# Patient Record
Sex: MALE | Race: WHITE | NOT HISPANIC OR LATINO | URBAN - METROPOLITAN AREA
[De-identification: names, ages, dates, MRNs, and addresses within clinical notes are randomized per-mention and may not be internally consistent; named-entity substitution may affect disease eponyms.]

---

## 2017-01-23 ENCOUNTER — OUTPATIENT (OUTPATIENT)
Dept: OUTPATIENT SERVICES | Facility: HOSPITAL | Age: 57
LOS: 1 days | End: 2017-01-23

## 2017-01-23 ENCOUNTER — EMERGENCY (EMERGENCY)
Facility: HOSPITAL | Age: 57
LOS: 1 days | End: 2017-01-23
Payer: MEDICARE

## 2017-01-23 PROCEDURE — 99285 EMERGENCY DEPT VISIT HI MDM: CPT

## 2017-01-23 PROCEDURE — 74176 CT ABD & PELVIS W/O CONTRAST: CPT | Mod: 26

## 2017-01-23 PROCEDURE — 71010: CPT | Mod: 26

## 2017-01-23 PROCEDURE — 76700 US EXAM ABDOM COMPLETE: CPT | Mod: 26

## 2017-02-07 ENCOUNTER — EMERGENCY (EMERGENCY)
Facility: HOSPITAL | Age: 57
LOS: 1 days | End: 2017-02-07
Payer: MEDICARE

## 2017-02-07 PROCEDURE — 99284 EMERGENCY DEPT VISIT MOD MDM: CPT

## 2017-02-07 PROCEDURE — 74177 CT ABD & PELVIS W/CONTRAST: CPT | Mod: 26

## 2017-07-24 ENCOUNTER — OUTPATIENT (OUTPATIENT)
Dept: EMERGENCY DEPT | Facility: HOSPITAL | Age: 57
LOS: 1 days | End: 2017-07-24
Payer: MEDICARE

## 2017-07-24 VITALS
RESPIRATION RATE: 26 BRPM | DIASTOLIC BLOOD PRESSURE: 88 MMHG | SYSTOLIC BLOOD PRESSURE: 138 MMHG | OXYGEN SATURATION: 95 % | HEART RATE: 102 BPM

## 2017-07-24 DIAGNOSIS — F17.200 NICOTINE DEPENDENCE, UNSPECIFIED, UNCOMPLICATED: ICD-10-CM

## 2017-07-24 DIAGNOSIS — J98.6 DISORDERS OF DIAPHRAGM: ICD-10-CM

## 2017-07-24 DIAGNOSIS — R65.10 SYSTEMIC INFLAMMATORY RESPONSE SYNDROME (SIRS) OF NON-INFECTIOUS ORIGIN WITHOUT ACUTE ORGAN DYSFUNCTION: ICD-10-CM

## 2017-07-24 DIAGNOSIS — I10 ESSENTIAL (PRIMARY) HYPERTENSION: ICD-10-CM

## 2017-07-24 DIAGNOSIS — I50.9 HEART FAILURE, UNSPECIFIED: ICD-10-CM

## 2017-07-24 DIAGNOSIS — R07.9 CHEST PAIN, UNSPECIFIED: ICD-10-CM

## 2017-07-24 DIAGNOSIS — R10.9 UNSPECIFIED ABDOMINAL PAIN: ICD-10-CM

## 2017-07-24 DIAGNOSIS — J44.9 CHRONIC OBSTRUCTIVE PULMONARY DISEASE, UNSPECIFIED: ICD-10-CM

## 2017-07-24 DIAGNOSIS — Z29.9 ENCOUNTER FOR PROPHYLACTIC MEASURES, UNSPECIFIED: ICD-10-CM

## 2017-07-24 LAB
ALBUMIN SERPL ELPH-MCNC: 4.1 G/DL — SIGNIFICANT CHANGE UP (ref 3.3–5)
ALBUMIN SERPL ELPH-MCNC: 4.3 G/DL — SIGNIFICANT CHANGE UP (ref 3.3–5)
ALP SERPL-CCNC: 59 U/L — SIGNIFICANT CHANGE UP (ref 40–120)
ALP SERPL-CCNC: 65 U/L — SIGNIFICANT CHANGE UP (ref 40–120)
ALT FLD-CCNC: 28 U/L RC — SIGNIFICANT CHANGE UP (ref 10–45)
ALT FLD-CCNC: 31 U/L RC — SIGNIFICANT CHANGE UP (ref 10–45)
AMPHET UR-MCNC: POSITIVE
AMYLASE P1 CFR SERPL: 29 U/L — SIGNIFICANT CHANGE UP (ref 25–125)
AMYLASE P1 CFR SERPL: 32 U/L — SIGNIFICANT CHANGE UP (ref 25–125)
ANION GAP SERPL CALC-SCNC: 15 MMOL/L — SIGNIFICANT CHANGE UP (ref 5–17)
ANION GAP SERPL CALC-SCNC: 15 MMOL/L — SIGNIFICANT CHANGE UP (ref 5–17)
APPEARANCE UR: CLEAR — SIGNIFICANT CHANGE UP
APPEARANCE UR: CLEAR — SIGNIFICANT CHANGE UP
APTT BLD: 39.8 SEC — HIGH (ref 27.5–37.4)
AST SERPL-CCNC: 21 U/L — SIGNIFICANT CHANGE UP (ref 10–40)
AST SERPL-CCNC: 25 U/L — SIGNIFICANT CHANGE UP (ref 10–40)
BARBITURATES UR SCN-MCNC: NEGATIVE — SIGNIFICANT CHANGE UP
BASOPHILS # BLD AUTO: 0 K/UL — SIGNIFICANT CHANGE UP (ref 0–0.2)
BASOPHILS # BLD AUTO: 0.1 K/UL — SIGNIFICANT CHANGE UP (ref 0–0.2)
BASOPHILS NFR BLD AUTO: 0 % — SIGNIFICANT CHANGE UP (ref 0–2)
BASOPHILS NFR BLD AUTO: 0.3 % — SIGNIFICANT CHANGE UP (ref 0–2)
BENZODIAZ UR-MCNC: NEGATIVE — SIGNIFICANT CHANGE UP
BILIRUB SERPL-MCNC: 0.5 MG/DL — SIGNIFICANT CHANGE UP (ref 0.2–1.2)
BILIRUB SERPL-MCNC: 0.8 MG/DL — SIGNIFICANT CHANGE UP (ref 0.2–1.2)
BILIRUB UR-MCNC: NEGATIVE — SIGNIFICANT CHANGE UP
BILIRUB UR-MCNC: NEGATIVE — SIGNIFICANT CHANGE UP
BLD GP AB SCN SERPL QL: NEGATIVE — SIGNIFICANT CHANGE UP
BUN SERPL-MCNC: 14 MG/DL — SIGNIFICANT CHANGE UP (ref 7–23)
BUN SERPL-MCNC: 15 MG/DL — SIGNIFICANT CHANGE UP (ref 7–23)
CALCIUM SERPL-MCNC: 9 MG/DL — SIGNIFICANT CHANGE UP (ref 8.4–10.5)
CALCIUM SERPL-MCNC: 9.2 MG/DL — SIGNIFICANT CHANGE UP (ref 8.4–10.5)
CHLORIDE SERPL-SCNC: 106 MMOL/L — SIGNIFICANT CHANGE UP (ref 96–108)
CHLORIDE SERPL-SCNC: 106 MMOL/L — SIGNIFICANT CHANGE UP (ref 96–108)
CK MB BLD-MCNC: 1.7 % — SIGNIFICANT CHANGE UP (ref 0–3.5)
CK MB CFR SERPL CALC: 2.7 NG/ML — SIGNIFICANT CHANGE UP (ref 0–6.7)
CK SERPL-CCNC: 159 U/L — SIGNIFICANT CHANGE UP (ref 30–200)
CO2 SERPL-SCNC: 21 MMOL/L — LOW (ref 22–31)
CO2 SERPL-SCNC: 22 MMOL/L — SIGNIFICANT CHANGE UP (ref 22–31)
COCAINE METAB.OTHER UR-MCNC: NEGATIVE — SIGNIFICANT CHANGE UP
COLOR SPEC: SIGNIFICANT CHANGE UP
COLOR SPEC: YELLOW — SIGNIFICANT CHANGE UP
CREAT SERPL-MCNC: 1.1 MG/DL — SIGNIFICANT CHANGE UP (ref 0.5–1.3)
CREAT SERPL-MCNC: 1.13 MG/DL — SIGNIFICANT CHANGE UP (ref 0.5–1.3)
DIFF PNL FLD: ABNORMAL
DIFF PNL FLD: ABNORMAL
EOSINOPHIL # BLD AUTO: 0.1 K/UL — SIGNIFICANT CHANGE UP (ref 0–0.5)
EOSINOPHIL # BLD AUTO: 0.2 K/UL — SIGNIFICANT CHANGE UP (ref 0–0.5)
EOSINOPHIL NFR BLD AUTO: 0.7 % — SIGNIFICANT CHANGE UP (ref 0–6)
EOSINOPHIL NFR BLD AUTO: 1.4 % — SIGNIFICANT CHANGE UP (ref 0–6)
GAS PNL BLDV: SIGNIFICANT CHANGE UP
GLUCOSE SERPL-MCNC: 107 MG/DL — HIGH (ref 70–99)
GLUCOSE SERPL-MCNC: 113 MG/DL — HIGH (ref 70–99)
GLUCOSE UR QL: NEGATIVE — SIGNIFICANT CHANGE UP
GLUCOSE UR QL: NEGATIVE — SIGNIFICANT CHANGE UP
HCT VFR BLD CALC: 43 % — SIGNIFICANT CHANGE UP (ref 39–50)
HCT VFR BLD CALC: 49.1 % — SIGNIFICANT CHANGE UP (ref 39–50)
HGB BLD-MCNC: 14.8 G/DL — SIGNIFICANT CHANGE UP (ref 13–17)
HGB BLD-MCNC: 15.6 G/DL — SIGNIFICANT CHANGE UP (ref 13–17)
INR BLD: 1 RATIO — SIGNIFICANT CHANGE UP (ref 0.88–1.16)
KETONES UR-MCNC: NEGATIVE — SIGNIFICANT CHANGE UP
KETONES UR-MCNC: NEGATIVE — SIGNIFICANT CHANGE UP
LACTATE SERPL-SCNC: 1 MMOL/L — SIGNIFICANT CHANGE UP (ref 0.7–2)
LEUKOCYTE ESTERASE UR-ACNC: NEGATIVE — SIGNIFICANT CHANGE UP
LEUKOCYTE ESTERASE UR-ACNC: NEGATIVE — SIGNIFICANT CHANGE UP
LIDOCAIN IGE QN: 23 U/L — SIGNIFICANT CHANGE UP (ref 7–60)
LIDOCAIN IGE QN: 23 U/L — SIGNIFICANT CHANGE UP (ref 7–60)
LYMPHOCYTES # BLD AUTO: 1.3 K/UL — SIGNIFICANT CHANGE UP (ref 1–3.3)
LYMPHOCYTES # BLD AUTO: 1.8 K/UL — SIGNIFICANT CHANGE UP (ref 1–3.3)
LYMPHOCYTES # BLD AUTO: 10.6 % — LOW (ref 13–44)
LYMPHOCYTES # BLD AUTO: 11.6 % — LOW (ref 13–44)
MCHC RBC-ENTMCNC: 29.7 PG — SIGNIFICANT CHANGE UP (ref 27–34)
MCHC RBC-ENTMCNC: 31.7 GM/DL — LOW (ref 32–36)
MCHC RBC-ENTMCNC: 32.1 PG — SIGNIFICANT CHANGE UP (ref 27–34)
MCHC RBC-ENTMCNC: 34.5 GM/DL — SIGNIFICANT CHANGE UP (ref 32–36)
MCV RBC AUTO: 93.1 FL — SIGNIFICANT CHANGE UP (ref 80–100)
MCV RBC AUTO: 93.7 FL — SIGNIFICANT CHANGE UP (ref 80–100)
METHADONE UR-MCNC: NEGATIVE — SIGNIFICANT CHANGE UP
MONOCYTES # BLD AUTO: 0.9 K/UL — SIGNIFICANT CHANGE UP (ref 0–0.9)
MONOCYTES # BLD AUTO: 1.2 K/UL — HIGH (ref 0–0.9)
MONOCYTES NFR BLD AUTO: 7.4 % — SIGNIFICANT CHANGE UP (ref 2–14)
MONOCYTES NFR BLD AUTO: 8.3 % — SIGNIFICANT CHANGE UP (ref 2–14)
NEUTROPHILS # BLD AUTO: 13.8 K/UL — HIGH (ref 1.8–7.4)
NEUTROPHILS # BLD AUTO: 8.8 K/UL — HIGH (ref 1.8–7.4)
NEUTROPHILS NFR BLD AUTO: 78.7 % — HIGH (ref 43–77)
NEUTROPHILS NFR BLD AUTO: 81 % — HIGH (ref 43–77)
NITRITE UR-MCNC: NEGATIVE — SIGNIFICANT CHANGE UP
NITRITE UR-MCNC: NEGATIVE — SIGNIFICANT CHANGE UP
NT-PROBNP SERPL-SCNC: 559 PG/ML — HIGH (ref 0–300)
OPIATES UR-MCNC: NEGATIVE — SIGNIFICANT CHANGE UP
OXYCODONE UR-MCNC: NEGATIVE — SIGNIFICANT CHANGE UP
PCP SPEC-MCNC: SIGNIFICANT CHANGE UP
PCP UR-MCNC: NEGATIVE — SIGNIFICANT CHANGE UP
PH UR: 6 — SIGNIFICANT CHANGE UP (ref 5–8)
PH UR: 6.5 — SIGNIFICANT CHANGE UP (ref 5–8)
PLATELET # BLD AUTO: 235 K/UL — SIGNIFICANT CHANGE UP (ref 150–400)
PLATELET # BLD AUTO: 285 K/UL — SIGNIFICANT CHANGE UP (ref 150–400)
POTASSIUM SERPL-MCNC: 3.5 MMOL/L — SIGNIFICANT CHANGE UP (ref 3.5–5.3)
POTASSIUM SERPL-MCNC: 3.6 MMOL/L — SIGNIFICANT CHANGE UP (ref 3.5–5.3)
POTASSIUM SERPL-SCNC: 3.5 MMOL/L — SIGNIFICANT CHANGE UP (ref 3.5–5.3)
POTASSIUM SERPL-SCNC: 3.6 MMOL/L — SIGNIFICANT CHANGE UP (ref 3.5–5.3)
PROT SERPL-MCNC: 7 G/DL — SIGNIFICANT CHANGE UP (ref 6–8.3)
PROT SERPL-MCNC: 7.6 G/DL — SIGNIFICANT CHANGE UP (ref 6–8.3)
PROT UR-MCNC: SIGNIFICANT CHANGE UP
PROT UR-MCNC: SIGNIFICANT CHANGE UP
PROTHROM AB SERPL-ACNC: 10.9 SEC — SIGNIFICANT CHANGE UP (ref 9.8–12.7)
RBC # BLD: 4.62 M/UL — SIGNIFICANT CHANGE UP (ref 4.2–5.8)
RBC # BLD: 5.24 M/UL — SIGNIFICANT CHANGE UP (ref 4.2–5.8)
RBC # FLD: 13.1 % — SIGNIFICANT CHANGE UP (ref 10.3–14.5)
RBC # FLD: 13.2 % — SIGNIFICANT CHANGE UP (ref 10.3–14.5)
RBC CASTS # UR COMP ASSIST: ABNORMAL /HPF (ref 0–2)
RH IG SCN BLD-IMP: NEGATIVE — SIGNIFICANT CHANGE UP
SODIUM SERPL-SCNC: 142 MMOL/L — SIGNIFICANT CHANGE UP (ref 135–145)
SODIUM SERPL-SCNC: 143 MMOL/L — SIGNIFICANT CHANGE UP (ref 135–145)
SP GR SPEC: >1.03 — HIGH (ref 1.01–1.02)
SP GR SPEC: >1.03 — HIGH (ref 1.01–1.02)
THC UR QL: NEGATIVE — SIGNIFICANT CHANGE UP
TROPONIN T SERPL-MCNC: <0.01 NG/ML — SIGNIFICANT CHANGE UP (ref 0–0.06)
UROBILINOGEN FLD QL: NEGATIVE — SIGNIFICANT CHANGE UP
UROBILINOGEN FLD QL: NEGATIVE — SIGNIFICANT CHANGE UP
WBC # BLD: 11.1 K/UL — HIGH (ref 3.8–10.5)
WBC # BLD: 17 K/UL — HIGH (ref 3.8–10.5)
WBC # FLD AUTO: 11.1 K/UL — HIGH (ref 3.8–10.5)
WBC # FLD AUTO: 17 K/UL — HIGH (ref 3.8–10.5)
WBC UR QL: SIGNIFICANT CHANGE UP /HPF (ref 0–5)

## 2017-07-24 PROCEDURE — 93010 ELECTROCARDIOGRAM REPORT: CPT

## 2017-07-24 RX ORDER — FLUTICASONE PROPIONATE 50 MCG
1 SPRAY, SUSPENSION NASAL DAILY
Qty: 0 | Refills: 0 | Status: DISCONTINUED | OUTPATIENT
Start: 2017-07-24 | End: 2017-07-25

## 2017-07-24 RX ORDER — DEXTROAMPHETAMINE SACCHARATE, AMPHETAMINE ASPARTATE, DEXTROAMPHETAMINE SULFATE AND AMPHETAMINE SULFATE 1.875; 1.875; 1.875; 1.875 MG/1; MG/1; MG/1; MG/1
30 TABLET ORAL DAILY
Qty: 0 | Refills: 0 | Status: DISCONTINUED | OUTPATIENT
Start: 2017-07-24 | End: 2017-07-25

## 2017-07-24 RX ORDER — NICOTINE POLACRILEX 2 MG
1 GUM BUCCAL DAILY
Qty: 0 | Refills: 0 | Status: DISCONTINUED | OUTPATIENT
Start: 2017-07-24 | End: 2017-07-25

## 2017-07-24 RX ORDER — SODIUM CHLORIDE 9 MG/ML
1000 INJECTION INTRAMUSCULAR; INTRAVENOUS; SUBCUTANEOUS ONCE
Qty: 0 | Refills: 0 | Status: COMPLETED | OUTPATIENT
Start: 2017-07-24 | End: 2017-07-24

## 2017-07-24 RX ORDER — NITROGLYCERIN 6.5 MG
0.4 CAPSULE, EXTENDED RELEASE ORAL
Qty: 0 | Refills: 0 | Status: DISCONTINUED | OUTPATIENT
Start: 2017-07-24 | End: 2017-07-24

## 2017-07-24 RX ORDER — ALPRAZOLAM 0.25 MG
1.5 TABLET ORAL AT BEDTIME
Qty: 0 | Refills: 0 | Status: DISCONTINUED | OUTPATIENT
Start: 2017-07-24 | End: 2017-07-25

## 2017-07-24 RX ORDER — PANTOPRAZOLE SODIUM 20 MG/1
40 TABLET, DELAYED RELEASE ORAL
Qty: 0 | Refills: 0 | Status: DISCONTINUED | OUTPATIENT
Start: 2017-07-24 | End: 2017-07-25

## 2017-07-24 RX ORDER — ASPIRIN/CALCIUM CARB/MAGNESIUM 324 MG
81 TABLET ORAL DAILY
Qty: 0 | Refills: 0 | Status: DISCONTINUED | OUTPATIENT
Start: 2017-07-24 | End: 2017-07-25

## 2017-07-24 RX ORDER — ENOXAPARIN SODIUM 100 MG/ML
40 INJECTION SUBCUTANEOUS DAILY
Qty: 0 | Refills: 0 | Status: DISCONTINUED | OUTPATIENT
Start: 2017-07-24 | End: 2017-07-25

## 2017-07-24 RX ORDER — KETOROLAC TROMETHAMINE 30 MG/ML
30 SYRINGE (ML) INJECTION ONCE
Qty: 0 | Refills: 0 | Status: DISCONTINUED | OUTPATIENT
Start: 2017-07-24 | End: 2017-07-24

## 2017-07-24 RX ORDER — SODIUM CHLORIDE 9 MG/ML
1000 INJECTION INTRAMUSCULAR; INTRAVENOUS; SUBCUTANEOUS
Qty: 0 | Refills: 0 | Status: DISCONTINUED | OUTPATIENT
Start: 2017-07-24 | End: 2017-07-25

## 2017-07-24 RX ORDER — ARIPIPRAZOLE 15 MG/1
2.5 TABLET ORAL AT BEDTIME
Qty: 0 | Refills: 0 | Status: DISCONTINUED | OUTPATIENT
Start: 2017-07-24 | End: 2017-07-24

## 2017-07-24 RX ORDER — HYDROMORPHONE HYDROCHLORIDE 2 MG/ML
0.5 INJECTION INTRAMUSCULAR; INTRAVENOUS; SUBCUTANEOUS EVERY 4 HOURS
Qty: 0 | Refills: 0 | Status: DISCONTINUED | OUTPATIENT
Start: 2017-07-24 | End: 2017-07-25

## 2017-07-24 RX ORDER — MORPHINE SULFATE 50 MG/1
2 CAPSULE, EXTENDED RELEASE ORAL ONCE
Qty: 0 | Refills: 0 | Status: DISCONTINUED | OUTPATIENT
Start: 2017-07-24 | End: 2017-07-24

## 2017-07-24 RX ORDER — CARVEDILOL PHOSPHATE 80 MG/1
12.5 CAPSULE, EXTENDED RELEASE ORAL EVERY 12 HOURS
Qty: 0 | Refills: 0 | Status: DISCONTINUED | OUTPATIENT
Start: 2017-07-24 | End: 2017-07-24

## 2017-07-24 RX ORDER — IBUPROFEN 200 MG
600 TABLET ORAL ONCE
Qty: 0 | Refills: 0 | Status: DISCONTINUED | OUTPATIENT
Start: 2017-07-24 | End: 2017-07-24

## 2017-07-24 RX ORDER — ASPIRIN/CALCIUM CARB/MAGNESIUM 324 MG
325 TABLET ORAL ONCE
Qty: 0 | Refills: 0 | Status: COMPLETED | OUTPATIENT
Start: 2017-07-24 | End: 2017-07-24

## 2017-07-24 RX ORDER — SODIUM CHLORIDE 9 MG/ML
3 INJECTION INTRAMUSCULAR; INTRAVENOUS; SUBCUTANEOUS ONCE
Qty: 0 | Refills: 0 | Status: COMPLETED | OUTPATIENT
Start: 2017-07-24 | End: 2017-07-24

## 2017-07-24 RX ORDER — LORATADINE 10 MG/1
10 TABLET ORAL DAILY
Qty: 0 | Refills: 0 | Status: DISCONTINUED | OUTPATIENT
Start: 2017-07-24 | End: 2017-07-25

## 2017-07-24 RX ORDER — IBUPROFEN 200 MG
600 TABLET ORAL THREE TIMES A DAY
Qty: 0 | Refills: 0 | Status: DISCONTINUED | OUTPATIENT
Start: 2017-07-24 | End: 2017-07-24

## 2017-07-24 RX ORDER — CARVEDILOL PHOSPHATE 80 MG/1
6.25 CAPSULE, EXTENDED RELEASE ORAL EVERY 12 HOURS
Qty: 0 | Refills: 0 | Status: DISCONTINUED | OUTPATIENT
Start: 2017-07-24 | End: 2017-07-25

## 2017-07-24 RX ORDER — NITROGLYCERIN 6.5 MG
1 CAPSULE, EXTENDED RELEASE ORAL ONCE
Qty: 0 | Refills: 0 | Status: DISCONTINUED | OUTPATIENT
Start: 2017-07-24 | End: 2017-07-24

## 2017-07-24 RX ORDER — DEXTROAMPHETAMINE SACCHARATE, AMPHETAMINE ASPARTATE, DEXTROAMPHETAMINE SULFATE AND AMPHETAMINE SULFATE 1.875; 1.875; 1.875; 1.875 MG/1; MG/1; MG/1; MG/1
20 TABLET ORAL THREE TIMES A DAY
Qty: 0 | Refills: 0 | Status: DISCONTINUED | OUTPATIENT
Start: 2017-07-24 | End: 2017-07-24

## 2017-07-24 RX ORDER — DEXTROAMPHETAMINE SACCHARATE, AMPHETAMINE ASPARTATE, DEXTROAMPHETAMINE SULFATE AND AMPHETAMINE SULFATE 1.875; 1.875; 1.875; 1.875 MG/1; MG/1; MG/1; MG/1
15 TABLET ORAL DAILY
Qty: 0 | Refills: 0 | Status: DISCONTINUED | OUTPATIENT
Start: 2017-07-24 | End: 2017-07-25

## 2017-07-24 RX ORDER — ONDANSETRON 8 MG/1
4 TABLET, FILM COATED ORAL EVERY 6 HOURS
Qty: 0 | Refills: 0 | Status: DISCONTINUED | OUTPATIENT
Start: 2017-07-24 | End: 2017-07-25

## 2017-07-24 RX ORDER — IPRATROPIUM/ALBUTEROL SULFATE 18-103MCG
3 AEROSOL WITH ADAPTER (GRAM) INHALATION EVERY 6 HOURS
Qty: 0 | Refills: 0 | Status: DISCONTINUED | OUTPATIENT
Start: 2017-07-24 | End: 2017-07-25

## 2017-07-24 RX ORDER — ACETAMINOPHEN 500 MG
650 TABLET ORAL ONCE
Qty: 0 | Refills: 0 | Status: COMPLETED | OUTPATIENT
Start: 2017-07-24 | End: 2017-07-24

## 2017-07-24 RX ADMIN — Medication 325 MILLIGRAM(S): at 10:25

## 2017-07-24 RX ADMIN — DEXTROAMPHETAMINE SACCHARATE, AMPHETAMINE ASPARTATE, DEXTROAMPHETAMINE SULFATE AND AMPHETAMINE SULFATE 15 MILLIGRAM(S): 1.875; 1.875; 1.875; 1.875 TABLET ORAL at 17:03

## 2017-07-24 RX ADMIN — Medication 0.4 MILLIGRAM(S): at 10:30

## 2017-07-24 RX ADMIN — MORPHINE SULFATE 2 MILLIGRAM(S): 50 CAPSULE, EXTENDED RELEASE ORAL at 15:49

## 2017-07-24 RX ADMIN — Medication 650 MILLIGRAM(S): at 13:18

## 2017-07-24 RX ADMIN — MORPHINE SULFATE 2 MILLIGRAM(S): 50 CAPSULE, EXTENDED RELEASE ORAL at 15:17

## 2017-07-24 RX ADMIN — Medication 1 SPRAY(S): at 14:18

## 2017-07-24 RX ADMIN — Medication 30 MILLIGRAM(S): at 18:06

## 2017-07-24 RX ADMIN — Medication 650 MILLIGRAM(S): at 13:46

## 2017-07-24 RX ADMIN — Medication 0.4 MILLIGRAM(S): at 10:22

## 2017-07-24 RX ADMIN — Medication 600 MILLIGRAM(S): at 19:50

## 2017-07-24 RX ADMIN — PANTOPRAZOLE SODIUM 40 MILLIGRAM(S): 20 TABLET, DELAYED RELEASE ORAL at 21:13

## 2017-07-24 RX ADMIN — Medication 30 MILLIGRAM(S): at 17:33

## 2017-07-24 RX ADMIN — LORATADINE 10 MILLIGRAM(S): 10 TABLET ORAL at 14:19

## 2017-07-24 RX ADMIN — SODIUM CHLORIDE 50 MILLILITER(S): 9 INJECTION INTRAMUSCULAR; INTRAVENOUS; SUBCUTANEOUS at 21:15

## 2017-07-24 RX ADMIN — Medication 10 MILLIGRAM(S): at 17:33

## 2017-07-24 RX ADMIN — Medication 600 MILLIGRAM(S): at 19:18

## 2017-07-24 RX ADMIN — SODIUM CHLORIDE 1000 MILLILITER(S): 9 INJECTION INTRAMUSCULAR; INTRAVENOUS; SUBCUTANEOUS at 10:31

## 2017-07-24 RX ADMIN — DEXTROAMPHETAMINE SACCHARATE, AMPHETAMINE ASPARTATE, DEXTROAMPHETAMINE SULFATE AND AMPHETAMINE SULFATE 30 MILLIGRAM(S): 1.875; 1.875; 1.875; 1.875 TABLET ORAL at 17:03

## 2017-07-24 RX ADMIN — Medication 81 MILLIGRAM(S): at 14:18

## 2017-07-24 RX ADMIN — CARVEDILOL PHOSPHATE 6.25 MILLIGRAM(S): 80 CAPSULE, EXTENDED RELEASE ORAL at 17:33

## 2017-07-24 RX ADMIN — SODIUM CHLORIDE 3 MILLILITER(S): 9 INJECTION INTRAMUSCULAR; INTRAVENOUS; SUBCUTANEOUS at 10:31

## 2017-07-24 RX ADMIN — Medication 0.4 MILLIGRAM(S): at 13:18

## 2017-07-24 RX ADMIN — Medication 1.5 MILLIGRAM(S): at 21:13

## 2017-07-24 NOTE — PROGRESS NOTE ADULT - PROBLEM SELECTOR PLAN 2
8/10 abdominal pain  Unclear etiology. Ruled out for ACS with negative Cardiac Cath this am   In moderate distress due to abdominal pain  Differential includes Gastritis vs intestinal perforation vs gastric/duodenal ulcers vs ischemic bowel vs cholecystitis vs cholangitis.   WBC count 17 with Neutrophil predominance. Lactate 2.1  Will check CT abdomen to rule out 8/10 abdominal pain  Unclear etiology. Ruled out for ACS with negative Cardiac Cath this am   In moderate distress due to abdominal pain  Differential includes Gastritis vs intestinal perforation vs gastric/duodenal ulcers vs ischemic bowel vs cholecystitis vs cholangitis.   WBC count 17 with Neutrophil predominance. Lactate 2.1  Will check CT abdomen to rule out cholecystitis vs pancreatitis 8/10 abdominal pain  Unclear etiology. Ruled out for ACS with negative Cardiac Cath this am   In moderate distress due to abdominal pain  Differential includes Gastritis vs intestinal perforation vs gastric/duodenal ulcers vs ischemic bowel vs cholecystitis vs cholangitis.   WBC count 17 with Neutrophil predominance. Lactate 2.1  Repeat Labs. NPO for now. IV hydration   Pain control with Dilaudid   Will check CT abdomen to rule out cholecystitis vs pancreatitis

## 2017-07-24 NOTE — PROGRESS NOTE ADULT - PROBLEM SELECTOR PLAN 7
Significant smoker   Nocturnal home BiPAP  c/w CPAP Significant smoker   Nocturnal home BiPAP  c/w CPAP  Duonebs PRN

## 2017-07-24 NOTE — ED PROVIDER NOTE - PHYSICAL EXAMINATION
AAOX3, Moderate to severe pain, appears uncomfortable. NCAT, PERRL, no pallor or icterus, MMM Neck Supple, no JVD, HR regular, tachycardic no murmur apprecated. CTABL, no wheeze or rales, ABD S/NT/ND EXT warm, well perfused, No Edema, Distal Pulses Intact, No Rash,  MAEx4, Sensation to soft touch grossly intact, normal strength/tone. AAOX3, Moderate to severe pain, appears uncomfortable. NCAT, PERRL, no pallor or icterus, MMM Neck Supple, no JVD, HR regular, tachycardic no murmur apprecated. CTABL, no wheeze or rales, ABD S/NT/ND EXT warm, well perfused, No Edema, Distal Pulses Intact, No Rash,  MAEx4, Sensation to soft touch grossly intact, normal strength/tone.      louie - no murmur ctabl s1s2 = pulses x4 no bp diffwernetial -

## 2017-07-24 NOTE — ED PROVIDER NOTE - MEDICAL DECISION MAKING DETAILS
Chest pain consistent with ACS and CAD risk factors 1) serial EKGs/CXR/ASA/O2/cardiac monitor/LABS/nitro prn CP. A cath consult was called for persistant pain with typical CP aw bedside echo demonstrating hypokenesis of anterior wall. Pt pain improved from 10/10 to 2/10 sp NTG SL x 2. On evaluation pt began to co of left lower extremeity numbness and parasthesia. Pt was reevaluated and found to have subjective sensory deficit in LLE to soft touch. Heparin held for concern for dissection. XRAY demonstrated nL mediastinum, no pericardial effusion on echo. Cardiology evaluated at [facility]. Pt accepted for diagnostic angiogram and cath. Chest pain consistent with ACS and CAD risk factors 1) serial EKGs/CXR/ASA/O2/cardiac monitor/LABS/nitro prn CP. A cath consult was called for persistant pain with typical CP aw bedside echo demonstrating hypokenesis of anterior wall. Pt pain improved from 10/10 to 2/10 sp NTG SL x 2. On evaluation pt began to co of left lower extremeity numbness and parasthesia. Pt was reevaluated and found to have subjective sensory deficit in LLE to soft touch. Heparin held for concern for dissection. XRAY demonstrated nL mediastinum, no pericardial effusion on echo. Cardiology evaluated at [facility]. Pt accepted for diagnostic angiogram and cath.  louie sscp with rad to l arm - ekg no stemi non dynamic no post wall changes - echo with ant wall hypokinesis - concern for AMI - pt describes transient lle numbnes sin ed - heparin held - will shoot aortogram in cath lab

## 2017-07-24 NOTE — ED PROVIDER NOTE - PROGRESS NOTE DETAILS
Talking with wife - Allergic to statin's and lisinopril, avap for partial paralyzed diaphragm\, copd, tested + for cocksackie,   Takes - prednisone, Adderall,  Carvedilol, Discussed with wife - Pt is allergic to statin's and lisinopril, unkown reaction. Pt uses avap for partial paralyzed diaphragm, has hx of copd, last year was tested + for cocksackie, Pt takes - prednisone, Adderall,  Carvedilol, ASA. - Forrest Coleman, Resident concerning story despite no stelev - cath called - asa and heparin ordered - heparin held bc of transient lle numbness - atypical for disection - howver will hold heparin - no peric effusion on pocus - no widened med on cxr - cards ok with shooting aortogram in lab in lieu of cta pt pain free after 2 sl ntg

## 2017-07-24 NOTE — H&P CARDIOLOGY - HISTORY OF PRESENT ILLNESS
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 Patient reports he developed left sided chest pain radiating to left arm after waking this am. Pain was present until he arrived to the ER at 10 am. Upon arrival to ER, patient had negative troponins and ECHO was done which showed   Taken to cath lab for urgent cath- revealing normal coronaries. 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 Patient reports he developed left sided chest pain radiating to left arm after waking this am. Pain was present until he arrived to the ER at 10 am. Upon arrival to ER, patient had negative troponin x 1  and ECHO was done which showed anterior wall hypokinesis.  Taken to cath lab for urgent cath- revealing normal coronaries. 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 Patient reports he developed left sided chest pain radiating to left arm after waking this am. Pain was present until he arrived to the ER at 10 am. Upon arrival to ER, patient had negative troponin x 1  and ECHO was done which showed anterior wall hypokinesis.  Taken to cath lab for urgent cath- revealing normal coronaries. 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 Patient reports he developed left sided chest pain radiating to left arm after waking this am. Pain was associated with dyspnea, and lasted until he arrived to the ER at 10 am. Upon arrival to ER, patient had negative troponin x 1  and ECHO was done which showed anterior wall hypokinesis.  Taken to cath lab for cardiac cath- revealing normal coronaries. Right radial access with radial band in place- no hematoma or bleeding + cap refill <2 secs. Admitted to hospitalist for further evaluation of chest pain and ESCALERA. 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 Patient reports he developed left sided chest pain radiating to left arm after waking this am. Pain was associated with dyspnea, and lasted until he arrived to the ER at 10 am. Upon arrival to ER, patient had negative troponin x 1  and ECHO was done which showed anterior wall hypokinesis.  Taken to cath lab for cardiac cath- revealing normal coronaries. Right radial access with radial band in place- no hematoma or bleeding + cap refill <2 secs. Admitted to hospitalist for further evaluation of chest pain and ESCALERA. 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 Patient reports he developed left sided chest pain radiating to left arm after waking this am. Pain was associated with dyspnea, and lasted until he arrived to the ER at 10 am. Upon arrival to ER, patient had negative troponin x 1  and ECHO was done which showed anterior wall hypokinesis.  Taken to cath lab for cardiac cath- revealing normal coronaries. Right radial access with radial band in place- no hematoma or bleeding + cap refill <2 secs. Admitted to hospitalist for further evaluation of chest pain and ESCALERA. 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 Patient reports he developed left sided chest pain radiating to left arm after waking this am. Pain was associated with dyspnea, and lasted until he arrived to the ER at 10 am. Upon arrival to ER, patient had negative troponin x 1  and ECHO was done which showed anterior wall hypokinesis.  Taken to cath lab for cardiac cath- revealing normal coronaries. Right radial access with radial band in place- no hematoma or bleeding + cap refill <2 secs. Admitted to hospitalist for further evaluation of chest pain and ESCALERA. 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 Patient reports he developed left sided chest pain radiating to left arm after waking this am. Pain was associated with dyspnea, and lasted until he arrived to the ER at 10 am. Upon arrival to ER, patient had negative troponin x 1  and ECHO was done which showed anterior wall hypokinesis.  Taken to cath lab for cardiac cath- revealing normal coronaries. Right radial access with radial band in place- no hematoma or bleeding + cap refill <2 secs. Admitted to hospitalist for further evaluation of chest pain and ESCALERA.

## 2017-07-24 NOTE — PROGRESS NOTE ADULT - ATTENDING COMMENTS
Pt seen and examined by me with [facility] resident.  Agree with above. 57 yr old male with PMH of bipolar disorder (non compliant with medication), ADHD,  current smoker (>70 pack year smoking hx), COPD- followed by Dr Solomon Heard PCP, partial paralysed diaphragm of unclear etiology (on prednisone-unclear why),  LORNA on home CPAP, HTN, HLD, HF- EF 10% by report s/p negative cath after being admitted this am for cp radiating to left arm, concern for ACS.  On our history, pt states pain is epigastric radiating up to neck and straight through to back.  Wife states pt has been having this pain for 4 yrs and has had extensive work up including endoscopies, CT scans (last 2 yrs ago) with unclear etiology.  Does not remember last TTE.  no weight loss, nl appetite, states he always feels better once he is on CPAP - per resident initial h/p pt was in distress from pain.  On my visit pt was found sleeping on bipap, + tenderness with guarding epigastrium/RUQ and has mild L.CVAT.  Pt states occasionally pain radiates to scrotum but no swelling/skin changes.  labs/studies/consult notes reviewed.  1.  abdominal/chest/back pain - unclear etiology but concern for initial leukocytosis/elevated lactate in immunosuppressed pt.  repeating STAT labs with blood cultures and ck CT a/p  plus chest ? underlying malignancy or mass causing symptoms given smoking/etoh/drug history.    if wbc/lactate are increasing will cover with zosyn.  if negative w/u ? repeat endoscopy  2.  microscopic hematuria - repeat ua, CT to be done with urography to r/o stone/ureter pathology, ? underlying bladder or renal CA  3.  chf - ck tte, cont coreg and asa  4.  adhd  - confirmed adderall and xanax

## 2017-07-24 NOTE — ED PROVIDER NOTE - OBJECTIVE STATEMENT
58yo M pmhx of partial diaphragm paralysis, CHF unknown ef, HTN, pw 10/10 crushing sscp rad to left arm. Pt states pain began on waking this AM at 5:30 and has steadily increased. A/w sob, no n/v, diaphoresis, focal numbness. No hx of recent CP. Pt is a  on local roots. Pt has 1 pack a day tobacco hx. Pt has family hx of CAD but does not know age of onset.     No fever/chills, no change in vision, no throat pain, no jvd, +sob, +cp, no leg swelling or calf pain, no orthopnea, no pnd, no decrease exersized tolerance, no recent travel, no cancer hx, no prior hx of dvt/blood clot,  no abdominal pain, no nausea/vomiting,  no dysuria, no joint pain, no rashes, no focal numbness or weakness, no known mental health issues, no latex allergy, no diabetes,

## 2017-07-24 NOTE — PATIENT PROFILE ADULT. - VISION (WITH CORRECTIVE LENSES IF THE PATIENT USUALLY WEARS THEM):
reading and distance/Partially impaired: cannot see medication labels or newsprint, but can see obstacles in path, and the surrounding layout; can count fingers at arm's length

## 2017-07-24 NOTE — PROGRESS NOTE ADULT - PROBLEM SELECTOR PLAN 1
RR>22, HR>90 With WBC>12  Unclear etiology. Ruled out for ACS with negative Cardiac Cath this am   In moderate distress due to abdominal pain  Differential includes Gastritis vs intestinal perforation vs gastric/duodenal ulcers vs ischemic bowel vs cholecystitis vs cholangitis.   WBC count 17 with Neutrophil predominance. Lactate 2.1  Will check CT abdomen to rule out pancreatitis vs cholecystitis RR>22, HR>90 With WBC>12  Unclear etiology. Ruled out for ACS with negative Cardiac Cath this am   In moderate distress due to abdominal pain  Differential includes Gastritis vs intestinal perforation vs gastric/duodenal ulcers vs ischemic bowel vs cholecystitis vs cholangitis.   WBC count 17 with Neutrophil predominance. Lactate 2.1  Will check CT abdomen to rule out cholecystitis vs pancreatitis RR>22, HR>90 With WBC>12  Unclear etiology. Ruled out for ACS with negative Cardiac Cath this am   In moderate distress due to abdominal pain  Differential includes Gastritis vs intestinal perforation vs gastric/duodenal ulcers vs ischemic bowel vs cholecystitis vs cholangitis.   WBC count 17 with Neutrophil predominance. Lactate 2.1  Will check CT abdomen to rule out cholecystitis vs pancreatitis  NPO. IVF. Repeat Labs and UA

## 2017-07-24 NOTE — PROGRESS NOTE ADULT - SUBJECTIVE AND OBJECTIVE BOX
Medicine Accept Note     Patient is a 57y old  Male who presents with a chief complaint of Chest pain while driving this morning (24 Jul 2017 12:22)      HPI / OVERNIGHT EVENTS:    Patient is a 57 yr old male with PMH of bipolar disorder (non compliant with medication), ADHD,  current smoker (>70 pack year smoking hx), COPD- followed by Dr Solomon Heard PCP, partial paralysed diaphragm of unclear etiology (on prednisone-unclear why),  LORNA on home CPAP, HTN, HLD, HF- EF 10% as per wife 4 years ago, ETOH/cocaine abuse -in recovery x 22 years presented to ER this am with chest pain. Patient says that he began to experience epigastric pain along with chest pain acutely upon awakening this morning. The pain is crushing, 8/10 and feels as if someone is sitting on his chest. Patient says that he had similar symptoms 4 years ago and was hospitalized for CHF exacerbation for 9 days in [place] [facility]. He has been in his typical state of health and denies any orthopnea, PND, peripheral edema, recent sickness, or sick contacts. No BRBPR ro hematemesis. No weight changes or anorexia. Appetite intact. No dysuria or urinary hesitation. No flank pain. He denies taking any NSAIDs at home.     Upon arrival to ER, patient had negative troponin x 1  and ECHO was done which showed anterior wall hypokinesis. Taken to cath lab for cardiac cath- revealing normal coronaries. Admitted to medicine for further workup for abdominal pain.     Social hx: 70 pack year smoking hx  EtOH: quit 22 years ago   Used cocaine and marijuana 22 years ago as well     Review of Systems: GEN: + discomfort from pain. no fevers, chills, no night sweats no weight loss , no swollen lymph nodes    EYES: No blurry vision , no changes in vision  ENT: no sores, no runny nose, no sore throat   PULM: no cough, no shortness of breath   CARD: no chest pain, no palpitations    GI : + as per HPI  : no burning urination, no change in color of urine, no flank pain, no blood in urine   MSK: no swelling   SKIN: no bruising or bleeding, no sores in mouth  , no yellowing of the skin  Neuro: no lightheadedness, no headaches, no weakness, no fainting, no confusion , no seizures      MEDICATIONS  (STANDING):  nicotine - 21 mG/24Hr(s) Patch 1 Patch Transdermal daily  predniSONE   Tablet 10 milliGRAM(s) Oral two times a day  ALBUTerol/ipratropium for Nebulization 3 milliLiter(s) Nebulizer every 6 hours  aspirin enteric coated 81 milliGRAM(s) Oral daily  loratadine 10 milliGRAM(s) Oral daily  ALPRAZolam 1.5 milliGRAM(s) Oral at bedtime  carvedilol 6.25 milliGRAM(s) Oral every 12 hours  amphetamine/dextroamphetamine 30 milliGRAM(s) Oral daily  fluticasone propionate 50 MICROgram(s)/spray Nasal Spray 1 Spray(s) Both Nostrils daily  amphetamine/dextroamphetamine 15 milliGRAM(s) Oral daily    MEDICATIONS  (PRN):  ondansetron Injectable 4 milliGRAM(s) IV Push every 6 hours PRN Nausea and/or Vomiting  ibuprofen  Tablet 600 milliGRAM(s) Oral three times a day PRN chest pain        CAPILLARY BLOOD GLUCOSE        I&O's Summary    24 Jul 2017 07:01  -  24 Jul 2017 19:46  --------------------------------------------------------  IN: 240 mL / OUT: 0 mL / NET: 240 mL        PHYSICAL EXAM:  GENERAL: NAD, well-developed, In mild to moderate distress from abdominal pain   HEAD:  Atraumatic, Normocephalic  EYES: EOMI, PERRLA, conjunctiva and sclera clear  NECK: Supple, No JVD, No Hepatojugular reflux   CHEST/LUNG: Crackles vs atelectasis b/l LLF; Upper lung fields clear. No wheezing. Hesitation in full respiration due to pain   HEART: Tachycardic to 120s. Regular rhythm; No murmurs, rubs, or gallops  ABDOMEN: Soft, Nondistended; Excessively TTP RUQ and epigastric regions. + Carpenter's sign. Hyperactive bowel sounds present  EXTREMITIES:  2+ Peripheral Pulses, No clubbing, cyanosis, or edema  PSYCH: AAOx3  NEUROLOGY: non-focal  SKIN: No rashes or lesions    LABS:                        15.6   17.0  )-----------( 285      ( 24 Jul 2017 10:32 )             49.1     07-24    143  |  106  |  15  ----------------------------<  107<H>  3.6   |  22  |  1.13    Ca    9.2      24 Jul 2017 10:32    TPro  7.6  /  Alb  4.3  /  TBili  0.5  /  DBili  x   /  AST  25  /  ALT  31  /  AlkPhos  65  07-24    PT/INR - ( 24 Jul 2017 10:32 )   PT: 10.9 sec;   INR: 1.00 ratio         PTT - ( 24 Jul 2017 10:32 )  PTT:39.8 sec  CARDIAC MARKERS ( 24 Jul 2017 10:32 )  x     / <0.01 ng/mL / 159 U/L / x     / 2.7 ng/mL      Urinalysis Basic - ( 24 Jul 2017 14:06 )    Color: x / Appearance: Clear / SG: >1.030 / pH: x  Gluc: x / Ketone: Negative  / Bili: Negative / Urobili: Negative   Blood: x / Protein: Trace / Nitrite: Negative   Leuk Esterase: Negative / RBC: 10-25 /HPF / WBC 2-5 /HPF   Sq Epi: x / Non Sq Epi: x / Bacteria: x        RADIOLOGY & ADDITIONAL TESTS:    Imaging Personally Reviewed:    Consultant(s) Notes Reviewed:      Care Discussed with Consultants/Other Providers: Medicine Accept Note     Patient is a 57y old  Male who presents with a chief complaint of Chest pain while driving this morning (24 Jul 2017 12:22)      HPI / OVERNIGHT EVENTS:    Patient is a 57 yr old male with PMH of bipolar disorder (non compliant with medication), ADHD,  current smoker (>70 pack year smoking hx), COPD- followed by Dr Solomon Heard PCP, partial paralysed diaphragm of unclear etiology (on prednisone-unclear why),  LORNA on home CPAP, HTN, HLD, HF- EF 10% as per wife 4 years ago, ETOH/cocaine abuse -in recovery x 22 years presented to ER this am with chest pain. Patient says that he began to experience epigastric pain along with chest pain acutely upon awakening this morning. The pain is crushing, 8/10 and feels as if someone is sitting on his chest. Patient says that he had similar symptoms 4 years ago and was hospitalized for CHF exacerbation for 9 days in [place] [facility]. He has been in his typical state of health and denies any orthopnea, PND, peripheral edema, recent sickness, or sick contacts. No BRBPR ro hematemesis. No weight changes or anorexia. Appetite intact. No dysuria or urinary hesitation. No flank pain. He denies taking any NSAIDs at home.     Upon arrival to ER, patient had negative troponin x 1  and ECHO was done which showed anterior wall hypokinesis. Taken to cath lab for cardiac cath- revealing normal coronaries. Admitted to medicine for further workup for abdominal pain.     iSTOP ref # [id number]  Confirmed xanax 1.5 qd ad amphetamine 20 mg (270 tabs per month supply)     PMHx: COPD, HTN, CHF?, ADHD, Diaphragmatic paralysis    Past surgical hx: None reported    Family hx: MI in father in his 50s. Also prostate CA in father. No other pertinent hx     Social hx: 70 pack year smoking hx  EtOH: quit 22 years ago   Used cocaine and marijuana 22 years ago as well     Review of Systems: GEN: + discomfort from pain. no fevers, chills, no night sweats no weight loss , no swollen lymph nodes    EYES: No blurry vision , no changes in vision  ENT: no sores, no runny nose, no sore throat   PULM: no cough, no shortness of breath   CARD: no chest pain, no palpitations    GI : + as per HPI  : no burning urination, no change in color of urine, no flank pain, no blood in urine   MSK: no swelling   SKIN: no bruising or bleeding, no sores in mouth  , no yellowing of the skin  Neuro: no lightheadedness, no headaches, no weakness, no fainting, no confusion , no seizures      MEDICATIONS  (STANDING):  nicotine - 21 mG/24Hr(s) Patch 1 Patch Transdermal daily  predniSONE   Tablet 10 milliGRAM(s) Oral two times a day  ALBUTerol/ipratropium for Nebulization 3 milliLiter(s) Nebulizer every 6 hours  aspirin enteric coated 81 milliGRAM(s) Oral daily  loratadine 10 milliGRAM(s) Oral daily  ALPRAZolam 1.5 milliGRAM(s) Oral at bedtime  carvedilol 6.25 milliGRAM(s) Oral every 12 hours  amphetamine/dextroamphetamine 30 milliGRAM(s) Oral daily  fluticasone propionate 50 MICROgram(s)/spray Nasal Spray 1 Spray(s) Both Nostrils daily  amphetamine/dextroamphetamine 15 milliGRAM(s) Oral daily    MEDICATIONS  (PRN):  ondansetron Injectable 4 milliGRAM(s) IV Push every 6 hours PRN Nausea and/or Vomiting  ibuprofen  Tablet 600 milliGRAM(s) Oral three times a day PRN chest pain        CAPILLARY BLOOD GLUCOSE        I&O's Summary    24 Jul 2017 07:01  -  24 Jul 2017 19:46  --------------------------------------------------------  IN: 240 mL / OUT: 0 mL / NET: 240 mL        PHYSICAL EXAM:  GENERAL: NAD, well-developed, In mild to moderate distress from abdominal pain   HEAD:  Atraumatic, Normocephalic  EYES: EOMI, PERRLA, conjunctiva and sclera clear  NECK: Supple, No JVD, No Hepatojugular reflux   CHEST/LUNG: Crackles vs atelectasis b/l LLF; Upper lung fields clear. No wheezing. Hesitation in full respiration due to pain   HEART: Tachycardic to 120s. Regular rhythm; No murmurs, rubs, or gallops  ABDOMEN: Soft, Nondistended; Excessively TTP RUQ and epigastric regions. + Carpenter's sign. Hyperactive bowel sounds present  EXTREMITIES:  2+ Peripheral Pulses, No clubbing, cyanosis, or edema  PSYCH: AAOx3  NEUROLOGY: non-focal  SKIN: No rashes or lesions    LABS:                        15.6   17.0  )-----------( 285      ( 24 Jul 2017 10:32 )             49.1     07-24    143  |  106  |  15  ----------------------------<  107<H>  3.6   |  22  |  1.13    Ca    9.2      24 Jul 2017 10:32    TPro  7.6  /  Alb  4.3  /  TBili  0.5  /  DBili  x   /  AST  25  /  ALT  31  /  AlkPhos  65  07-24    PT/INR - ( 24 Jul 2017 10:32 )   PT: 10.9 sec;   INR: 1.00 ratio         PTT - ( 24 Jul 2017 10:32 )  PTT:39.8 sec  CARDIAC MARKERS ( 24 Jul 2017 10:32 )  x     / <0.01 ng/mL / 159 U/L / x     / 2.7 ng/mL      Urinalysis Basic - ( 24 Jul 2017 14:06 )    Color: x / Appearance: Clear / SG: >1.030 / pH: x  Gluc: x / Ketone: Negative  / Bili: Negative / Urobili: Negative   Blood: x / Protein: Trace / Nitrite: Negative   Leuk Esterase: Negative / RBC: 10-25 /HPF / WBC 2-5 /HPF   Sq Epi: x / Non Sq Epi: x / Bacteria: x        RADIOLOGY & ADDITIONAL TESTS:    Imaging Personally Reviewed:    Consultant(s) Notes Reviewed:      Care Discussed with Consultants/Other Providers:

## 2017-07-24 NOTE — ED PROCEDURE NOTE - PROCEDURE ADDITIONAL DETAILS
Emergency Department Focused Ultrasound performed at patient's bedside.  The complete report can be found in PACS.  \

## 2017-07-24 NOTE — ED ADULT NURSE NOTE - OBJECTIVE STATEMENT
58 yo M presents to ED A+Ox3 c/o sudden onset of "crushing" chest pain. States the pain began at approx. 530am today, was driving when "the pain got too bad." 58 yo M presents to ED A+Ox3 c/o sudden onset of "crushing" chest pain. States the pain began at approx. 530am today, was driving when "the pain got too bad." Pt. states he is a PPD smoker, is a  stating "for local drives." Pt. arrives to exam room via wheelchair, clutching chest. Pt placed on CM, EKGs completed and given to MD as ordered at bedside. Breathing labored and tachypneic, placed on 2L NC. Skin warm pink and dry. Upon arrival reports arm pain and "my leg is getting numb." Two large IVs placed. Comfort and safety measured.

## 2017-07-24 NOTE — ED ADULT NURSE REASSESSMENT NOTE - NS ED NURSE REASSESS COMMENT FT1
Pt. reports improvement in pain following nitro administration. Pt. on CM. MD at bedside continuing to monitor patient.

## 2017-07-24 NOTE — H&P CARDIOLOGY - PMH
Chronic obstructive pulmonary disease, unspecified COPD type    HF (heart failure)    LORNA on CPAP    Smoker Chronic obstructive pulmonary disease, unspecified COPD type    Diaphragm dysfunction  partial paralysis  ETOH abuse    HF (heart failure)    LORNA on CPAP    Smoker Chronic obstructive pulmonary disease, unspecified COPD type    Diaphragm dysfunction  partial paralysis  ETOH abuse    HF (heart failure)    HLD (hyperlipidemia)    HTN (hypertension)    LORNA on CPAP    Smoker

## 2017-07-24 NOTE — PROGRESS NOTE ADULT - PROBLEM SELECTOR PLAN 5
Systolic HF as per patient   Will need to obtain records from outside hospital  continue beta blocker.   Euvolemic on exam Systolic HF as per patient   Will need to obtain records from outside hospital  continue beta blocker.   Euvolemic on exam  Check TTE

## 2017-07-24 NOTE — PATIENT PROFILE ADULT. - ABILITY TO HEAR (WITH HEARING AID OR HEARING APPLIANCE IF NORMALLY USED):
"I need to buy hearing aids"/Mildly to Moderately Impaired: difficulty hearing in some environments or speaker may need to increase volume or speak distinctly

## 2017-07-24 NOTE — PROGRESS NOTE ADULT - ASSESSMENT
Patient is a 57 years old male with PMH of systolic CHF (EF 10%?), LORNA on home CPAP, ADHD, Bipolar disorder, HTN and HLD who presents with acute onset of chest/ abdominal pain. Cardiac cath negative. Patient is a 57 years old male with PMH of systolic CHF (EF 10%?), LORNA on home CPAP, ADHD, Bipolar disorder, HTN and HLD who presents with acute onset of chest/ abdominal pain. Cardiac cath negative. Admitted for pain control

## 2017-07-25 ENCOUNTER — TRANSCRIPTION ENCOUNTER (OUTPATIENT)
Age: 57
End: 2017-07-25

## 2017-07-25 VITALS — WEIGHT: 214.95 LBS

## 2017-07-25 DIAGNOSIS — R10.13 EPIGASTRIC PAIN: ICD-10-CM

## 2017-07-25 DIAGNOSIS — R91.1 SOLITARY PULMONARY NODULE: ICD-10-CM

## 2017-07-25 DIAGNOSIS — G47.33 OBSTRUCTIVE SLEEP APNEA (ADULT) (PEDIATRIC): ICD-10-CM

## 2017-07-25 DIAGNOSIS — D72.829 ELEVATED WHITE BLOOD CELL COUNT, UNSPECIFIED: ICD-10-CM

## 2017-07-25 DIAGNOSIS — M54.42 LUMBAGO WITH SCIATICA, LEFT SIDE: ICD-10-CM

## 2017-07-25 DIAGNOSIS — R07.9 CHEST PAIN, UNSPECIFIED: ICD-10-CM

## 2017-07-25 DIAGNOSIS — F17.200 NICOTINE DEPENDENCE, UNSPECIFIED, UNCOMPLICATED: ICD-10-CM

## 2017-07-25 DIAGNOSIS — Z29.9 ENCOUNTER FOR PROPHYLACTIC MEASURES, UNSPECIFIED: ICD-10-CM

## 2017-07-25 DIAGNOSIS — N20.1 CALCULUS OF URETER: ICD-10-CM

## 2017-07-25 LAB
ANION GAP SERPL CALC-SCNC: 13 MMOL/L — SIGNIFICANT CHANGE UP (ref 5–17)
BUN SERPL-MCNC: 14 MG/DL — SIGNIFICANT CHANGE UP (ref 7–23)
CALCIUM SERPL-MCNC: 8.8 MG/DL — SIGNIFICANT CHANGE UP (ref 8.4–10.5)
CHLORIDE SERPL-SCNC: 108 MMOL/L — SIGNIFICANT CHANGE UP (ref 96–108)
CO2 SERPL-SCNC: 23 MMOL/L — SIGNIFICANT CHANGE UP (ref 22–31)
CREAT SERPL-MCNC: 1.03 MG/DL — SIGNIFICANT CHANGE UP (ref 0.5–1.3)
GLUCOSE SERPL-MCNC: 116 MG/DL — HIGH (ref 70–99)
HCT VFR BLD CALC: 43.7 % — SIGNIFICANT CHANGE UP (ref 39–50)
HGB BLD-MCNC: 14.7 G/DL — SIGNIFICANT CHANGE UP (ref 13–17)
MCHC RBC-ENTMCNC: 32 PG — SIGNIFICANT CHANGE UP (ref 27–34)
MCHC RBC-ENTMCNC: 33.5 GM/DL — SIGNIFICANT CHANGE UP (ref 32–36)
MCV RBC AUTO: 95.5 FL — SIGNIFICANT CHANGE UP (ref 80–100)
PLATELET # BLD AUTO: 212 K/UL — SIGNIFICANT CHANGE UP (ref 150–400)
POTASSIUM SERPL-MCNC: 4.2 MMOL/L — SIGNIFICANT CHANGE UP (ref 3.5–5.3)
POTASSIUM SERPL-SCNC: 4.2 MMOL/L — SIGNIFICANT CHANGE UP (ref 3.5–5.3)
RBC # BLD: 4.57 M/UL — SIGNIFICANT CHANGE UP (ref 4.2–5.8)
RBC # FLD: 13 % — SIGNIFICANT CHANGE UP (ref 10.3–14.5)
SODIUM SERPL-SCNC: 144 MMOL/L — SIGNIFICANT CHANGE UP (ref 135–145)
WBC # BLD: 10.1 K/UL — SIGNIFICANT CHANGE UP (ref 3.8–10.5)
WBC # FLD AUTO: 10.1 K/UL — SIGNIFICANT CHANGE UP (ref 3.8–10.5)

## 2017-07-25 PROCEDURE — 81001 URINALYSIS AUTO W/SCOPE: CPT

## 2017-07-25 PROCEDURE — 80307 DRUG TEST PRSMV CHEM ANLYZR: CPT

## 2017-07-25 PROCEDURE — 83690 ASSAY OF LIPASE: CPT

## 2017-07-25 PROCEDURE — 74177 CT ABD & PELVIS W/CONTRAST: CPT | Mod: 26

## 2017-07-25 PROCEDURE — 94660 CPAP INITIATION&MGMT: CPT

## 2017-07-25 PROCEDURE — 82550 ASSAY OF CK (CPK): CPT

## 2017-07-25 PROCEDURE — 93005 ELECTROCARDIOGRAM TRACING: CPT

## 2017-07-25 PROCEDURE — 82803 BLOOD GASES ANY COMBINATION: CPT

## 2017-07-25 PROCEDURE — 71250 CT THORAX DX C-: CPT | Mod: 26

## 2017-07-25 PROCEDURE — 93306 TTE W/DOPPLER COMPLETE: CPT

## 2017-07-25 PROCEDURE — 80048 BASIC METABOLIC PNL TOTAL CA: CPT

## 2017-07-25 PROCEDURE — 85610 PROTHROMBIN TIME: CPT

## 2017-07-25 PROCEDURE — 83880 ASSAY OF NATRIURETIC PEPTIDE: CPT

## 2017-07-25 PROCEDURE — 84295 ASSAY OF SERUM SODIUM: CPT

## 2017-07-25 PROCEDURE — 80053 COMPREHEN METABOLIC PANEL: CPT

## 2017-07-25 PROCEDURE — 71250 CT THORAX DX C-: CPT

## 2017-07-25 PROCEDURE — 82150 ASSAY OF AMYLASE: CPT

## 2017-07-25 PROCEDURE — 86850 RBC ANTIBODY SCREEN: CPT

## 2017-07-25 PROCEDURE — 84484 ASSAY OF TROPONIN QUANT: CPT

## 2017-07-25 PROCEDURE — 82435 ASSAY OF BLOOD CHLORIDE: CPT

## 2017-07-25 PROCEDURE — 82553 CREATINE MB FRACTION: CPT

## 2017-07-25 PROCEDURE — 71045 X-RAY EXAM CHEST 1 VIEW: CPT

## 2017-07-25 PROCEDURE — 74177 CT ABD & PELVIS W/CONTRAST: CPT

## 2017-07-25 PROCEDURE — 93567 NJX CAR CTH SPRVLV AORTGRPHY: CPT

## 2017-07-25 PROCEDURE — 93454 CORONARY ARTERY ANGIO S&I: CPT

## 2017-07-25 PROCEDURE — 87040 BLOOD CULTURE FOR BACTERIA: CPT

## 2017-07-25 PROCEDURE — 82330 ASSAY OF CALCIUM: CPT

## 2017-07-25 PROCEDURE — 85027 COMPLETE CBC AUTOMATED: CPT

## 2017-07-25 PROCEDURE — 86901 BLOOD TYPING SEROLOGIC RH(D): CPT

## 2017-07-25 PROCEDURE — C1894: CPT

## 2017-07-25 PROCEDURE — 93308 TTE F-UP OR LMTD: CPT

## 2017-07-25 PROCEDURE — 93306 TTE W/DOPPLER COMPLETE: CPT | Mod: 26

## 2017-07-25 PROCEDURE — C1887: CPT

## 2017-07-25 PROCEDURE — 82947 ASSAY GLUCOSE BLOOD QUANT: CPT

## 2017-07-25 PROCEDURE — C1769: CPT

## 2017-07-25 PROCEDURE — 83605 ASSAY OF LACTIC ACID: CPT

## 2017-07-25 PROCEDURE — 84132 ASSAY OF SERUM POTASSIUM: CPT

## 2017-07-25 PROCEDURE — 85014 HEMATOCRIT: CPT

## 2017-07-25 PROCEDURE — 85730 THROMBOPLASTIN TIME PARTIAL: CPT

## 2017-07-25 PROCEDURE — 94640 AIRWAY INHALATION TREATMENT: CPT

## 2017-07-25 PROCEDURE — 86900 BLOOD TYPING SEROLOGIC ABO: CPT

## 2017-07-25 RX ORDER — ALBUTEROL 90 UG/1
2 AEROSOL, METERED ORAL
Qty: 1 | Refills: 0 | OUTPATIENT
Start: 2017-07-25 | End: 2017-08-24

## 2017-07-25 RX ORDER — TAMSULOSIN HYDROCHLORIDE 0.4 MG/1
0.4 CAPSULE ORAL AT BEDTIME
Qty: 0 | Refills: 0 | Status: DISCONTINUED | OUTPATIENT
Start: 2017-07-25 | End: 2017-07-25

## 2017-07-25 RX ORDER — TAMSULOSIN HYDROCHLORIDE 0.4 MG/1
1 CAPSULE ORAL
Qty: 30 | Refills: 1 | OUTPATIENT
Start: 2017-07-25 | End: 2017-09-22

## 2017-07-25 RX ORDER — PANTOPRAZOLE SODIUM 20 MG/1
1 TABLET, DELAYED RELEASE ORAL
Qty: 30 | Refills: 0 | OUTPATIENT
Start: 2017-07-25 | End: 2017-08-24

## 2017-07-25 RX ADMIN — LORATADINE 10 MILLIGRAM(S): 10 TABLET ORAL at 05:44

## 2017-07-25 RX ADMIN — Medication 81 MILLIGRAM(S): at 05:44

## 2017-07-25 RX ADMIN — ENOXAPARIN SODIUM 40 MILLIGRAM(S): 100 INJECTION SUBCUTANEOUS at 11:49

## 2017-07-25 RX ADMIN — DEXTROAMPHETAMINE SACCHARATE, AMPHETAMINE ASPARTATE, DEXTROAMPHETAMINE SULFATE AND AMPHETAMINE SULFATE 30 MILLIGRAM(S): 1.875; 1.875; 1.875; 1.875 TABLET ORAL at 05:44

## 2017-07-25 RX ADMIN — Medication 10 MILLIGRAM(S): at 05:44

## 2017-07-25 RX ADMIN — Medication 1 PATCH: at 11:49

## 2017-07-25 RX ADMIN — CARVEDILOL PHOSPHATE 6.25 MILLIGRAM(S): 80 CAPSULE, EXTENDED RELEASE ORAL at 05:44

## 2017-07-25 RX ADMIN — DEXTROAMPHETAMINE SACCHARATE, AMPHETAMINE ASPARTATE, DEXTROAMPHETAMINE SULFATE AND AMPHETAMINE SULFATE 15 MILLIGRAM(S): 1.875; 1.875; 1.875; 1.875 TABLET ORAL at 11:49

## 2017-07-25 RX ADMIN — Medication 1 SPRAY(S): at 05:49

## 2017-07-25 RX ADMIN — PANTOPRAZOLE SODIUM 40 MILLIGRAM(S): 20 TABLET, DELAYED RELEASE ORAL at 05:44

## 2017-07-25 NOTE — DISCHARGE NOTE ADULT - ADDITIONAL INSTRUCTIONS
No heavy lifting,  pushing, pulling with affected arm for 2 weeks.  No strenuous  activity  for 2 weeks. No sex for 1 week.  No driving for 2 days. You may shower 24 hours following procedure but avoid baths and swimming for 1 week. Check wrist site for bleeding and/or swelling daily following procedure. Call your doctor/cardiologist immediately should it occur or if you have increased/persistent pain at the site. Follow up with your cardiologist in 1- 2 weeks. You may call [facility] at [phone] or [phone] after office hours and weekends with any questions or concerns following your procedure.

## 2017-07-25 NOTE — CONSULT NOTE ADULT - NEGATIVE NEUROLOGICAL SYMPTOMS
no weakness/no paresthesias/no generalized seizures/no transient paralysis/no focal seizures/no syncope/no tremors

## 2017-07-25 NOTE — CONSULT NOTE ADULT - PROBLEM SELECTOR RECOMMENDATION 10
-chronic back pain for which he has been on Prednisone 10mg BID for unclear reason  -given lung nodule and midline lower back pain with radiation to both legs would consider MRI of LS spine to evaluate for lesion/met  -at this time I doubt cord compression because there are no signs of incontinence and he has equal strength in both LE

## 2017-07-25 NOTE — DISCHARGE NOTE ADULT - HOSPITAL COURSE
HPI:  57 yr old male with PMH of bipolar disorder (non compliant with medication), ADD,  current smoker, COPD- followed by Dr Solomon Heard PCP, partial paralysed diaphragm (on prednisone),  LORNA on CPAP, HTN, HLD, HF-unknown EF, ETOH/cocaine abuse -in recovery x 22 years presented to ER this am with chest pain. Patient reports he developed left sided chest pain radiating to left arm after waking this am. Pain was associated with dyspnea, and lasted until he arrived to the ER at 10 am. Upon arrival to ER, patient had negative troponin x 1  and ECHO was done which showed anterior wall hypokinesis.  Taken to cath lab for cardiac cath- revealing normal coronaries. Right radial access with radial band in place- no hematoma or bleeding + cap refill <2 secs. Admitted to hospitalist for further evaluation of chest pain and ESCALERA. (24 Jul 2017 11:58) The patient is a 57-year-old man with PMH of bipolar disorder, ADHD, current smoker (>70 pack year smoking hx), COPD, partial paralysed diaphragm, LORNA on home CPAP, HTN, HLD, chronic systolic HF (EF 43%) , ETOH/cocaine abuse (in recovery x 22 years), presented to ER this with chest pain.  Taken to cath lab for cardiac cath that revealed normal coronaries. Admitted to medicine for further workup.    Patient had reports of abdominal pain and a CT chest w/o contrast and CT abd/pelvis with contrast was done.  The CT showed a spiculated 1cm lung nodule, a right UPJ stone with evidence of some obstruction, and a dilated CBD.  The patient was seen by pulmonary, plan for outpatient follow-up.  Also seen by urology, recommended added Flomax and outpatient follow-up.  Also offered GI consult, but symptoms of CP and abd pain improved with addition of PPI and patient insisted on being discharged.    Plan discussed with patient, wife, and family.  Plan for d/c with outpatient, PCP, Pulmonary, Urology, and GI follow-up.

## 2017-07-25 NOTE — PROGRESS NOTE ADULT - PROBLEM SELECTOR PLAN 4
Spiculated 1cm LAMAR nodule.  Pulmonary consult called.  Case briefly discussed with pulmonary attending.
Unclear etiology   On prednisone 10 bid-supra-physiologic dose   Will continue steroids and clarify reason in am

## 2017-07-25 NOTE — CONSULT NOTE ADULT - PROBLEM SELECTOR RECOMMENDATION 3
-smoking cessation discussed with patient and wife > 10 minutes  -nicotine patch  -patient interested in trying to quit

## 2017-07-25 NOTE — DISCHARGE NOTE ADULT - CONDITIONS AT DISCHARGE
Pt verbalized understanding of discharge instructions. Pt alert and oriented x4, ambulatory, voiding, tolerating diet, vss. Pt verbalized understanding of medications prescribed and to follow up with MD in time ordered.  IVL removed. Safety maintained.  right radial site benign no bleeding or hematoma noted.  Positive pulses in bilateral dorsalis pedis. Extremities warm and mobile.

## 2017-07-25 NOTE — CONSULT NOTE ADULT - PROBLEM SELECTOR RECOMMENDATION 2
-patient not on nebulizers at home regularly  -patient has been chronically on Prednisone 10mg BID for his back pain which is providing some bronchodilation  -Would restart Dulera - if not on formulary patient can bring medicine from home - they are hesitant to start new medications given his multiple allergies  -should be on Albuterol Q8PRN for rescue - although not currently wheezing  -patient would benefit form outpatient pulmonary rehab for his COPD. There is no pulmonary contraindication to pulmonary rehab. He will require cardiac clearance to participate.  -maintain O2 sat > 90  -OOB, ambulation, incentive spirometer, acapella

## 2017-07-25 NOTE — PROGRESS NOTE ADULT - PROBLEM SELECTOR PLAN 1
Reported abdominal pain with meals.  Also with epigastric TTP on exam.  Also noted to have biliary ductal dilatation on CT, but LFTs noted to be wnl.  Possible gastritis/GERD (which may also explain the CP), possible pancreatitis.  PPI started yesterday.  GI consulted called to help address abdominal pain and CT findings.

## 2017-07-25 NOTE — PROGRESS NOTE ADULT - SUBJECTIVE AND OBJECTIVE BOX
CC: Chest pain    SUBJECTIVE: Resting in bed.  Still reports some pressure like discomfort in the mid chest.  No SOB.  Denies any abdominal pain.  No nausea, would like to have lunch.  On questioning though, patient does report episodes of epigastric pain with meals.  NAD.    MEDICATIONS  (STANDING):  nicotine - 21 mG/24Hr(s) Patch 1 Patch Transdermal daily  predniSONE   Tablet 10 milliGRAM(s) Oral two times a day  ALBUTerol/ipratropium for Nebulization 3 milliLiter(s) Nebulizer every 6 hours  aspirin enteric coated 81 milliGRAM(s) Oral daily  loratadine 10 milliGRAM(s) Oral daily  ALPRAZolam 1.5 milliGRAM(s) Oral at bedtime  carvedilol 6.25 milliGRAM(s) Oral every 12 hours  amphetamine/dextroamphetamine 30 milliGRAM(s) Oral daily  fluticasone propionate 50 MICROgram(s)/spray Nasal Spray 1 Spray(s) Both Nostrils daily  amphetamine/dextroamphetamine 15 milliGRAM(s) Oral daily  pantoprazole    Tablet 40 milliGRAM(s) Oral before breakfast  sodium chloride 0.9%. 1000 milliLiter(s) (50 mL/Hr) IV Continuous <Continuous>  enoxaparin Injectable 40 milliGRAM(s) SubCutaneous daily    MEDICATIONS  (PRN):  ondansetron Injectable 4 milliGRAM(s) IV Push every 6 hours PRN Nausea and/or Vomiting  HYDROmorphone  Injectable 0.5 milliGRAM(s) IV Push every 4 hours PRN Severe Pain (7 - 10)      Vital Signs Last 24 Hrs  T(C): 36.6 (25 Jul 2017 11:30), Max: 36.6 (24 Jul 2017 20:00)  T(F): 97.9 (25 Jul 2017 11:30), Max: 97.9 (24 Jul 2017 20:00)  HR: 87 (25 Jul 2017 11:30) (81 - 105)  BP: 126/76 (25 Jul 2017 11:30) (121/75 - 154/92)  BP(mean): --  RR: 17 (25 Jul 2017 11:30) (17 - 18)  SpO2: 93% (25 Jul 2017 11:30) (93% - 97%)        I&O's Summary    24 Jul 2017 07:01  -  25 Jul 2017 07:00  --------------------------------------------------------  IN: 240 mL / OUT: 0 mL / NET: 240 mL    25 Jul 2017 07:01  -  25 Jul 2017 12:13  --------------------------------------------------------  IN: 0 mL / OUT: 0 mL / NET: 0 mL        PHYSICAL EXAM:  GENERAL: Looks stated age, NAD  CARDIOVASCULAR: Normal S1, S2  PULMONARY: Lungs clear to auscultation bilaterally. No wheezes/rales/rhonchi  GI: Abdomen soft, right flank TTP and epigastric TTP. Bowel sounds present  MSK/Ext:  No leg edema.  No calf tenderness bilaterally  PSYCH: Normal Affect.      LABS:                        14.7   10.1  )-----------( 212      ( 25 Jul 2017 05:56 )             43.7     07-25    144  |  108  |  14  ----------------------------<  116<H>  4.2   |  23  |  1.03    Ca    8.8      25 Jul 2017 05:56    TPro  7.0  /  Alb  4.1  /  TBili  0.8  /  DBili  x   /  AST  21  /  ALT  28  /  AlkPhos  59  07-24    PT/INR - ( 24 Jul 2017 10:32 )   PT: 10.9 sec;   INR: 1.00 ratio         PTT - ( 24 Jul 2017 10:32 )  PTT:39.8 sec  CARDIAC MARKERS ( 24 Jul 2017 10:32 )  x     / <0.01 ng/mL / 159 U/L / x     / 2.7 ng/mL      Urinalysis Basic - ( 24 Jul 2017 21:44 )    Color: Yellow / Appearance: Clear / SG: >1.030 / pH: x  Gluc: x / Ketone: Negative  / Bili: Negative / Urobili: Negative   Blood: x / Protein: Trace / Nitrite: Negative   Leuk Esterase: Negative / RBC: 2-5 /HPF / WBC 0-2 /HPF   Sq Epi: x / Non Sq Epi: x / Bacteria: x        RADIOLOGY & ADDITIONAL TESTS:    CT Chest w/o contrast; Abdomen and Pelvis w/ IV Cont:  Mildly increased from prior extrahepatic biliary ductal   dilatation, common bile duct 10 mm, correlate with LFTs and if warranted   with MRCP/MRI.    1 cm spiculated left upper lobe nodule, new from 2015, pulmonary   consultation is recommended. Subcentimeter left apical nodular density to   be followed.    Emphysema.    4 mm right UVJ calculus with mild upstream hydroureter. Otherwise   unremarkable CT urogram.

## 2017-07-25 NOTE — DISCHARGE NOTE ADULT - ABILITY TO HEAR (WITH HEARING AID OR HEARING APPLIANCE IF NORMALLY USED):
Mildly to Moderately Impaired: difficulty hearing in some environments or speaker may need to increase volume or speak distinctly/"I need to buy hearing aids"

## 2017-07-25 NOTE — CONSULT NOTE ADULT - SUBJECTIVE AND OBJECTIVE BOX
HPI:  Patient is a 57y Male who presented with chest pain and is being worked up, referred for CT finding of R ureteral stone. Patient denies any history of kidney stones. He c/o occassional R flank pain that radiated to groin, however no nausea or vomiting or fever or chills.  He is currently signing out AMA.       PAST MEDICAL & SURGICAL HISTORY:  HLD (hyperlipidemia)  HTN (hypertension)  Diaphragm dysfunction: partial paralysis  ETOH abuse  HF (heart failure)  LORNA on CPAP  Smoker  Chronic obstructive pulmonary disease, unspecified COPD type  No significant past surgical history    MEDICATIONS  (STANDING):  nicotine - 21 mG/24Hr(s) Patch 1 Patch Transdermal daily  predniSONE   Tablet 10 milliGRAM(s) Oral two times a day  ALBUTerol/ipratropium for Nebulization 3 milliLiter(s) Nebulizer every 6 hours  aspirin enteric coated 81 milliGRAM(s) Oral daily  loratadine 10 milliGRAM(s) Oral daily  ALPRAZolam 1.5 milliGRAM(s) Oral at bedtime  carvedilol 6.25 milliGRAM(s) Oral every 12 hours  amphetamine/dextroamphetamine 30 milliGRAM(s) Oral daily  fluticasone propionate 50 MICROgram(s)/spray Nasal Spray 1 Spray(s) Both Nostrils daily  amphetamine/dextroamphetamine 15 milliGRAM(s) Oral daily  pantoprazole    Tablet 40 milliGRAM(s) Oral before breakfast  sodium chloride 0.9%. 1000 milliLiter(s) (50 mL/Hr) IV Continuous <Continuous>  enoxaparin Injectable 40 milliGRAM(s) SubCutaneous daily  tamsulosin 0.4 milliGRAM(s) Oral at bedtime    MEDICATIONS  (PRN):  ondansetron Injectable 4 milliGRAM(s) IV Push every 6 hours PRN Nausea and/or Vomiting  HYDROmorphone  Injectable 0.5 milliGRAM(s) IV Push every 4 hours PRN Severe Pain (7 - 10)    FAMILY HISTORY:    Allergies    lisinopril (Unknown)  statins (Unknown)        SOCIAL HISTORY:   Tobacco hx: + smoker    REVIEW OF SYSTEMS: Pertinent positives and negatives as stated in HPI, otherwise negative    Vital signs  T(C): 36.6 (07-25-17 @ 11:30), Max: 36.6 (07-24-17 @ 20:00)  HR: 87 (07-25-17 @ 11:30)  BP: 126/76 (07-25-17 @ 11:30)  SpO2: 93% (07-25-17 @ 11:30)  Wt(kg): --    Output  I&O's Summary    24 Jul 2017 07:01 - 25 Jul 2017 07:00  --------------------------------------------------------  IN: 240 mL / OUT: 0 mL / NET: 240 mL    25 Jul 2017 07:01  -  25 Jul 2017 16:00  --------------------------------------------------------  IN: 320 mL / OUT: 0 mL / NET: 320 mL    I&O's Detail    24 Jul 2017 07:01 - 25 Jul 2017 07:00  --------------------------------------------------------  IN:    Oral Fluid: 240 mL  Total IN: 240 mL    OUT:  Total OUT: 0 mL    Total NET: 240 mL      25 Jul 2017 07:01 - 25 Jul 2017 16:01  --------------------------------------------------------  IN:    Oral Fluid: 320 mL  Total IN: 320 mL    OUT:  Total OUT: 0 mL    Total NET: 320 mL          Physical Exam  Gen: NAD  Pulm: No respiratory distress, no subcostal retractions  CV: RRR, no JVD  Abd: Soft, NT, ND  Back: No CVAT  MSK: No edema present    LABS:          07-25 @ 05:56    WBC 10.1  / Hct 43.7  / SCr 1.03     07-24 @ 21:44    WBC 11.1  / Hct 43.0  / SCr 1.10     07-25    144  |  108  |  14  ----------------------------<  116<H>  4.2   |  23  |  1.03    Ca    8.8      25 Jul 2017 05:56    TPro  7.0  /  Alb  4.1  /  TBili  0.8  /  DBili  x   /  AST  21  /  ALT  28  /  AlkPhos  59  07-24    PT/INR - ( 24 Jul 2017 10:32 )   PT: 10.9 sec;   INR: 1.00 ratio         PTT - ( 24 Jul 2017 10:32 )  PTT:39.8 sec  Urinalysis Basic - ( 24 Jul 2017 21:44 )    Color: Yellow / Appearance: Clear / SG: >1.030 / pH: x  Gluc: x / Ketone: Negative  / Bili: Negative / Urobili: Negative   Blood: x / Protein: Trace / Nitrite: Negative   Leuk Esterase: Negative / RBC: 2-5 /HPF / WBC 0-2 /HPF   Sq Epi: x / Non Sq Epi: x / Bacteria: x        RADIOLOGY:< from: CT Abdomen and Pelvis w/ IV Cont (07.25.17 @ 08:45) >  4 mm right UVJ calculus with mild upstream hydroureter. Otherwise   unremarkable CT urogram.    < end of copied text >

## 2017-07-25 NOTE — DISCHARGE NOTE ADULT - MEDICATION SUMMARY - MEDICATIONS TO TAKE
I will START or STAY ON the medications listed below when I get home from the hospital:    predniSONE 10 mg oral tablet  -- 1 tab(s) by mouth 2 times a day     -- Indication: For COPD    aspirin 81 mg oral tablet  -- 1 tab(s) by mouth once a day  -- Indication: For CAD    tamsulosin 0.4 mg oral capsule  -- 1 cap(s) by mouth once a day (at bedtime) MDD:one  -- Indication: For Renal stone    Allegra 180 mg oral tablet  -- 1 tab(s) by mouth once a day  -- Indication: For Anti Allergic    Xanax 0.5 mg oral tablet  -- 3 tab(s) by mouth once a day (at bedtime)  -- Indication: For Anxiety    carvedilol 6.25 mg oral tablet  -- 1 tab(s) by mouth 2 times a day  -- Indication: For ChF    Adderall 30 mg oral tablet  -- 1 tab(s) by mouth once a day AM  -- Indication: For ADHD    Adderall 30 mg oral tablet  -- 0.5 tab(s) by mouth once a day Noon   -- Indication: For ADHD    fluticasone 50 mcg/inh nasal spray  -- 1 spray(s) into nose once a day  -- Indication: For COPD    Vitamin D3  -- 01001 unit(s) by mouth once a week  -- Indication: For Supplements I will START or STAY ON the medications listed below when I get home from the hospital:    predniSONE 10 mg oral tablet  -- 1 tab(s) by mouth 2 times a day     -- Indication: For COPD    aspirin 81 mg oral tablet  -- 1 tab(s) by mouth once a day  -- Indication: For CAD    tamsulosin 0.4 mg oral capsule  -- 1 cap(s) by mouth once a day (at bedtime) MDD:one  -- Indication: For Renal stone    Allegra 180 mg oral tablet  -- 1 tab(s) by mouth once a day  -- Indication: For Anti Allergic    Xanax 0.5 mg oral tablet  -- 3 tab(s) by mouth once a day (at bedtime)  -- Indication: For Anxiety    carvedilol 6.25 mg oral tablet  -- 1 tab(s) by mouth 2 times a day  -- Indication: For CHF    ProAir HFA 90 mcg/inh inhalation aerosol  -- 2 puff(s) inhaled every 6 hours, As Needed -for shortness of breath and/or wheezing  -- For inhalation only.  It is very important that you take or use this exactly as directed.  Do not skip doses or discontinue unless directed by your doctor.  Obtain medical advice before taking any non-prescription drugs as some may affect the action of this medication.  Shake well before use.    -- Indication: For Chronic obstructive pulmonary disease    Adderall 30 mg oral tablet  -- 1 tab(s) by mouth once a day AM  -- Indication: For ADHD    Adderall 30 mg oral tablet  -- 0.5 tab(s) by mouth once a day Noon   -- Indication: For ADHD    fluticasone 50 mcg/inh nasal spray  -- 1 spray(s) into nose once a day  -- Indication: For COPD    pantoprazole 40 mg oral delayed release tablet  -- 1 tab(s) by mouth once a day (before a meal)  -- Indication: For Acid reflux    Vitamin D3  -- 33780 unit(s) by mouth once a week  -- Indication: For Supplements

## 2017-07-25 NOTE — DISCHARGE NOTE ADULT - CARE PROVIDER_API CALL
Vini Freeman), Cardiovascular Disease; Interventional Cardiology  [address] [place] [address]  Phone: [phone]  Fax: [phone] Vini Freeman), Cardiovascular Disease; Interventional Cardiology  [address] [place] [address]  Phone: [phone]  Fax: [phone]    Joseph Busch), Urology  [address] [place] [address]  Phone: [phone]  Fax: [phone] Vini Freeman), Cardiovascular Disease; Interventional Cardiology  [address] [place] [address]  Phone: [phone]  Fax: [phone]    Joseph Busch), Urology  [address] [place] [address] [address] [place] [address]  Phone: [phone]  Fax: [phone]    Yogi Salcido), Critical Care Medicine; Internal Medicine; Pulmonary Disease  [address] [place] [address]  Phone: [phone]  Fax: [phone]    Jude Haro), Gastroenterology; Internal Medicine  [address] [place] [address]  Phone: [phone]  Fax: [phone]

## 2017-07-25 NOTE — CONSULT NOTE ADULT - ASSESSMENT
R ureteral stone  - PO fluids  - Flomax  - strain urine  - Follow up as outpatient [phone]
57 years old male with PMH of systolic CHF (reported EF of 10%), HTN, HLD, active smoker (1 ppd),  LORNA on home CPAP, ADHD, Bipolar disorder, HTN and HLD who presented with acute onset of chest pain s/p cath that did not reveal any coronary disease

## 2017-07-25 NOTE — DISCHARGE NOTE ADULT - SECONDARY DIAGNOSIS.
Abdominal pain HF (heart failure) HLD (hyperlipidemia) Chronic obstructive pulmonary disease, unspecified COPD type Lung nodule Ureteral stone

## 2017-07-25 NOTE — DISCHARGE NOTE ADULT - CARE PLAN
Principal Discharge DX:	Chest pain  Goal:	Patient will be chest pain free  Instructions for follow-up, activity and diet:	Low salt, low fat diet.   Weight management.   Take medications as prescribed.    No smoking.  Follow up appointments with your doctor(s)  as instruced.  Secondary Diagnosis:	Abdominal pain  Goal:	Patient will be abdominal pain free  Instructions for follow-up, activity and diet:	Please follow up with renal and GI doctors.  Secondary Diagnosis:	HF (heart failure)  Goal:	Patient will be free of SOB  Instructions for follow-up, activity and diet:	Take your medications as prescribed. Follow a  low-salt,  low cholesterol heart healthy diet. Weigh yourself every day; call your doctor if you gain 2 pounds over one to two days or 3 pounds over three days. Get to or maintain a healthy weight; ask your heart failure team for referrals to a registered dietitian if needed. Be active (check with your physician or cardiologist first). Find healthy ways to deal with stress, such as deep breathing, meditation, exercise, and doing hobbies that you enjoy. If you smoke, quit. (A resource to help you stop smoking is the [facility] [employer] – phone number [phone].).  Secondary Diagnosis:	HLD (hyperlipidemia)  Goal:	LDL<70  Instructions for follow-up, activity and diet:	Goal is to keep LDL<70. Continue with your cholesterol medications as prescribed. Eat a heart healthy diet that is low in saturated fats and salt, and includes whole grains, fruits, vegetables and lean protein; exercise regularly (consult with your physician or cardiologist first); maintain a heart healthy weight; if you smoke - quit (A resource to help you stop smoking is the [facility] – phone number [phone].). Continue to follow with your primary physician or cardiologist. Principal Discharge DX:	Chest pain  Goal:	Patient will be chest pain free  Instructions for follow-up, activity and diet:	Low salt, low fat diet.   Weight management.   Take medications as prescribed.    No smoking.  Follow up appointments with your doctor(s)  as instruced.  Secondary Diagnosis:	Abdominal pain  Goal:	Patient will be abdominal pain free  Instructions for follow-up, activity and diet:	Please follow up with renal and GI doctors.  Secondary Diagnosis:	HF (heart failure)  Goal:	Patient will be free of SOB  Instructions for follow-up, activity and diet:	Take your medications as prescribed. Follow a  low-salt,  low cholesterol heart healthy diet. Weigh yourself every day; call your doctor if you gain 2 pounds over one to two days or 3 pounds over three days. Get to or maintain a healthy weight; ask your heart failure team for referrals to a registered dietitian if needed. Be active (check with your physician or cardiologist first). Find healthy ways to deal with stress, such as deep breathing, meditation, exercise, and doing hobbies that you enjoy. If you smoke, quit. (A resource to help you stop smoking is the [facility] [employer] – phone number [phone].).  Secondary Diagnosis:	HLD (hyperlipidemia)  Goal:	LDL<70  Instructions for follow-up, activity and diet:	Goal is to keep LDL<70. Continue with your cholesterol medications as prescribed. Eat a heart healthy diet that is low in saturated fats and salt, and includes whole grains, fruits, vegetables and lean protein; exercise regularly (consult with your physician or cardiologist first); maintain a heart healthy weight; if you smoke - quit (A resource to help you stop smoking is the [facility] – phone number [phone].). Continue to follow with your primary physician or cardiologist. Principal Discharge DX:	Chest pain  Goal:	To prevent recurrent chest pain  Instructions for follow-up, activity and diet:	The etiology of your chest pain is not entirely clear.  Your cardiac work-up was negative and your CT scan showed no PNA.  Your symptoms may be secondary to acid reflux.  Continue Protonix as prescribed.  Please follow-up with gastroenterology.  After discharge.  Included below is the contact information for Dr. Haro, the GI doctor who would have seen you here.  Please also follow-up with Dr. Heard in 2-4 weeks.  Secondary Diagnosis:	Abdominal pain  Instructions for follow-up, activity and diet:	Please follow up with Dr. Haro or one of his associates after discharge.  Secondary Diagnosis:	HF (heart failure)  Instructions for follow-up, activity and diet:	Take your medications as prescribed. Follow a  low-salt,  low cholesterol heart healthy diet. Weigh yourself every day; call your doctor if you gain 2 pounds over one to two days or 3 pounds over three days. Get to or maintain a healthy weight; ask your heart failure team for referrals to a registered dietitian if needed. Be active (check with your physician or cardiologist first). Find healthy ways to deal with stress, such as deep breathing, meditation, exercise, and doing hobbies that you enjoy. If you smoke, quit. (A resource to help you stop smoking is the [facility] – phone number [phone].).  Secondary Diagnosis:	Ureteral stone  Instructions for follow-up, activity and diet:	Please take Flomax as prescribed and follow-up with doctor Busch (urology) in one month.  Secondary Diagnosis:	Chronic obstructive pulmonary disease, unspecified COPD type  Instructions for follow-up, activity and diet:	Please follow-up with Dr. Salcido in one month.  We have added a prescription for Albuterol inhaler as needed for shortness of breath.  Secondary Diagnosis:	Lung nodule  Instructions for follow-up, activity and diet:	Please follow-up with Dr. Salcido in one month. Principal Discharge DX:	Chest pain  Goal:	To prevent recurrent chest pain  Instructions for follow-up, activity and diet:	The etiology of your chest pain is not entirely clear.  Your cardiac work-up was negative and your CT scan showed no PNA.  Your symptoms may be secondary to acid reflux.  Continue Protonix as prescribed.  Please follow-up with gastroenterology.  After discharge.  Included below is the contact information for Dr. Haro, the GI doctor who would have seen you here.  Please also follow-up with Dr. Heard in 2-4 weeks.  Secondary Diagnosis:	Abdominal pain  Instructions for follow-up, activity and diet:	Please follow up with Dr. Haro or one of his associates after discharge.  Secondary Diagnosis:	HF (heart failure)  Instructions for follow-up, activity and diet:	Take your medications as prescribed. Follow a  low-salt,  low cholesterol heart healthy diet. Weigh yourself every day; call your doctor if you gain 2 pounds over one to two days or 3 pounds over three days. Get to or maintain a healthy weight; ask your heart failure team for referrals to a registered dietitian if needed. Be active (check with your physician or cardiologist first). Find healthy ways to deal with stress, such as deep breathing, meditation, exercise, and doing hobbies that you enjoy. If you smoke, quit. (A resource to help you stop smoking is the [facility] – phone number [phone].).  Secondary Diagnosis:	Ureteral stone  Instructions for follow-up, activity and diet:	Please take Flomax as prescribed and follow-up with doctor Busch (urology) in one month.  Secondary Diagnosis:	Chronic obstructive pulmonary disease, unspecified COPD type  Instructions for follow-up, activity and diet:	Please follow-up with Dr. Salcido in one month.  We have added a prescription for Albuterol inhaler as needed for shortness of breath.  Secondary Diagnosis:	Lung nodule  Instructions for follow-up, activity and diet:	Please follow-up with Dr. Salcido in one month.

## 2017-07-25 NOTE — CONSULT NOTE ADULT - PROBLEM SELECTOR RECOMMENDATION 7
-still present  -cardiac cath without significant coronary disease  -continue aspirin  -cardiology followup  -would check repeat echo - to evaluate EF

## 2017-07-25 NOTE — CHART NOTE - NSCHARTNOTEFT_GEN_A_CORE
50 y/o male s/p diagnostic cath from 7/24/17. patient had CT chest& abdomen which reveals 4 mm stone in the uretero vesicular junction. There is left upper lobe has 1 cm nodule. Pulmonary consult and advised to repeat CT in one month. As per Dr. Van patient needs to stay , but patient refused to stay at hospital and wants to go home. Seen & evaluated by Dr. Van & recommends to go home. 50 y/o male s/p diagnostic cath from 7/24/17. patient had CT chest& abdomen which reveals 4 mm stone in the uretero vesicular junction. There is left upper lobe has 1 cm nodule. Pulmonary consult and advised to repeat CT in one month. Urology consult done and was advised to start on Flomax and urology out patient consult in one month by Dr. Busch. As per Dr. Van patient needs to stay , but patient refused to stay at hospital and wants to go home. Seen & evaluated by Dr. Van & recommends to go home.

## 2017-07-25 NOTE — CONSULT NOTE ADULT - SUBJECTIVE AND OBJECTIVE BOX
NITIN DAVALOS  MRN-[id number]  CHIEF COMPLAINT: chest pain/epigastric pain    HPI: 57M bipolar disorder, ADHD, HTN, HLD active smoker (>70 pack years), COPD but does not use routine bronchodilators, partial paralyzed diaphragm, chronic LBP and LORNA on AVAPs, HFrEF p/w chest pain while driving. He began having epigastric pain that was worse with eating any foods. THe pain was described as crushing and central. He denies nausea or vomiting. He denies urinary pain but has intermittent burning. He notes that now the pain is more right sided and along his flank/RUQ and epigastric region. He denies changes to his stool. He denies bowel or bladder incontinence. He does have chronic LBP and LE numbness for which he has been on chronic Prednisone 10mg BID. He does not think he has had any spinal imaging    He is a recovering EtOH and drug addict. He has a history of hospitalization for HF exacerbation approximately 4 years ago at [facility]. He denies orthopnea or LE edema. He denies recent travels or sick contacts.     He is an active smoker and at his most smoked 4 ppd. He has seen a pulmonologist at [facility] - Dr. Siegler in the past but does not have routine followup. He has not had any pulmonary nodules that he is aware of. His last CT scan in our system was 2015 which did not have any similar nodules. He does describe approximately 15 lb weight loss over the last few months. He does feel more tired and fatgued    PAST MEDICAL & SURGICAL HISTORY:  HLD (hyperlipidemia)  HTN (hypertension)  Diaphragm dysfunction: partial paralysis  ETOH abuse  HF (heart failure)  LORNA on CPAP  Smoker  Chronic obstructive pulmonary disease, unspecified COPD type  No significant past surgical history      FAMILY HISTORY: Father - prostate cancer    SOCIAL HISTORY:  Smoking: Active smoker - 1 ppd  Substance Use: [ ] Never Used [x ] Used - cocaine  EtOH Use: prior  Marital Status: [ ] Single [ x]  [ ]  [ ]   Occupation:   Recent Travel: none  Country of Birth[place]  Advance Directives: Full Code    Allergies:  lisinopril (Unknown), statins (Unknown)    Intolerances        HOME MEDICATIONS: reviewed    REVIEW OF SYSTEMS:  Constitutional: [ ] fevers [ ] chills [ x] weight loss [ ] weight gain  HEENT: [x] negative [ ] dry eyes [ ] eye irritation [ ] postnasal drip [ ] nasal congestion  CV: [ x] chest pain [ ] orthopnea [x ] palpitations [ ] murmur  Resp: [x ] cough [ ] shortness of breath [x ] dyspnea [ ] wheezing [ ] sputum [ ] hemoptysis  GI: [ ] nausea [ ] vomiting [ ] diarrhea [ ] constipation [x ] abd pain [ ] dysphagia   : [ ] dysuria [ ] nocturia [ ] hematuria [x ] increased urinary frequency  Musculoskeletal: [x ] back pain [ ] myalgias [ ] arthralgias [ ] fracture  [x ] All other systems negative  [ ] Unable to assess ROS because ________    OBJECTIVE:  ICU Vital Signs Last 24 Hrs  T(C): 36.6 (25 Jul 2017 11:30), Max: 36.6 (24 Jul 2017 20:00)  T(F): 97.9 (25 Jul 2017 11:30), Max: 97.9 (24 Jul 2017 20:00)  HR: 87 (25 Jul 2017 11:30) (81 - 105)  BP: 126/76 (25 Jul 2017 11:30) (121/75 - 154/92)  BP(mean): --  ABP: --  ABP(mean): --  RR: 17 (25 Jul 2017 11:30) (17 - 18)  SpO2: 93% (25 Jul 2017 11:30) (93% - 97%)        07-24 @ 07:01 - 07-25 @ 07:00  --------------------------------------------------------  IN: 240 mL / OUT: 0 mL / NET: 240 mL    07-25 @ 07:01 - 07-25 @ 12:34  --------------------------------------------------------  IN: 0 mL / OUT: 0 mL / NET: 0 mL      CAPILLARY BLOOD GLUCOSE        HOSPITAL MEDICATIONS:  MEDICATIONS  (STANDING):  nicotine - 21 mG/24Hr(s) Patch 1 Patch Transdermal daily  predniSONE   Tablet 10 milliGRAM(s) Oral two times a day  ALBUTerol/ipratropium for Nebulization 3 milliLiter(s) Nebulizer every 6 hours  aspirin enteric coated 81 milliGRAM(s) Oral daily  loratadine 10 milliGRAM(s) Oral daily  ALPRAZolam 1.5 milliGRAM(s) Oral at bedtime  carvedilol 6.25 milliGRAM(s) Oral every 12 hours  amphetamine/dextroamphetamine 30 milliGRAM(s) Oral daily  fluticasone propionate 50 MICROgram(s)/spray Nasal Spray 1 Spray(s) Both Nostrils daily  amphetamine/dextroamphetamine 15 milliGRAM(s) Oral daily  pantoprazole    Tablet 40 milliGRAM(s) Oral before breakfast  sodium chloride 0.9%. 1000 milliLiter(s) (50 mL/Hr) IV Continuous <Continuous>  enoxaparin Injectable 40 milliGRAM(s) SubCutaneous daily    MEDICATIONS  (PRN):  ondansetron Injectable 4 milliGRAM(s) IV Push every 6 hours PRN Nausea and/or Vomiting  HYDROmorphone  Injectable 0.5 milliGRAM(s) IV Push every 4 hours PRN Severe Pain (7 - 10)      LABS:                        14.7   10.1  )-----------( 212      ( 25 Jul 2017 05:56 )             43.7     Hgb Trend: 14.7<--, 14.8<--, 15.6<--  07-25    144  |  108  |  14  ----------------------------<  116<H>  4.2   |  23  |  1.03    Ca    8.8      25 Jul 2017 05:56    TPro  7.0  /  Alb  4.1  /  TBili  0.8  /  DBili  x   /  AST  21  /  ALT  28  /  AlkPhos  59  07-24    Creatinine Trend: 1.03<--, 1.10<--, 1.13<--  PT/INR - ( 24 Jul 2017 10:32 )   PT: 10.9 sec;   INR: 1.00 ratio         PTT - ( 24 Jul 2017 10:32 )  PTT:39.8 sec  Urinalysis Basic - ( 24 Jul 2017 21:44 )    Color: Yellow / Appearance: Clear / SG: >1.030 / pH: x  Gluc: x / Ketone: Negative  / Bili: Negative / Urobili: Negative   Blood: x / Protein: Trace / Nitrite: Negative   Leuk Esterase: Negative / RBC: 2-5 /HPF / WBC 0-2 /HPF   Sq Epi: x / Non Sq Epi: x / Bacteria: x        Venous Blood Gas:  07-24 @ 10:32  7.30/50/24/24/36  VBG Lactate: 2.1    CT chest/abd/pelvis - 7/25/17 - < from: CT Chest No Cont (07.25.17 @ 08:45) >  IMPRESSION:     1 cm spiculated left upper lobe nodule, new from 2015, pulmonary   consultation is recommended. Subcentimeter left apical nodular density to   be followed.    Emphysema.    4 mm right UVJ calculus with mild upstream hydroureter. Otherwise   unremarkable CT urogram.    Mildly increased. Ductal dilatation with recommendations as above.    < end of copied text >    Cath 7/24/17 - < from: Cardiac Cath Lab - Adult (07.24.17 @ 10:47) >  CORONARY VESSELS: The coronary circulation is right dominant.  LM:   --  LM: Normal.  LAD:   --  LAD: Angiography showed minor luminal irregularities with no  flow limiting lesions.  CX:   --  Circumflex: Angiography showed minor luminal irregularities with  no flow limiting lesions.  RCA:   --  RCA: Angiography showed minor luminal irregularities with no  flow limiting lesions.  AORTA: The root exhibited mild fibrocalcific change. Ascending aorta: The  segment was normal in size.  COMPLICATIONS: There were no complications.  DIAGNOSTIC RECOMMENDATIONS: The patient should continue with the present  medications.    < end of copied text >

## 2017-07-25 NOTE — DISCHARGE NOTE ADULT - CARE PROVIDERS DIRECT ADDRESSES
,[email or web address]

## 2017-07-25 NOTE — CHART NOTE - NSCHARTNOTEFT_GEN_A_CORE
Called to see patient, informed that he wanted to leave hospital.    Patient seen again.  Very pleasant, but stated that he wants to go home.  Says that CP is resolved, would prefer to go home and f/u with GI as an outpatient.  I again offered and strongly encouraged that patient stay for GI eval, but patient declined.  Patient agrees to see GI as outpatient.    Suspect that CP possibly secondary to GERD and improved with PPI.  Patient also seen by urology.  Case d/w urology PA (note pending).  Recommended adding Flomax and outpatient follow-up.  Case also d/w pulmonary attending.  Outpatient f/u in 1 month.  Adding Albuterol MDI PRN on discharge.    Called patient's PCP Dr. Solomon Heard to discuss, was on vacation, but hospital course and discharge plan discussed with his PA.    Total time spent 50 minutes. Called to see patient, informed that he wanted to leave hospital.    Patient seen again.  Very pleasant, but stated that he wants to go home.  Says that CP is resolved, would prefer to go home and f/u with GI as an outpatient.  I again offered and strongly encouraged that patient stay for GI eval, but patient declined.  Patient agrees to see GI as outpatient.    Suspect that CP possibly secondary to GERD and improved with PPI.  Patient also seen by urology.  Case d/w urology PA (note pending).  Recommended adding Flomax and outpatient follow-up.  Case also d/w pulmonary attending.  Outpatient f/u in 1 month.  Adding Albuterol MDI PRN on discharge.  Echo resulted noted, EF 43%.    Called patient's PCP Dr. Solomon Heard to discuss, was on vacation, but hospital course and discharge plan discussed with his PA.    Total time spent 50 minutes.

## 2017-07-25 NOTE — PROGRESS NOTE ADULT - PROBLEM SELECTOR PLAN 2
Etiology unclear.  Cardiac cath negative.  CT negative for PNA.  Perhaps secondary to GERD, await GI input.  Perhaps secondary to small vessel disease. Will also follow-up with cardiology.

## 2017-07-25 NOTE — DISCHARGE NOTE ADULT - PROVIDER TOKENS
JOSE:'[id number]' JOSE:'[id number]',JOSE:'[id number]' TOKEN:'[id number]',TOKEN:'[id number]',TOKEN:'[id number]',TOKEN:'[id number]'

## 2017-07-25 NOTE — DISCHARGE NOTE ADULT - PLAN OF CARE
Patient will be chest pain free Low salt, low fat diet.   Weight management.   Take medications as prescribed.    No smoking.  Follow up appointments with your doctor(s)  as instruced. Patient will be abdominal pain free Please follow up with renal and GI doctors. Patient will be free of SOB Take your medications as prescribed. Follow a  low-salt,  low cholesterol heart healthy diet. Weigh yourself every day; call your doctor if you gain 2 pounds over one to two days or 3 pounds over three days. Get to or maintain a healthy weight; ask your heart failure team for referrals to a registered dietitian if needed. Be active (check with your physician or cardiologist first). Find healthy ways to deal with stress, such as deep breathing, meditation, exercise, and doing hobbies that you enjoy. If you smoke, quit. (A resource to help you stop smoking is the [facility] – phone number [phone].). LDL<70 Goal is to keep LDL<70. Continue with your cholesterol medications as prescribed. Eat a heart healthy diet that is low in saturated fats and salt, and includes whole grains, fruits, vegetables and lean protein; exercise regularly (consult with your physician or cardiologist first); maintain a heart healthy weight; if you smoke - quit (A resource to help you stop smoking is the [facility] – phone number [phone].). Continue to follow with your primary physician or cardiologist. Please follow-up with Dr. Salcido in one month.  We have added a prescription for Albuterol inhaler as needed for shortness of breath. Please follow-up with Dr. Salcido in one month. To prevent recurrent chest pain The etiology of your chest pain is not entirely clear.  Your cardiac work-up was negative and your CT scan showed no PNA.  Your symptoms may be secondary to acid reflux.  Continue Protonix as prescribed.  Please follow-up with gastroenterology.  After discharge.  Included below is the contact information for Dr. Haro, the GI doctor who would have seen you here.  Please also follow-up with Dr. Heard in 2-4 weeks. Please follow up with Dr. Haro or one of his associates after discharge. Please take Flomax as prescribed and follow-up with doctor Jo-Ann (urology) in one month.

## 2017-07-25 NOTE — PROGRESS NOTE ADULT - ASSESSMENT
Patient is a 57 years old male with PMH of systolic CHF (reported EF of 10%), LORNA on home CPAP, ADHD, Bipolar disorder, HTN and HLD who presented with acute onset of chest. Also reports abd pain with meals.  Epigastric and right flank TTP on exam.  Cardiac cath negative.  CT shows spiculated lung mass, obstructing right UPJ stone, dilated CBD.

## 2017-07-25 NOTE — CONSULT NOTE ADULT - PROBLEM SELECTOR RECOMMENDATION 6
-biliary ductal dilatation seen on CT  -it is possible that this is the cause of his symptoms though his LFTs are not very abnormal  -check amylase, lipase  -consider GI eval

## 2017-07-25 NOTE — DISCHARGE NOTE ADULT - PATIENT PORTAL LINK FT
“You can access the FollowHealth Patient Portal, offered by [facility], by registering with the following website: [email or web address]”

## 2017-07-25 NOTE — PROGRESS NOTE ADULT - PROBLEM SELECTOR PLAN 3
Obstructing right UPJ stone on CT with right flank TTP.  Urology consult called.
-140s/80s  Elevated in setting of poorly controlled pain   Needs adequate pain control

## 2017-07-30 LAB
CULTURE RESULTS: SIGNIFICANT CHANGE UP
CULTURE RESULTS: SIGNIFICANT CHANGE UP
SPECIMEN SOURCE: SIGNIFICANT CHANGE UP
SPECIMEN SOURCE: SIGNIFICANT CHANGE UP

## 2017-08-07 PROBLEM — Z00.00 ENCOUNTER FOR PREVENTIVE HEALTH EXAMINATION: Noted: 2017-08-07

## 2017-08-09 ENCOUNTER — APPOINTMENT (OUTPATIENT)
Dept: THORACIC SURGERY | Facility: CLINIC | Age: 57
End: 2017-08-09
Payer: MEDICARE

## 2017-08-09 ENCOUNTER — RECORD ABSTRACTING (OUTPATIENT)
Age: 57
End: 2017-08-09

## 2017-08-09 VITALS
DIASTOLIC BLOOD PRESSURE: 80 MMHG | HEIGHT: 71 IN | HEART RATE: 98 BPM | OXYGEN SATURATION: 98 % | RESPIRATION RATE: 16 BRPM | WEIGHT: 268 LBS | SYSTOLIC BLOOD PRESSURE: 133 MMHG | BODY MASS INDEX: 37.52 KG/M2

## 2017-08-09 DIAGNOSIS — Z82.49 FAMILY HISTORY OF ISCHEMIC HEART DISEASE AND OTHER DISEASES OF THE CIRCULATORY SYSTEM: ICD-10-CM

## 2017-08-09 DIAGNOSIS — Z87.09 PERSONAL HISTORY OF OTHER DISEASES OF THE RESPIRATORY SYSTEM: ICD-10-CM

## 2017-08-09 DIAGNOSIS — R13.10 DYSPHAGIA, UNSPECIFIED: ICD-10-CM

## 2017-08-09 DIAGNOSIS — F19.90 OTHER PSYCHOACTIVE SUBSTANCE USE, UNSPECIFIED, UNCOMPLICATED: ICD-10-CM

## 2017-08-09 DIAGNOSIS — Z87.39 PERSONAL HISTORY OF OTHER DISEASES OF THE MUSCULOSKELETAL SYSTEM AND CONNECTIVE TISSUE: ICD-10-CM

## 2017-08-09 DIAGNOSIS — R07.9 CHEST PAIN, UNSPECIFIED: ICD-10-CM

## 2017-08-09 DIAGNOSIS — F90.1 ATTENTION-DEFICIT HYPERACTIVITY DISORDER, PREDOMINANTLY HYPERACTIVE TYPE: ICD-10-CM

## 2017-08-09 DIAGNOSIS — R20.0 ANESTHESIA OF SKIN: ICD-10-CM

## 2017-08-09 DIAGNOSIS — Z86.79 PERSONAL HISTORY OF OTHER DISEASES OF THE CIRCULATORY SYSTEM: ICD-10-CM

## 2017-08-09 DIAGNOSIS — F17.200 NICOTINE DEPENDENCE, UNSPECIFIED, UNCOMPLICATED: ICD-10-CM

## 2017-08-09 DIAGNOSIS — Z86.59 PERSONAL HISTORY OF OTHER MENTAL AND BEHAVIORAL DISORDERS: ICD-10-CM

## 2017-08-09 DIAGNOSIS — R20.2 ANESTHESIA OF SKIN: ICD-10-CM

## 2017-08-09 DIAGNOSIS — K21.9 GASTRO-ESOPHAGEAL REFLUX DISEASE W/OUT ESOPHAGITIS: ICD-10-CM

## 2017-08-09 PROCEDURE — 99205 OFFICE O/P NEW HI 60 MIN: CPT

## 2017-08-09 RX ORDER — PANTOPRAZOLE SODIUM 40 MG/1
40 TABLET, DELAYED RELEASE ORAL
Refills: 0 | Status: ACTIVE | COMMUNITY

## 2017-08-09 RX ORDER — FLUTICASONE PROPIONATE 50 UG/1
50 SPRAY, METERED NASAL
Refills: 0 | Status: ACTIVE | COMMUNITY

## 2017-08-09 RX ORDER — ALBUTEROL SULFATE 90 UG/1
AEROSOL, METERED RESPIRATORY (INHALATION)
Refills: 0 | Status: ACTIVE | COMMUNITY

## 2017-08-09 RX ORDER — CHROMIUM 200 MCG
TABLET ORAL
Refills: 0 | Status: ACTIVE | COMMUNITY

## 2017-08-09 RX ORDER — TAMSULOSIN HYDROCHLORIDE 0.4 MG/1
0.4 CAPSULE ORAL
Refills: 0 | Status: ACTIVE | COMMUNITY

## 2017-08-09 RX ORDER — FEXOFENADINE HCL 180 MG
180 TABLET ORAL
Refills: 0 | Status: ACTIVE | COMMUNITY

## 2017-08-09 RX ORDER — PREDNISONE 50 MG/1
TABLET ORAL TWICE DAILY
Refills: 0 | Status: ACTIVE | COMMUNITY

## 2017-08-09 RX ORDER — CARVEDILOL 6.25 MG/1
6.25 TABLET, FILM COATED ORAL TWICE DAILY
Refills: 0 | Status: ACTIVE | COMMUNITY

## 2017-08-09 RX ORDER — DEXTROAMPHETAMINE SACCHARATE, AMPHETAMINE ASPARTATE, DEXTROAMPHETAMINE SULFATE, AND AMPHETAMINE SULFATE 3.75; 3.75; 3.75; 3.75 MG/1; MG/1; MG/1; MG/1
TABLET ORAL DAILY
Refills: 0 | Status: ACTIVE | COMMUNITY

## 2017-08-10 ENCOUNTER — TRANSCRIPTION ENCOUNTER (OUTPATIENT)
Age: 57
End: 2017-08-10

## 2017-09-01 ENCOUNTER — OUTPATIENT (OUTPATIENT)
Dept: OUTPATIENT SERVICES | Facility: HOSPITAL | Age: 57
LOS: 1 days | End: 2017-09-01
Payer: MEDICARE

## 2017-09-01 VITALS
HEART RATE: 88 BPM | RESPIRATION RATE: 18 BRPM | HEIGHT: 71 IN | SYSTOLIC BLOOD PRESSURE: 119 MMHG | WEIGHT: 216.05 LBS | TEMPERATURE: 98 F | DIASTOLIC BLOOD PRESSURE: 77 MMHG

## 2017-09-01 DIAGNOSIS — R91.1 SOLITARY PULMONARY NODULE: ICD-10-CM

## 2017-09-01 DIAGNOSIS — Z98.890 OTHER SPECIFIED POSTPROCEDURAL STATES: Chronic | ICD-10-CM

## 2017-09-01 DIAGNOSIS — Z01.818 ENCOUNTER FOR OTHER PREPROCEDURAL EXAMINATION: ICD-10-CM

## 2017-09-01 DIAGNOSIS — Z98.61 CORONARY ANGIOPLASTY STATUS: Chronic | ICD-10-CM

## 2017-09-01 DIAGNOSIS — G47.33 OBSTRUCTIVE SLEEP APNEA (ADULT) (PEDIATRIC): ICD-10-CM

## 2017-09-01 LAB
ALBUMIN SERPL ELPH-MCNC: 4.4 G/DL — SIGNIFICANT CHANGE UP (ref 3.3–5.2)
ALP SERPL-CCNC: 81 U/L — SIGNIFICANT CHANGE UP (ref 40–120)
ALT FLD-CCNC: 30 U/L — SIGNIFICANT CHANGE UP
ANION GAP SERPL CALC-SCNC: 16 MMOL/L — SIGNIFICANT CHANGE UP (ref 5–17)
APTT BLD: 36.2 SEC — SIGNIFICANT CHANGE UP (ref 27.5–37.4)
AST SERPL-CCNC: 26 U/L — SIGNIFICANT CHANGE UP
BILIRUB SERPL-MCNC: 0.5 MG/DL — SIGNIFICANT CHANGE UP (ref 0.4–2)
BLD GP AB SCN SERPL QL: SIGNIFICANT CHANGE UP
BUN SERPL-MCNC: 18 MG/DL — SIGNIFICANT CHANGE UP (ref 8–20)
CALCIUM SERPL-MCNC: 9.3 MG/DL — SIGNIFICANT CHANGE UP (ref 8.6–10.2)
CHLORIDE SERPL-SCNC: 105 MMOL/L — SIGNIFICANT CHANGE UP (ref 98–107)
CO2 SERPL-SCNC: 20 MMOL/L — LOW (ref 22–29)
CREAT SERPL-MCNC: 0.93 MG/DL — SIGNIFICANT CHANGE UP (ref 0.5–1.3)
GLUCOSE SERPL-MCNC: 135 MG/DL — HIGH (ref 70–115)
HCT VFR BLD CALC: 44.8 % — SIGNIFICANT CHANGE UP (ref 42–52)
HGB BLD-MCNC: 15.3 G/DL — SIGNIFICANT CHANGE UP (ref 14–18)
INR BLD: 0.96 RATIO — SIGNIFICANT CHANGE UP (ref 0.88–1.16)
MCHC RBC-ENTMCNC: 31.1 PG — HIGH (ref 27–31)
MCHC RBC-ENTMCNC: 34.2 G/DL — SIGNIFICANT CHANGE UP (ref 32–36)
MCV RBC AUTO: 91.1 FL — SIGNIFICANT CHANGE UP (ref 80–94)
PLATELET # BLD AUTO: 248 K/UL — SIGNIFICANT CHANGE UP (ref 150–400)
POTASSIUM SERPL-MCNC: 4.1 MMOL/L — SIGNIFICANT CHANGE UP (ref 3.5–5.3)
POTASSIUM SERPL-SCNC: 4.1 MMOL/L — SIGNIFICANT CHANGE UP (ref 3.5–5.3)
PROT SERPL-MCNC: 7.5 G/DL — SIGNIFICANT CHANGE UP (ref 6.6–8.7)
PROTHROM AB SERPL-ACNC: 10.6 SEC — SIGNIFICANT CHANGE UP (ref 9.8–12.7)
RBC # BLD: 4.92 M/UL — SIGNIFICANT CHANGE UP (ref 4.6–6.2)
RBC # FLD: 13.8 % — SIGNIFICANT CHANGE UP (ref 11–15.6)
SODIUM SERPL-SCNC: 141 MMOL/L — SIGNIFICANT CHANGE UP (ref 135–145)
TYPE + AB SCN PNL BLD: SIGNIFICANT CHANGE UP
WBC # BLD: 7.6 K/UL — SIGNIFICANT CHANGE UP (ref 4.8–10.8)
WBC # FLD AUTO: 7.6 K/UL — SIGNIFICANT CHANGE UP (ref 4.8–10.8)

## 2017-09-01 PROCEDURE — G0463: CPT

## 2017-09-01 PROCEDURE — 86850 RBC ANTIBODY SCREEN: CPT

## 2017-09-01 PROCEDURE — 85610 PROTHROMBIN TIME: CPT

## 2017-09-01 PROCEDURE — 85730 THROMBOPLASTIN TIME PARTIAL: CPT

## 2017-09-01 PROCEDURE — 36415 COLL VENOUS BLD VENIPUNCTURE: CPT

## 2017-09-01 PROCEDURE — 86900 BLOOD TYPING SEROLOGIC ABO: CPT

## 2017-09-01 PROCEDURE — 86901 BLOOD TYPING SEROLOGIC RH(D): CPT

## 2017-09-01 PROCEDURE — 85027 COMPLETE CBC AUTOMATED: CPT

## 2017-09-01 PROCEDURE — 80053 COMPREHEN METABOLIC PANEL: CPT

## 2017-09-01 RX ORDER — SODIUM CHLORIDE 9 MG/ML
3 INJECTION INTRAMUSCULAR; INTRAVENOUS; SUBCUTANEOUS EVERY 8 HOURS
Qty: 0 | Refills: 0 | Status: DISCONTINUED | OUTPATIENT
Start: 2017-09-15 | End: 2017-09-18

## 2017-09-01 RX ORDER — CEFAZOLIN SODIUM 1 G
2000 VIAL (EA) INJECTION ONCE
Qty: 0 | Refills: 0 | Status: DISCONTINUED | OUTPATIENT
Start: 2017-09-15 | End: 2017-09-18

## 2017-09-01 NOTE — H&P PST ADULT - PMH
Bipolar disorder, unspecified    CAD (coronary artery disease)    CHF (congestive heart failure)    Chronic obstructive pulmonary disease, unspecified COPD type    Diaphragm dysfunction  partial paralysis  ETOH abuse    HF (heart failure)    HLD (hyperlipidemia)    HTN (hypertension)    LORNA on CPAP    Renal stones    Smoker Bipolar disorder, unspecified    CAD (coronary artery disease)    CHF (congestive heart failure)    Chronic obstructive pulmonary disease, unspecified COPD type    Diaphragm dysfunction  partial paralysis  ETOH abuse    HF (heart failure)    HLD (hyperlipidemia)    HTN (hypertension)    LORNA on CPAP    Renal stones    SIRS (systemic inflammatory response syndrome)    Smoker

## 2017-09-01 NOTE — H&P PST ADULT - NEGATIVE MUSCULOSKELETAL SYMPTOMS
no arthralgia/no leg pain L/no joint swelling/no arthritis/no stiffness/no neck pain/no arm pain L/no arm pain R/no leg pain R/no muscle cramps/no muscle weakness/no myalgia/no back pain

## 2017-09-01 NOTE — H&P PST ADULT - NEGATIVE CARDIOVASCULAR SYMPTOMS
no peripheral edema/no palpitations/no orthopnea/no dyspnea on exertion/no chest pain/no paroxysmal nocturnal dyspnea/no claudication

## 2017-09-01 NOTE — H&P PST ADULT - NEGATIVE SKIN SYMPTOMS
no change in size/color of mole/no rash/no pitted nails/no hair loss/no itching/no dryness/no brittle nails

## 2017-09-01 NOTE — PATIENT PROFILE ADULT. - PMH
Bipolar disorder, unspecified    CAD (coronary artery disease)    CHF (congestive heart failure)    Chronic obstructive pulmonary disease, unspecified COPD type    Diaphragm dysfunction  partial paralysis  ETOH abuse    HF (heart failure)    HLD (hyperlipidemia)    HTN (hypertension)    LORNA on CPAP    Renal stones    Smoker

## 2017-09-01 NOTE — H&P PST ADULT - NEUROLOGICAL DETAILS
responds to verbal commands/deep reflexes intact/cranial nerves intact/normal strength/sensation intact/no spontaneous movement/superficial reflexes intact/alert and oriented x 3/responds to pain

## 2017-09-01 NOTE — H&P PST ADULT - GASTROINTESTINAL DETAILS
nontender/no rebound tenderness/no rigidity/no organomegaly/no bruit/no guarding/normal/no masses palpable/bowel sounds normal/soft/no distention

## 2017-09-01 NOTE — H&P PST ADULT - NEGATIVE OPHTHALMOLOGIC SYMPTOMS
no scleral injection R/no blurred vision L/no blurred vision R/no discharge R/no irritation L/no loss of vision R/no photophobia/no diplopia/no discharge L/no irritation R/no loss of vision L/no scleral injection L/no pain R/no lacrimation L/no lacrimation R/no pain L

## 2017-09-01 NOTE — H&P PST ADULT - NEGATIVE NEUROLOGICAL SYMPTOMS
no tremors/no vertigo/no syncope/no loss of sensation/no loss of consciousness/no hemiparesis/no difficulty walking/no confusion/no transient paralysis/no headache/no facial palsy/no paresthesias/no weakness/no generalized seizures/no focal seizures

## 2017-09-01 NOTE — H&P PST ADULT - HISTORY OF PRESENT ILLNESS
58 y/o male  with h/o COPD had cxr and ct scan which showed left lung mass patient now presents for  flexible bronchoscopy left video assisted thoracoscopic lung resection

## 2017-09-01 NOTE — H&P PST ADULT - MUSCULOSKELETAL
details… detailed exam no calf tenderness/ROM intact/normal strength/no joint swelling/no joint erythema/normal/no joint warmth

## 2017-09-01 NOTE — H&P PST ADULT - NEGATIVE MALE-SPECIFIC SYMPTOMS
no impotence/no ejaculatory dysfunction/no undescended testicle L/no undescended testicle R/no genital sores/no scrotal mass R/no urethral discharge/no erectile dysfunction/no scrotal mass L

## 2017-09-01 NOTE — H&P PST ADULT - NSANTHOSAYNRD_GEN_A_CORE
No. LORNA screening performed.  STOP BANG Legend: 0-2 = LOW Risk; 3-4 = INTERMEDIATE Risk; 5-8 = HIGH Risk Yes

## 2017-09-01 NOTE — H&P PST ADULT - NEGATIVE GASTROINTESTINAL SYMPTOMS
no flatulence/no abdominal pain/no nausea/no vomiting/no diarrhea/no constipation/no hematochezia/no hiccoughs/no change in bowel habits/no steatorrhea/no jaundice/no melena

## 2017-09-01 NOTE — H&P PST ADULT - NEGATIVE ENMT SYMPTOMS
no hearing difficulty/no abnormal taste sensation/no dry mouth/no tinnitus/no vertigo/no sinus symptoms/no nasal obstruction/no nose bleeds/no nasal discharge/no recurrent cold sores/no ear pain/no gum bleeding/no throat pain/no dysphagia

## 2017-09-01 NOTE — H&P PST ADULT - NEGATIVE GENERAL GENITOURINARY SYMPTOMS
normal urinary frequency/no flank pain L/no flank pain R/no incontinence/no renal colic/no hematuria/no urine discoloration/no gas in urine/no dysuria/no bladder infections/no urinary hesitancy/no nocturia/normal libido

## 2017-09-01 NOTE — PATIENT PROFILE ADULT. - VISION (WITH CORRECTIVE LENSES IF THE PATIENT USUALLY WEARS THEM):
Partially impaired: cannot see medication labels or newsprint, but can see obstacles in path, and the surrounding layout; can count fingers at arm's length/reading and distance

## 2017-09-01 NOTE — H&P PST ADULT - NEGATIVE BREAST SYMPTOMS
no nipple discharge L/no breast tenderness L/no breast tenderness R/no nipple discharge R/no breast lump R/no breast lump L

## 2017-09-01 NOTE — H&P PST ADULT - NEGATIVE GENERAL SYMPTOMS
no fever/no sweating/no anorexia/no polydipsia/no malaise/no fatigue/no chills/no weight gain/no polyuria/no weight loss/no polyphagia

## 2017-09-01 NOTE — H&P PST ADULT - RS GEN PE MLT RESP DETAILS PC
good air movement/clear to auscultation bilaterally/no chest wall tenderness/no intercostal retractions/no subcutaneous emphysema/no rales/normal/respirations non-labored/no wheezes/diminished breath sounds, L/airway patent/no rhonchi

## 2017-09-01 NOTE — H&P PST ADULT - FAMILY HISTORY
Mother  Still living? Yes, Estimated age: 81-90  Family history of hypertension in mother, Age at diagnosis: Age Unknown     Father  Still living? No  No family history of COPD, Age at diagnosis: Age Unknown

## 2017-09-01 NOTE — H&P PST ADULT - NEGATIVE PSYCHIATRIC SYMPTOMS
no visual hallucinations/no memory loss/no suicidal ideation/no agitation/no auditory hallucinations/no insomnia/no paranoia

## 2017-09-15 ENCOUNTER — APPOINTMENT (OUTPATIENT)
Dept: THORACIC SURGERY | Facility: HOSPITAL | Age: 57
End: 2017-09-15
Payer: MEDICARE

## 2017-09-15 ENCOUNTER — RESULT REVIEW (OUTPATIENT)
Age: 57
End: 2017-09-15

## 2017-09-15 ENCOUNTER — TRANSCRIPTION ENCOUNTER (OUTPATIENT)
Age: 57
End: 2017-09-15

## 2017-09-15 ENCOUNTER — INPATIENT (INPATIENT)
Facility: HOSPITAL | Age: 57
LOS: 2 days | Discharge: HOME | DRG: 164 | End: 2017-09-18
Attending: THORACIC SURGERY (CARDIOTHORACIC VASCULAR SURGERY) | Admitting: THORACIC SURGERY (CARDIOTHORACIC VASCULAR SURGERY)
Payer: MEDICARE

## 2017-09-15 VITALS
SYSTOLIC BLOOD PRESSURE: 127 MMHG | TEMPERATURE: 98 F | OXYGEN SATURATION: 96 % | WEIGHT: 214.95 LBS | HEIGHT: 71 IN | HEART RATE: 66 BPM | RESPIRATION RATE: 16 BRPM | DIASTOLIC BLOOD PRESSURE: 66 MMHG

## 2017-09-15 DIAGNOSIS — Z98.890 OTHER SPECIFIED POSTPROCEDURAL STATES: Chronic | ICD-10-CM

## 2017-09-15 DIAGNOSIS — Z98.61 CORONARY ANGIOPLASTY STATUS: Chronic | ICD-10-CM

## 2017-09-15 DIAGNOSIS — R91.1 SOLITARY PULMONARY NODULE: ICD-10-CM

## 2017-09-15 LAB
BLD GP AB SCN SERPL QL: SIGNIFICANT CHANGE UP
TYPE + AB SCN PNL BLD: SIGNIFICANT CHANGE UP

## 2017-09-15 PROCEDURE — 71010: CPT | Mod: 26

## 2017-09-15 PROCEDURE — 32669 THORACOSCOPY REMOVE SEGMENT: CPT | Mod: AS

## 2017-09-15 PROCEDURE — 32674 THORACOSCOPY LYMPH NODE EXC: CPT

## 2017-09-15 PROCEDURE — 88305 TISSUE EXAM BY PATHOLOGIST: CPT | Mod: 26

## 2017-09-15 PROCEDURE — 32669 THORACOSCOPY REMOVE SEGMENT: CPT

## 2017-09-15 PROCEDURE — 88331 PATH CONSLTJ SURG 1 BLK 1SPC: CPT | Mod: 26

## 2017-09-15 PROCEDURE — 88309 TISSUE EXAM BY PATHOLOGIST: CPT | Mod: 26

## 2017-09-15 PROCEDURE — 32651 THORACOSCOPY REMOVE CORTEX: CPT | Mod: AS

## 2017-09-15 PROCEDURE — 32651 THORACOSCOPY REMOVE CORTEX: CPT

## 2017-09-15 PROCEDURE — 32674 THORACOSCOPY LYMPH NODE EXC: CPT | Mod: AS

## 2017-09-15 PROCEDURE — 88313 SPECIAL STAINS GROUP 2: CPT | Mod: 26

## 2017-09-15 RX ORDER — ACETAMINOPHEN 500 MG
1000 TABLET ORAL ONCE
Qty: 0 | Refills: 0 | Status: COMPLETED | OUTPATIENT
Start: 2017-09-15 | End: 2017-09-15

## 2017-09-15 RX ORDER — DEXTROAMPHETAMINE SACCHARATE, AMPHETAMINE ASPARTATE, DEXTROAMPHETAMINE SULFATE AND AMPHETAMINE SULFATE 1.875; 1.875; 1.875; 1.875 MG/1; MG/1; MG/1; MG/1
30 TABLET ORAL DAILY
Qty: 0 | Refills: 0 | Status: DISCONTINUED | OUTPATIENT
Start: 2017-09-15 | End: 2017-09-18

## 2017-09-15 RX ORDER — FENTANYL CITRATE 50 UG/ML
30 INJECTION INTRAVENOUS
Qty: 0 | Refills: 0 | Status: DISCONTINUED | OUTPATIENT
Start: 2017-09-15 | End: 2017-09-16

## 2017-09-15 RX ORDER — TAMSULOSIN HYDROCHLORIDE 0.4 MG/1
0.4 CAPSULE ORAL AT BEDTIME
Qty: 0 | Refills: 0 | Status: DISCONTINUED | OUTPATIENT
Start: 2017-09-15 | End: 2017-09-18

## 2017-09-15 RX ORDER — ONDANSETRON 8 MG/1
4 TABLET, FILM COATED ORAL EVERY 6 HOURS
Qty: 0 | Refills: 0 | Status: DISCONTINUED | OUTPATIENT
Start: 2017-09-15 | End: 2017-09-18

## 2017-09-15 RX ORDER — FENTANYL CITRATE 50 UG/ML
50 INJECTION INTRAVENOUS
Qty: 0 | Refills: 0 | Status: DISCONTINUED | OUTPATIENT
Start: 2017-09-15 | End: 2017-09-15

## 2017-09-15 RX ORDER — CARVEDILOL PHOSPHATE 80 MG/1
6.25 CAPSULE, EXTENDED RELEASE ORAL EVERY 12 HOURS
Qty: 0 | Refills: 0 | Status: DISCONTINUED | OUTPATIENT
Start: 2017-09-15 | End: 2017-09-18

## 2017-09-15 RX ORDER — ENOXAPARIN SODIUM 100 MG/ML
40 INJECTION SUBCUTANEOUS EVERY 24 HOURS
Qty: 0 | Refills: 0 | Status: DISCONTINUED | OUTPATIENT
Start: 2017-09-16 | End: 2017-09-18

## 2017-09-15 RX ORDER — ASPIRIN/CALCIUM CARB/MAGNESIUM 324 MG
81 TABLET ORAL DAILY
Qty: 0 | Refills: 0 | Status: DISCONTINUED | OUTPATIENT
Start: 2017-09-15 | End: 2017-09-18

## 2017-09-15 RX ORDER — IPRATROPIUM BROMIDE 0.2 MG/ML
500 SOLUTION, NON-ORAL INHALATION EVERY 6 HOURS
Qty: 0 | Refills: 0 | Status: DISCONTINUED | OUTPATIENT
Start: 2017-09-15 | End: 2017-09-18

## 2017-09-15 RX ORDER — BUDESONIDE AND FORMOTEROL FUMARATE DIHYDRATE 160; 4.5 UG/1; UG/1
2 AEROSOL RESPIRATORY (INHALATION)
Qty: 0 | Refills: 0 | Status: DISCONTINUED | OUTPATIENT
Start: 2017-09-15 | End: 2017-09-18

## 2017-09-15 RX ORDER — LORATADINE 10 MG/1
10 TABLET ORAL DAILY
Qty: 0 | Refills: 0 | Status: DISCONTINUED | OUTPATIENT
Start: 2017-09-15 | End: 2017-09-18

## 2017-09-15 RX ORDER — DOCUSATE SODIUM 100 MG
100 CAPSULE ORAL THREE TIMES A DAY
Qty: 0 | Refills: 0 | Status: DISCONTINUED | OUTPATIENT
Start: 2017-09-15 | End: 2017-09-18

## 2017-09-15 RX ORDER — NALOXONE HYDROCHLORIDE 4 MG/.1ML
0.1 SPRAY NASAL
Qty: 0 | Refills: 0 | Status: DISCONTINUED | OUTPATIENT
Start: 2017-09-15 | End: 2017-09-16

## 2017-09-15 RX ORDER — ONDANSETRON 8 MG/1
4 TABLET, FILM COATED ORAL ONCE
Qty: 0 | Refills: 0 | Status: DISCONTINUED | OUTPATIENT
Start: 2017-09-15 | End: 2017-09-15

## 2017-09-15 RX ORDER — SODIUM CHLORIDE 9 MG/ML
1000 INJECTION, SOLUTION INTRAVENOUS
Qty: 0 | Refills: 0 | Status: DISCONTINUED | OUTPATIENT
Start: 2017-09-15 | End: 2017-09-15

## 2017-09-15 RX ORDER — FLUTICASONE PROPIONATE 50 MCG
1 SPRAY, SUSPENSION NASAL DAILY
Qty: 0 | Refills: 0 | Status: DISCONTINUED | OUTPATIENT
Start: 2017-09-15 | End: 2017-09-18

## 2017-09-15 RX ORDER — SODIUM CHLORIDE 9 MG/ML
1000 INJECTION, SOLUTION INTRAVENOUS
Qty: 0 | Refills: 0 | Status: DISCONTINUED | OUTPATIENT
Start: 2017-09-15 | End: 2017-09-18

## 2017-09-15 RX ORDER — ALBUTEROL 90 UG/1
2.5 AEROSOL, METERED ORAL EVERY 6 HOURS
Qty: 0 | Refills: 0 | Status: DISCONTINUED | OUTPATIENT
Start: 2017-09-15 | End: 2017-09-18

## 2017-09-15 RX ADMIN — Medication 500 MICROGRAM(S): at 16:00

## 2017-09-15 RX ADMIN — FENTANYL CITRATE 30 MILLILITER(S): 50 INJECTION INTRAVENOUS at 15:17

## 2017-09-15 RX ADMIN — Medication 10 MILLIGRAM(S): at 23:31

## 2017-09-15 RX ADMIN — Medication 81 MILLIGRAM(S): at 23:25

## 2017-09-15 RX ADMIN — SODIUM CHLORIDE 3 MILLILITER(S): 9 INJECTION INTRAMUSCULAR; INTRAVENOUS; SUBCUTANEOUS at 22:25

## 2017-09-15 RX ADMIN — CARVEDILOL PHOSPHATE 6.25 MILLIGRAM(S): 80 CAPSULE, EXTENDED RELEASE ORAL at 23:32

## 2017-09-15 RX ADMIN — FENTANYL CITRATE 30 MILLILITER(S): 50 INJECTION INTRAVENOUS at 19:42

## 2017-09-15 RX ADMIN — Medication 100 MILLIGRAM(S): at 23:25

## 2017-09-15 RX ADMIN — FENTANYL CITRATE 30 MILLILITER(S): 50 INJECTION INTRAVENOUS at 18:32

## 2017-09-15 RX ADMIN — Medication 400 MILLIGRAM(S): at 23:25

## 2017-09-15 NOTE — BRIEF OPERATIVE NOTE - ANTIBIOTIC PROTOCOL
Followed protocol
Father  Still living? Unknown  Family history of duodenal cancer, Age at diagnosis: Age Unknown

## 2017-09-15 NOTE — BRIEF OPERATIVE NOTE - PROCEDURE
<<-----Click on this checkbox to enter Procedure Wedge resection, lung, using VATS  09/15/2017  Flex Bronch, Left VATS, Left Apical Trisegmentectomy, Partial Pulmonary Decortication, Mediastinal Lymph Node Dissection  Active  [id number]

## 2017-09-16 DIAGNOSIS — I15.8 OTHER SECONDARY HYPERTENSION: ICD-10-CM

## 2017-09-16 DIAGNOSIS — J93.83 OTHER PNEUMOTHORAX: ICD-10-CM

## 2017-09-16 DIAGNOSIS — F31.9 BIPOLAR DISORDER, UNSPECIFIED: ICD-10-CM

## 2017-09-16 LAB
ANION GAP SERPL CALC-SCNC: 14 MMOL/L — SIGNIFICANT CHANGE UP (ref 5–17)
BUN SERPL-MCNC: 17 MG/DL — SIGNIFICANT CHANGE UP (ref 8–20)
CALCIUM SERPL-MCNC: 8.9 MG/DL — SIGNIFICANT CHANGE UP (ref 8.6–10.2)
CHLORIDE SERPL-SCNC: 101 MMOL/L — SIGNIFICANT CHANGE UP (ref 98–107)
CO2 SERPL-SCNC: 23 MMOL/L — SIGNIFICANT CHANGE UP (ref 22–29)
CREAT SERPL-MCNC: 0.91 MG/DL — SIGNIFICANT CHANGE UP (ref 0.5–1.3)
GLUCOSE SERPL-MCNC: 160 MG/DL — HIGH (ref 70–115)
GRAM STN FLD: SIGNIFICANT CHANGE UP
HCT VFR BLD CALC: 45.5 % — SIGNIFICANT CHANGE UP (ref 42–52)
HGB BLD-MCNC: 15.6 G/DL — SIGNIFICANT CHANGE UP (ref 14–18)
LYMPHOCYTES # BLD AUTO: 0.9 K/UL — LOW (ref 1–4.8)
LYMPHOCYTES # BLD AUTO: 6.3 % — LOW (ref 20–55)
MCHC RBC-ENTMCNC: 31.3 PG — HIGH (ref 27–31)
MCHC RBC-ENTMCNC: 34.3 G/DL — SIGNIFICANT CHANGE UP (ref 32–36)
MCV RBC AUTO: 91.4 FL — SIGNIFICANT CHANGE UP (ref 80–94)
MONOCYTES # BLD AUTO: 0.7 K/UL — SIGNIFICANT CHANGE UP (ref 0–0.8)
MONOCYTES NFR BLD AUTO: 4.9 % — SIGNIFICANT CHANGE UP (ref 3–10)
NEUTROPHILS # BLD AUTO: 12.5 K/UL — HIGH (ref 1.8–8)
NEUTROPHILS NFR BLD AUTO: 88.4 % — HIGH (ref 37–73)
PLATELET # BLD AUTO: 236 K/UL — SIGNIFICANT CHANGE UP (ref 150–400)
POTASSIUM SERPL-MCNC: 4.1 MMOL/L — SIGNIFICANT CHANGE UP (ref 3.5–5.3)
POTASSIUM SERPL-SCNC: 4.1 MMOL/L — SIGNIFICANT CHANGE UP (ref 3.5–5.3)
RBC # BLD: 4.98 M/UL — SIGNIFICANT CHANGE UP (ref 4.6–6.2)
RBC # FLD: 13.6 % — SIGNIFICANT CHANGE UP (ref 11–15.6)
SODIUM SERPL-SCNC: 138 MMOL/L — SIGNIFICANT CHANGE UP (ref 135–145)
SPECIMEN SOURCE: SIGNIFICANT CHANGE UP
WBC # BLD: 14.1 K/UL — HIGH (ref 4.8–10.8)
WBC # FLD AUTO: 14.1 K/UL — HIGH (ref 4.8–10.8)

## 2017-09-16 PROCEDURE — 71010: CPT | Mod: 26,77

## 2017-09-16 PROCEDURE — 71010: CPT | Mod: 26

## 2017-09-16 PROCEDURE — 32556 INSERT CATH PLEURA W/O IMAGE: CPT

## 2017-09-16 RX ORDER — HYDROMORPHONE HYDROCHLORIDE 2 MG/ML
0.5 INJECTION INTRAMUSCULAR; INTRAVENOUS; SUBCUTANEOUS ONCE
Qty: 0 | Refills: 0 | Status: DISCONTINUED | OUTPATIENT
Start: 2017-09-16 | End: 2017-09-16

## 2017-09-16 RX ORDER — OXYCODONE AND ACETAMINOPHEN 5; 325 MG/1; MG/1
1 TABLET ORAL ONCE
Qty: 0 | Refills: 0 | Status: DISCONTINUED | OUTPATIENT
Start: 2017-09-16 | End: 2017-09-16

## 2017-09-16 RX ORDER — HYDROMORPHONE HYDROCHLORIDE 2 MG/ML
4 INJECTION INTRAMUSCULAR; INTRAVENOUS; SUBCUTANEOUS
Qty: 0 | Refills: 0 | Status: DISCONTINUED | OUTPATIENT
Start: 2017-09-16 | End: 2017-09-18

## 2017-09-16 RX ORDER — HYDROMORPHONE HYDROCHLORIDE 2 MG/ML
1 INJECTION INTRAMUSCULAR; INTRAVENOUS; SUBCUTANEOUS ONCE
Qty: 0 | Refills: 0 | Status: DISCONTINUED | OUTPATIENT
Start: 2017-09-16 | End: 2017-09-16

## 2017-09-16 RX ORDER — OXYCODONE AND ACETAMINOPHEN 5; 325 MG/1; MG/1
1 TABLET ORAL EVERY 4 HOURS
Qty: 0 | Refills: 0 | Status: DISCONTINUED | OUTPATIENT
Start: 2017-09-16 | End: 2017-09-16

## 2017-09-16 RX ORDER — OXYCODONE AND ACETAMINOPHEN 5; 325 MG/1; MG/1
2 TABLET ORAL EVERY 4 HOURS
Qty: 0 | Refills: 0 | Status: DISCONTINUED | OUTPATIENT
Start: 2017-09-16 | End: 2017-09-18

## 2017-09-16 RX ADMIN — Medication 100 MILLIGRAM(S): at 05:31

## 2017-09-16 RX ADMIN — HYDROMORPHONE HYDROCHLORIDE 0.5 MILLIGRAM(S): 2 INJECTION INTRAMUSCULAR; INTRAVENOUS; SUBCUTANEOUS at 20:35

## 2017-09-16 RX ADMIN — HYDROMORPHONE HYDROCHLORIDE 1 MILLIGRAM(S): 2 INJECTION INTRAMUSCULAR; INTRAVENOUS; SUBCUTANEOUS at 11:29

## 2017-09-16 RX ADMIN — OXYCODONE AND ACETAMINOPHEN 1 TABLET(S): 5; 325 TABLET ORAL at 10:05

## 2017-09-16 RX ADMIN — OXYCODONE AND ACETAMINOPHEN 2 TABLET(S): 5; 325 TABLET ORAL at 16:00

## 2017-09-16 RX ADMIN — ENOXAPARIN SODIUM 40 MILLIGRAM(S): 100 INJECTION SUBCUTANEOUS at 21:57

## 2017-09-16 RX ADMIN — CARVEDILOL PHOSPHATE 6.25 MILLIGRAM(S): 80 CAPSULE, EXTENDED RELEASE ORAL at 17:36

## 2017-09-16 RX ADMIN — SODIUM CHLORIDE 3 MILLILITER(S): 9 INJECTION INTRAMUSCULAR; INTRAVENOUS; SUBCUTANEOUS at 05:24

## 2017-09-16 RX ADMIN — HYDROMORPHONE HYDROCHLORIDE 0.5 MILLIGRAM(S): 2 INJECTION INTRAMUSCULAR; INTRAVENOUS; SUBCUTANEOUS at 21:00

## 2017-09-16 RX ADMIN — Medication 1000 MILLIGRAM(S): at 00:00

## 2017-09-16 RX ADMIN — HYDROMORPHONE HYDROCHLORIDE 1 MILLIGRAM(S): 2 INJECTION INTRAMUSCULAR; INTRAVENOUS; SUBCUTANEOUS at 16:30

## 2017-09-16 RX ADMIN — OXYCODONE AND ACETAMINOPHEN 2 TABLET(S): 5; 325 TABLET ORAL at 14:58

## 2017-09-16 RX ADMIN — CARVEDILOL PHOSPHATE 6.25 MILLIGRAM(S): 80 CAPSULE, EXTENDED RELEASE ORAL at 05:30

## 2017-09-16 RX ADMIN — FENTANYL CITRATE 30 MILLILITER(S): 50 INJECTION INTRAVENOUS at 08:00

## 2017-09-16 RX ADMIN — Medication 10 MILLIGRAM(S): at 17:36

## 2017-09-16 RX ADMIN — OXYCODONE AND ACETAMINOPHEN 1 TABLET(S): 5; 325 TABLET ORAL at 10:50

## 2017-09-16 RX ADMIN — HYDROMORPHONE HYDROCHLORIDE 1 MILLIGRAM(S): 2 INJECTION INTRAMUSCULAR; INTRAVENOUS; SUBCUTANEOUS at 16:13

## 2017-09-16 RX ADMIN — OXYCODONE AND ACETAMINOPHEN 1 TABLET(S): 5; 325 TABLET ORAL at 11:00

## 2017-09-16 RX ADMIN — Medication 81 MILLIGRAM(S): at 11:29

## 2017-09-16 RX ADMIN — Medication 10 MILLIGRAM(S): at 05:30

## 2017-09-16 RX ADMIN — OXYCODONE AND ACETAMINOPHEN 1 TABLET(S): 5; 325 TABLET ORAL at 10:00

## 2017-09-16 RX ADMIN — Medication 100 MILLIGRAM(S): at 21:56

## 2017-09-16 RX ADMIN — SODIUM CHLORIDE 3 MILLILITER(S): 9 INJECTION INTRAMUSCULAR; INTRAVENOUS; SUBCUTANEOUS at 13:32

## 2017-09-16 RX ADMIN — BUDESONIDE AND FORMOTEROL FUMARATE DIHYDRATE 2 PUFF(S): 160; 4.5 AEROSOL RESPIRATORY (INHALATION) at 08:27

## 2017-09-16 RX ADMIN — BUDESONIDE AND FORMOTEROL FUMARATE DIHYDRATE 2 PUFF(S): 160; 4.5 AEROSOL RESPIRATORY (INHALATION) at 20:49

## 2017-09-16 RX ADMIN — SODIUM CHLORIDE 3 MILLILITER(S): 9 INJECTION INTRAMUSCULAR; INTRAVENOUS; SUBCUTANEOUS at 21:56

## 2017-09-16 RX ADMIN — HYDROMORPHONE HYDROCHLORIDE 1 MILLIGRAM(S): 2 INJECTION INTRAMUSCULAR; INTRAVENOUS; SUBCUTANEOUS at 11:30

## 2017-09-16 NOTE — PROGRESS NOTE ADULT - SUBJECTIVE AND OBJECTIVE BOX
Subjective:      V/S  T(C): 36.7 (09-16-17 @ 10:20), Max: 36.7 (09-16-17 @ 10:20)  HR: 92 (09-16-17 @ 11:41) (63 - 106)  BP: 138/78 (09-16-17 @ 11:41) (126/70 - 158/91)  ABP: 153/75 (09-15-17 @ 16:15) (153/75 - 163/83)  ABP(mean): --  RR: 24 (09-16-17 @ 11:41) (19 - 26)  SpO2: 97% (09-16-17 @ 11:41) (94% - 100%)  Wt(kg): --  CVP(mm Hg): --  CO: --  CI: --  PA: --                                                Tele:    CHEST TUBE:                           OUTPUT:             per 24 hours     Drainage:    AIR LEAKS:  [ ] YES [ ] NO      MEDICATIONS  (STANDING):  sodium chloride 0.9% lock flush 3 milliLiter(s) IV Push every 8 hours  ceFAZolin   IVPB 2000 milliGRAM(s) IV Intermittent once  enoxaparin Injectable 40 milliGRAM(s) SubCutaneous every 24 hours  lactated ringers. 1000 milliLiter(s) (75 mL/Hr) IV Continuous <Continuous>  docusate sodium 100 milliGRAM(s) Oral three times a day  predniSONE   Tablet 10 milliGRAM(s) Oral two times a day  aspirin enteric coated 81 milliGRAM(s) Oral daily  tamsulosin 0.4 milliGRAM(s) Oral at bedtime  loratadine 10 milliGRAM(s) Oral daily  carvedilol 6.25 milliGRAM(s) Oral every 12 hours  buDESOnide 160 MICROgram(s)/formoterol 4.5 MICROgram(s) Inhaler 2 Puff(s) Inhalation two times a day  amphetamine/dextroamphetamine 30 milliGRAM(s) Oral daily  fluticasone propionate 50 MICROgram(s)/spray Nasal Spray 1 Spray(s) Both Nostrils daily      09-16    138  |  101  |  17.0  ----------------------------<  160<H>  4.1   |  23.0  |  0.91    Ca    8.9      16 Sep 2017 05:22                                 15.6   14.1  )-----------( 236      ( 16 Sep 2017 05:22 )             45.5                       CXR:  S/P tube removal> + anterior PTX w/ crepitus    Physical Exam:    Neuro: alert, no deficits    Pulm:   loose non productive cough  CPAP in use    CV:  S1  S2  RRR    Abd:  obese softly distended  non yender  +  BS     Extremities: trace edema B/L  LE    Incision:  L post incision site clean, L CT insitu to Sx earlier  Removed w/o incident> Pt reports SOB  CXR  confirmed PTX              PAST MEDICAL & SURGICAL HISTORY:  SIRS (systemic inflammatory response syndrome)  Bipolar disorder, unspecified  Renal stones  CAD (coronary artery disease)  CHF (congestive heart failure)  HLD (hyperlipidemia)  HTN (hypertension)  Diaphragm dysfunction: partial paralysis  ETOH abuse  HF (heart failure)  LORNA on CPAP  Smoker  Chronic obstructive pulmonary disease, unspecified COPD type  H/O endoscopy  H/O colonoscopy  H/O coronary angioplasty: x2 Subjective:  "I had a problem breathing after you took the tube out"  Wife at bedside      V/S  T(C): 36.7 (09-16-17 @ 10:20), Max: 36.7 (09-16-17 @ 10:20)  HR: 92 (09-16-17 @ 11:41) (63 - 106)  BP: 138/78 (09-16-17 @ 11:41) (126/70 - 158/91)  ABP: 153/75 (09-15-17 @ 16:15) (153/75 - 163/83)  RR: 24 (09-16-17 @ 11:41) (19 - 26)  SpO2: 97% (09-16-17 @ 11:41) (94% - 100%)                                                 Tele:  SR  90    CHEST TUBE:   l post tube   OUTPUT:     scant serous      per 24 hours    AIR LEAKS:  [ ] YES [x ] NO      MEDICATIONS  (STANDING):  sodium chloride 0.9% lock flush 3 milliLiter(s) IV Push every 8 hours  ceFAZolin   IVPB 2000 milliGRAM(s) IV Intermittent once  enoxaparin Injectable 40 milliGRAM(s) SubCutaneous every 24 hours  lactated ringers. 1000 milliLiter(s) (75 mL/Hr) IV Continuous <Continuous>  docusate sodium 100 milliGRAM(s) Oral three times a day  predniSONE   Tablet 10 milliGRAM(s) Oral two times a day  aspirin enteric coated 81 milliGRAM(s) Oral daily  tamsulosin 0.4 milliGRAM(s) Oral at bedtime  loratadine 10 milliGRAM(s) Oral daily  carvedilol 6.25 milliGRAM(s) Oral every 12 hours  buDESOnide 160 MICROgram(s)/formoterol 4.5 MICROgram(s) Inhaler 2 Puff(s) Inhalation two times a day  amphetamine/dextroamphetamine 30 milliGRAM(s) Oral daily  fluticasone propionate 50 MICROgram(s)/spray Nasal Spray 1 Spray(s) Both Nostrils daily      09-16    138  |  101  |  17.0  ----------------------------<  160<H>  4.1   |  23.0  |  0.91    Ca    8.9      16 Sep 2017 05:22                                 15.6   14.1  )-----------( 236      ( 16 Sep 2017 05:22 )             45.5           CXR:  S/P tube removal> + anterior PTX w/ crepitus    Physical Exam:    Neuro: alert, no deficits    Pulm:   loose non productive cough  CPAP in use    CV:  S1  S2  RRR    Abd:  obese softly distended  non yender  +  BS     Extremities: trace edema B/L  LE    Incision:  L post incision site clean, L CT insitu to Sx earlier  Removed w/o incident> Pt reports SOB  CXR  confirmed PTX              PAST MEDICAL & SURGICAL HISTORY:  SIRS (systemic inflammatory response syndrome)  Bipolar disorder, unspecified  Renal stones  CAD (coronary artery disease)  CHF (congestive heart failure)  HLD (hyperlipidemia)  HTN (hypertension)  Diaphragm dysfunction: partial paralysis  ETOH abuse  HF (heart failure)  LORNA on CPAP  Smoker  Chronic obstructive pulmonary disease, unspecified COPD type  H/O endoscopy  H/O colonoscopy  H/O coronary angioplasty: x2

## 2017-09-16 NOTE — PROCEDURE NOTE - PROCEDURE
<<-----Click on this checkbox to enter Procedure Chest tube insertion  09/16/2017    Active  [id number]

## 2017-09-17 LAB
ALBUMIN SERPL ELPH-MCNC: 4 G/DL — SIGNIFICANT CHANGE UP (ref 3.3–5.2)
ALP SERPL-CCNC: 66 U/L — SIGNIFICANT CHANGE UP (ref 40–120)
ALT FLD-CCNC: 28 U/L — SIGNIFICANT CHANGE UP
ANION GAP SERPL CALC-SCNC: 13 MMOL/L — SIGNIFICANT CHANGE UP (ref 5–17)
AST SERPL-CCNC: 23 U/L — SIGNIFICANT CHANGE UP
BILIRUB SERPL-MCNC: 0.4 MG/DL — SIGNIFICANT CHANGE UP (ref 0.4–2)
BUN SERPL-MCNC: 18 MG/DL — SIGNIFICANT CHANGE UP (ref 8–20)
CALCIUM SERPL-MCNC: 9 MG/DL — SIGNIFICANT CHANGE UP (ref 8.6–10.2)
CHLORIDE SERPL-SCNC: 102 MMOL/L — SIGNIFICANT CHANGE UP (ref 98–107)
CO2 SERPL-SCNC: 25 MMOL/L — SIGNIFICANT CHANGE UP (ref 22–29)
CREAT SERPL-MCNC: 0.9 MG/DL — SIGNIFICANT CHANGE UP (ref 0.5–1.3)
GLUCOSE SERPL-MCNC: 114 MG/DL — SIGNIFICANT CHANGE UP (ref 70–115)
HCT VFR BLD CALC: 44.3 % — SIGNIFICANT CHANGE UP (ref 42–52)
HGB BLD-MCNC: 14.4 G/DL — SIGNIFICANT CHANGE UP (ref 14–18)
MCHC RBC-ENTMCNC: 30.6 PG — SIGNIFICANT CHANGE UP (ref 27–31)
MCHC RBC-ENTMCNC: 32.5 G/DL — SIGNIFICANT CHANGE UP (ref 32–36)
MCV RBC AUTO: 94.3 FL — HIGH (ref 80–94)
NIGHT BLUE STAIN TISS: SIGNIFICANT CHANGE UP
PLATELET # BLD AUTO: 246 K/UL — SIGNIFICANT CHANGE UP (ref 150–400)
POTASSIUM SERPL-MCNC: 4.2 MMOL/L — SIGNIFICANT CHANGE UP (ref 3.5–5.3)
POTASSIUM SERPL-SCNC: 4.2 MMOL/L — SIGNIFICANT CHANGE UP (ref 3.5–5.3)
PROT SERPL-MCNC: 7.2 G/DL — SIGNIFICANT CHANGE UP (ref 6.6–8.7)
RBC # BLD: 4.7 M/UL — SIGNIFICANT CHANGE UP (ref 4.6–6.2)
RBC # FLD: 13.9 % — SIGNIFICANT CHANGE UP (ref 11–15.6)
SODIUM SERPL-SCNC: 140 MMOL/L — SIGNIFICANT CHANGE UP (ref 135–145)
SPECIMEN SOURCE: SIGNIFICANT CHANGE UP
WBC # BLD: 17.5 K/UL — HIGH (ref 4.8–10.8)
WBC # FLD AUTO: 17.5 K/UL — HIGH (ref 4.8–10.8)

## 2017-09-17 PROCEDURE — 71010: CPT | Mod: 26

## 2017-09-17 RX ORDER — ALPRAZOLAM 0.25 MG
0.25 TABLET ORAL
Qty: 0 | Refills: 0 | Status: DISCONTINUED | OUTPATIENT
Start: 2017-09-17 | End: 2017-09-18

## 2017-09-17 RX ADMIN — SODIUM CHLORIDE 3 MILLILITER(S): 9 INJECTION INTRAMUSCULAR; INTRAVENOUS; SUBCUTANEOUS at 20:59

## 2017-09-17 RX ADMIN — ENOXAPARIN SODIUM 40 MILLIGRAM(S): 100 INJECTION SUBCUTANEOUS at 21:01

## 2017-09-17 RX ADMIN — BUDESONIDE AND FORMOTEROL FUMARATE DIHYDRATE 2 PUFF(S): 160; 4.5 AEROSOL RESPIRATORY (INHALATION) at 21:00

## 2017-09-17 RX ADMIN — SODIUM CHLORIDE 3 MILLILITER(S): 9 INJECTION INTRAMUSCULAR; INTRAVENOUS; SUBCUTANEOUS at 11:49

## 2017-09-17 RX ADMIN — Medication 100 MILLIGRAM(S): at 05:13

## 2017-09-17 RX ADMIN — Medication 100 MILLIGRAM(S): at 21:00

## 2017-09-17 RX ADMIN — LORATADINE 10 MILLIGRAM(S): 10 TABLET ORAL at 11:46

## 2017-09-17 RX ADMIN — Medication 10 MILLIGRAM(S): at 05:13

## 2017-09-17 RX ADMIN — HYDROMORPHONE HYDROCHLORIDE 4 MILLIGRAM(S): 2 INJECTION INTRAMUSCULAR; INTRAVENOUS; SUBCUTANEOUS at 03:24

## 2017-09-17 RX ADMIN — BUDESONIDE AND FORMOTEROL FUMARATE DIHYDRATE 2 PUFF(S): 160; 4.5 AEROSOL RESPIRATORY (INHALATION) at 08:19

## 2017-09-17 RX ADMIN — Medication 10 MILLIGRAM(S): at 17:45

## 2017-09-17 RX ADMIN — CARVEDILOL PHOSPHATE 6.25 MILLIGRAM(S): 80 CAPSULE, EXTENDED RELEASE ORAL at 17:45

## 2017-09-17 RX ADMIN — CARVEDILOL PHOSPHATE 6.25 MILLIGRAM(S): 80 CAPSULE, EXTENDED RELEASE ORAL at 05:13

## 2017-09-17 RX ADMIN — Medication 0.25 MILLIGRAM(S): at 17:45

## 2017-09-17 RX ADMIN — Medication 1 SPRAY(S): at 11:47

## 2017-09-17 RX ADMIN — SODIUM CHLORIDE 3 MILLILITER(S): 9 INJECTION INTRAMUSCULAR; INTRAVENOUS; SUBCUTANEOUS at 05:06

## 2017-09-17 RX ADMIN — Medication 81 MILLIGRAM(S): at 11:46

## 2017-09-17 RX ADMIN — HYDROMORPHONE HYDROCHLORIDE 4 MILLIGRAM(S): 2 INJECTION INTRAMUSCULAR; INTRAVENOUS; SUBCUTANEOUS at 04:10

## 2017-09-17 NOTE — PROGRESS NOTE ADULT - SUBJECTIVE AND OBJECTIVE BOX
57 year old Male, S/P Left VATS Left Apical Trisgmentectomy, Partial Pulmonary Decortication Mediastenal Lymph Node Dissection under One [facility] Anesthesia on 9/15/17. Patient is resting comfortably. Family stated that the Chest Tube had to be reinserted due to developing Pneumothorax. Patient, as per Family, had no complaints or complications with regard to Anesthesia Care.

## 2017-09-17 NOTE — PROGRESS NOTE ADULT - SUBJECTIVE AND OBJECTIVE BOX
Subjective: " I feel OK"  Pt in bed family at bedside     T(C): 36.4 (09-17-17 @ 05:15), Max: 36.7 (09-16-17 @ 10:20)  HR: 72 (09-17-17 @ 09:02) (72 - 119)  BP: 122/60 (09-17-17 @ 05:15) (120/64 - 177/83)    RR: 18 (09-17-17 @ 09:02) (18 - 24)  SpO2: 99% (09-17-17 @ 09:02) (95% - 100%)  Tele: SR     CHEST TUBE:  Left ant                              OUTPUT:   0   per 24 hours    AIR LEAKS:  [ ] YES [x ] NO          09-17    140  |  102  |  18.0  ----------------------------<  114  4.2   |  25.0  |  0.90    Ca    9.0      17 Sep 2017 05:36    TPro  7.2  /  Alb  4.0  /  TBili  0.4  /  DBili  x   /  AST  23  /  ALT  28  /  AlkPhos  66  09-17                               14.4   17.5  )-----------( 246      ( 17 Sep 2017 05:36 )             44.3                 CAPILLARY BLOOD GLUCOSE               CXR:  < from: Xray Chest 1 View AP/PA. (09.16.17 @ 13:15) >  Left chest tube positioned within the apex of lung expansion of left   lung. The left lateral subcutaneous and the left adnexal tissue noted.   The right lung remains clear. Heart size normal magnification effect.   Trachea midline.    Visualized osseous structures are intact.        IMPRESSION:   Reexpansion of left lung following placement of a left   chest tube as described..          < end of copied text >        Assessment  Neuro: alert, no deficits  Pulm:   loose non productive cough    CV:  S1  S2  RRR  Abd:  obese softly distended  non yender  +  BS  Extremities: trace edema B/L  LE  Incision:  L post incision site clean, L CT insitu to waterseal mild crepitus noted         :

## 2017-09-18 ENCOUNTER — TRANSCRIPTION ENCOUNTER (OUTPATIENT)
Age: 57
End: 2017-09-18

## 2017-09-18 VITALS
SYSTOLIC BLOOD PRESSURE: 138 MMHG | DIASTOLIC BLOOD PRESSURE: 76 MMHG | OXYGEN SATURATION: 98 % | HEART RATE: 81 BPM | TEMPERATURE: 98 F | RESPIRATION RATE: 18 BRPM

## 2017-09-18 LAB
ANION GAP SERPL CALC-SCNC: 14 MMOL/L — SIGNIFICANT CHANGE UP (ref 5–17)
BUN SERPL-MCNC: 19 MG/DL — SIGNIFICANT CHANGE UP (ref 8–20)
CALCIUM SERPL-MCNC: 8.9 MG/DL — SIGNIFICANT CHANGE UP (ref 8.6–10.2)
CHLORIDE SERPL-SCNC: 103 MMOL/L — SIGNIFICANT CHANGE UP (ref 98–107)
CO2 SERPL-SCNC: 25 MMOL/L — SIGNIFICANT CHANGE UP (ref 22–29)
CREAT SERPL-MCNC: 0.87 MG/DL — SIGNIFICANT CHANGE UP (ref 0.5–1.3)
GLUCOSE SERPL-MCNC: 92 MG/DL — SIGNIFICANT CHANGE UP (ref 70–115)
HCT VFR BLD CALC: 44.3 % — SIGNIFICANT CHANGE UP (ref 42–52)
HGB BLD-MCNC: 14.9 G/DL — SIGNIFICANT CHANGE UP (ref 14–18)
MCHC RBC-ENTMCNC: 31.2 PG — HIGH (ref 27–31)
MCHC RBC-ENTMCNC: 33.6 G/DL — SIGNIFICANT CHANGE UP (ref 32–36)
MCV RBC AUTO: 92.9 FL — SIGNIFICANT CHANGE UP (ref 80–94)
PLATELET # BLD AUTO: 227 K/UL — SIGNIFICANT CHANGE UP (ref 150–400)
POTASSIUM SERPL-MCNC: 3.6 MMOL/L — SIGNIFICANT CHANGE UP (ref 3.5–5.3)
POTASSIUM SERPL-SCNC: 3.6 MMOL/L — SIGNIFICANT CHANGE UP (ref 3.5–5.3)
RBC # BLD: 4.77 M/UL — SIGNIFICANT CHANGE UP (ref 4.6–6.2)
RBC # FLD: 13.7 % — SIGNIFICANT CHANGE UP (ref 11–15.6)
SODIUM SERPL-SCNC: 142 MMOL/L — SIGNIFICANT CHANGE UP (ref 135–145)
WBC # BLD: 13.1 K/UL — HIGH (ref 4.8–10.8)
WBC # FLD AUTO: 13.1 K/UL — HIGH (ref 4.8–10.8)

## 2017-09-18 PROCEDURE — 71010: CPT | Mod: 26,76

## 2017-09-18 RX ORDER — DOCUSATE SODIUM 100 MG
1 CAPSULE ORAL
Qty: 0 | Refills: 0 | COMMUNITY
Start: 2017-09-18

## 2017-09-18 RX ORDER — POTASSIUM CHLORIDE 20 MEQ
40 PACKET (EA) ORAL ONCE
Qty: 0 | Refills: 0 | Status: COMPLETED | OUTPATIENT
Start: 2017-09-18 | End: 2017-09-18

## 2017-09-18 RX ADMIN — DEXTROAMPHETAMINE SACCHARATE, AMPHETAMINE ASPARTATE, DEXTROAMPHETAMINE SULFATE AND AMPHETAMINE SULFATE 30 MILLIGRAM(S): 1.875; 1.875; 1.875; 1.875 TABLET ORAL at 05:21

## 2017-09-18 RX ADMIN — BUDESONIDE AND FORMOTEROL FUMARATE DIHYDRATE 2 PUFF(S): 160; 4.5 AEROSOL RESPIRATORY (INHALATION) at 08:42

## 2017-09-18 RX ADMIN — Medication 100 MILLIGRAM(S): at 12:10

## 2017-09-18 RX ADMIN — Medication 10 MILLIGRAM(S): at 05:21

## 2017-09-18 RX ADMIN — Medication 81 MILLIGRAM(S): at 12:09

## 2017-09-18 RX ADMIN — Medication 1 SPRAY(S): at 12:11

## 2017-09-18 RX ADMIN — Medication 40 MILLIEQUIVALENT(S): at 09:55

## 2017-09-18 RX ADMIN — SODIUM CHLORIDE 3 MILLILITER(S): 9 INJECTION INTRAMUSCULAR; INTRAVENOUS; SUBCUTANEOUS at 05:21

## 2017-09-18 RX ADMIN — LORATADINE 10 MILLIGRAM(S): 10 TABLET ORAL at 12:10

## 2017-09-18 RX ADMIN — CARVEDILOL PHOSPHATE 6.25 MILLIGRAM(S): 80 CAPSULE, EXTENDED RELEASE ORAL at 05:21

## 2017-09-18 NOTE — DISCHARGE NOTE ADULT - PATIENT PORTAL LINK FT
“You can access the FollowHealth Patient Portal, offered by [facility], by registering with the following website: [email or web address]”

## 2017-09-18 NOTE — DISCHARGE NOTE ADULT - HOSPITAL COURSE
9/15- Left vats for lung nodule with partial decortication and trisegmentectomy   9/16 Left PTX after chest tube removal, left anterior pigtail placed for the same   9/18 d/c anterior chest tube, awaiting official CXR reading but without apparent PTX

## 2017-09-18 NOTE — PROGRESS NOTE ADULT - PROBLEM SELECTOR PLAN 3
adarall
patient education   continue to encourage coping mechanisms   continue outpatient follow up
adarall

## 2017-09-18 NOTE — DISCHARGE NOTE ADULT - SECONDARY DIAGNOSIS.
Bipolar affective disorder, current episode mixed, current episode severity unspecified Coronary artery disease involving native coronary artery of native heart without angina pectoris Chronic obstructive pulmonary disease, unspecified COPD type LORNA on CPAP Essential hypertension Hyperlipidemia, unspecified hyperlipidemia type

## 2017-09-18 NOTE — DISCHARGE NOTE ADULT - PLAN OF CARE
remain event free Please follow up with Dr Le in the office on Monday the 25th at 915 AM as previously scheduled     Continue to take your medications as previously prescribed   please take ibuprofen if needed for breakthrough pain,  Please do not take acetaminophen with Percocet as they are the same medication.    Please come to ED or Call Cardio thoracic office at [phone] if Chest pain, Shortness of Breath, persistant Nausea & vomiting, oozing from wounds, 2 lb increase in weight in 24 hours.    Please shower daily; please wash wound with mild soap continue follow up as normal. as above as above   continue home CPAP with home machine   continue home medications as above   continue cpap as above   please maintain a low sodium diet as this is the best diet to lower blood pressure. as above   please maintain a dash diet or a diet low in fat Please follow up with Dr Le in the office on Monday the 25th at 915 AM as previously scheduled     Continue to take your medications as previously prescribed   please take ibuprofen if needed for breakthrough pain,  Please do not take acetaminophen with Percocet as they are the same medication.    Please come to ED or Call Cardio thoracic office at [phone] if Chest pain, Shortness of Breath, persistant Nausea & vomiting, oozing from wounds, 2 lb increase in weight in 24 hours.    Please shower daily; please wash wound with mild soap; please do not scrub wound aggressively.  please call the office at [phone] if you have redness, swelling or discharge from the wound.

## 2017-09-18 NOTE — DISCHARGE NOTE ADULT - NS AS ACTIVITY OBS
Stairs allowed/No Heavy lifting/straining/Showering allowed/Do not make important decisions/Do not drive or operate machinery/Sex allowed

## 2017-09-18 NOTE — PROGRESS NOTE ADULT - SUBJECTIVE AND OBJECTIVE BOX
Subjective: "i am doing well"    patient seen and examined bedside with wife present; no acute distress    9/15 Left VATS LAMAR wedge with partial decortication, post op period complicated by PTX after chest tube removal on 9/16 for which a left anterior pigtail was placed; patient currently noted to be stable; without airleak on chest tube and without PTX on CXR; chest tube removed and post removal CXR without apparent PTX awaiting official read     T(C): 36.7 (09-18-17 @ 05:16), Max: 36.8 (09-17-17 @ 11:00)  HR: 84 (09-18-17 @ 05:16) (84 - 100)  BP: 128/80 (09-18-17 @ 05:16) (121/67 - 140/90)  RR: 18 (09-18-17 @ 05:16) (18 - 22)  SpO2: 93% (09-18-17 @ 08:43) (92% - 97%)      Physical examination  Neuro: alert and oriented; no acute distress   Pulm: clear anteriorly, without crackles or wheezing;  CV: +s1S2 rrr without murmur or rub   Abd: soft nontender   Extrem/MS: +pulses without edema   Incision(s): incisions examined; dressing taken down from lower incision, both noted to have appropriate healing without drainage      09-18    142  |  103  |  19.0  ----------------------------<  92  3.6   |  25.0  |  0.87    Ca    8.9      18 Sep 2017 05:59    TPro  7.2  /  Alb  4.0  /  TBili  0.4  /  DBili  x   /  AST  23  /  ALT  28  /  AlkPhos  66  09-17                        14.9   13.1  )-----------( 227      ( 18 Sep 2017 05:59 )             44.3             CAPILLARY BLOOD GLUCOSE      CXR:  < from: Xray Chest 1 View AP/PA. (09.18.17 @ 05:32) >    Comparisons:  Chest x-ray dated 9/17/2017    Findings: Pigtail catheter in left upper lung field. No pneumothorax but   subcutaneous emphysema is present along the left lateral chest wall. No   change in the appearance of the left apex and lung. Discoid atelectasis   right lung base. There are no infiltrates, congestion or pleural   effusions. The pulmonary vasculature and aorta are normal for age. Heart   size is unremarkable. The thorax is normal for age.    Impression: Discoid atelectasis right lower lobe. Status post VATS left   lung    < end of copied text >      I&O's Detail    17 Sep 2017 07:01  -  18 Sep 2017 07:00  --------------------------------------------------------  IN:    Oral Fluid: 240 mL  Total IN: 240 mL    OUT:    Chest Tube: 7 mL    Voided: 300 mL  Total OUT: 307 mL    Total NET: -67 mL        Drug Dosing Weight  Height (cm): 180.34 (15 Sep 2017 11:14)  Weight (kg): 97.5 (15 Sep 2017 11:14)  BMI (kg/m2): 30 (15 Sep 2017 11:14)  BSA (m2): 2.17 (15 Sep 2017 11:14)    MEDICATIONS  (STANDING):  sodium chloride 0.9% lock flush 3 milliLiter(s) IV Push every 8 hours  ceFAZolin   IVPB 2000 milliGRAM(s) IV Intermittent once  enoxaparin Injectable 40 milliGRAM(s) SubCutaneous every 24 hours  lactated ringers. 1000 milliLiter(s) (75 mL/Hr) IV Continuous <Continuous>  docusate sodium 100 milliGRAM(s) Oral three times a day  predniSONE   Tablet 10 milliGRAM(s) Oral two times a day  aspirin enteric coated 81 milliGRAM(s) Oral daily  tamsulosin 0.4 milliGRAM(s) Oral at bedtime  loratadine 10 milliGRAM(s) Oral daily  carvedilol 6.25 milliGRAM(s) Oral every 12 hours  buDESOnide 160 MICROgram(s)/formoterol 4.5 MICROgram(s) Inhaler 2 Puff(s) Inhalation two times a day  amphetamine/dextroamphetamine 30 milliGRAM(s) Oral daily  fluticasone propionate 50 MICROgram(s)/spray Nasal Spray 1 Spray(s) Both Nostrils daily    MEDICATIONS  (PRN):  ondansetron Injectable 4 milliGRAM(s) IV Push every 6 hours PRN Nausea  ALBUTerol    0.083% 2.5 milliGRAM(s) Nebulizer every 6 hours PRN Shortness of Breath and/or Wheezing  ipratropium    for Nebulization 500 MICROGram(s) Nebulizer every 6 hours PRN Shortness of Breath and/or Wheezing  oxyCODONE    5 mG/acetaminophen 325 mG 2 Tablet(s) Oral every 4 hours PRN Moderate Pain (4 - 6)  HYDROmorphone   Tablet 4 milliGRAM(s) Oral every 3 hours PRN Severe Pain (7 - 10)  ALPRAZolam 0.25 milliGRAM(s) Oral two times a day PRN anxiety      PAST MEDICAL & SURGICAL HISTORY:  SIRS (systemic inflammatory response syndrome)  Bipolar disorder, unspecified  Renal stones  CAD (coronary artery disease)  CHF (congestive heart failure)  HLD (hyperlipidemia)  HTN (hypertension)  Diaphragm dysfunction: partial paralysis  ETOH abuse  HF (heart failure)  LORNA on CPAP  Smoker  Chronic obstructive pulmonary disease, unspecified COPD type  H/O endoscopy  H/O colonoscopy  H/O coronary angioplasty: x2

## 2017-09-18 NOTE — DISCHARGE NOTE ADULT - CARE PLAN
Principal Discharge DX:	Lung nodule  Goal:	remain event free  Instructions for follow-up, activity and diet:	Please follow up with Dr Le in the office on Monday the 25th at 915 AM as previously scheduled     Continue to take your medications as previously prescribed   please take ibuprofen if needed for breakthrough pain,  Please do not take acetaminophen with Percocet as they are the same medication.    Please come to ED or Call Cardio thoracic office at [phone] if Chest pain, Shortness of Breath, persistant Nausea & vomiting, oozing from wounds, 2 lb increase in weight in 24 hours.    Please shower daily; please wash wound with mild soap  Secondary Diagnosis:	Bipolar affective disorder, current episode mixed, current episode severity unspecified  Goal:	remain event free  Instructions for follow-up, activity and diet:	continue follow up as normal.  Secondary Diagnosis:	Coronary artery disease involving native coronary artery of native heart without angina pectoris  Goal:	remain event free  Instructions for follow-up, activity and diet:	as above  Secondary Diagnosis:	Chronic obstructive pulmonary disease, unspecified COPD type  Goal:	remain event free  Instructions for follow-up, activity and diet:	as above   continue home CPAP with home machine   continue home medications  Secondary Diagnosis:	LORNA on CPAP  Goal:	remain event free  Instructions for follow-up, activity and diet:	as above   continue cpap  Secondary Diagnosis:	Essential hypertension  Goal:	remain event free  Instructions for follow-up, activity and diet:	as above   please maintain a low sodium diet as this is the best diet to lower blood pressure.  Secondary Diagnosis:	Hyperlipidemia, unspecified hyperlipidemia type  Goal:	remain event free  Instructions for follow-up, activity and diet:	as above   please maintain a dash diet or a diet low in fat Principal Discharge DX:	Lung nodule  Goal:	remain event free  Instructions for follow-up, activity and diet:	Please follow up with Dr Le in the office on Monday the 25th at 915 AM as previously scheduled     Continue to take your medications as previously prescribed   please take ibuprofen if needed for breakthrough pain,  Please do not take acetaminophen with Percocet as they are the same medication.    Please come to ED or Call Cardio thoracic office at [phone] if Chest pain, Shortness of Breath, persistant Nausea & vomiting, oozing from wounds, 2 lb increase in weight in 24 hours.    Please shower daily; please wash wound with mild soap; please do not scrub wound aggressively.  please call the office at [phone] if you have redness, swelling or discharge from the wound.  Secondary Diagnosis:	Bipolar affective disorder, current episode mixed, current episode severity unspecified  Goal:	remain event free  Instructions for follow-up, activity and diet:	continue follow up as normal.  Secondary Diagnosis:	Coronary artery disease involving native coronary artery of native heart without angina pectoris  Goal:	remain event free  Instructions for follow-up, activity and diet:	as above  Secondary Diagnosis:	Chronic obstructive pulmonary disease, unspecified COPD type  Goal:	remain event free  Instructions for follow-up, activity and diet:	as above   continue home CPAP with home machine   continue home medications  Secondary Diagnosis:	LORNA on CPAP  Goal:	remain event free  Instructions for follow-up, activity and diet:	as above   continue cpap  Secondary Diagnosis:	Essential hypertension  Goal:	remain event free  Instructions for follow-up, activity and diet:	as above   please maintain a low sodium diet as this is the best diet to lower blood pressure.  Secondary Diagnosis:	Hyperlipidemia, unspecified hyperlipidemia type  Goal:	remain event free  Instructions for follow-up, activity and diet:	as above   please maintain a dash diet or a diet low in fat

## 2017-09-18 NOTE — DISCHARGE NOTE ADULT - MEDICATION SUMMARY - MEDICATIONS TO TAKE
I will START or STAY ON the medications listed below when I get home from the hospital:    predniSONE 10 mg oral tablet  -- 1 tab(s) by mouth 2 times a day     -- Indication: For COPD    aspirin 81 mg oral tablet  -- 1 tab(s) by mouth once a day  -- Indication: For CAD (coronary artery disease)    Vicodin 5 mg-300 mg oral tablet  -- 1 tab(s) by mouth every 6 hours, As Needed  -- Indication: For Pain control     tamsulosin 0.4 mg oral capsule  -- 1 cap(s) by mouth once a day (at bedtime) MDD:one  -- Indication: For BPH     Allegra 180 mg oral tablet  -- 1 tab(s) by mouth once a day  -- Indication: For allergies     Xanax 0.5 mg oral tablet  -- 4 tab(s) by mouth once a day (at bedtime)  -- Indication: For anxiety     carvedilol 6.25 mg oral tablet  -- 1 tab(s) by mouth 2 times a day  -- Indication: For CAD (coronary artery disease)    ProAir HFA 90 mcg/inh inhalation aerosol  -- 2 puff(s) inhaled every 6 hours, As Needed -for shortness of breath and/or wheezing  -- For inhalation only.  It is very important that you take or use this exactly as directed.  Do not skip doses or discontinue unless directed by your doctor.  Obtain medical advice before taking any non-prescription drugs as some may affect the action of this medication.  Shake well before use.    -- Indication: For COPD    Dulera 200 mcg-5 mcg/inh inhalation aerosol  -- 2 puff(s) inhaled 2 times a day  -- Indication: For COPD    Adderall 30 mg oral tablet  -- 1 tab(s) by mouth once a day AM  -- Indication: For ADHD    Adderall 30 mg oral tablet  -- 0.5 tab(s) by mouth once a day Noon   -- Indication: For ADHD    docusate sodium 100 mg oral capsule  -- 1 cap(s) by mouth 3 times a day  -- Indication: For laxative     fluticasone 50 mcg/inh nasal spray  -- 1 spray(s) into nose once a day  -- Indication: For allergies

## 2017-09-18 NOTE — PROGRESS NOTE ADULT - ASSESSMENT
56 y/o male 9/15  L  VATS trisegmentectomy partial decortication  9/16 post tube removal PTX pigtail placed anteriorly  9/18 chest tube removed
58 y/o male 9/15  L  VATS trisegmentectomy partial decortication  9/16 post tube removal PTX pigtail placed anteriorly
56 y/o male 9/15  L  VATS trisegmentectomy partial decortication  9/16 post tube removal PTX pigtail placed anteriorly

## 2017-09-18 NOTE — DISCHARGE NOTE ADULT - CARE PROVIDER_API CALL
Solomon Le), Surgery; Thoracic Surgery  [address] [place] [address]  Phone: [phone]  Fax: [phone]

## 2017-09-18 NOTE — PROGRESS NOTE ADULT - PROBLEM SELECTOR PLAN 5
place L pigtail place to water-seal today   CXR am  Supplemental O2 PRN
place L pigtail  CXR am  Supplemental O2
without PTX on CXR   without air leak   chest tube removed   awaiting offical read of CXR   without apparent CXR

## 2017-09-21 LAB
CULTURE RESULTS: SIGNIFICANT CHANGE UP
SPECIMEN SOURCE: SIGNIFICANT CHANGE UP
SURGICAL PATHOLOGY FINAL REPORT - CH: SIGNIFICANT CHANGE UP

## 2017-09-25 ENCOUNTER — OUTPATIENT (OUTPATIENT)
Dept: OUTPATIENT SERVICES | Facility: HOSPITAL | Age: 57
LOS: 1 days | End: 2017-09-25
Payer: MEDICARE

## 2017-09-25 ENCOUNTER — APPOINTMENT (OUTPATIENT)
Dept: THORACIC SURGERY | Facility: CLINIC | Age: 57
End: 2017-09-25
Payer: MEDICARE

## 2017-09-25 VITALS
HEIGHT: 71 IN | RESPIRATION RATE: 16 BRPM | WEIGHT: 268 LBS | SYSTOLIC BLOOD PRESSURE: 116 MMHG | HEART RATE: 88 BPM | OXYGEN SATURATION: 96 % | DIASTOLIC BLOOD PRESSURE: 75 MMHG | BODY MASS INDEX: 37.52 KG/M2

## 2017-09-25 DIAGNOSIS — Z98.61 CORONARY ANGIOPLASTY STATUS: Chronic | ICD-10-CM

## 2017-09-25 DIAGNOSIS — Z98.890 OTHER SPECIFIED POSTPROCEDURAL STATES: Chronic | ICD-10-CM

## 2017-09-25 DIAGNOSIS — R91.1 SOLITARY PULMONARY NODULE: ICD-10-CM

## 2017-09-25 PROCEDURE — 71046 X-RAY EXAM CHEST 2 VIEWS: CPT

## 2017-09-25 PROCEDURE — 99024 POSTOP FOLLOW-UP VISIT: CPT

## 2017-09-25 PROCEDURE — 71020: CPT | Mod: 26

## 2017-09-27 ENCOUNTER — OUTPATIENT (OUTPATIENT)
Dept: OUTPATIENT SERVICES | Facility: HOSPITAL | Age: 57
LOS: 1 days | End: 2017-09-27
Payer: MEDICARE

## 2017-09-27 ENCOUNTER — APPOINTMENT (OUTPATIENT)
Dept: RADIOLOGY | Facility: CLINIC | Age: 57
End: 2017-09-27
Payer: MEDICARE

## 2017-09-27 DIAGNOSIS — Z98.890 OTHER SPECIFIED POSTPROCEDURAL STATES: Chronic | ICD-10-CM

## 2017-09-27 DIAGNOSIS — Z98.61 CORONARY ANGIOPLASTY STATUS: Chronic | ICD-10-CM

## 2017-09-27 DIAGNOSIS — R91.1 SOLITARY PULMONARY NODULE: ICD-10-CM

## 2017-09-27 PROCEDURE — 71046 X-RAY EXAM CHEST 2 VIEWS: CPT

## 2017-09-27 PROCEDURE — 71020: CPT | Mod: 26

## 2017-09-30 NOTE — CHART NOTE - NSCHARTNOTEFT_GEN_A_CORE
called by [facility] lab for abnormal lab result.   pt is not a hospitalist pt. ct abril called and left message on phone.   called lab at  and informed Nirali to contact CT surgeon Dr Le or his PA.

## 2017-10-09 ENCOUNTER — APPOINTMENT (OUTPATIENT)
Dept: THORACIC SURGERY | Facility: CLINIC | Age: 57
End: 2017-10-09
Payer: MEDICARE

## 2017-10-09 ENCOUNTER — RECORD ABSTRACTING (OUTPATIENT)
Age: 57
End: 2017-10-09

## 2017-10-09 ENCOUNTER — OUTPATIENT (OUTPATIENT)
Dept: OUTPATIENT SERVICES | Facility: HOSPITAL | Age: 57
LOS: 1 days | End: 2017-10-09
Payer: MEDICARE

## 2017-10-09 VITALS
HEART RATE: 87 BPM | WEIGHT: 215 LBS | BODY MASS INDEX: 30.1 KG/M2 | OXYGEN SATURATION: 95 % | HEIGHT: 71 IN | DIASTOLIC BLOOD PRESSURE: 73 MMHG | RESPIRATION RATE: 16 BRPM | SYSTOLIC BLOOD PRESSURE: 110 MMHG

## 2017-10-09 DIAGNOSIS — Z98.890 OTHER SPECIFIED POSTPROCEDURAL STATES: Chronic | ICD-10-CM

## 2017-10-09 DIAGNOSIS — J93.9 PNEUMOTHORAX, UNSPECIFIED: ICD-10-CM

## 2017-10-09 DIAGNOSIS — Z98.61 CORONARY ANGIOPLASTY STATUS: Chronic | ICD-10-CM

## 2017-10-09 LAB
CULTURE RESULTS: SIGNIFICANT CHANGE UP
SPECIMEN SOURCE: SIGNIFICANT CHANGE UP

## 2017-10-09 PROCEDURE — 71020: CPT | Mod: 26

## 2017-10-09 PROCEDURE — 99024 POSTOP FOLLOW-UP VISIT: CPT

## 2017-10-09 PROCEDURE — 71046 X-RAY EXAM CHEST 2 VIEWS: CPT

## 2017-10-19 PROCEDURE — 94660 CPAP INITIATION&MGMT: CPT

## 2017-10-19 PROCEDURE — 80053 COMPREHEN METABOLIC PANEL: CPT

## 2017-10-19 PROCEDURE — 88309 TISSUE EXAM BY PATHOLOGIST: CPT

## 2017-10-19 PROCEDURE — 86900 BLOOD TYPING SEROLOGIC ABO: CPT

## 2017-10-19 PROCEDURE — 88331 PATH CONSLTJ SURG 1 BLK 1SPC: CPT

## 2017-10-19 PROCEDURE — 85027 COMPLETE CBC AUTOMATED: CPT

## 2017-10-19 PROCEDURE — 94760 N-INVAS EAR/PLS OXIMETRY 1: CPT

## 2017-10-19 PROCEDURE — 87015 SPECIMEN INFECT AGNT CONCNTJ: CPT

## 2017-10-19 PROCEDURE — 87206 SMEAR FLUORESCENT/ACID STAI: CPT

## 2017-10-19 PROCEDURE — 88313 SPECIAL STAINS GROUP 2: CPT

## 2017-10-19 PROCEDURE — 86850 RBC ANTIBODY SCREEN: CPT

## 2017-10-19 PROCEDURE — 86901 BLOOD TYPING SEROLOGIC RH(D): CPT

## 2017-10-19 PROCEDURE — 71045 X-RAY EXAM CHEST 1 VIEW: CPT

## 2017-10-19 PROCEDURE — 86920 COMPATIBILITY TEST SPIN: CPT

## 2017-10-19 PROCEDURE — 88305 TISSUE EXAM BY PATHOLOGIST: CPT

## 2017-10-19 PROCEDURE — 94640 AIRWAY INHALATION TREATMENT: CPT

## 2017-10-19 PROCEDURE — 87102 FUNGUS ISOLATION CULTURE: CPT

## 2017-10-19 PROCEDURE — 80048 BASIC METABOLIC PNL TOTAL CA: CPT

## 2017-10-19 PROCEDURE — 87116 MYCOBACTERIA CULTURE: CPT

## 2017-10-19 PROCEDURE — 87070 CULTURE OTHR SPECIMN AEROBIC: CPT

## 2017-11-08 LAB
CULTURE RESULTS: SIGNIFICANT CHANGE UP
SPECIMEN SOURCE: SIGNIFICANT CHANGE UP

## 2017-11-10 ENCOUNTER — EMERGENCY (EMERGENCY)
Facility: HOSPITAL | Age: 57
LOS: 1 days | Discharge: HOME | End: 2017-11-10
Attending: EMERGENCY MEDICINE | Admitting: EMERGENCY MEDICINE
Payer: MEDICARE

## 2017-11-10 VITALS
OXYGEN SATURATION: 99 % | RESPIRATION RATE: 16 BRPM | SYSTOLIC BLOOD PRESSURE: 121 MMHG | TEMPERATURE: 98 F | HEART RATE: 85 BPM | DIASTOLIC BLOOD PRESSURE: 72 MMHG

## 2017-11-10 VITALS — HEIGHT: 72 IN | WEIGHT: 214.95 LBS

## 2017-11-10 DIAGNOSIS — Z98.61 CORONARY ANGIOPLASTY STATUS: Chronic | ICD-10-CM

## 2017-11-10 DIAGNOSIS — Z98.890 OTHER SPECIFIED POSTPROCEDURAL STATES: Chronic | ICD-10-CM

## 2017-11-10 LAB
ALBUMIN SERPL ELPH-MCNC: 4.3 G/DL — SIGNIFICANT CHANGE UP (ref 3.3–5.2)
ALP SERPL-CCNC: 68 U/L — SIGNIFICANT CHANGE UP (ref 40–120)
ALT FLD-CCNC: 21 U/L — SIGNIFICANT CHANGE UP
AMPHET UR-MCNC: POSITIVE
ANION GAP SERPL CALC-SCNC: 11 MMOL/L — SIGNIFICANT CHANGE UP (ref 5–17)
APAP SERPL-MCNC: <7.5 UG/ML — LOW (ref 10–26)
APTT BLD: 39.4 SEC — HIGH (ref 27.5–37.4)
AST SERPL-CCNC: 21 U/L — SIGNIFICANT CHANGE UP
BARBITURATES UR SCN-MCNC: NEGATIVE — SIGNIFICANT CHANGE UP
BASE EXCESS BLDV CALC-SCNC: -0.1 MMOL/L — SIGNIFICANT CHANGE UP (ref -3–3)
BASOPHILS # BLD AUTO: 0 K/UL — SIGNIFICANT CHANGE UP (ref 0–0.2)
BASOPHILS NFR BLD AUTO: 0.6 % — SIGNIFICANT CHANGE UP (ref 0–2)
BENZODIAZ UR-MCNC: POSITIVE
BILIRUB SERPL-MCNC: 0.3 MG/DL — LOW (ref 0.4–2)
BUN SERPL-MCNC: 12 MG/DL — SIGNIFICANT CHANGE UP (ref 8–20)
CALCIUM SERPL-MCNC: 9 MG/DL — SIGNIFICANT CHANGE UP (ref 8.6–10.2)
CHLORIDE SERPL-SCNC: 103 MMOL/L — SIGNIFICANT CHANGE UP (ref 98–107)
CK SERPL-CCNC: 112 U/L — SIGNIFICANT CHANGE UP (ref 30–200)
CO2 SERPL-SCNC: 25 MMOL/L — SIGNIFICANT CHANGE UP (ref 22–29)
COCAINE METAB.OTHER UR-MCNC: NEGATIVE — SIGNIFICANT CHANGE UP
CREAT SERPL-MCNC: 0.98 MG/DL — SIGNIFICANT CHANGE UP (ref 0.5–1.3)
EOSINOPHIL # BLD AUTO: 0.3 K/UL — SIGNIFICANT CHANGE UP (ref 0–0.5)
EOSINOPHIL NFR BLD AUTO: 4.3 % — SIGNIFICANT CHANGE UP (ref 0–5)
ETHANOL SERPL-MCNC: <10 MG/DL — SIGNIFICANT CHANGE UP
GAS PNL BLDV: SIGNIFICANT CHANGE UP
GLUCOSE SERPL-MCNC: 96 MG/DL — SIGNIFICANT CHANGE UP (ref 70–115)
HCO3 BLDV-SCNC: 24 MMOL/L — SIGNIFICANT CHANGE UP (ref 20–26)
HCT VFR BLD CALC: 42.8 % — SIGNIFICANT CHANGE UP (ref 42–52)
HGB BLD-MCNC: 14.6 G/DL — SIGNIFICANT CHANGE UP (ref 14–18)
INR BLD: 1.01 RATIO — SIGNIFICANT CHANGE UP (ref 0.88–1.16)
LYMPHOCYTES # BLD AUTO: 2.2 K/UL — SIGNIFICANT CHANGE UP (ref 1–4.8)
LYMPHOCYTES # BLD AUTO: 32.6 % — SIGNIFICANT CHANGE UP (ref 20–55)
MAGNESIUM SERPL-MCNC: 2 MG/DL — SIGNIFICANT CHANGE UP (ref 1.6–2.6)
MCHC RBC-ENTMCNC: 30.9 PG — SIGNIFICANT CHANGE UP (ref 27–31)
MCHC RBC-ENTMCNC: 34.1 G/DL — SIGNIFICANT CHANGE UP (ref 32–36)
MCV RBC AUTO: 90.5 FL — SIGNIFICANT CHANGE UP (ref 80–94)
METHADONE UR-MCNC: NEGATIVE — SIGNIFICANT CHANGE UP
MONOCYTES # BLD AUTO: 0.6 K/UL — SIGNIFICANT CHANGE UP (ref 0–0.8)
MONOCYTES NFR BLD AUTO: 8.1 % — SIGNIFICANT CHANGE UP (ref 3–10)
NEUTROPHILS # BLD AUTO: 3.7 K/UL — SIGNIFICANT CHANGE UP (ref 1.8–8)
NEUTROPHILS NFR BLD AUTO: 54.1 % — SIGNIFICANT CHANGE UP (ref 37–73)
OPIATES UR-MCNC: NEGATIVE — SIGNIFICANT CHANGE UP
PCO2 BLDV: 41 MMHG — SIGNIFICANT CHANGE UP (ref 35–50)
PCP SPEC-MCNC: SIGNIFICANT CHANGE UP
PCP UR-MCNC: NEGATIVE — SIGNIFICANT CHANGE UP
PH BLDV: 7.39 — SIGNIFICANT CHANGE UP (ref 7.35–7.45)
PHOSPHATE SERPL-MCNC: 3.5 MG/DL — SIGNIFICANT CHANGE UP (ref 2.4–4.7)
PLATELET # BLD AUTO: 224 K/UL — SIGNIFICANT CHANGE UP (ref 150–400)
PO2 BLDV: 44 MMHG — SIGNIFICANT CHANGE UP (ref 25–45)
POTASSIUM SERPL-MCNC: 3.8 MMOL/L — SIGNIFICANT CHANGE UP (ref 3.5–5.3)
POTASSIUM SERPL-SCNC: 3.8 MMOL/L — SIGNIFICANT CHANGE UP (ref 3.5–5.3)
PROT SERPL-MCNC: 7.2 G/DL — SIGNIFICANT CHANGE UP (ref 6.6–8.7)
PROTHROM AB SERPL-ACNC: 11.1 SEC — SIGNIFICANT CHANGE UP (ref 9.8–12.7)
RBC # BLD: 4.73 M/UL — SIGNIFICANT CHANGE UP (ref 4.6–6.2)
RBC # FLD: 13.9 % — SIGNIFICANT CHANGE UP (ref 11–15.6)
SALICYLATES SERPL-MCNC: <2 MG/DL — LOW (ref 10–20)
SAO2 % BLDV: 77 % — SIGNIFICANT CHANGE UP (ref 67–88)
SODIUM SERPL-SCNC: 139 MMOL/L — SIGNIFICANT CHANGE UP (ref 135–145)
T3 SERPL-MCNC: 151 NG/DL — SIGNIFICANT CHANGE UP (ref 80–200)
T4 AB SER-ACNC: 6.2 UG/DL — SIGNIFICANT CHANGE UP (ref 4.5–12)
THC UR QL: NEGATIVE — SIGNIFICANT CHANGE UP
TROPONIN T SERPL-MCNC: <0.01 NG/ML — SIGNIFICANT CHANGE UP (ref 0–0.06)
TROPONIN T SERPL-MCNC: <0.01 NG/ML — SIGNIFICANT CHANGE UP (ref 0–0.06)
TSH SERPL-MCNC: 0.98 UIU/ML — SIGNIFICANT CHANGE UP (ref 0.27–4.2)
VIT B12 SERPL-MCNC: 345 PG/ML — SIGNIFICANT CHANGE UP (ref 180–914)
WBC # BLD: 6.8 K/UL — SIGNIFICANT CHANGE UP (ref 4.8–10.8)
WBC # FLD AUTO: 6.8 K/UL — SIGNIFICANT CHANGE UP (ref 4.8–10.8)

## 2017-11-10 PROCEDURE — 82607 VITAMIN B-12: CPT

## 2017-11-10 PROCEDURE — 82803 BLOOD GASES ANY COMBINATION: CPT

## 2017-11-10 PROCEDURE — 85730 THROMBOPLASTIN TIME PARTIAL: CPT

## 2017-11-10 PROCEDURE — 93010 ELECTROCARDIOGRAM REPORT: CPT

## 2017-11-10 PROCEDURE — 93005 ELECTROCARDIOGRAM TRACING: CPT

## 2017-11-10 PROCEDURE — 99284 EMERGENCY DEPT VISIT MOD MDM: CPT | Mod: 25

## 2017-11-10 PROCEDURE — 70450 CT HEAD/BRAIN W/O DYE: CPT

## 2017-11-10 PROCEDURE — 85027 COMPLETE CBC AUTOMATED: CPT

## 2017-11-10 PROCEDURE — 80307 DRUG TEST PRSMV CHEM ANLYZR: CPT

## 2017-11-10 PROCEDURE — 83735 ASSAY OF MAGNESIUM: CPT

## 2017-11-10 PROCEDURE — 80053 COMPREHEN METABOLIC PANEL: CPT

## 2017-11-10 PROCEDURE — 71010: CPT | Mod: 26

## 2017-11-10 PROCEDURE — 85610 PROTHROMBIN TIME: CPT

## 2017-11-10 PROCEDURE — 84480 ASSAY TRIIODOTHYRONINE (T3): CPT

## 2017-11-10 PROCEDURE — 84484 ASSAY OF TROPONIN QUANT: CPT

## 2017-11-10 PROCEDURE — 82550 ASSAY OF CK (CPK): CPT

## 2017-11-10 PROCEDURE — 84443 ASSAY THYROID STIM HORMONE: CPT

## 2017-11-10 PROCEDURE — 84100 ASSAY OF PHOSPHORUS: CPT

## 2017-11-10 PROCEDURE — 71275 CT ANGIOGRAPHY CHEST: CPT

## 2017-11-10 PROCEDURE — 71045 X-RAY EXAM CHEST 1 VIEW: CPT

## 2017-11-10 PROCEDURE — 36415 COLL VENOUS BLD VENIPUNCTURE: CPT

## 2017-11-10 PROCEDURE — 71275 CT ANGIOGRAPHY CHEST: CPT | Mod: 26

## 2017-11-10 PROCEDURE — 70450 CT HEAD/BRAIN W/O DYE: CPT | Mod: 26

## 2017-11-10 PROCEDURE — 84436 ASSAY OF TOTAL THYROXINE: CPT

## 2017-11-10 PROCEDURE — 99285 EMERGENCY DEPT VISIT HI MDM: CPT

## 2017-11-10 RX ORDER — SODIUM CHLORIDE 9 MG/ML
500 INJECTION INTRAMUSCULAR; INTRAVENOUS; SUBCUTANEOUS ONCE
Qty: 0 | Refills: 0 | Status: COMPLETED | OUTPATIENT
Start: 2017-11-10 | End: 2017-11-10

## 2017-11-10 RX ADMIN — SODIUM CHLORIDE 500 MILLILITER(S): 9 INJECTION INTRAMUSCULAR; INTRAVENOUS; SUBCUTANEOUS at 18:59

## 2017-11-10 NOTE — ED PROVIDER NOTE - MEDICAL DECISION MAKING DETAILS
EKG with no ischemia, trop neg x 2 despite 3 days of pain, atypical chest pain. Sx have resolved and pt is well appearing. CT and labs normal. Discussed case with ID Dr. Back, who advises holding ethambutol and following up with ID in the office. Pt urine also positive for amphetamines, which can also explain the Sx.

## 2017-11-10 NOTE — ED ADULT NURSE NOTE - CCCP TRG CHIEF CMPLNT
This is an extremely nice 71-year-old who is here for follow-up.  I will request routine blood work and notify him when the results are available.  
chest pain

## 2017-11-10 NOTE — ED PROVIDER NOTE - OBJECTIVE STATEMENT
57 year old male with PMH CHF, CAD, HTN, HLD, LORNA on CPAP, COPD presents with paresthesias and chest pain. Pt states that he recently had a VATS procedure for lung mass resection, which grew out mycobacterium avium complex. He was started on rifampin and azithromycin at first, and then 3 days ago wa started on ethambutol. he staes that shortly after starting ethambutol he felt very anxious and tremulous, with diffuse felings of pins and nedles and lightheadedness. He also began to have chest pain described as a sharp pain in his left chest, constant x 3 days, worse with movement. No associated diaphoresis, pain with exertion, or SOB. Deneis fevers, chills, hemoptysis.

## 2017-11-10 NOTE — ED ADULT NURSE NOTE - PMH
Bipolar disorder, unspecified    CAD (coronary artery disease)    CHF (congestive heart failure)    Chronic obstructive pulmonary disease, unspecified COPD type    Diaphragm dysfunction  partial paralysis  ETOH abuse    HF (heart failure)    HLD (hyperlipidemia)    HTN (hypertension)    LORNA on CPAP    Renal stones    SIRS (systemic inflammatory response syndrome)    Smoker

## 2017-11-10 NOTE — ED ADULT NURSE REASSESSMENT NOTE - NS ED NURSE REASSESS COMMENT FT1
Dr. Iniguez @ bedside speaking with family as they are requesting a d/c. They are aware that a troponin will be back in 10-15 minute and will be going home.

## 2017-11-10 NOTE — ED ADULT NURSE REASSESSMENT NOTE - NS ED NURSE REASSESS COMMENT FT1
Assumed pt care @ 1900 from NORMAN Mendoza. Pt is A&Ox3 in NAD, NSR on cardiac monitor. Pt denies complaint.  IV clean dry and intact, flushes without difficulty. Safety maintained, Bed locked in lowest position. Pt aware to repeat troponin @ 2000. Wife @ bedside. Will continue to monitor.

## 2017-11-10 NOTE — ED ADULT NURSE NOTE - ADDITIONAL PRINTED INSTRUCTIONS GIVEN
pt aware to f/u with ID pt aware to f/u with ID, pt left prior to given d/c papers but plan of care was discussed and IV removed

## 2017-11-10 NOTE — ED ADULT NURSE NOTE - OBJECTIVE STATEMENT
Pt states he was started on a new medication on Wednesday and pain developed since then. Nothing makes the pain better of worse. Pain is constant.

## 2017-11-30 NOTE — PATIENT PROFILE ADULT. - NS TRANSFER PATIENT BELONGINGS
Jewelry/Money (specify)
“You can access the [facility] Patient Portal, offered by [facility], by registering with the following website: [email or web address]”

## 2018-06-05 ENCOUNTER — APPOINTMENT (OUTPATIENT)
Dept: OTOLARYNGOLOGY | Facility: CLINIC | Age: 58
End: 2018-06-05
Payer: MEDICARE

## 2018-06-05 VITALS
BODY MASS INDEX: 28.73 KG/M2 | WEIGHT: 206 LBS | DIASTOLIC BLOOD PRESSURE: 81 MMHG | HEART RATE: 81 BPM | SYSTOLIC BLOOD PRESSURE: 130 MMHG

## 2018-06-05 PROCEDURE — 31231 NASAL ENDOSCOPY DX: CPT

## 2018-06-05 PROCEDURE — 99202 OFFICE O/P NEW SF 15 MIN: CPT | Mod: 25

## 2018-06-05 PROCEDURE — 99205 OFFICE O/P NEW HI 60 MIN: CPT | Mod: 25

## 2018-06-05 RX ORDER — OXYCODONE AND ACETAMINOPHEN 5; 325 MG/1; MG/1
5-325 TABLET ORAL
Qty: 40 | Refills: 0 | Status: ACTIVE | COMMUNITY
Start: 2018-06-05 | End: 1900-01-01

## 2018-06-07 ENCOUNTER — FORM ENCOUNTER (OUTPATIENT)
Age: 58
End: 2018-06-07

## 2018-06-08 ENCOUNTER — APPOINTMENT (OUTPATIENT)
Dept: MRI IMAGING | Facility: CLINIC | Age: 58
End: 2018-06-08
Payer: MEDICARE

## 2018-06-08 ENCOUNTER — RX RENEWAL (OUTPATIENT)
Age: 58
End: 2018-06-08

## 2018-06-08 ENCOUNTER — OUTPATIENT (OUTPATIENT)
Dept: OUTPATIENT SERVICES | Facility: HOSPITAL | Age: 58
LOS: 1 days | End: 2018-06-08
Payer: MEDICARE

## 2018-06-08 DIAGNOSIS — Z98.890 OTHER SPECIFIED POSTPROCEDURAL STATES: Chronic | ICD-10-CM

## 2018-06-08 DIAGNOSIS — Z98.61 CORONARY ANGIOPLASTY STATUS: Chronic | ICD-10-CM

## 2018-06-08 DIAGNOSIS — Z00.8 ENCOUNTER FOR OTHER GENERAL EXAMINATION: ICD-10-CM

## 2018-06-08 PROCEDURE — 70543 MRI ORBT/FAC/NCK W/O &W/DYE: CPT

## 2018-06-08 PROCEDURE — 70543 MRI ORBT/FAC/NCK W/O &W/DYE: CPT | Mod: 26

## 2018-06-08 RX ORDER — HYDROCODONE BITARTRATE AND ACETAMINOPHEN 5; 300 MG/1; MG/1
5-300 TABLET ORAL
Qty: 20 | Refills: 0 | Status: ACTIVE | COMMUNITY
Start: 2018-06-08 | End: 1900-01-01

## 2018-06-13 ENCOUNTER — OUTPATIENT (OUTPATIENT)
Dept: OUTPATIENT SERVICES | Facility: HOSPITAL | Age: 58
LOS: 1 days | End: 2018-06-13
Payer: MEDICARE

## 2018-06-13 ENCOUNTER — TRANSCRIPTION ENCOUNTER (OUTPATIENT)
Age: 58
End: 2018-06-13

## 2018-06-13 VITALS
DIASTOLIC BLOOD PRESSURE: 72 MMHG | SYSTOLIC BLOOD PRESSURE: 118 MMHG | WEIGHT: 205.91 LBS | RESPIRATION RATE: 16 BRPM | TEMPERATURE: 98 F | OXYGEN SATURATION: 96 % | HEART RATE: 86 BPM | HEIGHT: 67 IN

## 2018-06-13 DIAGNOSIS — D49.1 NEOPLASM OF UNSPECIFIED BEHAVIOR OF RESPIRATORY SYSTEM: ICD-10-CM

## 2018-06-13 DIAGNOSIS — Z98.890 OTHER SPECIFIED POSTPROCEDURAL STATES: Chronic | ICD-10-CM

## 2018-06-13 DIAGNOSIS — F31.9 BIPOLAR DISORDER, UNSPECIFIED: ICD-10-CM

## 2018-06-13 DIAGNOSIS — K40.20 BILATERAL INGUINAL HERNIA, WITHOUT OBSTRUCTION OR GANGRENE, NOT SPECIFIED AS RECURRENT: ICD-10-CM

## 2018-06-13 DIAGNOSIS — I10 ESSENTIAL (PRIMARY) HYPERTENSION: ICD-10-CM

## 2018-06-13 DIAGNOSIS — Z90.2 ACQUIRED ABSENCE OF LUNG [PART OF]: Chronic | ICD-10-CM

## 2018-06-13 DIAGNOSIS — H26.9 UNSPECIFIED CATARACT: Chronic | ICD-10-CM

## 2018-06-13 DIAGNOSIS — Z29.9 ENCOUNTER FOR PROPHYLACTIC MEASURES, UNSPECIFIED: ICD-10-CM

## 2018-06-13 DIAGNOSIS — C31.0 MALIGNANT NEOPLASM OF MAXILLARY SINUS: ICD-10-CM

## 2018-06-13 DIAGNOSIS — Z98.61 CORONARY ANGIOPLASTY STATUS: Chronic | ICD-10-CM

## 2018-06-13 LAB
BUN SERPL-MCNC: 14 MG/DL — SIGNIFICANT CHANGE UP (ref 7–23)
CALCIUM SERPL-MCNC: 9.2 MG/DL — SIGNIFICANT CHANGE UP (ref 8.4–10.5)
CHLORIDE SERPL-SCNC: 100 MMOL/L — SIGNIFICANT CHANGE UP (ref 98–107)
CO2 SERPL-SCNC: 25 MMOL/L — SIGNIFICANT CHANGE UP (ref 22–31)
CREAT SERPL-MCNC: 0.96 MG/DL — SIGNIFICANT CHANGE UP (ref 0.5–1.3)
GLUCOSE SERPL-MCNC: 144 MG/DL — HIGH (ref 70–99)
HCT VFR BLD CALC: 43.4 % — SIGNIFICANT CHANGE UP (ref 39–50)
HGB BLD-MCNC: 15 G/DL — SIGNIFICANT CHANGE UP (ref 13–17)
MCHC RBC-ENTMCNC: 29.9 PG — SIGNIFICANT CHANGE UP (ref 27–34)
MCHC RBC-ENTMCNC: 34.6 % — SIGNIFICANT CHANGE UP (ref 32–36)
MCV RBC AUTO: 86.6 FL — SIGNIFICANT CHANGE UP (ref 80–100)
NRBC # FLD: 0 — SIGNIFICANT CHANGE UP
PLATELET # BLD AUTO: 328 K/UL — SIGNIFICANT CHANGE UP (ref 150–400)
PMV BLD: 9.9 FL — SIGNIFICANT CHANGE UP (ref 7–13)
POTASSIUM SERPL-MCNC: 3.8 MMOL/L — SIGNIFICANT CHANGE UP (ref 3.5–5.3)
POTASSIUM SERPL-SCNC: 3.8 MMOL/L — SIGNIFICANT CHANGE UP (ref 3.5–5.3)
RBC # BLD: 5.01 M/UL — SIGNIFICANT CHANGE UP (ref 4.2–5.8)
RBC # FLD: 13.7 % — SIGNIFICANT CHANGE UP (ref 10.3–14.5)
SODIUM SERPL-SCNC: 139 MMOL/L — SIGNIFICANT CHANGE UP (ref 135–145)
WBC # BLD: 10.74 K/UL — HIGH (ref 3.8–10.5)
WBC # FLD AUTO: 10.74 K/UL — HIGH (ref 3.8–10.5)

## 2018-06-13 PROCEDURE — 93010 ELECTROCARDIOGRAM REPORT: CPT

## 2018-06-13 RX ORDER — SODIUM CHLORIDE 9 MG/ML
1000 INJECTION, SOLUTION INTRAVENOUS
Qty: 0 | Refills: 0 | Status: DISCONTINUED | OUTPATIENT
Start: 2018-06-14 | End: 2018-06-15

## 2018-06-13 NOTE — H&P PST ADULT - VISION (WITH CORRECTIVE LENSES IF THE PATIENT USUALLY WEARS THEM):
Partially impaired: cannot see medication labels or newsprint, but can see obstacles in path, and the surrounding layout; can count fingers at arm's length wears glasses/Partially impaired: cannot see medication labels or newsprint, but can see obstacles in path, and the surrounding layout; can count fingers at arm's length

## 2018-06-13 NOTE — H&P PST ADULT - PSH
Cataract    H/O colonoscopy    H/O coronary angioplasty  x2  H/O endoscopy    S/P lobectomy of lung  Sept 2017

## 2018-06-13 NOTE — H&P PST ADULT - PMH
Bipolar disorder, unspecified    CAD (coronary artery disease)    CHF (congestive heart failure)  2014  Chronic obstructive pulmonary disease, unspecified COPD type    Diaphragm dysfunction  partial paralysis- now resolved as per wife  ETOH abuse    HF (heart failure)    HLD (hyperlipidemia)    HTN (hypertension)    YANET (mycobacterium avium-intracellulare)  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

## 2018-06-13 NOTE — H&P PST ADULT - OTHER CARE PROVIDERS
Cardiologist- Dr. Naranjo- [phone] Cardiologist- Dr. Naranjo- [phone], Pulmonologist Dr. Mendez Mata [phone]

## 2018-06-13 NOTE — H&P PST ADULT - PROBLEM SELECTOR PLAN 1
Pt. is scheduled for nasal endoscopy with biopsy, direct laryngoscopy esophagoscopy on 6/14/18  Preoperative instructions reviewed, pt verbalized understanding.  Preop Famotidine provided.  Lab results pending, EKG done.  Pt. instructed to obtain a medical evaluation prior to surgery by surgeon, please obtain copy for PST chart

## 2018-06-13 NOTE — H&P PST ADULT - HISTORY OF PRESENT ILLNESS
58 y/o male with history of facial mass.  c.o nose2.5 weeks ago bleed- continued bleeding facial pain, saw PMD, ENT, attempted to cauterize, sent for CT Scan 56 y/o male recently diagnosed with maxillary sinus mass presents to PAST today for presurgical  with history of facial mass.  c.o nose2.5 weeks ago bleed- continued bleeding facial pain, saw PMD, ENT, attempted to cauterize, sent for CT Scan 56 y/o male recently diagnosed with maxillary sinus mass presents to PAST today for presurgical evaluation.  He complains of nose bleeds that started approximately 2 weeks ago along with right facial pain and right eye swelling.  He saw his PMD and was referred to ENT.  CT Scan and MRI were done.  He was started on Prednisone taper and eye swelling has improved.  He is scheduled for nasal endoscopy with biopsy, direct laryngoscopy esophagoscopy on 6/14/18.

## 2018-06-13 NOTE — H&P PST ADULT - NEGATIVE CARDIOVASCULAR SYMPTOMS
no paroxysmal nocturnal dyspnea/no palpitations/no dyspnea on exertion/no orthopnea/no peripheral edema/no chest pain/no claudication

## 2018-06-14 ENCOUNTER — RESULT REVIEW (OUTPATIENT)
Age: 58
End: 2018-06-14

## 2018-06-14 ENCOUNTER — APPOINTMENT (OUTPATIENT)
Dept: OTOLARYNGOLOGY | Facility: HOSPITAL | Age: 58
End: 2018-06-14

## 2018-06-14 ENCOUNTER — INPATIENT (INPATIENT)
Facility: HOSPITAL | Age: 58
LOS: 0 days | Discharge: HOME | End: 2018-06-15
Attending: OTOLARYNGOLOGY | Admitting: OTOLARYNGOLOGY
Payer: MEDICARE

## 2018-06-14 VITALS
SYSTOLIC BLOOD PRESSURE: 137 MMHG | OXYGEN SATURATION: 95 % | RESPIRATION RATE: 17 BRPM | HEIGHT: 67 IN | DIASTOLIC BLOOD PRESSURE: 92 MMHG | TEMPERATURE: 98 F | WEIGHT: 205.91 LBS | HEART RATE: 92 BPM

## 2018-06-14 DIAGNOSIS — H26.9 UNSPECIFIED CATARACT: Chronic | ICD-10-CM

## 2018-06-14 DIAGNOSIS — K40.20 BILATERAL INGUINAL HERNIA, WITHOUT OBSTRUCTION OR GANGRENE, NOT SPECIFIED AS RECURRENT: ICD-10-CM

## 2018-06-14 DIAGNOSIS — C31.0 MALIGNANT NEOPLASM OF MAXILLARY SINUS: ICD-10-CM

## 2018-06-14 DIAGNOSIS — Z98.61 CORONARY ANGIOPLASTY STATUS: Chronic | ICD-10-CM

## 2018-06-14 DIAGNOSIS — Z98.890 OTHER SPECIFIED POSTPROCEDURAL STATES: Chronic | ICD-10-CM

## 2018-06-14 DIAGNOSIS — Z90.2 ACQUIRED ABSENCE OF LUNG [PART OF]: Chronic | ICD-10-CM

## 2018-06-14 PROCEDURE — 88365 INSITU HYBRIDIZATION (FISH): CPT | Mod: 26,59

## 2018-06-14 PROCEDURE — 88334 PATH CONSLTJ SURG CYTO XM EA: CPT | Mod: 26,59

## 2018-06-14 PROCEDURE — 88331 PATH CONSLTJ SURG 1 BLK 1SPC: CPT | Mod: 26

## 2018-06-14 PROCEDURE — 88367 INSITU HYBRIDIZATION AUTO: CPT | Mod: 26

## 2018-06-14 PROCEDURE — 88360 TUMOR IMMUNOHISTOCHEM/MANUAL: CPT | Mod: 26

## 2018-06-14 PROCEDURE — 88341 IMHCHEM/IMCYTCHM EA ADD ANTB: CPT | Mod: 26,59

## 2018-06-14 PROCEDURE — 88311 DECALCIFY TISSUE: CPT | Mod: 26

## 2018-06-14 PROCEDURE — 88305 TISSUE EXAM BY PATHOLOGIST: CPT | Mod: 26

## 2018-06-14 PROCEDURE — 88342 IMHCHEM/IMCYTCHM 1ST ANTB: CPT | Mod: 26,59

## 2018-06-14 RX ORDER — HYDROMORPHONE HYDROCHLORIDE 2 MG/ML
1 INJECTION INTRAMUSCULAR; INTRAVENOUS; SUBCUTANEOUS
Qty: 0 | Refills: 0 | Status: DISCONTINUED | OUTPATIENT
Start: 2018-06-14 | End: 2018-06-15

## 2018-06-14 RX ORDER — OLANZAPINE 15 MG/1
5 TABLET, FILM COATED ORAL DAILY
Qty: 0 | Refills: 0 | Status: DISCONTINUED | OUTPATIENT
Start: 2018-06-14 | End: 2018-06-15

## 2018-06-14 RX ORDER — CARVEDILOL PHOSPHATE 80 MG/1
12.5 CAPSULE, EXTENDED RELEASE ORAL EVERY 12 HOURS
Qty: 0 | Refills: 0 | Status: DISCONTINUED | OUTPATIENT
Start: 2018-06-14 | End: 2018-06-15

## 2018-06-14 RX ORDER — OXYCODONE AND ACETAMINOPHEN 5; 325 MG/1; MG/1
1 TABLET ORAL EVERY 6 HOURS
Qty: 0 | Refills: 0 | Status: DISCONTINUED | OUTPATIENT
Start: 2018-06-14 | End: 2018-06-15

## 2018-06-14 RX ORDER — SODIUM CHLORIDE 9 MG/ML
1000 INJECTION, SOLUTION INTRAVENOUS
Qty: 0 | Refills: 0 | Status: DISCONTINUED | OUTPATIENT
Start: 2018-06-14 | End: 2018-06-15

## 2018-06-14 RX ORDER — ACETAMINOPHEN 500 MG
650 TABLET ORAL EVERY 6 HOURS
Qty: 0 | Refills: 0 | Status: DISCONTINUED | OUTPATIENT
Start: 2018-06-14 | End: 2018-06-15

## 2018-06-14 RX ORDER — ONDANSETRON 8 MG/1
4 TABLET, FILM COATED ORAL ONCE
Qty: 0 | Refills: 0 | Status: DISCONTINUED | OUTPATIENT
Start: 2018-06-14 | End: 2018-06-15

## 2018-06-14 RX ORDER — HYDROMORPHONE HYDROCHLORIDE 2 MG/ML
1 INJECTION INTRAMUSCULAR; INTRAVENOUS; SUBCUTANEOUS ONCE
Qty: 0 | Refills: 0 | Status: DISCONTINUED | OUTPATIENT
Start: 2018-06-14 | End: 2018-06-14

## 2018-06-14 RX ORDER — ALPRAZOLAM 0.25 MG
0.5 TABLET ORAL AT BEDTIME
Qty: 0 | Refills: 0 | Status: DISCONTINUED | OUTPATIENT
Start: 2018-06-14 | End: 2018-06-15

## 2018-06-14 RX ORDER — LORATADINE 10 MG/1
10 TABLET ORAL DAILY
Qty: 0 | Refills: 0 | Status: DISCONTINUED | OUTPATIENT
Start: 2018-06-14 | End: 2018-06-15

## 2018-06-14 RX ORDER — HYDROMORPHONE HYDROCHLORIDE 2 MG/ML
0.5 INJECTION INTRAMUSCULAR; INTRAVENOUS; SUBCUTANEOUS
Qty: 0 | Refills: 0 | Status: DISCONTINUED | OUTPATIENT
Start: 2018-06-14 | End: 2018-06-15

## 2018-06-14 RX ORDER — CEPHALEXIN 500 MG
500 CAPSULE ORAL EVERY 12 HOURS
Qty: 0 | Refills: 0 | Status: DISCONTINUED | OUTPATIENT
Start: 2018-06-14 | End: 2018-06-15

## 2018-06-14 RX ORDER — OXYCODONE AND ACETAMINOPHEN 5; 325 MG/1; MG/1
2 TABLET ORAL EVERY 6 HOURS
Qty: 0 | Refills: 0 | Status: DISCONTINUED | OUTPATIENT
Start: 2018-06-14 | End: 2018-06-15

## 2018-06-14 RX ORDER — DEXTROAMPHETAMINE SACCHARATE, AMPHETAMINE ASPARTATE, DEXTROAMPHETAMINE SULFATE AND AMPHETAMINE SULFATE 1.875; 1.875; 1.875; 1.875 MG/1; MG/1; MG/1; MG/1
30 TABLET ORAL
Qty: 0 | Refills: 0 | Status: DISCONTINUED | OUTPATIENT
Start: 2018-06-14 | End: 2018-06-14

## 2018-06-14 RX ADMIN — HYDROMORPHONE HYDROCHLORIDE 1 MILLIGRAM(S): 2 INJECTION INTRAMUSCULAR; INTRAVENOUS; SUBCUTANEOUS at 12:44

## 2018-06-14 RX ADMIN — SODIUM CHLORIDE 100 MILLILITER(S): 9 INJECTION, SOLUTION INTRAVENOUS at 19:07

## 2018-06-14 RX ADMIN — Medication 0.5 MILLIGRAM(S): at 21:51

## 2018-06-14 RX ADMIN — Medication 75 MILLIGRAM(S): at 21:51

## 2018-06-14 RX ADMIN — CARVEDILOL PHOSPHATE 12.5 MILLIGRAM(S): 80 CAPSULE, EXTENDED RELEASE ORAL at 19:38

## 2018-06-14 RX ADMIN — HYDROMORPHONE HYDROCHLORIDE 0.5 MILLIGRAM(S): 2 INJECTION INTRAMUSCULAR; INTRAVENOUS; SUBCUTANEOUS at 13:34

## 2018-06-14 RX ADMIN — HYDROMORPHONE HYDROCHLORIDE 0.5 MILLIGRAM(S): 2 INJECTION INTRAMUSCULAR; INTRAVENOUS; SUBCUTANEOUS at 15:27

## 2018-06-14 RX ADMIN — HYDROMORPHONE HYDROCHLORIDE 0.5 MILLIGRAM(S): 2 INJECTION INTRAMUSCULAR; INTRAVENOUS; SUBCUTANEOUS at 16:03

## 2018-06-14 RX ADMIN — Medication 500 MILLIGRAM(S): at 19:14

## 2018-06-14 NOTE — ASU DISCHARGE PLAN (ADULT/PEDIATRIC). - MEDICATION SUMMARY - MEDICATIONS TO TAKE
I will START or STAY ON the medications listed below when I get home from the hospital:    prednisoLONE 10 mg oral tablet, disintegrating  -- 1 tab(s) by mouth once a day (tapering as directed)  -- Indication: For home med    aspirin 81 mg oral tablet  -- 1 tab(s) by mouth once a day  -- Indication: For home med    HYDROcodone-acetaminophen 5 mg-300 mg oral tablet  -- 1 tab(s) by mouth every 12 hours  -- Indication: For home pain med - can use as needed for pain post-op    Vicodin 5 mg-300 mg oral tablet  -- Indication: For home med pain med - can use as needed for pain post-op    Lyrica 75 mg oral capsule  -- 1 cap(s) by mouth 2 times a day  -- Indication: For home med    Allegra 180 mg oral tablet  -- 1 tab(s) by mouth once a day  -- Indication: For home med    OLANZapine 5 mg oral tablet  -- 1 tab(s) by mouth once a day  -- Indication: For home med    Xanax 0.5 mg oral tablet  --  tab(s) by mouth once a day (at bedtime)  -- Indication: For home med    carvedilol 12.5 mg oral tablet  -- 1 tab(s) by mouth 2 times a day  -- Indication: For home med    cefdinir 300 mg oral capsule  -- 1 cap(s) by mouth every 12 hours  -- Indication: For home med    Adderall 30 mg oral tablet  -- 1 tab(s) by mouth 2 times a day  -- Indication: For home med    vitamin E  -- 1 tab(s) by mouth once a day. last dose 6/13/2018  -- Indication: For home med

## 2018-06-14 NOTE — ASU DISCHARGE PLAN (ADULT/PEDIATRIC). - NOTIFY
Pain not relieved by Medications/Bleeding that does not stop/Persistent Nausea and Vomiting/Inability to Tolerate Liquids or Foods/Fever greater than 101

## 2018-06-14 NOTE — PATIENT PROFILE ADULT. - MEDICATION ADMINISTRATION INFO, PROFILE
no concerns Xenograft Text: The defect edges were debeveled with a #15 scalpel blade.  Given the location of the defect, shape of the defect and the proximity to free margins a xenograft was deemed most appropriate.  The graft was then trimmed to fit the size of the defect.  The graft was then placed in the primary defect and oriented appropriately.

## 2018-06-14 NOTE — PATIENT PROFILE ADULT. - TOBACCO CESSATION EDUCATION/COUNSELLING(PROVIDED IF TOBACCO USED IN THE PAST 30 DAYS) NON CORE MEASURE SITES
Problem: Depressive Behavior with or without Suicide precautions  Goal: STG-Able to verbalize suicidal ideations  Patient did not attend goal setting and community meeting from [phone] despite staff encouragement and prompts. Offered and provided

## 2018-06-15 ENCOUNTER — TRANSCRIPTION ENCOUNTER (OUTPATIENT)
Age: 58
End: 2018-06-15

## 2018-06-15 ENCOUNTER — FORM ENCOUNTER (OUTPATIENT)
Age: 58
End: 2018-06-15

## 2018-06-15 VITALS
DIASTOLIC BLOOD PRESSURE: 72 MMHG | HEART RATE: 68 BPM | TEMPERATURE: 98 F | SYSTOLIC BLOOD PRESSURE: 138 MMHG | OXYGEN SATURATION: 98 % | RESPIRATION RATE: 16 BRPM

## 2018-06-15 LAB
CULTURE - ACID FAST SMEAR CONCENTRATED: SIGNIFICANT CHANGE UP
GRAM STN WND: SIGNIFICANT CHANGE UP
HCT VFR BLD CALC: 43.6 % — SIGNIFICANT CHANGE UP (ref 39–50)
HGB BLD-MCNC: 14.9 G/DL — SIGNIFICANT CHANGE UP (ref 13–17)
MCHC RBC-ENTMCNC: 30.5 PG — SIGNIFICANT CHANGE UP (ref 27–34)
MCHC RBC-ENTMCNC: 34.2 % — SIGNIFICANT CHANGE UP (ref 32–36)
MCV RBC AUTO: 89.2 FL — SIGNIFICANT CHANGE UP (ref 80–100)
NRBC # FLD: 0 — SIGNIFICANT CHANGE UP
PLATELET # BLD AUTO: 357 K/UL — SIGNIFICANT CHANGE UP (ref 150–400)
PMV BLD: 9.6 FL — SIGNIFICANT CHANGE UP (ref 7–13)
RBC # BLD: 4.89 M/UL — SIGNIFICANT CHANGE UP (ref 4.2–5.8)
RBC # FLD: 13.8 % — SIGNIFICANT CHANGE UP (ref 10.3–14.5)
SPECIMEN SOURCE: SIGNIFICANT CHANGE UP
SPECIMEN SOURCE: SIGNIFICANT CHANGE UP
WBC # BLD: 12.07 K/UL — HIGH (ref 3.8–10.5)
WBC # FLD AUTO: 12.07 K/UL — HIGH (ref 3.8–10.5)

## 2018-06-15 RX ADMIN — CARVEDILOL PHOSPHATE 12.5 MILLIGRAM(S): 80 CAPSULE, EXTENDED RELEASE ORAL at 06:40

## 2018-06-15 RX ADMIN — LORATADINE 10 MILLIGRAM(S): 10 TABLET ORAL at 12:12

## 2018-06-15 RX ADMIN — OLANZAPINE 5 MILLIGRAM(S): 15 TABLET, FILM COATED ORAL at 12:13

## 2018-06-15 RX ADMIN — Medication 75 MILLIGRAM(S): at 06:40

## 2018-06-15 RX ADMIN — Medication 500 MILLIGRAM(S): at 06:40

## 2018-06-15 NOTE — DISCHARGE NOTE ADULT - PATIENT PORTAL LINK FT
You can access the [facility] Patient Portal, offered by [facility], by registering with the following website: [email or web address]

## 2018-06-15 NOTE — DISCHARGE NOTE ADULT - CARE PROVIDER_API CALL
Lew Olivo), Otolaryngology  [address] [place] [address]  Phone: [phone]  Fax: [phone]

## 2018-06-15 NOTE — DISCHARGE NOTE ADULT - CARE PLAN
Principal Discharge DX:	Neoplasm of maxillary sinus  Goal:	excision of nasal mass  Assessment and plan of treatment:	same

## 2018-06-15 NOTE — DISCHARGE NOTE ADULT - CONDITIONS AT DISCHARGE
Pt stable and free from injury. No nasal bleeding or discharge noted. Pt is not SOB and having no pain. Pt a-febrile. Pt tolerating diet.

## 2018-06-15 NOTE — DISCHARGE NOTE ADULT - MEDICATION SUMMARY - MEDICATIONS TO TAKE
I will START or STAY ON the medications listed below when I get home from the hospital:    prednisoLONE 10 mg oral tablet, disintegrating  -- 1 tab(s) by mouth once a day (tapering as directed)  -- Indication: For swelling    aspirin 81 mg oral tablet  -- 1 tab(s) by mouth once a day  -- Indication: For blood thinner    HYDROcodone-acetaminophen 5 mg-300 mg oral tablet  -- 1 tab(s) by mouth every 12 hours  -- Indication: For pain    Vicodin 5 mg-300 mg oral tablet  -- Indication: For pain    Lyrica 75 mg oral capsule  -- 1 cap(s) by mouth 2 times a day  -- Indication: For outpt med    Allegra 180 mg oral tablet  -- 1 tab(s) by mouth once a day  -- Indication: For outpt med    OLANZapine 5 mg oral tablet  -- 1 tab(s) by mouth once a day  -- Indication: For outpt med    Xanax 0.5 mg oral tablet  --  tab(s) by mouth once a day (at bedtime)  -- Indication: For outpt med    carvedilol 12.5 mg oral tablet  -- 1 tab(s) by mouth 2 times a day  -- Indication: For outpt med    cefdinir 300 mg oral capsule  -- 1 cap(s) by mouth every 12 hours  -- Indication: For outpt med    Adderall 30 mg oral tablet  -- 1 tab(s) by mouth 2 times a day  -- Indication: For outpt med     vitamin E  -- 1 tab(s) by mouth once a day. last dose 6/13/2018  -- Indication: For outpt med

## 2018-06-15 NOTE — DISCHARGE NOTE ADULT - HOSPITAL COURSE
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 Patient with pain after surgery, had Pain service evaluation and was started on oral meds. Discharged home on 6/15/18.

## 2018-06-16 ENCOUNTER — APPOINTMENT (OUTPATIENT)
Dept: NUCLEAR MEDICINE | Facility: CLINIC | Age: 58
End: 2018-06-16

## 2018-06-16 ENCOUNTER — OUTPATIENT (OUTPATIENT)
Dept: OUTPATIENT SERVICES | Facility: HOSPITAL | Age: 58
LOS: 1 days | End: 2018-06-16
Payer: MEDICARE

## 2018-06-16 DIAGNOSIS — Z90.2 ACQUIRED ABSENCE OF LUNG [PART OF]: Chronic | ICD-10-CM

## 2018-06-16 DIAGNOSIS — Z00.8 ENCOUNTER FOR OTHER GENERAL EXAMINATION: ICD-10-CM

## 2018-06-16 DIAGNOSIS — Z98.890 OTHER SPECIFIED POSTPROCEDURAL STATES: Chronic | ICD-10-CM

## 2018-06-16 DIAGNOSIS — H26.9 UNSPECIFIED CATARACT: Chronic | ICD-10-CM

## 2018-06-16 DIAGNOSIS — Z98.61 CORONARY ANGIOPLASTY STATUS: Chronic | ICD-10-CM

## 2018-06-16 PROCEDURE — A9552: CPT

## 2018-06-16 PROCEDURE — 78815 PET IMAGE W/CT SKULL-THIGH: CPT | Mod: 26,PI

## 2018-06-16 PROCEDURE — 78815 PET IMAGE W/CT SKULL-THIGH: CPT

## 2018-06-16 RX ORDER — CEFDINIR 250 MG/5ML
1 POWDER, FOR SUSPENSION ORAL
Qty: 14 | Refills: 0 | OUTPATIENT
Start: 2018-06-16 | End: 2018-06-22

## 2018-06-17 LAB — CULTURE - SURGICAL SITE: SIGNIFICANT CHANGE UP

## 2018-06-18 LAB — SPECIMEN SOURCE: SIGNIFICANT CHANGE UP

## 2018-06-19 ENCOUNTER — APPOINTMENT (OUTPATIENT)
Dept: OTOLARYNGOLOGY | Facility: CLINIC | Age: 58
End: 2018-06-19
Payer: MEDICARE

## 2018-06-19 VITALS — HEART RATE: 83 BPM | DIASTOLIC BLOOD PRESSURE: 82 MMHG | SYSTOLIC BLOOD PRESSURE: 101 MMHG

## 2018-06-19 PROCEDURE — 31231 NASAL ENDOSCOPY DX: CPT

## 2018-06-19 PROCEDURE — 99215 OFFICE O/P EST HI 40 MIN: CPT | Mod: 25

## 2018-06-19 RX ORDER — HYDROCODONE BITARTRATE AND ACETAMINOPHEN 7.5; 3 MG/1; MG/1
7.5-3 TABLET ORAL
Qty: 60 | Refills: 0 | Status: ACTIVE | COMMUNITY
Start: 2018-06-19 | End: 1900-01-01

## 2018-06-20 ENCOUNTER — INPATIENT (INPATIENT)
Facility: HOSPITAL | Age: 58
LOS: 5 days | Discharge: HOME | End: 2018-06-26
Attending: OTOLARYNGOLOGY | Admitting: OTOLARYNGOLOGY
Payer: MEDICARE

## 2018-06-20 VITALS
TEMPERATURE: 98 F | DIASTOLIC BLOOD PRESSURE: 85 MMHG | OXYGEN SATURATION: 98 % | RESPIRATION RATE: 20 BRPM | SYSTOLIC BLOOD PRESSURE: 130 MMHG | HEART RATE: 97 BPM

## 2018-06-20 DIAGNOSIS — C31.0 MALIGNANT NEOPLASM OF MAXILLARY SINUS: ICD-10-CM

## 2018-06-20 DIAGNOSIS — Z90.2 ACQUIRED ABSENCE OF LUNG [PART OF]: Chronic | ICD-10-CM

## 2018-06-20 DIAGNOSIS — Z98.890 OTHER SPECIFIED POSTPROCEDURAL STATES: Chronic | ICD-10-CM

## 2018-06-20 DIAGNOSIS — H26.9 UNSPECIFIED CATARACT: Chronic | ICD-10-CM

## 2018-06-20 DIAGNOSIS — Z98.61 CORONARY ANGIOPLASTY STATUS: Chronic | ICD-10-CM

## 2018-06-20 LAB
ALBUMIN SERPL ELPH-MCNC: 4.6 G/DL — SIGNIFICANT CHANGE UP (ref 3.3–5)
ALP SERPL-CCNC: 89 U/L — SIGNIFICANT CHANGE UP (ref 40–120)
ALT FLD-CCNC: 26 U/L — SIGNIFICANT CHANGE UP (ref 4–41)
AST SERPL-CCNC: 16 U/L — SIGNIFICANT CHANGE UP (ref 4–40)
BASE EXCESS BLDV CALC-SCNC: 5.9 MMOL/L — SIGNIFICANT CHANGE UP
BASOPHILS # BLD AUTO: 0.08 K/UL — SIGNIFICANT CHANGE UP (ref 0–0.2)
BASOPHILS NFR BLD AUTO: 0.6 % — SIGNIFICANT CHANGE UP (ref 0–2)
BILIRUB SERPL-MCNC: 0.6 MG/DL — SIGNIFICANT CHANGE UP (ref 0.2–1.2)
BLOOD GAS VENOUS - CREATININE: 0.87 MG/DL — SIGNIFICANT CHANGE UP (ref 0.5–1.3)
BUN SERPL-MCNC: 13 MG/DL — SIGNIFICANT CHANGE UP (ref 7–23)
CALCIUM SERPL-MCNC: 9.8 MG/DL — SIGNIFICANT CHANGE UP (ref 8.4–10.5)
CHLORIDE BLDV-SCNC: 106 MMOL/L — SIGNIFICANT CHANGE UP (ref 96–108)
CHLORIDE SERPL-SCNC: 96 MMOL/L — LOW (ref 98–107)
CO2 SERPL-SCNC: 29 MMOL/L — SIGNIFICANT CHANGE UP (ref 22–31)
CREAT SERPL-MCNC: 0.97 MG/DL — SIGNIFICANT CHANGE UP (ref 0.5–1.3)
EOSINOPHIL # BLD AUTO: 0.3 K/UL — SIGNIFICANT CHANGE UP (ref 0–0.5)
EOSINOPHIL NFR BLD AUTO: 2.4 % — SIGNIFICANT CHANGE UP (ref 0–6)
GAS PNL BLDV: 134 MMOL/L — LOW (ref 136–146)
GLUCOSE BLDV-MCNC: 94 — SIGNIFICANT CHANGE UP (ref 70–99)
GLUCOSE SERPL-MCNC: 92 MG/DL — SIGNIFICANT CHANGE UP (ref 70–99)
HCO3 BLDV-SCNC: 26 MMOL/L — SIGNIFICANT CHANGE UP (ref 20–27)
HCT VFR BLD CALC: 49.6 % — SIGNIFICANT CHANGE UP (ref 39–50)
HCT VFR BLDV CALC: 54.1 % — HIGH (ref 39–51)
HGB BLD-MCNC: 16.1 G/DL — SIGNIFICANT CHANGE UP (ref 13–17)
HGB BLDV-MCNC: 17.7 G/DL — HIGH (ref 13–17)
IMM GRANULOCYTES # BLD AUTO: 0.09 # — SIGNIFICANT CHANGE UP
IMM GRANULOCYTES NFR BLD AUTO: 0.7 % — SIGNIFICANT CHANGE UP (ref 0–1.5)
LACTATE BLDV-MCNC: 1.9 MMOL/L — SIGNIFICANT CHANGE UP (ref 0.5–2)
LYMPHOCYTES # BLD AUTO: 18.3 % — SIGNIFICANT CHANGE UP (ref 13–44)
LYMPHOCYTES # BLD AUTO: 2.32 K/UL — SIGNIFICANT CHANGE UP (ref 1–3.3)
MCHC RBC-ENTMCNC: 30.1 PG — SIGNIFICANT CHANGE UP (ref 27–34)
MCHC RBC-ENTMCNC: 32.5 % — SIGNIFICANT CHANGE UP (ref 32–36)
MCV RBC AUTO: 92.9 FL — SIGNIFICANT CHANGE UP (ref 80–100)
MONOCYTES # BLD AUTO: 1.03 K/UL — HIGH (ref 0–0.9)
MONOCYTES NFR BLD AUTO: 8.1 % — SIGNIFICANT CHANGE UP (ref 2–14)
NEUTROPHILS # BLD AUTO: 8.86 K/UL — HIGH (ref 1.8–7.4)
NEUTROPHILS NFR BLD AUTO: 69.9 % — SIGNIFICANT CHANGE UP (ref 43–77)
NRBC # FLD: 0 — SIGNIFICANT CHANGE UP
PCO2 BLDV: 60 MMHG — HIGH (ref 41–51)
PH BLDV: 7.34 PH — SIGNIFICANT CHANGE UP (ref 7.32–7.43)
PLATELET # BLD AUTO: 326 K/UL — SIGNIFICANT CHANGE UP (ref 150–400)
PMV BLD: 10 FL — SIGNIFICANT CHANGE UP (ref 7–13)
PO2 BLDV: < 24 MMHG — LOW (ref 35–40)
POTASSIUM BLDV-SCNC: 3.5 MMOL/L — SIGNIFICANT CHANGE UP (ref 3.4–4.5)
POTASSIUM SERPL-MCNC: 3.9 MMOL/L — SIGNIFICANT CHANGE UP (ref 3.5–5.3)
POTASSIUM SERPL-SCNC: 3.9 MMOL/L — SIGNIFICANT CHANGE UP (ref 3.5–5.3)
PROT SERPL-MCNC: 8.3 G/DL — SIGNIFICANT CHANGE UP (ref 6–8.3)
RBC # BLD: 5.34 M/UL — SIGNIFICANT CHANGE UP (ref 4.2–5.8)
RBC # FLD: 13.9 % — SIGNIFICANT CHANGE UP (ref 10.3–14.5)
SAO2 % BLDV: 24.6 % — LOW (ref 60–85)
SODIUM SERPL-SCNC: 138 MMOL/L — SIGNIFICANT CHANGE UP (ref 135–145)
WBC # BLD: 12.68 K/UL — HIGH (ref 3.8–10.5)
WBC # FLD AUTO: 12.68 K/UL — HIGH (ref 3.8–10.5)

## 2018-06-20 PROCEDURE — 70487 CT MAXILLOFACIAL W/DYE: CPT | Mod: 26

## 2018-06-20 PROCEDURE — 70481 CT ORBIT/EAR/FOSSA W/DYE: CPT | Mod: 26

## 2018-06-20 RX ORDER — MORPHINE SULFATE 50 MG/1
4 CAPSULE, EXTENDED RELEASE ORAL ONCE
Qty: 0 | Refills: 0 | Status: DISCONTINUED | OUTPATIENT
Start: 2018-06-20 | End: 2018-06-20

## 2018-06-20 RX ORDER — HYDROMORPHONE HYDROCHLORIDE 2 MG/ML
1 INJECTION INTRAMUSCULAR; INTRAVENOUS; SUBCUTANEOUS ONCE
Qty: 0 | Refills: 0 | Status: DISCONTINUED | OUTPATIENT
Start: 2018-06-20 | End: 2018-06-20

## 2018-06-20 RX ORDER — CARVEDILOL PHOSPHATE 80 MG/1
12.5 CAPSULE, EXTENDED RELEASE ORAL EVERY 12 HOURS
Qty: 0 | Refills: 0 | Status: DISCONTINUED | OUTPATIENT
Start: 2018-06-20 | End: 2018-06-26

## 2018-06-20 RX ORDER — ALPRAZOLAM 0.25 MG
0.5 TABLET ORAL AT BEDTIME
Qty: 0 | Refills: 0 | Status: DISCONTINUED | OUTPATIENT
Start: 2018-06-20 | End: 2018-06-21

## 2018-06-20 RX ORDER — AMPICILLIN SODIUM AND SULBACTAM SODIUM 250; 125 MG/ML; MG/ML
3 INJECTION, POWDER, FOR SUSPENSION INTRAMUSCULAR; INTRAVENOUS ONCE
Qty: 0 | Refills: 0 | Status: COMPLETED | OUTPATIENT
Start: 2018-06-20 | End: 2018-06-20

## 2018-06-20 RX ORDER — HYDROMORPHONE HYDROCHLORIDE 2 MG/ML
1 INJECTION INTRAMUSCULAR; INTRAVENOUS; SUBCUTANEOUS EVERY 4 HOURS
Qty: 0 | Refills: 0 | Status: DISCONTINUED | OUTPATIENT
Start: 2018-06-20 | End: 2018-06-26

## 2018-06-20 RX ORDER — OXYCODONE AND ACETAMINOPHEN 5; 325 MG/1; MG/1
2 TABLET ORAL EVERY 4 HOURS
Qty: 0 | Refills: 0 | Status: DISCONTINUED | OUTPATIENT
Start: 2018-06-20 | End: 2018-06-20

## 2018-06-20 RX ORDER — HEPARIN SODIUM 5000 [USP'U]/ML
5000 INJECTION INTRAVENOUS; SUBCUTANEOUS EVERY 8 HOURS
Qty: 0 | Refills: 0 | Status: DISCONTINUED | OUTPATIENT
Start: 2018-06-20 | End: 2018-06-26

## 2018-06-20 RX ORDER — OLANZAPINE 15 MG/1
5 TABLET, FILM COATED ORAL DAILY
Qty: 0 | Refills: 0 | Status: DISCONTINUED | OUTPATIENT
Start: 2018-06-20 | End: 2018-06-26

## 2018-06-20 RX ORDER — OXYCODONE AND ACETAMINOPHEN 5; 325 MG/1; MG/1
1 TABLET ORAL EVERY 4 HOURS
Qty: 0 | Refills: 0 | Status: DISCONTINUED | OUTPATIENT
Start: 2018-06-20 | End: 2018-06-20

## 2018-06-20 RX ORDER — MORPHINE SULFATE 50 MG/1
2 CAPSULE, EXTENDED RELEASE ORAL EVERY 4 HOURS
Qty: 0 | Refills: 0 | Status: DISCONTINUED | OUTPATIENT
Start: 2018-06-20 | End: 2018-06-20

## 2018-06-20 RX ORDER — DEXTROAMPHETAMINE SACCHARATE, AMPHETAMINE ASPARTATE, DEXTROAMPHETAMINE SULFATE AND AMPHETAMINE SULFATE 1.875; 1.875; 1.875; 1.875 MG/1; MG/1; MG/1; MG/1
30 TABLET ORAL
Qty: 0 | Refills: 0 | Status: DISCONTINUED | OUTPATIENT
Start: 2018-06-20 | End: 2018-06-26

## 2018-06-20 RX ORDER — SODIUM CHLORIDE 9 MG/ML
1000 INJECTION INTRAMUSCULAR; INTRAVENOUS; SUBCUTANEOUS ONCE
Qty: 0 | Refills: 0 | Status: COMPLETED | OUTPATIENT
Start: 2018-06-20 | End: 2018-06-20

## 2018-06-20 RX ORDER — AMPICILLIN SODIUM AND SULBACTAM SODIUM 250; 125 MG/ML; MG/ML
3 INJECTION, POWDER, FOR SUSPENSION INTRAMUSCULAR; INTRAVENOUS EVERY 6 HOURS
Qty: 0 | Refills: 0 | Status: DISCONTINUED | OUTPATIENT
Start: 2018-06-20 | End: 2018-06-26

## 2018-06-20 RX ADMIN — HYDROMORPHONE HYDROCHLORIDE 1 MILLIGRAM(S): 2 INJECTION INTRAMUSCULAR; INTRAVENOUS; SUBCUTANEOUS at 16:44

## 2018-06-20 RX ADMIN — AMPICILLIN SODIUM AND SULBACTAM SODIUM 200 GRAM(S): 250; 125 INJECTION, POWDER, FOR SUSPENSION INTRAMUSCULAR; INTRAVENOUS at 16:06

## 2018-06-20 RX ADMIN — Medication 0.5 MILLIGRAM(S): at 23:21

## 2018-06-20 RX ADMIN — SODIUM CHLORIDE 1000 MILLILITER(S): 9 INJECTION INTRAMUSCULAR; INTRAVENOUS; SUBCUTANEOUS at 15:26

## 2018-06-20 RX ADMIN — HYDROMORPHONE HYDROCHLORIDE 1 MILLIGRAM(S): 2 INJECTION INTRAMUSCULAR; INTRAVENOUS; SUBCUTANEOUS at 23:55

## 2018-06-20 RX ADMIN — MORPHINE SULFATE 4 MILLIGRAM(S): 50 CAPSULE, EXTENDED RELEASE ORAL at 15:25

## 2018-06-20 RX ADMIN — MORPHINE SULFATE 4 MILLIGRAM(S): 50 CAPSULE, EXTENDED RELEASE ORAL at 16:06

## 2018-06-20 RX ADMIN — HYDROMORPHONE HYDROCHLORIDE 1 MILLIGRAM(S): 2 INJECTION INTRAMUSCULAR; INTRAVENOUS; SUBCUTANEOUS at 17:15

## 2018-06-20 RX ADMIN — HYDROMORPHONE HYDROCHLORIDE 1 MILLIGRAM(S): 2 INJECTION INTRAMUSCULAR; INTRAVENOUS; SUBCUTANEOUS at 19:50

## 2018-06-20 RX ADMIN — HYDROMORPHONE HYDROCHLORIDE 1 MILLIGRAM(S): 2 INJECTION INTRAMUSCULAR; INTRAVENOUS; SUBCUTANEOUS at 18:52

## 2018-06-20 RX ADMIN — HYDROMORPHONE HYDROCHLORIDE 1 MILLIGRAM(S): 2 INJECTION INTRAMUSCULAR; INTRAVENOUS; SUBCUTANEOUS at 23:21

## 2018-06-20 NOTE — ED PROVIDER NOTE - PROGRESS NOTE DETAILS
Thor: spoke with Neo, MRI tech, will come for patient shortly. Thor: spoke with ENT, prefer CT sinuses/orbits and to start unasyn. ordered. Thor: Spoke with ENT, requesting to please re-order MRI and to preform emergently. Can admit to ENT, Dr. Olivo.

## 2018-06-20 NOTE — ED PROVIDER NOTE - OBJECTIVE STATEMENT
57M with hx of maxillary SCC, recent epistaxis, packed and f/u with ENT for removal. Today woke up with acute onset of right sided eye and facial pain with blurry vision from that eye. no fever, chills, drainage. Patient called ENT, Dr. Pallavi henao sent to ED for eval. 57M with hx of maxillary SCC, recent epistaxis, packed and f/u with ENT for removal. Today woke up with acute onset of right sided eye and facial pain with blurry vision from that eye. no fever, chills, drainage. Patient called ENT, Dr. Pallavi henao sent to ED for eval.    Attendinyo male presents with increased swelling and pain to the right face below the right eye.  Also with blurry vision in the right eye today which is new.  Pt in excruciating pain.  Sent by ENT for CT to evaluate for fluid collection.

## 2018-06-20 NOTE — ED PROVIDER NOTE - MEDICAL DECISION MAKING DETAILS
57M with right eye pain and blurry vision, rigth sided head pain. r/o fluid/abscess collection. ENT eval. CT sinuses/orbits. unasyn, labs with cultures, pain control

## 2018-06-20 NOTE — ED ADULT TRIAGE NOTE - CHIEF COMPLAINT QUOTE
pt with Hx. of Neoplasm of maxillary sinus coming with right eye lid/facial swelling started this AM as per family s/p right side nasal packing removed yest. today + N/V

## 2018-06-20 NOTE — CONSULT NOTE ADULT - SUBJECTIVE AND OBJECTIVE BOX
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 Patient had right   epistaxis after biopsy and was packed with Merocel. Packing was left in place and patient was discharged home  6/15. Went to Dr. Olivo's office for removal yesterday. States he woke up early this morning and   had right eye swelling and now is having double vision and nausea.C/O right sided head pain.    Denies any fevers, chills, rigors  sweats or any other complaints.    AVSS, P02 98%   HEENT:  Head; + right eye edema/erythema.   EOM's intact but decreased on right.   + tenderness to right frontal sinus and right temporal bone.

## 2018-06-20 NOTE — ED ADULT NURSE NOTE - OBJECTIVE STATEMENT
Break coverage- Pt received to spot #22. AAOx4, c/o right sided eye and facial pain acc with blurry vision started today. Pt states he had packing removed yesterday from ENT doctor due to recent epistaxis. Right eye/facial swelling noted. No fever, chills, drainage. MD sykes performed. #20g IVSL placed by flobharat RN. Pt taken to CT at this time. no acute distress. Awaiting further plan of care.

## 2018-06-20 NOTE — CONSULT NOTE ADULT - ASSESSMENT
57 year old male with maxillary sinus cancer that presents with right sided head pain, right eye pain and double vision.

## 2018-06-20 NOTE — CONSULT NOTE ADULT - PROBLEM SELECTOR RECOMMENDATION 9
1. CT head/sinuses with contrast  2. Start unasyn  3. Will review CT and decide if patient is being admitted and if MRI will be needed.   4. Pain control as per ED.

## 2018-06-20 NOTE — ED PROVIDER NOTE - FAMILY HISTORY
No family history of COPD     Sibling  Still living? Unknown  Family history of hypertension in mother, Age at diagnosis: Age Unknown

## 2018-06-21 DIAGNOSIS — R52 PAIN, UNSPECIFIED: ICD-10-CM

## 2018-06-21 DIAGNOSIS — R22.0 LOCALIZED SWELLING, MASS AND LUMP, HEAD: ICD-10-CM

## 2018-06-21 DIAGNOSIS — R53.81 OTHER MALAISE: ICD-10-CM

## 2018-06-21 DIAGNOSIS — Z51.5 ENCOUNTER FOR PALLIATIVE CARE: ICD-10-CM

## 2018-06-21 LAB
SPECIMEN SOURCE: SIGNIFICANT CHANGE UP

## 2018-06-21 PROCEDURE — 99223 1ST HOSP IP/OBS HIGH 75: CPT

## 2018-06-21 PROCEDURE — 70552 MRI BRAIN STEM W/DYE: CPT | Mod: 26

## 2018-06-21 PROCEDURE — 70542 MRI ORBIT/FACE/NECK W/DYE: CPT | Mod: 26

## 2018-06-21 RX ORDER — HYDROMORPHONE HYDROCHLORIDE 2 MG/ML
0.5 INJECTION INTRAMUSCULAR; INTRAVENOUS; SUBCUTANEOUS ONCE
Qty: 0 | Refills: 0 | Status: DISCONTINUED | OUTPATIENT
Start: 2018-06-21 | End: 2018-06-21

## 2018-06-21 RX ORDER — POLYETHYLENE GLYCOL 3350 17 G/17G
17 POWDER, FOR SOLUTION ORAL DAILY
Qty: 0 | Refills: 0 | Status: DISCONTINUED | OUTPATIENT
Start: 2018-06-21 | End: 2018-06-26

## 2018-06-21 RX ORDER — ALPRAZOLAM 0.25 MG
0.5 TABLET ORAL DAILY
Qty: 0 | Refills: 0 | Status: DISCONTINUED | OUTPATIENT
Start: 2018-06-21 | End: 2018-06-26

## 2018-06-21 RX ORDER — SENNA PLUS 8.6 MG/1
2 TABLET ORAL AT BEDTIME
Qty: 0 | Refills: 0 | Status: DISCONTINUED | OUTPATIENT
Start: 2018-06-21 | End: 2018-06-26

## 2018-06-21 RX ADMIN — Medication 75 MILLIGRAM(S): at 17:22

## 2018-06-21 RX ADMIN — HYDROMORPHONE HYDROCHLORIDE 1 MILLIGRAM(S): 2 INJECTION INTRAMUSCULAR; INTRAVENOUS; SUBCUTANEOUS at 08:20

## 2018-06-21 RX ADMIN — CARVEDILOL PHOSPHATE 12.5 MILLIGRAM(S): 80 CAPSULE, EXTENDED RELEASE ORAL at 17:22

## 2018-06-21 RX ADMIN — HYDROMORPHONE HYDROCHLORIDE 1 MILLIGRAM(S): 2 INJECTION INTRAMUSCULAR; INTRAVENOUS; SUBCUTANEOUS at 22:10

## 2018-06-21 RX ADMIN — SENNA PLUS 2 TABLET(S): 8.6 TABLET ORAL at 23:05

## 2018-06-21 RX ADMIN — HYDROMORPHONE HYDROCHLORIDE 1 MILLIGRAM(S): 2 INJECTION INTRAMUSCULAR; INTRAVENOUS; SUBCUTANEOUS at 17:23

## 2018-06-21 RX ADMIN — CARVEDILOL PHOSPHATE 12.5 MILLIGRAM(S): 80 CAPSULE, EXTENDED RELEASE ORAL at 06:51

## 2018-06-21 RX ADMIN — AMPICILLIN SODIUM AND SULBACTAM SODIUM 200 GRAM(S): 250; 125 INJECTION, POWDER, FOR SUSPENSION INTRAMUSCULAR; INTRAVENOUS at 19:44

## 2018-06-21 RX ADMIN — HYDROMORPHONE HYDROCHLORIDE 1 MILLIGRAM(S): 2 INJECTION INTRAMUSCULAR; INTRAVENOUS; SUBCUTANEOUS at 21:38

## 2018-06-21 RX ADMIN — HEPARIN SODIUM 5000 UNIT(S): 5000 INJECTION INTRAVENOUS; SUBCUTANEOUS at 06:51

## 2018-06-21 RX ADMIN — HYDROMORPHONE HYDROCHLORIDE 1 MILLIGRAM(S): 2 INJECTION INTRAMUSCULAR; INTRAVENOUS; SUBCUTANEOUS at 13:10

## 2018-06-21 RX ADMIN — HYDROMORPHONE HYDROCHLORIDE 1 MILLIGRAM(S): 2 INJECTION INTRAMUSCULAR; INTRAVENOUS; SUBCUTANEOUS at 04:00

## 2018-06-21 RX ADMIN — Medication 0.5 MILLIGRAM(S): at 17:55

## 2018-06-21 RX ADMIN — AMPICILLIN SODIUM AND SULBACTAM SODIUM 200 GRAM(S): 250; 125 INJECTION, POWDER, FOR SUSPENSION INTRAMUSCULAR; INTRAVENOUS at 01:38

## 2018-06-21 RX ADMIN — HYDROMORPHONE HYDROCHLORIDE 0.5 MILLIGRAM(S): 2 INJECTION INTRAMUSCULAR; INTRAVENOUS; SUBCUTANEOUS at 18:31

## 2018-06-21 RX ADMIN — HYDROMORPHONE HYDROCHLORIDE 1 MILLIGRAM(S): 2 INJECTION INTRAMUSCULAR; INTRAVENOUS; SUBCUTANEOUS at 04:30

## 2018-06-21 RX ADMIN — HYDROMORPHONE HYDROCHLORIDE 1 MILLIGRAM(S): 2 INJECTION INTRAMUSCULAR; INTRAVENOUS; SUBCUTANEOUS at 18:20

## 2018-06-21 RX ADMIN — AMPICILLIN SODIUM AND SULBACTAM SODIUM 200 GRAM(S): 250; 125 INJECTION, POWDER, FOR SUSPENSION INTRAMUSCULAR; INTRAVENOUS at 11:57

## 2018-06-21 RX ADMIN — HYDROMORPHONE HYDROCHLORIDE 1 MILLIGRAM(S): 2 INJECTION INTRAMUSCULAR; INTRAVENOUS; SUBCUTANEOUS at 12:54

## 2018-06-21 RX ADMIN — AMPICILLIN SODIUM AND SULBACTAM SODIUM 200 GRAM(S): 250; 125 INJECTION, POWDER, FOR SUSPENSION INTRAMUSCULAR; INTRAVENOUS at 06:51

## 2018-06-21 RX ADMIN — OLANZAPINE 5 MILLIGRAM(S): 15 TABLET, FILM COATED ORAL at 17:55

## 2018-06-21 RX ADMIN — HYDROMORPHONE HYDROCHLORIDE 1 MILLIGRAM(S): 2 INJECTION INTRAMUSCULAR; INTRAVENOUS; SUBCUTANEOUS at 08:35

## 2018-06-21 NOTE — CONSULT NOTE ADULT - SUBJECTIVE AND OBJECTIVE BOX
HPI: Obtained from H&P   57 year old male with right maxillary sinus cancer that is S/P bx last week presents to the hospital stating he was having increased pressure and pain to   right eye with double vision. Denies any fevers, chills, rigors, sweats or any other complaints. (21 Jun 2018 07:53)    Patient reports a history of YANET with left lung resection ~2 years ago     Currently, patient laying in a stretcher in the ED in no acute distress with wife at bedside      PERTINENT PMH REVIEWED:  [ x] YES [ ] NO           SOCIAL HISTORY:  Significant other/partner:  [x ] YES  [ ] NO            Children:  [x ] YES  [ ] NO                   Confucianist/Spirituality:  Synagogue  Substance hx:  [ ] YES   [x ] NO           Tobacco hx:  [ x] YES - current 1/2 PPD - declines nicotine patch             Alcohol hx: [ ] YES  [x ] NO        Home Opioid hx:  [x ] YES - vicodin/oxycodone   Living Situation: [x ] Home  [ ] Long term care  [ ] Rehab    FAMILY HISTORY:  No family history of COPD  Family history of hypertension in mother (Sibling)      BASELINE ADLs (prior to admission):  Independent [ ] moderately [x ] fully   Dependent   [ ] moderately [ ] fully    Code Status:   Full Code                       [x ] Surrogate  [ ] HCP  [ ] Guardian:                pete Campbell                                                  Phone#: [phone]    Allergies    hospital socks (Rash)  lisinopril (Anaphylaxis)  statins (Anaphylaxis)    Intolerances        MEDICATIONS  (STANDING):  ALPRAZolam 0.5 milliGRAM(s) Oral at bedtime  amphetamine/dextroamphetamine 30 milliGRAM(s) Oral two times a day  ampicillin/sulbactam  IVPB 3 Gram(s) IV Intermittent every 6 hours  carvedilol 12.5 milliGRAM(s) Oral every 12 hours  heparin  Injectable 5000 Unit(s) SubCutaneous every 8 hours  OLANZapine 5 milliGRAM(s) Oral daily  pregabalin 75 milliGRAM(s) Oral two times a day  senna 2 Tablet(s) Oral at bedtime    MEDICATIONS  (PRN):  HYDROmorphone  Injectable 1 milliGRAM(s) IV Push every 4 hours PRN Severe Pain (7 - 10)  polyethylene glycol 3350 17 Gram(s) Oral daily PRN Constipation      PRESENT SYMPTOMS:  Source: [x ] Patient   [ ] Family   [ ] Team     Pain: [x ] YES - patient complained of right sided facial/eye/ear/neck pain - currently 2/10 - pain is 10/10 at its worst - sharp, constant - started ~ 2-3 weeks ago, worsened after packing removal - aggravated by movement, relieved by opioids  Dyspnea: [ ] YES [x ] NO - Mild [ ]  Moderate [ ]  Severe [ ]    Anxiety: [ ] YES [x ] NO  Fatigue: [ ] YES [x ] NO   Nausea: [ ] YES [x ] NO  Loss of appetite: [ ] YES [x ] NO   Constipation: [ ] YES [ x] NO     Other Symptoms:  [ x] All other review of systems negative   [ ] Unable to obtain due to poor mentation     Does patient meet criteria for Severe Protein Calorie Malnutrition?  Yes [ ]  No [ ]  PPSV 30% or below [ ]  Anasarca [ ]  Albumin < 2 [ ] Catabolic State [ ] Poor nutritional intake [ ] Significant weight loss [ ]      Palliative Performance Status Version 2:   70%  ECOG - 1       Vital Signs Last 24 Hrs  T(C): 36.8 (21 Jun 2018 06:50), Max: 38.2 (21 Jun 2018 01:48)  T(F): 98.3 (21 Jun 2018 06:50), Max: 100.8 (21 Jun 2018 01:48)  HR: 100 (21 Jun 2018 06:50) (82 - 100)  BP: 138/89 (21 Jun 2018 06:50) (130/85 - 183/95)  BP(mean): --  RR: 18 (21 Jun 2018 06:50) (16 - 20)  SpO2: 95% (21 Jun 2018 06:50) (95% - 98%)    Physical Exam:    General: [x ] Alert,  A&O x 4      HEENT: Right eye with orbital edema - reports double vision to right eye - unchanged - slight erythema to right facial/eye area    Lungs: Clear - decreased to left    Cardiovascular:  [x ] Regular rate and rhythm  [ ] Irregular [ ] Tachycardia   [ ] Bradycardia   Abdomen: [x ] Soft  [x ] Distended  [x ] +BS  [x ] Non tender [ ] Tender  [ ]PEG   [ ] NGT   Last BM:   6/20/18  Genitourinary: [x ] Normal [ ] Incontinent   [ ] Oliguria/Anuria   [ ] Andrade  Musculoskeletal:  [ ] Normal   [ x] Generalized weakness  [ ] Bedbound  [ ] Edema   Neurological: [x ] No focal deficits  [ ] Cognitive impairment     Skin: as above      LABS:                        16.1   12.68 )-----------( 326      ( 20 Jun 2018 15:40 )             49.6     06-20    138  |  96<L>  |  13  ----------------------------<  92  3.9   |  29  |  0.97    Ca    9.8      20 Jun 2018 15:40    TPro  8.3  /  Alb  4.6  /  TBili  0.6  /  DBili  x   /  AST  16  /  ALT  26  /  AlkPhos  89  06-20        I&O's Summary      RADIOLOGY & ADDITIONAL STUDIES:  Reviewed     MRI head/orbits 6/21/19  IMPRESSION:  Enlarging right sinonasal mass when compared with the prior   exam compatible with an aggressive neoplasm. There is currently posterior   tumor extension into the sphenopalatine foramen and sphenoid sinus not   seen previously.    Increasing right-sided proptosis without evidence of increased marco antonio   intraorbital tumor extension. There is cystic enlargement of the lacrimal   sac not seen previously raising the possibility of postobstructive   dacryocystitis.    Extensive infiltration of retrobulbar fat planes is seen with soft tissue   thickening and enhancement involving the periorbital, premaxillary and   retroantral soft tissues extending into the  space as described   above. While these findings are likely inflammatory in nature and may   represent orbital/soft tissue cellulitis, the possibility of tumor   infiltration is also raised.    PET - 6/16/18  IMPRESSION:      1. FDG avid mass right maxillary sinus with extension to the right    ethmoid sinus and osseous erosion medial and posterior walls of the   sinus, consistent with malignancy.    2. FDG avid right intraparotid subcentimeter lymph node and difficult to   delineate focus adjacent to the right mandibular condyle, possibly   additional lymph node, indeterminate.    3. Asymmetric FDG avidity in the left prostate. Suggest clinical   correlation and prostate MRI as indicated.    4. Newly FDG avid right middle lobe airspace infiltrate, probably   scarring or atelectasis.    5. Indeterminate mild FDG avid thickening left diaphragmatic damien.        Referrals:  Hospice [ ]   Chaplaincy [ ]    Child Life [ ]   Social Work [ ]   Case Management [ ]   Holistic Therapy [ ] HPI: Obtained from H&P   57 year old male with right maxillary sinus cancer that is S/P bx last week presents to the hospital stating he was having increased pressure and pain to   right eye with double vision. Denies any fevers, chills, rigors, sweats or any other complaints. (21 Jun 2018 07:53)    Patient reports a history of YANET with left lung resection ~2 years ago and history of bipolar disorder and ADD.     Currently, patient laying in a stretcher in the ED in no acute distress with wife at bedside      PERTINENT PMH REVIEWED:  [ x] YES [ ] NO           SOCIAL HISTORY:  Significant other/partner:  [x ] YES  [ ] NO            Children:  [x ] YES  [ ] NO                   Buddhist/Spirituality:  Zoroastrian  Substance hx:  [ ] YES   [x ] NO - denies          Tobacco hx:  [ x] YES - current 1/2 PPD - declines nicotine patch             Alcohol hx: [ ] YES  [x ] NO  - denies      Home Opioid hx:  [x ] YES - vicodin/oxycodone   Living Situation: [x ] Home  [ ] Long term care  [ ] Rehab    FAMILY HISTORY:  No family history of COPD  Family history of hypertension in mother (Sibling)      BASELINE ADLs (prior to admission):  Independent [ ] moderately [x ] fully   Dependent   [ ] moderately [ ] fully    Code Status:   Full Code                       [x ] Surrogate  [ ] HCP  [ ] Guardian:                pete Campbell                                                  Phone#: [phone]    Allergies    hospital socks (Rash)  lisinopril (Anaphylaxis)  statins (Anaphylaxis)    Intolerances        MEDICATIONS  (STANDING):  ALPRAZolam 0.5 milliGRAM(s) Oral at bedtime  amphetamine/dextroamphetamine 30 milliGRAM(s) Oral two times a day  ampicillin/sulbactam  IVPB 3 Gram(s) IV Intermittent every 6 hours  carvedilol 12.5 milliGRAM(s) Oral every 12 hours  heparin  Injectable 5000 Unit(s) SubCutaneous every 8 hours  OLANZapine 5 milliGRAM(s) Oral daily  pregabalin 75 milliGRAM(s) Oral two times a day  senna 2 Tablet(s) Oral at bedtime    MEDICATIONS  (PRN):  HYDROmorphone  Injectable 1 milliGRAM(s) IV Push every 4 hours PRN Severe Pain (7 - 10)  polyethylene glycol 3350 17 Gram(s) Oral daily PRN Constipation      PRESENT SYMPTOMS:  Source: [x ] Patient   [ ] Family   [ ] Team     Pain: [x ] YES - patient complained of right sided facial/eye/ear/neck pain - currently 2/10 - pain is 10/10 at its worst - sharp, constant - started ~ 2-3 weeks ago, worsened after packing removal - aggravated by movement, relieved by opioids  Dyspnea: [ ] YES [x ] NO - Mild [ ]  Moderate [ ]  Severe [ ]    Anxiety: [ ] YES [x ] NO  Fatigue: [ ] YES [x ] NO   Nausea: [ ] YES [x ] NO  Loss of appetite: [ ] YES [x ] NO   Constipation: [ ] YES [ x] NO     Other Symptoms:  [ x] All other review of systems negative   [ ] Unable to obtain due to poor mentation     Does patient meet criteria for Severe Protein Calorie Malnutrition?  Yes [ ]  No [ ]  PPSV 30% or below [ ]  Anasarca [ ]  Albumin < 2 [ ] Catabolic State [ ] Poor nutritional intake [ ] Significant weight loss [ ]      Palliative Performance Status Version 2:   70%  ECOG - 1       Vital Signs Last 24 Hrs  T(C): 36.8 (21 Jun 2018 06:50), Max: 38.2 (21 Jun 2018 01:48)  T(F): 98.3 (21 Jun 2018 06:50), Max: 100.8 (21 Jun 2018 01:48)  HR: 100 (21 Jun 2018 06:50) (82 - 100)  BP: 138/89 (21 Jun 2018 06:50) (130/85 - 183/95)  BP(mean): --  RR: 18 (21 Jun 2018 06:50) (16 - 20)  SpO2: 95% (21 Jun 2018 06:50) (95% - 98%)    Physical Exam:    General: [x ] Alert,  A&O x 4      HEENT: Right eye with orbital edema - reports double vision to right eye - unchanged - slight erythema to right facial/eye area    Lungs: Clear - decreased to left    Cardiovascular:  [x ] Regular rate and rhythm  [ ] Irregular [ ] Tachycardia   [ ] Bradycardia   Abdomen: [x ] Soft  [x ] Distended  [x ] +BS  [x ] Non tender [ ] Tender  [ ]PEG   [ ] NGT   Last BM:   6/20/18  Genitourinary: [x ] Normal [ ] Incontinent   [ ] Oliguria/Anuria   [ ] Andrade  Musculoskeletal:  [ ] Normal   [ x] Generalized weakness  [ ] Bedbound  [ ] Edema   Neurological: [x ] No focal deficits  [ ] Cognitive impairment     Skin: as above      LABS:                        16.1   12.68 )-----------( 326      ( 20 Jun 2018 15:40 )             49.6     06-20    138  |  96<L>  |  13  ----------------------------<  92  3.9   |  29  |  0.97    Ca    9.8      20 Jun 2018 15:40    TPro  8.3  /  Alb  4.6  /  TBili  0.6  /  DBili  x   /  AST  16  /  ALT  26  /  AlkPhos  89  06-20        I&O's Summary      RADIOLOGY & ADDITIONAL STUDIES:  Reviewed     MRI head/orbits 6/21/19  IMPRESSION:  Enlarging right sinonasal mass when compared with the prior   exam compatible with an aggressive neoplasm. There is currently posterior   tumor extension into the sphenopalatine foramen and sphenoid sinus not   seen previously.    Increasing right-sided proptosis without evidence of increased marco antonio   intraorbital tumor extension. There is cystic enlargement of the lacrimal   sac not seen previously raising the possibility of postobstructive   dacryocystitis.    Extensive infiltration of retrobulbar fat planes is seen with soft tissue   thickening and enhancement involving the periorbital, premaxillary and   retroantral soft tissues extending into the  space as described   above. While these findings are likely inflammatory in nature and may   represent orbital/soft tissue cellulitis, the possibility of tumor   infiltration is also raised.    PET - 6/16/18  IMPRESSION:      1. FDG avid mass right maxillary sinus with extension to the right    ethmoid sinus and osseous erosion medial and posterior walls of the   sinus, consistent with malignancy.    2. FDG avid right intraparotid subcentimeter lymph node and difficult to   delineate focus adjacent to the right mandibular condyle, possibly   additional lymph node, indeterminate.    3. Asymmetric FDG avidity in the left prostate. Suggest clinical   correlation and prostate MRI as indicated.    4. Newly FDG avid right middle lobe airspace infiltrate, probably   scarring or atelectasis.    5. Indeterminate mild FDG avid thickening left diaphragmatic damien.        Referrals:  Hospice [ ]   Chaplaincy [ ]    Child Life [ ]   Social Work [ ]   Case Management [ ]   Holistic Therapy [ ]

## 2018-06-21 NOTE — CONSULT NOTE ADULT - ASSESSMENT
57 year old male with maxillary sinus mass, s/p nasal endoscopy and biopsy 6/14/18 - with packing left in place due to epistaxis - packing subsequently removed in clinic 6/19/18 - patient with intolerable right facial pain and orbital edema.  Palliative consulted for pain management.

## 2018-06-21 NOTE — H&P ADULT - HISTORY OF PRESENT ILLNESS
57 year old male with right maxillary sinus cancer that is S/P bx last week presents to the hospital stating he was having increased pressure and pain to   right eye with double vision. Denies any fevers, chills, rigors, sweats or any other complaints.

## 2018-06-21 NOTE — CONSULT NOTE ADULT - PROBLEM SELECTOR RECOMMENDATION 2
Pain as above.  Patient reports that pain is as high as 10/10 but with Dilaudid 1mg IV pain decreases to 1/10 and lasts ~ 4hours.  He is content with current regimen at this time.  Would continue Dilaudid 1mg IV q4 PRN.  Bowel regimen started.

## 2018-06-21 NOTE — CONSULT NOTE ADULT - PROBLEM SELECTOR RECOMMENDATION 9
Patient of Dr. Olivo, s/p biopsy - path pending.  Will follow-up with ENT regarding treatment plan going forward.  Continue supportive care.

## 2018-06-21 NOTE — PATIENT PROFILE ADULT. - VISION (WITH CORRECTIVE LENSES IF THE PATIENT USUALLY WEARS THEM):
Partially impaired: cannot see medication labels or newsprint, but can see obstacles in path, and the surrounding layout; can count fingers at arm's length/Patient has double vision

## 2018-06-21 NOTE — H&P ADULT - NSHPPHYSICALEXAM_GEN_ALL_CORE
AVSS  Heent: + right eye swelling with pain over   frontal sinus and right temporal bone.   Nose; no bleeding  Mouth: no FOM edema, no injection  no exudates.

## 2018-06-21 NOTE — CONSULT NOTE ADULT - PROBLEM SELECTOR RECOMMENDATION 4
Will continue to follow for ongoing symptom management.  Psychosocial support provided to patient and wife.

## 2018-06-22 ENCOUNTER — TRANSCRIPTION ENCOUNTER (OUTPATIENT)
Age: 58
End: 2018-06-22

## 2018-06-22 LAB
BUN SERPL-MCNC: 12 MG/DL — SIGNIFICANT CHANGE UP (ref 7–23)
CALCIUM SERPL-MCNC: 8.6 MG/DL — SIGNIFICANT CHANGE UP (ref 8.4–10.5)
CHLORIDE SERPL-SCNC: 100 MMOL/L — SIGNIFICANT CHANGE UP (ref 98–107)
CO2 SERPL-SCNC: 25 MMOL/L — SIGNIFICANT CHANGE UP (ref 22–31)
CREAT SERPL-MCNC: 0.88 MG/DL — SIGNIFICANT CHANGE UP (ref 0.5–1.3)
GLUCOSE SERPL-MCNC: 146 MG/DL — HIGH (ref 70–99)
HCT VFR BLD CALC: 40.4 % — SIGNIFICANT CHANGE UP (ref 39–50)
HGB BLD-MCNC: 13.2 G/DL — SIGNIFICANT CHANGE UP (ref 13–17)
MAGNESIUM SERPL-MCNC: 1.8 MG/DL — SIGNIFICANT CHANGE UP (ref 1.6–2.6)
MCHC RBC-ENTMCNC: 30.1 PG — SIGNIFICANT CHANGE UP (ref 27–34)
MCHC RBC-ENTMCNC: 32.7 % — SIGNIFICANT CHANGE UP (ref 32–36)
MCV RBC AUTO: 92.2 FL — SIGNIFICANT CHANGE UP (ref 80–100)
NRBC # FLD: 0 — SIGNIFICANT CHANGE UP
PHOSPHATE SERPL-MCNC: 2.6 MG/DL — SIGNIFICANT CHANGE UP (ref 2.5–4.5)
PLATELET # BLD AUTO: 238 K/UL — SIGNIFICANT CHANGE UP (ref 150–400)
PMV BLD: 10.1 FL — SIGNIFICANT CHANGE UP (ref 7–13)
POTASSIUM SERPL-MCNC: 3.2 MMOL/L — LOW (ref 3.5–5.3)
POTASSIUM SERPL-SCNC: 3.2 MMOL/L — LOW (ref 3.5–5.3)
RBC # BLD: 4.38 M/UL — SIGNIFICANT CHANGE UP (ref 4.2–5.8)
RBC # FLD: 13.7 % — SIGNIFICANT CHANGE UP (ref 10.3–14.5)
SODIUM SERPL-SCNC: 138 MMOL/L — SIGNIFICANT CHANGE UP (ref 135–145)
WBC # BLD: 10.07 K/UL — SIGNIFICANT CHANGE UP (ref 3.8–10.5)
WBC # FLD AUTO: 10.07 K/UL — SIGNIFICANT CHANGE UP (ref 3.8–10.5)

## 2018-06-22 PROCEDURE — 99233 SBSQ HOSP IP/OBS HIGH 50: CPT

## 2018-06-22 RX ORDER — MORPHINE SULFATE 50 MG/1
30 CAPSULE, EXTENDED RELEASE ORAL EVERY 12 HOURS
Qty: 0 | Refills: 0 | Status: DISCONTINUED | OUTPATIENT
Start: 2018-06-22 | End: 2018-06-25

## 2018-06-22 RX ORDER — POTASSIUM CHLORIDE 20 MEQ
40 PACKET (EA) ORAL EVERY 4 HOURS
Qty: 0 | Refills: 0 | Status: COMPLETED | OUTPATIENT
Start: 2018-06-22 | End: 2018-06-22

## 2018-06-22 RX ORDER — SODIUM CHLORIDE 0.65 %
1 AEROSOL, SPRAY (ML) NASAL
Qty: 0 | Refills: 0 | Status: DISCONTINUED | OUTPATIENT
Start: 2018-06-22 | End: 2018-06-26

## 2018-06-22 RX ADMIN — HYDROMORPHONE HYDROCHLORIDE 1 MILLIGRAM(S): 2 INJECTION INTRAMUSCULAR; INTRAVENOUS; SUBCUTANEOUS at 14:38

## 2018-06-22 RX ADMIN — CARVEDILOL PHOSPHATE 12.5 MILLIGRAM(S): 80 CAPSULE, EXTENDED RELEASE ORAL at 06:18

## 2018-06-22 RX ADMIN — Medication 1 SPRAY(S): at 17:15

## 2018-06-22 RX ADMIN — HYDROMORPHONE HYDROCHLORIDE 1 MILLIGRAM(S): 2 INJECTION INTRAMUSCULAR; INTRAVENOUS; SUBCUTANEOUS at 02:30

## 2018-06-22 RX ADMIN — HYDROMORPHONE HYDROCHLORIDE 1 MILLIGRAM(S): 2 INJECTION INTRAMUSCULAR; INTRAVENOUS; SUBCUTANEOUS at 10:51

## 2018-06-22 RX ADMIN — HYDROMORPHONE HYDROCHLORIDE 1 MILLIGRAM(S): 2 INJECTION INTRAMUSCULAR; INTRAVENOUS; SUBCUTANEOUS at 18:55

## 2018-06-22 RX ADMIN — CARVEDILOL PHOSPHATE 12.5 MILLIGRAM(S): 80 CAPSULE, EXTENDED RELEASE ORAL at 17:14

## 2018-06-22 RX ADMIN — HYDROMORPHONE HYDROCHLORIDE 1 MILLIGRAM(S): 2 INJECTION INTRAMUSCULAR; INTRAVENOUS; SUBCUTANEOUS at 23:42

## 2018-06-22 RX ADMIN — HYDROMORPHONE HYDROCHLORIDE 1 MILLIGRAM(S): 2 INJECTION INTRAMUSCULAR; INTRAVENOUS; SUBCUTANEOUS at 10:36

## 2018-06-22 RX ADMIN — HYDROMORPHONE HYDROCHLORIDE 1 MILLIGRAM(S): 2 INJECTION INTRAMUSCULAR; INTRAVENOUS; SUBCUTANEOUS at 06:18

## 2018-06-22 RX ADMIN — Medication 75 MILLIGRAM(S): at 06:18

## 2018-06-22 RX ADMIN — Medication 40 MILLIEQUIVALENT(S): at 14:26

## 2018-06-22 RX ADMIN — Medication 40 MILLIEQUIVALENT(S): at 10:35

## 2018-06-22 RX ADMIN — Medication 40 MILLIEQUIVALENT(S): at 17:14

## 2018-06-22 RX ADMIN — HYDROMORPHONE HYDROCHLORIDE 1 MILLIGRAM(S): 2 INJECTION INTRAMUSCULAR; INTRAVENOUS; SUBCUTANEOUS at 18:40

## 2018-06-22 RX ADMIN — Medication 0.5 MILLIGRAM(S): at 17:14

## 2018-06-22 RX ADMIN — AMPICILLIN SODIUM AND SULBACTAM SODIUM 200 GRAM(S): 250; 125 INJECTION, POWDER, FOR SUSPENSION INTRAMUSCULAR; INTRAVENOUS at 02:03

## 2018-06-22 RX ADMIN — OLANZAPINE 5 MILLIGRAM(S): 15 TABLET, FILM COATED ORAL at 17:13

## 2018-06-22 RX ADMIN — MORPHINE SULFATE 30 MILLIGRAM(S): 50 CAPSULE, EXTENDED RELEASE ORAL at 18:10

## 2018-06-22 RX ADMIN — HYDROMORPHONE HYDROCHLORIDE 1 MILLIGRAM(S): 2 INJECTION INTRAMUSCULAR; INTRAVENOUS; SUBCUTANEOUS at 06:50

## 2018-06-22 RX ADMIN — AMPICILLIN SODIUM AND SULBACTAM SODIUM 200 GRAM(S): 250; 125 INJECTION, POWDER, FOR SUSPENSION INTRAMUSCULAR; INTRAVENOUS at 19:55

## 2018-06-22 RX ADMIN — AMPICILLIN SODIUM AND SULBACTAM SODIUM 200 GRAM(S): 250; 125 INJECTION, POWDER, FOR SUSPENSION INTRAMUSCULAR; INTRAVENOUS at 14:27

## 2018-06-22 RX ADMIN — HYDROMORPHONE HYDROCHLORIDE 1 MILLIGRAM(S): 2 INJECTION INTRAMUSCULAR; INTRAVENOUS; SUBCUTANEOUS at 02:03

## 2018-06-22 RX ADMIN — HYDROMORPHONE HYDROCHLORIDE 1 MILLIGRAM(S): 2 INJECTION INTRAMUSCULAR; INTRAVENOUS; SUBCUTANEOUS at 14:23

## 2018-06-22 RX ADMIN — Medication 75 MILLIGRAM(S): at 17:14

## 2018-06-22 RX ADMIN — AMPICILLIN SODIUM AND SULBACTAM SODIUM 200 GRAM(S): 250; 125 INJECTION, POWDER, FOR SUSPENSION INTRAMUSCULAR; INTRAVENOUS at 07:23

## 2018-06-22 RX ADMIN — MORPHINE SULFATE 30 MILLIGRAM(S): 50 CAPSULE, EXTENDED RELEASE ORAL at 17:14

## 2018-06-22 NOTE — DISCHARGE NOTE ADULT - INSTRUCTIONS
Watch for signs of infection; redness, swelling, fever, chills or heat, report such symptoms to the MD. No driving while taking pain medication, it causes drowsiness & constipation. Drink 6-8 glasses of fluids daily to promote hydration. No heavy lifting, pulling or pushing heavy objects. Follow up with primary & surgical MD. Maintain antibiotics as ordered

## 2018-06-22 NOTE — DISCHARGE NOTE ADULT - MEDICATION SUMMARY - MEDICATIONS TO TAKE
I will START or STAY ON the medications listed below when I get home from the hospital:    aspirin 81 mg oral tablet  -- 1 tab(s) by mouth once a day  -- Indication: For blood thinner    HYDROmorphone 4 mg oral tablet  -- 1 tab(s) by mouth every 4 hours, As needed, Severe Pain (7 - 10) MDD:6  -- Indication: For Pain    morphine 30 mg/12 hr oral tablet, extended release  -- 1 tab(s) by mouth 3 times a day MDD:3  -- Indication: For Pain    Lyrica 75 mg oral capsule  -- 1 cap(s) by mouth 2 times a day MDD:2  -- Indication: For outpt med    Allegra 180 mg oral tablet  -- 1 tab(s) by mouth once a day  -- Indication: For allergies    OLANZapine 5 mg oral tablet  -- 1 tab(s) by mouth once a day  -- Indication: For outpt med    Xanax 0.5 mg oral tablet  --  tab(s) by mouth once a day (at bedtime)  -- Indication: For anxiety    carvedilol 12.5 mg oral tablet  -- 1 tab(s) by mouth 2 times a day  -- Indication: For HTN    Adderall 30 mg oral tablet  -- 1 tab(s) by mouth 2 times a day  -- Indication: For outpt med    polyethylene glycol 3350 oral powder for reconstitution  -- 17 gram(s) by mouth once a day, As needed, Constipation  -- Indication: For Constipation    senna oral tablet  -- 2 tab(s) by mouth once a day (at bedtime)  -- Indication: For Constipation    vitamin E  -- 1 tab(s) by mouth once a day. last dose 6/13/2018  -- Indication: For supplement I will START or STAY ON the medications listed below when I get home from the hospital:    aspirin 81 mg oral tablet  -- 1 tab(s) by mouth once a day  -- Indication: For blood thinner    HYDROmorphone 4 mg oral tablet  -- 1 tab(s) by mouth every 4 hours, As needed, Severe Pain (7 - 10) MDD:6  -- Indication: For Pain    morphine 30 mg/12 hr oral tablet, extended release  -- 1 tab(s) by mouth 3 times a day MDD:3  -- Indication: For Pain    Lyrica 75 mg oral capsule  -- 1 cap(s) by mouth 2 times a day MDD:2  -- Indication: For outpt med    Allegra 180 mg oral tablet  -- 1 tab(s) by mouth once a day  -- Indication: For allergies    OLANZapine 5 mg oral tablet  -- 1 tab(s) by mouth once a day  -- Indication: For outpt med    Xanax 0.5 mg oral tablet  --  tab(s) by mouth once a day (at bedtime)  -- Indication: For anxiety    carvedilol 12.5 mg oral tablet  -- 1 tab(s) by mouth 2 times a day  -- Indication: For HTN    Adderall 30 mg oral tablet  -- 1 tab(s) by mouth 2 times a day  -- Indication: For outpt med    polyethylene glycol 3350 oral powder for reconstitution  -- 17 gram(s) by mouth once a day, As needed, Constipation  -- Indication: For Constipation    senna oral tablet  -- 2 tab(s) by mouth once a day (at bedtime)  -- Indication: For Constipation    amoxicillin-clavulanate 875 mg-125 mg oral tablet  -- 875 milligram(s) by mouth every 12 hours   -- Finish all this medication unless otherwise directed by prescriber.  Take with food or milk.    -- Indication: For Prophylaxis    vitamin E  -- 1 tab(s) by mouth once a day. last dose 6/13/2018  -- Indication: For supplement

## 2018-06-22 NOTE — DISCHARGE NOTE ADULT - CARE PROVIDER_API CALL
Lew Olivo), Otolaryngology  [address] [place] [address]  Phone: [phone]  Fax: [phone]

## 2018-06-22 NOTE — DISCHARGE NOTE ADULT - CONDITIONS AT DISCHARGE
Alert & oriented. Out of bed ambulating. Tolerating diet without nausea or vomiting. Has some blurred vision but  it has improved. Facial swelling decreased. Voiding without difficulty. Vitals stable, afebrile. Understands all discharge instruction & will follow up with the MD. Time allowed for questions.

## 2018-06-22 NOTE — DISCHARGE NOTE ADULT - PATIENT PORTAL LINK FT
You can access the [facility] Patient Portal, offered by [facility], by registering with the following website: [email or web address]

## 2018-06-22 NOTE — DISCHARGE NOTE ADULT - HOSPITAL COURSE
57 year old male with right maxillary sinus mass that presented to the hospital after recent biopsy. Now with tumor extension into Right orbit. Patient had increased pain, evaluated by palliative and started on... 57 year old male with right maxillary sinus mass that presented to the hospital after recent biopsy. Now with tumor extension into Right orbit. Patient had increased pain, evaluated by palliative and started on a pain regiment and discharged for out patient oncology follow up.

## 2018-06-22 NOTE — DISCHARGE NOTE ADULT - CARE PLAN
Principal Discharge DX:	Maxillary sinus cancer  Goal:	pain control and palliative care  Assessment and plan of treatment:	same

## 2018-06-23 LAB
BUN SERPL-MCNC: 10 MG/DL — SIGNIFICANT CHANGE UP (ref 7–23)
BUN SERPL-MCNC: 10 MG/DL — SIGNIFICANT CHANGE UP (ref 7–23)
CALCIUM SERPL-MCNC: 8.8 MG/DL — SIGNIFICANT CHANGE UP (ref 8.4–10.5)
CALCIUM SERPL-MCNC: 8.8 MG/DL — SIGNIFICANT CHANGE UP (ref 8.4–10.5)
CHLORIDE SERPL-SCNC: 105 MMOL/L — SIGNIFICANT CHANGE UP (ref 98–107)
CHLORIDE SERPL-SCNC: 105 MMOL/L — SIGNIFICANT CHANGE UP (ref 98–107)
CO2 SERPL-SCNC: 25 MMOL/L — SIGNIFICANT CHANGE UP (ref 22–31)
CO2 SERPL-SCNC: 25 MMOL/L — SIGNIFICANT CHANGE UP (ref 22–31)
CREAT SERPL-MCNC: 0.92 MG/DL — SIGNIFICANT CHANGE UP (ref 0.5–1.3)
CREAT SERPL-MCNC: 0.92 MG/DL — SIGNIFICANT CHANGE UP (ref 0.5–1.3)
GLUCOSE SERPL-MCNC: 95 MG/DL — SIGNIFICANT CHANGE UP (ref 70–99)
GLUCOSE SERPL-MCNC: 95 MG/DL — SIGNIFICANT CHANGE UP (ref 70–99)
HCT VFR BLD CALC: 39.8 % — SIGNIFICANT CHANGE UP (ref 39–50)
HGB BLD-MCNC: 12.9 G/DL — LOW (ref 13–17)
MAGNESIUM SERPL-MCNC: 1.9 MG/DL — SIGNIFICANT CHANGE UP (ref 1.6–2.6)
MCHC RBC-ENTMCNC: 30.4 PG — SIGNIFICANT CHANGE UP (ref 27–34)
MCHC RBC-ENTMCNC: 32.4 % — SIGNIFICANT CHANGE UP (ref 32–36)
MCV RBC AUTO: 93.6 FL — SIGNIFICANT CHANGE UP (ref 80–100)
NRBC # FLD: 0 — SIGNIFICANT CHANGE UP
PHOSPHATE SERPL-MCNC: 2.8 MG/DL — SIGNIFICANT CHANGE UP (ref 2.5–4.5)
PLATELET # BLD AUTO: 253 K/UL — SIGNIFICANT CHANGE UP (ref 150–400)
PMV BLD: 10.5 FL — SIGNIFICANT CHANGE UP (ref 7–13)
POTASSIUM SERPL-MCNC: 3.8 MMOL/L — SIGNIFICANT CHANGE UP (ref 3.5–5.3)
POTASSIUM SERPL-MCNC: 3.8 MMOL/L — SIGNIFICANT CHANGE UP (ref 3.5–5.3)
POTASSIUM SERPL-SCNC: 3.8 MMOL/L — SIGNIFICANT CHANGE UP (ref 3.5–5.3)
POTASSIUM SERPL-SCNC: 3.8 MMOL/L — SIGNIFICANT CHANGE UP (ref 3.5–5.3)
RBC # BLD: 4.25 M/UL — SIGNIFICANT CHANGE UP (ref 4.2–5.8)
RBC # FLD: 14 % — SIGNIFICANT CHANGE UP (ref 10.3–14.5)
SODIUM SERPL-SCNC: 141 MMOL/L — SIGNIFICANT CHANGE UP (ref 135–145)
SODIUM SERPL-SCNC: 141 MMOL/L — SIGNIFICANT CHANGE UP (ref 135–145)
WBC # BLD: 7.93 K/UL — SIGNIFICANT CHANGE UP (ref 3.8–10.5)
WBC # FLD AUTO: 7.93 K/UL — SIGNIFICANT CHANGE UP (ref 3.8–10.5)

## 2018-06-23 RX ADMIN — OLANZAPINE 5 MILLIGRAM(S): 15 TABLET, FILM COATED ORAL at 17:29

## 2018-06-23 RX ADMIN — MORPHINE SULFATE 30 MILLIGRAM(S): 50 CAPSULE, EXTENDED RELEASE ORAL at 19:34

## 2018-06-23 RX ADMIN — HYDROMORPHONE HYDROCHLORIDE 1 MILLIGRAM(S): 2 INJECTION INTRAMUSCULAR; INTRAVENOUS; SUBCUTANEOUS at 07:00

## 2018-06-23 RX ADMIN — MORPHINE SULFATE 30 MILLIGRAM(S): 50 CAPSULE, EXTENDED RELEASE ORAL at 08:30

## 2018-06-23 RX ADMIN — AMPICILLIN SODIUM AND SULBACTAM SODIUM 200 GRAM(S): 250; 125 INJECTION, POWDER, FOR SUSPENSION INTRAMUSCULAR; INTRAVENOUS at 14:56

## 2018-06-23 RX ADMIN — HYDROMORPHONE HYDROCHLORIDE 1 MILLIGRAM(S): 2 INJECTION INTRAMUSCULAR; INTRAVENOUS; SUBCUTANEOUS at 06:24

## 2018-06-23 RX ADMIN — AMPICILLIN SODIUM AND SULBACTAM SODIUM 200 GRAM(S): 250; 125 INJECTION, POWDER, FOR SUSPENSION INTRAMUSCULAR; INTRAVENOUS at 07:45

## 2018-06-23 RX ADMIN — Medication 1 SPRAY(S): at 10:49

## 2018-06-23 RX ADMIN — AMPICILLIN SODIUM AND SULBACTAM SODIUM 200 GRAM(S): 250; 125 INJECTION, POWDER, FOR SUSPENSION INTRAMUSCULAR; INTRAVENOUS at 21:44

## 2018-06-23 RX ADMIN — HYDROMORPHONE HYDROCHLORIDE 1 MILLIGRAM(S): 2 INJECTION INTRAMUSCULAR; INTRAVENOUS; SUBCUTANEOUS at 00:20

## 2018-06-23 RX ADMIN — Medication 75 MILLIGRAM(S): at 17:29

## 2018-06-23 RX ADMIN — CARVEDILOL PHOSPHATE 12.5 MILLIGRAM(S): 80 CAPSULE, EXTENDED RELEASE ORAL at 06:24

## 2018-06-23 RX ADMIN — HYDROMORPHONE HYDROCHLORIDE 1 MILLIGRAM(S): 2 INJECTION INTRAMUSCULAR; INTRAVENOUS; SUBCUTANEOUS at 15:15

## 2018-06-23 RX ADMIN — Medication 75 MILLIGRAM(S): at 06:25

## 2018-06-23 RX ADMIN — HYDROMORPHONE HYDROCHLORIDE 1 MILLIGRAM(S): 2 INJECTION INTRAMUSCULAR; INTRAVENOUS; SUBCUTANEOUS at 19:15

## 2018-06-23 RX ADMIN — HYDROMORPHONE HYDROCHLORIDE 1 MILLIGRAM(S): 2 INJECTION INTRAMUSCULAR; INTRAVENOUS; SUBCUTANEOUS at 14:54

## 2018-06-23 RX ADMIN — HYDROMORPHONE HYDROCHLORIDE 1 MILLIGRAM(S): 2 INJECTION INTRAMUSCULAR; INTRAVENOUS; SUBCUTANEOUS at 10:46

## 2018-06-23 RX ADMIN — HYDROMORPHONE HYDROCHLORIDE 1 MILLIGRAM(S): 2 INJECTION INTRAMUSCULAR; INTRAVENOUS; SUBCUTANEOUS at 18:57

## 2018-06-23 RX ADMIN — HYDROMORPHONE HYDROCHLORIDE 1 MILLIGRAM(S): 2 INJECTION INTRAMUSCULAR; INTRAVENOUS; SUBCUTANEOUS at 11:15

## 2018-06-23 RX ADMIN — Medication 1 SPRAY(S): at 06:24

## 2018-06-23 RX ADMIN — MORPHINE SULFATE 30 MILLIGRAM(S): 50 CAPSULE, EXTENDED RELEASE ORAL at 07:45

## 2018-06-23 RX ADMIN — SENNA PLUS 2 TABLET(S): 8.6 TABLET ORAL at 21:44

## 2018-06-23 RX ADMIN — AMPICILLIN SODIUM AND SULBACTAM SODIUM 200 GRAM(S): 250; 125 INJECTION, POWDER, FOR SUSPENSION INTRAMUSCULAR; INTRAVENOUS at 02:25

## 2018-06-23 RX ADMIN — Medication 0.5 MILLIGRAM(S): at 17:29

## 2018-06-23 RX ADMIN — CARVEDILOL PHOSPHATE 12.5 MILLIGRAM(S): 80 CAPSULE, EXTENDED RELEASE ORAL at 17:29

## 2018-06-24 RX ORDER — ACETAMINOPHEN 500 MG
1000 TABLET ORAL ONCE
Qty: 0 | Refills: 0 | Status: COMPLETED | OUTPATIENT
Start: 2018-06-24 | End: 2018-06-24

## 2018-06-24 RX ADMIN — MORPHINE SULFATE 30 MILLIGRAM(S): 50 CAPSULE, EXTENDED RELEASE ORAL at 08:12

## 2018-06-24 RX ADMIN — MORPHINE SULFATE 30 MILLIGRAM(S): 50 CAPSULE, EXTENDED RELEASE ORAL at 18:57

## 2018-06-24 RX ADMIN — OLANZAPINE 5 MILLIGRAM(S): 15 TABLET, FILM COATED ORAL at 18:09

## 2018-06-24 RX ADMIN — Medication 75 MILLIGRAM(S): at 18:09

## 2018-06-24 RX ADMIN — HYDROMORPHONE HYDROCHLORIDE 1 MILLIGRAM(S): 2 INJECTION INTRAMUSCULAR; INTRAVENOUS; SUBCUTANEOUS at 10:51

## 2018-06-24 RX ADMIN — Medication 75 MILLIGRAM(S): at 06:26

## 2018-06-24 RX ADMIN — HYDROMORPHONE HYDROCHLORIDE 1 MILLIGRAM(S): 2 INJECTION INTRAMUSCULAR; INTRAVENOUS; SUBCUTANEOUS at 06:56

## 2018-06-24 RX ADMIN — AMPICILLIN SODIUM AND SULBACTAM SODIUM 200 GRAM(S): 250; 125 INJECTION, POWDER, FOR SUSPENSION INTRAMUSCULAR; INTRAVENOUS at 10:51

## 2018-06-24 RX ADMIN — HYDROMORPHONE HYDROCHLORIDE 1 MILLIGRAM(S): 2 INJECTION INTRAMUSCULAR; INTRAVENOUS; SUBCUTANEOUS at 19:15

## 2018-06-24 RX ADMIN — HYDROMORPHONE HYDROCHLORIDE 1 MILLIGRAM(S): 2 INJECTION INTRAMUSCULAR; INTRAVENOUS; SUBCUTANEOUS at 14:47

## 2018-06-24 RX ADMIN — HYDROMORPHONE HYDROCHLORIDE 1 MILLIGRAM(S): 2 INJECTION INTRAMUSCULAR; INTRAVENOUS; SUBCUTANEOUS at 15:02

## 2018-06-24 RX ADMIN — AMPICILLIN SODIUM AND SULBACTAM SODIUM 200 GRAM(S): 250; 125 INJECTION, POWDER, FOR SUSPENSION INTRAMUSCULAR; INTRAVENOUS at 22:03

## 2018-06-24 RX ADMIN — HYDROMORPHONE HYDROCHLORIDE 1 MILLIGRAM(S): 2 INJECTION INTRAMUSCULAR; INTRAVENOUS; SUBCUTANEOUS at 02:19

## 2018-06-24 RX ADMIN — Medication 0.5 MILLIGRAM(S): at 18:09

## 2018-06-24 RX ADMIN — HYDROMORPHONE HYDROCHLORIDE 1 MILLIGRAM(S): 2 INJECTION INTRAMUSCULAR; INTRAVENOUS; SUBCUTANEOUS at 11:05

## 2018-06-24 RX ADMIN — Medication 1000 MILLIGRAM(S): at 17:22

## 2018-06-24 RX ADMIN — HYDROMORPHONE HYDROCHLORIDE 1 MILLIGRAM(S): 2 INJECTION INTRAMUSCULAR; INTRAVENOUS; SUBCUTANEOUS at 02:49

## 2018-06-24 RX ADMIN — CARVEDILOL PHOSPHATE 12.5 MILLIGRAM(S): 80 CAPSULE, EXTENDED RELEASE ORAL at 18:10

## 2018-06-24 RX ADMIN — HYDROMORPHONE HYDROCHLORIDE 1 MILLIGRAM(S): 2 INJECTION INTRAMUSCULAR; INTRAVENOUS; SUBCUTANEOUS at 06:26

## 2018-06-24 RX ADMIN — Medication 400 MILLIGRAM(S): at 16:52

## 2018-06-24 RX ADMIN — HYDROMORPHONE HYDROCHLORIDE 1 MILLIGRAM(S): 2 INJECTION INTRAMUSCULAR; INTRAVENOUS; SUBCUTANEOUS at 19:02

## 2018-06-24 RX ADMIN — AMPICILLIN SODIUM AND SULBACTAM SODIUM 200 GRAM(S): 250; 125 INJECTION, POWDER, FOR SUSPENSION INTRAMUSCULAR; INTRAVENOUS at 02:27

## 2018-06-24 RX ADMIN — CARVEDILOL PHOSPHATE 12.5 MILLIGRAM(S): 80 CAPSULE, EXTENDED RELEASE ORAL at 06:26

## 2018-06-24 RX ADMIN — MORPHINE SULFATE 30 MILLIGRAM(S): 50 CAPSULE, EXTENDED RELEASE ORAL at 19:26

## 2018-06-24 RX ADMIN — HYDROMORPHONE HYDROCHLORIDE 1 MILLIGRAM(S): 2 INJECTION INTRAMUSCULAR; INTRAVENOUS; SUBCUTANEOUS at 23:31

## 2018-06-24 RX ADMIN — AMPICILLIN SODIUM AND SULBACTAM SODIUM 200 GRAM(S): 250; 125 INJECTION, POWDER, FOR SUSPENSION INTRAMUSCULAR; INTRAVENOUS at 17:24

## 2018-06-24 RX ADMIN — MORPHINE SULFATE 30 MILLIGRAM(S): 50 CAPSULE, EXTENDED RELEASE ORAL at 07:42

## 2018-06-25 DIAGNOSIS — C31.0 MALIGNANT NEOPLASM OF MAXILLARY SINUS: ICD-10-CM

## 2018-06-25 LAB
BACTERIA BLD CULT: SIGNIFICANT CHANGE UP
BACTERIA BLD CULT: SIGNIFICANT CHANGE UP
VIRUS SPEC CULT: SIGNIFICANT CHANGE UP

## 2018-06-25 PROCEDURE — 99233 SBSQ HOSP IP/OBS HIGH 50: CPT | Mod: GC

## 2018-06-25 PROCEDURE — 70542 MRI ORBIT/FACE/NECK W/DYE: CPT | Mod: 26

## 2018-06-25 RX ORDER — MORPHINE SULFATE 50 MG/1
30 CAPSULE, EXTENDED RELEASE ORAL THREE TIMES A DAY
Qty: 0 | Refills: 0 | Status: DISCONTINUED | OUTPATIENT
Start: 2018-06-25 | End: 2018-06-26

## 2018-06-25 RX ORDER — ACETAMINOPHEN 500 MG
650 TABLET ORAL ONCE
Qty: 0 | Refills: 0 | Status: DISCONTINUED | OUTPATIENT
Start: 2018-06-25 | End: 2018-06-26

## 2018-06-25 RX ORDER — HYDROMORPHONE HYDROCHLORIDE 2 MG/ML
1 INJECTION INTRAMUSCULAR; INTRAVENOUS; SUBCUTANEOUS ONCE
Qty: 0 | Refills: 0 | Status: DISCONTINUED | OUTPATIENT
Start: 2018-06-25 | End: 2018-06-25

## 2018-06-25 RX ORDER — HYDROMORPHONE HYDROCHLORIDE 2 MG/ML
4 INJECTION INTRAMUSCULAR; INTRAVENOUS; SUBCUTANEOUS EVERY 4 HOURS
Qty: 0 | Refills: 0 | Status: DISCONTINUED | OUTPATIENT
Start: 2018-06-25 | End: 2018-06-26

## 2018-06-25 RX ORDER — DOCUSATE SODIUM 100 MG
200 CAPSULE ORAL ONCE
Qty: 0 | Refills: 0 | Status: COMPLETED | OUTPATIENT
Start: 2018-06-25 | End: 2018-06-25

## 2018-06-25 RX ADMIN — MORPHINE SULFATE 30 MILLIGRAM(S): 50 CAPSULE, EXTENDED RELEASE ORAL at 05:55

## 2018-06-25 RX ADMIN — HYDROMORPHONE HYDROCHLORIDE 1 MILLIGRAM(S): 2 INJECTION INTRAMUSCULAR; INTRAVENOUS; SUBCUTANEOUS at 09:00

## 2018-06-25 RX ADMIN — HYDROMORPHONE HYDROCHLORIDE 1 MILLIGRAM(S): 2 INJECTION INTRAMUSCULAR; INTRAVENOUS; SUBCUTANEOUS at 20:25

## 2018-06-25 RX ADMIN — SENNA PLUS 2 TABLET(S): 8.6 TABLET ORAL at 21:49

## 2018-06-25 RX ADMIN — HYDROMORPHONE HYDROCHLORIDE 1 MILLIGRAM(S): 2 INJECTION INTRAMUSCULAR; INTRAVENOUS; SUBCUTANEOUS at 13:59

## 2018-06-25 RX ADMIN — CARVEDILOL PHOSPHATE 12.5 MILLIGRAM(S): 80 CAPSULE, EXTENDED RELEASE ORAL at 05:23

## 2018-06-25 RX ADMIN — HYDROMORPHONE HYDROCHLORIDE 1 MILLIGRAM(S): 2 INJECTION INTRAMUSCULAR; INTRAVENOUS; SUBCUTANEOUS at 00:10

## 2018-06-25 RX ADMIN — MORPHINE SULFATE 30 MILLIGRAM(S): 50 CAPSULE, EXTENDED RELEASE ORAL at 14:29

## 2018-06-25 RX ADMIN — Medication 75 MILLIGRAM(S): at 05:23

## 2018-06-25 RX ADMIN — MORPHINE SULFATE 30 MILLIGRAM(S): 50 CAPSULE, EXTENDED RELEASE ORAL at 13:59

## 2018-06-25 RX ADMIN — AMPICILLIN SODIUM AND SULBACTAM SODIUM 200 GRAM(S): 250; 125 INJECTION, POWDER, FOR SUSPENSION INTRAMUSCULAR; INTRAVENOUS at 16:30

## 2018-06-25 RX ADMIN — OLANZAPINE 5 MILLIGRAM(S): 15 TABLET, FILM COATED ORAL at 18:03

## 2018-06-25 RX ADMIN — HYDROMORPHONE HYDROCHLORIDE 1 MILLIGRAM(S): 2 INJECTION INTRAMUSCULAR; INTRAVENOUS; SUBCUTANEOUS at 11:47

## 2018-06-25 RX ADMIN — Medication 75 MILLIGRAM(S): at 18:11

## 2018-06-25 RX ADMIN — HYDROMORPHONE HYDROCHLORIDE 1 MILLIGRAM(S): 2 INJECTION INTRAMUSCULAR; INTRAVENOUS; SUBCUTANEOUS at 11:55

## 2018-06-25 RX ADMIN — HYDROMORPHONE HYDROCHLORIDE 1 MILLIGRAM(S): 2 INJECTION INTRAMUSCULAR; INTRAVENOUS; SUBCUTANEOUS at 04:08

## 2018-06-25 RX ADMIN — CARVEDILOL PHOSPHATE 12.5 MILLIGRAM(S): 80 CAPSULE, EXTENDED RELEASE ORAL at 18:04

## 2018-06-25 RX ADMIN — HYDROMORPHONE HYDROCHLORIDE 1 MILLIGRAM(S): 2 INJECTION INTRAMUSCULAR; INTRAVENOUS; SUBCUTANEOUS at 14:15

## 2018-06-25 RX ADMIN — HYDROMORPHONE HYDROCHLORIDE 1 MILLIGRAM(S): 2 INJECTION INTRAMUSCULAR; INTRAVENOUS; SUBCUTANEOUS at 05:02

## 2018-06-25 RX ADMIN — HYDROMORPHONE HYDROCHLORIDE 1 MILLIGRAM(S): 2 INJECTION INTRAMUSCULAR; INTRAVENOUS; SUBCUTANEOUS at 22:28

## 2018-06-25 RX ADMIN — AMPICILLIN SODIUM AND SULBACTAM SODIUM 200 GRAM(S): 250; 125 INJECTION, POWDER, FOR SUSPENSION INTRAMUSCULAR; INTRAVENOUS at 10:34

## 2018-06-25 RX ADMIN — AMPICILLIN SODIUM AND SULBACTAM SODIUM 200 GRAM(S): 250; 125 INJECTION, POWDER, FOR SUSPENSION INTRAMUSCULAR; INTRAVENOUS at 04:08

## 2018-06-25 RX ADMIN — AMPICILLIN SODIUM AND SULBACTAM SODIUM 200 GRAM(S): 250; 125 INJECTION, POWDER, FOR SUSPENSION INTRAMUSCULAR; INTRAVENOUS at 21:49

## 2018-06-25 RX ADMIN — Medication 200 MILLIGRAM(S): at 21:49

## 2018-06-25 RX ADMIN — HYDROMORPHONE HYDROCHLORIDE 1 MILLIGRAM(S): 2 INJECTION INTRAMUSCULAR; INTRAVENOUS; SUBCUTANEOUS at 08:42

## 2018-06-25 RX ADMIN — Medication 0.5 MILLIGRAM(S): at 18:04

## 2018-06-25 RX ADMIN — MORPHINE SULFATE 30 MILLIGRAM(S): 50 CAPSULE, EXTENDED RELEASE ORAL at 05:23

## 2018-06-25 RX ADMIN — HYDROMORPHONE HYDROCHLORIDE 1 MILLIGRAM(S): 2 INJECTION INTRAMUSCULAR; INTRAVENOUS; SUBCUTANEOUS at 22:13

## 2018-06-25 RX ADMIN — HYDROMORPHONE HYDROCHLORIDE 1 MILLIGRAM(S): 2 INJECTION INTRAMUSCULAR; INTRAVENOUS; SUBCUTANEOUS at 18:11

## 2018-06-25 RX ADMIN — MORPHINE SULFATE 30 MILLIGRAM(S): 50 CAPSULE, EXTENDED RELEASE ORAL at 22:36

## 2018-06-25 RX ADMIN — MORPHINE SULFATE 30 MILLIGRAM(S): 50 CAPSULE, EXTENDED RELEASE ORAL at 21:49

## 2018-06-25 NOTE — PROGRESS NOTE ADULT - ASSESSMENT
57 year old male with maxillary sinus mass, s/p nasal endoscopy and biopsy 6/14/18 - with packing left in place due to epistaxis - packing subsequently removed in clinic 6/19/18 - patient with intolerable right facial pain and orbital edema.  Palliative consulted for pain management.
58 yo male with head and neck ca asked to help with pain

## 2018-06-25 NOTE — PROGRESS NOTE ADULT - PROBLEM SELECTOR PLAN 3
Psychosocial support provided to patient and patient's wife.  Will continue to follow for ongoing pain management.
encourage ambulation  improved with improved pain control

## 2018-06-25 NOTE — PROGRESS NOTE ADULT - PROBLEM SELECTOR PLAN 1
Biopsy results: Paranasal sinus, right maxilla, nasal endoscopic biopsies  - Sinonasal non-keratinizing squamous cell carcinoma  Continue management as per ENT.
still w/o in progress  awaiting mri  goal will to follow up at [facility] onc

## 2018-06-25 NOTE — PROGRESS NOTE ADULT - PROBLEM SELECTOR PLAN 2
As above.  Patient reports that Dilaudid 1mg IV every 4hrs around the clock has been successful at keeping his pain under control.  He states that if he falls asleep and does not take the pain medication every 4 hours, the pain begins to become intolerable.  He denies any adverse effects from the Dilaudid, including lethargy. Patient received 6 PRN doses of Dilaudid 1mg IV in 24hours.  As per patient and wife, patient was having uncontrolled pain for weeks (despite PRN Vicodin)- prior to acute episode of swelling.  Given patients consistent usage of Dilaudid (6mg IV in 24hours = 120mg of oral Morphine), will start MS Contin 30mg PO q12h to adjust for cross tolerance.  Dilaudid 1mg IV q4h PRN continued to allow time for MS Contin to reach peak efficacy.  Will transition patient from IV Dilaudid to PO Dilaudid when pain is more effectively controlled.  Bowel regimen.  Patient's wife reports patient was seeing a pain specialist, Dr. Manzano, prior to admission and will reach out to ascertain his ability to continue coverage for pain given recent cancer diagnosis.  Patient's preference is to follow a pain specialist in [facility] due to ease of follow-up from their residence.
increase to ms contin 30 tid with dilaudid 4mg po Q 4 hours prn  continue lyrica bid

## 2018-06-26 VITALS
RESPIRATION RATE: 18 BRPM | TEMPERATURE: 98 F | SYSTOLIC BLOOD PRESSURE: 136 MMHG | HEART RATE: 88 BPM | DIASTOLIC BLOOD PRESSURE: 91 MMHG | OXYGEN SATURATION: 94 %

## 2018-06-26 RX ORDER — POLYETHYLENE GLYCOL 3350 17 G/17G
17 POWDER, FOR SOLUTION ORAL
Qty: 1 | Refills: 0 | OUTPATIENT
Start: 2018-06-26 | End: 2018-07-25

## 2018-06-26 RX ORDER — MORPHINE SULFATE 50 MG/1
1 CAPSULE, EXTENDED RELEASE ORAL
Qty: 30 | Refills: 0 | OUTPATIENT
Start: 2018-06-26 | End: 2018-07-05

## 2018-06-26 RX ORDER — HYDROMORPHONE HYDROCHLORIDE 2 MG/ML
1 INJECTION INTRAMUSCULAR; INTRAVENOUS; SUBCUTANEOUS
Qty: 60 | Refills: 0 | OUTPATIENT
Start: 2018-06-26 | End: 2018-07-05

## 2018-06-26 RX ORDER — SENNA PLUS 8.6 MG/1
2 TABLET ORAL
Qty: 60 | Refills: 0 | OUTPATIENT
Start: 2018-06-26 | End: 2018-07-25

## 2018-06-26 RX ADMIN — AMPICILLIN SODIUM AND SULBACTAM SODIUM 200 GRAM(S): 250; 125 INJECTION, POWDER, FOR SUSPENSION INTRAMUSCULAR; INTRAVENOUS at 03:35

## 2018-06-26 RX ADMIN — HYDROMORPHONE HYDROCHLORIDE 4 MILLIGRAM(S): 2 INJECTION INTRAMUSCULAR; INTRAVENOUS; SUBCUTANEOUS at 10:10

## 2018-06-26 RX ADMIN — AMPICILLIN SODIUM AND SULBACTAM SODIUM 200 GRAM(S): 250; 125 INJECTION, POWDER, FOR SUSPENSION INTRAMUSCULAR; INTRAVENOUS at 10:10

## 2018-06-26 RX ADMIN — MORPHINE SULFATE 30 MILLIGRAM(S): 50 CAPSULE, EXTENDED RELEASE ORAL at 07:17

## 2018-06-26 RX ADMIN — Medication 75 MILLIGRAM(S): at 06:30

## 2018-06-26 RX ADMIN — CARVEDILOL PHOSPHATE 12.5 MILLIGRAM(S): 80 CAPSULE, EXTENDED RELEASE ORAL at 06:30

## 2018-06-26 RX ADMIN — HYDROMORPHONE HYDROCHLORIDE 1 MILLIGRAM(S): 2 INJECTION INTRAMUSCULAR; INTRAVENOUS; SUBCUTANEOUS at 06:50

## 2018-06-26 RX ADMIN — HYDROMORPHONE HYDROCHLORIDE 1 MILLIGRAM(S): 2 INJECTION INTRAMUSCULAR; INTRAVENOUS; SUBCUTANEOUS at 06:35

## 2018-06-26 RX ADMIN — MORPHINE SULFATE 30 MILLIGRAM(S): 50 CAPSULE, EXTENDED RELEASE ORAL at 06:30

## 2018-06-26 NOTE — PROGRESS NOTE ADULT - SUBJECTIVE AND OBJECTIVE BOX
INTERVAL HPI/OVERNIGHT EVENTS:  Patient with persistent pain to his right eye/ear/neck/face     Allergies    hospital socks (Rash)  lisinopril (Anaphylaxis)  statins (Anaphylaxis)    Intolerances        Code Status:  Full Code         PRESENT SYMPTOMS:   SOURCE:  [x ] Patient   [ ] Family   [ ] Team     Pain: Patient reports pain - currently 2/10 (post dilaudid 1mg IV) - to right face/eye/ear/neck - sharp/throbbing - constant - aggravated by movement of his jaw/talking - relieved with Dilaudid PRN     Dyspnea [ ] YES [x ] NO - Mild [ ]  Moderate [ ]  Severe [ ]   Anxiety:  [ ] YES [x ] NO  Fatigue: [ ] YES [x ] NO  Nausea: [ ] YES [x ] NO  Loss of Appetite: [ ] YES [x ] NO  Constipation [ ] YES   [x ] No     OTHER SYMPTOMS:  [x ] All other ROS negative     [ ] Unable to obtain due to poor mentation    Does the patient meet criteria for Severe Protein Calorie Malnutrition?  Yes [ ]  No [x ]   PPSV 30% or below [ ]  Anasarca [ ]  Albumin <2 [ ]  Catabolic State [ ]  Poor nutritional intake [ ]  Significant weight loss [ ]     MEDICATIONS  (STANDING):  ALPRAZolam 0.5 milliGRAM(s) Oral daily  amphetamine/dextroamphetamine 30 milliGRAM(s) Oral two times a day  ampicillin/sulbactam  IVPB 3 Gram(s) IV Intermittent every 6 hours  carvedilol 12.5 milliGRAM(s) Oral every 12 hours  heparin  Injectable 5000 Unit(s) SubCutaneous every 8 hours  morphine ER Tablet 30 milliGRAM(s) Oral every 12 hours  OLANZapine 5 milliGRAM(s) Oral daily  potassium chloride    Tablet ER 40 milliEquivalent(s) Oral every 4 hours  pregabalin 75 milliGRAM(s) Oral two times a day  senna 2 Tablet(s) Oral at bedtime    MEDICATIONS  (PRN):  HYDROmorphone  Injectable 1 milliGRAM(s) IV Push every 4 hours PRN Severe Pain (7 - 10)  polyethylene glycol 3350 17 Gram(s) Oral daily PRN Constipation  sodium chloride 0.65% Nasal 1 Spray(s) Both Nostrils four times a day PRN Nasal Congestion      Palliative Performance Status Version 2:   80 %  ECOG - 2       Physical Exam:    General: [x ] Alert,  A&O x4      HEENT: Right eye edema improved - double vision to right eye persists   Lungs: Clear anteriorly    Cardiovascular:  [x ] Regular rate and rhythm  [ ] Irregular [ ] Tachycardia   [ ] Bradycardia   Abdomen: [x ] Soft  [ ] Distended  [x ] +BS  [x ] Non tender [ ] Tender  [ ]PEG   [ ] NGT   Last BM:   6/22/18  Genitourinary:  [ x] Normal [ ] Incontinent   [ ] Oliguria/Anuria   [ ] Andrade  Musculoskeletal:  [x ] Normal   [ ] Generalized weakness  [ ] Bedbound  [ ] Edema   Neurological: Right eye diplopia      Skin: As above      Vital Signs Last 24 Hrs  T(C): 36.6 (22 Jun 2018 14:38), Max: 37.2 (21 Jun 2018 22:34)  T(F): 97.9 (22 Jun 2018 14:38), Max: 99 (21 Jun 2018 22:34)  HR: 82 (22 Jun 2018 14:38) (80 - 101)  BP: 115/74 (22 Jun 2018 14:38) (101/69 - 137/86)  BP(mean): --  RR: 18 (22 Jun 2018 14:38) (18 - 20)  SpO2: 96% (22 Jun 2018 14:38) (90% - 96%)    LABS:                        13.2   10.07 )-----------( 238      ( 22 Jun 2018 07:10 )             40.4     06-22    138  |  100  |  12  ----------------------------<  146<H>  3.2<L>   |  25  |  0.88    Ca    8.6      22 Jun 2018 07:10  Phos  2.6     06-22  Mg     1.8     06-22          I&O's Summary    21 Jun 2018 07:01  -  22 Jun 2018 07:00  --------------------------------------------------------  IN: 560 mL / OUT: 200 mL / NET: 360 mL    22 Jun 2018 07:01  -  22 Jun 2018 17:06  --------------------------------------------------------  IN: 480 mL / OUT: 250 mL / NET: 230 mL        RADIOLOGY & ADDITIONAL STUDIES:
INTERVAL HPI/OVERNIGHT EVENTS:  pain still in face.  improved not yet gone  Allergies    hospital socks (Rash)  lisinopril (Anaphylaxis)  statins (Anaphylaxis)    Intolerances        Code Status:          PRESENT SYMPTOMS:   SOURCE:  [ x] Patient   [ ] Family   [ ] Team     Pain: yes  Onset: weeks     Location: right face          Duration: hours       Character: sharp        Aggravating factors:          Relieving Factors: dilaudid            Timing:         Severity:      Dyspnea [ ] YES [x ] NO - Mild [ ]  Moderate [ ]  Severe [ ]   Anxiety:  [ ] YES [ x] NO  Fatigue: [ ] YES [x ] NO  Nausea: [ ] YES [x ] NO  Loss of Appetite: [ ] YES x[ ] NO  Constipation [ ] YES   x[ ] No     OTHER SYMPTOMS:  [x ] All other ROS negative     [ ] Unable to obtain due to poor mentation    Does the patient meet criteria for Severe Protein Calorie Malnutrition?  Yes [ ]  No [ ]   PPSV less than <30% [ ]  Anasarca [ ]  Albumin <2 [ ]  Catabolic State [ ]  Poor nutritional intake [ ]  Significant weight loss [ ]     MEDICATIONS  (STANDING):  ALPRAZolam 0.5 milliGRAM(s) Oral daily  amphetamine/dextroamphetamine 30 milliGRAM(s) Oral two times a day  ampicillin/sulbactam  IVPB 3 Gram(s) IV Intermittent every 6 hours  carvedilol 12.5 milliGRAM(s) Oral every 12 hours  docusate sodium 200 milliGRAM(s) Oral once  heparin  Injectable 5000 Unit(s) SubCutaneous every 8 hours  morphine ER Tablet 30 milliGRAM(s) Oral three times a day  OLANZapine 5 milliGRAM(s) Oral daily  pregabalin 75 milliGRAM(s) Oral two times a day  senna 2 Tablet(s) Oral at bedtime    MEDICATIONS  (PRN):  acetaminophen   Tablet. 650 milliGRAM(s) Oral once PRN breakthrough pain  HYDROmorphone   Tablet 4 milliGRAM(s) Oral every 4 hours PRN Severe Pain (7 - 10)  HYDROmorphone  Injectable 1 milliGRAM(s) IV Push every 4 hours PRN Severe Pain (7 - 10)  polyethylene glycol 3350 17 Gram(s) Oral daily PRN Constipation  sodium chloride 0.65% Nasal 1 Spray(s) Both Nostrils four times a day PRN Nasal Congestion      Palliative Performance Status Version 2:     80    %  ECOG -        Physical Exam:    General: [x ] Alert,  A&O x  3   [ ] lethargic   [ ] Agitated   [ ] Cachexia   HEENT: [x ] Normal   [ ] Dry mouth   [ ] ET Tube    [ ] Trach   Lungs: [x ] Clear [ ] Rhonchi  [ ] Crackles [ ] Wheezing [ ] Tachypnea  [ ] Audible excessive secretions   Cardiovascular:  [ ] Regular rate and rhythm  [ ] Irregular [x ] Tachycardia   [ ] Bradycardia   Abdomen: [x ] Soft  [ ] Distended  [ ]  [ x] +BS  [ ] Non tender [ ] Tender  [ ]PEG   [ ] NGT   Last BM:     Genitourinary:  [ x] Normal [ ] Incontinent   [ ] Oliguria/Anuria   [ ] Andrade  Musculoskeletal:  [x ] Normal   [ ] Generalized weakness  [ ] Bedbound  [ ] Edema   Neurological: [x ] No focal deficits  [ ] Cognitive impairment     Skin: [ x] Normal   [ ] Pressure ulcers     Vital Signs Last 24 Hrs  T(C): 36.6 (25 Jun 2018 17:57), Max: 37.2 (25 Jun 2018 05:20)  T(F): 97.8 (25 Jun 2018 17:57), Max: 98.9 (25 Jun 2018 05:20)  HR: 77 (25 Jun 2018 17:57) (60 - 78)  BP: 135/79 (25 Jun 2018 17:57) (108/62 - 146/80)  BP(mean): --  RR: 18 (25 Jun 2018 17:57) (16 - 18)  SpO2: 95% (25 Jun 2018 17:57) (91% - 95%)    LABS:              I&O's Summary    24 Jun 2018 07:01  -  25 Jun 2018 07:00  --------------------------------------------------------  IN: 1560 mL / OUT: 0 mL / NET: 1560 mL    25 Jun 2018 07:01  -  25 Jun 2018 19:32  --------------------------------------------------------  IN: 560 mL / OUT: 0 mL / NET: 560 mL        RADIOLOGY & ADDITIONAL STUDIES:
Pt seen and examined at bedside. No acute events overnight.     NAD, awake   Breathing comfortably on RA   R periorbital edema with interval improvement   R maxillary area with skin discoloration   NC clear   OC/OP wnl     A/P: 57M with maxillary sinus mass p/w periorbital edema and vision changes c/w cellulitis   -pain control   -unasyn  -pain mgmt consult: dilaudid, ms contin, appreciate recs  -home meds   -regular diet   -oob ad denton   -sqh for dvt ppx
Pt seen and examined at bedside. No acute events overnight.     NAD, awake   Breathing comfortably on RA   R periorbital edema, EOMI   R maxillary area with skin discoloration   NC clear   OC/OP wnl     A/P: 57M with maxillary sinus mass p/w periorbital edema and vision changes   -MRI done - f/u read   -pain control   -pain mgmt consult   -home meds   -regular diet   -oob ad denton   -sqh for dvt ppx
Pt seen and examined at bedside. No acute events overnight.   -  HR   NAD, awake   Breathing comfortably on RA   R periorbital edema with interval improvement   R maxillary area with skin discoloration   NC clear   OC/OP wnl     A/P: 57M with maxillary sinus mass p/w periorbital edema and vision changes c/w cellulitis   -pain control   -unasyn  -pain mgmt consult: dilaudid  -home meds   -regular diet   -oob ad denton   -sqh for dvt ppx
Pt seen and examined. No acute events overnight.     NAD, awake   Breathing comfortably on RA   R periorbital edema with significant interval improvement   R maxillary area with skin discoloration   NC clear   OC/OP wnl     A/P: 57M with maxillary sinus mass p/w periorbital edema and vision changes c/w cellulitis   -pain control   -cont unasyn  -pain mgmt consult: dilaudid, ms contin, appreciate recs  -home meds   -regular diet   -oob ad denton   -sqh for dvt ppx   -repeat MRI
Pt seen and examined. No acute events overnight. Awaiting mri    NAD, awake   Breathing comfortably on RA   R periorbital edema with significant interval improvement   R maxillary area with skin discoloration   NC clear   OC/OP wnl     A/P: 57M with maxillary sinus mass p/w periorbital edema and vision changes c/w cellulitis   -pain control   -cont unasyn  -pain mgmt consult: dilaudid, ms contin, appreciate recs  -home meds   -regular diet   -oob ad denton   -sqh for dvt ppx   -repeat MRI
Pt seen and examined. No acute events overnight. MRI completed overnight.     NAD, awake   Breathing comfortably on RA   R periorbital edema with significant interval improvement   R maxillary area with skin discoloration   NC clear   OC/OP wnl     A/P: 57M with maxillary sinus mass p/w periorbital edema and vision changes c/w cellulitis   -will discuss MRI read with radiology   -pain control   -cont unasyn  -pain mgmt consult: dilaudid, ms contin, appreciate recs - need to get patient off IV pain medication   -home meds   -regular diet   -oob ad denton   -sqh for dvt ppx   -repeat MRI

## 2018-06-27 ENCOUNTER — OUTPATIENT (OUTPATIENT)
Dept: OUTPATIENT SERVICES | Facility: HOSPITAL | Age: 58
LOS: 1 days | Discharge: HOME | End: 2018-06-27

## 2018-06-27 DIAGNOSIS — Z98.61 CORONARY ANGIOPLASTY STATUS: Chronic | ICD-10-CM

## 2018-06-27 DIAGNOSIS — Z98.890 OTHER SPECIFIED POSTPROCEDURAL STATES: Chronic | ICD-10-CM

## 2018-06-27 DIAGNOSIS — J34.89 OTHER SPECIFIED DISORDERS OF NOSE AND NASAL SINUSES: ICD-10-CM

## 2018-06-27 DIAGNOSIS — Z90.2 ACQUIRED ABSENCE OF LUNG [PART OF]: Chronic | ICD-10-CM

## 2018-06-27 DIAGNOSIS — H26.9 UNSPECIFIED CATARACT: Chronic | ICD-10-CM

## 2018-07-02 ENCOUNTER — APPOINTMENT (OUTPATIENT)
Dept: PHYSICAL MEDICINE AND REHAB | Facility: CLINIC | Age: 58
End: 2018-07-02
Payer: MEDICARE

## 2018-07-02 ENCOUNTER — APPOINTMENT (OUTPATIENT)
Dept: HEMATOLOGY ONCOLOGY | Facility: CLINIC | Age: 58
End: 2018-07-02
Payer: MEDICARE

## 2018-07-02 VITALS
OXYGEN SATURATION: 98 % | HEART RATE: 88 BPM | HEIGHT: 71 IN | SYSTOLIC BLOOD PRESSURE: 107 MMHG | WEIGHT: 206 LBS | DIASTOLIC BLOOD PRESSURE: 71 MMHG | BODY MASS INDEX: 28.84 KG/M2

## 2018-07-02 VITALS
DIASTOLIC BLOOD PRESSURE: 78 MMHG | BODY MASS INDEX: 27.11 KG/M2 | OXYGEN SATURATION: 95 % | SYSTOLIC BLOOD PRESSURE: 123 MMHG | WEIGHT: 193.67 LBS | HEIGHT: 71 IN | HEART RATE: 84 BPM | TEMPERATURE: 94.6 F

## 2018-07-02 PROCEDURE — 99204 OFFICE O/P NEW MOD 45 MIN: CPT

## 2018-07-02 PROCEDURE — 99205 OFFICE O/P NEW HI 60 MIN: CPT

## 2018-07-02 RX ORDER — MECLIZINE HYDROCHLORIDE 25 MG/1
25 TABLET ORAL
Qty: 90 | Refills: 0 | Status: ACTIVE | COMMUNITY
Start: 2018-07-02 | End: 1900-01-01

## 2018-07-02 RX ORDER — MORPHINE SULFATE 30 MG/1
30 CAPSULE, EXTENDED RELEASE ORAL
Qty: 90 | Refills: 0 | Status: ACTIVE | COMMUNITY
Start: 2018-07-02 | End: 1900-01-01

## 2018-07-02 RX ORDER — ONDANSETRON 8 MG/1
8 TABLET, ORALLY DISINTEGRATING ORAL EVERY 8 HOURS
Qty: 90 | Refills: 3 | Status: ACTIVE | COMMUNITY
Start: 2018-07-02 | End: 1900-01-01

## 2018-07-02 RX ORDER — DIPHENHYDRAMINE HYDROCHLORIDE AND LIDOCAINE HYDROCHLORIDE AND ALUMINUM HYDROXIDE AND MAGNESIUM HYDRO
KIT
Qty: 500 | Refills: 4 | Status: ACTIVE | COMMUNITY
Start: 2018-07-02 | End: 1900-01-01

## 2018-07-05 NOTE — ASU PATIENT PROFILE, ADULT - ABILITY TO HEAR (WITH HEARING AID OR HEARING APPLIANCE IF NORMALLY USED):
Adequate: hears normal conversation without difficulty diminished hearing, bilat. ears no hearing aids,./Mildly to Moderately Impaired: difficulty hearing in some environments or speaker may need to increase volume or speak distinctly Mildly to Moderately Impaired: difficulty hearing in some environments or speaker may need to increase volume or speak distinctly/diminished hearing, bilat. ears; Does not wear hearing aids.

## 2018-07-05 NOTE — ASU PATIENT PROFILE, ADULT - VISION (WITH CORRECTIVE LENSES IF THE PATIENT USUALLY WEARS THEM):
wears glasses/Partially impaired: cannot see medication labels or newsprint, but can see obstacles in path, and the surrounding layout; can count fingers at arm's length wears glasses- reading/Partially impaired: cannot see medication labels or newsprint, but can see obstacles in path, and the surrounding layout; can count fingers at arm's length

## 2018-07-05 NOTE — ASU PATIENT PROFILE, ADULT - LEARNING ASSESSMENT (PATIENT) ADDITIONAL COMMENTS
Telephone interview Telephone interview with pt's wife (Akiko) instructed on pre-op instructions, tips for safer surgery, pain management scale, take Carvedilol with a sip of water the morning of procedure, may take pain meds morning of surgery as per Dr. Gamino, last dose of ASA on 07/02/18, medical clearance with Dr Heard and verbalized understanding of all.

## 2018-07-06 ENCOUNTER — APPOINTMENT (OUTPATIENT)
Dept: OTOLARYNGOLOGY | Facility: CLINIC | Age: 58
End: 2018-07-06
Payer: MEDICARE

## 2018-07-06 VITALS
WEIGHT: 204 LBS | OXYGEN SATURATION: 95 % | BODY MASS INDEX: 28.56 KG/M2 | HEART RATE: 93 BPM | SYSTOLIC BLOOD PRESSURE: 150 MMHG | DIASTOLIC BLOOD PRESSURE: 92 MMHG | HEIGHT: 71 IN

## 2018-07-06 PROCEDURE — 31231 NASAL ENDOSCOPY DX: CPT

## 2018-07-06 PROCEDURE — 99215 OFFICE O/P EST HI 40 MIN: CPT | Mod: 25

## 2018-07-06 RX ORDER — PROCHLORPERAZINE MALEATE 10 MG/1
10 TABLET, FILM COATED ORAL
Refills: 0 | Status: ACTIVE | COMMUNITY

## 2018-07-06 RX ORDER — MORPHINE SULFATE 15 MG/1
15 TABLET, FILM COATED, EXTENDED RELEASE ORAL
Refills: 0 | Status: ACTIVE | COMMUNITY

## 2018-07-06 RX ORDER — MECLIZINE HYDROCHLORIDE 25 MG/1
25 TABLET ORAL
Refills: 0 | Status: ACTIVE | COMMUNITY

## 2018-07-06 RX ORDER — SODIUM CHLORIDE 9 MG/ML
3 INJECTION INTRAMUSCULAR; INTRAVENOUS; SUBCUTANEOUS ONCE
Qty: 0 | Refills: 0 | Status: DISCONTINUED | OUTPATIENT
Start: 2018-07-09 | End: 2018-07-24

## 2018-07-06 RX ORDER — ONDANSETRON 4 MG/1
4 TABLET, ORALLY DISINTEGRATING ORAL
Refills: 0 | Status: ACTIVE | COMMUNITY

## 2018-07-09 ENCOUNTER — OUTPATIENT (OUTPATIENT)
Dept: OUTPATIENT SERVICES | Facility: HOSPITAL | Age: 58
LOS: 1 days | End: 2018-07-09
Payer: MEDICARE

## 2018-07-09 VITALS
RESPIRATION RATE: 24 BRPM | OXYGEN SATURATION: 95 % | HEART RATE: 88 BPM | DIASTOLIC BLOOD PRESSURE: 76 MMHG | TEMPERATURE: 100 F | SYSTOLIC BLOOD PRESSURE: 128 MMHG

## 2018-07-09 VITALS
RESPIRATION RATE: 16 BRPM | OXYGEN SATURATION: 96 % | HEART RATE: 80 BPM | DIASTOLIC BLOOD PRESSURE: 77 MMHG | SYSTOLIC BLOOD PRESSURE: 116 MMHG | HEIGHT: 72 IN | TEMPERATURE: 98 F | WEIGHT: 205.91 LBS

## 2018-07-09 DIAGNOSIS — C31.0 MALIGNANT NEOPLASM OF MAXILLARY SINUS: ICD-10-CM

## 2018-07-09 DIAGNOSIS — Z98.890 OTHER SPECIFIED POSTPROCEDURAL STATES: Chronic | ICD-10-CM

## 2018-07-09 DIAGNOSIS — Z98.61 CORONARY ANGIOPLASTY STATUS: Chronic | ICD-10-CM

## 2018-07-09 DIAGNOSIS — Z90.2 ACQUIRED ABSENCE OF LUNG [PART OF]: Chronic | ICD-10-CM

## 2018-07-09 DIAGNOSIS — H26.9 UNSPECIFIED CATARACT: Chronic | ICD-10-CM

## 2018-07-09 LAB
APTT BLD: 41.9 SEC — HIGH (ref 27.5–37.4)
INR BLD: 1.15 RATIO — SIGNIFICANT CHANGE UP (ref 0.88–1.16)
PROTHROM AB SERPL-ACNC: 12.7 SEC — SIGNIFICANT CHANGE UP (ref 9.8–12.7)

## 2018-07-09 PROCEDURE — 85610 PROTHROMBIN TIME: CPT

## 2018-07-09 PROCEDURE — 36561 INSERT TUNNELED CV CATH: CPT

## 2018-07-09 PROCEDURE — 76000 FLUOROSCOPY <1 HR PHYS/QHP: CPT

## 2018-07-09 PROCEDURE — 77001 FLUOROGUIDE FOR VEIN DEVICE: CPT

## 2018-07-09 PROCEDURE — C1788: CPT

## 2018-07-09 PROCEDURE — 76942 ECHO GUIDE FOR BIOPSY: CPT

## 2018-07-09 PROCEDURE — 36415 COLL VENOUS BLD VENIPUNCTURE: CPT

## 2018-07-09 PROCEDURE — 77001 FLUOROGUIDE FOR VEIN DEVICE: CPT | Mod: 26

## 2018-07-09 PROCEDURE — 76937 US GUIDE VASCULAR ACCESS: CPT | Mod: 26

## 2018-07-09 PROCEDURE — 76937 US GUIDE VASCULAR ACCESS: CPT

## 2018-07-09 PROCEDURE — 85730 THROMBOPLASTIN TIME PARTIAL: CPT

## 2018-07-09 NOTE — PROGRESS NOTE ADULT - SUBJECTIVE AND OBJECTIVE BOX
port placed with anesthesia right chest, tip in diatla svc.    blood loss :< 5 cc    May use port     formal report to follow

## 2018-07-09 NOTE — ASU DISCHARGE PLAN (ADULT/PEDIATRIC). - MEDICATION SUMMARY - MEDICATIONS TO TAKE
I will START or STAY ON the medications listed below when I get home from the hospital:    aspirin 81 mg oral tablet  -- 1 tab(s) by mouth once a day  -- Indication: For per PMD    HYDROmorphone 4 mg oral tablet  -- 1 tab(s) by mouth every 4 hours, As needed, Severe Pain (7 - 10) MDD:6  -- Indication: For per PMD    morphine 30 mg/12 hr oral tablet, extended release  -- 1 tab(s) by mouth 3 times a day MDD:3  -- Indication: For per PMD    Lyrica 75 mg oral capsule  -- 1 cap(s) by mouth 2 times a day MDD:2  -- Indication: For per PMD    Lyrica 75 mg oral capsule  -- 1 cap(s) by mouth 2 times a day  -- Indication: For per PMD    ondansetron 8 mg oral tablet  -- 1 tab(s) by mouth every 8 hours  -- Indication: For per PMD    prochlorperazine 10 mg oral tablet  -- 1 tab(s) by mouth every 6 hours  -- Indication: For per PMD    meclizine 25 mg oral tablet  -- 1 tab(s) by mouth 3 times a day  -- Indication: For per PMD    Allegra 180 mg oral tablet  -- 1 tab(s) by mouth once a day  -- Indication: For per PMD    OLANZapine 5 mg oral tablet  -- 1 tab(s) by mouth once a day  -- Indication: For per PMD    Xanax 0.5 mg oral tablet  -- 3 tab(s) by mouth once a day (at bedtime), As Needed  -- Indication: For per PMD    carvedilol 12.5 mg oral tablet  -- 1 tab(s) by mouth 2 times a day  -- Indication: For per PMD    Adderall 30 mg oral tablet  -- 1 tab(s) by mouth 2 times a day  -- Indication: For per PMD    polyethylene glycol 3350 oral powder for reconstitution  -- 17 gram(s) by mouth once a day, As needed, Constipation  -- Indication: For per PMD    senna oral tablet  -- 2 tab(s) by mouth once a day (at bedtime)  -- Indication: For per PMD    Colace 100 mg oral capsule  -- 1 cap(s) by mouth 2 times a day  -- Indication: For per PMD

## 2018-07-12 ENCOUNTER — LABORATORY RESULT (OUTPATIENT)
Age: 58
End: 2018-07-12

## 2018-07-12 ENCOUNTER — APPOINTMENT (OUTPATIENT)
Dept: INFUSION THERAPY | Facility: CLINIC | Age: 58
End: 2018-07-12

## 2018-07-12 ENCOUNTER — RESULT REVIEW (OUTPATIENT)
Age: 58
End: 2018-07-12

## 2018-07-12 ENCOUNTER — APPOINTMENT (OUTPATIENT)
Dept: HEMATOLOGY ONCOLOGY | Facility: CLINIC | Age: 58
End: 2018-07-12

## 2018-07-12 LAB
BASOPHILS # BLD AUTO: 0.1 K/UL — SIGNIFICANT CHANGE UP (ref 0–0.2)
BASOPHILS NFR BLD AUTO: 1 % — SIGNIFICANT CHANGE UP (ref 0–2)
EOSINOPHIL # BLD AUTO: 0.4 K/UL — SIGNIFICANT CHANGE UP (ref 0–0.5)
EOSINOPHIL NFR BLD AUTO: 5 % — SIGNIFICANT CHANGE UP (ref 0–6)
FUNGUS SPEC QL CULT: SIGNIFICANT CHANGE UP
HCT VFR BLD CALC: 41.5 % — SIGNIFICANT CHANGE UP (ref 39–50)
HGB BLD-MCNC: 14.1 G/DL — SIGNIFICANT CHANGE UP (ref 13–17)
LYMPHOCYTES # BLD AUTO: 2.1 K/UL — SIGNIFICANT CHANGE UP (ref 1–3.3)
LYMPHOCYTES # BLD AUTO: 23.7 % — SIGNIFICANT CHANGE UP (ref 13–44)
MCHC RBC-ENTMCNC: 30.5 PG — SIGNIFICANT CHANGE UP (ref 27–34)
MCHC RBC-ENTMCNC: 34 GM/DL — SIGNIFICANT CHANGE UP (ref 32–36)
MCV RBC AUTO: 89.7 FL — SIGNIFICANT CHANGE UP (ref 80–100)
MONOCYTES # BLD AUTO: 1 K/UL — HIGH (ref 0–0.9)
MONOCYTES NFR BLD AUTO: 11.4 % — SIGNIFICANT CHANGE UP (ref 2–14)
NEUTROPHILS # BLD AUTO: 5.1 K/UL — SIGNIFICANT CHANGE UP (ref 1.8–7.4)
NEUTROPHILS NFR BLD AUTO: 58.9 % — SIGNIFICANT CHANGE UP (ref 43–77)
PLATELET # BLD AUTO: 287 K/UL — SIGNIFICANT CHANGE UP (ref 150–400)
RBC # BLD: 4.63 M/UL — SIGNIFICANT CHANGE UP (ref 4.2–5.8)
RBC # FLD: 11.4 % — SIGNIFICANT CHANGE UP (ref 10.3–14.5)
WBC # BLD: 8.7 K/UL — SIGNIFICANT CHANGE UP (ref 3.8–10.5)
WBC # FLD AUTO: 8.7 K/UL — SIGNIFICANT CHANGE UP (ref 3.8–10.5)

## 2018-07-13 DIAGNOSIS — C31.0 MALIGNANT NEOPLASM OF MAXILLARY SINUS: ICD-10-CM

## 2018-07-13 DIAGNOSIS — R11.2 NAUSEA WITH VOMITING, UNSPECIFIED: ICD-10-CM

## 2018-07-13 DIAGNOSIS — C76.0 MALIGNANT NEOPLASM OF HEAD, FACE AND NECK: ICD-10-CM

## 2018-07-13 DIAGNOSIS — Z51.11 ENCOUNTER FOR ANTINEOPLASTIC CHEMOTHERAPY: ICD-10-CM

## 2018-07-13 DIAGNOSIS — E86.0 DEHYDRATION: ICD-10-CM

## 2018-07-16 ENCOUNTER — RESULT REVIEW (OUTPATIENT)
Age: 58
End: 2018-07-16

## 2018-07-16 ENCOUNTER — APPOINTMENT (OUTPATIENT)
Dept: INFUSION THERAPY | Facility: CLINIC | Age: 58
End: 2018-07-16

## 2018-07-16 DIAGNOSIS — E87.6 HYPOKALEMIA: ICD-10-CM

## 2018-07-16 LAB
BASOPHILS # BLD AUTO: 0.1 K/UL — SIGNIFICANT CHANGE UP (ref 0–0.2)
BASOPHILS NFR BLD AUTO: 1.1 % — SIGNIFICANT CHANGE UP (ref 0–2)
BUN SERPL-MCNC: 17 MG/DL — SIGNIFICANT CHANGE UP (ref 7–23)
CA-I BLDA-SCNC: 1.07 MMOL/L — LOW (ref 1.12–1.3)
CHLORIDE SERPL-SCNC: 98 MMOL/L — SIGNIFICANT CHANGE UP (ref 96–108)
CO2 SERPL-SCNC: 26 MMOL/L — SIGNIFICANT CHANGE UP (ref 22–31)
CREAT SERPL-MCNC: 0.8 MG/DL — SIGNIFICANT CHANGE UP (ref 0.5–1.3)
EOSINOPHIL # BLD AUTO: 0.1 K/UL — SIGNIFICANT CHANGE UP (ref 0–0.5)
EOSINOPHIL NFR BLD AUTO: 1.9 % — SIGNIFICANT CHANGE UP (ref 0–6)
GLUCOSE SERPL-MCNC: 122 MG/DL — HIGH (ref 70–99)
HCT VFR BLD CALC: 39.6 % — SIGNIFICANT CHANGE UP (ref 39–50)
HGB BLD-MCNC: 13.7 G/DL — SIGNIFICANT CHANGE UP (ref 13–17)
LYMPHOCYTES # BLD AUTO: 0.9 K/UL — LOW (ref 1–3.3)
LYMPHOCYTES # BLD AUTO: 12.6 % — LOW (ref 13–44)
MCHC RBC-ENTMCNC: 31 PG — SIGNIFICANT CHANGE UP (ref 27–34)
MCHC RBC-ENTMCNC: 34.6 GM/DL — SIGNIFICANT CHANGE UP (ref 32–36)
MCV RBC AUTO: 89.5 FL — SIGNIFICANT CHANGE UP (ref 80–100)
MONOCYTES # BLD AUTO: 0.1 K/UL — SIGNIFICANT CHANGE UP (ref 0–0.9)
MONOCYTES NFR BLD AUTO: 1.2 % — LOW (ref 2–14)
NEUTROPHILS # BLD AUTO: 5.9 K/UL — SIGNIFICANT CHANGE UP (ref 1.8–7.4)
NEUTROPHILS NFR BLD AUTO: 83.2 % — HIGH (ref 43–77)
PLATELET # BLD AUTO: 214 K/UL — SIGNIFICANT CHANGE UP (ref 150–400)
POTASSIUM SERPL-MCNC: 3 MMOL/L — LOW (ref 3.5–5.3)
POTASSIUM SERPL-SCNC: 3 MMOL/L — LOW (ref 3.5–5.3)
RBC # BLD: 4.42 M/UL — SIGNIFICANT CHANGE UP (ref 4.2–5.8)
RBC # FLD: 11.5 % — SIGNIFICANT CHANGE UP (ref 10.3–14.5)
SODIUM SERPL-SCNC: 136 MMOL/L — SIGNIFICANT CHANGE UP (ref 135–145)
WBC # BLD: 7.1 K/UL — SIGNIFICANT CHANGE UP (ref 3.8–10.5)
WBC # FLD AUTO: 7.1 K/UL — SIGNIFICANT CHANGE UP (ref 3.8–10.5)

## 2018-07-16 RX ORDER — POTASSIUM CHLORIDE 1500 MG/1
20 TABLET, EXTENDED RELEASE ORAL
Qty: 20 | Refills: 1 | Status: ACTIVE | COMMUNITY
Start: 2018-07-16 | End: 1900-01-01

## 2018-07-17 DIAGNOSIS — Z51.89 ENCOUNTER FOR OTHER SPECIFIED AFTERCARE: ICD-10-CM

## 2018-07-19 ENCOUNTER — TRANSCRIPTION ENCOUNTER (OUTPATIENT)
Age: 58
End: 2018-07-19

## 2018-07-19 ENCOUNTER — INPATIENT (INPATIENT)
Facility: HOSPITAL | Age: 58
LOS: 11 days | Discharge: HOME | DRG: 146 | End: 2018-07-31
Attending: INTERNAL MEDICINE | Admitting: HOSPITALIST
Payer: MEDICARE

## 2018-07-19 ENCOUNTER — APPOINTMENT (OUTPATIENT)
Dept: HEMATOLOGY ONCOLOGY | Facility: CLINIC | Age: 58
End: 2018-07-19
Payer: MEDICARE

## 2018-07-19 VITALS
TEMPERATURE: 97.9 F | HEART RATE: 123 BPM | HEIGHT: 71 IN | SYSTOLIC BLOOD PRESSURE: 112 MMHG | WEIGHT: 180.56 LBS | DIASTOLIC BLOOD PRESSURE: 77 MMHG | BODY MASS INDEX: 25.28 KG/M2 | OXYGEN SATURATION: 96 %

## 2018-07-19 VITALS — WEIGHT: 179.9 LBS

## 2018-07-19 DIAGNOSIS — Z90.2 ACQUIRED ABSENCE OF LUNG [PART OF]: Chronic | ICD-10-CM

## 2018-07-19 DIAGNOSIS — Z98.890 OTHER SPECIFIED POSTPROCEDURAL STATES: Chronic | ICD-10-CM

## 2018-07-19 DIAGNOSIS — Z98.61 CORONARY ANGIOPLASTY STATUS: Chronic | ICD-10-CM

## 2018-07-19 DIAGNOSIS — H26.9 UNSPECIFIED CATARACT: Chronic | ICD-10-CM

## 2018-07-19 LAB
APTT BLD: 35.7 SEC — SIGNIFICANT CHANGE UP (ref 27.5–37.4)
INR BLD: 1.26 RATIO — HIGH (ref 0.88–1.16)
LACTATE BLDV-MCNC: 1.4 MMOL/L — SIGNIFICANT CHANGE UP (ref 0.5–2)
PROTHROM AB SERPL-ACNC: 13.9 SEC — HIGH (ref 9.8–12.7)

## 2018-07-19 PROCEDURE — 71045 X-RAY EXAM CHEST 1 VIEW: CPT | Mod: 26

## 2018-07-19 PROCEDURE — 99215 OFFICE O/P EST HI 40 MIN: CPT

## 2018-07-19 PROCEDURE — 99285 EMERGENCY DEPT VISIT HI MDM: CPT

## 2018-07-19 RX ORDER — HYDROMORPHONE HYDROCHLORIDE 2 MG/ML
1 INJECTION INTRAMUSCULAR; INTRAVENOUS; SUBCUTANEOUS ONCE
Qty: 0 | Refills: 0 | Status: DISCONTINUED | OUTPATIENT
Start: 2018-07-19 | End: 2018-07-19

## 2018-07-19 RX ORDER — HYDROMORPHONE HYDROCHLORIDE 2 MG/ML
4 INJECTION INTRAMUSCULAR; INTRAVENOUS; SUBCUTANEOUS ONCE
Qty: 0 | Refills: 0 | Status: DISCONTINUED | OUTPATIENT
Start: 2018-07-19 | End: 2018-07-19

## 2018-07-19 RX ORDER — GUAR GUM
POWDER (GRAM) ORAL
Qty: 90 | Refills: 0 | Status: ACTIVE | COMMUNITY
Start: 2018-07-19 | End: 1900-01-01

## 2018-07-19 RX ORDER — SODIUM CHLORIDE 9 MG/ML
1000 INJECTION INTRAMUSCULAR; INTRAVENOUS; SUBCUTANEOUS
Qty: 0 | Refills: 0 | Status: COMPLETED | OUTPATIENT
Start: 2018-07-19 | End: 2018-07-19

## 2018-07-19 RX ADMIN — HYDROMORPHONE HYDROCHLORIDE 4 MILLIGRAM(S): 2 INJECTION INTRAMUSCULAR; INTRAVENOUS; SUBCUTANEOUS at 23:15

## 2018-07-19 RX ADMIN — HYDROMORPHONE HYDROCHLORIDE 1 MILLIGRAM(S): 2 INJECTION INTRAMUSCULAR; INTRAVENOUS; SUBCUTANEOUS at 23:15

## 2018-07-19 RX ADMIN — SODIUM CHLORIDE 500 MILLILITER(S): 9 INJECTION INTRAMUSCULAR; INTRAVENOUS; SUBCUTANEOUS at 23:15

## 2018-07-19 NOTE — ED PROVIDER NOTE - PROGRESS NOTE DETAILS
pt brought in 2 prescribed from oncology.  requesting possible peg placement.  oncologist prescribed not to give pt contrast. Pt brought in 2 prescriptions from oncology.  Requesting possible peg placement.  Oncologist prescribed not to give pt contrast.

## 2018-07-19 NOTE — ED ADULT NURSE NOTE - OBJECTIVE STATEMENT
Assumed pt care at 2300.  Pt a&ox3 c/o shortness of breath and sent by cardiologist for r/o renal failure and possible feeding tube placement.  Pt has hx of lung removal and has own bipap machine at bedside c/o 9/10 facial pain r/t tumor, no acute s/s of respiratory distress noted or reported at this time, will continue to monitor

## 2018-07-19 NOTE — ED PROVIDER NOTE - PHYSICAL EXAMINATION
Constitutional : Appears comfortably, talking in short sentences  appears fatigued and weak.  Head :NC AT , no asymmetrical facial swelling, face appears flushed.  had dried blood on R nostril. complains of facial tenderness to palaption.  Eyes :eomi, no swelling  Mouth :mm dry,  Neck : supple, trachea in midline  Chest :Kieran air entry, symm chest expansion, no distress, R side Mediport, with no surrounding erythema or open wound.  Heart :S1 S2 distant  Abdomen :abd soft, non tender  Musc/Skel :ext no swelling, no deformity, no spine tenderness, distal pulses present  Neuro  :AAO 3 no focal deficits

## 2018-07-19 NOTE — ED PROVIDER NOTE - OBJECTIVE STATEMENT
58 y/o M pt with PMHx CAD, CHF, HTN, HLD, CA, COPD, presents to the ED with wife c/o SOB.  As per wife, pt's chemo doctor states these symptoms may be related to chemotherapy complications and recommended he come into the ED.    As per wife, pt was diagnosed with CA one month ago and began chemotherapy one week ago.  Pt was instructed to drink 2L of water daily, which he as not been compliant with.  Former smoker.  Denies .  No further acute complaints at this time.    PMD: Dr. Dr. Nielsen  Chemo: Dr. Geiger 56 y/o M pt with PMHx CAD, CHF, HTN, HLD, CA, COPD, presents to the ED with wife c/o gradual onset SOB for the past several days.  As per wife, pt's chemo doctor states these symptoms may be related to dehydration and recommended he come into the ED to have his kidney function checked.  Pt is also complaining of facial pain for the past several days.    As per wife, pt was diagnosed with neck and head CA one month ago and began chemotherapy one week ago.  The CA was discovered while the patient was being evaluated for having multiple nose bleed prior to his diagnosis.  Pt was instructed to drink 2L of water daily since beginning chemotherapy, which he as not been compliant with.  Wife notes recent weight loss.  Being evaluated for peg placement.  Former smoker.  Denies CP, fevers, chills.  No further acute complaints at this time.    PMD: Dr. Dr. Nielsen  Chemo: Dr. Geiger 58 y/o M pt with PMHx CAD, CHF, HTN, HLD, CA, COPD, presents to the ED with wife c/o gradual onset SOB for the past several days.  As per wife, pt's chemo doctor states these symptoms may be related to dehydration and recommended he come into the ED to have his kidney function checked.  Pt is also complaining of facial pain for the past several days.    As per wife, pt was diagnosed with neck and head CA one month ago and began chemotherapy one week ago.  The CA was discovered while the patient was being evaluated for having multiple nose bleed prior to his diagnosis.  Pt was instructed to drink 2L of water daily since beginning chemotherapy, which he as not been compliant with.  Wife notes recent weight loss.  Being evaluated for peg placement.  Former smoker.  Denies CP, fevers, chills.  No further acute complaints at this time.  ENT: Dr. Chambers  Chemo: Dr. Geiger

## 2018-07-19 NOTE — ED ADULT TRIAGE NOTE - CHIEF COMPLAINT QUOTE
sent by MD to r/o renal failure, patient has been SOB for 3 days, on chemotherapy for paranasalsinus CA

## 2018-07-19 NOTE — ED PROVIDER NOTE - MEDICAL DECISION MAKING DETAILS
58 y/o M pt with head and neck CA on chemotherapy sent for hydration and peg placement.  Plan to admit.

## 2018-07-19 NOTE — ED PROVIDER NOTE - NS ED ROS FT
+ weight change, no fever or chills  no recent travel, no recent abox, no sick contacts  + recent change in medications (chemotherapy)  no rash, no bruises  no visual changes no eye discharge  no cough cold or congestion,   + sob, no chest pain  no orthopnea, no pnd  no abd pain, no n/v/d  no hematuria, no change in urinary habits  no joint pain, no deformity  no headache, no paresthesia + weight change, no fever or chills  no recent travel, no recent abox, no sick contacts  + recent change in medications (chemotherapy)  no rash, no bruises  no visual changes no eye discharge  no cough cold or congestion,   + sob, no chest pain  no orthopnea, no pnd  no abd pain, no n/v/d  no hematuria, no change in urinary habits  no joint pain, no deformity, +facial pain  no headache, no paresthesia

## 2018-07-20 DIAGNOSIS — R13.12 DYSPHAGIA, OROPHARYNGEAL PHASE: ICD-10-CM

## 2018-07-20 DIAGNOSIS — D49.1 NEOPLASM OF UNSPECIFIED BEHAVIOR OF RESPIRATORY SYSTEM: ICD-10-CM

## 2018-07-20 DIAGNOSIS — F31.9 BIPOLAR DISORDER, UNSPECIFIED: ICD-10-CM

## 2018-07-20 DIAGNOSIS — D50.0 IRON DEFICIENCY ANEMIA SECONDARY TO BLOOD LOSS (CHRONIC): ICD-10-CM

## 2018-07-20 DIAGNOSIS — E86.0 DEHYDRATION: ICD-10-CM

## 2018-07-20 DIAGNOSIS — C31.0 MALIGNANT NEOPLASM OF MAXILLARY SINUS: ICD-10-CM

## 2018-07-20 LAB
ANION GAP SERPL CALC-SCNC: 12 MMOL/L — SIGNIFICANT CHANGE UP (ref 5–17)
ANISOCYTOSIS BLD QL: SLIGHT — SIGNIFICANT CHANGE UP
BUN SERPL-MCNC: 17 MG/DL — SIGNIFICANT CHANGE UP (ref 8–20)
CALCIUM SERPL-MCNC: 8.4 MG/DL — LOW (ref 8.6–10.2)
CHLORIDE SERPL-SCNC: 100 MMOL/L — SIGNIFICANT CHANGE UP (ref 98–107)
CK SERPL-CCNC: 23 U/L — LOW (ref 30–200)
CO2 SERPL-SCNC: 24 MMOL/L — SIGNIFICANT CHANGE UP (ref 22–29)
CREAT SERPL-MCNC: 1.03 MG/DL — SIGNIFICANT CHANGE UP (ref 0.5–1.3)
EOSINOPHIL NFR BLD AUTO: 1 % — SIGNIFICANT CHANGE UP (ref 0–6)
GLUCOSE SERPL-MCNC: 218 MG/DL — HIGH (ref 70–115)
HCT VFR BLD CALC: 27.4 % — LOW (ref 42–52)
HCT VFR BLD CALC: 31.1 % — LOW (ref 42–52)
HGB BLD-MCNC: 10.1 G/DL — LOW (ref 14–18)
HGB BLD-MCNC: 8.8 G/DL — LOW (ref 14–18)
LDH SERPL L TO P-CCNC: 157 U/L — SIGNIFICANT CHANGE UP (ref 98–192)
LYMPHOCYTES # BLD AUTO: 27 % — SIGNIFICANT CHANGE UP (ref 20–55)
MAGNESIUM SERPL-MCNC: 1.7 MG/DL — SIGNIFICANT CHANGE UP (ref 1.6–2.6)
MAGNESIUM SERPL-MCNC: 1.7 MG/DL — SIGNIFICANT CHANGE UP (ref 1.6–2.6)
MCHC RBC-ENTMCNC: 28.5 PG — SIGNIFICANT CHANGE UP (ref 27–31)
MCHC RBC-ENTMCNC: 29.7 PG — SIGNIFICANT CHANGE UP (ref 27–31)
MCHC RBC-ENTMCNC: 32.1 G/DL — SIGNIFICANT CHANGE UP (ref 32–36)
MCHC RBC-ENTMCNC: 32.5 G/DL — SIGNIFICANT CHANGE UP (ref 32–36)
MCV RBC AUTO: 87.9 FL — SIGNIFICANT CHANGE UP (ref 80–94)
MCV RBC AUTO: 92.6 FL — SIGNIFICANT CHANGE UP (ref 80–94)
MONOCYTES NFR BLD AUTO: 4 % — SIGNIFICANT CHANGE UP (ref 3–10)
NEUTROPHILS NFR BLD AUTO: 65 % — SIGNIFICANT CHANGE UP (ref 37–73)
NEUTS BAND # BLD: 1 % — SIGNIFICANT CHANGE UP (ref 0–8)
NRBC # BLD: 1 /100 — HIGH (ref 0–0)
OVALOCYTES BLD QL SMEAR: SLIGHT — SIGNIFICANT CHANGE UP
PHOSPHATE SERPL-MCNC: 2.9 MG/DL — SIGNIFICANT CHANGE UP (ref 2.4–4.7)
PLAT MORPH BLD: NORMAL — SIGNIFICANT CHANGE UP
PLATELET # BLD AUTO: 240 K/UL — SIGNIFICANT CHANGE UP (ref 150–400)
PLATELET # BLD AUTO: 295 K/UL — SIGNIFICANT CHANGE UP (ref 150–400)
POIKILOCYTOSIS BLD QL AUTO: SLIGHT — SIGNIFICANT CHANGE UP
POTASSIUM SERPL-MCNC: 3.6 MMOL/L — SIGNIFICANT CHANGE UP (ref 3.5–5.3)
POTASSIUM SERPL-SCNC: 3.6 MMOL/L — SIGNIFICANT CHANGE UP (ref 3.5–5.3)
RBC # BLD: 2.96 M/UL — LOW (ref 4.6–6.2)
RBC # BLD: 3.54 M/UL — LOW (ref 4.6–6.2)
RBC # FLD: 12.8 % — SIGNIFICANT CHANGE UP (ref 11–15.6)
RBC # FLD: 12.9 % — SIGNIFICANT CHANGE UP (ref 11–15.6)
RBC BLD AUTO: ABNORMAL
SODIUM SERPL-SCNC: 136 MMOL/L — SIGNIFICANT CHANGE UP (ref 135–145)
TROPONIN T SERPL-MCNC: <0.01 NG/ML — SIGNIFICANT CHANGE UP (ref 0–0.06)
TSH SERPL-MCNC: 1.56 UIU/ML — SIGNIFICANT CHANGE UP (ref 0.27–4.2)
VARIANT LYMPHS # BLD: 2 % — SIGNIFICANT CHANGE UP (ref 0–6)
WBC # BLD: 6.8 K/UL — SIGNIFICANT CHANGE UP (ref 4.8–10.8)
WBC # BLD: 7.2 K/UL — SIGNIFICANT CHANGE UP (ref 4.8–10.8)
WBC # FLD AUTO: 6.8 K/UL — SIGNIFICANT CHANGE UP (ref 4.8–10.8)
WBC # FLD AUTO: 7.2 K/UL — SIGNIFICANT CHANGE UP (ref 4.8–10.8)

## 2018-07-20 PROCEDURE — 93010 ELECTROCARDIOGRAM REPORT: CPT

## 2018-07-20 PROCEDURE — 99223 1ST HOSP IP/OBS HIGH 75: CPT

## 2018-07-20 PROCEDURE — 99232 SBSQ HOSP IP/OBS MODERATE 35: CPT

## 2018-07-20 PROCEDURE — 99223 1ST HOSP IP/OBS HIGH 75: CPT | Mod: 25

## 2018-07-20 PROCEDURE — 99222 1ST HOSP IP/OBS MODERATE 55: CPT

## 2018-07-20 PROCEDURE — 12345: CPT

## 2018-07-20 PROCEDURE — 43246 EGD PLACE GASTROSTOMY TUBE: CPT

## 2018-07-20 RX ORDER — CEFAZOLIN SODIUM 1 G
2000 VIAL (EA) INJECTION ONCE
Qty: 0 | Refills: 0 | Status: COMPLETED | OUTPATIENT
Start: 2018-07-20 | End: 2018-07-20

## 2018-07-20 RX ORDER — MORPHINE SULFATE 50 MG/1
30 CAPSULE, EXTENDED RELEASE ORAL THREE TIMES A DAY
Qty: 0 | Refills: 0 | Status: DISCONTINUED | OUTPATIENT
Start: 2018-07-20 | End: 2018-07-27

## 2018-07-20 RX ORDER — SODIUM CHLORIDE 9 MG/ML
1000 INJECTION, SOLUTION INTRAVENOUS
Qty: 0 | Refills: 0 | Status: DISCONTINUED | OUTPATIENT
Start: 2018-07-20 | End: 2018-07-21

## 2018-07-20 RX ORDER — CEFAZOLIN SODIUM 1 G
VIAL (EA) INJECTION
Qty: 0 | Refills: 0 | Status: DISCONTINUED | OUTPATIENT
Start: 2018-07-20 | End: 2018-07-21

## 2018-07-20 RX ORDER — OLANZAPINE 15 MG/1
5 TABLET, FILM COATED ORAL ONCE
Qty: 0 | Refills: 0 | Status: COMPLETED | OUTPATIENT
Start: 2018-07-20 | End: 2018-07-20

## 2018-07-20 RX ORDER — MECLIZINE HCL 12.5 MG
25 TABLET ORAL THREE TIMES A DAY
Qty: 0 | Refills: 0 | Status: DISCONTINUED | OUTPATIENT
Start: 2018-07-20 | End: 2018-07-31

## 2018-07-20 RX ORDER — ASPIRIN/CALCIUM CARB/MAGNESIUM 324 MG
81 TABLET ORAL DAILY
Qty: 0 | Refills: 0 | Status: DISCONTINUED | OUTPATIENT
Start: 2018-07-20 | End: 2018-07-20

## 2018-07-20 RX ORDER — PANTOPRAZOLE SODIUM 20 MG/1
40 TABLET, DELAYED RELEASE ORAL
Qty: 0 | Refills: 0 | Status: DISCONTINUED | OUTPATIENT
Start: 2018-07-20 | End: 2018-07-31

## 2018-07-20 RX ORDER — HYDROMORPHONE HYDROCHLORIDE 2 MG/ML
1 INJECTION INTRAMUSCULAR; INTRAVENOUS; SUBCUTANEOUS EVERY 4 HOURS
Qty: 0 | Refills: 0 | Status: DISCONTINUED | OUTPATIENT
Start: 2018-07-20 | End: 2018-07-27

## 2018-07-20 RX ORDER — HYDROMORPHONE HYDROCHLORIDE 2 MG/ML
4 INJECTION INTRAMUSCULAR; INTRAVENOUS; SUBCUTANEOUS EVERY 4 HOURS
Qty: 0 | Refills: 0 | Status: DISCONTINUED | OUTPATIENT
Start: 2018-07-20 | End: 2018-07-20

## 2018-07-20 RX ORDER — ALPRAZOLAM 0.25 MG
0.5 TABLET ORAL AT BEDTIME
Qty: 0 | Refills: 0 | Status: DISCONTINUED | OUTPATIENT
Start: 2018-07-20 | End: 2018-07-25

## 2018-07-20 RX ORDER — DEXTROAMPHETAMINE SACCHARATE, AMPHETAMINE ASPARTATE, DEXTROAMPHETAMINE SULFATE AND AMPHETAMINE SULFATE 1.875; 1.875; 1.875; 1.875 MG/1; MG/1; MG/1; MG/1
30 TABLET ORAL
Qty: 0 | Refills: 0 | Status: DISCONTINUED | OUTPATIENT
Start: 2018-07-20 | End: 2018-07-27

## 2018-07-20 RX ORDER — CEFAZOLIN SODIUM 1 G
2000 VIAL (EA) INJECTION EVERY 8 HOURS
Qty: 0 | Refills: 0 | Status: DISCONTINUED | OUTPATIENT
Start: 2018-07-20 | End: 2018-07-21

## 2018-07-20 RX ORDER — OLANZAPINE 15 MG/1
5 TABLET, FILM COATED ORAL DAILY
Qty: 0 | Refills: 0 | Status: DISCONTINUED | OUTPATIENT
Start: 2018-07-20 | End: 2018-07-31

## 2018-07-20 RX ORDER — CARVEDILOL PHOSPHATE 80 MG/1
6.25 CAPSULE, EXTENDED RELEASE ORAL EVERY 12 HOURS
Qty: 0 | Refills: 0 | Status: DISCONTINUED | OUTPATIENT
Start: 2018-07-20 | End: 2018-07-31

## 2018-07-20 RX ORDER — LORATADINE 10 MG/1
10 TABLET ORAL DAILY
Qty: 0 | Refills: 0 | Status: DISCONTINUED | OUTPATIENT
Start: 2018-07-20 | End: 2018-07-31

## 2018-07-20 RX ORDER — ALPRAZOLAM 0.25 MG
0.5 TABLET ORAL ONCE
Qty: 0 | Refills: 0 | Status: DISCONTINUED | OUTPATIENT
Start: 2018-07-20 | End: 2018-07-20

## 2018-07-20 RX ADMIN — Medication 0.5 MILLIGRAM(S): at 01:27

## 2018-07-20 RX ADMIN — SODIUM CHLORIDE 75 MILLILITER(S): 9 INJECTION, SOLUTION INTRAVENOUS at 10:59

## 2018-07-20 RX ADMIN — HYDROMORPHONE HYDROCHLORIDE 1 MILLIGRAM(S): 2 INJECTION INTRAMUSCULAR; INTRAVENOUS; SUBCUTANEOUS at 01:16

## 2018-07-20 RX ADMIN — HYDROMORPHONE HYDROCHLORIDE 1 MILLIGRAM(S): 2 INJECTION INTRAMUSCULAR; INTRAVENOUS; SUBCUTANEOUS at 17:37

## 2018-07-20 RX ADMIN — OLANZAPINE 5 MILLIGRAM(S): 15 TABLET, FILM COATED ORAL at 19:59

## 2018-07-20 RX ADMIN — SODIUM CHLORIDE 75 MILLILITER(S): 9 INJECTION, SOLUTION INTRAVENOUS at 20:14

## 2018-07-20 RX ADMIN — Medication 0.5 MILLIGRAM(S): at 20:01

## 2018-07-20 RX ADMIN — HYDROMORPHONE HYDROCHLORIDE 4 MILLIGRAM(S): 2 INJECTION INTRAMUSCULAR; INTRAVENOUS; SUBCUTANEOUS at 01:15

## 2018-07-20 RX ADMIN — HYDROMORPHONE HYDROCHLORIDE 1 MILLIGRAM(S): 2 INJECTION INTRAMUSCULAR; INTRAVENOUS; SUBCUTANEOUS at 10:53

## 2018-07-20 RX ADMIN — Medication 100 MILLIGRAM(S): at 10:59

## 2018-07-20 RX ADMIN — Medication 100 MILLIGRAM(S): at 19:59

## 2018-07-20 RX ADMIN — PANTOPRAZOLE SODIUM 40 MILLIGRAM(S): 20 TABLET, DELAYED RELEASE ORAL at 19:59

## 2018-07-20 RX ADMIN — MORPHINE SULFATE 30 MILLIGRAM(S): 50 CAPSULE, EXTENDED RELEASE ORAL at 20:01

## 2018-07-20 RX ADMIN — HYDROMORPHONE HYDROCHLORIDE 1 MILLIGRAM(S): 2 INJECTION INTRAMUSCULAR; INTRAVENOUS; SUBCUTANEOUS at 22:09

## 2018-07-20 RX ADMIN — CARVEDILOL PHOSPHATE 6.25 MILLIGRAM(S): 80 CAPSULE, EXTENDED RELEASE ORAL at 20:00

## 2018-07-20 RX ADMIN — MORPHINE SULFATE 30 MILLIGRAM(S): 50 CAPSULE, EXTENDED RELEASE ORAL at 19:59

## 2018-07-20 RX ADMIN — OLANZAPINE 5 MILLIGRAM(S): 15 TABLET, FILM COATED ORAL at 01:27

## 2018-07-20 RX ADMIN — Medication 75 MILLIGRAM(S): at 19:59

## 2018-07-20 NOTE — CONSULT NOTE ADULT - ASSESSMENT
A/P: : Patient is a 58 y/o Male with a PMHx of CHF 2014 HFrEF 43% s/p 2 Coronary Angio with minor luminal irregularities only, COPD/Emphysema, Bipolar Disorder, YANET s/p  L  VATS trisegmentectomy partial decortication, neoplasm of maxillary sinus, LORNA on CPAP who presented to the ED due to dehydration and for PEG evaluation. A/P: : Patient is a 58 y/o Male with a PMHx of CHF 2014 HFrEF 43% s/p 2 Coronary Angio with minor luminal irregularities only, COPD/Emphysema, Bipolar Disorder, YANET s/p  L  VATS trisegmentectomy partial decortication, neoplasm of maxillary sinus, LORNA on CPAP who presented to the ED due to dehydration and for PEG evaluation.     1. Fabi-operative Cardiac Risk Stratification.   - Intermediate risk for procedure to medical Hx.   - No signs of ACS/UA, troponins negative.   -     2. CHF  - TTE 07/25/17 with eccentric LVH (dilated left ventricle with normal relative thickness).   - Coronary Angio 07/24/17 with no significant stenosis only minor luminal irregularities. A/P: : Patient is a 56 y/o Male with a PMHx of CHF 2014 HFrEF 43% s/p 2 Coronary Angio with minor luminal irregularities only, COPD/Emphysema, Bipolar Disorder, YANET s/p  L  VATS trisegmentectomy partial decortication, neoplasm of maxillary sinus, LORNA on CPAP who presented to the ED due to dehydration and for PEG evaluation.     1. Fabi-operative Cardiac Risk Stratification.   - Intermediate risk for procedure to medical hx for low risk procedure.   - Risk is <1% at this time.  - No signs of ACS/UA, troponins negative.   - Due to lung exam and COPD/Emphysema, should obtain Pulmonology clearance prior to procedure.     2. CHF  - TTE 07/25/17 with eccentric LVH (dilated left ventricle with normal relative thickness).   - Coronary Angio 07/24/17 with no significant stenosis only minor luminal irregularities.   - Continue current medication management.

## 2018-07-20 NOTE — CONSULT NOTE ADULT - PROBLEM SELECTOR RECOMMENDATION 9
Secondary to the above cancer. Pt. unable to eat or drink. Keep NPO for PEG tube placement later today. IV antibiotics ordered. Pt. and his wife are aware and agreeable to PEG tube placement. Secondary to the above cancer. Pt. unable to eat or drink. Keep NPO for PEG tube placement later today. IV antibiotics ordered. Pt. and his wife are aware and agreeable to PEG tube placement. ASA D/Benji. Need Cardiac clearance for PEG tube placement. Please have Cardiology see and clear pt. this AM so that we can proceed with PEG tube placement later today. Thanks.

## 2018-07-20 NOTE — H&P ADULT - ASSESSMENT
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

## 2018-07-20 NOTE — PROGRESS NOTE ADULT - ASSESSMENT
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  Now admitted with FTT, poor PO intake due to tumor.  Plan is for PEG later today.  Follow up with Dr. Loza post discharge. 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  Now admitted with FTT, poor PO intake due to tumor.  Plan is for PEG later today.  Transfuse if hemoglobin drops further. Follow up with Dr. Loza post discharge.

## 2018-07-20 NOTE — CHART NOTE - NSCHARTNOTEFT_GEN_A_CORE
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 He was referred to the ER  for evaluation for PEG tue placement decreased PO intake due to disease x 2 weeks. Patient seen and examined earlier this morning. Complaining of right maxillary pain but otherwise denied chest pain or SOB.    Vitals stable. Physical exam unchanged from H&P earlier this morning. Labs revealed.    Cardiac clearance obtained for PEG  Patient s/p PEG placement this afternoon  Started on prophylactic antibiotics per GI   Nutrition evaluation for tube feeds - to be initiated on 7/21 (wait 24 hours after PEG insertion to start tube feeds)  Judicious hydration and accuchecks q6hrs while NPO  Patient saturating well on RA; CXR with linear left lung scarring  Nocturnal CPAP  SW consult to help facilitate tube feeds upon discharge  H/H trending down, likely due to epistaxis from right nare; now resolved   ASA held due to PEG insertion; resume in 24 hours if no bleeding  Can start PO meds via PEG in 4-6 hours after PEG insertion  Continue analgesia PRN (changed dilaudid PO to IV for now, but resume dilaudid 4 mg PO q4 hrs PRN tomorrow)  Outpatient pulmonary follow up    Case discussed with patient, wife, Dr. Keys, Dr. Galvin and Dr. 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 He was referred to the ER  for evaluation for PEG tue placement decreased PO intake due to disease x 2 weeks. Patient seen and examined earlier this morning. Complaining of right maxillary pain but otherwise denied chest pain or SOB.    Vitals stable. Physical exam unchanged from H&P earlier this morning. Labs revealed.    Cardiac clearance obtained for PEG  Patient s/p PEG placement this afternoon  Started on prophylactic antibiotics per GI   Nutrition evaluation for tube feeds - to be initiated on 7/21 (wait 24 hours after PEG insertion to start tube feeds)  Judicious hydration and accuchecks q6hrs while NPO  Patient saturating well on RA; CXR with linear left lung scarring  SW consult to help facilitate tube feeds upon discharge  H/H trending down, likely due to epistaxis from right nare; now resolved   ASA held due to PEG insertion; resume in 24 hours if no bleeding  Can start PO meds via PEG in 4-6 hours after PEG insertion  Continue analgesia PRN (changed dilaudid PO to IV for now, but resume dilaudid 4 mg PO q4 hrs PRN tomorrow)  Hold CPAP tonight given recent epistaxis and drop in H/H  Place patient on   Outpatient pulmonary follow up  Type and Screen in AM, transfuse if Hb < 8    Case discussed with patient, wife, Dr. Keys, Dr. Galvin and Dr. Duncan

## 2018-07-20 NOTE — BRIEF OPERATIVE NOTE - PROCEDURE
<<-----Click on this checkbox to enter Procedure EGD, with gastrostomy tube insertion  07/20/2018    Active  [id number]

## 2018-07-20 NOTE — PROGRESS NOTE ADULT - SUBJECTIVE AND OBJECTIVE BOX
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 He was referred to the ER  for evaluation for PEG tue placement decreased PO intake due to disease x 2 weeks.  Labs showed Hgb: 10 likely due to bleeding from tumor.       REVIEW OF SYSTEMS:  Constitutional, Eyes, ENT, Cardiovascular, Respiratory, Gastrointestinal, Genitourinary, Musculoskeletal, Integumentary, Neurological, Psychiatric, Endocrine, Heme/Lymph, and Allergic/Immunologic review of systems are otherwise negative except as noted in the HPI.    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- was followed by ID after lung surgery  Neoplasm of maxillary sinus  Bipolar disorder, unspecified  CHF (congestive heart failure): 2014  Diaphragm dysfunction: partial paralysis- now resolved as per wife  ETOH abuse  LORNA on CPAP  Cataract  S/P lobectomy of lung: Sept 2017  H/O endoscopy  H/O colonoscopy  H/O coronary angioplasty: x2      FAMILY HISTORY:  No family history of COPD  Family history of hypertension in mother (Sibling)      SOCIAL HISTORY:    Allergies    hospital socks (Rash)  lisinopril (Anaphylaxis)  statins (Anaphylaxis)    Intolerances        MEDICATIONS  (STANDING):  amphetamine/dextroamphetamine 30 milliGRAM(s) Oral two times a day  carvedilol 6.25 milliGRAM(s) Oral every 12 hours  ceFAZolin   IVPB      ceFAZolin   IVPB 2000 milliGRAM(s) IV Intermittent once  ceFAZolin   IVPB 2000 milliGRAM(s) IV Intermittent every 8 hours  loratadine 10 milliGRAM(s) Oral daily  morphine ER Tablet 30 milliGRAM(s) Oral three times a day  OLANZapine 5 milliGRAM(s) Oral daily  pregabalin 75 milliGRAM(s) Oral two times a day    MEDICATIONS  (PRN):  ALPRAZolam 0.5 milliGRAM(s) Oral at bedtime PRN anxiety  HYDROmorphone   Tablet 4 milliGRAM(s) Oral every 4 hours PRN Severe Pain (7 - 10)  meclizine 25 milliGRAM(s) Oral three times a day PRN Dizziness      Vital Signs Last 24 Hrs  T(C): 36.8 (19 Jul 2018 18:31), Max: 36.8 (19 Jul 2018 18:31)  T(F): 98.2 (19 Jul 2018 18:31), Max: 98.2 (19 Jul 2018 18:31)  HR: 95 (20 Jul 2018 06:44) (95 - 112)  BP: 107/72 (20 Jul 2018 06:44) (103/69 - 126/80)  BP(mean): --  RR: 18 (20 Jul 2018 06:44) (18 - 19)  SpO2: 95% (20 Jul 2018 06:44) (95% - 95%)    PHYSICAL EXAM:    GENERAL: NAD, well-groomed, well-developed  HEAD:  Atraumatic, Normocephalic  NECK: Supple, No JVD, Normal thyroid  NERVOUS SYSTEM:  Alert & Oriented X3, Good concentration;  CHEST/LUNG: Clear to auscultation bilaterally;  HEART: Regular rate and rhythm; No murmurs, rubs, or gallops  EXTREMITIES:  2+ Peripheral Pulses, No clubbing, cyanosis, or edema  SKIN: No rashes or lesions      LABS:                        10.1   7.2   )-----------( 295      ( 19 Jul 2018 23:43 )             31.1     07-19    137  |  99  |  19.0  ----------------------------<  96  3.7   |  22.0  |  1.09    Ca    9.4      19 Jul 2018 23:44  Mg     1.7     07-19    TPro  6.7  /  Alb  3.7  /  TBili  0.7  /  DBili  x   /  AST  36  /  ALT  47<H>  /  AlkPhos  101  07-19    PT/INR - ( 19 Jul 2018 23:43 )   PT: 13.9 sec;   INR: 1.26 ratio         PTT - ( 19 Jul 2018 23:43 )  PTT:35.7 sec REASON FOR CONSULTATION:     HPI:  56 y/o male with 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 He was referred to the ER  for evaluation for PEG tue placement decreased PO intake due to disease x 2 weeks.  Labs showed Hgb: 10 likely due to bleeding from tumor.       REVIEW OF SYSTEMS:  Constitutional, Eyes, ENT, Cardiovascular, Respiratory, Gastrointestinal, Genitourinary, Musculoskeletal, Integumentary, Neurological, Psychiatric, Endocrine, Heme/Lymph, and Allergic/Immunologic review of systems are otherwise negative except as noted in the HPI.    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- was followed by ID after lung surgery  Neoplasm of maxillary sinus  Bipolar disorder, unspecified  CHF (congestive heart failure): 2014  Diaphragm dysfunction: partial paralysis- now resolved as per wife  ETOH abuse  LORNA on CPAP  Cataract  S/P lobectomy of lung: Sept 2017  H/O endoscopy  H/O colonoscopy  H/O coronary angioplasty: x2      FAMILY HISTORY:  No family history of COPD  Family history of hypertension in mother (Sibling)      SOCIAL HISTORY:    Allergies    hospital socks (Rash)  lisinopril (Anaphylaxis)  statins (Anaphylaxis)    Intolerances        MEDICATIONS  (STANDING):  amphetamine/dextroamphetamine 30 milliGRAM(s) Oral two times a day  carvedilol 6.25 milliGRAM(s) Oral every 12 hours  ceFAZolin   IVPB      ceFAZolin   IVPB 2000 milliGRAM(s) IV Intermittent once  ceFAZolin   IVPB 2000 milliGRAM(s) IV Intermittent every 8 hours  loratadine 10 milliGRAM(s) Oral daily  morphine ER Tablet 30 milliGRAM(s) Oral three times a day  OLANZapine 5 milliGRAM(s) Oral daily  pregabalin 75 milliGRAM(s) Oral two times a day    MEDICATIONS  (PRN):  ALPRAZolam 0.5 milliGRAM(s) Oral at bedtime PRN anxiety  HYDROmorphone   Tablet 4 milliGRAM(s) Oral every 4 hours PRN Severe Pain (7 - 10)  meclizine 25 milliGRAM(s) Oral three times a day PRN Dizziness      Vital Signs Last 24 Hrs  T(C): 36.8 (19 Jul 2018 18:31), Max: 36.8 (19 Jul 2018 18:31)  T(F): 98.2 (19 Jul 2018 18:31), Max: 98.2 (19 Jul 2018 18:31)  HR: 95 (20 Jul 2018 06:44) (95 - 112)  BP: 107/72 (20 Jul 2018 06:44) (103/69 - 126/80)  BP(mean): --  RR: 18 (20 Jul 2018 06:44) (18 - 19)  SpO2: 95% (20 Jul 2018 06:44) (95% - 95%)    PHYSICAL EXAM:    GENERAL: NAD, well-groomed, well-developed  HEAD:  Atraumatic, Normocephalic  ENT: dried blood in nose  NECK: Supple,   NERVOUS SYSTEM:  Alert & Oriented X3, Good concentration;  CHEST/LUNG: Clear to auscultation bilaterally;  HEART: Regular rate and rhythm; No murmurs, rubs, or gallops  EXTREMITIES:  2+ Peripheral Pulses, No clubbing, cyanosis, or edema  SKIN: No rashes or lesions      LABS:                        10.1   7.2   )-----------( 295      ( 19 Jul 2018 23:43 )             31.1     07-19    137  |  99  |  19.0  ----------------------------<  96  3.7   |  22.0  |  1.09    Ca    9.4      19 Jul 2018 23:44  Mg     1.7     07-19    TPro  6.7  /  Alb  3.7  /  TBili  0.7  /  DBili  x   /  AST  36  /  ALT  47<H>  /  AlkPhos  101  07-19    PT/INR - ( 19 Jul 2018 23:43 )   PT: 13.9 sec;   INR: 1.26 ratio         PTT - ( 19 Jul 2018 23:43 )  PTT:35.7 sec

## 2018-07-20 NOTE — H&P ADULT - HISTORY OF PRESENT ILLNESS
56 y/o male with h/o right maxillary sinus cancer with local fungation through the palate into the mouth, started chemotherapy, was referred to the ER by the Oncologist Dr. Juan for evaluation for PEG tube evaluation and placement as patient has a difficulty to eat by mouth because of the cancer invasion into the mouth. Labs showed Hgb: 10. patient has frequent attacks of bleed from the nose without trauma

## 2018-07-20 NOTE — H&P ADULT - PMH
Bipolar disorder, unspecified    CHF (congestive heart failure)  2014  Diaphragm dysfunction  partial paralysis- now resolved as per wife  ETOH abuse    YANET (mycobacterium avium-intracellulare)  Sept 2017- s/p lung resection- was followed by ID after lung surgery  Neoplasm of maxillary sinus    LORNA on CPAP

## 2018-07-21 LAB
ANION GAP SERPL CALC-SCNC: 13 MMOL/L — SIGNIFICANT CHANGE UP (ref 5–17)
ANISOCYTOSIS BLD QL: SLIGHT — SIGNIFICANT CHANGE UP
APPEARANCE UR: CLEAR — SIGNIFICANT CHANGE UP
BACTERIA # UR AUTO: NEGATIVE — SIGNIFICANT CHANGE UP
BILIRUB UR-MCNC: NEGATIVE — SIGNIFICANT CHANGE UP
BLD GP AB SCN SERPL QL: SIGNIFICANT CHANGE UP
BUN SERPL-MCNC: 11 MG/DL — SIGNIFICANT CHANGE UP (ref 8–20)
CALCIUM SERPL-MCNC: 8 MG/DL — LOW (ref 8.6–10.2)
CHLORIDE SERPL-SCNC: 100 MMOL/L — SIGNIFICANT CHANGE UP (ref 98–107)
CO2 SERPL-SCNC: 23 MMOL/L — SIGNIFICANT CHANGE UP (ref 22–29)
COLOR SPEC: YELLOW — SIGNIFICANT CHANGE UP
CREAT SERPL-MCNC: 1 MG/DL — SIGNIFICANT CHANGE UP (ref 0.5–1.3)
DIFF PNL FLD: NEGATIVE — SIGNIFICANT CHANGE UP
ELLIPTOCYTES BLD QL SMEAR: SLIGHT — SIGNIFICANT CHANGE UP
EPI CELLS # UR: NEGATIVE — SIGNIFICANT CHANGE UP
FERRITIN SERPL-MCNC: 1146 NG/ML — HIGH (ref 30–400)
GLUCOSE BLDC GLUCOMTR-MCNC: 125 MG/DL — HIGH (ref 70–99)
GLUCOSE BLDC GLUCOMTR-MCNC: 75 MG/DL — SIGNIFICANT CHANGE UP (ref 70–99)
GLUCOSE BLDC GLUCOMTR-MCNC: 86 MG/DL — SIGNIFICANT CHANGE UP (ref 70–99)
GLUCOSE SERPL-MCNC: 137 MG/DL — HIGH (ref 70–115)
GLUCOSE UR QL: NEGATIVE MG/DL — SIGNIFICANT CHANGE UP
HCT VFR BLD CALC: 25.6 % — LOW (ref 42–52)
HGB BLD-MCNC: 8.3 G/DL — LOW (ref 14–18)
HYPOCHROMIA BLD QL: SLIGHT — SIGNIFICANT CHANGE UP
IRON SATN MFR SERPL: 13 % — LOW (ref 16–55)
IRON SATN MFR SERPL: 22 UG/DL — LOW (ref 59–158)
KETONES UR-MCNC: NEGATIVE — SIGNIFICANT CHANGE UP
LEUKOCYTE ESTERASE UR-ACNC: NEGATIVE — SIGNIFICANT CHANGE UP
LYMPHOCYTES # BLD AUTO: 15 % — LOW (ref 20–55)
MACROCYTES BLD QL: SLIGHT — SIGNIFICANT CHANGE UP
MAGNESIUM SERPL-MCNC: 1.6 MG/DL — SIGNIFICANT CHANGE UP (ref 1.6–2.6)
MCHC RBC-ENTMCNC: 29.4 PG — SIGNIFICANT CHANGE UP (ref 27–31)
MCHC RBC-ENTMCNC: 32.4 G/DL — SIGNIFICANT CHANGE UP (ref 32–36)
MCV RBC AUTO: 90.8 FL — SIGNIFICANT CHANGE UP (ref 80–94)
MICROCYTES BLD QL: SLIGHT — SIGNIFICANT CHANGE UP
MONOCYTES NFR BLD AUTO: 7 % — SIGNIFICANT CHANGE UP (ref 3–10)
NEUTROPHILS NFR BLD AUTO: 73 % — SIGNIFICANT CHANGE UP (ref 37–73)
NITRITE UR-MCNC: NEGATIVE — SIGNIFICANT CHANGE UP
NRBC # BLD: 1 /100 — HIGH (ref 0–0)
OVALOCYTES BLD QL SMEAR: SLIGHT — SIGNIFICANT CHANGE UP
PH UR: 5 — SIGNIFICANT CHANGE UP (ref 5–8)
PHOSPHATE SERPL-MCNC: 2.6 MG/DL — SIGNIFICANT CHANGE UP (ref 2.4–4.7)
PLAT MORPH BLD: NORMAL — SIGNIFICANT CHANGE UP
PLATELET # BLD AUTO: 257 K/UL — SIGNIFICANT CHANGE UP (ref 150–400)
POIKILOCYTOSIS BLD QL AUTO: SLIGHT — SIGNIFICANT CHANGE UP
POTASSIUM SERPL-MCNC: 3.4 MMOL/L — LOW (ref 3.5–5.3)
POTASSIUM SERPL-SCNC: 3.4 MMOL/L — LOW (ref 3.5–5.3)
PROT UR-MCNC: 15 MG/DL
RBC # BLD: 2.82 M/UL — LOW (ref 4.6–6.2)
RBC # FLD: 13.4 % — SIGNIFICANT CHANGE UP (ref 11–15.6)
RBC BLD AUTO: ABNORMAL
RBC CASTS # UR COMP ASSIST: SIGNIFICANT CHANGE UP /HPF (ref 0–4)
SODIUM SERPL-SCNC: 136 MMOL/L — SIGNIFICANT CHANGE UP (ref 135–145)
SP GR SPEC: 1.01 — SIGNIFICANT CHANGE UP (ref 1.01–1.02)
TIBC SERPL-MCNC: 163 UG/DL — LOW (ref 220–430)
TRANSFERRIN SERPL-MCNC: 114 MG/DL — LOW (ref 180–329)
TYPE + AB SCN PNL BLD: SIGNIFICANT CHANGE UP
UROBILINOGEN FLD QL: NEGATIVE MG/DL — SIGNIFICANT CHANGE UP
VARIANT LYMPHS # BLD: 5 % — SIGNIFICANT CHANGE UP (ref 0–6)
WBC # BLD: 9.1 K/UL — SIGNIFICANT CHANGE UP (ref 4.8–10.8)
WBC # FLD AUTO: 9.1 K/UL — SIGNIFICANT CHANGE UP (ref 4.8–10.8)
WBC UR QL: SIGNIFICANT CHANGE UP

## 2018-07-21 PROCEDURE — 99233 SBSQ HOSP IP/OBS HIGH 50: CPT

## 2018-07-21 RX ORDER — POTASSIUM CHLORIDE 20 MEQ
40 PACKET (EA) ORAL ONCE
Qty: 0 | Refills: 0 | Status: DISCONTINUED | OUTPATIENT
Start: 2018-07-21 | End: 2018-07-21

## 2018-07-21 RX ORDER — POTASSIUM CHLORIDE 20 MEQ
40 PACKET (EA) ORAL ONCE
Qty: 0 | Refills: 0 | Status: COMPLETED | OUTPATIENT
Start: 2018-07-21 | End: 2018-07-21

## 2018-07-21 RX ORDER — SODIUM CHLORIDE 9 MG/ML
500 INJECTION INTRAMUSCULAR; INTRAVENOUS; SUBCUTANEOUS ONCE
Qty: 0 | Refills: 0 | Status: COMPLETED | OUTPATIENT
Start: 2018-07-21 | End: 2018-07-21

## 2018-07-21 RX ADMIN — HYDROMORPHONE HYDROCHLORIDE 1 MILLIGRAM(S): 2 INJECTION INTRAMUSCULAR; INTRAVENOUS; SUBCUTANEOUS at 07:23

## 2018-07-21 RX ADMIN — PANTOPRAZOLE SODIUM 40 MILLIGRAM(S): 20 TABLET, DELAYED RELEASE ORAL at 17:44

## 2018-07-21 RX ADMIN — LORATADINE 10 MILLIGRAM(S): 10 TABLET ORAL at 14:49

## 2018-07-21 RX ADMIN — Medication 100 MILLIGRAM(S): at 14:49

## 2018-07-21 RX ADMIN — OLANZAPINE 5 MILLIGRAM(S): 15 TABLET, FILM COATED ORAL at 22:16

## 2018-07-21 RX ADMIN — HYDROMORPHONE HYDROCHLORIDE 1 MILLIGRAM(S): 2 INJECTION INTRAMUSCULAR; INTRAVENOUS; SUBCUTANEOUS at 20:22

## 2018-07-21 RX ADMIN — Medication 100 MILLIGRAM(S): at 09:03

## 2018-07-21 RX ADMIN — HYDROMORPHONE HYDROCHLORIDE 1 MILLIGRAM(S): 2 INJECTION INTRAMUSCULAR; INTRAVENOUS; SUBCUTANEOUS at 12:00

## 2018-07-21 RX ADMIN — HYDROMORPHONE HYDROCHLORIDE 1 MILLIGRAM(S): 2 INJECTION INTRAMUSCULAR; INTRAVENOUS; SUBCUTANEOUS at 02:44

## 2018-07-21 RX ADMIN — PANTOPRAZOLE SODIUM 40 MILLIGRAM(S): 20 TABLET, DELAYED RELEASE ORAL at 06:34

## 2018-07-21 RX ADMIN — MORPHINE SULFATE 30 MILLIGRAM(S): 50 CAPSULE, EXTENDED RELEASE ORAL at 06:32

## 2018-07-21 RX ADMIN — MORPHINE SULFATE 30 MILLIGRAM(S): 50 CAPSULE, EXTENDED RELEASE ORAL at 14:48

## 2018-07-21 RX ADMIN — MORPHINE SULFATE 30 MILLIGRAM(S): 50 CAPSULE, EXTENDED RELEASE ORAL at 07:32

## 2018-07-21 RX ADMIN — HYDROMORPHONE HYDROCHLORIDE 1 MILLIGRAM(S): 2 INJECTION INTRAMUSCULAR; INTRAVENOUS; SUBCUTANEOUS at 20:37

## 2018-07-21 RX ADMIN — HYDROMORPHONE HYDROCHLORIDE 1 MILLIGRAM(S): 2 INJECTION INTRAMUSCULAR; INTRAVENOUS; SUBCUTANEOUS at 16:15

## 2018-07-21 RX ADMIN — MORPHINE SULFATE 30 MILLIGRAM(S): 50 CAPSULE, EXTENDED RELEASE ORAL at 22:16

## 2018-07-21 RX ADMIN — Medication 75 MILLIGRAM(S): at 06:33

## 2018-07-21 RX ADMIN — Medication 40 MILLIEQUIVALENT(S): at 16:12

## 2018-07-21 RX ADMIN — HYDROMORPHONE HYDROCHLORIDE 1 MILLIGRAM(S): 2 INJECTION INTRAMUSCULAR; INTRAVENOUS; SUBCUTANEOUS at 16:04

## 2018-07-21 RX ADMIN — CARVEDILOL PHOSPHATE 6.25 MILLIGRAM(S): 80 CAPSULE, EXTENDED RELEASE ORAL at 17:44

## 2018-07-21 RX ADMIN — MORPHINE SULFATE 30 MILLIGRAM(S): 50 CAPSULE, EXTENDED RELEASE ORAL at 15:48

## 2018-07-21 RX ADMIN — HYDROMORPHONE HYDROCHLORIDE 1 MILLIGRAM(S): 2 INJECTION INTRAMUSCULAR; INTRAVENOUS; SUBCUTANEOUS at 07:08

## 2018-07-21 RX ADMIN — HYDROMORPHONE HYDROCHLORIDE 1 MILLIGRAM(S): 2 INJECTION INTRAMUSCULAR; INTRAVENOUS; SUBCUTANEOUS at 12:50

## 2018-07-21 RX ADMIN — MORPHINE SULFATE 30 MILLIGRAM(S): 50 CAPSULE, EXTENDED RELEASE ORAL at 23:16

## 2018-07-21 RX ADMIN — SODIUM CHLORIDE 500 MILLILITER(S): 9 INJECTION INTRAMUSCULAR; INTRAVENOUS; SUBCUTANEOUS at 01:32

## 2018-07-21 RX ADMIN — Medication 75 MILLIGRAM(S): at 17:44

## 2018-07-21 RX ADMIN — HYDROMORPHONE HYDROCHLORIDE 1 MILLIGRAM(S): 2 INJECTION INTRAMUSCULAR; INTRAVENOUS; SUBCUTANEOUS at 11:43

## 2018-07-21 NOTE — PROGRESS NOTE ADULT - SUBJECTIVE AND OBJECTIVE BOX
INTERVAL HPI/OVERNIGHT EVENTS:FU after G tube placement. Tolerating diet with no issues. Feels fine.     MEDICATIONS  (STANDING):  amphetamine/dextroamphetamine 30 milliGRAM(s) Oral two times a day  carvedilol 6.25 milliGRAM(s) Oral every 12 hours  ceFAZolin   IVPB      ceFAZolin   IVPB 2000 milliGRAM(s) IV Intermittent every 8 hours  loratadine 10 milliGRAM(s) Oral daily  morphine ER Tablet 30 milliGRAM(s) Oral three times a day  OLANZapine 5 milliGRAM(s) Oral daily  pantoprazole  Injectable 40 milliGRAM(s) IV Push two times a day  pregabalin 75 milliGRAM(s) Oral two times a day    MEDICATIONS  (PRN):  ALPRAZolam 0.5 milliGRAM(s) Oral at bedtime PRN anxiety  HYDROmorphone  Injectable 1 milliGRAM(s) IV Push every 4 hours PRN Severe Pain (7 - 10)  meclizine 25 milliGRAM(s) Oral three times a day PRN Dizziness      Allergies    hospital socks (Rash)  lisinopril (Anaphylaxis)  statins (Anaphylaxis)    Intolerances        Vital Signs Last 24 Hrs  T(C): 37.1 (2018 16:32), Max: 37.1 (2018 16:32)  T(F): 98.7 (2018 16:32), Max: 98.7 (2018 16:32)  HR: 100 (2018 16:32) (91 - 108)  BP: 104/60 (2018 16:32) (96/59 - 116/62)  BP(mean): --  RR: 19 (2018 16:32) (18 - 20)  SpO2: 90% (2018 16:32) (90% - 95%)    LABS:                        8.3    9.1   )-----------( 257      ( 2018 07:55 )             25.6     07-21    136  |  100  |  11.0  ----------------------------<  137<H>  3.4<L>   |  23.0  |  1.00    Ca    8.0<L>      2018 07:55  Phos  2.6     07-21  Mg     1.6     07-21    TPro  6.7  /  Alb  3.7  /  TBili  0.7  /  DBili  x   /  AST  36  /  ALT  47<H>  /  AlkPhos  101  07-    PT/INR - ( 2018 23:43 )   PT: 13.9 sec;   INR: 1.26 ratio         PTT - ( 2018 23:43 )  PTT:35.7 sec  Urinalysis Basic - ( 2018 08:32 )    Color: Yellow / Appearance: Clear / S.015 / pH: x  Gluc: x / Ketone: Negative  / Bili: Negative / Urobili: Negative mg/dL   Blood: x / Protein: 15 mg/dL / Nitrite: Negative   Leuk Esterase: Negative / RBC: 0-2 /HPF / WBC 0-2   Sq Epi: x / Non Sq Epi: Negative / Bacteria: Negative        RADIOLOGY & ADDITIONAL TESTS:

## 2018-07-21 NOTE — PROGRESS NOTE ADULT - ASSESSMENT
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 G tube with no complications. Tolerating G tube feeds. Stop antibiotics.  Can be discharged from GI perspective  Please provide G tube feeding instructions

## 2018-07-21 NOTE — SWALLOW BEDSIDE ASSESSMENT ADULT - ADDITIONAL RECOMMENDATIONS
Would Rx consideration for MBS study if increased swallowing difficulty demonstrated. MBS discussed with pt and wife, who declined at this time stating that they do not want any tests with contrast. Wife added that pt is "incredibly picky" with what he eats and she justs wants to give him what he wants and can tolerate, noting that he is very cautious while eating and that she adheres to strict aspiration precautions. Wife and pt educated to seek objective study as needed; both verbalized understanding. Dr. Blanchard also informed and aware.

## 2018-07-21 NOTE — SWALLOW BEDSIDE ASSESSMENT ADULT - SLP PERTINENT HISTORY OF CURRENT PROBLEM
56 y/o male with difficulty to swallow 2nd to palate invasion by right maxillary sinus neoplasm, anemia 2nd to frequent nose bleed from right maxillary sinus neoplasm, Bipolar disorder. Pt is s/p placement of PEG yesterday (7/20) 2* poor PO intake and weight loss. Pt reports "extremely poor" appetite with minimal PO intake. He reported that oral mass was also interfering with PO intake, however noted that since the initiation of chemo, the oral mass appears to have reduced in size.

## 2018-07-21 NOTE — SWALLOW BEDSIDE ASSESSMENT ADULT - ASR SWALLOW ASPIRATION MONITOR
oral hygiene/fever/pneumonia/change of breathing pattern/position upright (90Y)/cough/throat clearing/gurgly voice

## 2018-07-21 NOTE — SWALLOW BEDSIDE ASSESSMENT ADULT - ORAL PREPARATORY PHASE
Within functional limits Decreased mastication ability/reduced bolus manipulation likely 2* oral mass

## 2018-07-21 NOTE — ED ADULT NURSE REASSESSMENT NOTE - NS ED NURSE REASSESS COMMENT FT1
pt with low BP and FS 75 MD saab made aware. orders completed.
MD @ bedside for assessment
comfortable in appearance pt medicated iv. npo for peg.
pt resting comfortable. peg teaching completed. pt explained plan of care. medication given for all needs. will continue to monitor.

## 2018-07-21 NOTE — SWALLOW BEDSIDE ASSESSMENT ADULT - SWALLOW EVAL: DIAGNOSIS
Mild oral dysphagia- impacted by presence of oral mass.  Pharyngeal stage appears functional for puree, mech soft, and thin liquids.

## 2018-07-22 LAB
ALBUMIN SERPL ELPH-MCNC: 3.2 G/DL — LOW (ref 3.3–5.2)
ALP SERPL-CCNC: 143 U/L — HIGH (ref 40–120)
ALT FLD-CCNC: 22 U/L — SIGNIFICANT CHANGE UP
ANION GAP SERPL CALC-SCNC: 12 MMOL/L — SIGNIFICANT CHANGE UP (ref 5–17)
ANION GAP SERPL CALC-SCNC: 14 MMOL/L — SIGNIFICANT CHANGE UP (ref 5–17)
ANISOCYTOSIS BLD QL: SLIGHT — SIGNIFICANT CHANGE UP
APPEARANCE UR: CLEAR — SIGNIFICANT CHANGE UP
APTT BLD: 38.4 SEC — HIGH (ref 27.5–37.4)
AST SERPL-CCNC: 26 U/L — SIGNIFICANT CHANGE UP
BASOPHILS # BLD AUTO: 0.1 K/UL — SIGNIFICANT CHANGE UP (ref 0–0.2)
BILIRUB SERPL-MCNC: 0.3 MG/DL — LOW (ref 0.4–2)
BILIRUB UR-MCNC: NEGATIVE — SIGNIFICANT CHANGE UP
BUN SERPL-MCNC: 10 MG/DL — SIGNIFICANT CHANGE UP (ref 8–20)
BUN SERPL-MCNC: 9 MG/DL — SIGNIFICANT CHANGE UP (ref 8–20)
CALCIUM SERPL-MCNC: 8.3 MG/DL — LOW (ref 8.6–10.2)
CALCIUM SERPL-MCNC: 8.5 MG/DL — LOW (ref 8.6–10.2)
CHLORIDE SERPL-SCNC: 98 MMOL/L — SIGNIFICANT CHANGE UP (ref 98–107)
CHLORIDE SERPL-SCNC: 98 MMOL/L — SIGNIFICANT CHANGE UP (ref 98–107)
CO2 SERPL-SCNC: 25 MMOL/L — SIGNIFICANT CHANGE UP (ref 22–29)
CO2 SERPL-SCNC: 25 MMOL/L — SIGNIFICANT CHANGE UP (ref 22–29)
COLOR SPEC: YELLOW — SIGNIFICANT CHANGE UP
CREAT SERPL-MCNC: 0.85 MG/DL — SIGNIFICANT CHANGE UP (ref 0.5–1.3)
CREAT SERPL-MCNC: 0.93 MG/DL — SIGNIFICANT CHANGE UP (ref 0.5–1.3)
CULTURE RESULTS: NO GROWTH — SIGNIFICANT CHANGE UP
DIFF PNL FLD: NEGATIVE — SIGNIFICANT CHANGE UP
EOSINOPHIL # BLD AUTO: 0 K/UL — SIGNIFICANT CHANGE UP (ref 0–0.5)
EPI CELLS # UR: SIGNIFICANT CHANGE UP
GLUCOSE BLDC GLUCOMTR-MCNC: 121 MG/DL — HIGH (ref 70–99)
GLUCOSE BLDC GLUCOMTR-MCNC: 163 MG/DL — HIGH (ref 70–99)
GLUCOSE SERPL-MCNC: 120 MG/DL — HIGH (ref 70–115)
GLUCOSE SERPL-MCNC: 129 MG/DL — HIGH (ref 70–115)
GLUCOSE UR QL: NEGATIVE MG/DL — SIGNIFICANT CHANGE UP
HCT VFR BLD CALC: 29.2 % — LOW (ref 42–52)
HCT VFR BLD CALC: 29.6 % — LOW (ref 42–52)
HGB BLD-MCNC: 9.2 G/DL — LOW (ref 14–18)
HGB BLD-MCNC: 9.4 G/DL — LOW (ref 14–18)
HYPOCHROMIA BLD QL: SLIGHT — SIGNIFICANT CHANGE UP
INR BLD: 1.28 RATIO — HIGH (ref 0.88–1.16)
KETONES UR-MCNC: NEGATIVE — SIGNIFICANT CHANGE UP
LACTATE BLDV-MCNC: 0.9 MMOL/L — SIGNIFICANT CHANGE UP (ref 0.5–2)
LEUKOCYTE ESTERASE UR-ACNC: NEGATIVE — SIGNIFICANT CHANGE UP
LYMPHOCYTES # BLD AUTO: 10 % — LOW (ref 20–55)
LYMPHOCYTES # BLD AUTO: 2.2 K/UL — SIGNIFICANT CHANGE UP (ref 1–4.8)
MACROCYTES BLD QL: SLIGHT — SIGNIFICANT CHANGE UP
MAGNESIUM SERPL-MCNC: 1.9 MG/DL — SIGNIFICANT CHANGE UP (ref 1.8–2.6)
MCHC RBC-ENTMCNC: 28.8 PG — SIGNIFICANT CHANGE UP (ref 27–31)
MCHC RBC-ENTMCNC: 28.9 PG — SIGNIFICANT CHANGE UP (ref 27–31)
MCHC RBC-ENTMCNC: 31.5 G/DL — LOW (ref 32–36)
MCHC RBC-ENTMCNC: 31.8 G/DL — LOW (ref 32–36)
MCV RBC AUTO: 91.1 FL — SIGNIFICANT CHANGE UP (ref 80–94)
MCV RBC AUTO: 91.3 FL — SIGNIFICANT CHANGE UP (ref 80–94)
METAMYELOCYTES # FLD: 2 % — HIGH (ref 0–0)
MICROCYTES BLD QL: SLIGHT — SIGNIFICANT CHANGE UP
MONOCYTES # BLD AUTO: 2.1 K/UL — HIGH (ref 0–0.8)
MONOCYTES NFR BLD AUTO: 5 % — SIGNIFICANT CHANGE UP (ref 3–10)
MYELOCYTES NFR BLD: 3 % — HIGH (ref 0–0)
NEUTROPHILS # BLD AUTO: 15.1 K/UL — HIGH (ref 1.8–8)
NEUTROPHILS NFR BLD AUTO: 74 % — HIGH (ref 37–73)
NEUTS BAND # BLD: 3 % — SIGNIFICANT CHANGE UP (ref 0–8)
NITRITE UR-MCNC: NEGATIVE — SIGNIFICANT CHANGE UP
NRBC # BLD: 2 /100 — HIGH (ref 0–0)
OVALOCYTES BLD QL SMEAR: SLIGHT — SIGNIFICANT CHANGE UP
PH UR: 5 — SIGNIFICANT CHANGE UP (ref 5–8)
PHOSPHATE SERPL-MCNC: 2.6 MG/DL — SIGNIFICANT CHANGE UP (ref 2.4–4.7)
PLAT MORPH BLD: NORMAL — SIGNIFICANT CHANGE UP
PLATELET # BLD AUTO: 302 K/UL — SIGNIFICANT CHANGE UP (ref 150–400)
PLATELET # BLD AUTO: 316 K/UL — SIGNIFICANT CHANGE UP (ref 150–400)
POIKILOCYTOSIS BLD QL AUTO: SLIGHT — SIGNIFICANT CHANGE UP
POTASSIUM SERPL-MCNC: 3.4 MMOL/L — LOW (ref 3.5–5.3)
POTASSIUM SERPL-MCNC: 3.7 MMOL/L — SIGNIFICANT CHANGE UP (ref 3.5–5.3)
POTASSIUM SERPL-SCNC: 3.4 MMOL/L — LOW (ref 3.5–5.3)
POTASSIUM SERPL-SCNC: 3.7 MMOL/L — SIGNIFICANT CHANGE UP (ref 3.5–5.3)
PROCALCITONIN SERPL-MCNC: 0.27 NG/ML — SIGNIFICANT CHANGE UP (ref 0–0.04)
PROMYELOCYTES # FLD: 1 % — HIGH (ref 0–0)
PROT SERPL-MCNC: 5.9 G/DL — LOW (ref 6.6–8.7)
PROT UR-MCNC: 30 MG/DL
PROTHROM AB SERPL-ACNC: 14.2 SEC — HIGH (ref 9.8–12.7)
RBC # BLD: 3.2 M/UL — LOW (ref 4.6–6.2)
RBC # BLD: 3.25 M/UL — LOW (ref 4.6–6.2)
RBC # FLD: 14 % — SIGNIFICANT CHANGE UP (ref 11–15.6)
RBC # FLD: 14.1 % — SIGNIFICANT CHANGE UP (ref 11–15.6)
RBC BLD AUTO: ABNORMAL
SODIUM SERPL-SCNC: 135 MMOL/L — SIGNIFICANT CHANGE UP (ref 135–145)
SODIUM SERPL-SCNC: 137 MMOL/L — SIGNIFICANT CHANGE UP (ref 135–145)
SP GR SPEC: 1.02 — SIGNIFICANT CHANGE UP (ref 1.01–1.02)
SPECIMEN SOURCE: SIGNIFICANT CHANGE UP
UROBILINOGEN FLD QL: 1 MG/DL
VARIANT LYMPHS # BLD: 2 % — SIGNIFICANT CHANGE UP (ref 0–6)
WBC # BLD: 21.1 K/UL — HIGH (ref 4.8–10.8)
WBC # BLD: 23.1 K/UL — HIGH (ref 4.8–10.8)
WBC # FLD AUTO: 21.1 K/UL — HIGH (ref 4.8–10.8)
WBC # FLD AUTO: 23.1 K/UL — HIGH (ref 4.8–10.8)
WBC UR QL: SIGNIFICANT CHANGE UP

## 2018-07-22 PROCEDURE — 99233 SBSQ HOSP IP/OBS HIGH 50: CPT

## 2018-07-22 PROCEDURE — 71045 X-RAY EXAM CHEST 1 VIEW: CPT | Mod: 26

## 2018-07-22 RX ORDER — PIPERACILLIN AND TAZOBACTAM 4; .5 G/20ML; G/20ML
3.38 INJECTION, POWDER, LYOPHILIZED, FOR SOLUTION INTRAVENOUS EVERY 8 HOURS
Qty: 0 | Refills: 0 | Status: DISCONTINUED | OUTPATIENT
Start: 2018-07-22 | End: 2018-07-30

## 2018-07-22 RX ORDER — POTASSIUM PHOSPHATE, MONOBASIC POTASSIUM PHOSPHATE, DIBASIC 236; 224 MG/ML; MG/ML
15 INJECTION, SOLUTION INTRAVENOUS ONCE
Qty: 0 | Refills: 0 | Status: COMPLETED | OUTPATIENT
Start: 2018-07-22 | End: 2018-07-23

## 2018-07-22 RX ORDER — PIPERACILLIN AND TAZOBACTAM 4; .5 G/20ML; G/20ML
3.38 INJECTION, POWDER, LYOPHILIZED, FOR SOLUTION INTRAVENOUS ONCE
Qty: 0 | Refills: 0 | Status: COMPLETED | OUTPATIENT
Start: 2018-07-22 | End: 2018-07-22

## 2018-07-22 RX ORDER — VANCOMYCIN HCL 1 G
1000 VIAL (EA) INTRAVENOUS ONCE
Qty: 0 | Refills: 0 | Status: COMPLETED | OUTPATIENT
Start: 2018-07-22 | End: 2018-07-22

## 2018-07-22 RX ORDER — SODIUM CHLORIDE 9 MG/ML
1000 INJECTION INTRAMUSCULAR; INTRAVENOUS; SUBCUTANEOUS ONCE
Qty: 0 | Refills: 0 | Status: COMPLETED | OUTPATIENT
Start: 2018-07-22 | End: 2018-07-22

## 2018-07-22 RX ORDER — IPRATROPIUM/ALBUTEROL SULFATE 18-103MCG
3 AEROSOL WITH ADAPTER (GRAM) INHALATION EVERY 6 HOURS
Qty: 0 | Refills: 0 | Status: DISCONTINUED | OUTPATIENT
Start: 2018-07-22 | End: 2018-07-23

## 2018-07-22 RX ORDER — ACETAMINOPHEN 500 MG
650 TABLET ORAL EVERY 6 HOURS
Qty: 0 | Refills: 0 | Status: DISCONTINUED | OUTPATIENT
Start: 2018-07-22 | End: 2018-07-26

## 2018-07-22 RX ORDER — MAGNESIUM SULFATE 500 MG/ML
2 VIAL (ML) INJECTION ONCE
Qty: 0 | Refills: 0 | Status: COMPLETED | OUTPATIENT
Start: 2018-07-22 | End: 2018-07-22

## 2018-07-22 RX ORDER — SODIUM CHLORIDE 9 MG/ML
1000 INJECTION, SOLUTION INTRAVENOUS
Qty: 0 | Refills: 0 | Status: DISCONTINUED | OUTPATIENT
Start: 2018-07-22 | End: 2018-07-23

## 2018-07-22 RX ORDER — POTASSIUM CHLORIDE 20 MEQ
40 PACKET (EA) ORAL ONCE
Qty: 0 | Refills: 0 | Status: COMPLETED | OUTPATIENT
Start: 2018-07-22 | End: 2018-07-22

## 2018-07-22 RX ADMIN — SODIUM CHLORIDE 125 MILLILITER(S): 9 INJECTION, SOLUTION INTRAVENOUS at 22:01

## 2018-07-22 RX ADMIN — LORATADINE 10 MILLIGRAM(S): 10 TABLET ORAL at 14:13

## 2018-07-22 RX ADMIN — MORPHINE SULFATE 30 MILLIGRAM(S): 50 CAPSULE, EXTENDED RELEASE ORAL at 07:30

## 2018-07-22 RX ADMIN — Medication 3 MILLILITER(S): at 21:36

## 2018-07-22 RX ADMIN — Medication 3 MILLILITER(S): at 15:09

## 2018-07-22 RX ADMIN — HYDROMORPHONE HYDROCHLORIDE 1 MILLIGRAM(S): 2 INJECTION INTRAMUSCULAR; INTRAVENOUS; SUBCUTANEOUS at 15:47

## 2018-07-22 RX ADMIN — PANTOPRAZOLE SODIUM 40 MILLIGRAM(S): 20 TABLET, DELAYED RELEASE ORAL at 06:32

## 2018-07-22 RX ADMIN — HYDROMORPHONE HYDROCHLORIDE 1 MILLIGRAM(S): 2 INJECTION INTRAMUSCULAR; INTRAVENOUS; SUBCUTANEOUS at 03:43

## 2018-07-22 RX ADMIN — HYDROMORPHONE HYDROCHLORIDE 1 MILLIGRAM(S): 2 INJECTION INTRAMUSCULAR; INTRAVENOUS; SUBCUTANEOUS at 16:15

## 2018-07-22 RX ADMIN — OLANZAPINE 5 MILLIGRAM(S): 15 TABLET, FILM COATED ORAL at 22:00

## 2018-07-22 RX ADMIN — MORPHINE SULFATE 30 MILLIGRAM(S): 50 CAPSULE, EXTENDED RELEASE ORAL at 06:31

## 2018-07-22 RX ADMIN — HYDROMORPHONE HYDROCHLORIDE 1 MILLIGRAM(S): 2 INJECTION INTRAMUSCULAR; INTRAVENOUS; SUBCUTANEOUS at 11:47

## 2018-07-22 RX ADMIN — HYDROMORPHONE HYDROCHLORIDE 1 MILLIGRAM(S): 2 INJECTION INTRAMUSCULAR; INTRAVENOUS; SUBCUTANEOUS at 03:28

## 2018-07-22 RX ADMIN — MORPHINE SULFATE 30 MILLIGRAM(S): 50 CAPSULE, EXTENDED RELEASE ORAL at 14:13

## 2018-07-22 RX ADMIN — MORPHINE SULFATE 30 MILLIGRAM(S): 50 CAPSULE, EXTENDED RELEASE ORAL at 22:01

## 2018-07-22 RX ADMIN — PANTOPRAZOLE SODIUM 40 MILLIGRAM(S): 20 TABLET, DELAYED RELEASE ORAL at 18:01

## 2018-07-22 RX ADMIN — Medication 75 MILLIGRAM(S): at 18:00

## 2018-07-22 RX ADMIN — SODIUM CHLORIDE 1000 MILLILITER(S): 9 INJECTION INTRAMUSCULAR; INTRAVENOUS; SUBCUTANEOUS at 16:21

## 2018-07-22 RX ADMIN — CARVEDILOL PHOSPHATE 6.25 MILLIGRAM(S): 80 CAPSULE, EXTENDED RELEASE ORAL at 18:00

## 2018-07-22 RX ADMIN — Medication 50 GRAM(S): at 20:17

## 2018-07-22 RX ADMIN — MORPHINE SULFATE 30 MILLIGRAM(S): 50 CAPSULE, EXTENDED RELEASE ORAL at 23:05

## 2018-07-22 RX ADMIN — PIPERACILLIN AND TAZOBACTAM 25 GRAM(S): 4; .5 INJECTION, POWDER, LYOPHILIZED, FOR SOLUTION INTRAVENOUS at 22:00

## 2018-07-22 RX ADMIN — HYDROMORPHONE HYDROCHLORIDE 1 MILLIGRAM(S): 2 INJECTION INTRAMUSCULAR; INTRAVENOUS; SUBCUTANEOUS at 21:54

## 2018-07-22 RX ADMIN — Medication 0.5 MILLIGRAM(S): at 18:00

## 2018-07-22 RX ADMIN — Medication 40 MILLIEQUIVALENT(S): at 22:00

## 2018-07-22 RX ADMIN — HYDROMORPHONE HYDROCHLORIDE 1 MILLIGRAM(S): 2 INJECTION INTRAMUSCULAR; INTRAVENOUS; SUBCUTANEOUS at 08:00

## 2018-07-22 RX ADMIN — PIPERACILLIN AND TAZOBACTAM 200 GRAM(S): 4; .5 INJECTION, POWDER, LYOPHILIZED, FOR SOLUTION INTRAVENOUS at 18:01

## 2018-07-22 RX ADMIN — HYDROMORPHONE HYDROCHLORIDE 1 MILLIGRAM(S): 2 INJECTION INTRAMUSCULAR; INTRAVENOUS; SUBCUTANEOUS at 20:17

## 2018-07-22 RX ADMIN — Medication 250 MILLIGRAM(S): at 18:01

## 2018-07-22 RX ADMIN — HYDROMORPHONE HYDROCHLORIDE 1 MILLIGRAM(S): 2 INJECTION INTRAMUSCULAR; INTRAVENOUS; SUBCUTANEOUS at 07:45

## 2018-07-22 RX ADMIN — Medication 75 MILLIGRAM(S): at 06:32

## 2018-07-22 RX ADMIN — MORPHINE SULFATE 30 MILLIGRAM(S): 50 CAPSULE, EXTENDED RELEASE ORAL at 15:46

## 2018-07-22 RX ADMIN — HYDROMORPHONE HYDROCHLORIDE 1 MILLIGRAM(S): 2 INJECTION INTRAMUSCULAR; INTRAVENOUS; SUBCUTANEOUS at 12:15

## 2018-07-22 NOTE — PROVIDER CONTACT NOTE (EICU) - ASSESSMENT
- agree with abx, cxr reviewed- possibly new RL/ML opacity; lactated < 1; IV Fluids provided per residents  - pt sitting in bed, "I feel a lot better, just had pain"  - carvel shake at bedside, he is able to tolerate this by mouth

## 2018-07-22 NOTE — PROVIDER CONTACT NOTE (EICU) - ACTION/TREATMENT ORDERED:
- STRONGLY suggest palliative care consultation as they were involved daily at during his admission at [facility] in June 2018.

## 2018-07-22 NOTE — CHART NOTE - NSCHARTNOTEFT_GEN_A_CORE
Consult for TF   Spoke to family at length. Pt with very poor intake at home. ( 20lb weight loss) Peg placed. Wife is requesting a bolus regimen as pt is unlikely to be compliant with continuous and it is easier to manage at home.   current weight 81 kg (30-35 kcal/ kg )2430 -2835 kcal/ day and 97- 113 g pro  Pt will require 8 8oz cans of Jevity / day. ( discussed  every 3-4 hr feeding schedule with wife. Also pt is taking some PO, so discussed skipping a feeding if adequate PO intake is occurring.   twocal formula may be available in a home care setting and more calorically dense and pt would require 6 cans of two jazzmine a day.

## 2018-07-22 NOTE — PROGRESS NOTE ADULT - SUBJECTIVE AND OBJECTIVE BOX
NITIN MALONELISA  ----------------------------------------  The patient was seen and evaluated for dysphagia.  The patient is in no acute distress.  Denied any chest pain, palpitations, dyspnea, or abdominal pain.  Tolerated oral intake and gastrostomy tube feeds.    Vital Signs Last 24 Hrs  T(C): 37.4 (2018 04:08), Max: 37.4 (2018 04:08)  T(F): 99.4 (2018 04:08), Max: 99.4 (2018 04:08)  HR: 88 (2018 06:22) (88 - 105)  BP: 107/68 (2018 06:22) (103/64 - 116/62)  BP(mean): --  RR: 20 (2018 06:22) (19 - 20)  SpO2: 96% (2018 08:00) (90% - 96%)    CAPILLARY BLOOD GLUCOSE  POCT Blood Glucose.: 121 mg/dL (2018 07:49)    PHYSICAL EXAMINATION:  ----------------------------------------  General appearance: No acute distress, Awake, Alert  HEENT: Normocephalic, Atraumatic, Conjunctiva clear, EOMI  Neck: Supple, No JVD, No tenderness  Lungs: Clear to auscultation, Breath sound equal bilaterally, No wheezes, No rales  Cardiovascular: S1S2, Regular rhythm  Abdomen: Soft, Nontender, Nondistended, No guarding/rebound, Positive bowel sounds, Gastrostomy tube in place.  Extremities: No clubbing, No cyanosis, No edema, No calf tenderness  Neuro: Strength equal bilaterally, No tremors  Psychiatric: Appropriate mood, Normal affect    LABORATORY STUDIES:  ----------------------------------------             9.2    21.1  )-----------( 302      ( 2018 07:45 )             29.2     07-22    137  |  98  |  10.0  ----------------------------<  120<H>  3.7   |  25.0  |  0.93    Ca    8.5<L>      2018 07:45  Phos  2.6     07-  Mg     1.6     -    Urinalysis Basic - ( 2018 08:32 )  Color: Yellow / Appearance: Clear / S.015 / pH: x  Gluc: x / Ketone: Negative  / Bili: Negative / Urobili: Negative mg/dL   Blood: x / Protein: 15 mg/dL / Nitrite: Negative   Leuk Esterase: Negative / RBC: 0-2 /HPF / WBC 0-2   Sq Epi: x / Non Sq Epi: Negative / Bacteria: Negative    Culture - Urine (collected 2018 08:31)  Source: .Urine Clean Catch (Midstream)  Final Report (2018 11:17):    No growth    MEDICATIONS  (STANDING):  ALBUTerol/ipratropium for Nebulization 3 milliLiter(s) Nebulizer every 6 hours  amphetamine/dextroamphetamine 30 milliGRAM(s) Oral two times a day  carvedilol 6.25 milliGRAM(s) Oral every 12 hours  loratadine 10 milliGRAM(s) Oral daily  morphine ER Tablet 30 milliGRAM(s) Oral three times a day  OLANZapine 5 milliGRAM(s) Oral daily  pantoprazole  Injectable 40 milliGRAM(s) IV Push two times a day  pregabalin 75 milliGRAM(s) Oral two times a day    MEDICATIONS  (PRN):  ALPRAZolam 0.5 milliGRAM(s) Oral at bedtime PRN anxiety  HYDROmorphone  Injectable 1 milliGRAM(s) IV Push every 4 hours PRN Severe Pain (7 - 10)  meclizine 25 milliGRAM(s) Oral three times a day PRN Dizziness      ASSESSMENT / PLAN:  ----------------------------------------  Dysphagia - Status post placement of a gastrostomy tube with initiation of feeds. Tolerating well.    Maxillary sinus neoplasm - Oncology consultation noted. Analgesic medications as needed.    Anemia - Multifactorial. Acute blood loss anemia from the tumor and chemotherapy. CBC stable. The patient reported no further episodes of bleeding.    Bipolar disorder - On olanzapine.

## 2018-07-22 NOTE — PROVIDER CONTACT NOTE (EICU) - BACKGROUND
- a/w squamous cell carcinoma oral  - PHM: chf, copd, cancer, YANET infection with VATS  - post peg placement  - on chemo (last week (?))

## 2018-07-22 NOTE — PROVIDER CONTACT NOTE (EICU) - RECOMMENDATIONS
- agree with above  - pain control as this is his primary concern  - if there is any concern about the peg location then would check "peg study" - water soluble contrast, injected through peg, with abdominal xray afterwards.

## 2018-07-22 NOTE — CHART NOTE - NSCHARTNOTEFT_GEN_A_CORE
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 Juan for evaluation for PEG tube evaluation and placement as patient has a difficulty to eat by mouth because of the cancer invasion into the mouth. Labs showed Hgb: 10. 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- was followed by ID after lung surgery  Neoplasm of maxillary sinus  SIRS (systemic inflammatory response syndrome)  Bipolar disorder, unspecified  Renal stones  CAD (coronary artery disease)  CHF (congestive heart failure): 2014  HLD (hyperlipidemia)  HTN (hypertension)  Diaphragm dysfunction: partial paralysis- now resolved as per wife  ETOH abuse  HF (heart failure)  LORNA on CPAP  Smoker  Chronic obstructive pulmonary disease, unspecified COPD type  Cataract  S/P lobectomy of lung: Sept 2017  H/O endoscopy  H/O colonoscopy  H/O coronary angioplasty: x2  No significant past surgical history      Allergies    hospital socks (Rash)  lisinopril (Anaphylaxis)  statins (Anaphylaxis)    Intolerances      MEDICATIONS  (STANDING):  ALBUTerol/ipratropium for Nebulization 3 milliLiter(s) Nebulizer every 6 hours  amphetamine/dextroamphetamine 30 milliGRAM(s) Oral two times a day  carvedilol 6.25 milliGRAM(s) Oral every 12 hours  loratadine 10 milliGRAM(s) Oral daily  morphine ER Tablet 30 milliGRAM(s) Oral three times a day  OLANZapine 5 milliGRAM(s) Oral daily  pantoprazole  Injectable 40 milliGRAM(s) IV Push two times a day  piperacillin/tazobactam IVPB. 3.375 Gram(s) IV Intermittent once  pregabalin 75 milliGRAM(s) Oral two times a day  vancomycin  IVPB 1000 milliGRAM(s) IV Intermittent once    MEDICATIONS  (PRN):  acetaminophen   Tablet 650 milliGRAM(s) Oral every 6 hours PRN For Temp greater than 38 C (100.4 F)  ALPRAZolam 0.5 milliGRAM(s) Oral at bedtime PRN anxiety  HYDROmorphone  Injectable 1 milliGRAM(s) IV Push every 4 hours PRN Severe Pain (7 - 10)  meclizine 25 milliGRAM(s) Oral three times a day PRN Dizziness      Vital Signs Last 24 Hrs  T(C): 38.7 (07-22-18 @ 16:17), Max: 38.7 (07-22-18 @ 16:17)  T(F): 101.6 (07-22-18 @ 16:17), Max: 101.6 (07-22-18 @ 16:17)  HR: 101 (07-22-18 @ 16:17) (88 - 105)  BP: 116/68 (07-22-18 @ 16:17) (103/64 - 118/72)  BP(mean): --  RR: 18 (07-22-18 @ 12:21) (18 - 20)  SpO2: 90% (07-22-18 @ 12:21) (90% - 96%)  Daily     Daily   I&O's Summary    21 Jul 2018 07:01  -  22 Jul 2018 07:00  --------------------------------------------------------  IN: 75 mL / OUT: 0 mL / NET: 75 mL        PHYSICAL EXAM:    GENERAL: adult male sitting upright in NAD     NECK: Supple, No JVD  NERVOUS SYSTEM:  Alert & Oriented X 3,  Grossly moving all extremities   CHEST/LUNG: CTABL, No rales, rhonchi, wheezing, chemo port right side chest wall no apparent overlying infection or tenderness  HEART: Regular rate and rhythm; No murmurs, rubs, or gallops  ABDOMEN: Soft, Nontender, Nondistended; Bowel sounds present, PEG tube in place  EXTREMITIES:  2+ Peripheral Pulses, No clubbing, cyanosis, or edema  SKIN: warm, turgor good,  capillary refill    <2 sec          Assessment-  1. Sepsis- a new suspected infection + 2 of ( Temp >101/ HR 90 or more/ RR >20/ WBC >12 K or < 4K/ Bands > 5% )  2. Severe sepsis/ Septic shock- Sepsis + (AMS/ SBP< 90/ MAP <65/ SBP reduced > 40 mmHg from baseline/ Plts <100K/ Cr > 2/ Cr > 50% from baseline/>50% FiO2 required for SaO2 >90%/ U Output < 30 ml/hr or < 240 ml in 8 hrs/ Bilirubin >2/ Lactate >2/ INR > 1.5/   PTT> 60 secs )    -----------------------------------------------------------------------------------------------------------------------------------------------------------------------------------  Assessment-  1. Sepsis                                                                                                                                             Time 16:27                     Intervention-  STAT Labs- CBC, CMP, S Lactate, Blood C/S x 2, PT/ PTT/ INR, UA, Urine C/S, CXR                          Time drawn 16:48  Antibiotic - zoxyn + vancomycin                                                                                                                                          Time started 17:15    OTHER antibiotic due to-     Intervention - Fluids                                                                                                                         Time started 16:27  Crystalloid fluid( NS; Ringer's; Plasmalyte)  Rate (30ml/Kg in 500ml boluses Q 15 mins until goal)  Total Volume- 1000 mL    lactate 0.9    d/w Dr. Blanchard

## 2018-07-22 NOTE — CHART NOTE - NSCHARTNOTEFT_GEN_A_CORE
Code Sepsis Follow-up    3 hr assessment      I have re-evaluated the patient's fluid status and vital signs. Clinical evaluation demonstrates an appropriate response to fluid resuscitation.

## 2018-07-23 ENCOUNTER — APPOINTMENT (OUTPATIENT)
Dept: PHYSICAL MEDICINE AND REHAB | Facility: CLINIC | Age: 58
End: 2018-07-23

## 2018-07-23 LAB
HCT VFR BLD CALC: 25.3 % — LOW (ref 42–52)
HGB BLD-MCNC: 8.1 G/DL — LOW (ref 14–18)
MCHC RBC-ENTMCNC: 29.2 PG — SIGNIFICANT CHANGE UP (ref 27–31)
MCHC RBC-ENTMCNC: 32 G/DL — SIGNIFICANT CHANGE UP (ref 32–36)
MCV RBC AUTO: 91.3 FL — SIGNIFICANT CHANGE UP (ref 80–94)
PLATELET # BLD AUTO: 267 K/UL — SIGNIFICANT CHANGE UP (ref 150–400)
PROCALCITONIN SERPL-MCNC: 0.72 NG/ML — HIGH (ref 0–0.04)
RBC # BLD: 2.77 M/UL — LOW (ref 4.6–6.2)
RBC # FLD: 14 % — SIGNIFICANT CHANGE UP (ref 11–15.6)
WBC # BLD: 18.2 K/UL — HIGH (ref 4.8–10.8)
WBC # FLD AUTO: 18.2 K/UL — HIGH (ref 4.8–10.8)

## 2018-07-23 PROCEDURE — 99233 SBSQ HOSP IP/OBS HIGH 50: CPT

## 2018-07-23 RX ORDER — IPRATROPIUM/ALBUTEROL SULFATE 18-103MCG
3 AEROSOL WITH ADAPTER (GRAM) INHALATION EVERY 6 HOURS
Qty: 0 | Refills: 0 | Status: DISCONTINUED | OUTPATIENT
Start: 2018-07-23 | End: 2018-07-31

## 2018-07-23 RX ORDER — SODIUM CHLORIDE 9 MG/ML
1000 INJECTION, SOLUTION INTRAVENOUS
Qty: 0 | Refills: 0 | Status: DISCONTINUED | OUTPATIENT
Start: 2018-07-23 | End: 2018-07-24

## 2018-07-23 RX ADMIN — MORPHINE SULFATE 30 MILLIGRAM(S): 50 CAPSULE, EXTENDED RELEASE ORAL at 13:50

## 2018-07-23 RX ADMIN — HYDROMORPHONE HYDROCHLORIDE 1 MILLIGRAM(S): 2 INJECTION INTRAMUSCULAR; INTRAVENOUS; SUBCUTANEOUS at 22:25

## 2018-07-23 RX ADMIN — MORPHINE SULFATE 30 MILLIGRAM(S): 50 CAPSULE, EXTENDED RELEASE ORAL at 06:30

## 2018-07-23 RX ADMIN — PIPERACILLIN AND TAZOBACTAM 25 GRAM(S): 4; .5 INJECTION, POWDER, LYOPHILIZED, FOR SOLUTION INTRAVENOUS at 05:20

## 2018-07-23 RX ADMIN — MORPHINE SULFATE 30 MILLIGRAM(S): 50 CAPSULE, EXTENDED RELEASE ORAL at 13:06

## 2018-07-23 RX ADMIN — HYDROMORPHONE HYDROCHLORIDE 1 MILLIGRAM(S): 2 INJECTION INTRAMUSCULAR; INTRAVENOUS; SUBCUTANEOUS at 13:51

## 2018-07-23 RX ADMIN — LORATADINE 10 MILLIGRAM(S): 10 TABLET ORAL at 13:07

## 2018-07-23 RX ADMIN — HYDROMORPHONE HYDROCHLORIDE 1 MILLIGRAM(S): 2 INJECTION INTRAMUSCULAR; INTRAVENOUS; SUBCUTANEOUS at 09:38

## 2018-07-23 RX ADMIN — HYDROMORPHONE HYDROCHLORIDE 1 MILLIGRAM(S): 2 INJECTION INTRAMUSCULAR; INTRAVENOUS; SUBCUTANEOUS at 18:00

## 2018-07-23 RX ADMIN — HYDROMORPHONE HYDROCHLORIDE 1 MILLIGRAM(S): 2 INJECTION INTRAMUSCULAR; INTRAVENOUS; SUBCUTANEOUS at 10:07

## 2018-07-23 RX ADMIN — PANTOPRAZOLE SODIUM 40 MILLIGRAM(S): 20 TABLET, DELAYED RELEASE ORAL at 18:01

## 2018-07-23 RX ADMIN — MORPHINE SULFATE 30 MILLIGRAM(S): 50 CAPSULE, EXTENDED RELEASE ORAL at 05:19

## 2018-07-23 RX ADMIN — Medication 0.5 MILLIGRAM(S): at 18:41

## 2018-07-23 RX ADMIN — HYDROMORPHONE HYDROCHLORIDE 1 MILLIGRAM(S): 2 INJECTION INTRAMUSCULAR; INTRAVENOUS; SUBCUTANEOUS at 14:30

## 2018-07-23 RX ADMIN — PIPERACILLIN AND TAZOBACTAM 25 GRAM(S): 4; .5 INJECTION, POWDER, LYOPHILIZED, FOR SOLUTION INTRAVENOUS at 21:30

## 2018-07-23 RX ADMIN — PANTOPRAZOLE SODIUM 40 MILLIGRAM(S): 20 TABLET, DELAYED RELEASE ORAL at 05:20

## 2018-07-23 RX ADMIN — HYDROMORPHONE HYDROCHLORIDE 1 MILLIGRAM(S): 2 INJECTION INTRAMUSCULAR; INTRAVENOUS; SUBCUTANEOUS at 22:40

## 2018-07-23 RX ADMIN — MORPHINE SULFATE 30 MILLIGRAM(S): 50 CAPSULE, EXTENDED RELEASE ORAL at 21:31

## 2018-07-23 RX ADMIN — MORPHINE SULFATE 30 MILLIGRAM(S): 50 CAPSULE, EXTENDED RELEASE ORAL at 22:31

## 2018-07-23 RX ADMIN — Medication 3 MILLILITER(S): at 21:04

## 2018-07-23 RX ADMIN — Medication 3 MILLILITER(S): at 03:15

## 2018-07-23 RX ADMIN — Medication 75 MILLIGRAM(S): at 18:00

## 2018-07-23 RX ADMIN — Medication 3 MILLILITER(S): at 08:43

## 2018-07-23 RX ADMIN — PIPERACILLIN AND TAZOBACTAM 25 GRAM(S): 4; .5 INJECTION, POWDER, LYOPHILIZED, FOR SOLUTION INTRAVENOUS at 13:06

## 2018-07-23 RX ADMIN — POTASSIUM PHOSPHATE, MONOBASIC POTASSIUM PHOSPHATE, DIBASIC 62.5 MILLIMOLE(S): 236; 224 INJECTION, SOLUTION INTRAVENOUS at 10:08

## 2018-07-23 RX ADMIN — Medication 75 MILLIGRAM(S): at 05:19

## 2018-07-23 RX ADMIN — HYDROMORPHONE HYDROCHLORIDE 1 MILLIGRAM(S): 2 INJECTION INTRAMUSCULAR; INTRAVENOUS; SUBCUTANEOUS at 18:22

## 2018-07-23 RX ADMIN — HYDROMORPHONE HYDROCHLORIDE 1 MILLIGRAM(S): 2 INJECTION INTRAMUSCULAR; INTRAVENOUS; SUBCUTANEOUS at 05:40

## 2018-07-23 RX ADMIN — SODIUM CHLORIDE 125 MILLILITER(S): 9 INJECTION, SOLUTION INTRAVENOUS at 05:46

## 2018-07-23 RX ADMIN — Medication 650 MILLIGRAM(S): at 20:14

## 2018-07-23 RX ADMIN — SODIUM CHLORIDE 125 MILLILITER(S): 9 INJECTION, SOLUTION INTRAVENOUS at 17:59

## 2018-07-23 RX ADMIN — HYDROMORPHONE HYDROCHLORIDE 1 MILLIGRAM(S): 2 INJECTION INTRAMUSCULAR; INTRAVENOUS; SUBCUTANEOUS at 05:21

## 2018-07-23 RX ADMIN — OLANZAPINE 5 MILLIGRAM(S): 15 TABLET, FILM COATED ORAL at 21:30

## 2018-07-23 NOTE — PROGRESS NOTE ADULT - SUBJECTIVE AND OBJECTIVE BOX
NITIN DAVALOS  ----------------------------------------  The patient was seen and evaluated for fever.  The patient is in no acute distress.  Denied any chest pain, palpitations, dyspnea, or abdominal pain.  The patient reports feeling well.    Vital Signs Last 24 Hrs  T(C): 37 (2018 11:39), Max: 38.7 (2018 16:17)  T(F): 98.6 (2018 11:39), Max: 101.6 (2018 16:17)  HR: 105 (2018 11:39) (95 - 114)  BP: 92/61 (2018 11:39) (92/61 - 116/68)  BP(mean): --  RR: 20 (2018 11:39) (20 - 24)  SpO2: 94% (2018 11:39) (84% - 98%)    CAPILLARY BLOOD GLUCOSE  POCT Blood Glucose.: 163 mg/dL (2018 16:36)    PHYSICAL EXAMINATION:  ----------------------------------------  General appearance: No acute distress, Awake, Alert  HEENT: Normocephalic, Atraumatic, Conjunctiva clear, EOMI  Neck: Supple, No JVD, No tenderness  Lungs: Clear to auscultation, Breath sound equal bilaterally, No wheezes, No rales  Cardiovascular: S1S2, Regular rhythm  Abdomen: Soft, Nontender, Nondistended, No guarding/rebound, Positive bowel sounds, Gastrostomy tube in place.  Extremities: No clubbing, No cyanosis, No edema, No calf tenderness  Neuro: Strength equal bilaterally, No tremors  Psychiatric: Appropriate mood, Normal affect    LABORATORY STUDIES:  ----------------------------------------             8.1    18.2  )-----------( 267      ( 2018 06:48 )             25.3     07-22    135  |  98  |  9.0  ----------------------------<  129<H>  3.4<L>   |  25.0  |  0.85    Ca    8.3<L>      2018 16:51  Phos  2.6       Mg     1.9         TPro  5.9<L>  /  Alb  3.2<L>  /  TBili  0.3<L>  /  DBili  x   /  AST  26  /  ALT  22  /  AlkPhos  143<H>      LIVER FUNCTIONS - ( 2018 16:51 )  Alb: 3.2 g/dL / Pro: 5.9 g/dL / ALK PHOS: 143 U/L / ALT: 22 U/L / AST: 26 U/L / GGT: x           PT/INR - ( 2018 17:40 )   PT: 14.2 sec;   INR: 1.28 ratio    PTT - ( 2018 17:40 )  PTT:38.4 sec    Urinalysis Basic - ( 2018 21:18 )  Color: Yellow / Appearance: Clear / S.020 / pH: x  Gluc: x / Ketone: Negative  / Bili: Negative / Urobili: 1 mg/dL   Blood: x / Protein: 30 mg/dL / Nitrite: Negative   Leuk Esterase: Negative / RBC: x / WBC 0-2   Sq Epi: x / Non Sq Epi: Occasional / Bacteria: x    Culture - Urine (collected 2018 08:31)  Source: .Urine Clean Catch (Midstream)  Final Report (2018 11:17):    No growth    MEDICATIONS  (STANDING):  ALBUTerol/ipratropium for Nebulization 3 milliLiter(s) Nebulizer every 6 hours  amphetamine/dextroamphetamine 30 milliGRAM(s) Oral two times a day  carvedilol 6.25 milliGRAM(s) Oral every 12 hours  loratadine 10 milliGRAM(s) Oral daily  morphine ER Tablet 30 milliGRAM(s) Oral three times a day  multiple electrolytes Injection Type 1 1000 milliLiter(s) (125 mL/Hr) IV Continuous <Continuous>  OLANZapine 5 milliGRAM(s) Oral daily  pantoprazole  Injectable 40 milliGRAM(s) IV Push two times a day  piperacillin/tazobactam IVPB. 3.375 Gram(s) IV Intermittent every 8 hours  pregabalin 75 milliGRAM(s) Oral two times a day    MEDICATIONS  (PRN):  acetaminophen   Tablet 650 milliGRAM(s) Oral every 6 hours PRN For Temp greater than 38 C (100.4 F)  ALPRAZolam 0.5 milliGRAM(s) Oral at bedtime PRN anxiety  HYDROmorphone  Injectable 1 milliGRAM(s) IV Push every 4 hours PRN Severe Pain (7 - 10)  meclizine 25 milliGRAM(s) Oral three times a day PRN Dizziness      ASSESSMENT / PLAN:  ----------------------------------------  SIRS - Unclear source of fever. On intravenous piperacillin/tazobactam. Culture results to be followed.    Dysphagia - Tolerating feeds through gastrostomy tube. Minimal discomfort at the insertion site. Having bowel movements without diarrhea.    Maxillary sinus neoplasm - Oncology consultation noted. Analgesic medications as needed.    Anemia - Multifactorial. No further bleeding episodes. Worsened with intravenous fluid resuscitation / dilutional.    Bipolar disorder - On olanzapine.

## 2018-07-24 LAB
ANION GAP SERPL CALC-SCNC: 13 MMOL/L — SIGNIFICANT CHANGE UP (ref 5–17)
BUN SERPL-MCNC: 6 MG/DL — LOW (ref 8–20)
CALCIUM SERPL-MCNC: 6.9 MG/DL — LOW (ref 8.6–10.2)
CHLORIDE SERPL-SCNC: 98 MMOL/L — SIGNIFICANT CHANGE UP (ref 98–107)
CO2 SERPL-SCNC: 27 MMOL/L — SIGNIFICANT CHANGE UP (ref 22–29)
CREAT SERPL-MCNC: 0.63 MG/DL — SIGNIFICANT CHANGE UP (ref 0.5–1.3)
CULTURE RESULTS: NO GROWTH — SIGNIFICANT CHANGE UP
GLUCOSE SERPL-MCNC: 104 MG/DL — SIGNIFICANT CHANGE UP (ref 70–115)
HCT VFR BLD CALC: 24.3 % — LOW (ref 42–52)
HGB BLD-MCNC: 7.5 G/DL — LOW (ref 14–18)
LACTATE BLDV-MCNC: 1 MMOL/L — SIGNIFICANT CHANGE UP (ref 0.5–2)
MCHC RBC-ENTMCNC: 29.4 PG — SIGNIFICANT CHANGE UP (ref 27–31)
MCHC RBC-ENTMCNC: 30.9 G/DL — LOW (ref 32–36)
MCV RBC AUTO: 95.3 FL — HIGH (ref 80–94)
PLATELET # BLD AUTO: 239 K/UL — SIGNIFICANT CHANGE UP (ref 150–400)
POTASSIUM SERPL-MCNC: 3.5 MMOL/L — SIGNIFICANT CHANGE UP (ref 3.5–5.3)
POTASSIUM SERPL-SCNC: 3.5 MMOL/L — SIGNIFICANT CHANGE UP (ref 3.5–5.3)
RBC # BLD: 2.55 M/UL — LOW (ref 4.6–6.2)
RBC # FLD: 14 % — SIGNIFICANT CHANGE UP (ref 11–15.6)
SODIUM SERPL-SCNC: 138 MMOL/L — SIGNIFICANT CHANGE UP (ref 135–145)
SPECIMEN SOURCE: SIGNIFICANT CHANGE UP
WBC # BLD: 16.8 K/UL — HIGH (ref 4.8–10.8)
WBC # FLD AUTO: 16.8 K/UL — HIGH (ref 4.8–10.8)

## 2018-07-24 PROCEDURE — 99233 SBSQ HOSP IP/OBS HIGH 50: CPT

## 2018-07-24 RX ORDER — IBUPROFEN 200 MG
400 TABLET ORAL ONCE
Qty: 0 | Refills: 0 | Status: COMPLETED | OUTPATIENT
Start: 2018-07-24 | End: 2018-07-24

## 2018-07-24 RX ORDER — SODIUM CHLORIDE 9 MG/ML
500 INJECTION INTRAMUSCULAR; INTRAVENOUS; SUBCUTANEOUS ONCE
Qty: 0 | Refills: 0 | Status: COMPLETED | OUTPATIENT
Start: 2018-07-24 | End: 2018-07-24

## 2018-07-24 RX ADMIN — LORATADINE 10 MILLIGRAM(S): 10 TABLET ORAL at 12:33

## 2018-07-24 RX ADMIN — HYDROMORPHONE HYDROCHLORIDE 1 MILLIGRAM(S): 2 INJECTION INTRAMUSCULAR; INTRAVENOUS; SUBCUTANEOUS at 21:17

## 2018-07-24 RX ADMIN — MORPHINE SULFATE 30 MILLIGRAM(S): 50 CAPSULE, EXTENDED RELEASE ORAL at 15:03

## 2018-07-24 RX ADMIN — MORPHINE SULFATE 30 MILLIGRAM(S): 50 CAPSULE, EXTENDED RELEASE ORAL at 23:54

## 2018-07-24 RX ADMIN — MORPHINE SULFATE 30 MILLIGRAM(S): 50 CAPSULE, EXTENDED RELEASE ORAL at 14:03

## 2018-07-24 RX ADMIN — Medication 400 MILLIGRAM(S): at 23:54

## 2018-07-24 RX ADMIN — Medication 75 MILLIGRAM(S): at 05:23

## 2018-07-24 RX ADMIN — MORPHINE SULFATE 30 MILLIGRAM(S): 50 CAPSULE, EXTENDED RELEASE ORAL at 06:23

## 2018-07-24 RX ADMIN — Medication 3 MILLILITER(S): at 20:25

## 2018-07-24 RX ADMIN — Medication 3 MILLILITER(S): at 15:51

## 2018-07-24 RX ADMIN — HYDROMORPHONE HYDROCHLORIDE 1 MILLIGRAM(S): 2 INJECTION INTRAMUSCULAR; INTRAVENOUS; SUBCUTANEOUS at 12:33

## 2018-07-24 RX ADMIN — CARVEDILOL PHOSPHATE 6.25 MILLIGRAM(S): 80 CAPSULE, EXTENDED RELEASE ORAL at 05:23

## 2018-07-24 RX ADMIN — HYDROMORPHONE HYDROCHLORIDE 1 MILLIGRAM(S): 2 INJECTION INTRAMUSCULAR; INTRAVENOUS; SUBCUTANEOUS at 04:24

## 2018-07-24 RX ADMIN — HYDROMORPHONE HYDROCHLORIDE 1 MILLIGRAM(S): 2 INJECTION INTRAMUSCULAR; INTRAVENOUS; SUBCUTANEOUS at 04:40

## 2018-07-24 RX ADMIN — PIPERACILLIN AND TAZOBACTAM 25 GRAM(S): 4; .5 INJECTION, POWDER, LYOPHILIZED, FOR SOLUTION INTRAVENOUS at 05:22

## 2018-07-24 RX ADMIN — HYDROMORPHONE HYDROCHLORIDE 1 MILLIGRAM(S): 2 INJECTION INTRAMUSCULAR; INTRAVENOUS; SUBCUTANEOUS at 20:12

## 2018-07-24 RX ADMIN — Medication 650 MILLIGRAM(S): at 17:41

## 2018-07-24 RX ADMIN — Medication 400 MILLIGRAM(S): at 23:26

## 2018-07-24 RX ADMIN — SODIUM CHLORIDE 125 MILLILITER(S): 9 INJECTION, SOLUTION INTRAVENOUS at 05:22

## 2018-07-24 RX ADMIN — PIPERACILLIN AND TAZOBACTAM 25 GRAM(S): 4; .5 INJECTION, POWDER, LYOPHILIZED, FOR SOLUTION INTRAVENOUS at 14:03

## 2018-07-24 RX ADMIN — MORPHINE SULFATE 30 MILLIGRAM(S): 50 CAPSULE, EXTENDED RELEASE ORAL at 05:23

## 2018-07-24 RX ADMIN — DEXTROAMPHETAMINE SACCHARATE, AMPHETAMINE ASPARTATE, DEXTROAMPHETAMINE SULFATE AND AMPHETAMINE SULFATE 30 MILLIGRAM(S): 1.875; 1.875; 1.875; 1.875 TABLET ORAL at 17:48

## 2018-07-24 RX ADMIN — PIPERACILLIN AND TAZOBACTAM 25 GRAM(S): 4; .5 INJECTION, POWDER, LYOPHILIZED, FOR SOLUTION INTRAVENOUS at 21:18

## 2018-07-24 RX ADMIN — SODIUM CHLORIDE 500 MILLILITER(S): 9 INJECTION INTRAMUSCULAR; INTRAVENOUS; SUBCUTANEOUS at 20:59

## 2018-07-24 RX ADMIN — PANTOPRAZOLE SODIUM 40 MILLIGRAM(S): 20 TABLET, DELAYED RELEASE ORAL at 05:22

## 2018-07-24 RX ADMIN — HYDROMORPHONE HYDROCHLORIDE 1 MILLIGRAM(S): 2 INJECTION INTRAMUSCULAR; INTRAVENOUS; SUBCUTANEOUS at 16:05

## 2018-07-24 RX ADMIN — Medication 75 MILLIGRAM(S): at 17:48

## 2018-07-24 RX ADMIN — Medication 3 MILLILITER(S): at 08:50

## 2018-07-24 RX ADMIN — PANTOPRAZOLE SODIUM 40 MILLIGRAM(S): 20 TABLET, DELAYED RELEASE ORAL at 17:48

## 2018-07-24 RX ADMIN — HYDROMORPHONE HYDROCHLORIDE 1 MILLIGRAM(S): 2 INJECTION INTRAMUSCULAR; INTRAVENOUS; SUBCUTANEOUS at 15:48

## 2018-07-24 RX ADMIN — MORPHINE SULFATE 30 MILLIGRAM(S): 50 CAPSULE, EXTENDED RELEASE ORAL at 21:18

## 2018-07-24 RX ADMIN — OLANZAPINE 5 MILLIGRAM(S): 15 TABLET, FILM COATED ORAL at 21:18

## 2018-07-24 NOTE — CHART NOTE - NSCHARTNOTEFT_GEN_A_CORE
Nutrition Note: Pt with mech soft diet order and peg tube feeds. Pt taking minimal amounts of mech soft diet as per wife at bedside, and tolerating Jevity 1.5cal @60 hr.   Rec Ensure TID with meals to supplement kcal, protein.  Spoke to family at length. Pt with very poor intake at home. ( 20lb weight loss) Peg placed. Wife is requesting a bolus regimen as pt is unlikely to be compliant with continuous and it is easier to manage at home.   current weight 81 kg (30-35 kcal/ kg )2430 -2835 kcal/ day and 97- 113 g pro  Pt will require 8 8oz cans of Jevity / day. ( discussed  every 3 hr feeding schedule with wife. Also pt is taking some PO, so discussed skipping a feeding if adequate PO intake is occurring.)    Emily Crocker RD CDN

## 2018-07-24 NOTE — PROGRESS NOTE ADULT - SUBJECTIVE AND OBJECTIVE BOX
NITIN DAVALOS  ----------------------------------------  The patient was seen and evaluated for fever.  The patient is in no acute distress.  Denied any chest pain, palpitations, dyspnea, or abdominal pain.  Tolerating gastrostomy tube feeds.    Vital Signs Last 24 Hrs  T(C): 37.2 (2018 12:38), Max: 38.1 (2018 20:03)  T(F): 99 (2018 12:38), Max: 100.6 (2018 20:03)  HR: 99 (2018 12:38) (82 - 115)  BP: 101/56 (2018 12:38) (98/58 - 147/70)  BP(mean): --  RR: 20 (2018 12:38) (19 - 20)  SpO2: 97% (2018 08:53) (95% - 97%)    PHYSICAL EXAMINATION:  ----------------------------------------  General appearance: No acute distress, Awake, Alert  HEENT: Normocephalic, Atraumatic, Conjunctiva clear, EOMI  Neck: Supple, No JVD, No tenderness  Lungs: Clear to auscultation, Breath sound equal bilaterally, No wheezes, No rales  Cardiovascular: S1S2, Regular rhythm  Abdomen: Soft, Nontender, Nondistended, No guarding/rebound, Positive bowel sounds, Gastrostomy tube in place.  Extremities: No clubbing, No cyanosis, No edema, No calf tenderness  Neuro: Strength equal bilaterally, No tremors  Psychiatric: Appropriate mood, Normal affect    LABORATORY STUDIES:  ----------------------------------------             7.5    16.8  )-----------( 239      ( 2018 07:48 )             24.3     07-24    138  |  98  |  6.0<L>  ----------------------------<  104  3.5   |  27.0  |  0.63    Ca    6.9<L>      2018 07:45  Phos  2.6       Mg     1.9         TPro  5.9<L>  /  Alb  3.2<L>  /  TBili  0.3<L>  /  DBili  x   /  AST  26  /  ALT  22  /  AlkPhos  143<H>      LIVER FUNCTIONS - ( 2018 16:51 )  Alb: 3.2 g/dL / Pro: 5.9 g/dL / ALK PHOS: 143 U/L / ALT: 22 U/L / AST: 26 U/L / GGT: x           PT/INR - ( 2018 17:40 )   PT: 14.2 sec;   INR: 1.28 ratio    PTT - ( 2018 17:40 )  PTT:38.4 sec    Urinalysis Basic - ( 2018 21:18 )  Color: Yellow / Appearance: Clear / S.020 / pH: x  Gluc: x / Ketone: Negative  / Bili: Negative / Urobili: 1 mg/dL   Blood: x / Protein: 30 mg/dL / Nitrite: Negative   Leuk Esterase: Negative / RBC: x / WBC 0-2   Sq Epi: x / Non Sq Epi: Occasional / Bacteria: x    Culture - Urine (collected 2018 21:20)  Source: .Urine Catheterized  Final Report (2018 10:28):    No growth    MEDICATIONS  (STANDING):  ALBUTerol/ipratropium for Nebulization 3 milliLiter(s) Nebulizer every 6 hours  amphetamine/dextroamphetamine 30 milliGRAM(s) Oral two times a day  carvedilol 6.25 milliGRAM(s) Oral every 12 hours  loratadine 10 milliGRAM(s) Oral daily  morphine ER Tablet 30 milliGRAM(s) Oral three times a day  OLANZapine 5 milliGRAM(s) Oral daily  pantoprazole  Injectable 40 milliGRAM(s) IV Push two times a day  piperacillin/tazobactam IVPB. 3.375 Gram(s) IV Intermittent every 8 hours  pregabalin 75 milliGRAM(s) Oral two times a day    MEDICATIONS  (PRN):  acetaminophen   Tablet 650 milliGRAM(s) Oral every 6 hours PRN For Temp greater than 38 C (100.4 F)  ALPRAZolam 0.5 milliGRAM(s) Oral at bedtime PRN anxiety  HYDROmorphone  Injectable 1 milliGRAM(s) IV Push every 4 hours PRN Severe Pain (7 - 10)  meclizine 25 milliGRAM(s) Oral three times a day PRN Dizziness      ASSESSMENT / PLAN:  ----------------------------------------  SIRS - Unclear source of fever. On intravenous piperacillin/tazobactam. Urine culture was without growth. Blood culture results pending.    Dysphagia - Tolerating feeds through gastrostomy tube. Minimal discomfort at the insertion site. Nutrition follow up noted. Poor oral caloric intake, the patient is consuming only one to two spoonfuls of food per meal.    Maxillary sinus neoplasm - Oncology consultation noted. Analgesic medications as needed. Episodes of intermittent bleeding which had occurred in the past. The patient was seen previously by otolaryngology in the past and no intervention was thought to be beneficial.    Anemia - Multifactorial. May need transfusion of packed red blood cells if worsens. Discussed with the patient and his wife at the bedside.    Bipolar disorder - On olanzapine. NITIN DAVALOS  ----------------------------------------  The patient was seen and evaluated for fever.  The patient is in no acute distress.  Denied any chest pain, palpitations, dyspnea, or abdominal pain.  Tolerating gastrostomy tube feeds.    Vital Signs Last 24 Hrs  T(C): 37.2 (2018 12:38), Max: 38.1 (2018 20:03)  T(F): 99 (2018 12:38), Max: 100.6 (2018 20:03)  HR: 99 (2018 12:38) (82 - 115)  BP: 101/56 (2018 12:38) (98/58 - 147/70)  BP(mean): --  RR: 20 (2018 12:38) (19 - 20)  SpO2: 97% (2018 08:53) (95% - 97%)    PHYSICAL EXAMINATION:  ----------------------------------------  General appearance: No acute distress, Awake, Alert  HEENT: Normocephalic, Atraumatic, Conjunctiva clear, EOMI  Neck: Supple, No JVD, No tenderness  Lungs: Clear to auscultation, Breath sound equal bilaterally, No wheezes, No rales  Cardiovascular: S1S2, Regular rhythm  Abdomen: Soft, Nontender, Nondistended, No guarding/rebound, Positive bowel sounds, Gastrostomy tube in place.  Extremities: No clubbing, No cyanosis, No edema, No calf tenderness  Neuro: Strength equal bilaterally, No tremors  Psychiatric: Appropriate mood, Normal affect    LABORATORY STUDIES:  ----------------------------------------             7.5    16.8  )-----------( 239      ( 2018 07:48 )             24.3     07-24    138  |  98  |  6.0<L>  ----------------------------<  104  3.5   |  27.0  |  0.63    Ca    6.9<L>      2018 07:45  Phos  2.6       Mg     1.9         TPro  5.9<L>  /  Alb  3.2<L>  /  TBili  0.3<L>  /  DBili  x   /  AST  26  /  ALT  22  /  AlkPhos  143<H>      LIVER FUNCTIONS - ( 2018 16:51 )  Alb: 3.2 g/dL / Pro: 5.9 g/dL / ALK PHOS: 143 U/L / ALT: 22 U/L / AST: 26 U/L / GGT: x           PT/INR - ( 2018 17:40 )   PT: 14.2 sec;   INR: 1.28 ratio    PTT - ( 2018 17:40 )  PTT:38.4 sec    Urinalysis Basic - ( 2018 21:18 )  Color: Yellow / Appearance: Clear / S.020 / pH: x  Gluc: x / Ketone: Negative  / Bili: Negative / Urobili: 1 mg/dL   Blood: x / Protein: 30 mg/dL / Nitrite: Negative   Leuk Esterase: Negative / RBC: x / WBC 0-2   Sq Epi: x / Non Sq Epi: Occasional / Bacteria: x    Culture - Urine (collected 2018 21:20)  Source: .Urine Catheterized  Final Report (2018 10:28):    No growth    MEDICATIONS  (STANDING):  ALBUTerol/ipratropium for Nebulization 3 milliLiter(s) Nebulizer every 6 hours  amphetamine/dextroamphetamine 30 milliGRAM(s) Oral two times a day  carvedilol 6.25 milliGRAM(s) Oral every 12 hours  loratadine 10 milliGRAM(s) Oral daily  morphine ER Tablet 30 milliGRAM(s) Oral three times a day  OLANZapine 5 milliGRAM(s) Oral daily  pantoprazole  Injectable 40 milliGRAM(s) IV Push two times a day  piperacillin/tazobactam IVPB. 3.375 Gram(s) IV Intermittent every 8 hours  pregabalin 75 milliGRAM(s) Oral two times a day    MEDICATIONS  (PRN):  acetaminophen   Tablet 650 milliGRAM(s) Oral every 6 hours PRN For Temp greater than 38 C (100.4 F)  ALPRAZolam 0.5 milliGRAM(s) Oral at bedtime PRN anxiety  HYDROmorphone  Injectable 1 milliGRAM(s) IV Push every 4 hours PRN Severe Pain (7 - 10)  meclizine 25 milliGRAM(s) Oral three times a day PRN Dizziness      ASSESSMENT / PLAN:  ----------------------------------------  SIRS - Unclear source of fever. Leukocytosis improved. On intravenous piperacillin/tazobactam. Urine culture was without growth. Blood culture results pending.    Dysphagia - Tolerating feeds through gastrostomy tube. Minimal discomfort at the insertion site. Nutrition follow up noted. Poor oral caloric intake, the patient is consuming only one to two spoonfuls of food per meal.    Maxillary sinus neoplasm - Oncology consultation noted. Analgesic medications as needed. Episodes of intermittent bleeding which had occurred in the past. The patient was seen previously by otolaryngology in the past and no intervention was thought to be beneficial.    Anemia - Multifactorial. May need transfusion of packed red blood cells if worsens. Discussed with the patient and his wife at the bedside.    Bipolar disorder - On olanzapine.

## 2018-07-24 NOTE — CHART NOTE - NSCHARTNOTEFT_GEN_A_CORE
Pt code sepsis Sunday. B/P low all day.6 pm coreg held. B/P now 86/50 asymptomatic. VS B/P 86/40 P 94 R 18 PO 99% T 99.4  500cc bolus ordered . STAT lactate. continue to monitor

## 2018-07-25 ENCOUNTER — OTHER (OUTPATIENT)
Age: 58
End: 2018-07-25

## 2018-07-25 DIAGNOSIS — R09.02 HYPOXEMIA: ICD-10-CM

## 2018-07-25 DIAGNOSIS — R65.10 SYSTEMIC INFLAMMATORY RESPONSE SYNDROME (SIRS) OF NON-INFECTIOUS ORIGIN WITHOUT ACUTE ORGAN DYSFUNCTION: ICD-10-CM

## 2018-07-25 LAB
ANION GAP SERPL CALC-SCNC: 10 MMOL/L — SIGNIFICANT CHANGE UP (ref 5–17)
BUN SERPL-MCNC: 8 MG/DL — SIGNIFICANT CHANGE UP (ref 8–20)
CALCIUM SERPL-MCNC: 8.2 MG/DL — LOW (ref 8.6–10.2)
CHLORIDE SERPL-SCNC: 99 MMOL/L — SIGNIFICANT CHANGE UP (ref 98–107)
CO2 SERPL-SCNC: 30 MMOL/L — HIGH (ref 22–29)
CREAT SERPL-MCNC: 0.72 MG/DL — SIGNIFICANT CHANGE UP (ref 0.5–1.3)
GLUCOSE BLDC GLUCOMTR-MCNC: 117 MG/DL — HIGH (ref 70–99)
GLUCOSE SERPL-MCNC: 119 MG/DL — HIGH (ref 70–115)
HCT VFR BLD CALC: 26.5 % — LOW (ref 42–52)
HCT VFR BLD CALC: 27.5 % — LOW (ref 42–52)
HGB BLD-MCNC: 8.1 G/DL — LOW (ref 14–18)
HGB BLD-MCNC: 8.4 G/DL — LOW (ref 14–18)
MCHC RBC-ENTMCNC: 29.1 PG — SIGNIFICANT CHANGE UP (ref 27–31)
MCHC RBC-ENTMCNC: 29.1 PG — SIGNIFICANT CHANGE UP (ref 27–31)
MCHC RBC-ENTMCNC: 30.5 G/DL — LOW (ref 32–36)
MCHC RBC-ENTMCNC: 30.6 G/DL — LOW (ref 32–36)
MCV RBC AUTO: 95.2 FL — HIGH (ref 80–94)
MCV RBC AUTO: 95.3 FL — HIGH (ref 80–94)
PLATELET # BLD AUTO: 230 K/UL — SIGNIFICANT CHANGE UP (ref 150–400)
PLATELET # BLD AUTO: 244 K/UL — SIGNIFICANT CHANGE UP (ref 150–400)
POTASSIUM SERPL-MCNC: 4 MMOL/L — SIGNIFICANT CHANGE UP (ref 3.5–5.3)
POTASSIUM SERPL-SCNC: 4 MMOL/L — SIGNIFICANT CHANGE UP (ref 3.5–5.3)
RBC # BLD: 2.78 M/UL — LOW (ref 4.6–6.2)
RBC # BLD: 2.89 M/UL — LOW (ref 4.6–6.2)
RBC # FLD: 14 % — SIGNIFICANT CHANGE UP (ref 11–15.6)
RBC # FLD: 14.2 % — SIGNIFICANT CHANGE UP (ref 11–15.6)
SODIUM SERPL-SCNC: 139 MMOL/L — SIGNIFICANT CHANGE UP (ref 135–145)
TROPONIN T SERPL-MCNC: <0.01 NG/ML — SIGNIFICANT CHANGE UP (ref 0–0.06)
WBC # BLD: 15.3 K/UL — HIGH (ref 4.8–10.8)
WBC # BLD: 16.6 K/UL — HIGH (ref 4.8–10.8)
WBC # FLD AUTO: 15.3 K/UL — HIGH (ref 4.8–10.8)
WBC # FLD AUTO: 16.6 K/UL — HIGH (ref 4.8–10.8)

## 2018-07-25 PROCEDURE — 99233 SBSQ HOSP IP/OBS HIGH 50: CPT

## 2018-07-25 PROCEDURE — 93970 EXTREMITY STUDY: CPT | Mod: 26

## 2018-07-25 PROCEDURE — 71250 CT THORAX DX C-: CPT | Mod: 26

## 2018-07-25 PROCEDURE — 93010 ELECTROCARDIOGRAM REPORT: CPT

## 2018-07-25 RX ADMIN — Medication 3 MILLILITER(S): at 15:56

## 2018-07-25 RX ADMIN — HYDROMORPHONE HYDROCHLORIDE 1 MILLIGRAM(S): 2 INJECTION INTRAMUSCULAR; INTRAVENOUS; SUBCUTANEOUS at 17:42

## 2018-07-25 RX ADMIN — HYDROMORPHONE HYDROCHLORIDE 1 MILLIGRAM(S): 2 INJECTION INTRAMUSCULAR; INTRAVENOUS; SUBCUTANEOUS at 21:30

## 2018-07-25 RX ADMIN — Medication 75 MILLIGRAM(S): at 17:55

## 2018-07-25 RX ADMIN — MORPHINE SULFATE 30 MILLIGRAM(S): 50 CAPSULE, EXTENDED RELEASE ORAL at 14:42

## 2018-07-25 RX ADMIN — MORPHINE SULFATE 30 MILLIGRAM(S): 50 CAPSULE, EXTENDED RELEASE ORAL at 15:56

## 2018-07-25 RX ADMIN — HYDROMORPHONE HYDROCHLORIDE 1 MILLIGRAM(S): 2 INJECTION INTRAMUSCULAR; INTRAVENOUS; SUBCUTANEOUS at 16:40

## 2018-07-25 RX ADMIN — Medication 650 MILLIGRAM(S): at 18:01

## 2018-07-25 RX ADMIN — Medication 3 MILLILITER(S): at 20:18

## 2018-07-25 RX ADMIN — PIPERACILLIN AND TAZOBACTAM 25 GRAM(S): 4; .5 INJECTION, POWDER, LYOPHILIZED, FOR SOLUTION INTRAVENOUS at 15:59

## 2018-07-25 RX ADMIN — Medication 3 MILLILITER(S): at 03:43

## 2018-07-25 RX ADMIN — HYDROMORPHONE HYDROCHLORIDE 1 MILLIGRAM(S): 2 INJECTION INTRAMUSCULAR; INTRAVENOUS; SUBCUTANEOUS at 08:15

## 2018-07-25 RX ADMIN — HYDROMORPHONE HYDROCHLORIDE 1 MILLIGRAM(S): 2 INJECTION INTRAMUSCULAR; INTRAVENOUS; SUBCUTANEOUS at 08:40

## 2018-07-25 RX ADMIN — HYDROMORPHONE HYDROCHLORIDE 1 MILLIGRAM(S): 2 INJECTION INTRAMUSCULAR; INTRAVENOUS; SUBCUTANEOUS at 21:06

## 2018-07-25 RX ADMIN — Medication 3 MILLILITER(S): at 09:40

## 2018-07-25 RX ADMIN — HYDROMORPHONE HYDROCHLORIDE 1 MILLIGRAM(S): 2 INJECTION INTRAMUSCULAR; INTRAVENOUS; SUBCUTANEOUS at 13:15

## 2018-07-25 RX ADMIN — MORPHINE SULFATE 30 MILLIGRAM(S): 50 CAPSULE, EXTENDED RELEASE ORAL at 22:00

## 2018-07-25 RX ADMIN — OLANZAPINE 5 MILLIGRAM(S): 15 TABLET, FILM COATED ORAL at 21:07

## 2018-07-25 RX ADMIN — LORATADINE 10 MILLIGRAM(S): 10 TABLET ORAL at 11:08

## 2018-07-25 RX ADMIN — MORPHINE SULFATE 30 MILLIGRAM(S): 50 CAPSULE, EXTENDED RELEASE ORAL at 07:38

## 2018-07-25 RX ADMIN — Medication 75 MILLIGRAM(S): at 06:23

## 2018-07-25 RX ADMIN — MORPHINE SULFATE 30 MILLIGRAM(S): 50 CAPSULE, EXTENDED RELEASE ORAL at 06:23

## 2018-07-25 RX ADMIN — PANTOPRAZOLE SODIUM 40 MILLIGRAM(S): 20 TABLET, DELAYED RELEASE ORAL at 17:56

## 2018-07-25 RX ADMIN — Medication 0.5 MILLIGRAM(S): at 17:55

## 2018-07-25 RX ADMIN — PIPERACILLIN AND TAZOBACTAM 25 GRAM(S): 4; .5 INJECTION, POWDER, LYOPHILIZED, FOR SOLUTION INTRAVENOUS at 21:07

## 2018-07-25 RX ADMIN — MORPHINE SULFATE 30 MILLIGRAM(S): 50 CAPSULE, EXTENDED RELEASE ORAL at 21:06

## 2018-07-25 RX ADMIN — Medication 25 MILLIGRAM(S): at 04:22

## 2018-07-25 RX ADMIN — PANTOPRAZOLE SODIUM 40 MILLIGRAM(S): 20 TABLET, DELAYED RELEASE ORAL at 06:23

## 2018-07-25 RX ADMIN — PIPERACILLIN AND TAZOBACTAM 25 GRAM(S): 4; .5 INJECTION, POWDER, LYOPHILIZED, FOR SOLUTION INTRAVENOUS at 06:23

## 2018-07-25 RX ADMIN — HYDROMORPHONE HYDROCHLORIDE 1 MILLIGRAM(S): 2 INJECTION INTRAMUSCULAR; INTRAVENOUS; SUBCUTANEOUS at 12:48

## 2018-07-25 NOTE — CHART NOTE - NSCHARTNOTEFT_GEN_A_CORE
Pt c/o episode of feeling lightheaded and diaphoretic after going to the bathroom. Pt states this has happened many times before. Pt denied h/a cp LOC palpitations SOB or any other complaints. Pt without any complaints at this time. Pt states he did not have a BM.  General: wife at bedside VSS B/P 102/64 T 97.4 P 81 R 18 PO 92%  A+OX3 cooperative NAD appears comfortable  skin warm and dry  lungs clear   heart rr  calf nontender no edema  STAT EKG NSR t wave inversions noted lead 3 V2-V3  STAT CBC TROPONINS X 3  STAT ACCUCHECK 117  Pt VSS NAD pt without complaints at this time, appears comfortable  labs pending  continue to monitor  call PA if any changes in pts condition

## 2018-07-25 NOTE — CONSULT NOTE ADULT - SUBJECTIVE AND OBJECTIVE BOX
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 Juan for evaluation for PEG tube evaluation and placement as patient has a difficulty to eat by mouth because of the cancer invasion into the mouth. Labs showed Hgb: 10. patient has frequent attacks of bleed from the nose without trauma (20 Jul 2018 05:30). He was Dxed with the above cancer roughly five weeks ago and started chemotherapy roughly one week ago. He was a heavy smoker which is felt to be the etiology of his maxillary sinus cancer and according to his wife he just stopped smoking roughly one week ago.      REVIEW OF SYSTEMS:  Constitutional: No fever, weight loss or fatigue  ENMT:  No difficulty hearing, tinnitus, vertigo; + Throat pain and right sinus pain  Respiratory: No cough, wheezing, chills or hemoptysis  Cardiovascular: No chest pain, palpitations, dizziness or leg swelling  Gastrointestinal: As per HPI. No abdominal or epigastric pain. No nausea, vomiting or hematemesis; + diarrhea secondary to chemotherapy. No constipation. No melena or hematochezia.  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- was followed by ID after lung surgery  Neoplasm of maxillary sinus  SIRS (systemic inflammatory response syndrome)  Bipolar disorder, unspecified  Renal stones  CAD (coronary artery disease)  CHF (congestive heart failure): 2014  HLD (hyperlipidemia)  HTN (hypertension)  Diaphragm dysfunction: partial paralysis- now resolved as per wife  ETOH abuse  HF (heart failure)  LORNA on CPAP  Smoker  Chronic obstructive pulmonary disease, unspecified COPD type  Cataract  S/P lobectomy of lung: Sept 2017  H/O endoscopy  H/O colonoscopy  H/O coronary angioplasty: x2      FAMILY HISTORY:  No family history of COPD  Family history of hypertension in mother (Sibling)      SOCIAL HISTORY:  Smoking Status: [ ] Current, [x ] Former, [ ] Never  Pack Years: > 40 pack year history. No ETOH or drug abuse history.    MEDICATIONS:  MEDICATIONS  (STANDING):  amphetamine/dextroamphetamine 30 milliGRAM(s) Oral two times a day  aspirin enteric coated 81 milliGRAM(s) Oral daily  carvedilol 6.25 milliGRAM(s) Oral every 12 hours  loratadine 10 milliGRAM(s) Oral daily  morphine ER Tablet 30 milliGRAM(s) Oral three times a day  OLANZapine 5 milliGRAM(s) Oral daily  pregabalin 75 milliGRAM(s) Oral two times a day    MEDICATIONS  (PRN):  ALPRAZolam 0.5 milliGRAM(s) Oral at bedtime PRN anxiety  HYDROmorphone   Tablet 4 milliGRAM(s) Oral every 4 hours PRN Severe Pain (7 - 10)  meclizine 25 milliGRAM(s) Oral three times a day PRN Dizziness      Allergies    hospital socks (Rash)  lisinopril (Anaphylaxis)  statins (Anaphylaxis)    Intolerances        Vital Signs Last 24 Hrs  T(C): 36.8 (19 Jul 2018 18:31), Max: 36.8 (19 Jul 2018 18:31)  T(F): 98.2 (19 Jul 2018 18:31), Max: 98.2 (19 Jul 2018 18:31)  HR: 95 (20 Jul 2018 06:44) (95 - 112)  BP: 107/72 (20 Jul 2018 06:44) (103/69 - 126/80)  BP(mean): --  RR: 18 (20 Jul 2018 06:44) (18 - 19)  SpO2: 95% (20 Jul 2018 06:44) (95% - 95%)        PHYSICAL EXAM:    General: Well developed; lethargic male in no acute distress when seen.  HEENT: MMM, conjunctiva pink and sclera anicteric.  Lungs: Clear bilaterally.   Cor: RRR S1, S2 only.  Gastrointestinal:  Abdomen: Soft, non-tender non-distended; Normal bowel sounds; No rebound or guarding or HSM or surgical scars.  BACILIO: Not done. Not indicated or necessary in this patient.  Extremities: Normal range of motion, No clubbing, cyanosis or edema  Neurological: Alert and oriented x3  Skin: Warm and dry. No obvious rash      LABS:                        10.1   7.2   )-----------( 295      ( 19 Jul 2018 23:43 )             31.1     07-19    137  |  99  |  19.0  ----------------------------<  96  3.7   |  22.0  |  1.09    Ca    9.4      19 Jul 2018 23:44  Mg     1.7     07-19    TPro  6.7  /  Alb  3.7  /  TBili  0.7  /  DBili  x   /  AST  36  /  ALT  47<H>  /  AlkPhos  101  07-19          RADIOLOGY & ADDITIONAL STUDIES:
[facility]                                                        Office: [address] [address]                                                       Telephone: [phone]. Fax:[phone]      CC: " I was dehydrated and need a tube in my stomach."      HPI: Patient is a 56 y/o Male with a PMHx of CHF  HFrEF 43% s/p 2 Coronary Angio with minor luminal irregularities only, COPD/Emphysema, Bipolar Disorder, YANET s/p  L  VATS trisegmentectomy partial decortication, neoplasm of maxillary sinus, LORNA on CPAP who presented to the ED due to dehydration and for PEG evaluation. Patient with recent weight loss due to maxillary sinus cancer extending down palate to the mouth. Patient started chemotherapy 1 week ago, has been very weak and tired ever since. Patient with frequent nose bleeds lately as well. History provided by patient's wife, patietn very fatigued and giving short answers. Per patient's wife he has been experiencing a chronic cough for months. Up until chemotherapy patient was able to climb stairs and walk a few blocks without CP or SOB. Patient is feeling very weak and tired, but no other complaints. Denies fevers, chills, CP, SOB, n/V/D, headache, or dizziness.       PAST MEDICAL & SURGICAL HISTORY:  YANET (mycobacterium avium-intracellulare): 2017- s/p l  L  VATS trisegmentectomy partial decortication  Neoplasm of maxillary sinus  Bipolar disorder, unspecified  CHF (congestive heart failure):   Diaphragm dysfunction: partial paralysis- now resolved as per wife  ETOH abuse  LORNA on CPAP  Cataract  S/P lobectomy of lung: 2017  H/O endoscopy  H/O colonoscopy  H/O coronary angioplasty: x2    FAMILY HISTORY:  No family history of COPD  Family history of hypertension in mother (Sibling)    SOCIAL HISTORY:   SMOKIN ppd for up to 45 years, quit 1 week ago.   ALCOHOL: Quit 30 years ago, drank daily up to 10 years.   DRUGS: Former cocaine use, quit 30 years ago.    MEDICATIONS  (STANDING):  amphetamine/dextroamphetamine 30 milliGRAM(s) Oral two times a day  carvedilol 6.25 milliGRAM(s) Oral every 12 hours  ceFAZolin   IVPB      ceFAZolin   IVPB 2000 milliGRAM(s) IV Intermittent every 8 hours  dextrose 5% + sodium chloride 0.45%. 1000 milliLiter(s) (75 mL/Hr) IV Continuous <Continuous>  loratadine 10 milliGRAM(s) Oral daily  morphine ER Tablet 30 milliGRAM(s) Oral three times a day  OLANZapine 5 milliGRAM(s) Oral daily  pregabalin 75 milliGRAM(s) Oral two times a day    ROS: All others negative    ALLERGIES:  Lisinopril- hypotension   Statin- Hypotension    PHYSICAL EXAM:  Vital Signs Last 24 Hrs  T(C): 36.8 (2018 08:11), Max: 36.8 (2018 18:31)  T(F): 98.3 (2018 08:11), Max: 98.3 (2018 08:11)  HR: 88 (2018 08:11) (88 - 112)  BP: 118/62 (2018 08:11) (103/69 - 126/80)  RR: 18 (2018 08:11) (18 - 19)  SpO2: 96% (2018 08:11) (95% - 96%)  I&O's Summary    Appearance: Normal, appears tired and weak  HEENT: Dried blood on right nare, + tenderness to right maxillary sinus   Lymphatic: No lymphadenopathy  Cardiovascular: Normal S1 S2, No JVD, No murmurs, No edema  Respiratory: Coarse rhonchi and expiratory wheezing. 	  Psychiatry: A & O x 3, Mood & affect appropriate  Gastrointestinal:  Soft, Non-tender, + BS	  Skin: No rashes, No ecchymoses, No cyanosis  Neurologic: Non-focal  Extremities: Normal range of motion, No clubbing, cyanosis or edema  Vascular: Peripheral pulses palpable 2+ bilaterally    ECG: ST, no acute ST/T wave changes  LABS:                        10.1   7.2   )-----------( 295      ( 2018 23:43 )             31.1     07-19    137  |  99  |  19.0  ----------------------------<  96  3.7   |  22.0  |  1.09    Ca    9.4      2018 23:44  Mg     1.7     07-    TPro  6.7  /  Alb  3.7  /  TBili  0.7  /  DBili  x   /  AST  36  /  ALT  47<H>  /  AlkPhos  101      PT/INR - ( 2018 23:43 )   PT: 13.9 sec;   INR: 1.26 ratio         PTT - ( 2018 23:43 )  PTT:35.7 sec  CARDIAC MARKERS ( 2018 23:44 )  x     / <0.01 ng/mL / 23 U/L / x     / x          RADIOLOGY & ADDITIONAL STUDIES:  Cardiac Cath 17  PROCEDURE:  --  Left coronary angiography.  --  Right coronary angiography.  --  Aortography.  TECHNIQUE: The risks and alternatives of the procedures and conscious  sedation were explained to the patient and informed consent was obtained.  Cardiac catheterization performed emergently.  Local anesthetic given. Right radial artery access. Left coronary artery  angiography. The vessel was injected utilizing a catheter. Right coronary  artery angiography. The vessel was injected utilizing a catheter.  Aortography. A catheter was placed and contrast was injected. RADIATION  EXPOSURE: 2.6 min.  CONTRAST GIVEN: Omnipaque 104 ml.  MEDICATIONS GIVEN: Verapamil (Isoptin, Calan, Covera), 2.5 mg, IA. Heparin,  3000 units, IA.  CORONARY VESSELS: The coronary circulation is right dominant.  LM:   --  LM: Normal.  LAD:   --  LAD: Angiography showed minor luminal irregularities with no  flow limiting lesions.  CX:   --  Circumflex: Angiography showed minor luminal irregularities with  no flow limiting lesions.  RCA:   --  RCA: Angiography showed minor luminal irregularities with no  flow limiting lesions.  AORTA: The root exhibited mild fibrocalcific change. Ascending aorta: The  segment was normal in size.  COMPLICATIONS: There were no complications.  DIAGNOSTIC RECOMMENDATIONS: The patient should continue with the present  medications.  Prepared and signed by  DEISY Macdonald .17  Observations:  Mitral Valve: Mitral annular calcification, otherwise  normal mitral valve. Minimal mitral regurgitation.  Aortic Valve/Aorta: Normal trileaflet aortic valve.  Aortic Root: 4.4 cm.  Left Atrium: Normal left atrium.  LA volume index = 26  cc/m2.  Left Ventricle: Moderate global left ventricular systolic  dysfunction. Eccentric left ventricular hypertrophy  (dilated left ventricle with normal relative wall  thickness).  Right Heart: Normal right atrium. Normal right ventricular  size and function. Normal tricuspid valve. Normal pulmonic  valve.  Pericardium/Pleura: Normal pericardium with no pericardial  effusion.  Hemodynamic: Estimated right atrial pressure is 8 mm Hg.  ------------------------------------------------------------------------  Conclusions:  1. Mitral annular calcification, otherwise normal mitral  valve. Minimal mitral regurgitation.  2. Eccentric left ventricular hypertrophy (dilated left  ventricle with normal relative wall thickness).  3. Moderate global left ventricular systolic dysfunction.  ------------------------------------------------------------------------  Confirmed on  2017 - 13:01:25 by Kayli Falk M.D.
[facility]  INFECTIOUS DISEASES AND INTERNAL MEDICINE at [place]  =======================================================  Jose George MD  Diplomates American Board of Internal Medicine and Infectious Diseases  =======================================================      [id number]  NITIN DAVALOS      CC: Patient is a 58y old  Male who presents with a chief complaint of unable to eat (20 Jul 2018 05:30)    57y/o  Male with h/o neoplasm of maxillary sinus, CHF, s/p 2 Coronary Angio with no PCI, COPD/Emphysema, Bipolar Disorder, YANET s/p  L  VATS trisegmentectomy partial decortication,  LORNA on CPAP. Patient presented to the ED due to dehydration and for PEG evaluation. Patient with recent weight loss due to maxillary sinus cancer extending down palate to the mouth. Patient started chemotherapy 1 week prior to presentation. Patient has been very weak and tired ever since. During hospitalization on 7/22 patient developed fever to 101.6F, Fever work up done, patient given a dose of Vancomycin and continued on Zosyn. He remained febrile, becomes intermittently hypotensive and has been since hypoxic requiring O2. ID input requested.       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- s/p lung resection- was followed by ID after lung surgery  Neoplasm of maxillary sinus  Bipolar disorder, unspecified  CHF (congestive heart failure): 2014  Diaphragm dysfunction: partial paralysis- now resolved as per wife  ETOH abuse  LORNA on CPAP  Cataract  S/P lobectomy of lung: Sept 2017  H/O endoscopy  H/O colonoscopy  H/O coronary angioplasty: x2      Social Hx:  Former smoker      FAMILY HISTORY:  No family history of COPD  Family history of hypertension in mother (Sibling)      Allergies  hospital socks (Rash)  lisinopril (Anaphylaxis)  statins (Anaphylaxis)      Antibiotics:  piperacillin/tazobactam IVPB. 3.375 Gram(s) IV Intermittent every 8 hours       REVIEW OF SYSTEMS:  CONSTITUTIONAL:  + Fever   HEENT:  No diplopia or blurred vision.  No earache, sore throat or runny nose.  CARDIOVASCULAR:  No pressure, squeezing, strangling, tightness, heaviness or aching about the chest, neck, axilla or epigastrium.  RESPIRATORY:  No cough. + shortness of breath  GASTROINTESTINAL:  No nausea, vomiting or diarrhea.  GENITOURINARY:  No dysuria, frequency or urgency  MUSCULOSKELETAL:  no joint aches, no muscle pain  SKIN:  No change in skin, hair or nails.  NEUROLOGIC:  No paresthesias, fasciculations  PSYCHIATRIC:  No disorder of thought or mood.  ENDOCRINE:  No heat or cold intolerance  HEMATOLOGICAL:  No easy bruising or bleeding.       Physical Exam:  Vital Signs Last 24 Hrs  T(C): 36.3 (25 Jul 2018 04:21), Max: 39.2 (24 Jul 2018 18:00)  T(F): 97.4 (25 Jul 2018 04:21), Max: 102.5 (24 Jul 2018 18:00)  HR: 94 (25 Jul 2018 09:42) (79 - 101)  BP: 102/64 (25 Jul 2018 04:21) (86/50 - 102/64)  RR: 18 (25 Jul 2018 04:21) (18 - 20)  SpO2: 93% (25 Jul 2018 09:42) (92% - 99%)      GEN: NAD, pleasant  HEENT: normocephalic and atraumatic. PERRL.    NECK: Supple.   LUNGS: Clear to auscultation.  HEART: Regular rate and rhythm  ABDOMEN: Soft, nontender, and nondistended.  Positive bowel sounds.  + PEG  : No CVA tenderness  EXTREMITIES: Without any edema.  MSK: No joint swelling  NEUROLOGIC: Alert and oriented x3  PSYCHIATRIC: Appropriate affect .  SKIN: No rash      Labs:  07-25    139  |  99  |  8.0  ----------------------------<  119<H>  4.0   |  30.0<H>  |  0.72    Ca    8.2<L>      25 Jul 2018 06:08                 8.1    16.6  )-----------( 230      ( 25 Jul 2018 06:53 )             26.5     CARDIAC MARKERS ( 25 Jul 2018 06:08 )  x     / <0.01 ng/mL / x     / x     / x          RECENT CULTURES:  07-22 @ 21:20 .Urine Catheterized     No growth      07-22 @ 16:48 .Blood Blood     No growth at 48 hours      07-22 @ 16:47 .Blood Blood     No growth at 48 hours      07-21 @ 08:31 .Urine Clean Catch (Midstream)     No growth        EXAM:  XR CHEST PORTABLE URGENT 1V                        PROCEDURE DATE:  07/22/2018    INTERPRETATION:  History: Fever  Chest:  one view.    Comparison: 7/19/2018  AP radiograph of the chest demonstrates subsegmental atelectasis inthe   RIGHT lower lobe. RIGHT central venous catheter is unchanged. The cardiac   silhouette is normal in size. Osseous structures are intact.  Impression:  subsegmental atelectasis in the RIGHT lower lobe. RIGHT   central venous catheter is unchanged.

## 2018-07-25 NOTE — DIETITIAN INITIAL EVALUATION ADULT. - OTHER INFO
BMi 24.4, Pt with poor po intake PTA, Peg tube inserted this admission, taking Jevity @60cchr well which does not meet energy needs. Pt also receiving [facility] soft foods however requesting regular diet with ensure TID

## 2018-07-25 NOTE — CHART NOTE - NSCHARTNOTEFT_GEN_A_CORE
Upon Nutritional Assessment by the Registered Dietitian your patient was determined to meet criteria / has evidence of the following diagnosis/diagnoses:          [ ]  Mild Protein Calorie Malnutrition        [ ]  Moderate Protein Calorie Malnutrition        [x ] Severe Protein Calorie Malnutrition    Chronic        [ ] Unspecified Protein Calorie Malnutrition        [ ] Underweight / BMI <19        [ ] Morbid Obesity / BMI > 40      Findings as based on:  •  Comprehensive nutrition assessment and consultation  •  Calorie counts (nutrient intake analysis)  •  Food acceptance and intake status from observations by staff  •  Follow up  •  Patient education  •  Intervention secondary to interdisciplinary rounds  •   concerns      Treatment:    The following diet has been recommended:  Rec increase rate to 90cchr or 8 boluses daily. Pt reporting difficulty getting food down even with mech soft foods however pt requesting regular diet. Ensure TID recommended.      PROVIDER Section:     By signing this assessment you are acknowledging and agree with the diagnosis/diagnoses assigned by the Registered Dietitian    Comments:

## 2018-07-25 NOTE — DIETITIAN INITIAL EVALUATION ADULT. - PERTINENT LABORATORY DATA
07-25 Na139 mmol/L Glu 119 mg/dL<H> K+ 4.0 mmol/L Cr  0.72 mg/dL BUN 8.0 mg/dL Phos n/a   Alb n/a   PAB n/a

## 2018-07-25 NOTE — PROGRESS NOTE ADULT - SUBJECTIVE AND OBJECTIVE BOX
NITIN DAVALOS  ----------------------------------------  The patient was seen and evaluated for   The patient is in no acute distress.  Denied any chest pain, palpitations, dyspnea, or abdominal pain.    Vital Signs Last 24 Hrs  T(C): 36.3 (25 Jul 2018 04:21), Max: 39.2 (24 Jul 2018 18:00)  T(F): 97.4 (25 Jul 2018 04:21), Max: 102.5 (24 Jul 2018 18:00)  HR: 94 (25 Jul 2018 09:42) (79 - 101)  BP: 102/64 (25 Jul 2018 04:21) (86/50 - 102/64)  BP(mean): --  RR: 18 (25 Jul 2018 04:21) (18 - 20)  SpO2: 93% (25 Jul 2018 09:42) (92% - 99%)    CAPILLARY BLOOD GLUCOSE      POCT Blood Glucose.: 117 mg/dL (25 Jul 2018 05:23)    PHYSICAL EXAMINATION:  ----------------------------------------  General appearance: No acute distress, Awake, Alert  HEENT: Normocephalic, Atraumatic, Conjunctiva clear, EOMI  Neck: Supple, No JVD, No tenderness  Lungs: Clear to auscultation, Breath sound equal bilaterally, No wheezes, No rales  Cardiovascular: S1S2, Regular rhythm  Abdomen: Soft, Nontender, Nondistended, No guarding/rebound, Positive bowel sounds, Gastrostomy tube in place.  Extremities: No clubbing, No cyanosis, No edema, No calf tenderness  Neuro: Strength equal bilaterally, No tremors  Psychiatric: Appropriate mood, Normal affect    LABORATORY STUDIES:  ----------------------------------------             8.1    16.6  )-----------( 230      ( 25 Jul 2018 06:53 )             26.5     07-25    139  |  99  |  8.0  ----------------------------<  119<H>  4.0   |  30.0<H>  |  0.72    Ca    8.2<L>      25 Jul 2018 06:08    CARDIAC MARKERS ( 25 Jul 2018 06:08 )  x     / <0.01 ng/mL / x     / x     / x        Culture - Urine (collected 22 Jul 2018 21:20)  Source: .Urine Catheterized  Final Report (24 Jul 2018 10:28):    No growth    Culture - Blood (collected 22 Jul 2018 16:48)  Source: .Blood Blood  Preliminary Report (25 Jul 2018 01:03):    No growth at 48 hours    Culture - Blood (collected 22 Jul 2018 16:47)  Source: .Blood Blood  Preliminary Report (25 Jul 2018 01:03):    No growth at 48 hours    MEDICATIONS  (STANDING):  ALBUTerol/ipratropium for Nebulization 3 milliLiter(s) Nebulizer every 6 hours  amphetamine/dextroamphetamine 30 milliGRAM(s) Oral two times a day  carvedilol 6.25 milliGRAM(s) Oral every 12 hours  loratadine 10 milliGRAM(s) Oral daily  morphine ER Tablet 30 milliGRAM(s) Oral three times a day  OLANZapine 5 milliGRAM(s) Oral daily  pantoprazole  Injectable 40 milliGRAM(s) IV Push two times a day  piperacillin/tazobactam IVPB. 3.375 Gram(s) IV Intermittent every 8 hours  pregabalin 75 milliGRAM(s) Oral two times a day    MEDICATIONS  (PRN):  acetaminophen   Tablet 650 milliGRAM(s) Oral every 6 hours PRN For Temp greater than 38 C (100.4 F)  ALPRAZolam 0.5 milliGRAM(s) Oral at bedtime PRN anxiety  HYDROmorphone  Injectable 1 milliGRAM(s) IV Push every 4 hours PRN Severe Pain (7 - 10)  meclizine 25 milliGRAM(s) Oral three times a day PRN Dizziness      ASSESSMENT / PLAN:  ----------------------------------------  SIRS - Unclear source of fever. On intravenous piperacillin/tazobactam. Urine culture was without growth. Blood culture results pending. Discussed with Infectious Disease. CT of the chest pending.    Dysphagia - Tolerating feeds through gastrostomy tube. Poor oral caloric intake, the patient is consuming only one to two spoonfuls of food per meal.    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 Episodes of intermittent bleeding which had occurred in the past. The patient was seen previously by otolaryngology in the past and no intervention was thought to be beneficial.    Anemia - Multifactorial. May need transfusion of packed red blood cells if worsens.    Bipolar disorder - On olanzapine.

## 2018-07-26 ENCOUNTER — OUTPATIENT (OUTPATIENT)
Dept: OUTPATIENT SERVICES | Facility: HOSPITAL | Age: 58
LOS: 1 days | Discharge: HOME | End: 2018-07-26

## 2018-07-26 ENCOUNTER — APPOINTMENT (OUTPATIENT)
Dept: HEMATOLOGY ONCOLOGY | Facility: CLINIC | Age: 58
End: 2018-07-26

## 2018-07-26 DIAGNOSIS — C31.0 MALIGNANT NEOPLASM OF MAXILLARY SINUS: ICD-10-CM

## 2018-07-26 DIAGNOSIS — H26.9 UNSPECIFIED CATARACT: Chronic | ICD-10-CM

## 2018-07-26 DIAGNOSIS — J69.0 PNEUMONITIS DUE TO INHALATION OF FOOD AND VOMIT: ICD-10-CM

## 2018-07-26 DIAGNOSIS — Z98.890 OTHER SPECIFIED POSTPROCEDURAL STATES: Chronic | ICD-10-CM

## 2018-07-26 DIAGNOSIS — Z98.61 CORONARY ANGIOPLASTY STATUS: Chronic | ICD-10-CM

## 2018-07-26 DIAGNOSIS — Z90.2 ACQUIRED ABSENCE OF LUNG [PART OF]: Chronic | ICD-10-CM

## 2018-07-26 DIAGNOSIS — C76.0 MALIGNANT NEOPLASM OF HEAD, FACE AND NECK: ICD-10-CM

## 2018-07-26 DIAGNOSIS — J18.9 PNEUMONIA, UNSPECIFIED ORGANISM: ICD-10-CM

## 2018-07-26 LAB
ACID FAST STN SPEC: SIGNIFICANT CHANGE UP
CRP SERPL-MCNC: 6.2 MG/DL — HIGH (ref 0–0.4)
ERYTHROCYTE [SEDIMENTATION RATE] IN BLOOD: 56 MM/HR — HIGH (ref 0–20)
PROCALCITONIN SERPL-MCNC: 0.28 NG/ML — HIGH (ref 0–0.04)

## 2018-07-26 PROCEDURE — 99232 SBSQ HOSP IP/OBS MODERATE 35: CPT

## 2018-07-26 PROCEDURE — 99233 SBSQ HOSP IP/OBS HIGH 50: CPT

## 2018-07-26 RX ORDER — SODIUM CHLORIDE 9 MG/ML
1000 INJECTION, SOLUTION INTRAVENOUS
Qty: 0 | Refills: 0 | Status: DISCONTINUED | OUTPATIENT
Start: 2018-07-26 | End: 2018-07-27

## 2018-07-26 RX ORDER — ACETAMINOPHEN 500 MG
650 TABLET ORAL EVERY 6 HOURS
Qty: 0 | Refills: 0 | Status: DISCONTINUED | OUTPATIENT
Start: 2018-07-26 | End: 2018-07-31

## 2018-07-26 RX ADMIN — PIPERACILLIN AND TAZOBACTAM 25 GRAM(S): 4; .5 INJECTION, POWDER, LYOPHILIZED, FOR SOLUTION INTRAVENOUS at 05:30

## 2018-07-26 RX ADMIN — SODIUM CHLORIDE 500 MILLILITER(S): 9 INJECTION, SOLUTION INTRAVENOUS at 18:37

## 2018-07-26 RX ADMIN — HYDROMORPHONE HYDROCHLORIDE 1 MILLIGRAM(S): 2 INJECTION INTRAMUSCULAR; INTRAVENOUS; SUBCUTANEOUS at 17:30

## 2018-07-26 RX ADMIN — PIPERACILLIN AND TAZOBACTAM 25 GRAM(S): 4; .5 INJECTION, POWDER, LYOPHILIZED, FOR SOLUTION INTRAVENOUS at 14:28

## 2018-07-26 RX ADMIN — Medication 3 MILLILITER(S): at 15:52

## 2018-07-26 RX ADMIN — Medication 650 MILLIGRAM(S): at 16:28

## 2018-07-26 RX ADMIN — MORPHINE SULFATE 30 MILLIGRAM(S): 50 CAPSULE, EXTENDED RELEASE ORAL at 23:15

## 2018-07-26 RX ADMIN — Medication 75 MILLIGRAM(S): at 05:30

## 2018-07-26 RX ADMIN — HYDROMORPHONE HYDROCHLORIDE 1 MILLIGRAM(S): 2 INJECTION INTRAMUSCULAR; INTRAVENOUS; SUBCUTANEOUS at 12:37

## 2018-07-26 RX ADMIN — HYDROMORPHONE HYDROCHLORIDE 1 MILLIGRAM(S): 2 INJECTION INTRAMUSCULAR; INTRAVENOUS; SUBCUTANEOUS at 21:12

## 2018-07-26 RX ADMIN — MORPHINE SULFATE 30 MILLIGRAM(S): 50 CAPSULE, EXTENDED RELEASE ORAL at 06:55

## 2018-07-26 RX ADMIN — Medication 75 MILLIGRAM(S): at 18:41

## 2018-07-26 RX ADMIN — MORPHINE SULFATE 30 MILLIGRAM(S): 50 CAPSULE, EXTENDED RELEASE ORAL at 15:16

## 2018-07-26 RX ADMIN — MORPHINE SULFATE 30 MILLIGRAM(S): 50 CAPSULE, EXTENDED RELEASE ORAL at 22:45

## 2018-07-26 RX ADMIN — HYDROMORPHONE HYDROCHLORIDE 1 MILLIGRAM(S): 2 INJECTION INTRAMUSCULAR; INTRAVENOUS; SUBCUTANEOUS at 21:30

## 2018-07-26 RX ADMIN — OLANZAPINE 5 MILLIGRAM(S): 15 TABLET, FILM COATED ORAL at 22:44

## 2018-07-26 RX ADMIN — HYDROMORPHONE HYDROCHLORIDE 1 MILLIGRAM(S): 2 INJECTION INTRAMUSCULAR; INTRAVENOUS; SUBCUTANEOUS at 16:49

## 2018-07-26 RX ADMIN — HYDROMORPHONE HYDROCHLORIDE 1 MILLIGRAM(S): 2 INJECTION INTRAMUSCULAR; INTRAVENOUS; SUBCUTANEOUS at 09:15

## 2018-07-26 RX ADMIN — PANTOPRAZOLE SODIUM 40 MILLIGRAM(S): 20 TABLET, DELAYED RELEASE ORAL at 19:06

## 2018-07-26 RX ADMIN — Medication 3 MILLILITER(S): at 20:39

## 2018-07-26 RX ADMIN — MORPHINE SULFATE 30 MILLIGRAM(S): 50 CAPSULE, EXTENDED RELEASE ORAL at 05:30

## 2018-07-26 RX ADMIN — HYDROMORPHONE HYDROCHLORIDE 1 MILLIGRAM(S): 2 INJECTION INTRAMUSCULAR; INTRAVENOUS; SUBCUTANEOUS at 08:33

## 2018-07-26 RX ADMIN — MORPHINE SULFATE 30 MILLIGRAM(S): 50 CAPSULE, EXTENDED RELEASE ORAL at 14:28

## 2018-07-26 RX ADMIN — HYDROMORPHONE HYDROCHLORIDE 1 MILLIGRAM(S): 2 INJECTION INTRAMUSCULAR; INTRAVENOUS; SUBCUTANEOUS at 13:10

## 2018-07-26 RX ADMIN — PIPERACILLIN AND TAZOBACTAM 25 GRAM(S): 4; .5 INJECTION, POWDER, LYOPHILIZED, FOR SOLUTION INTRAVENOUS at 22:44

## 2018-07-26 RX ADMIN — PANTOPRAZOLE SODIUM 40 MILLIGRAM(S): 20 TABLET, DELAYED RELEASE ORAL at 05:30

## 2018-07-26 RX ADMIN — LORATADINE 10 MILLIGRAM(S): 10 TABLET ORAL at 12:37

## 2018-07-26 RX ADMIN — SODIUM CHLORIDE 500 MILLILITER(S): 9 INJECTION, SOLUTION INTRAVENOUS at 18:41

## 2018-07-26 RX ADMIN — Medication 3 MILLILITER(S): at 09:19

## 2018-07-26 NOTE — PROGRESS NOTE ADULT - ASSESSMENT
57 with SCC right maxillary sinus s/p C1 induction chemotherapy with Cisplatin, docetaxel, 5FU.  Now admitted with poor PO intake s/p PEG   Currently tx with iv abx for mulifocal PNA.

## 2018-07-26 NOTE — PROGRESS NOTE ADULT - SUBJECTIVE AND OBJECTIVE BOX
NITIN DAVALOS  ----------------------------------------  The patient was seen and evaluated for pneumonia.  The patient is in no acute distress.  Denied any chest pain, palpitations, dyspnea, or abdominal pain.  Tolerating gastrostomy tube feeds.    Vital Signs Last 24 Hrs  T(C): 36.8 (26 Jul 2018 11:28), Max: 36.9 (25 Jul 2018 18:30)  T(F): 98.2 (26 Jul 2018 11:28), Max: 98.5 (25 Jul 2018 18:30)  HR: 86 (26 Jul 2018 11:28) (80 - 102)  BP: 93/65 (26 Jul 2018 11:28) (89/56 - 110/67)  BP(mean): --  RR: 20 (26 Jul 2018 11:28) (18 - 20)  SpO2: 97% (26 Jul 2018 09:26) (93% - 99%)    PHYSICAL EXAMINATION:  ----------------------------------------  General appearance: No acute distress, Awake, Alert  HEENT: Normocephalic, Atraumatic, Conjunctiva clear, EOMI  Neck: Supple, No JVD, No tenderness  Lungs: Clear to auscultation, Breath sound equal bilaterally, No wheezes, No rales  Cardiovascular: S1S2, Regular rhythm  Abdomen: Soft, Nontender, Nondistended, No guarding/rebound, Positive bowel sounds, Gastrostomy tube in place.  Extremities: No clubbing, No cyanosis, No edema, No calf tenderness  Neuro: Strength equal bilaterally, No tremors  Psychiatric: Appropriate mood, Normal affect    LABORATORY STUDIES:  ----------------------------------------             8.1    16.6  )-----------( 230      ( 25 Jul 2018 06:53 )             26.5     07-25    139  |  99  |  8.0  ----------------------------<  119<H>  4.0   |  30.0<H>  |  0.72    Ca    8.2<L>      25 Jul 2018 06:08    CARDIAC MARKERS ( 25 Jul 2018 17:32 )  x     / <0.01 ng/mL / x     / x     / x      CARDIAC MARKERS ( 25 Jul 2018 11:34 )  x     / <0.01 ng/mL / x     / x     / x      CARDIAC MARKERS ( 25 Jul 2018 06:08 )  x     / <0.01 ng/mL / x     / x     / x        MEDICATIONS  (STANDING):  ALBUTerol/ipratropium for Nebulization 3 milliLiter(s) Nebulizer every 6 hours  amphetamine/dextroamphetamine 30 milliGRAM(s) Oral two times a day  carvedilol 6.25 milliGRAM(s) Oral every 12 hours  loratadine 10 milliGRAM(s) Oral daily  morphine ER Tablet 30 milliGRAM(s) Oral three times a day  OLANZapine 5 milliGRAM(s) Oral daily  pantoprazole  Injectable 40 milliGRAM(s) IV Push two times a day  piperacillin/tazobactam IVPB. 3.375 Gram(s) IV Intermittent every 8 hours  pregabalin 75 milliGRAM(s) Oral two times a day    MEDICATIONS  (PRN):  acetaminophen   Tablet 650 milliGRAM(s) Oral every 6 hours PRN For Temp greater than 38 C (100.4 F)  ALPRAZolam 0.5 milliGRAM(s) Oral at bedtime PRN anxiety  HYDROmorphone  Injectable 1 milliGRAM(s) IV Push every 4 hours PRN Severe Pain (7 - 10)  meclizine 25 milliGRAM(s) Oral three times a day PRN Dizziness      ASSESSMENT / PLAN:  ----------------------------------------  Aspiration pneumonia - CT noted pneumonia. On intravenous piperacillin/tazobactam. On supplemental oxygen for hypoxia.    Dysphagia - To discontinued oral intake due to suspicion for aspiration.    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    Anemia - Multifactorial. Monitoring CBC. May need transfusion of packed red blood cells if worsens.    Bipolar disorder - On olanzapine. NITIN DAVALOS  ----------------------------------------  The patient was seen and evaluated for pneumonia.  The patient is in no acute distress.  Denied any chest pain, palpitations, dyspnea, or abdominal pain.  Tolerating gastrostomy tube feeds.    Vital Signs Last 24 Hrs  T(C): 36.8 (26 Jul 2018 11:28), Max: 36.9 (25 Jul 2018 18:30)  T(F): 98.2 (26 Jul 2018 11:28), Max: 98.5 (25 Jul 2018 18:30)  HR: 86 (26 Jul 2018 11:28) (80 - 102)  BP: 93/65 (26 Jul 2018 11:28) (89/56 - 110/67)  BP(mean): --  RR: 20 (26 Jul 2018 11:28) (18 - 20)  SpO2: 97% (26 Jul 2018 09:26) (93% - 99%)    PHYSICAL EXAMINATION:  ----------------------------------------  General appearance: No acute distress, Awake, Alert  HEENT: Normocephalic, Atraumatic, Conjunctiva clear, EOMI  Neck: Supple, No JVD, No tenderness  Lungs: Clear to auscultation, Breath sound equal bilaterally, No wheezes, No rales  Cardiovascular: S1S2, Regular rhythm  Abdomen: Soft, Nontender, Nondistended, No guarding/rebound, Positive bowel sounds, Gastrostomy tube in place.  Extremities: No clubbing, No cyanosis, No edema, No calf tenderness  Neuro: Strength equal bilaterally, No tremors  Psychiatric: Appropriate mood, Normal affect    LABORATORY STUDIES:  ----------------------------------------             8.1    16.6  )-----------( 230      ( 25 Jul 2018 06:53 )             26.5     07-25    139  |  99  |  8.0  ----------------------------<  119<H>  4.0   |  30.0<H>  |  0.72    Ca    8.2<L>      25 Jul 2018 06:08    CARDIAC MARKERS ( 25 Jul 2018 17:32 )  x     / <0.01 ng/mL / x     / x     / x      CARDIAC MARKERS ( 25 Jul 2018 11:34 )  x     / <0.01 ng/mL / x     / x     / x      CARDIAC MARKERS ( 25 Jul 2018 06:08 )  x     / <0.01 ng/mL / x     / x     / x        MEDICATIONS  (STANDING):  ALBUTerol/ipratropium for Nebulization 3 milliLiter(s) Nebulizer every 6 hours  amphetamine/dextroamphetamine 30 milliGRAM(s) Oral two times a day  carvedilol 6.25 milliGRAM(s) Oral every 12 hours  loratadine 10 milliGRAM(s) Oral daily  morphine ER Tablet 30 milliGRAM(s) Oral three times a day  OLANZapine 5 milliGRAM(s) Oral daily  pantoprazole  Injectable 40 milliGRAM(s) IV Push two times a day  piperacillin/tazobactam IVPB. 3.375 Gram(s) IV Intermittent every 8 hours  pregabalin 75 milliGRAM(s) Oral two times a day    MEDICATIONS  (PRN):  acetaminophen   Tablet 650 milliGRAM(s) Oral every 6 hours PRN For Temp greater than 38 C (100.4 F)  ALPRAZolam 0.5 milliGRAM(s) Oral at bedtime PRN anxiety  HYDROmorphone  Injectable 1 milliGRAM(s) IV Push every 4 hours PRN Severe Pain (7 - 10)  meclizine 25 milliGRAM(s) Oral three times a day PRN Dizziness      ASSESSMENT / PLAN:  ----------------------------------------  Aspiration pneumonia - CT noted pneumonia. On intravenous piperacillin/tazobactam. On supplemental oxygen for hypoxia.    Dysphagia - To discontinued oral intake due to suspicion for aspiration.    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    Anemia - Multifactorial. Monitoring CBC. May need transfusion of packed red blood cells if worsens.    Bipolar disorder - On olanzapine.    Discussed with the patient's wife on the telephone.

## 2018-07-26 NOTE — PROGRESS NOTE ADULT - PROBLEM SELECTOR PLAN 1
CT Chest with RLL pneumonia  Blood cultures no growth  Leukocytosis to 16k  No need for CTA per oncology  Will Continue Zosyn for Aspiration pneumonia

## 2018-07-26 NOTE — PROGRESS NOTE ADULT - SUBJECTIVE AND OBJECTIVE BOX
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 He was referred to the ER  for evaluation for PEG tue placement decreased PO intake due to disease x 2 weeks.  Labs showed Hgb: 10 likely due to bleeding from tumor.  Found to have multifocal PNA - improving on abx.      REVIEW OF SYSTEMS:  Constitutional, Eyes, ENT, Cardiovascular, Respiratory, Gastrointestinal, Genitourinary, Musculoskeletal, Integumentary, Neurological, Psychiatric, Endocrine, Heme/Lymph, and Allergic/Immunologic review of systems are otherwise negative except as noted in the HPI.    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- was followed by ID after lung surgery  Neoplasm of maxillary sinus  Bipolar disorder, unspecified  CHF (congestive heart failure): 2014  Diaphragm dysfunction: partial paralysis- now resolved as per wife  ETOH abuse  LORNA on CPAP  Cataract  S/P lobectomy of lung: Sept 2017  H/O endoscopy  H/O colonoscopy  H/O coronary angioplasty: x2      FAMILY HISTORY:  No family history of COPD  Family history of hypertension in mother (Sibling)      SOCIAL HISTORY:    Allergies    hospital socks (Rash)  lisinopril (Anaphylaxis)  statins (Anaphylaxis)    Intolerances        MEDICATIONS  (STANDING):  amphetamine/dextroamphetamine 30 milliGRAM(s) Oral two times a day  carvedilol 6.25 milliGRAM(s) Oral every 12 hours  ceFAZolin   IVPB      ceFAZolin   IVPB 2000 milliGRAM(s) IV Intermittent once  ceFAZolin   IVPB 2000 milliGRAM(s) IV Intermittent every 8 hours  loratadine 10 milliGRAM(s) Oral daily  morphine ER Tablet 30 milliGRAM(s) Oral three times a day  OLANZapine 5 milliGRAM(s) Oral daily  pregabalin 75 milliGRAM(s) Oral two times a day    MEDICATIONS  (PRN):  ALPRAZolam 0.5 milliGRAM(s) Oral at bedtime PRN anxiety  HYDROmorphone   Tablet 4 milliGRAM(s) Oral every 4 hours PRN Severe Pain (7 - 10)  meclizine 25 milliGRAM(s) Oral three times a day PRN Dizziness        Vital Signs Last 24 Hrs  T(C): 36.9 (26 Jul 2018 20:57), Max: 38.4 (26 Jul 2018 16:28)  T(F): 98.4 (26 Jul 2018 20:57), Max: 101.2 (26 Jul 2018 16:28)  HR: 99 (26 Jul 2018 20:57) (80 - 112)  BP: 104/55 (26 Jul 2018 20:57) (80/50 - 104/55)  BP(mean): --  RR: 20 (26 Jul 2018 18:27) (18 - 20)  SpO2: 95% (26 Jul 2018 20:57) (92% - 97%)      PHYSICAL EXAM:    GENERAL: NAD, well-groomed, well-developed  HEAD:  Atraumatic, Normocephalic  ENT: dried blood in nose  NECK: Supple,   NERVOUS SYSTEM:  Alert & Oriented X3, Good concentration;  CHEST/LUNG: Clear to auscultation bilaterally;  HEART: Regular rate and rhythm; No murmurs, rubs, or gallops  EXTREMITIES:  2+ Peripheral Pulses, No clubbing, cyanosis, or edema  SKIN: No rashes or lesions    LABS:  CBC Full  -  ( 25 Jul 2018 06:53 )  WBC Count : 16.6 K/uL  Hemoglobin : 8.1 g/dL  Hematocrit : 26.5 %  Platelet Count - Automated : 230 K/uL  Mean Cell Volume : 95.3 fl  Mean Cell Hemoglobin : 29.1 pg  Mean Cell Hemoglobin Concentration : 30.6 g/dL  Auto Neutrophil # : x  Auto Lymphocyte # : x  Auto Monocyte # : x  Auto Eosinophil # : x  Auto Basophil # : x  Auto Neutrophil % : x  Auto Lymphocyte % : x  Auto Monocyte % : x  Auto Eosinophil % : x  Auto Basophil % : x    07-25    139  |  99  |  8.0  ----------------------------<  119<H>  4.0   |  30.0<H>  |  0.72    Ca    8.2<L>      25 Jul 2018 06:08            RADIOLOGY & ADDITIONAL STUDIES:      < from: CT Chest No Cont (07.25.18 @ 14:05) >     EXAM:  CT CHEST                          PROCEDURE DATE:  07/25/2018          INTERPRETATION:  CLINICAL INFORMATION: Hypoxia.    COMPARISON: November 10, 2017.    PROCEDURE:   CT of the Chest was performed without intravenous contrast.  Sagittal and coronal reformats were performed.    FINDINGS:    LUNGS AND LARGE AIRWAYS: Sutures in the left upper lobe. Severe   emphysema. Consolidation in the right lower lobe with scattered patchy   and tree-in-bud opacities, probably multifocal pneumonia. Scattered   pulmonary nodules, including a nodule in the posterior left upper lobe   (series 5, image 71), measuring 0.8 cm.  PLEURA: Small bilateral pleural effusions.  VESSELS: Right chest wall port with catheter tip terminating the superior   vena cava.   HEART: Heart size is normal. No pericardial effusion.  MEDIASTINUM AND CY: No lymphadenopathy.  CHEST WALL AND LOWER NECK: Right chest wall port.  VISUALIZED UPPER ABDOMEN: Percutaneous gastrostomy tube. Cholelithiasis.  BONES: Degenerative changes of the spine.    IMPRESSION: Multifocal pneumonia.    < end of copied text >

## 2018-07-27 ENCOUNTER — APPOINTMENT (OUTPATIENT)
Dept: OTOLARYNGOLOGY | Facility: CLINIC | Age: 58
End: 2018-07-27

## 2018-07-27 LAB
ANION GAP SERPL CALC-SCNC: 9 MMOL/L — SIGNIFICANT CHANGE UP (ref 5–17)
BUN SERPL-MCNC: 9 MG/DL — SIGNIFICANT CHANGE UP (ref 8–20)
CALCIUM SERPL-MCNC: 8.2 MG/DL — LOW (ref 8.6–10.2)
CHLORIDE SERPL-SCNC: 100 MMOL/L — SIGNIFICANT CHANGE UP (ref 98–107)
CO2 SERPL-SCNC: 30 MMOL/L — HIGH (ref 22–29)
CREAT SERPL-MCNC: 0.67 MG/DL — SIGNIFICANT CHANGE UP (ref 0.5–1.3)
CULTURE RESULTS: SIGNIFICANT CHANGE UP
CULTURE RESULTS: SIGNIFICANT CHANGE UP
GLUCOSE SERPL-MCNC: 125 MG/DL — HIGH (ref 70–115)
HCT VFR BLD CALC: 26.1 % — LOW (ref 42–52)
HGB BLD-MCNC: 8 G/DL — LOW (ref 14–18)
MCHC RBC-ENTMCNC: 29.2 PG — SIGNIFICANT CHANGE UP (ref 27–31)
MCHC RBC-ENTMCNC: 30.7 G/DL — LOW (ref 32–36)
MCV RBC AUTO: 95.3 FL — HIGH (ref 80–94)
PLATELET # BLD AUTO: 223 K/UL — SIGNIFICANT CHANGE UP (ref 150–400)
POTASSIUM SERPL-MCNC: 4 MMOL/L — SIGNIFICANT CHANGE UP (ref 3.5–5.3)
POTASSIUM SERPL-SCNC: 4 MMOL/L — SIGNIFICANT CHANGE UP (ref 3.5–5.3)
RBC # BLD: 2.74 M/UL — LOW (ref 4.6–6.2)
RBC # FLD: 14.2 % — SIGNIFICANT CHANGE UP (ref 11–15.6)
SODIUM SERPL-SCNC: 139 MMOL/L — SIGNIFICANT CHANGE UP (ref 135–145)
SPECIMEN SOURCE: SIGNIFICANT CHANGE UP
SPECIMEN SOURCE: SIGNIFICANT CHANGE UP
WBC # BLD: 9.7 K/UL — SIGNIFICANT CHANGE UP (ref 4.8–10.8)
WBC # FLD AUTO: 9.7 K/UL — SIGNIFICANT CHANGE UP (ref 4.8–10.8)

## 2018-07-27 PROCEDURE — 99233 SBSQ HOSP IP/OBS HIGH 50: CPT

## 2018-07-27 PROCEDURE — 99232 SBSQ HOSP IP/OBS MODERATE 35: CPT

## 2018-07-27 RX ORDER — MORPHINE SULFATE 50 MG/1
30 CAPSULE, EXTENDED RELEASE ORAL THREE TIMES A DAY
Qty: 0 | Refills: 0 | Status: DISCONTINUED | OUTPATIENT
Start: 2018-07-28 | End: 2018-07-31

## 2018-07-27 RX ORDER — HYDROMORPHONE HYDROCHLORIDE 2 MG/ML
1 INJECTION INTRAMUSCULAR; INTRAVENOUS; SUBCUTANEOUS EVERY 4 HOURS
Qty: 0 | Refills: 0 | Status: DISCONTINUED | OUTPATIENT
Start: 2018-07-28 | End: 2018-07-28

## 2018-07-27 RX ORDER — ALPRAZOLAM 0.25 MG
0.5 TABLET ORAL AT BEDTIME
Qty: 0 | Refills: 0 | Status: DISCONTINUED | OUTPATIENT
Start: 2018-07-28 | End: 2018-07-31

## 2018-07-27 RX ADMIN — Medication 75 MILLIGRAM(S): at 18:20

## 2018-07-27 RX ADMIN — MORPHINE SULFATE 30 MILLIGRAM(S): 50 CAPSULE, EXTENDED RELEASE ORAL at 06:53

## 2018-07-27 RX ADMIN — LORATADINE 10 MILLIGRAM(S): 10 TABLET ORAL at 12:35

## 2018-07-27 RX ADMIN — MORPHINE SULFATE 30 MILLIGRAM(S): 50 CAPSULE, EXTENDED RELEASE ORAL at 14:53

## 2018-07-27 RX ADMIN — HYDROMORPHONE HYDROCHLORIDE 1 MILLIGRAM(S): 2 INJECTION INTRAMUSCULAR; INTRAVENOUS; SUBCUTANEOUS at 14:55

## 2018-07-27 RX ADMIN — PIPERACILLIN AND TAZOBACTAM 25 GRAM(S): 4; .5 INJECTION, POWDER, LYOPHILIZED, FOR SOLUTION INTRAVENOUS at 22:08

## 2018-07-27 RX ADMIN — MORPHINE SULFATE 30 MILLIGRAM(S): 50 CAPSULE, EXTENDED RELEASE ORAL at 22:07

## 2018-07-27 RX ADMIN — MORPHINE SULFATE 30 MILLIGRAM(S): 50 CAPSULE, EXTENDED RELEASE ORAL at 22:30

## 2018-07-27 RX ADMIN — Medication 3 MILLILITER(S): at 15:25

## 2018-07-27 RX ADMIN — HYDROMORPHONE HYDROCHLORIDE 1 MILLIGRAM(S): 2 INJECTION INTRAMUSCULAR; INTRAVENOUS; SUBCUTANEOUS at 06:10

## 2018-07-27 RX ADMIN — PANTOPRAZOLE SODIUM 40 MILLIGRAM(S): 20 TABLET, DELAYED RELEASE ORAL at 18:17

## 2018-07-27 RX ADMIN — Medication 75 MILLIGRAM(S): at 06:53

## 2018-07-27 RX ADMIN — HYDROMORPHONE HYDROCHLORIDE 1 MILLIGRAM(S): 2 INJECTION INTRAMUSCULAR; INTRAVENOUS; SUBCUTANEOUS at 10:50

## 2018-07-27 RX ADMIN — Medication 3 MILLILITER(S): at 20:58

## 2018-07-27 RX ADMIN — HYDROMORPHONE HYDROCHLORIDE 1 MILLIGRAM(S): 2 INJECTION INTRAMUSCULAR; INTRAVENOUS; SUBCUTANEOUS at 19:27

## 2018-07-27 RX ADMIN — HYDROMORPHONE HYDROCHLORIDE 1 MILLIGRAM(S): 2 INJECTION INTRAMUSCULAR; INTRAVENOUS; SUBCUTANEOUS at 19:06

## 2018-07-27 RX ADMIN — PANTOPRAZOLE SODIUM 40 MILLIGRAM(S): 20 TABLET, DELAYED RELEASE ORAL at 06:53

## 2018-07-27 RX ADMIN — PIPERACILLIN AND TAZOBACTAM 25 GRAM(S): 4; .5 INJECTION, POWDER, LYOPHILIZED, FOR SOLUTION INTRAVENOUS at 14:54

## 2018-07-27 RX ADMIN — MORPHINE SULFATE 30 MILLIGRAM(S): 50 CAPSULE, EXTENDED RELEASE ORAL at 15:53

## 2018-07-27 RX ADMIN — HYDROMORPHONE HYDROCHLORIDE 1 MILLIGRAM(S): 2 INJECTION INTRAMUSCULAR; INTRAVENOUS; SUBCUTANEOUS at 05:54

## 2018-07-27 RX ADMIN — Medication 3 MILLILITER(S): at 09:02

## 2018-07-27 RX ADMIN — OLANZAPINE 5 MILLIGRAM(S): 15 TABLET, FILM COATED ORAL at 22:07

## 2018-07-27 RX ADMIN — PIPERACILLIN AND TAZOBACTAM 25 GRAM(S): 4; .5 INJECTION, POWDER, LYOPHILIZED, FOR SOLUTION INTRAVENOUS at 06:52

## 2018-07-27 RX ADMIN — HYDROMORPHONE HYDROCHLORIDE 1 MILLIGRAM(S): 2 INJECTION INTRAMUSCULAR; INTRAVENOUS; SUBCUTANEOUS at 23:43

## 2018-07-27 RX ADMIN — MORPHINE SULFATE 30 MILLIGRAM(S): 50 CAPSULE, EXTENDED RELEASE ORAL at 07:25

## 2018-07-27 RX ADMIN — HYDROMORPHONE HYDROCHLORIDE 1 MILLIGRAM(S): 2 INJECTION INTRAMUSCULAR; INTRAVENOUS; SUBCUTANEOUS at 15:53

## 2018-07-27 RX ADMIN — HYDROMORPHONE HYDROCHLORIDE 1 MILLIGRAM(S): 2 INJECTION INTRAMUSCULAR; INTRAVENOUS; SUBCUTANEOUS at 11:50

## 2018-07-27 NOTE — CHART NOTE - NSCHARTNOTEFT_GEN_A_CORE
Source: Patient [x ]  Family [ ]   other [ ]    Current Diet: NPO    Patient reports [ ] nausea  [ ] vomiting [ ] diarrhea [ ] constipation  [ ]chewing problems [ x] swallowing issues  [ ] other:     PO intake:  < 50% [ ]   50-75%  [ ]   %  [ ]  other :    Source for PO intake [x ] Patient [ ] family [ ] chart [ ] staff [ ] other    Enteral /Parenteral Nutrition: Pt receiving 60cchr Jevity 1.5cal, not meeting nutrtional needs  Current Weight: 7/19 81.6kg, 7/25 94.1kg question accuracy    % Weight Change     Pertinent Medications: MEDICATIONS  (STANDING):  ALBUTerol/ipratropium for Nebulization 3 milliLiter(s) Nebulizer every 6 hours  amphetamine/dextroamphetamine 30 milliGRAM(s) Oral two times a day  carvedilol 6.25 milliGRAM(s) Oral every 12 hours  loratadine 10 milliGRAM(s) Oral daily  morphine ER Tablet 30 milliGRAM(s) Oral three times a day  multiple electrolytes Injection Type 1 1000 milliLiter(s) (500 mL/Hr) IV Continuous <Continuous>  OLANZapine 5 milliGRAM(s) Oral daily  pantoprazole  Injectable 40 milliGRAM(s) IV Push two times a day  piperacillin/tazobactam IVPB. 3.375 Gram(s) IV Intermittent every 8 hours  pregabalin 75 milliGRAM(s) Oral two times a day    MEDICATIONS  (PRN):  acetaminophen   Tablet 650 milliGRAM(s) Oral every 6 hours PRN For Temp greater than 38 C (99 F)  ALPRAZolam 0.5 milliGRAM(s) Oral at bedtime PRN anxiety  HYDROmorphone  Injectable 1 milliGRAM(s) IV Push every 4 hours PRN Severe Pain (7 - 10)  meclizine 25 milliGRAM(s) Oral three times a day PRN Dizziness    Pertinent Labs: CBC Full  -  ( 27 Jul 2018 07:09 )  WBC Count : 9.7 K/uL  Hemoglobin : 8.0 g/dL  Hematocrit : 26.1 %  Platelet Count - Automated : 223 K/uL  Mean Cell Volume : 95.3 fl  Mean Cell Hemoglobin : 29.2 pg  Mean Cell Hemoglobin Concentration : 30.7 g/dL  Auto Neutrophil # : x  Auto Lymphocyte # : x  Auto Monocyte # : x  Auto Eosinophil # : x  Auto Basophil # : x  Auto Neutrophil % : x  Auto Lymphocyte % : x  Auto Monocyte % : x  Auto Eosinophil % : x  Auto Basophil % : x    07-27 Na139 mmol/L Glu 125 mg/dL<H> K+ 4.0 mmol/L Cr  0.67 mg/dL BUN 9.0 mg/dL Phos n/a   Alb n/a   PAB n/a             Skin:     Nutrition focused physical exam not  conducted - found signs of malnutrition [ ]absent [ ]present    Subcutaneous fat loss: [ ] Orbital fat pads region, [ ]Buccal fat region, [ ]Triceps region,  [ ]Ribs region    Muscle wasting: [ ]Temples region, [ ]Clavicle region, [ ]Shoulder region, [ ]Scapula region, [ ]Interosseous region,  [ ]thigh region, [ ]Calf region    Estimated Needs:   [ ] no change since previous assessment  [ ] recalculated:     Current Nutrition Diagnosis: Pt meets criteria for severe chronic malnutrition related to inadequate energy intake in setting of mouth CA, esophagitis, dysphagia as evidenced by <75% intake >1 mo, 40# weight loss in 4 mo. Pt received [facility] soft foods as per diet order and Jevity 60cchr via TF.  Pt with poor po intake. Pt c/o not being able to get food down well. Pt now with pneumonia. Oral intake discontinued. TF continues. Pt asking for oral food.    Recommendations: Increase tube feeds to 90cchr continuous to better meet nutritional needs ( 2700kcal, 115 g pro)  For bolus regime,  Pt will require 8 8oz cans of Jevity / day to provide 1920cc, 2880kcal, 122gr protein  Monitor tolerance.      Monitoring and Evaluation:   [ ] PO intake [ ] Tolerance to diet prescription [X] Weights  [X] Follow up per protocol [X] Labs:

## 2018-07-27 NOTE — PROGRESS NOTE ADULT - SUBJECTIVE AND OBJECTIVE BOX
[facility]  INFECTIOUS DISEASES AND INTERNAL MEDICINE at [place]  =======================================================  Jose George MD  Diplomates American Board of Internal Medicine and Infectious Diseases  =======================================================    NITIN DAVALOS [id number]    Follow up: Pneumonia     + Cough and SOB  Was febrile 101.2F  Remains hypoxic on O2      Allergies:  hospital socks (Rash)  lisinopril (Anaphylaxis)  statins (Anaphylaxis)      Antibiotics:  piperacillin/tazobactam IVPB. 3.375 Gram(s) IV Intermittent every 8 hours        REVIEW OF SYSTEMS:  CONSTITUTIONAL:  + Fever   HEENT:  No diplopia or blurred vision.  No earache, sore throat or runny nose.  CARDIOVASCULAR:  No pressure, squeezing, strangling, tightness, heaviness or aching about the chest, neck, axilla or epigastrium.  RESPIRATORY:  + cough. + shortness of breath  GASTROINTESTINAL:  No nausea, vomiting or diarrhea.  GENITOURINARY:  No dysuria, frequency or urgency  MUSCULOSKELETAL:  no joint aches, no muscle pain  SKIN:  No change in skin, hair or nails.  NEUROLOGIC:  No paresthesias, fasciculations  PSYCHIATRIC:  No disorder of thought or mood.  ENDOCRINE:  No heat or cold intolerance  HEMATOLOGICAL:  No easy bruising or bleeding.       Physical Exam:  Vital Signs Last 24 Hrs  T(C): 37.2 (27 Jul 2018 06:19), Max: 38.4 (26 Jul 2018 16:28)  T(F): 98.9 (27 Jul 2018 06:19), Max: 101.2 (26 Jul 2018 16:28)  HR: 91 (27 Jul 2018 06:19) (86 - 112)  BP: 102/59 (27 Jul 2018 06:19) (80/50 - 104/55)  RR: 18 (27 Jul 2018 06:19) (18 - 20)  SpO2: 94% (27 Jul 2018 04:54) (92% - 96%)      GEN: NAD, pleasant  HEENT: normocephalic and atraumatic. PERRL.    NECK: Supple.   LUNGS: Clear to auscultation.  HEART: Regular rate and rhythm  ABDOMEN: Soft, nontender, and nondistended.  Positive bowel sounds.  + PEG  : No CVA tenderness  EXTREMITIES: Without any edema.  MSK: No joint swelling  NEUROLOGIC: Alert and oriented x3  PSYCHIATRIC: Appropriate affect .  SKIN: No rash      Labs:  07-27    139  |  100  |  9.0  ----------------------------<  125<H>  4.0   |  30.0<H>  |  0.67    Ca    8.2<L>      27 Jul 2018 07:09               8.0    9.7   )-----------( 223      ( 27 Jul 2018 07:09 )             26.1       CARDIAC MARKERS ( 25 Jul 2018 17:32 )  x     / <0.01 ng/mL / x     / x     / x      CARDIAC MARKERS ( 25 Jul 2018 11:34 )  x     / <0.01 ng/mL / x     / x     / x            RECENT CULTURES:  07-22 @ 21:20 .Urine Catheterized     No growth      07-22 @ 16:48 .Blood Blood     No growth at 48 hours      07-22 @ 16:47 .Blood Blood     No growth at 48 hours      07-21 @ 08:31 .Urine Clean Catch (Midstream)     No growth        EXAM:  CT CHEST                        PROCEDURE DATE:  07/25/2018    INTERPRETATION:  CLINICAL INFORMATION: Hypoxia.  COMPARISON: November 10, 2017.  PROCEDURE:   CT of the Chest was performed without intravenous contrast.  Sagittal and coronal reformats were performed.  FINDINGS:  LUNGS AND LARGE AIRWAYS: Sutures in the left upper lobe. Severe   emphysema. Consolidation in the right lower lobe with scattered patchy   and tree-in-bud opacities, probably multifocal pneumonia. Scattered   pulmonary nodules, including a nodule in the posterior left upper lobe   (series 5, image 71), measuring 0.8 cm.  PLEURA: Small bilateral pleural effusions.  VESSELS: Right chest wall port with catheter tip terminating the superior vena cava.   HEART: Heart size is normal. No pericardial effusion.  MEDIASTINUM AND CY: No lymphadenopathy.  CHEST WALL AND LOWER NECK: Right chest wall port.  VISUALIZED UPPER ABDOMEN: Percutaneous gastrostomy tube. Cholelithiasis.  BONES: Degenerative changes of the spine.  IMPRESSION:   Multifocal pneumonia.

## 2018-07-27 NOTE — PROGRESS NOTE ADULT - PROBLEM SELECTOR PLAN 1
CT Chest with RLL pneumonia  Blood cultures no growth  Leukocytosis down from 16k to 9k  No need for CTA per oncology  Will Continue Zosyn for Aspiration pneumonia  Still with fever  Do swallow eval

## 2018-07-27 NOTE — PROGRESS NOTE ADULT - SUBJECTIVE AND OBJECTIVE BOX
NITIN DAVALOS  ----------------------------------------  The patient was seen and evaluated for pneumonia.  The patient is in no acute distress.  Denied any chest pain, palpitations, dyspnea, or abdominal pain.  Tolerating tube feeds.    Vital Signs Last 24 Hrs  T(C): 37.2 (27 Jul 2018 06:19), Max: 38.4 (26 Jul 2018 16:28)  T(F): 98.9 (27 Jul 2018 06:19), Max: 101.2 (26 Jul 2018 16:28)  HR: 91 (27 Jul 2018 06:19) (88 - 112)  BP: 102/59 (27 Jul 2018 06:19) (80/50 - 104/55)  BP(mean): --  RR: 18 (27 Jul 2018 06:19) (18 - 20)  SpO2: 94% (27 Jul 2018 04:54) (92% - 96%)    PHYSICAL EXAMINATION:  ----------------------------------------  General appearance: No acute distress, Awake, Alert  HEENT: Normocephalic, Atraumatic, Conjunctiva clear, EOMI  Neck: Supple, No JVD, No tenderness  Lungs: Clear to auscultation, Breath sound equal bilaterally, No wheezes, No rales  Cardiovascular: S1S2, Regular rhythm  Abdomen: Soft, Nontender, Nondistended, No guarding/rebound, Positive bowel sounds, Gastrostomy tube in place.  Extremities: No clubbing, No cyanosis, No edema, No calf tenderness  Neuro: Strength equal bilaterally, No tremors  Psychiatric: Appropriate mood, Normal affect    LABORATORY STUDIES:  ----------------------------------------             8.0    9.7   )-----------( 223      ( 27 Jul 2018 07:09 )             26.1     07-27    139  |  100  |  9.0  ----------------------------<  125<H>  4.0   |  30.0<H>  |  0.67    Ca    8.2<L>      27 Jul 2018 07:09    CARDIAC MARKERS ( 25 Jul 2018 17:32 )  x     / <0.01 ng/mL / x     / x     / x        MEDICATIONS  (STANDING):  ALBUTerol/ipratropium for Nebulization 3 milliLiter(s) Nebulizer every 6 hours  amphetamine/dextroamphetamine 30 milliGRAM(s) Oral two times a day  carvedilol 6.25 milliGRAM(s) Oral every 12 hours  loratadine 10 milliGRAM(s) Oral daily  morphine ER Tablet 30 milliGRAM(s) Oral three times a day  OLANZapine 5 milliGRAM(s) Oral daily  pantoprazole  Injectable 40 milliGRAM(s) IV Push two times a day  piperacillin/tazobactam IVPB. 3.375 Gram(s) IV Intermittent every 8 hours  pregabalin 75 milliGRAM(s) Oral two times a day    MEDICATIONS  (PRN):  acetaminophen   Tablet 650 milliGRAM(s) Oral every 6 hours PRN For Temp greater than 38 C (99 F)  ALPRAZolam 0.5 milliGRAM(s) Oral at bedtime PRN anxiety  HYDROmorphone  Injectable 1 milliGRAM(s) IV Push every 4 hours PRN Severe Pain (7 - 10)  meclizine 25 milliGRAM(s) Oral three times a day PRN Dizziness      ASSESSMENT / PLAN:  ----------------------------------------  Aspiration pneumonia - On intravenous piperacillin/tazobactam. On supplemental oxygen for hypoxia. Discussed with Infectious Disease.    Dysphagia - To discontinued oral intake due to suspicion for aspiration. The patient is agreeable to a modified barium swallow study.    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    Anemia - Multifactorial. Monitoring CBC. May need transfusion of packed red blood cells if worsens.    Bipolar disorder - On olanzapine. NITIN DAVALOS  ----------------------------------------  The patient was seen and evaluated for pneumonia.  The patient is in no acute distress.  Denied any chest pain, palpitations, dyspnea, or abdominal pain.  Tolerating tube feeds.    Vital Signs Last 24 Hrs  T(C): 37.2 (27 Jul 2018 06:19), Max: 38.4 (26 Jul 2018 16:28)  T(F): 98.9 (27 Jul 2018 06:19), Max: 101.2 (26 Jul 2018 16:28)  HR: 91 (27 Jul 2018 06:19) (88 - 112)  BP: 102/59 (27 Jul 2018 06:19) (80/50 - 104/55)  BP(mean): --  RR: 18 (27 Jul 2018 06:19) (18 - 20)  SpO2: 94% (27 Jul 2018 04:54) (92% - 96%)    PHYSICAL EXAMINATION:  ----------------------------------------  General appearance: No acute distress, Awake, Alert  HEENT: Normocephalic, Atraumatic, Conjunctiva clear, EOMI  Neck: Supple, No JVD, No tenderness  Lungs: Clear to auscultation, Breath sound equal bilaterally, No wheezes, No rales  Cardiovascular: S1S2, Regular rhythm  Abdomen: Soft, Nontender, Nondistended, No guarding/rebound, Positive bowel sounds, Gastrostomy tube in place.  Extremities: No clubbing, No cyanosis, No edema, No calf tenderness  Neuro: Strength equal bilaterally, No tremors  Psychiatric: Appropriate mood, Normal affect    LABORATORY STUDIES:  ----------------------------------------             8.0    9.7   )-----------( 223      ( 27 Jul 2018 07:09 )             26.1     07-27    139  |  100  |  9.0  ----------------------------<  125<H>  4.0   |  30.0<H>  |  0.67    Ca    8.2<L>      27 Jul 2018 07:09    CARDIAC MARKERS ( 25 Jul 2018 17:32 )  x     / <0.01 ng/mL / x     / x     / x        MEDICATIONS  (STANDING):  ALBUTerol/ipratropium for Nebulization 3 milliLiter(s) Nebulizer every 6 hours  amphetamine/dextroamphetamine 30 milliGRAM(s) Oral two times a day  carvedilol 6.25 milliGRAM(s) Oral every 12 hours  loratadine 10 milliGRAM(s) Oral daily  morphine ER Tablet 30 milliGRAM(s) Oral three times a day  OLANZapine 5 milliGRAM(s) Oral daily  pantoprazole  Injectable 40 milliGRAM(s) IV Push two times a day  piperacillin/tazobactam IVPB. 3.375 Gram(s) IV Intermittent every 8 hours  pregabalin 75 milliGRAM(s) Oral two times a day    MEDICATIONS  (PRN):  acetaminophen   Tablet 650 milliGRAM(s) Oral every 6 hours PRN For Temp greater than 38 C (99 F)  ALPRAZolam 0.5 milliGRAM(s) Oral at bedtime PRN anxiety  HYDROmorphone  Injectable 1 milliGRAM(s) IV Push every 4 hours PRN Severe Pain (7 - 10)  meclizine 25 milliGRAM(s) Oral three times a day PRN Dizziness      ASSESSMENT / PLAN:  ----------------------------------------  Aspiration pneumonia - On intravenous piperacillin/tazobactam. On supplemental oxygen for hypoxia. Discussed with Infectious Disease.    Dysphagia - To discontinued oral intake due to suspicion for aspiration. The patient is agreeable to a modified barium swallow study.    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    Anemia - Multifactorial. Monitoring CBC. May need transfusion of packed red blood cells if worsens.    Bipolar disorder - On olanzapine.    Discussed with the patient and his wife at the bedside.

## 2018-07-27 NOTE — CHART NOTE - NSCHARTNOTEFT_GEN_A_CORE
MD, Pt reports he is hungry. Rec increase TF rate of Jevity 1.5cal  to 90cchr to better meet nutritional needs. If bolus recommend x 8 cans daily.   Aspiration noted. NPO noted.    Thank You    Emily Crocker RD CDN

## 2018-07-28 PROCEDURE — 99233 SBSQ HOSP IP/OBS HIGH 50: CPT

## 2018-07-28 PROCEDURE — 99232 SBSQ HOSP IP/OBS MODERATE 35: CPT

## 2018-07-28 RX ORDER — HYDROMORPHONE HYDROCHLORIDE 2 MG/ML
1.5 INJECTION INTRAMUSCULAR; INTRAVENOUS; SUBCUTANEOUS EVERY 4 HOURS
Qty: 0 | Refills: 0 | Status: DISCONTINUED | OUTPATIENT
Start: 2018-07-28 | End: 2018-07-29

## 2018-07-28 RX ADMIN — MORPHINE SULFATE 30 MILLIGRAM(S): 50 CAPSULE, EXTENDED RELEASE ORAL at 07:25

## 2018-07-28 RX ADMIN — PIPERACILLIN AND TAZOBACTAM 25 GRAM(S): 4; .5 INJECTION, POWDER, LYOPHILIZED, FOR SOLUTION INTRAVENOUS at 22:25

## 2018-07-28 RX ADMIN — MORPHINE SULFATE 30 MILLIGRAM(S): 50 CAPSULE, EXTENDED RELEASE ORAL at 06:52

## 2018-07-28 RX ADMIN — PANTOPRAZOLE SODIUM 40 MILLIGRAM(S): 20 TABLET, DELAYED RELEASE ORAL at 17:57

## 2018-07-28 RX ADMIN — MORPHINE SULFATE 30 MILLIGRAM(S): 50 CAPSULE, EXTENDED RELEASE ORAL at 22:26

## 2018-07-28 RX ADMIN — Medication 75 MILLIGRAM(S): at 17:57

## 2018-07-28 RX ADMIN — MORPHINE SULFATE 30 MILLIGRAM(S): 50 CAPSULE, EXTENDED RELEASE ORAL at 13:33

## 2018-07-28 RX ADMIN — Medication 75 MILLIGRAM(S): at 06:52

## 2018-07-28 RX ADMIN — HYDROMORPHONE HYDROCHLORIDE 1.5 MILLIGRAM(S): 2 INJECTION INTRAMUSCULAR; INTRAVENOUS; SUBCUTANEOUS at 20:20

## 2018-07-28 RX ADMIN — OLANZAPINE 5 MILLIGRAM(S): 15 TABLET, FILM COATED ORAL at 22:26

## 2018-07-28 RX ADMIN — Medication 3 MILLILITER(S): at 21:19

## 2018-07-28 RX ADMIN — MORPHINE SULFATE 30 MILLIGRAM(S): 50 CAPSULE, EXTENDED RELEASE ORAL at 23:00

## 2018-07-28 RX ADMIN — PIPERACILLIN AND TAZOBACTAM 25 GRAM(S): 4; .5 INJECTION, POWDER, LYOPHILIZED, FOR SOLUTION INTRAVENOUS at 06:52

## 2018-07-28 RX ADMIN — Medication 3 MILLILITER(S): at 08:18

## 2018-07-28 RX ADMIN — HYDROMORPHONE HYDROCHLORIDE 1 MILLIGRAM(S): 2 INJECTION INTRAMUSCULAR; INTRAVENOUS; SUBCUTANEOUS at 12:18

## 2018-07-28 RX ADMIN — PANTOPRAZOLE SODIUM 40 MILLIGRAM(S): 20 TABLET, DELAYED RELEASE ORAL at 06:51

## 2018-07-28 RX ADMIN — HYDROMORPHONE HYDROCHLORIDE 1.5 MILLIGRAM(S): 2 INJECTION INTRAMUSCULAR; INTRAVENOUS; SUBCUTANEOUS at 20:06

## 2018-07-28 RX ADMIN — PIPERACILLIN AND TAZOBACTAM 25 GRAM(S): 4; .5 INJECTION, POWDER, LYOPHILIZED, FOR SOLUTION INTRAVENOUS at 13:33

## 2018-07-28 RX ADMIN — Medication 650 MILLIGRAM(S): at 16:53

## 2018-07-28 RX ADMIN — LORATADINE 10 MILLIGRAM(S): 10 TABLET ORAL at 11:47

## 2018-07-28 RX ADMIN — HYDROMORPHONE HYDROCHLORIDE 1 MILLIGRAM(S): 2 INJECTION INTRAMUSCULAR; INTRAVENOUS; SUBCUTANEOUS at 07:55

## 2018-07-28 RX ADMIN — MORPHINE SULFATE 30 MILLIGRAM(S): 50 CAPSULE, EXTENDED RELEASE ORAL at 14:09

## 2018-07-28 RX ADMIN — Medication 3 MILLILITER(S): at 14:43

## 2018-07-28 RX ADMIN — HYDROMORPHONE HYDROCHLORIDE 1.5 MILLIGRAM(S): 2 INJECTION INTRAMUSCULAR; INTRAVENOUS; SUBCUTANEOUS at 15:51

## 2018-07-28 RX ADMIN — HYDROMORPHONE HYDROCHLORIDE 1.5 MILLIGRAM(S): 2 INJECTION INTRAMUSCULAR; INTRAVENOUS; SUBCUTANEOUS at 16:52

## 2018-07-28 RX ADMIN — HYDROMORPHONE HYDROCHLORIDE 1 MILLIGRAM(S): 2 INJECTION INTRAMUSCULAR; INTRAVENOUS; SUBCUTANEOUS at 00:15

## 2018-07-28 RX ADMIN — HYDROMORPHONE HYDROCHLORIDE 1 MILLIGRAM(S): 2 INJECTION INTRAMUSCULAR; INTRAVENOUS; SUBCUTANEOUS at 07:38

## 2018-07-28 RX ADMIN — HYDROMORPHONE HYDROCHLORIDE 1 MILLIGRAM(S): 2 INJECTION INTRAMUSCULAR; INTRAVENOUS; SUBCUTANEOUS at 11:47

## 2018-07-28 NOTE — PROGRESS NOTE ADULT - SUBJECTIVE AND OBJECTIVE BOX
[facility]  INFECTIOUS DISEASES AND INTERNAL MEDICINE at [place]  =======================================================  Jose George MD  Diplomates American Board of Internal Medicine and Infectious Diseases  =======================================================    NITIN DAVALOS [id number]    Follow up: Pneumonia     + Cough and SOB  Afebrile last 24 hours      Allergies:  hospital socks (Rash)  lisinopril (Anaphylaxis)  statins (Anaphylaxis)      Antibiotics:  piperacillin/tazobactam IVPB. 3.375 Gram(s) IV Intermittent every 8 hours        REVIEW OF SYSTEMS:  CONSTITUTIONAL:  No Fever   HEENT:  No diplopia or blurred vision.  No earache, sore throat or runny nose.  CARDIOVASCULAR:  No pressure, squeezing, strangling, tightness, heaviness or aching about the chest, neck, axilla or epigastrium.  RESPIRATORY:  + cough. + shortness of breath  GASTROINTESTINAL:  No nausea, vomiting or diarrhea.  GENITOURINARY:  No dysuria, frequency or urgency  MUSCULOSKELETAL:  no joint aches, no muscle pain  SKIN:  No change in skin, hair or nails.  NEUROLOGIC:  No paresthesias, fasciculations  PSYCHIATRIC:  No disorder of thought or mood.  ENDOCRINE:  No heat or cold intolerance  HEMATOLOGICAL:  No easy bruising or bleeding.       Physical Exam:  Vital Signs Last 24 Hrs  T(C): 36.5 (28 Jul 2018 09:00), Max: 36.9 (27 Jul 2018 22:05)  T(F): 97.7 (28 Jul 2018 09:00), Max: 98.4 (27 Jul 2018 22:05)  HR: 91 (28 Jul 2018 09:00) (87 - 96)  BP: 98/52 (28 Jul 2018 09:00) (95/60 - 110/54)  RR: 16 (28 Jul 2018 09:00) (16 - 20)  SpO2: 98% (28 Jul 2018 09:00) (92% - 98%)      GEN: NAD, pleasant  HEENT: normocephalic and atraumatic. PERRL.    NECK: Supple.   LUNGS: Clear to auscultation.  HEART: Regular rate and rhythm  ABDOMEN: Soft, nontender, and nondistended.  Positive bowel sounds.  + PEG  : No CVA tenderness  EXTREMITIES: Without any edema.  MSK: No joint swelling  NEUROLOGIC: Alert and oriented x3  PSYCHIATRIC: Appropriate affect .  SKIN: No rash      Labs:  07-27    139  |  100  |  9.0  ----------------------------<  125<H>  4.0   |  30.0<H>  |  0.67    Ca    8.2<L>      27 Jul 2018 07:09                            8.0    9.7   )-----------( 223      ( 27 Jul 2018 07:09 )             26.1       RECENT CULTURES:  07-22 @ 21:20 .Urine Catheterized     No growth      07-22 @ 16:48 .Blood Blood     No growth at 5 days.      07-22 @ 16:47 .Blood Blood     No growth at 5 days.      07-21 @ 08:31 .Urine Clean Catch (Midstream)     No growth        EXAM:  CT CHEST                        PROCEDURE DATE:  07/25/2018    INTERPRETATION:  CLINICAL INFORMATION: Hypoxia.  COMPARISON: November 10, 2017.  PROCEDURE:   CT of the Chest was performed without intravenous contrast.  Sagittal and coronal reformats were performed.  FINDINGS:  LUNGS AND LARGE AIRWAYS: Sutures in the left upper lobe. Severe   emphysema. Consolidation in the right lower lobe with scattered patchy   and tree-in-bud opacities, probably multifocal pneumonia. Scattered   pulmonary nodules, including a nodule in the posterior left upper lobe   (series 5, image 71), measuring 0.8 cm.  PLEURA: Small bilateral pleural effusions.  VESSELS: Right chest wall port with catheter tip terminating the superior vena cava.   HEART: Heart size is normal. No pericardial effusion.  MEDIASTINUM AND CY: No lymphadenopathy.  CHEST WALL AND LOWER NECK: Right chest wall port.  VISUALIZED UPPER ABDOMEN: Percutaneous gastrostomy tube. Cholelithiasis.  BONES: Degenerative changes of the spine.  IMPRESSION:   Multifocal pneumonia.

## 2018-07-28 NOTE — PROGRESS NOTE ADULT - PROBLEM SELECTOR PLAN 1
CT Chest with RLL pneumonia  Blood cultures no growth  Leukocytosis down from 16k to 9k  Will Continue Zosyn for Aspiration pneumonia  Do swallow eval

## 2018-07-28 NOTE — PROGRESS NOTE ADULT - SUBJECTIVE AND OBJECTIVE BOX
NITIN BRANDINELSON  ----------------------------------------  The patient was seen and evaluated for pneumonia.  The patient is in no acute distress.  Denied any chest pain, palpitations, dyspnea, or abdominal pain.  Offers no complaints. Tolerating gastrostomy tube feeds at the higher rate.    Vital Signs Last 24 Hrs  T(C): 36.5 (28 Jul 2018 09:00), Max: 36.9 (27 Jul 2018 22:05)  T(F): 97.7 (28 Jul 2018 09:00), Max: 98.4 (27 Jul 2018 22:05)  HR: 90 (28 Jul 2018 14:44) (87 - 96)  BP: 98/52 (28 Jul 2018 09:00) (95/60 - 110/54)  BP(mean): --  RR: 16 (28 Jul 2018 09:00) (16 - 20)  SpO2: 98% (28 Jul 2018 09:00) (92% - 98%)    PHYSICAL EXAMINATION:  ----------------------------------------  General appearance: No acute distress, Awake, Alert  HEENT: Normocephalic, Atraumatic, Conjunctiva clear, EOMI  Neck: Supple, No JVD, No tenderness  Lungs: Clear to auscultation, Breath sound equal bilaterally, No wheezes, No rales  Cardiovascular: S1S2, Regular rhythm  Abdomen: Soft, Nontender, Nondistended, No guarding/rebound, Positive bowel sounds, Gastrostomy tube in place.  Extremities: No clubbing, No cyanosis, No edema, No calf tenderness  Neuro: Strength equal bilaterally, No tremors  Psychiatric: Appropriate mood, Normal affect    LABORATORY STUDIES:  ----------------------------------------              8.0    9.7   )-----------( 223      ( 27 Jul 2018 07:09 )             26.1     07-27    139  |  100  |  9.0  ----------------------------<  125<H>  4.0   |  30.0<H>  |  0.67    Ca    8.2<L>      27 Jul 2018 07:09    MEDICATIONS  (STANDING):  ALBUTerol/ipratropium for Nebulization 3 milliLiter(s) Nebulizer every 6 hours  carvedilol 6.25 milliGRAM(s) Oral every 12 hours  loratadine 10 milliGRAM(s) Oral daily  morphine ER Tablet 30 milliGRAM(s) Oral three times a day  OLANZapine 5 milliGRAM(s) Oral daily  pantoprazole  Injectable 40 milliGRAM(s) IV Push two times a day  piperacillin/tazobactam IVPB. 3.375 Gram(s) IV Intermittent every 8 hours  pregabalin 75 milliGRAM(s) Oral two times a day    MEDICATIONS  (PRN):  acetaminophen   Tablet 650 milliGRAM(s) Oral every 6 hours PRN For Temp greater than 38 C (99 F)  ALPRAZolam 0.5 milliGRAM(s) Oral at bedtime PRN anxiety  HYDROmorphone  Injectable 1.5 milliGRAM(s) IV Push every 4 hours PRN Severe Pain (7 - 10)  meclizine 25 milliGRAM(s) Oral three times a day PRN Dizziness      ASSESSMENT / PLAN:  ----------------------------------------  Aspiration pneumonia - On intravenous piperacillin/tazobactam. Infectious Disease follow up noted. On supplemental oxygen for hypoxia.    Dysphagia - Oral intake due to suspicion for aspiration. Awaiting modified barium swallow study.    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    Anemia - Multifactorial. Monitoring CBC.    Bipolar disorder - On olanzapine.    Discussed with the patient and his wife at the bedside in detail. NITIN BRANDINELSON  ----------------------------------------  The patient was seen and evaluated for pneumonia.  The patient is in no acute distress.  Denied any chest pain, palpitations, dyspnea, or abdominal pain.  Offers no complaints. Tolerating gastrostomy tube feeds at the higher rate.    Vital Signs Last 24 Hrs  T(C): 36.5 (28 Jul 2018 09:00), Max: 36.9 (27 Jul 2018 22:05)  T(F): 97.7 (28 Jul 2018 09:00), Max: 98.4 (27 Jul 2018 22:05)  HR: 90 (28 Jul 2018 14:44) (87 - 96)  BP: 98/52 (28 Jul 2018 09:00) (95/60 - 110/54)  BP(mean): --  RR: 16 (28 Jul 2018 09:00) (16 - 20)  SpO2: 98% (28 Jul 2018 09:00) (92% - 98%)    PHYSICAL EXAMINATION:  ----------------------------------------  General appearance: No acute distress, Awake, Alert  HEENT: Normocephalic, Atraumatic, Conjunctiva clear, EOMI  Neck: Supple, No JVD, No tenderness  Lungs: Clear to auscultation, Breath sound equal bilaterally, No wheezes, No rales  Cardiovascular: S1S2, Regular rhythm  Abdomen: Soft, Nontender, Nondistended, No guarding/rebound, Positive bowel sounds, Gastrostomy tube in place.  Extremities: No clubbing, No cyanosis, No edema, No calf tenderness  Neuro: Strength equal bilaterally, No tremors  Psychiatric: Appropriate mood, Normal affect    LABORATORY STUDIES:  ----------------------------------------              8.0    9.7   )-----------( 223      ( 27 Jul 2018 07:09 )             26.1     07-27    139  |  100  |  9.0  ----------------------------<  125<H>  4.0   |  30.0<H>  |  0.67    Ca    8.2<L>      27 Jul 2018 07:09    MEDICATIONS  (STANDING):  ALBUTerol/ipratropium for Nebulization 3 milliLiter(s) Nebulizer every 6 hours  carvedilol 6.25 milliGRAM(s) Oral every 12 hours  loratadine 10 milliGRAM(s) Oral daily  morphine ER Tablet 30 milliGRAM(s) Oral three times a day  OLANZapine 5 milliGRAM(s) Oral daily  pantoprazole  Injectable 40 milliGRAM(s) IV Push two times a day  piperacillin/tazobactam IVPB. 3.375 Gram(s) IV Intermittent every 8 hours  pregabalin 75 milliGRAM(s) Oral two times a day    MEDICATIONS  (PRN):  acetaminophen   Tablet 650 milliGRAM(s) Oral every 6 hours PRN For Temp greater than 38 C (99 F)  ALPRAZolam 0.5 milliGRAM(s) Oral at bedtime PRN anxiety  HYDROmorphone  Injectable 1.5 milliGRAM(s) IV Push every 4 hours PRN Severe Pain (7 - 10)  meclizine 25 milliGRAM(s) Oral three times a day PRN Dizziness      ASSESSMENT / PLAN:  ----------------------------------------  Aspiration pneumonia - On intravenous piperacillin/tazobactam. Leukocytosis resolved. Infectious Disease follow up noted. On supplemental oxygen for hypoxia.    Dysphagia - Oral intake due to suspicion for aspiration. Awaiting modified barium swallow study.    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    Anemia - Multifactorial. Monitoring CBC.    Bipolar disorder - On olanzapine.    Discussed with the patient and his wife at the bedside in detail. NITIN BRANDINELSON  ----------------------------------------  The patient was seen and evaluated for pneumonia.  The patient is in no acute distress.  Denied any chest pain, palpitations, dyspnea, or abdominal pain.  Offers no complaints. Tolerating gastrostomy tube feeds at the higher rate.    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 The patient was seen by Gastroenterology in consultation for gastrostomy tube placement. The patient was also seen by Cardiology in consultation for perioperative evaluation. The patient underwent upper endoscopy with placement of a gastrostomy tube. The patient tolerated the procedure well and gastrostomy tube feeds were initiated to supplement the patient’s oral diet. He was noted to have mild hypoxia and inhaled bronchodilator therapy was initiated. The patient was noted to have fever(101.6) and leukocytosis. Lower extremity Doppler was without DVT. CT of the chest noted severe emphysema, consolidation in the right lower lobe with scattered patchy and tree-in-bud opacities, scattered pulmonary nodules, small bilateral pleural effusions. Oral intake was discontinued due to suspicion of aspiration.    Vital Signs Last 24 Hrs  T(C): 36.5 (28 Jul 2018 09:00), Max: 36.9 (27 Jul 2018 22:05)  T(F): 97.7 (28 Jul 2018 09:00), Max: 98.4 (27 Jul 2018 22:05)  HR: 90 (28 Jul 2018 14:44) (87 - 96)  BP: 98/52 (28 Jul 2018 09:00) (95/60 - 110/54)  BP(mean): --  RR: 16 (28 Jul 2018 09:00) (16 - 20)  SpO2: 98% (28 Jul 2018 09:00) (92% - 98%)    PHYSICAL EXAMINATION:  ----------------------------------------  General appearance: No acute distress, Awake, Alert  HEENT: Normocephalic, Atraumatic, Conjunctiva clear, EOMI  Neck: Supple, No JVD, No tenderness  Lungs: Clear to auscultation, Breath sound equal bilaterally, No wheezes, No rales  Cardiovascular: S1S2, Regular rhythm  Abdomen: Soft, Nontender, Nondistended, No guarding/rebound, Positive bowel sounds, Gastrostomy tube in place.  Extremities: No clubbing, No cyanosis, No edema, No calf tenderness  Neuro: Strength equal bilaterally, No tremors  Psychiatric: Appropriate mood, Normal affect    LABORATORY STUDIES:  ----------------------------------------              8.0    9.7   )-----------( 223      ( 27 Jul 2018 07:09 )             26.1     07-27    139  |  100  |  9.0  ----------------------------<  125<H>  4.0   |  30.0<H>  |  0.67    Ca    8.2<L>      27 Jul 2018 07:09    MEDICATIONS  (STANDING):  ALBUTerol/ipratropium for Nebulization 3 milliLiter(s) Nebulizer every 6 hours  carvedilol 6.25 milliGRAM(s) Oral every 12 hours  loratadine 10 milliGRAM(s) Oral daily  morphine ER Tablet 30 milliGRAM(s) Oral three times a day  OLANZapine 5 milliGRAM(s) Oral daily  pantoprazole  Injectable 40 milliGRAM(s) IV Push two times a day  piperacillin/tazobactam IVPB. 3.375 Gram(s) IV Intermittent every 8 hours  pregabalin 75 milliGRAM(s) Oral two times a day    MEDICATIONS  (PRN):  acetaminophen   Tablet 650 milliGRAM(s) Oral every 6 hours PRN For Temp greater than 38 C (99 F)  ALPRAZolam 0.5 milliGRAM(s) Oral at bedtime PRN anxiety  HYDROmorphone  Injectable 1.5 milliGRAM(s) IV Push every 4 hours PRN Severe Pain (7 - 10)  meclizine 25 milliGRAM(s) Oral three times a day PRN Dizziness      ASSESSMENT / PLAN:  ----------------------------------------  Aspiration pneumonia - On intravenous piperacillin/tazobactam. Leukocytosis resolved. Infectious Disease follow up noted. On supplemental oxygen for hypoxia.    Dysphagia - Oral intake due to suspicion for aspiration. Awaiting modified barium swallow study.    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    Anemia - Multifactorial. Monitoring CBC.    Bipolar disorder - On olanzapine.    Discussed with the patient and his wife at the bedside in detail.

## 2018-07-29 DIAGNOSIS — J15.9 UNSPECIFIED BACTERIAL PNEUMONIA: ICD-10-CM

## 2018-07-29 LAB
ANION GAP SERPL CALC-SCNC: 10 MMOL/L — SIGNIFICANT CHANGE UP (ref 5–17)
BUN SERPL-MCNC: 12 MG/DL — SIGNIFICANT CHANGE UP (ref 8–20)
CALCIUM SERPL-MCNC: 8.5 MG/DL — LOW (ref 8.6–10.2)
CHLORIDE SERPL-SCNC: 99 MMOL/L — SIGNIFICANT CHANGE UP (ref 98–107)
CO2 SERPL-SCNC: 31 MMOL/L — HIGH (ref 22–29)
CREAT SERPL-MCNC: 0.8 MG/DL — SIGNIFICANT CHANGE UP (ref 0.5–1.3)
GLUCOSE SERPL-MCNC: 120 MG/DL — HIGH (ref 70–115)
HCT VFR BLD CALC: 26.3 % — LOW (ref 42–52)
HGB BLD-MCNC: 7.9 G/DL — LOW (ref 14–18)
MCHC RBC-ENTMCNC: 29 PG — SIGNIFICANT CHANGE UP (ref 27–31)
MCHC RBC-ENTMCNC: 30 G/DL — LOW (ref 32–36)
MCV RBC AUTO: 96.7 FL — HIGH (ref 80–94)
PLATELET # BLD AUTO: 249 K/UL — SIGNIFICANT CHANGE UP (ref 150–400)
POTASSIUM SERPL-MCNC: 4.4 MMOL/L — SIGNIFICANT CHANGE UP (ref 3.5–5.3)
POTASSIUM SERPL-SCNC: 4.4 MMOL/L — SIGNIFICANT CHANGE UP (ref 3.5–5.3)
RBC # BLD: 2.72 M/UL — LOW (ref 4.6–6.2)
RBC # FLD: 14.3 % — SIGNIFICANT CHANGE UP (ref 11–15.6)
SODIUM SERPL-SCNC: 140 MMOL/L — SIGNIFICANT CHANGE UP (ref 135–145)
WBC # BLD: 8.1 K/UL — SIGNIFICANT CHANGE UP (ref 4.8–10.8)
WBC # FLD AUTO: 8.1 K/UL — SIGNIFICANT CHANGE UP (ref 4.8–10.8)

## 2018-07-29 PROCEDURE — 99233 SBSQ HOSP IP/OBS HIGH 50: CPT

## 2018-07-29 RX ORDER — HYDROMORPHONE HYDROCHLORIDE 2 MG/ML
2 INJECTION INTRAMUSCULAR; INTRAVENOUS; SUBCUTANEOUS
Qty: 0 | Refills: 0 | Status: DISCONTINUED | OUTPATIENT
Start: 2018-07-29 | End: 2018-07-31

## 2018-07-29 RX ADMIN — HYDROMORPHONE HYDROCHLORIDE 1.5 MILLIGRAM(S): 2 INJECTION INTRAMUSCULAR; INTRAVENOUS; SUBCUTANEOUS at 02:25

## 2018-07-29 RX ADMIN — Medication 3 MILLILITER(S): at 09:09

## 2018-07-29 RX ADMIN — HYDROMORPHONE HYDROCHLORIDE 1.5 MILLIGRAM(S): 2 INJECTION INTRAMUSCULAR; INTRAVENOUS; SUBCUTANEOUS at 11:19

## 2018-07-29 RX ADMIN — PIPERACILLIN AND TAZOBACTAM 25 GRAM(S): 4; .5 INJECTION, POWDER, LYOPHILIZED, FOR SOLUTION INTRAVENOUS at 13:05

## 2018-07-29 RX ADMIN — MORPHINE SULFATE 30 MILLIGRAM(S): 50 CAPSULE, EXTENDED RELEASE ORAL at 22:03

## 2018-07-29 RX ADMIN — MORPHINE SULFATE 30 MILLIGRAM(S): 50 CAPSULE, EXTENDED RELEASE ORAL at 14:57

## 2018-07-29 RX ADMIN — Medication 75 MILLIGRAM(S): at 05:55

## 2018-07-29 RX ADMIN — LORATADINE 10 MILLIGRAM(S): 10 TABLET ORAL at 11:22

## 2018-07-29 RX ADMIN — OLANZAPINE 5 MILLIGRAM(S): 15 TABLET, FILM COATED ORAL at 22:03

## 2018-07-29 RX ADMIN — HYDROMORPHONE HYDROCHLORIDE 1.5 MILLIGRAM(S): 2 INJECTION INTRAMUSCULAR; INTRAVENOUS; SUBCUTANEOUS at 13:03

## 2018-07-29 RX ADMIN — MORPHINE SULFATE 30 MILLIGRAM(S): 50 CAPSULE, EXTENDED RELEASE ORAL at 13:05

## 2018-07-29 RX ADMIN — HYDROMORPHONE HYDROCHLORIDE 2 MILLIGRAM(S): 2 INJECTION INTRAMUSCULAR; INTRAVENOUS; SUBCUTANEOUS at 23:26

## 2018-07-29 RX ADMIN — PIPERACILLIN AND TAZOBACTAM 25 GRAM(S): 4; .5 INJECTION, POWDER, LYOPHILIZED, FOR SOLUTION INTRAVENOUS at 22:03

## 2018-07-29 RX ADMIN — HYDROMORPHONE HYDROCHLORIDE 1.5 MILLIGRAM(S): 2 INJECTION INTRAMUSCULAR; INTRAVENOUS; SUBCUTANEOUS at 07:13

## 2018-07-29 RX ADMIN — Medication 3 MILLILITER(S): at 15:15

## 2018-07-29 RX ADMIN — MORPHINE SULFATE 30 MILLIGRAM(S): 50 CAPSULE, EXTENDED RELEASE ORAL at 22:30

## 2018-07-29 RX ADMIN — HYDROMORPHONE HYDROCHLORIDE 1.5 MILLIGRAM(S): 2 INJECTION INTRAMUSCULAR; INTRAVENOUS; SUBCUTANEOUS at 07:30

## 2018-07-29 RX ADMIN — Medication 75 MILLIGRAM(S): at 18:21

## 2018-07-29 RX ADMIN — HYDROMORPHONE HYDROCHLORIDE 2 MILLIGRAM(S): 2 INJECTION INTRAMUSCULAR; INTRAVENOUS; SUBCUTANEOUS at 18:55

## 2018-07-29 RX ADMIN — Medication 3 MILLILITER(S): at 20:52

## 2018-07-29 RX ADMIN — MORPHINE SULFATE 30 MILLIGRAM(S): 50 CAPSULE, EXTENDED RELEASE ORAL at 06:30

## 2018-07-29 RX ADMIN — MORPHINE SULFATE 30 MILLIGRAM(S): 50 CAPSULE, EXTENDED RELEASE ORAL at 05:55

## 2018-07-29 RX ADMIN — HYDROMORPHONE HYDROCHLORIDE 2 MILLIGRAM(S): 2 INJECTION INTRAMUSCULAR; INTRAVENOUS; SUBCUTANEOUS at 18:20

## 2018-07-29 RX ADMIN — HYDROMORPHONE HYDROCHLORIDE 1.5 MILLIGRAM(S): 2 INJECTION INTRAMUSCULAR; INTRAVENOUS; SUBCUTANEOUS at 02:45

## 2018-07-29 RX ADMIN — PIPERACILLIN AND TAZOBACTAM 25 GRAM(S): 4; .5 INJECTION, POWDER, LYOPHILIZED, FOR SOLUTION INTRAVENOUS at 05:54

## 2018-07-29 RX ADMIN — PANTOPRAZOLE SODIUM 40 MILLIGRAM(S): 20 TABLET, DELAYED RELEASE ORAL at 18:21

## 2018-07-29 RX ADMIN — PANTOPRAZOLE SODIUM 40 MILLIGRAM(S): 20 TABLET, DELAYED RELEASE ORAL at 05:54

## 2018-07-29 NOTE — PROGRESS NOTE ADULT - PROBLEM SELECTOR PLAN 1
Aspiration pneumonia, concern for gram negative and anaerobes day 7 of abx. PEG in place on tube feeds

## 2018-07-29 NOTE — PROGRESS NOTE ADULT - SUBJECTIVE AND OBJECTIVE BOX
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 3.375 Gram(s) IV Intermittent every 8 hours  pregabalin 75 milliGRAM(s) Oral two times a day      Vital Signs Last 24 Hrs  T(C): 36.6 (29 Jul 2018 09:30), Max: 37.9 (28 Jul 2018 16:57)  T(F): 97.8 (29 Jul 2018 09:30), Max: 100.2 (28 Jul 2018 16:57)  HR: 92 (29 Jul 2018 15:17) (84 - 99)  BP: 102/52 (29 Jul 2018 09:30) (93/61 - 102/52)  BP(mean): --  RR: 18 (29 Jul 2018 09:30) (18 - 18)  SpO2: 95% (29 Jul 2018 15:17) (93% - 95%)    PHYSICAL EXAM:  Constitutional: NAD, well-groomed, well-developed  HEENT: PERRLA, EOMI, Normal Hearing, MMM  Neck: No LAD, No JVD  Back: Normal spine flexure, No CVA tenderness  Respiratory: CTAB Cardiovascular: S1 and S2, RRR, no M/G/R  Gastrointestinal: BS+, soft, NT/ND  Extremities: No peripheral edema  Vascular: 2+ peripheral pulses  Neurological: A/O x 3, no focal deficits  Psychiatric: Normal mood, normal affect  Musculoskeletal: 5/5 strength b/l upper and lower extremities  Skin: No rashes    CAPILLARY BLOOD GLUCOSE    LABS:                        7.9    8.1   )-----------( 249      ( 29 Jul 2018 08:01 )             26.3     07-29    140  |  99  |  12.0  ----------------------------<  120<H>  4.4   |  31.0<H>  |  0.80    Ca    8.5<L>      29 Jul 2018 08:01            RADIOLOGY & ADDITIONAL TESTS:

## 2018-07-30 LAB
ANION GAP SERPL CALC-SCNC: 11 MMOL/L — SIGNIFICANT CHANGE UP (ref 5–17)
BUN SERPL-MCNC: 12 MG/DL — SIGNIFICANT CHANGE UP (ref 8–20)
CALCIUM SERPL-MCNC: 9 MG/DL — SIGNIFICANT CHANGE UP (ref 8.6–10.2)
CHLORIDE SERPL-SCNC: 97 MMOL/L — LOW (ref 98–107)
CO2 SERPL-SCNC: 30 MMOL/L — HIGH (ref 22–29)
CREAT SERPL-MCNC: 0.7 MG/DL — SIGNIFICANT CHANGE UP (ref 0.5–1.3)
GLUCOSE SERPL-MCNC: 122 MG/DL — HIGH (ref 70–115)
HCT VFR BLD CALC: 26.8 % — LOW (ref 42–52)
HGB BLD-MCNC: 8.1 G/DL — LOW (ref 14–18)
MAGNESIUM SERPL-MCNC: 2.1 MG/DL — SIGNIFICANT CHANGE UP (ref 1.6–2.6)
MCHC RBC-ENTMCNC: 28.4 PG — SIGNIFICANT CHANGE UP (ref 27–31)
MCHC RBC-ENTMCNC: 30.2 G/DL — LOW (ref 32–36)
MCV RBC AUTO: 94 FL — SIGNIFICANT CHANGE UP (ref 80–94)
PHOSPHATE SERPL-MCNC: 4 MG/DL — SIGNIFICANT CHANGE UP (ref 2.4–4.7)
PLATELET # BLD AUTO: 305 K/UL — SIGNIFICANT CHANGE UP (ref 150–400)
POTASSIUM SERPL-MCNC: 4.4 MMOL/L — SIGNIFICANT CHANGE UP (ref 3.5–5.3)
POTASSIUM SERPL-SCNC: 4.4 MMOL/L — SIGNIFICANT CHANGE UP (ref 3.5–5.3)
RBC # BLD: 2.85 M/UL — LOW (ref 4.6–6.2)
RBC # FLD: 14.5 % — SIGNIFICANT CHANGE UP (ref 11–15.6)
SODIUM SERPL-SCNC: 138 MMOL/L — SIGNIFICANT CHANGE UP (ref 135–145)
WBC # BLD: 9.1 K/UL — SIGNIFICANT CHANGE UP (ref 4.8–10.8)
WBC # FLD AUTO: 9.1 K/UL — SIGNIFICANT CHANGE UP (ref 4.8–10.8)

## 2018-07-30 PROCEDURE — 93306 TTE W/DOPPLER COMPLETE: CPT | Mod: 26

## 2018-07-30 PROCEDURE — 74230 X-RAY XM SWLNG FUNCJ C+: CPT | Mod: 26

## 2018-07-30 PROCEDURE — 99233 SBSQ HOSP IP/OBS HIGH 50: CPT

## 2018-07-30 RX ORDER — PIPERACILLIN AND TAZOBACTAM 4; .5 G/20ML; G/20ML
3.38 INJECTION, POWDER, LYOPHILIZED, FOR SOLUTION INTRAVENOUS ONCE
Qty: 0 | Refills: 0 | Status: COMPLETED | OUTPATIENT
Start: 2018-07-30 | End: 2018-07-30

## 2018-07-30 RX ADMIN — HYDROMORPHONE HYDROCHLORIDE 2 MILLIGRAM(S): 2 INJECTION INTRAMUSCULAR; INTRAVENOUS; SUBCUTANEOUS at 23:45

## 2018-07-30 RX ADMIN — MORPHINE SULFATE 30 MILLIGRAM(S): 50 CAPSULE, EXTENDED RELEASE ORAL at 23:30

## 2018-07-30 RX ADMIN — MORPHINE SULFATE 30 MILLIGRAM(S): 50 CAPSULE, EXTENDED RELEASE ORAL at 16:04

## 2018-07-30 RX ADMIN — PANTOPRAZOLE SODIUM 40 MILLIGRAM(S): 20 TABLET, DELAYED RELEASE ORAL at 06:57

## 2018-07-30 RX ADMIN — HYDROMORPHONE HYDROCHLORIDE 2 MILLIGRAM(S): 2 INJECTION INTRAMUSCULAR; INTRAVENOUS; SUBCUTANEOUS at 19:45

## 2018-07-30 RX ADMIN — Medication 3 MILLILITER(S): at 20:39

## 2018-07-30 RX ADMIN — Medication 3 MILLILITER(S): at 15:58

## 2018-07-30 RX ADMIN — HYDROMORPHONE HYDROCHLORIDE 2 MILLIGRAM(S): 2 INJECTION INTRAMUSCULAR; INTRAVENOUS; SUBCUTANEOUS at 16:14

## 2018-07-30 RX ADMIN — HYDROMORPHONE HYDROCHLORIDE 2 MILLIGRAM(S): 2 INJECTION INTRAMUSCULAR; INTRAVENOUS; SUBCUTANEOUS at 05:45

## 2018-07-30 RX ADMIN — PIPERACILLIN AND TAZOBACTAM 25 GRAM(S): 4; .5 INJECTION, POWDER, LYOPHILIZED, FOR SOLUTION INTRAVENOUS at 06:56

## 2018-07-30 RX ADMIN — Medication 75 MILLIGRAM(S): at 18:09

## 2018-07-30 RX ADMIN — Medication 75 MILLIGRAM(S): at 06:59

## 2018-07-30 RX ADMIN — MORPHINE SULFATE 30 MILLIGRAM(S): 50 CAPSULE, EXTENDED RELEASE ORAL at 07:30

## 2018-07-30 RX ADMIN — LORATADINE 10 MILLIGRAM(S): 10 TABLET ORAL at 12:12

## 2018-07-30 RX ADMIN — HYDROMORPHONE HYDROCHLORIDE 2 MILLIGRAM(S): 2 INJECTION INTRAMUSCULAR; INTRAVENOUS; SUBCUTANEOUS at 12:00

## 2018-07-30 RX ADMIN — PIPERACILLIN AND TAZOBACTAM 200 GRAM(S): 4; .5 INJECTION, POWDER, LYOPHILIZED, FOR SOLUTION INTRAVENOUS at 14:28

## 2018-07-30 RX ADMIN — MORPHINE SULFATE 30 MILLIGRAM(S): 50 CAPSULE, EXTENDED RELEASE ORAL at 23:05

## 2018-07-30 RX ADMIN — HYDROMORPHONE HYDROCHLORIDE 2 MILLIGRAM(S): 2 INJECTION INTRAMUSCULAR; INTRAVENOUS; SUBCUTANEOUS at 23:27

## 2018-07-30 RX ADMIN — HYDROMORPHONE HYDROCHLORIDE 2 MILLIGRAM(S): 2 INJECTION INTRAMUSCULAR; INTRAVENOUS; SUBCUTANEOUS at 00:00

## 2018-07-30 RX ADMIN — HYDROMORPHONE HYDROCHLORIDE 2 MILLIGRAM(S): 2 INJECTION INTRAMUSCULAR; INTRAVENOUS; SUBCUTANEOUS at 05:32

## 2018-07-30 RX ADMIN — PANTOPRAZOLE SODIUM 40 MILLIGRAM(S): 20 TABLET, DELAYED RELEASE ORAL at 18:09

## 2018-07-30 RX ADMIN — HYDROMORPHONE HYDROCHLORIDE 2 MILLIGRAM(S): 2 INJECTION INTRAMUSCULAR; INTRAVENOUS; SUBCUTANEOUS at 12:38

## 2018-07-30 RX ADMIN — MORPHINE SULFATE 30 MILLIGRAM(S): 50 CAPSULE, EXTENDED RELEASE ORAL at 06:57

## 2018-07-30 RX ADMIN — HYDROMORPHONE HYDROCHLORIDE 2 MILLIGRAM(S): 2 INJECTION INTRAMUSCULAR; INTRAVENOUS; SUBCUTANEOUS at 16:55

## 2018-07-30 RX ADMIN — MORPHINE SULFATE 30 MILLIGRAM(S): 50 CAPSULE, EXTENDED RELEASE ORAL at 14:27

## 2018-07-30 RX ADMIN — OLANZAPINE 5 MILLIGRAM(S): 15 TABLET, FILM COATED ORAL at 23:05

## 2018-07-30 RX ADMIN — HYDROMORPHONE HYDROCHLORIDE 2 MILLIGRAM(S): 2 INJECTION INTRAMUSCULAR; INTRAVENOUS; SUBCUTANEOUS at 19:12

## 2018-07-30 NOTE — PROGRESS NOTE ADULT - SUBJECTIVE AND OBJECTIVE BOX
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     MEDICATIONS  (STANDING):  ALBUTerol/ipratropium for Nebulization 3 milliLiter(s) Nebulizer every 6 hours  carvedilol 6.25 milliGRAM(s) Oral every 12 hours  loratadine 10 milliGRAM(s) Oral daily  morphine ER Tablet 30 milliGRAM(s) Oral three times a day  OLANZapine 5 milliGRAM(s) Oral daily  pantoprazole  Injectable 40 milliGRAM(s) IV Push two times a day  pregabalin 75 milliGRAM(s) Oral two times a day      Vital Signs Last 24 Hrs  T(C): 36.4 (30 Jul 2018 06:55), Max: 36.9 (29 Jul 2018 22:00)  T(F): 97.6 (30 Jul 2018 06:55), Max: 98.4 (29 Jul 2018 22:00)  HR: 87 (30 Jul 2018 06:55) (87 - 100)  BP: 116/87 (30 Jul 2018 06:55) (104/52 - 116/87)  BP(mean): --  RR: 16 (29 Jul 2018 18:14) (16 - 16)  SpO2: 94% (30 Jul 2018 06:55) (94% - 96%)    PHYSICAL EXAM:  Constitutional: NAD, well-groomed, well-developed  HEENT: PERRLA, EOMI, Normal Hearing, MMM  Neck: No LAD, No JVD  Back: Normal spine flexure, No CVA tenderness  Respiratory: CTAB Cardiovascular: S1 and S2, RRR, no M/G/R  Gastrointestinal:  peg  Extremities: No peripheral edema  Vascular: 2+ peripheral pulses  Neurological: A/O x 3, no focal deficits  Psychiatric: Normal mood, normal affect  Musculoskeletal: 5/5 strength b/l upper and lower extremities  Skin: No rashes    CAPILLARY BLOOD GLUCOSE    LABS:                        8.1    9.1   )-----------( 305      ( 30 Jul 2018 06:19 )             26.8     07-30    138  |  97<L>  |  12.0  ----------------------------<  122<H>  4.4   |  30.0<H>  |  0.70    Ca    9.0      30 Jul 2018 06:19  Phos  4.0     07-30  Mg     2.1     07-30            RADIOLOGY & ADDITIONAL TESTS:

## 2018-07-30 NOTE — PROGRESS NOTE ADULT - ASSESSMENT
58 year old male maxillary sinus cancer, aspiration pna, peg placed, hypoxemia. On 7/30 patient passed a aswallow evaluation.

## 2018-07-30 NOTE — SWALLOW VFSS/MBS ASSESSMENT ADULT - SLP PERTINENT HISTORY OF CURRENT PROBLEM
As per h&p: 58 y/o male with h/o right maxillary sinus cancer with local fungation through the palate into the mouth, started chemotherapy, was referred to the ER by the Oncologist Dr. Juan for evaluation for PEG tube evaluation and placement as patient has a difficulty to eat by mouth because of the cancer invasion into the mouth. As per recent MD's note: CT of the chest noted severe emphysema, consolidation in the right lower lobe with scattered patchy and tree-in-bud opacities, scattered pulmonary nodules, small bilateral pleural effusions. Oral intake was discontinued due to suspicion of aspiration.

## 2018-07-30 NOTE — PROGRESS NOTE ADULT - PROBLEM SELECTOR PLAN 1
Aspiration pneumonia, concern for gram negative and anaerobes day 7 of abx. PEG in place on tube feeds. Patient has completed his cycle of antibiotics and he has the choice to eat or use tube feeds. DC home on 7/31.

## 2018-07-30 NOTE — SWALLOW VFSS/MBS ASSESSMENT ADULT - RECOMMENDED FEEDING/EATING TECHNIQUES
maintain upright posture during/after eating for 30 mins/position upright (90 degrees)/crush medication (when feasible)/oral hygiene/small sips/bites

## 2018-07-30 NOTE — SWALLOW VFSS/MBS ASSESSMENT ADULT - DIAGNOSTIC IMPRESSIONS
Oral and pharyngeal stage of swallow judged to be WFL. No overt sign/symptom of aspiration/penetration

## 2018-07-30 NOTE — CHART NOTE - NSCHARTNOTEFT_GEN_A_CORE
Source: Patient [ ]  Family [x ]   other [ ]    Current Diet: NPO, MBS today    Patient reports [ ] nausea  [ ] vomiting [ ] diarrhea [ ] constipation  [ ]chewing problems [ ] swallowing issues  [ ] other:     PO intake:  < 50% [x ]   50-75%  [ ]   %  [ ]  other :    Source for PO intake [x ] Patient [x ] family [ ] chart [ ] staff [ ] other    Enteral /Parenteral Nutrition: Jevity @90cchr with Ensure TID via Gtube to provide ( 2700kcal, 115 g pro)    Current Weight:  81.6kg, 7/19,   94.1 kg 7/25  no weight loss noted  % Weight Change     Pertinent Medications: MEDICATIONS  (STANDING):  ALBUTerol/ipratropium for Nebulization 3 milliLiter(s) Nebulizer every 6 hours  carvedilol 6.25 milliGRAM(s) Oral every 12 hours  loratadine 10 milliGRAM(s) Oral daily  morphine ER Tablet 30 milliGRAM(s) Oral three times a day  OLANZapine 5 milliGRAM(s) Oral daily  pantoprazole  Injectable 40 milliGRAM(s) IV Push two times a day  piperacillin/tazobactam IVPB. 3.375 Gram(s) IV Intermittent every 8 hours  pregabalin 75 milliGRAM(s) Oral two times a day    MEDICATIONS  (PRN):  acetaminophen   Tablet 650 milliGRAM(s) Oral every 6 hours PRN For Temp greater than 38 C (99 F)  ALPRAZolam 0.5 milliGRAM(s) Oral at bedtime PRN anxiety  HYDROmorphone  Injectable 2 milliGRAM(s) IV Push every 3 hours PRN Severe Pain (7 - 10)  meclizine 25 milliGRAM(s) Oral three times a day PRN Dizziness    Pertinent Labs: CBC Full  -  ( 30 Jul 2018 06:19 )  WBC Count : 9.1 K/uL  Hemoglobin : 8.1 g/dL  Hematocrit : 26.8 %  Platelet Count - Automated : 305 K/uL  Mean Cell Volume : 94.0 fl  Mean Cell Hemoglobin : 28.4 pg  Mean Cell Hemoglobin Concentration : 30.2 g/dL  Auto Neutrophil # : x  Auto Lymphocyte # : x  Auto Monocyte # : x  Auto Eosinophil # : x  Auto Basophil # : x  Auto Neutrophil % : x  Auto Lymphocyte % : x  Auto Monocyte % : x  Auto Eosinophil % : x  Auto Basophil % : x      07-30 Na138 mmol/L Glu 122 mg/dL<H> K+ 4.4 mmol/L Cr  0.70 mg/dL BUN 12.0 mg/dL Phos 4.0 mg/dL Alb n/a   PAB n/a           Skin:     Nutrition focused physical exam conducted - found signs of malnutrition [ ]absent [ ]present    Subcutaneous fat loss: [ ] Orbital fat pads region, [ ]Buccal fat region, [ ]Triceps region,  [ ]Ribs region    Muscle wasting: [ ]Temples region, [ ]Clavicle region, [ ]Shoulder region, [ ]Scapula region, [ ]Interosseous region,  [ ]thigh region, [ ]Calf region    Estimated Needs:   [x ] no change since previous assessment  [ ] recalculated:     Current Nutrition Diagnosis: Pt meets criteria for severe chronic malnutrition related to inadequate energy intake in setting of mouth CA, esophagitis, pneumonia, dysphagia as evidenced by <75% intake >1 mo, 40# weight loss in 4 mo. Pt had MBS today, recommendation is [facility] soft. Pt with poor po intake usually. Pt c/o not being able to get food down well. Pt also receives TF to help meet nutritional needs. Jevity increased to 90cchr with Ensure TID via gtube.     Recommendations:   For bolus regime,  Pt will require 8 8oz cans of Jevity / day to provide 1920cc, 2880kcal, 122gr protein.  Monitor po intake, labs, tolerance        Monitoring and Evaluation:   [x ] PO intake [x ] Tolerance to diet prescription [X] Weights  [X] Follow up per protocol [X] Labs:

## 2018-07-30 NOTE — SWALLOW VFSS/MBS ASSESSMENT ADULT - ADDITIONAL INFORMATION
During exam, pt declined to have soft and regular consistencies due to these consistencies would cause right jaw pain, pt expressed "it would be too hard to swallow"

## 2018-07-30 NOTE — SWALLOW VFSS/MBS ASSESSMENT ADULT - RECOMMENDED CONSISTENCY
Begin a mech soft diet with thin liquids. If mech soft diet is too difficult to masticate (due to right jaw pain), pts diet can be changed to puree consistency

## 2018-07-31 ENCOUNTER — TRANSCRIPTION ENCOUNTER (OUTPATIENT)
Age: 58
End: 2018-07-31

## 2018-07-31 VITALS — WEIGHT: 207.45 LBS

## 2018-07-31 PROCEDURE — 86850 RBC ANTIBODY SCREEN: CPT

## 2018-07-31 PROCEDURE — 85027 COMPLETE CBC AUTOMATED: CPT

## 2018-07-31 PROCEDURE — 71250 CT THORAX DX C-: CPT

## 2018-07-31 PROCEDURE — 94640 AIRWAY INHALATION TREATMENT: CPT

## 2018-07-31 PROCEDURE — 86901 BLOOD TYPING SEROLOGIC RH(D): CPT

## 2018-07-31 PROCEDURE — 86140 C-REACTIVE PROTEIN: CPT

## 2018-07-31 PROCEDURE — 71045 X-RAY EXAM CHEST 1 VIEW: CPT

## 2018-07-31 PROCEDURE — 84484 ASSAY OF TROPONIN QUANT: CPT

## 2018-07-31 PROCEDURE — 93005 ELECTROCARDIOGRAM TRACING: CPT

## 2018-07-31 PROCEDURE — 84443 ASSAY THYROID STIM HORMONE: CPT

## 2018-07-31 PROCEDURE — 80053 COMPREHEN METABOLIC PANEL: CPT

## 2018-07-31 PROCEDURE — 85730 THROMBOPLASTIN TIME PARTIAL: CPT

## 2018-07-31 PROCEDURE — 92611 MOTION FLUOROSCOPY/SWALLOW: CPT

## 2018-07-31 PROCEDURE — 96374 THER/PROPH/DIAG INJ IV PUSH: CPT

## 2018-07-31 PROCEDURE — 83605 ASSAY OF LACTIC ACID: CPT

## 2018-07-31 PROCEDURE — 99285 EMERGENCY DEPT VISIT HI MDM: CPT | Mod: 25

## 2018-07-31 PROCEDURE — 84145 PROCALCITONIN (PCT): CPT

## 2018-07-31 PROCEDURE — 80048 BASIC METABOLIC PNL TOTAL CA: CPT

## 2018-07-31 PROCEDURE — 83615 LACTATE (LD) (LDH) ENZYME: CPT

## 2018-07-31 PROCEDURE — 99239 HOSP IP/OBS DSCHRG MGMT >30: CPT

## 2018-07-31 PROCEDURE — 82962 GLUCOSE BLOOD TEST: CPT

## 2018-07-31 PROCEDURE — 86900 BLOOD TYPING SEROLOGIC ABO: CPT

## 2018-07-31 PROCEDURE — 84466 ASSAY OF TRANSFERRIN: CPT

## 2018-07-31 PROCEDURE — 92610 EVALUATE SWALLOWING FUNCTION: CPT

## 2018-07-31 PROCEDURE — 93970 EXTREMITY STUDY: CPT

## 2018-07-31 PROCEDURE — 85610 PROTHROMBIN TIME: CPT

## 2018-07-31 PROCEDURE — 84100 ASSAY OF PHOSPHORUS: CPT

## 2018-07-31 PROCEDURE — 81001 URINALYSIS AUTO W/SCOPE: CPT

## 2018-07-31 PROCEDURE — 36415 COLL VENOUS BLD VENIPUNCTURE: CPT

## 2018-07-31 PROCEDURE — 87086 URINE CULTURE/COLONY COUNT: CPT

## 2018-07-31 PROCEDURE — L8699: CPT

## 2018-07-31 PROCEDURE — 93306 TTE W/DOPPLER COMPLETE: CPT

## 2018-07-31 PROCEDURE — 82550 ASSAY OF CK (CPK): CPT

## 2018-07-31 PROCEDURE — 85652 RBC SED RATE AUTOMATED: CPT

## 2018-07-31 PROCEDURE — 74230 X-RAY XM SWLNG FUNCJ C+: CPT

## 2018-07-31 PROCEDURE — 83735 ASSAY OF MAGNESIUM: CPT

## 2018-07-31 PROCEDURE — 82728 ASSAY OF FERRITIN: CPT

## 2018-07-31 PROCEDURE — 87040 BLOOD CULTURE FOR BACTERIA: CPT

## 2018-07-31 PROCEDURE — 83550 IRON BINDING TEST: CPT

## 2018-07-31 RX ORDER — MORPHINE SULFATE 50 MG/1
1 CAPSULE, EXTENDED RELEASE ORAL
Qty: 42 | Refills: 0 | OUTPATIENT
Start: 2018-07-31 | End: 2018-08-13

## 2018-07-31 RX ORDER — MORPHINE SULFATE 50 MG/1
4 CAPSULE, EXTENDED RELEASE ORAL ONCE
Qty: 0 | Refills: 0 | Status: DISCONTINUED | OUTPATIENT
Start: 2018-07-31 | End: 2018-07-31

## 2018-07-31 RX ORDER — PANTOPRAZOLE SODIUM 20 MG/1
40 TABLET, DELAYED RELEASE ORAL
Qty: 0 | Refills: 0 | Status: DISCONTINUED | OUTPATIENT
Start: 2018-07-31 | End: 2018-07-31

## 2018-07-31 RX ORDER — OLANZAPINE 15 MG/1
1 TABLET, FILM COATED ORAL
Qty: 30 | Refills: 0 | OUTPATIENT
Start: 2018-07-31 | End: 2018-08-29

## 2018-07-31 RX ORDER — ALPRAZOLAM 0.25 MG
1 TABLET ORAL
Qty: 14 | Refills: 0 | OUTPATIENT
Start: 2018-07-31 | End: 2018-08-13

## 2018-07-31 RX ORDER — MECLIZINE HCL 12.5 MG
1 TABLET ORAL
Qty: 14 | Refills: 0 | OUTPATIENT
Start: 2018-07-31 | End: 2018-08-13

## 2018-07-31 RX ORDER — CARVEDILOL PHOSPHATE 80 MG/1
1 CAPSULE, EXTENDED RELEASE ORAL
Qty: 60 | Refills: 0 | OUTPATIENT
Start: 2018-07-31 | End: 2018-08-29

## 2018-07-31 RX ORDER — PANTOPRAZOLE SODIUM 20 MG/1
1 TABLET, DELAYED RELEASE ORAL
Qty: 30 | Refills: 0 | OUTPATIENT
Start: 2018-07-31 | End: 2018-08-29

## 2018-07-31 RX ORDER — HYDROMORPHONE HYDROCHLORIDE 2 MG/ML
1 INJECTION INTRAMUSCULAR; INTRAVENOUS; SUBCUTANEOUS
Qty: 60 | Refills: 0 | OUTPATIENT
Start: 2018-07-31 | End: 2018-08-09

## 2018-07-31 RX ADMIN — HYDROMORPHONE HYDROCHLORIDE 2 MILLIGRAM(S): 2 INJECTION INTRAMUSCULAR; INTRAVENOUS; SUBCUTANEOUS at 07:20

## 2018-07-31 RX ADMIN — MORPHINE SULFATE 30 MILLIGRAM(S): 50 CAPSULE, EXTENDED RELEASE ORAL at 06:13

## 2018-07-31 RX ADMIN — MORPHINE SULFATE 30 MILLIGRAM(S): 50 CAPSULE, EXTENDED RELEASE ORAL at 06:45

## 2018-07-31 RX ADMIN — Medication 75 MILLIGRAM(S): at 06:13

## 2018-07-31 RX ADMIN — LORATADINE 10 MILLIGRAM(S): 10 TABLET ORAL at 11:25

## 2018-07-31 RX ADMIN — HYDROMORPHONE HYDROCHLORIDE 2 MILLIGRAM(S): 2 INJECTION INTRAMUSCULAR; INTRAVENOUS; SUBCUTANEOUS at 07:02

## 2018-07-31 RX ADMIN — PANTOPRAZOLE SODIUM 40 MILLIGRAM(S): 20 TABLET, DELAYED RELEASE ORAL at 06:13

## 2018-07-31 RX ADMIN — MORPHINE SULFATE 4 MILLIGRAM(S): 50 CAPSULE, EXTENDED RELEASE ORAL at 11:25

## 2018-07-31 NOTE — DISCHARGE NOTE ADULT - CARE PLAN
Principal Discharge DX:	Aspiration pneumonia of right lower lobe, unspecified aspiration pneumonia type  Goal:	Resolve infection  Assessment and plan of treatment:	Patient completed cycle of antibiotics  Secondary Diagnosis:	Anemia due to blood loss  Goal:	Stable hgb  Secondary Diagnosis:	Bipolar affective disorder, remission status unspecified  Goal:	Stable mood  Secondary Diagnosis:	Neoplasm of maxillary sinus  Goal:	Follow up with Oncology  Secondary Diagnosis:	Chronic diastolic (congestive) heart failure  Goal:	Stable

## 2018-07-31 NOTE — DISCHARGE NOTE ADULT - PATIENT PORTAL LINK FT
You can access the [facility], offered by [facility], by registering with the following website: [email or web address]

## 2018-07-31 NOTE — PROGRESS NOTE ADULT - PROBLEM SELECTOR PROBLEM 1
Dysphagia, oropharyngeal phase
Aspiration pneumonia of right lower lobe, unspecified aspiration pneumonia type
Pneumonia, bacterial
Dysphagia, oropharyngeal phase

## 2018-07-31 NOTE — DISCHARGE NOTE ADULT - VISION (WITH CORRECTIVE LENSES IF THE PATIENT USUALLY WEARS THEM):
Partially impaired: cannot see medication labels or newsprint, but can see obstacles in path, and the surrounding layout; can count fingers at arm's length/S/P cataract surgery (one eye)

## 2018-07-31 NOTE — PROGRESS NOTE ADULT - PROBLEM SELECTOR PROBLEM 2
Maxillary sinus cancer
Sepsis due to pneumonia
Maxillary sinus cancer

## 2018-07-31 NOTE — DISCHARGE NOTE ADULT - CARE PROVIDER_API CALL
Akiko Loza), Medical Oncology  [facility]  [address] [place] [address]  Phone: [phone]  Fax: [phone]

## 2018-07-31 NOTE — PROGRESS NOTE ADULT - ASSESSMENT
58 year old male maxillary sinus cancer, aspiration pna, peg placed, hypoxemia. [facility] for [facility] home.

## 2018-07-31 NOTE — PROGRESS NOTE ADULT - SUBJECTIVE AND OBJECTIVE BOX
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- was followed by ID after lung surgery  Neoplasm of maxillary sinus  Bipolar disorder, unspecified  CHF (congestive heart failure): 2014  Diaphragm dysfunction: partial paralysis- now resolved as per wife  ETOH abuse  LORNA on CPAP  Cataract  S/P lobectomy of lung: Sept 2017  H/O endoscopy  H/O colonoscopy  H/O coronary angioplasty: x2      HPI/OVERNIGHT EVENTS: patient in no distress    MEDICATIONS  (STANDING):  ALBUTerol/ipratropium for Nebulization 3 milliLiter(s) Nebulizer every 6 hours  carvedilol 6.25 milliGRAM(s) Oral every 12 hours  loratadine 10 milliGRAM(s) Oral daily  morphine ER Tablet 30 milliGRAM(s) Oral three times a day  OLANZapine 5 milliGRAM(s) Oral daily  pantoprazole    Tablet 40 milliGRAM(s) Oral before breakfast  pregabalin 75 milliGRAM(s) Oral two times a day      Vital Signs Last 24 Hrs  T(C): 36.9 (31 Jul 2018 10:55), Max: 36.9 (30 Jul 2018 18:06)  T(F): 98.4 (31 Jul 2018 10:55), Max: 98.4 (30 Jul 2018 18:06)  HR: 94 (31 Jul 2018 10:55) (92 - 98)  BP: 119/74 (31 Jul 2018 10:55) (105/66 - 119/74)  BP(mean): --  RR: 20 (31 Jul 2018 10:55) (18 - 20)  SpO2: 98% (31 Jul 2018 06:00) (94% - 98%)    PHYSICAL EXAM:  Constitutional: NAD, well-groomed, well-developed  HEENT: PERRLA, EOMI, Normal Hearing, MMM  Neck: No LAD, No JVD  Back: Normal spine flexure, No CVA tenderness  Respiratory: CTAB Cardiovascular: S1 and S2, RRR, no M/G/R  Gastrointestinal peg  Extremities: No peripheral edema  Vascular: 2+ peripheral pulses  Neurological: A/O x 3, no focal deficits  Psychiatric: Normal mood, normal affect  Musculoskeletal: 5/5 strength b/l upper and lower extremities  Skin: No rashes    CAPILLARY BLOOD GLUCOSE    LABS:                        8.1    9.1   )-----------( 305      ( 30 Jul 2018 06:19 )             26.8     07-30    138  |  97<L>  |  12.0  ----------------------------<  122<H>  4.4   |  30.0<H>  |  0.70    Ca    9.0      30 Jul 2018 06:19  Phos  4.0     07-30  Mg     2.1     07-30            RADIOLOGY & ADDITIONAL TESTS:

## 2018-07-31 NOTE — DISCHARGE NOTE ADULT - SECONDARY DIAGNOSIS.
Anemia due to blood loss Bipolar affective disorder, remission status unspecified Neoplasm of maxillary sinus Chronic diastolic (congestive) heart failure

## 2018-07-31 NOTE — DISCHARGE NOTE ADULT - MEDICATION SUMMARY - MEDICATIONS TO TAKE
I will START or STAY ON the medications listed below when I get home from the hospital:    morphine 30 mg/12 hr oral tablet, extended release  -- 1 tab(s) by mouth 3 times a day MDD:3  -- Indication: For Pain    HYDROmorphone 4 mg oral tablet  -- 1 tab(s) by mouth every 4 hours, As needed, Severe Pain (7 - 10) MDD:6  -- Indication: For Pain    pregabalin 75 mg oral capsule  -- 1 cap(s) by mouth 2 times a day MDD:2  -- Indication: For Pain    meclizine 25 mg oral tablet  -- 1 tab(s) by mouth once a day, As Needed -Dizziness   -- Indication: For Nausea    OLANZapine 5 mg oral tablet  -- 1 tab(s) by mouth once a day  -- Indication: For Psychosis    Xanax 0.5 mg oral tablet  -- 1 tab(s) by mouth once a day (at bedtime), As needed, anxiety MDD:3  -- Indication: For Anxiety    carvedilol 6.25 mg oral tablet  -- 1 tab(s) by mouth every 12 hours  -- Indication: For Htn    pantoprazole 40 mg oral delayed release tablet  -- 1 tab(s) by mouth once a day (before a meal)  -- Indication: For gerd

## 2018-07-31 NOTE — DISCHARGE NOTE ADULT - PLAN OF CARE
Resolve infection Patient completed cycle of antibiotics Stable hgb Stable mood Follow up with Oncology Stable

## 2018-07-31 NOTE — DISCHARGE NOTE ADULT - HOSPITAL COURSE
58 year old male maxillary sinus cancer, aspiration pna, peg placed, hypoxemia. On 7/30 patient passed a swallow evaluation. Stable for discharge home.     Problem/Plan - 1:  ·  Problem: Pneumonia, bacterial.  Plan: Aspiration pneumonia, concern for gram negative and anaerobes day 7 of abx. PEG in place on tube feeds. Patient has completed his cycle of antibiotics and he has the choice to eat or use tube feeds. DC home on 7/31.      Problem/Plan - 2:  ·  Problem: Maxillary sinus cancer.  Plan: Supportive care.     Attending Attestation:   I was physically present for the key portions of the evaluation and management (E/M) service provided.  I agree with the above history, physical, and plan which I have reviewed and edited where appropriate.     45 minutes spent on total encounter; more than 50% of the visit was spent counseling and/or coordinating care by the attending physician.

## 2018-08-01 ENCOUNTER — APPOINTMENT (OUTPATIENT)
Dept: HEMATOLOGY ONCOLOGY | Facility: CLINIC | Age: 58
End: 2018-08-01
Payer: MEDICARE

## 2018-08-01 ENCOUNTER — RESULT REVIEW (OUTPATIENT)
Age: 58
End: 2018-08-01

## 2018-08-01 VITALS
HEART RATE: 112 BPM | BODY MASS INDEX: 26.62 KG/M2 | WEIGHT: 190.15 LBS | DIASTOLIC BLOOD PRESSURE: 73 MMHG | OXYGEN SATURATION: 94 % | TEMPERATURE: 97.6 F | HEIGHT: 71 IN | SYSTOLIC BLOOD PRESSURE: 109 MMHG

## 2018-08-01 DIAGNOSIS — R06.02 SHORTNESS OF BREATH: ICD-10-CM

## 2018-08-01 LAB
BASOPHILS # BLD AUTO: 0.2 K/UL — SIGNIFICANT CHANGE UP (ref 0–0.2)
BASOPHILS NFR BLD AUTO: 1.3 % — SIGNIFICANT CHANGE UP (ref 0–2)
EOSINOPHIL # BLD AUTO: 0.1 K/UL — SIGNIFICANT CHANGE UP (ref 0–0.5)
EOSINOPHIL NFR BLD AUTO: 0.8 % — SIGNIFICANT CHANGE UP (ref 0–6)
HCT VFR BLD CALC: 30.9 % — LOW (ref 39–50)
HGB BLD-MCNC: 10.4 G/DL — LOW (ref 13–17)
LYMPHOCYTES # BLD AUTO: 1.6 K/UL — SIGNIFICANT CHANGE UP (ref 1–3.3)
LYMPHOCYTES # BLD AUTO: 11.5 % — LOW (ref 13–44)
MCHC RBC-ENTMCNC: 30.3 PG — SIGNIFICANT CHANGE UP (ref 27–34)
MCHC RBC-ENTMCNC: 33.5 GM/DL — SIGNIFICANT CHANGE UP (ref 32–36)
MCV RBC AUTO: 90.4 FL — SIGNIFICANT CHANGE UP (ref 80–100)
MONOCYTES # BLD AUTO: 0.7 K/UL — SIGNIFICANT CHANGE UP (ref 0–0.9)
MONOCYTES NFR BLD AUTO: 5.2 % — SIGNIFICANT CHANGE UP (ref 2–14)
NEUTROPHILS # BLD AUTO: 11.6 K/UL — HIGH (ref 1.8–7.4)
NEUTROPHILS NFR BLD AUTO: 81.2 % — HIGH (ref 43–77)
PLATELET # BLD AUTO: 418 K/UL — HIGH (ref 150–400)
RBC # BLD: 3.41 M/UL — LOW (ref 4.2–5.8)
RBC # FLD: 13.8 % — SIGNIFICANT CHANGE UP (ref 10.3–14.5)
WBC # BLD: 14.2 K/UL — HIGH (ref 3.8–10.5)
WBC # FLD AUTO: 14.2 K/UL — HIGH (ref 3.8–10.5)

## 2018-08-01 PROCEDURE — 99215 OFFICE O/P EST HI 40 MIN: CPT

## 2018-08-02 ENCOUNTER — APPOINTMENT (OUTPATIENT)
Dept: INFUSION THERAPY | Facility: CLINIC | Age: 58
End: 2018-08-02

## 2018-08-03 LAB
ALBUMIN SERPL ELPH-MCNC: 3.8 G/DL
ALP BLD-CCNC: 112 U/L
ALT SERPL-CCNC: 16 U/L
ANION GAP SERPL CALC-SCNC: 15 MMOL/L
AST SERPL-CCNC: 21 U/L
BILIRUB SERPL-MCNC: 0.3 MG/DL
BUN SERPL-MCNC: 16 MG/DL
CALCIUM SERPL-MCNC: 9.5 MG/DL
CHLORIDE SERPL-SCNC: 97 MMOL/L
CO2 SERPL-SCNC: 27 MMOL/L
CREAT SERPL-MCNC: 0.78 MG/DL
GLUCOSE SERPL-MCNC: 110 MG/DL
HBV CORE IGG+IGM SER QL: NONREACTIVE
HBV SURFACE AB SER QL: REACTIVE
HBV SURFACE AG SER QL: NONREACTIVE
HCV AB SER QL: NONREACTIVE
HCV S/CO RATIO: 0.23 S/CO
INR PPP: 1.1 RATIO
MAGNESIUM SERPL-MCNC: 2.1 MG/DL
POTASSIUM SERPL-SCNC: 4.9 MMOL/L
PROT SERPL-MCNC: 6.8 G/DL
PT BLD: 12.4 SEC
SODIUM SERPL-SCNC: 139 MMOL/L

## 2018-08-06 ENCOUNTER — APPOINTMENT (OUTPATIENT)
Dept: INFUSION THERAPY | Facility: CLINIC | Age: 58
End: 2018-08-06

## 2018-08-08 ENCOUNTER — RESULT REVIEW (OUTPATIENT)
Age: 58
End: 2018-08-08

## 2018-08-08 ENCOUNTER — APPOINTMENT (OUTPATIENT)
Dept: HEMATOLOGY ONCOLOGY | Facility: CLINIC | Age: 58
End: 2018-08-08
Payer: MEDICARE

## 2018-08-08 VITALS
DIASTOLIC BLOOD PRESSURE: 77 MMHG | SYSTOLIC BLOOD PRESSURE: 117 MMHG | BODY MASS INDEX: 26.14 KG/M2 | WEIGHT: 186.73 LBS | TEMPERATURE: 98 F | OXYGEN SATURATION: 96 % | HEART RATE: 107 BPM | HEIGHT: 71 IN

## 2018-08-08 LAB
BASOPHILS # BLD AUTO: 0.1 K/UL — SIGNIFICANT CHANGE UP (ref 0–0.2)
BASOPHILS NFR BLD AUTO: 0.9 % — SIGNIFICANT CHANGE UP (ref 0–2)
EOSINOPHIL # BLD AUTO: 0.6 K/UL — HIGH (ref 0–0.5)
EOSINOPHIL NFR BLD AUTO: 4.9 % — SIGNIFICANT CHANGE UP (ref 0–6)
HCT VFR BLD CALC: 34.4 % — LOW (ref 39–50)
HGB BLD-MCNC: 11.4 G/DL — LOW (ref 13–17)
LYMPHOCYTES # BLD AUTO: 17.2 % — SIGNIFICANT CHANGE UP (ref 13–44)
LYMPHOCYTES # BLD AUTO: 2 K/UL — SIGNIFICANT CHANGE UP (ref 1–3.3)
MCHC RBC-ENTMCNC: 30.3 PG — SIGNIFICANT CHANGE UP (ref 27–34)
MCHC RBC-ENTMCNC: 33 GM/DL — SIGNIFICANT CHANGE UP (ref 32–36)
MCV RBC AUTO: 91.6 FL — SIGNIFICANT CHANGE UP (ref 80–100)
MONOCYTES # BLD AUTO: 0.6 K/UL — SIGNIFICANT CHANGE UP (ref 0–0.9)
MONOCYTES NFR BLD AUTO: 5.5 % — SIGNIFICANT CHANGE UP (ref 2–14)
NEUTROPHILS # BLD AUTO: 8.2 K/UL — HIGH (ref 1.8–7.4)
NEUTROPHILS NFR BLD AUTO: 71.5 % — SIGNIFICANT CHANGE UP (ref 43–77)
PLATELET # BLD AUTO: 403 K/UL — HIGH (ref 150–400)
RBC # BLD: 3.75 M/UL — LOW (ref 4.2–5.8)
RBC # FLD: 14.5 % — SIGNIFICANT CHANGE UP (ref 10.3–14.5)
WBC # BLD: 11.5 K/UL — HIGH (ref 3.8–10.5)
WBC # FLD AUTO: 11.5 K/UL — HIGH (ref 3.8–10.5)

## 2018-08-08 PROCEDURE — 99214 OFFICE O/P EST MOD 30 MIN: CPT

## 2018-08-09 ENCOUNTER — APPOINTMENT (OUTPATIENT)
Dept: HEMATOLOGY ONCOLOGY | Facility: CLINIC | Age: 58
End: 2018-08-09

## 2018-08-09 LAB
ALBUMIN SERPL ELPH-MCNC: 4 G/DL
ALP BLD-CCNC: 95 U/L
ALT SERPL-CCNC: 12 U/L
ANION GAP SERPL CALC-SCNC: 17 MMOL/L
AST SERPL-CCNC: 22 U/L
BILIRUB SERPL-MCNC: 0.3 MG/DL
BUN SERPL-MCNC: 12 MG/DL
CALCIUM SERPL-MCNC: 9.2 MG/DL
CHLORIDE SERPL-SCNC: 101 MMOL/L
CO2 SERPL-SCNC: 20 MMOL/L
CREAT SERPL-MCNC: 0.88 MG/DL
GLUCOSE SERPL-MCNC: 111 MG/DL
MAGNESIUM SERPL-MCNC: 1.9 MG/DL
POTASSIUM SERPL-SCNC: 4.5 MMOL/L
PROT SERPL-MCNC: 6.9 G/DL
SODIUM SERPL-SCNC: 138 MMOL/L

## 2018-08-10 ENCOUNTER — LABORATORY RESULT (OUTPATIENT)
Age: 58
End: 2018-08-10

## 2018-08-10 ENCOUNTER — APPOINTMENT (OUTPATIENT)
Dept: PHYSICAL MEDICINE AND REHAB | Facility: CLINIC | Age: 58
End: 2018-08-10
Payer: MEDICARE

## 2018-08-10 ENCOUNTER — APPOINTMENT (OUTPATIENT)
Dept: INFUSION THERAPY | Facility: CLINIC | Age: 58
End: 2018-08-10

## 2018-08-10 VITALS
HEIGHT: 71 IN | DIASTOLIC BLOOD PRESSURE: 72 MMHG | BODY MASS INDEX: 26.14 KG/M2 | WEIGHT: 186.73 LBS | SYSTOLIC BLOOD PRESSURE: 141 MMHG

## 2018-08-10 DIAGNOSIS — R53.83 OTHER FATIGUE: ICD-10-CM

## 2018-08-10 PROCEDURE — 99214 OFFICE O/P EST MOD 30 MIN: CPT

## 2018-08-10 RX ORDER — LIDOCAINE AND PRILOCAINE 25; 25 MG/G; MG/G
2.5-2.5 CREAM TOPICAL
Qty: 60 | Refills: 0 | Status: ACTIVE | COMMUNITY
Start: 2018-08-10 | End: 1900-01-01

## 2018-08-13 DIAGNOSIS — Z51.11 ENCOUNTER FOR ANTINEOPLASTIC CHEMOTHERAPY: ICD-10-CM

## 2018-08-13 DIAGNOSIS — R11.2 NAUSEA WITH VOMITING, UNSPECIFIED: ICD-10-CM

## 2018-08-14 ENCOUNTER — APPOINTMENT (OUTPATIENT)
Dept: INFUSION THERAPY | Facility: CLINIC | Age: 58
End: 2018-08-14

## 2018-08-15 DIAGNOSIS — Z51.89 ENCOUNTER FOR OTHER SPECIFIED AFTERCARE: ICD-10-CM

## 2018-08-16 ENCOUNTER — APPOINTMENT (OUTPATIENT)
Dept: HEMATOLOGY ONCOLOGY | Facility: CLINIC | Age: 58
End: 2018-08-16

## 2018-08-18 ENCOUNTER — EMERGENCY (EMERGENCY)
Facility: HOSPITAL | Age: 58
LOS: 1 days | Discharge: HOME | End: 2018-08-18
Attending: EMERGENCY MEDICINE
Payer: MEDICARE

## 2018-08-18 VITALS — HEIGHT: 72 IN | WEIGHT: 173.94 LBS

## 2018-08-18 DIAGNOSIS — Z98.61 CORONARY ANGIOPLASTY STATUS: Chronic | ICD-10-CM

## 2018-08-18 DIAGNOSIS — Z90.2 ACQUIRED ABSENCE OF LUNG [PART OF]: Chronic | ICD-10-CM

## 2018-08-18 DIAGNOSIS — H26.9 UNSPECIFIED CATARACT: Chronic | ICD-10-CM

## 2018-08-18 DIAGNOSIS — Z98.890 OTHER SPECIFIED POSTPROCEDURAL STATES: Chronic | ICD-10-CM

## 2018-08-18 LAB
ALBUMIN SERPL ELPH-MCNC: 3.9 G/DL — SIGNIFICANT CHANGE UP (ref 3.3–5.2)
ALP SERPL-CCNC: 136 U/L — HIGH (ref 40–120)
ALT FLD-CCNC: 17 U/L — SIGNIFICANT CHANGE UP
ANION GAP SERPL CALC-SCNC: 15 MMOL/L — SIGNIFICANT CHANGE UP (ref 5–17)
ANISOCYTOSIS BLD QL: SLIGHT — SIGNIFICANT CHANGE UP
APTT BLD: 32.1 SEC — SIGNIFICANT CHANGE UP (ref 27.5–37.4)
AST SERPL-CCNC: 17 U/L — SIGNIFICANT CHANGE UP
BASOPHILS NFR BLD AUTO: 1 % — SIGNIFICANT CHANGE UP (ref 0–2)
BILIRUB SERPL-MCNC: 0.5 MG/DL — SIGNIFICANT CHANGE UP (ref 0.4–2)
BUN SERPL-MCNC: 33 MG/DL — HIGH (ref 8–20)
CALCIUM SERPL-MCNC: 9 MG/DL — SIGNIFICANT CHANGE UP (ref 8.6–10.2)
CHLORIDE SERPL-SCNC: 88 MMOL/L — LOW (ref 98–107)
CO2 SERPL-SCNC: 22 MMOL/L — SIGNIFICANT CHANGE UP (ref 22–29)
CREAT SERPL-MCNC: 1.25 MG/DL — SIGNIFICANT CHANGE UP (ref 0.5–1.3)
EOSINOPHIL NFR BLD AUTO: 1 % — SIGNIFICANT CHANGE UP (ref 0–6)
GLUCOSE SERPL-MCNC: 92 MG/DL — SIGNIFICANT CHANGE UP (ref 70–115)
HCT VFR BLD CALC: 30.8 % — LOW (ref 42–52)
HGB BLD-MCNC: 10.4 G/DL — LOW (ref 14–18)
INR BLD: 1.3 RATIO — HIGH (ref 0.88–1.16)
LACTATE BLDV-MCNC: 1.5 MMOL/L — SIGNIFICANT CHANGE UP (ref 0.5–2)
LYMPHOCYTES # BLD AUTO: 15 % — LOW (ref 20–55)
MACROCYTES BLD QL: SLIGHT — SIGNIFICANT CHANGE UP
MANUAL DIF COMMENT BLD-IMP: SIGNIFICANT CHANGE UP
MCHC RBC-ENTMCNC: 29.9 PG — SIGNIFICANT CHANGE UP (ref 27–31)
MCHC RBC-ENTMCNC: 33.8 G/DL — SIGNIFICANT CHANGE UP (ref 32–36)
MCV RBC AUTO: 88.5 FL — SIGNIFICANT CHANGE UP (ref 80–94)
METAMYELOCYTES # FLD: 1 % — HIGH (ref 0–0)
MICROCYTES BLD QL: SLIGHT — SIGNIFICANT CHANGE UP
MONOCYTES NFR BLD AUTO: 24 % — HIGH (ref 3–10)
MYELOCYTES NFR BLD: 1 % — HIGH (ref 0–0)
NEUTROPHILS NFR BLD AUTO: 56 % — SIGNIFICANT CHANGE UP (ref 37–73)
OVALOCYTES BLD QL SMEAR: SLIGHT — SIGNIFICANT CHANGE UP
PLAT MORPH BLD: NORMAL — SIGNIFICANT CHANGE UP
PLATELET # BLD AUTO: 362 K/UL — SIGNIFICANT CHANGE UP (ref 150–400)
POIKILOCYTOSIS BLD QL AUTO: SLIGHT — SIGNIFICANT CHANGE UP
POLYCHROMASIA BLD QL SMEAR: SLIGHT — SIGNIFICANT CHANGE UP
POTASSIUM SERPL-MCNC: 3.3 MMOL/L — LOW (ref 3.5–5.3)
POTASSIUM SERPL-SCNC: 3.3 MMOL/L — LOW (ref 3.5–5.3)
PROMYELOCYTES # FLD: 1 % — HIGH (ref 0–0)
PROT SERPL-MCNC: 7.1 G/DL — SIGNIFICANT CHANGE UP (ref 6.6–8.7)
PROTHROM AB SERPL-ACNC: 14.4 SEC — HIGH (ref 9.8–12.7)
RBC # BLD: 3.48 M/UL — LOW (ref 4.6–6.2)
RBC # FLD: 14.5 % — SIGNIFICANT CHANGE UP (ref 11–15.6)
RBC BLD AUTO: ABNORMAL
SODIUM SERPL-SCNC: 125 MMOL/L — LOW (ref 135–145)
WBC # BLD: 11.1 K/UL — HIGH (ref 4.8–10.8)
WBC # FLD AUTO: 11.1 K/UL — HIGH (ref 4.8–10.8)

## 2018-08-18 PROCEDURE — 71045 X-RAY EXAM CHEST 1 VIEW: CPT | Mod: 26

## 2018-08-18 PROCEDURE — 93010 ELECTROCARDIOGRAM REPORT: CPT

## 2018-08-18 PROCEDURE — 99284 EMERGENCY DEPT VISIT MOD MDM: CPT

## 2018-08-18 RX ORDER — ONDANSETRON 8 MG/1
8 TABLET, FILM COATED ORAL ONCE
Qty: 0 | Refills: 0 | Status: COMPLETED | OUTPATIENT
Start: 2018-08-18 | End: 2018-08-18

## 2018-08-18 RX ORDER — HYDROMORPHONE HYDROCHLORIDE 2 MG/ML
1 INJECTION INTRAMUSCULAR; INTRAVENOUS; SUBCUTANEOUS ONCE
Qty: 0 | Refills: 0 | Status: DISCONTINUED | OUTPATIENT
Start: 2018-08-18 | End: 2018-08-18

## 2018-08-18 RX ORDER — SODIUM CHLORIDE 9 MG/ML
2000 INJECTION INTRAMUSCULAR; INTRAVENOUS; SUBCUTANEOUS ONCE
Qty: 0 | Refills: 0 | Status: COMPLETED | OUTPATIENT
Start: 2018-08-18 | End: 2018-08-18

## 2018-08-18 RX ADMIN — SODIUM CHLORIDE 2000 MILLILITER(S): 9 INJECTION INTRAMUSCULAR; INTRAVENOUS; SUBCUTANEOUS at 18:36

## 2018-08-18 RX ADMIN — HYDROMORPHONE HYDROCHLORIDE 1 MILLIGRAM(S): 2 INJECTION INTRAMUSCULAR; INTRAVENOUS; SUBCUTANEOUS at 20:58

## 2018-08-18 RX ADMIN — HYDROMORPHONE HYDROCHLORIDE 1 MILLIGRAM(S): 2 INJECTION INTRAMUSCULAR; INTRAVENOUS; SUBCUTANEOUS at 21:00

## 2018-08-18 RX ADMIN — ONDANSETRON 8 MILLIGRAM(S): 8 TABLET, FILM COATED ORAL at 20:58

## 2018-08-18 NOTE — ED ADULT NURSE NOTE - OBJECTIVE STATEMENT
pt came in due to having abdominal pains  and diarrhea since Thursday. pt currently receives IV chemo last Friday was second dose due to paranasal squamaous cancer. pt alert and oriented x4. pt in no distress, respirations even unlabored. pt has PEG tube, red around insertion site placed july 2018. pt also has chemo port on right non accessed. Vss afebrile. pt on contact for r/o Cdiff. pt takes jevity at home, pt has had poor appetite since wednesday,

## 2018-08-18 NOTE — ED ADULT TRIAGE NOTE - PAIN: PRESENCE, MLM
We are committed to providing our patients with their test results in a timely manner. If you have access to [employer], you will receive your results within 5 to 7 days. If your results are normal, a member of our office may call you or you may receive a letter in the mail within 10 to 15 days of your testing. If your results have something additional to discuss, a member of our office will contact you by phone. If at any time you have questions related your results, please feel free to call our office at [phone]    Medicare Wellness Visit  Plan for Preventive Care    A good way for you to stay healthy is to use preventive care.  Medicare covers many services that can help you stay healthy.* The goal of these services is to find any health problems as quickly as possible. Finding problems early can help make them easier to treat.  Your personal plan below lists the services you may need and when they are due.     Health Maintenance Summary     Topic Due On Due Status Completed On Postpone Until Reason    Immunization-Zoster  Completed Dec 9, 2013      Immunization - Pneumococcal  Completed Jun 17, 2015      Medicare Wellness Visit Jun 27, 2017 Overdue Jun 27, 2016      IMMUNIZATION - DTaP/Tdap/Td Oct 27, 2010 Overdue Oct 26, 2010      Immunization-Influenza Sep 1, 2017 Postponed Feb 9, 2017 Sep 18, 2017 Insufficient Supply           Preventive Care for Women and Men    Heart Screenings (Cardiovascular):  · Blood tests are used to check your cholesterol, lipid and triglyceride levels. High levels can increase your risk for heart disease and stroke. High levels can be treated with medications, diet and exercise. Lowering your levels can help keep your heart and blood vessels healthy.  Your provider will order these tests if they are needed.    · An ultrasound is done to see if you have an abdominal aortic aneurysm (AAA).  This is an enlargement of one of the main blood vessels that delivers blood to the body.   In  the [place], 9,000 deaths are caused by AAA.  You may not even know you have this problem and as many as 1 in 3 people will have a serious problem if it is not treated.  Early diagnosis allows for more effective treatment and cure.  If you have a family history of AAA or are a male age 65-75 who has smoked, you are at higher risk of an AAA.  Your provider can order this test, if needed.    Colorectal Screening:  · There are many tests that are used to check for cancer of your colon and rectum. You and your provider should discuss what test is best for you and when to have it done.  Options include:  · Screening Colonoscopy: exam of the entire colon, seen through a flexible lighted tube.  · Flexible Sigmoidoscopy: exam of the last third (sigmoid portion) of the colon and rectum, seen through a flexible lighted tube.  · Cologuard DNA stool test: a sample of your stool is used to screen for cancer and unseen blood in your stool.  · Fecal Occult Blood Test: a sample of your stool is studied to find any unseen blood    Flu Shot:  · An immunization that helps to prevent influenza (the flu). You should get this every year. The best time to get the shot is in the fall.    Pneumococcal Shot:  • Vaccines are available that can help prevent pneumococcal disease, which is any type of infection caused by Streptococcus pneumoniae bacteria.   Their use can prevent some cases of pneumonia, meningitis, and sepsis. There are two types of pneumococcal vaccines:   o Conjugate vaccines (PCV-13 or Prevnar 13®) - helps protect against the 13 types of pneumococcal bacteria that are the most common causes of serious infections in children and adults.    o Polysaccharide vaccine (PPSV23 or [id number]®) - helps protect against 23 types of pneumococcal bacteria for patients who are recommended to get it.  These vaccines should be given at least 12 months apart.  A booster is usually not needed.     Hepatitis B Shot:  · An immunization  that helps to protect people from getting Hepatitis B. Hepatitis B is a virus that spreads through contact with infected blood or body fluids. Many people with the virus do not have symptoms.  The virus can lead to serious problems, such as liver disease. Some people are at higher risk than others. Your doctor will tell you if you need this shot.     Diabetes Screening:  · A test to measure sugar (glucose) in your blood is called a fasting blood sugar. Fasting means you cannot have food or drink for at least 8 hours before the test. This test can detect diabetes long before you may notice symptoms.    Glaucoma Screening:  · Glaucoma screening is performed by your eye doctor. The test measures the fluid pressure inside your eyes to determine if you have glaucoma.     Hepatitis C Screening:  · A blood test to see if you have the hepatitis C virus.  Hepatitis C attacks the liver and is a major cause of chronic liver disease.  Medicare will cover a single screening for all adults born between 1945 & 1965, or high risk patients (people who have injected illegal drugs or people who have had blood transfusions).  High risk patients who continue to inject illegal drugs can be screened for Hepatitis C every year.    Smoking and Tobacco-Use Cessation Counseling:  · Tobacco is the single greatest cause of disease and early death in our country today. Medication and counseling together can increase a person’s chance of quitting for good.   · Medicare covers two quitting attempts per year, with four counseling sessions per attempt (eight sessions in a 12 month period)      Preventive Screening tests for Men    Prostate Screening:  · PSA - Prostate Cancer blood test.  Experts do not recommend routine screening of healthy men with no signs or symptoms of prostate disease.  However, men should not ignore urinary symptoms, and should discuss their family history with their doctor.    *Medicare pays for many preventive services to  keep you healthy. For some of these services, you might have to pay a deductible, coinsurance, and / or copayment.  The amounts vary depending on the type of services you need and the kind of Medicare health plan you have.               complains of pain/discomfort

## 2018-08-18 NOTE — ED ADULT TRIAGE NOTE - CHIEF COMPLAINT QUOTE
Patient arrived to ED today with c/o diarrhea, abdominal pains.  Patient has stage 4 CA. Patient os Dr. Chahal

## 2018-08-18 NOTE — ED PROVIDER NOTE - MEDICAL DECISION MAKING DETAILS
feeling better on home po dilaudid c diff not back until later today will call if positive return to ed for intractable abd pain fever any overall worsening

## 2018-08-18 NOTE — ED ADULT NURSE NOTE - NSIMPLEMENTINTERV_GEN_ALL_ED
Implemented All Universal Safety Interventions:  [place] to call system. Call bell, personal items and telephone within reach. Instruct patient to call for assistance. Room bathroom lighting operational. Non-slip footwear when patient is off stretcher. Physically safe environment: no spills, clutter or unnecessary equipment. Stretcher in lowest position, wheels locked, appropriate side rails in place.

## 2018-08-18 NOTE — ED PROVIDER NOTE - OBJECTIVE STATEMENT
57 y/o M pt with PMHx of bipolar disorder, CHF, diaphragm dysfunction, EtOH abuse, YANET, neoplasm of maxillary sinus and LORNA on CPAP and PSHX of cataracts, colonoscopy, coronary angioplasty, endoscopy and lobectomy of lung presents to the ED c/o nonstop diarrhea onset last Friday. Per patient wife patient has had more than 20 episodes of diarrhea. States he was here a week ago in this ED. Denies vomiting. No further complaints at this time.   Oncologist: Dr. Parson

## 2018-08-18 NOTE — ED STATDOCS - PROGRESS NOTE DETAILS
Pt will be sent to Main ED. 59 y/o M pt with hx of  CHF, CA, bipolar disorder presents to ED c/o sudden onset of constant diarrhea for the last 1-2 days associated with abdominal pain. Pt went through chemo with last session one week from Friday. His wife who presents with him today states it smells like C-Diff. The pt notes he lots 14 pounds in the last 2-3 days. Mild blood tinge noted on the pad of his tube. Denies fever, SOB, CP, difficulty breathing.

## 2018-08-19 VITALS — DIASTOLIC BLOOD PRESSURE: 65 MMHG | HEART RATE: 92 BPM | SYSTOLIC BLOOD PRESSURE: 111 MMHG

## 2018-08-19 LAB
APPEARANCE UR: CLEAR — SIGNIFICANT CHANGE UP
BACTERIA # UR AUTO: ABNORMAL
BILIRUB UR-MCNC: NEGATIVE — SIGNIFICANT CHANGE UP
C DIFF BY PCR RESULT: SIGNIFICANT CHANGE UP
C DIFF TOX GENS STL QL NAA+PROBE: SIGNIFICANT CHANGE UP
COD CRY URNS QL: ABNORMAL
COLOR SPEC: YELLOW — SIGNIFICANT CHANGE UP
DIFF PNL FLD: NEGATIVE — SIGNIFICANT CHANGE UP
EPI CELLS # UR: SIGNIFICANT CHANGE UP
GLUCOSE UR QL: NEGATIVE MG/DL — SIGNIFICANT CHANGE UP
HYALINE CASTS # UR AUTO: ABNORMAL /LPF
KETONES UR-MCNC: NEGATIVE — SIGNIFICANT CHANGE UP
LEUKOCYTE ESTERASE UR-ACNC: NEGATIVE — SIGNIFICANT CHANGE UP
NITRITE UR-MCNC: NEGATIVE — SIGNIFICANT CHANGE UP
PH UR: 6 — SIGNIFICANT CHANGE UP (ref 5–8)
PROT UR-MCNC: 30 MG/DL
RBC CASTS # UR COMP ASSIST: SIGNIFICANT CHANGE UP /HPF (ref 0–4)
SP GR SPEC: 1.01 — SIGNIFICANT CHANGE UP (ref 1.01–1.02)
UROBILINOGEN FLD QL: NEGATIVE MG/DL — SIGNIFICANT CHANGE UP
WBC UR QL: SIGNIFICANT CHANGE UP

## 2018-08-19 PROCEDURE — 83605 ASSAY OF LACTIC ACID: CPT

## 2018-08-19 PROCEDURE — 80053 COMPREHEN METABOLIC PANEL: CPT

## 2018-08-19 PROCEDURE — 96375 TX/PRO/DX INJ NEW DRUG ADDON: CPT

## 2018-08-19 PROCEDURE — 71045 X-RAY EXAM CHEST 1 VIEW: CPT

## 2018-08-19 PROCEDURE — 84100 ASSAY OF PHOSPHORUS: CPT

## 2018-08-19 PROCEDURE — 36415 COLL VENOUS BLD VENIPUNCTURE: CPT

## 2018-08-19 PROCEDURE — 83690 ASSAY OF LIPASE: CPT

## 2018-08-19 PROCEDURE — 87040 BLOOD CULTURE FOR BACTERIA: CPT

## 2018-08-19 PROCEDURE — 83735 ASSAY OF MAGNESIUM: CPT

## 2018-08-19 PROCEDURE — 96376 TX/PRO/DX INJ SAME DRUG ADON: CPT

## 2018-08-19 PROCEDURE — 85027 COMPLETE CBC AUTOMATED: CPT

## 2018-08-19 PROCEDURE — 85730 THROMBOPLASTIN TIME PARTIAL: CPT

## 2018-08-19 PROCEDURE — 87493 C DIFF AMPLIFIED PROBE: CPT

## 2018-08-19 PROCEDURE — 87086 URINE CULTURE/COLONY COUNT: CPT

## 2018-08-19 PROCEDURE — 96374 THER/PROPH/DIAG INJ IV PUSH: CPT

## 2018-08-19 PROCEDURE — 93005 ELECTROCARDIOGRAM TRACING: CPT

## 2018-08-19 PROCEDURE — 81001 URINALYSIS AUTO W/SCOPE: CPT

## 2018-08-19 PROCEDURE — 85610 PROTHROMBIN TIME: CPT

## 2018-08-19 PROCEDURE — 84443 ASSAY THYROID STIM HORMONE: CPT

## 2018-08-19 PROCEDURE — 99284 EMERGENCY DEPT VISIT MOD MDM: CPT | Mod: 25

## 2018-08-19 RX ORDER — HYDROMORPHONE HYDROCHLORIDE 2 MG/ML
1 INJECTION INTRAMUSCULAR; INTRAVENOUS; SUBCUTANEOUS ONCE
Qty: 0 | Refills: 0 | Status: DISCONTINUED | OUTPATIENT
Start: 2018-08-19 | End: 2018-08-19

## 2018-08-19 RX ADMIN — HYDROMORPHONE HYDROCHLORIDE 1 MILLIGRAM(S): 2 INJECTION INTRAMUSCULAR; INTRAVENOUS; SUBCUTANEOUS at 02:28

## 2018-08-20 LAB
CULTURE RESULTS: NO GROWTH — SIGNIFICANT CHANGE UP
SPECIMEN SOURCE: SIGNIFICANT CHANGE UP

## 2018-08-20 RX ORDER — DIPHENOXYLATE HYDROCHLORIDE AND ATROPINE SULFATE 2.5; .025 MG/1; MG/1
2.5-0.025 TABLET ORAL
Qty: 240 | Refills: 5 | Status: ACTIVE | COMMUNITY
Start: 2018-08-20 | End: 1900-01-01

## 2018-08-21 ENCOUNTER — OTHER (OUTPATIENT)
Age: 58
End: 2018-08-21

## 2018-08-22 ENCOUNTER — APPOINTMENT (OUTPATIENT)
Dept: HEMATOLOGY ONCOLOGY | Facility: CLINIC | Age: 58
End: 2018-08-22
Payer: MEDICARE

## 2018-08-22 ENCOUNTER — RESULT REVIEW (OUTPATIENT)
Age: 58
End: 2018-08-22

## 2018-08-22 ENCOUNTER — INPATIENT (INPATIENT)
Facility: HOSPITAL | Age: 58
LOS: 3 days | Discharge: HOME | DRG: 394 | End: 2018-08-26
Attending: HOSPITALIST | Admitting: HOSPITALIST
Payer: MEDICARE

## 2018-08-22 VITALS
DIASTOLIC BLOOD PRESSURE: 71 MMHG | OXYGEN SATURATION: 95 % | TEMPERATURE: 98 F | HEART RATE: 105 BPM | SYSTOLIC BLOOD PRESSURE: 102 MMHG | RESPIRATION RATE: 18 BRPM

## 2018-08-22 VITALS
BODY MASS INDEX: 24.8 KG/M2 | HEART RATE: 107 BPM | WEIGHT: 177.14 LBS | SYSTOLIC BLOOD PRESSURE: 98 MMHG | HEIGHT: 71 IN | TEMPERATURE: 98.6 F | OXYGEN SATURATION: 92 % | DIASTOLIC BLOOD PRESSURE: 71 MMHG

## 2018-08-22 DIAGNOSIS — Z98.890 OTHER SPECIFIED POSTPROCEDURAL STATES: Chronic | ICD-10-CM

## 2018-08-22 DIAGNOSIS — Z98.61 CORONARY ANGIOPLASTY STATUS: Chronic | ICD-10-CM

## 2018-08-22 DIAGNOSIS — H26.9 UNSPECIFIED CATARACT: Chronic | ICD-10-CM

## 2018-08-22 DIAGNOSIS — Z90.2 ACQUIRED ABSENCE OF LUNG [PART OF]: Chronic | ICD-10-CM

## 2018-08-22 LAB
ALBUMIN SERPL ELPH-MCNC: 3.3 G/DL — SIGNIFICANT CHANGE UP (ref 3.3–5.2)
ALP SERPL-CCNC: 162 U/L — HIGH (ref 40–120)
ALT FLD-CCNC: 16 U/L — SIGNIFICANT CHANGE UP
ANION GAP SERPL CALC-SCNC: 16 MMOL/L — SIGNIFICANT CHANGE UP (ref 5–17)
AST SERPL-CCNC: 23 U/L — SIGNIFICANT CHANGE UP
BASOPHILS # BLD AUTO: 0.2 K/UL — SIGNIFICANT CHANGE UP (ref 0–0.2)
BASOPHILS NFR BLD AUTO: 0.7 % — SIGNIFICANT CHANGE UP (ref 0–2)
BILIRUB SERPL-MCNC: 0.3 MG/DL — LOW (ref 0.4–2)
BUN SERPL-MCNC: 9 MG/DL — SIGNIFICANT CHANGE UP (ref 8–20)
CALCIUM SERPL-MCNC: 8.6 MG/DL — SIGNIFICANT CHANGE UP (ref 8.6–10.2)
CHLORIDE SERPL-SCNC: 92 MMOL/L — LOW (ref 98–107)
CO2 SERPL-SCNC: 24 MMOL/L — SIGNIFICANT CHANGE UP (ref 22–29)
CREAT SERPL-MCNC: 0.9 MG/DL — SIGNIFICANT CHANGE UP (ref 0.5–1.3)
EOSINOPHIL # BLD AUTO: 0 K/UL — SIGNIFICANT CHANGE UP (ref 0–0.5)
EOSINOPHIL NFR BLD AUTO: 0.1 % — SIGNIFICANT CHANGE UP (ref 0–6)
GLUCOSE SERPL-MCNC: 103 MG/DL — SIGNIFICANT CHANGE UP (ref 70–115)
HCT VFR BLD CALC: 31.6 % — LOW (ref 42–52)
HCT VFR BLD CALC: 33.5 % — LOW (ref 39–50)
HGB BLD-MCNC: 10.2 G/DL — LOW (ref 14–18)
HGB BLD-MCNC: 11.3 G/DL — LOW (ref 13–17)
LYMPHOCYTES # BLD AUTO: 10.4 % — LOW (ref 13–44)
LYMPHOCYTES # BLD AUTO: 2.6 K/UL — SIGNIFICANT CHANGE UP (ref 1–3.3)
LYMPHOCYTES # BLD AUTO: 9 % — LOW (ref 20–55)
MAGNESIUM SERPL-MCNC: 1.8 MG/DL — SIGNIFICANT CHANGE UP (ref 1.6–2.6)
MCHC RBC-ENTMCNC: 28.5 PG — SIGNIFICANT CHANGE UP (ref 27–31)
MCHC RBC-ENTMCNC: 29.5 PG — SIGNIFICANT CHANGE UP (ref 27–34)
MCHC RBC-ENTMCNC: 32.3 G/DL — SIGNIFICANT CHANGE UP (ref 32–36)
MCHC RBC-ENTMCNC: 33.8 GM/DL — SIGNIFICANT CHANGE UP (ref 32–36)
MCV RBC AUTO: 87.3 FL — SIGNIFICANT CHANGE UP (ref 80–100)
MCV RBC AUTO: 88.3 FL — SIGNIFICANT CHANGE UP (ref 80–94)
MONOCYTES # BLD AUTO: 0.9 K/UL — SIGNIFICANT CHANGE UP (ref 0–0.9)
MONOCYTES NFR BLD AUTO: 2 % — LOW (ref 3–10)
MONOCYTES NFR BLD AUTO: 3.5 % — SIGNIFICANT CHANGE UP (ref 2–14)
NEUTROPHILS # BLD AUTO: 21 K/UL — HIGH (ref 1.8–7.4)
NEUTROPHILS NFR BLD AUTO: 85.3 % — HIGH (ref 43–77)
NEUTROPHILS NFR BLD AUTO: 89 % — HIGH (ref 37–73)
PHOSPHATE SERPL-MCNC: 3.3 MG/DL — SIGNIFICANT CHANGE UP (ref 2.4–4.7)
PLATELET # BLD AUTO: 314 K/UL — SIGNIFICANT CHANGE UP (ref 150–400)
PLATELET # BLD AUTO: 337 K/UL — SIGNIFICANT CHANGE UP (ref 150–400)
POTASSIUM SERPL-MCNC: 2.6 MMOL/L — CRITICAL LOW (ref 3.5–5.3)
POTASSIUM SERPL-SCNC: 2.6 MMOL/L — CRITICAL LOW (ref 3.5–5.3)
PROT SERPL-MCNC: 6.6 G/DL — SIGNIFICANT CHANGE UP (ref 6.6–8.7)
RBC # BLD: 3.58 M/UL — LOW (ref 4.6–6.2)
RBC # BLD: 3.83 M/UL — LOW (ref 4.2–5.8)
RBC # FLD: 14.1 % — SIGNIFICANT CHANGE UP (ref 10.3–14.5)
RBC # FLD: 15.2 % — SIGNIFICANT CHANGE UP (ref 11–15.6)
SODIUM SERPL-SCNC: 132 MMOL/L — LOW (ref 135–145)
WBC # BLD: 24.7 K/UL — HIGH (ref 3.8–10.5)
WBC # BLD: 26.5 K/UL — HIGH (ref 4.8–10.8)
WBC # FLD AUTO: 24.7 K/UL — HIGH (ref 3.8–10.5)
WBC # FLD AUTO: 26.5 K/UL — HIGH (ref 4.8–10.8)

## 2018-08-22 PROCEDURE — 99285 EMERGENCY DEPT VISIT HI MDM: CPT

## 2018-08-22 PROCEDURE — 93010 ELECTROCARDIOGRAM REPORT: CPT

## 2018-08-22 PROCEDURE — 99215 OFFICE O/P EST HI 40 MIN: CPT

## 2018-08-22 PROCEDURE — 76705 ECHO EXAM OF ABDOMEN: CPT | Mod: 26

## 2018-08-22 RX ORDER — HYDROMORPHONE HYDROCHLORIDE 2 MG/ML
1 INJECTION INTRAMUSCULAR; INTRAVENOUS; SUBCUTANEOUS ONCE
Qty: 0 | Refills: 0 | Status: DISCONTINUED | OUTPATIENT
Start: 2018-08-22 | End: 2018-08-22

## 2018-08-22 RX ORDER — POTASSIUM CHLORIDE 20 MEQ
10 PACKET (EA) ORAL
Qty: 0 | Refills: 0 | Status: COMPLETED | OUTPATIENT
Start: 2018-08-22 | End: 2018-08-22

## 2018-08-22 RX ORDER — MAGNESIUM SULFATE 500 MG/ML
2 VIAL (ML) INJECTION ONCE
Qty: 0 | Refills: 0 | Status: DISCONTINUED | OUTPATIENT
Start: 2018-08-22 | End: 2018-08-23

## 2018-08-22 RX ADMIN — HYDROMORPHONE HYDROCHLORIDE 1 MILLIGRAM(S): 2 INJECTION INTRAMUSCULAR; INTRAVENOUS; SUBCUTANEOUS at 23:51

## 2018-08-22 RX ADMIN — HYDROMORPHONE HYDROCHLORIDE 1 MILLIGRAM(S): 2 INJECTION INTRAMUSCULAR; INTRAVENOUS; SUBCUTANEOUS at 22:35

## 2018-08-22 RX ADMIN — Medication 10 MILLIEQUIVALENT(S): at 23:51

## 2018-08-22 RX ADMIN — Medication 100 MILLIEQUIVALENT(S): at 22:35

## 2018-08-22 RX ADMIN — Medication 100 MILLIEQUIVALENT(S): at 23:48

## 2018-08-22 NOTE — ED STATDOCS - PROGRESS NOTE DETAILS
57 y/o M pt with PEG tube and hx of CHF, YANET s/p lung resection, neoplasm of maxillary sinus presents to ED from PMD Dr. Loza for admission for r/o C. diff. Pt has been having constant abdominal pain and frequent diarrhea x 6 days. Pt has had intermittent chest pain, cough and SOB x 1 week. Pt was in hospital for PNA 1 month ago. Last chemo was 2 weeks ago. Imotil was recently added to pt's medications. Denies fever. Pt will be placed in the main ED for complete evaluation by another provider.

## 2018-08-22 NOTE — ED PROVIDER NOTE - OBJECTIVE STATEMENT
This patient is a 58 year old man sent to the ER by his oncologist Dr. Loza for admission.  Patient is currently being treated with chemotherapy for neoplasm of the sinuses.  He was recently seen in the ER 4 days ago for diarrhea.  His wife who is at bedside states that the diarrhea has been explosive and she believes it is C-diff.  As per prescription from Dr. Juan patient is to be admitted to rule out c-diff.  He has diarrhea and abdominal pain for 7 days, weakness and weight loss.  Patient denies vomiting, fever, sick contacts and recent antibiotic use.

## 2018-08-22 NOTE — ED ADULT NURSE REASSESSMENT NOTE - NS ED NURSE REASSESS COMMENT FT1
Report received from off going RN, charting as noted. Patient A&Ox4, denies any pain or discomfort. Respirations even & unlabored, denies any numbness or tingling. Denies any chest pain, shortness of breath, nausea or dizziness. Cardiac monitor in place, NSR. Peg tube in place. Saline lock in place, patent, negative s/s phlebitis or infiltration. Plan of care discussed, all questions answered. Wife at bedside. Will monitor.

## 2018-08-22 NOTE — ED ADULT NURSE NOTE - NSIMPLEMENTINTERV_GEN_ALL_ED
Implemented All Fall Risk Interventions:  [place] to call system. Call bell, personal items and telephone within reach. Instruct patient to call for assistance. Room bathroom lighting operational. Non-slip footwear when patient is off stretcher. Physically safe environment: no spills, clutter or unnecessary equipment. Stretcher in lowest position, wheels locked, appropriate side rails in place. Provide visual cue, wrist band, yellow gown, etc. Monitor gait and stability. Monitor for mental status changes and reorient to person, place, and time. Review medications for side effects contributing to fall risk. Reinforce activity limits and safety measures with patient and family.

## 2018-08-22 NOTE — ED PROVIDER NOTE - MEDICAL DECISION MAKING DETAILS
Patient hx of sinus cancer on chemo c/o persistent diarrhea recently see a few days ago sent for admission by oncologist.  Patient tachycardic with dry mucous membranes.  IV Fluids ordered.  Will check Labs and Admit.

## 2018-08-22 NOTE — ED PROVIDER NOTE - CARE PLAN
Principal Discharge DX:	Weakness  Secondary Diagnosis:	Dehydration  Secondary Diagnosis:	Hypokalemia

## 2018-08-23 DIAGNOSIS — R19.7 DIARRHEA, UNSPECIFIED: ICD-10-CM

## 2018-08-23 DIAGNOSIS — F31.9 BIPOLAR DISORDER, UNSPECIFIED: ICD-10-CM

## 2018-08-23 DIAGNOSIS — I10 ESSENTIAL (PRIMARY) HYPERTENSION: ICD-10-CM

## 2018-08-23 DIAGNOSIS — R10.9 UNSPECIFIED ABDOMINAL PAIN: ICD-10-CM

## 2018-08-23 DIAGNOSIS — R53.1 WEAKNESS: ICD-10-CM

## 2018-08-23 DIAGNOSIS — R10.13 EPIGASTRIC PAIN: ICD-10-CM

## 2018-08-23 DIAGNOSIS — D72.829 ELEVATED WHITE BLOOD CELL COUNT, UNSPECIFIED: ICD-10-CM

## 2018-08-23 DIAGNOSIS — Z29.9 ENCOUNTER FOR PROPHYLACTIC MEASURES, UNSPECIFIED: ICD-10-CM

## 2018-08-23 DIAGNOSIS — C31.0 MALIGNANT NEOPLASM OF MAXILLARY SINUS: ICD-10-CM

## 2018-08-23 LAB
ALBUMIN SERPL ELPH-MCNC: 3.8 G/DL
ALP BLD-CCNC: 142 U/L
ALT SERPL-CCNC: 17 U/L
ANION GAP SERPL CALC-SCNC: 16 MMOL/L
ANION GAP SERPL CALC-SCNC: 16 MMOL/L — SIGNIFICANT CHANGE UP (ref 5–17)
APPEARANCE UR: CLEAR — SIGNIFICANT CHANGE UP
AST SERPL-CCNC: 21 U/L
BILIRUB SERPL-MCNC: 0.3 MG/DL
BILIRUB UR-MCNC: NEGATIVE — SIGNIFICANT CHANGE UP
BUN SERPL-MCNC: 8 MG/DL
BUN SERPL-MCNC: 8 MG/DL — SIGNIFICANT CHANGE UP (ref 8–20)
CALCIUM SERPL-MCNC: 8.5 MG/DL
CALCIUM SERPL-MCNC: 8.7 MG/DL — SIGNIFICANT CHANGE UP (ref 8.6–10.2)
CHLORIDE SERPL-SCNC: 94 MMOL/L
CHLORIDE SERPL-SCNC: 96 MMOL/L — LOW (ref 98–107)
CO2 SERPL-SCNC: 24 MMOL/L
CO2 SERPL-SCNC: 25 MMOL/L — SIGNIFICANT CHANGE UP (ref 22–29)
COLOR SPEC: YELLOW — SIGNIFICANT CHANGE UP
CREAT SERPL-MCNC: 0.76 MG/DL — SIGNIFICANT CHANGE UP (ref 0.5–1.3)
CREAT SERPL-MCNC: 1.06 MG/DL
CULTURE RESULTS: SIGNIFICANT CHANGE UP
CULTURE RESULTS: SIGNIFICANT CHANGE UP
DIFF PNL FLD: ABNORMAL
GLUCOSE SERPL-MCNC: 103 MG/DL — SIGNIFICANT CHANGE UP (ref 70–115)
GLUCOSE SERPL-MCNC: 108 MG/DL
GLUCOSE UR QL: NEGATIVE MG/DL — SIGNIFICANT CHANGE UP
HCT VFR BLD CALC: 30.6 % — LOW (ref 42–52)
HGB BLD-MCNC: 9.9 G/DL — LOW (ref 14–18)
KETONES UR-MCNC: NEGATIVE — SIGNIFICANT CHANGE UP
LEUKOCYTE ESTERASE UR-ACNC: NEGATIVE — SIGNIFICANT CHANGE UP
MAGNESIUM SERPL-MCNC: 1.7 MG/DL
MCHC RBC-ENTMCNC: 28.5 PG — SIGNIFICANT CHANGE UP (ref 27–31)
MCHC RBC-ENTMCNC: 32.4 G/DL — SIGNIFICANT CHANGE UP (ref 32–36)
MCV RBC AUTO: 88.2 FL — SIGNIFICANT CHANGE UP (ref 80–94)
NITRITE UR-MCNC: NEGATIVE — SIGNIFICANT CHANGE UP
PH UR: 6.5 — SIGNIFICANT CHANGE UP (ref 5–8)
PLATELET # BLD AUTO: 290 K/UL — SIGNIFICANT CHANGE UP (ref 150–400)
POTASSIUM SERPL-MCNC: 2.6 MMOL/L — CRITICAL LOW (ref 3.5–5.3)
POTASSIUM SERPL-SCNC: 2.6 MMOL/L — CRITICAL LOW (ref 3.5–5.3)
POTASSIUM SERPL-SCNC: 3.2 MMOL/L
PROT SERPL-MCNC: 6.3 G/DL
PROT UR-MCNC: 15 MG/DL
RBC # BLD: 3.47 M/UL — LOW (ref 4.6–6.2)
RBC # FLD: 15 % — SIGNIFICANT CHANGE UP (ref 11–15.6)
SODIUM SERPL-SCNC: 134 MMOL/L
SODIUM SERPL-SCNC: 137 MMOL/L — SIGNIFICANT CHANGE UP (ref 135–145)
SP GR SPEC: 1.01 — SIGNIFICANT CHANGE UP (ref 1.01–1.02)
SPECIMEN SOURCE: SIGNIFICANT CHANGE UP
SPECIMEN SOURCE: SIGNIFICANT CHANGE UP
UROBILINOGEN FLD QL: NEGATIVE MG/DL — SIGNIFICANT CHANGE UP
WBC # BLD: 23.2 K/UL — HIGH (ref 4.8–10.8)
WBC # FLD AUTO: 23.2 K/UL — HIGH (ref 4.8–10.8)

## 2018-08-23 PROCEDURE — 99222 1ST HOSP IP/OBS MODERATE 55: CPT

## 2018-08-23 PROCEDURE — 74176 CT ABD & PELVIS W/O CONTRAST: CPT | Mod: 26

## 2018-08-23 PROCEDURE — 99223 1ST HOSP IP/OBS HIGH 75: CPT

## 2018-08-23 PROCEDURE — 12345: CPT | Mod: NC

## 2018-08-23 RX ORDER — SODIUM CHLORIDE 9 MG/ML
1000 INJECTION INTRAMUSCULAR; INTRAVENOUS; SUBCUTANEOUS
Qty: 0 | Refills: 0 | Status: DISCONTINUED | OUTPATIENT
Start: 2018-08-23 | End: 2018-08-23

## 2018-08-23 RX ORDER — ALPRAZOLAM 0.25 MG
0.5 TABLET ORAL AT BEDTIME
Qty: 0 | Refills: 0 | Status: DISCONTINUED | OUTPATIENT
Start: 2018-08-23 | End: 2018-08-26

## 2018-08-23 RX ORDER — METRONIDAZOLE 500 MG
TABLET ORAL
Qty: 0 | Refills: 0 | Status: DISCONTINUED | OUTPATIENT
Start: 2018-08-23 | End: 2018-08-24

## 2018-08-23 RX ORDER — MORPHINE SULFATE 50 MG/1
30 CAPSULE, EXTENDED RELEASE ORAL THREE TIMES A DAY
Qty: 0 | Refills: 0 | Status: DISCONTINUED | OUTPATIENT
Start: 2018-08-23 | End: 2018-08-26

## 2018-08-23 RX ORDER — SODIUM CHLORIDE 9 MG/ML
250 INJECTION INTRAMUSCULAR; INTRAVENOUS; SUBCUTANEOUS ONCE
Qty: 0 | Refills: 0 | Status: COMPLETED | OUTPATIENT
Start: 2018-08-23 | End: 2018-08-23

## 2018-08-23 RX ORDER — HYDROMORPHONE HYDROCHLORIDE 2 MG/ML
2 INJECTION INTRAMUSCULAR; INTRAVENOUS; SUBCUTANEOUS EVERY 12 HOURS
Qty: 0 | Refills: 0 | Status: DISCONTINUED | OUTPATIENT
Start: 2018-08-23 | End: 2018-08-24

## 2018-08-23 RX ORDER — CIPROFLOXACIN LACTATE 400MG/40ML
VIAL (ML) INTRAVENOUS
Qty: 0 | Refills: 0 | Status: DISCONTINUED | OUTPATIENT
Start: 2018-08-23 | End: 2018-08-24

## 2018-08-23 RX ORDER — HEPARIN SODIUM 5000 [USP'U]/ML
5000 INJECTION INTRAVENOUS; SUBCUTANEOUS EVERY 12 HOURS
Qty: 0 | Refills: 0 | Status: DISCONTINUED | OUTPATIENT
Start: 2018-08-23 | End: 2018-08-23

## 2018-08-23 RX ORDER — HYDROMORPHONE HYDROCHLORIDE 2 MG/ML
1 INJECTION INTRAMUSCULAR; INTRAVENOUS; SUBCUTANEOUS ONCE
Qty: 0 | Refills: 0 | Status: DISCONTINUED | OUTPATIENT
Start: 2018-08-23 | End: 2018-08-23

## 2018-08-23 RX ORDER — METRONIDAZOLE 500 MG
500 TABLET ORAL EVERY 8 HOURS
Qty: 0 | Refills: 0 | Status: DISCONTINUED | OUTPATIENT
Start: 2018-08-23 | End: 2018-08-24

## 2018-08-23 RX ORDER — POTASSIUM CHLORIDE 20 MEQ
40 PACKET (EA) ORAL ONCE
Qty: 0 | Refills: 0 | Status: COMPLETED | OUTPATIENT
Start: 2018-08-23 | End: 2018-08-23

## 2018-08-23 RX ORDER — METRONIDAZOLE 500 MG
500 TABLET ORAL ONCE
Qty: 0 | Refills: 0 | Status: COMPLETED | OUTPATIENT
Start: 2018-08-23 | End: 2018-08-23

## 2018-08-23 RX ORDER — PANTOPRAZOLE SODIUM 20 MG/1
40 TABLET, DELAYED RELEASE ORAL
Qty: 0 | Refills: 0 | Status: DISCONTINUED | OUTPATIENT
Start: 2018-08-23 | End: 2018-08-26

## 2018-08-23 RX ORDER — OLANZAPINE 15 MG/1
5 TABLET, FILM COATED ORAL DAILY
Qty: 0 | Refills: 0 | Status: DISCONTINUED | OUTPATIENT
Start: 2018-08-23 | End: 2018-08-26

## 2018-08-23 RX ORDER — CIPROFLOXACIN LACTATE 400MG/40ML
200 VIAL (ML) INTRAVENOUS ONCE
Qty: 0 | Refills: 0 | Status: COMPLETED | OUTPATIENT
Start: 2018-08-23 | End: 2018-08-23

## 2018-08-23 RX ORDER — CARVEDILOL PHOSPHATE 80 MG/1
6.25 CAPSULE, EXTENDED RELEASE ORAL EVERY 12 HOURS
Qty: 0 | Refills: 0 | Status: DISCONTINUED | OUTPATIENT
Start: 2018-08-23 | End: 2018-08-26

## 2018-08-23 RX ORDER — POTASSIUM CHLORIDE 20 MEQ
20 PACKET (EA) ORAL
Qty: 0 | Refills: 0 | Status: COMPLETED | OUTPATIENT
Start: 2018-08-23 | End: 2018-08-23

## 2018-08-23 RX ORDER — ACETAMINOPHEN 500 MG
1000 TABLET ORAL ONCE
Qty: 0 | Refills: 0 | Status: COMPLETED | OUTPATIENT
Start: 2018-08-23 | End: 2018-08-23

## 2018-08-23 RX ORDER — HYDROMORPHONE HYDROCHLORIDE 2 MG/ML
4 INJECTION INTRAMUSCULAR; INTRAVENOUS; SUBCUTANEOUS EVERY 4 HOURS
Qty: 0 | Refills: 0 | Status: DISCONTINUED | OUTPATIENT
Start: 2018-08-23 | End: 2018-08-26

## 2018-08-23 RX ORDER — POTASSIUM CHLORIDE 20 MEQ
40 PACKET (EA) ORAL EVERY 4 HOURS
Qty: 0 | Refills: 0 | Status: COMPLETED | OUTPATIENT
Start: 2018-08-23 | End: 2018-08-23

## 2018-08-23 RX ORDER — CIPROFLOXACIN LACTATE 400MG/40ML
200 VIAL (ML) INTRAVENOUS EVERY 12 HOURS
Qty: 0 | Refills: 0 | Status: DISCONTINUED | OUTPATIENT
Start: 2018-08-23 | End: 2018-08-24

## 2018-08-23 RX ORDER — SODIUM CHLORIDE 9 MG/ML
1000 INJECTION, SOLUTION INTRAVENOUS
Qty: 0 | Refills: 0 | Status: DISCONTINUED | OUTPATIENT
Start: 2018-08-23 | End: 2018-08-24

## 2018-08-23 RX ADMIN — SODIUM CHLORIDE 80 MILLILITER(S): 9 INJECTION, SOLUTION INTRAVENOUS at 10:19

## 2018-08-23 RX ADMIN — HYDROMORPHONE HYDROCHLORIDE 1 MILLIGRAM(S): 2 INJECTION INTRAMUSCULAR; INTRAVENOUS; SUBCUTANEOUS at 14:59

## 2018-08-23 RX ADMIN — Medication 20 MILLIEQUIVALENT(S): at 20:33

## 2018-08-23 RX ADMIN — HYDROMORPHONE HYDROCHLORIDE 1 MILLIGRAM(S): 2 INJECTION INTRAMUSCULAR; INTRAVENOUS; SUBCUTANEOUS at 15:25

## 2018-08-23 RX ADMIN — OLANZAPINE 5 MILLIGRAM(S): 15 TABLET, FILM COATED ORAL at 12:16

## 2018-08-23 RX ADMIN — MORPHINE SULFATE 30 MILLIGRAM(S): 50 CAPSULE, EXTENDED RELEASE ORAL at 07:04

## 2018-08-23 RX ADMIN — Medication 100 MILLIGRAM(S): at 03:13

## 2018-08-23 RX ADMIN — HEPARIN SODIUM 5000 UNIT(S): 5000 INJECTION INTRAVENOUS; SUBCUTANEOUS at 06:14

## 2018-08-23 RX ADMIN — HYDROMORPHONE HYDROCHLORIDE 1 MILLIGRAM(S): 2 INJECTION INTRAMUSCULAR; INTRAVENOUS; SUBCUTANEOUS at 12:16

## 2018-08-23 RX ADMIN — Medication 100 MILLIGRAM(S): at 17:23

## 2018-08-23 RX ADMIN — Medication 100 MILLIGRAM(S): at 12:17

## 2018-08-23 RX ADMIN — Medication 100 MILLIGRAM(S): at 05:18

## 2018-08-23 RX ADMIN — Medication 75 MILLIGRAM(S): at 17:24

## 2018-08-23 RX ADMIN — Medication 100 MILLIGRAM(S): at 20:33

## 2018-08-23 RX ADMIN — SODIUM CHLORIDE 500 MILLILITER(S): 9 INJECTION INTRAMUSCULAR; INTRAVENOUS; SUBCUTANEOUS at 06:36

## 2018-08-23 RX ADMIN — Medication 40 MILLIEQUIVALENT(S): at 23:56

## 2018-08-23 RX ADMIN — Medication 40 MILLIEQUIVALENT(S): at 12:18

## 2018-08-23 RX ADMIN — SODIUM CHLORIDE 1000 MILLILITER(S): 9 INJECTION INTRAMUSCULAR; INTRAVENOUS; SUBCUTANEOUS at 21:36

## 2018-08-23 RX ADMIN — HYDROMORPHONE HYDROCHLORIDE 1 MILLIGRAM(S): 2 INJECTION INTRAMUSCULAR; INTRAVENOUS; SUBCUTANEOUS at 13:10

## 2018-08-23 RX ADMIN — Medication 20 MILLIEQUIVALENT(S): at 17:24

## 2018-08-23 RX ADMIN — Medication 40 MILLIEQUIVALENT(S): at 06:14

## 2018-08-23 RX ADMIN — MORPHINE SULFATE 30 MILLIGRAM(S): 50 CAPSULE, EXTENDED RELEASE ORAL at 12:18

## 2018-08-23 RX ADMIN — MORPHINE SULFATE 30 MILLIGRAM(S): 50 CAPSULE, EXTENDED RELEASE ORAL at 13:10

## 2018-08-23 RX ADMIN — CARVEDILOL PHOSPHATE 6.25 MILLIGRAM(S): 80 CAPSULE, EXTENDED RELEASE ORAL at 17:24

## 2018-08-23 RX ADMIN — Medication 400 MILLIGRAM(S): at 23:56

## 2018-08-23 RX ADMIN — MORPHINE SULFATE 30 MILLIGRAM(S): 50 CAPSULE, EXTENDED RELEASE ORAL at 06:14

## 2018-08-23 RX ADMIN — PANTOPRAZOLE SODIUM 40 MILLIGRAM(S): 20 TABLET, DELAYED RELEASE ORAL at 06:14

## 2018-08-23 RX ADMIN — Medication 75 MILLIGRAM(S): at 06:14

## 2018-08-23 RX ADMIN — SODIUM CHLORIDE 100 MILLILITER(S): 9 INJECTION INTRAMUSCULAR; INTRAVENOUS; SUBCUTANEOUS at 04:19

## 2018-08-23 NOTE — CONSULT NOTE ADULT - PROBLEM SELECTOR RECOMMENDATION 2
probablt due to dislodge G tube as withh with some focal peritoneal inflammation but need to rule out recurrrant c diif. At this time it does not appear that he will require another G tube as he is eating well.

## 2018-08-23 NOTE — H&P ADULT - ASSESSMENT
58 years old male with PMH of HTN, Bipolar disorder, maxillary sinus cancer undergoing chemo and recent h/o C.Diff colitis presented to the ER with abdominal pin, started on Tuesday. Pain is constant, periumbilical, 8/10 and not associated with fever, chills, nausea, vomiting, diarrhea or constipation. Today he was seen by his oncologist,  who referred him to the ER.

## 2018-08-23 NOTE — PROGRESS NOTE ADULT - PROBLEM SELECTOR PLAN 1
Likely due to dislodged PEG tube, GI and surgical consults called. CT abdomen results appreciated as above.  Advance NPO to clear liquids.  Pain control.  GI consult Likely due to dislodged PEG tube, GI and surgical consults called. CT abdomen results appreciated as above.  Advance NPO to clear liquids. Advance as tolerated. Pain control.  GI consult Likely due to dislodged PEG tube.   GI and surgical consults called. Tube removed by surgery team.  Advance NPO to clear liquids. Advance as tolerated. Pain control.

## 2018-08-23 NOTE — PROGRESS NOTE ADULT - PROBLEM SELECTOR PLAN 5
management as per oncology  pain control Outpatient follow up and management as per oncology  pain control

## 2018-08-23 NOTE — CONSULT NOTE ADULT - PROBLEM SELECTOR RECOMMENDATION 9
due to dislodged G tube with probable intraperitoneal inflammatory response. Suggest surgical conslt for removal of G tube-I feel uncomfortable about just pulling it out due to marked inflammatory response around the G tube and I am not certain as to the best way it should be removed. due to dislodged G tube with probable intraperitoneal inflammatory response. Suggest surgical conslt for removal of G tube-I feel uncomfortable about just pulling it out due to marked inflammatory response around the G tube and I am not certain as to the best way it should be removed. For the present, till seen by surgery suggest hold lovenox and make NPO ( on clear liquids but was eating a bagel when I did the consult) and give IV fluids and replete potassium.

## 2018-08-23 NOTE — H&P ADULT - ATTENDING COMMENTS
Full Code Full Code    Addendum  ct abdomen result noted:  The percutaneous gastrostomy tube balloon is inflated deep to the anterior   abdominal wall, however outside of the stomach and there are mild   surrounding inflammatory changes. Repositioning necessary.   Follow up GI consult.

## 2018-08-23 NOTE — PROGRESS NOTE ADULT - PROBLEM SELECTOR PLAN 6
May be reactive due to inflammatory process surrounding PEG tube. Cont ABX. Trend WBC. May be reactive due to inflammatory process surrounding PEG tube. Cont with empiric ABX.   Trend WBC.

## 2018-08-23 NOTE — H&P ADULT - PROBLEM SELECTOR PLAN 2
recently treated for C.Diff colitis  stool for culture and c.dIff colitis.  continue cipro and flagyl  GI input.

## 2018-08-23 NOTE — PROGRESS NOTE ADULT - SUBJECTIVE AND OBJECTIVE BOX
Interval/HPI: No acute events overnight as per RN. Chart reviewed. Pt seen/evaluated by Attending and PA.  Pt's wife is at bedside. Pt is in NAD at this time. Pt reports he is still having diarrhea had 2 episodes today but a sample was not obtained. Pt reports abdominal pain around PEG tube site.  As per pt and wife, pt has been tolerating food by mouth.    REVIEW OF SYSTEMS:    Patient denied fever, chills, nausea, vomiting, cough, shortness of breath, chest pain or palpitations    Vital Signs Last 24 Hrs  T(C): 36.7 (23 Aug 2018 04:29), Max: 37.1 (23 Aug 2018 03:10)  T(F): 98 (23 Aug 2018 04:29), Max: 98.8 (23 Aug 2018 03:10)  HR: 93 (23 Aug 2018 04:29) (93 - 105)  BP: 89/53 (23 Aug 2018 04:29) (89/53 - 102/71)  BP(mean): 81 (23 Aug 2018 00:26) (81 - 81)  RR: 16 (23 Aug 2018 03:10) (16 - 18)  SpO2: 94% (23 Aug 2018 03:10) (94% - 96%)I&O's Summary  CAPILLARY BLOOD GLUCOSE    PHYSICAL EXAM:  GENERAL: NAD, well-groomed  HEENT: PERRL, +EOMI, anicteric  NECK: Supple, No JVD   CHEST/LUNG: CTA bilaterally; Normal effort  HEART: S1S2 Normal intensity, no murmurs, gallops or rubs noted  ABDOMEN: PEG tube noted w some oozing discharge, mild erythema surrounding the PEG site. TTP around PEG site.   EXTREMITIES:  2+ radial and DP pulses noted, no clubbing, cyanosis, or edema noted, FROM x 4  SKIN: No rashes or lesions noted  NEURO: A&Ox3, no focal deficits noted, CN II-XII intact  PSYCH: flat affect; insight/judgement appropriate    LABS:                        9.9    23.2  )-----------( 290      ( 23 Aug 2018 07:39 )             30.6     08-23    137  |  96<L>  |  8.0  ----------------------------<  103  2.6<LL>   |  25.0  |  0.76    Ca    8.7      23 Aug 2018 07:39  Phos  3.3     08-  Mg     1.8     -    TPro  6.6  /  Alb  3.3  /  TBili  0.3<L>  /  DBili  x   /  AST  23  /  ALT  16  /  AlkPhos  162<H>        Urinalysis Basic - ( 23 Aug 2018 00:19 )    Color: Yellow / Appearance: Clear / S.015 / pH: x  Gluc: x / Ketone: Negative  / Bili: Negative / Urobili: Negative mg/dL   Blood: x / Protein: 15 mg/dL / Nitrite: Negative   Leuk Esterase: Negative / RBC: 3-5 /HPF / WBC Negative   Sq Epi: x / Non Sq Epi: Occasional / Bacteria: x    RADIOLOGY & ADDITIONAL TESTS:    CT abd and pelvis 18  IMPRESSION:     Percutaneous gastrostomy tube balloon/tip is outside the stomach lumen   and within the ventral peritoneum with surrounding inflammation, new   since chest CT of 18. No fluid collection or free air.      MEDICATIONS:  MEDICATIONS  (STANDING):  carvedilol 6.25 milliGRAM(s) Oral every 12 hours  ciprofloxacin   IVPB 200 milliGRAM(s) IV Intermittent every 12 hours  ciprofloxacin   IVPB      metroNIDAZOLE  IVPB      metroNIDAZOLE  IVPB 500 milliGRAM(s) IV Intermittent every 8 hours  morphine ER Tablet 30 milliGRAM(s) Oral three times a day  OLANZapine 5 milliGRAM(s) Oral daily  pantoprazole    Tablet 40 milliGRAM(s) Oral before breakfast  potassium chloride    Tablet ER 40 milliEquivalent(s) Oral every 4 hours  potassium chloride    Tablet ER 20 milliEquivalent(s) Oral every 2 hours  pregabalin 75 milliGRAM(s) Oral two times a day  sodium chloride 0.9% 1000 milliLiter(s) (80 mL/Hr) IV Continuous <Continuous>    MEDICATIONS  (PRN):  ALPRAZolam 0.5 milliGRAM(s) Oral at bedtime PRN anxiety  HYDROmorphone   Tablet 4 milliGRAM(s) Oral every 4 hours PRN Severe Pain (7 - 10)

## 2018-08-23 NOTE — ED ADULT NURSE REASSESSMENT NOTE - NS ED NURSE REASSESS COMMENT FT1
MD notified of patients absolute refusal. Explained to MD that according to CT staff, order must be changed to say just "CT Abdomen & Pelvis". MD stated that ED attending put in the order & she cannot change it. Notified MD that ED attending has left for the night & there is no way to change the order, must be cancelled & new one written so that patient cannot received CT scan. Dr. Navarro stated will put in new order reflecting no PO contrast so that patient may have exam. Charge RN notified of situation.

## 2018-08-23 NOTE — CONSULT NOTE ADULT - ASSESSMENT
59 y/o M h/o maxillary sinus cancer s/p chemotherapy, s/p PEG tube placement 7/20/18 with dislodged PEG. CT evidence of inflammatory reaction to dislodged g-tube. Leukocytosis to 23 today. Afebrile, hemodynamically stable.    Plan:  -PEG tube removed by surgical team at bedside during evaluation  -Patient tolerating regular diet, likely does not need further feeding access  -No further surgical intervention needed  -Management as per primary team  -Please reconsult as needed. Thank you.

## 2018-08-23 NOTE — ED ADULT NURSE REASSESSMENT NOTE - NS ED NURSE REASSESS COMMENT FT1
Patient refusing PO contrast for CT, wife at bedside stated "His chemo doctor said he cannot have it. I already told that to the doctor." Dr. Navarro notified, MD stated to explain to patient PO contrast not harmful to kidneys. Explained to patient as instructed, patient stated "I am not drinking anything. Will call MD.

## 2018-08-23 NOTE — H&P ADULT - HISTORY OF PRESENT ILLNESS
58 years old male with PMH of HTN, Bipolar disorder, maxillary sinus cancer undergoing chemo and recent h/o C.Diff colitis presented to the ER with abdominal pin, started on Tuesday. Pain is constant, periumbilical, 8/10 and not associated with fever, chills, nausea, vomiting, constipation or urinary complaints. Patient also c/o watery diarrhea having 2-3 BMs daily. Today he was seen by his oncologist,  who referred him to the ER.

## 2018-08-23 NOTE — PROGRESS NOTE ADULT - PROBLEM SELECTOR PLAN 2
Will send stool for culture and c.dIff colitis.  continue cipro and flagyl  GI input:   ·  Problem: Diarrhea, unspecified type. Probably due to dislodge G tube as with with some focal peritoneal inflammation but need to rule out recurrent c DIF. Stool sample pending. At this time it does not appear that he will require another G tube as he is eating well. Diaarhea with abdomen pain.   Seen by Go- ? from dislodged from G tube.   Will send stool for culture and c.dIff colitis.  continue empiric  cipro and flagyl  F/u stool studies.

## 2018-08-23 NOTE — CHART NOTE - NSCHARTNOTEFT_GEN_A_CORE
Medicine PA- Radiologist cd., Dr. Simms, regarding pt's. ct abdomen- PEG balloon intact, but inflamm. changes noted outside stomach, recommend repositioning,see report. ? needs GI consult. Also pt's. BP 89/53, asymptomaic, hold coreg, NS 250c bolus, stat CBC added to a.m. labs.

## 2018-08-23 NOTE — PROGRESS NOTE ADULT - ASSESSMENT
58 years old male with PMH of HTN, Bipolar disorder, maxillary sinus cancer undergoing chemo presented to the ER with abdominal pain, admitted for dislodged PEG tube. 58 years old male with PMH of HTN, Bipolar disorder, maxillary sinus cancer undergoing chemo presented to the ER with abdominal pain / diarrhea, noted to have disloged PEG on CT scan and admitted for dislodged PEG tube. Pt was evaluated by GI /Surgery team, tube removed and Pt tolerating PO food- Per GI no need to replace new tubes.

## 2018-08-24 ENCOUNTER — OUTPATIENT (OUTPATIENT)
Dept: OUTPATIENT SERVICES | Facility: HOSPITAL | Age: 58
LOS: 1 days | Discharge: HOME | End: 2018-08-24

## 2018-08-24 DIAGNOSIS — Z90.2 ACQUIRED ABSENCE OF LUNG [PART OF]: Chronic | ICD-10-CM

## 2018-08-24 DIAGNOSIS — H26.9 UNSPECIFIED CATARACT: Chronic | ICD-10-CM

## 2018-08-24 DIAGNOSIS — C31.0 MALIGNANT NEOPLASM OF MAXILLARY SINUS: ICD-10-CM

## 2018-08-24 DIAGNOSIS — L03.90 CELLULITIS, UNSPECIFIED: ICD-10-CM

## 2018-08-24 DIAGNOSIS — Z98.890 OTHER SPECIFIED POSTPROCEDURAL STATES: Chronic | ICD-10-CM

## 2018-08-24 DIAGNOSIS — Z98.61 CORONARY ANGIOPLASTY STATUS: Chronic | ICD-10-CM

## 2018-08-24 LAB
ANION GAP SERPL CALC-SCNC: 12 MMOL/L — SIGNIFICANT CHANGE UP (ref 5–17)
BUN SERPL-MCNC: 7 MG/DL — LOW (ref 8–20)
CALCIUM SERPL-MCNC: 7.9 MG/DL — LOW (ref 8.6–10.2)
CHLORIDE SERPL-SCNC: 105 MMOL/L — SIGNIFICANT CHANGE UP (ref 98–107)
CO2 SERPL-SCNC: 22 MMOL/L — SIGNIFICANT CHANGE UP (ref 22–29)
CREAT SERPL-MCNC: 0.67 MG/DL — SIGNIFICANT CHANGE UP (ref 0.5–1.3)
CULTURE RESULTS: SIGNIFICANT CHANGE UP
GLUCOSE SERPL-MCNC: 104 MG/DL — SIGNIFICANT CHANGE UP (ref 70–115)
HCT VFR BLD CALC: 29.7 % — LOW (ref 42–52)
HGB BLD-MCNC: 9.2 G/DL — LOW (ref 14–18)
MCHC RBC-ENTMCNC: 28 PG — SIGNIFICANT CHANGE UP (ref 27–31)
MCHC RBC-ENTMCNC: 31 G/DL — LOW (ref 32–36)
MCV RBC AUTO: 90.3 FL — SIGNIFICANT CHANGE UP (ref 80–94)
PLATELET # BLD AUTO: 263 K/UL — SIGNIFICANT CHANGE UP (ref 150–400)
POTASSIUM SERPL-MCNC: 3.5 MMOL/L — SIGNIFICANT CHANGE UP (ref 3.5–5.3)
POTASSIUM SERPL-SCNC: 3.5 MMOL/L — SIGNIFICANT CHANGE UP (ref 3.5–5.3)
RBC # BLD: 3.29 M/UL — LOW (ref 4.6–6.2)
RBC # FLD: 15.3 % — SIGNIFICANT CHANGE UP (ref 11–15.6)
SODIUM SERPL-SCNC: 139 MMOL/L — SIGNIFICANT CHANGE UP (ref 135–145)
SPECIMEN SOURCE: SIGNIFICANT CHANGE UP
WBC # BLD: 15.6 K/UL — HIGH (ref 4.8–10.8)
WBC # FLD AUTO: 15.6 K/UL — HIGH (ref 4.8–10.8)

## 2018-08-24 PROCEDURE — 99233 SBSQ HOSP IP/OBS HIGH 50: CPT

## 2018-08-24 PROCEDURE — 71045 X-RAY EXAM CHEST 1 VIEW: CPT | Mod: 26

## 2018-08-24 PROCEDURE — 99232 SBSQ HOSP IP/OBS MODERATE 35: CPT

## 2018-08-24 RX ORDER — CEFAZOLIN SODIUM 1 G
VIAL (EA) INJECTION
Qty: 0 | Refills: 0 | Status: DISCONTINUED | OUTPATIENT
Start: 2018-08-24 | End: 2018-08-26

## 2018-08-24 RX ORDER — HYDROMORPHONE HYDROCHLORIDE 2 MG/ML
1 INJECTION INTRAMUSCULAR; INTRAVENOUS; SUBCUTANEOUS EVERY 4 HOURS
Qty: 0 | Refills: 0 | Status: DISCONTINUED | OUTPATIENT
Start: 2018-08-24 | End: 2018-08-26

## 2018-08-24 RX ORDER — HYDROMORPHONE HYDROCHLORIDE 2 MG/ML
1 INJECTION INTRAMUSCULAR; INTRAVENOUS; SUBCUTANEOUS ONCE
Qty: 0 | Refills: 0 | Status: DISCONTINUED | OUTPATIENT
Start: 2018-08-24 | End: 2018-08-24

## 2018-08-24 RX ORDER — DEXTROAMPHETAMINE SACCHARATE, AMPHETAMINE ASPARTATE, DEXTROAMPHETAMINE SULFATE AND AMPHETAMINE SULFATE 1.875; 1.875; 1.875; 1.875 MG/1; MG/1; MG/1; MG/1
30 TABLET ORAL
Qty: 0 | Refills: 0 | Status: DISCONTINUED | OUTPATIENT
Start: 2018-08-24 | End: 2018-08-26

## 2018-08-24 RX ORDER — CEFAZOLIN SODIUM 1 G
1000 VIAL (EA) INJECTION EVERY 8 HOURS
Qty: 0 | Refills: 0 | Status: DISCONTINUED | OUTPATIENT
Start: 2018-08-24 | End: 2018-08-26

## 2018-08-24 RX ORDER — CEFAZOLIN SODIUM 1 G
1000 VIAL (EA) INJECTION ONCE
Qty: 0 | Refills: 0 | Status: COMPLETED | OUTPATIENT
Start: 2018-08-24 | End: 2018-08-24

## 2018-08-24 RX ORDER — SODIUM CHLORIDE 9 MG/ML
1000 INJECTION, SOLUTION INTRAVENOUS
Qty: 0 | Refills: 0 | Status: DISCONTINUED | OUTPATIENT
Start: 2018-08-24 | End: 2018-08-26

## 2018-08-24 RX ORDER — ASPIRIN/CALCIUM CARB/MAGNESIUM 324 MG
81 TABLET ORAL DAILY
Qty: 0 | Refills: 0 | Status: DISCONTINUED | OUTPATIENT
Start: 2018-08-24 | End: 2018-08-26

## 2018-08-24 RX ADMIN — SODIUM CHLORIDE 80 MILLILITER(S): 9 INJECTION, SOLUTION INTRAVENOUS at 10:06

## 2018-08-24 RX ADMIN — HYDROMORPHONE HYDROCHLORIDE 1 MILLIGRAM(S): 2 INJECTION INTRAMUSCULAR; INTRAVENOUS; SUBCUTANEOUS at 12:18

## 2018-08-24 RX ADMIN — Medication 100 MILLIGRAM(S): at 06:48

## 2018-08-24 RX ADMIN — Medication 75 MILLIGRAM(S): at 06:48

## 2018-08-24 RX ADMIN — Medication 81 MILLIGRAM(S): at 12:17

## 2018-08-24 RX ADMIN — HYDROMORPHONE HYDROCHLORIDE 1 MILLIGRAM(S): 2 INJECTION INTRAMUSCULAR; INTRAVENOUS; SUBCUTANEOUS at 13:48

## 2018-08-24 RX ADMIN — SODIUM CHLORIDE 80 MILLILITER(S): 9 INJECTION, SOLUTION INTRAVENOUS at 17:41

## 2018-08-24 RX ADMIN — MORPHINE SULFATE 30 MILLIGRAM(S): 50 CAPSULE, EXTENDED RELEASE ORAL at 14:30

## 2018-08-24 RX ADMIN — OLANZAPINE 5 MILLIGRAM(S): 15 TABLET, FILM COATED ORAL at 12:17

## 2018-08-24 RX ADMIN — Medication 1000 MILLIGRAM(S): at 00:50

## 2018-08-24 RX ADMIN — Medication 100 MILLIGRAM(S): at 05:44

## 2018-08-24 RX ADMIN — MORPHINE SULFATE 30 MILLIGRAM(S): 50 CAPSULE, EXTENDED RELEASE ORAL at 14:25

## 2018-08-24 RX ADMIN — Medication 100 MILLIGRAM(S): at 21:36

## 2018-08-24 RX ADMIN — PANTOPRAZOLE SODIUM 40 MILLIGRAM(S): 20 TABLET, DELAYED RELEASE ORAL at 06:48

## 2018-08-24 RX ADMIN — Medication 75 MILLIGRAM(S): at 17:41

## 2018-08-24 RX ADMIN — HYDROMORPHONE HYDROCHLORIDE 1 MILLIGRAM(S): 2 INJECTION INTRAMUSCULAR; INTRAVENOUS; SUBCUTANEOUS at 18:36

## 2018-08-24 RX ADMIN — Medication 100 MILLIGRAM(S): at 12:18

## 2018-08-24 RX ADMIN — MORPHINE SULFATE 30 MILLIGRAM(S): 50 CAPSULE, EXTENDED RELEASE ORAL at 23:00

## 2018-08-24 RX ADMIN — MORPHINE SULFATE 30 MILLIGRAM(S): 50 CAPSULE, EXTENDED RELEASE ORAL at 22:17

## 2018-08-24 NOTE — PROGRESS NOTE ADULT - SUBJECTIVE AND OBJECTIVE BOX
Interval/Overnight:  No acute events overnight or today. Chart reviewed. Pt seen/examined by Attending and PA. Pt complains of pain around former PEG site. Tolerating regular diet.     REVIEW OF SYSTEMS:    Patient denied fever, chills, nausea, vomiting, cough, shortness of breath, chest pain or palpitations    Vital Signs Last 24 Hrs  T(C): 36.8 (24 Aug 2018 05:30), Max: 37.1 (23 Aug 2018 20:22)  T(F): 98.3 (24 Aug 2018 05:30), Max: 98.8 (23 Aug 2018 20:22)  HR: 81 (24 Aug 2018 05:30) (72 - 100)  BP: 95/55 (24 Aug 2018 11:38) (92/54 - 104/60)  BP(mean): --  RR: 18 (24 Aug 2018 05:30) (18 - 18)  SpO2: 98% (23 Aug 2018 20:22) (97% - 98%)I&O's Summary    23 Aug 2018 07:01  -  24 Aug 2018 07:00  --------------------------------------------------------  IN: 1910 mL / OUT: 0 mL / NET: 1910 mL    CAPILLARY BLOOD GLUCOSE    PHYSICAL EXAM:  GENERAL: NAD, well-groomed  HEENT: PERRL, +EOMI, anicteric  NECK: Supple, No JVD   CHEST/LUNG: CTA bilaterally; Normal effort  HEART: S1S2 Normal intensity, no murmurs, gallops or rubs noted  ABDOMEN: mild TTP around former PEG site, no purulence, mild surrounding erythema, no sig swelling  EXTREMITIES:  2+ radial and DP pulses noted, no clubbing, cyanosis, or edema noted, FROM x 4  SKIN: No rashes or lesions noted  NEURO: A&Ox3, no focal deficits noted, CN II-XII intact  PSYCH: normal mood and affect; insight/judgement appropriate  LABS:                        9.2    15.6  )-----------( 263      ( 24 Aug 2018 09:36 )             29.7     08-24    139  |  105  |  7.0<L>  ----------------------------<  104  3.5   |  22.0  |  0.67    Ca    7.9<L>      24 Aug 2018 09:36  Phos  3.3       Mg     1.8         TPro  6.6  /  Alb  3.3  /  TBili  0.3<L>  /  DBili  x   /  AST  23  /  ALT  16  /  AlkPhos  162<H>        Urinalysis Basic - ( 23 Aug 2018 00:19 )    Color: Yellow / Appearance: Clear / S.015 / pH: x  Gluc: x / Ketone: Negative  / Bili: Negative / Urobili: Negative mg/dL   Blood: x / Protein: 15 mg/dL / Nitrite: Negative   Leuk Esterase: Negative / RBC: 3-5 /HPF / WBC Negative   Sq Epi: x / Non Sq Epi: Occasional / Bacteria: x      RADIOLOGY & ADDITIONAL TESTS:    CXR 18  IMPRESSION:      Right port catheter with tip in the SVC is stable.    Small airspace opacity at the right lung base is not significantly   changed. Tiny left pleural effusion cannot be excluded. Left lung is   otherwise clear.    Cardiomediastinal silhouette is intact.        MEDICATIONS:  MEDICATIONS  (STANDING):  amphetamine/dextroamphetamine 30 milliGRAM(s) Oral two times a day  aspirin  chewable 81 milliGRAM(s) Oral daily  carvedilol 6.25 milliGRAM(s) Oral every 12 hours  ceFAZolin   IVPB      ceFAZolin   IVPB 1000 milliGRAM(s) IV Intermittent every 8 hours  morphine ER Tablet 30 milliGRAM(s) Oral three times a day  OLANZapine 5 milliGRAM(s) Oral daily  pantoprazole    Tablet 40 milliGRAM(s) Oral before breakfast  pregabalin 75 milliGRAM(s) Oral two times a day  sodium chloride 0.9% 1000 milliLiter(s) (80 mL/Hr) IV Continuous <Continuous>    MEDICATIONS  (PRN):  ALPRAZolam 0.5 milliGRAM(s) Oral at bedtime PRN anxiety  HYDROmorphone   Tablet 4 milliGRAM(s) Oral every 4 hours PRN Severe Pain (7 - 10)  HYDROmorphone  Injectable 2 milliGRAM(s) IV Push every 12 hours PRN Severe Pain (7 - 10)

## 2018-08-24 NOTE — PROGRESS NOTE ADULT - PROBLEM SELECTOR PLAN 1
Likely due to dislodged PEG tube.   GI and surgical consults called. Tube removed by surgery team.  Now tolerating regular diet. Cont pain control w parameters due to BP runs low. Likely due to dislodged PEG tube with cellulitis at PEG site.   GI and surgical consults appreciated. Tube removed by surgery team.  Now tolerating regular diet. Cont pain control w parameters due to BP runs low.

## 2018-08-24 NOTE — PROGRESS NOTE ADULT - PROBLEM SELECTOR PLAN 5
Heme-onc consult appreciated: maxillary sinus tumor, it is responding to induction chemotherapy and Sunny Loza and Shanelle will follow post-discharge re: plans for future chemotherapy/surgery. Pain control.

## 2018-08-24 NOTE — PROGRESS NOTE ADULT - SUBJECTIVE AND OBJECTIVE BOX
Chief Complaint: This is a 58y old Male patient being seen for follow-up consultation for abdominal pain, diarrhea and dislodged PEG tube.    HPI:  This is a 58-year-old man with a past medical history of maxillary sinus cancer currently undergoing chemotherapy, a recent history of C. diff colitis who was referred to the ED by both his oncologist and me for severe abdominal pain around his PEG tube site.  His PEG tube was found to be dislodged and was removed at the bedside by ACS.  Stool studies pending.    REVIEW OF SYSTEMS:  Constitutional:  No unintentional weight loss, fevers, chills or night sweats	  Eyes: No eye pain, redness, discharge, or proptosis  ENMT: No sore throat, ear pain, mouth sores, or swollen glands in the neck  Respiratory: No dyspnea, cough or wheezing  Cardiovascular: No chest pain, dyspnea on exertion, or orthopnea  Gastrointestinal:	Please see HPI  Genitourinary: No dysuria or hematuria  Neurological:	 No changes in sleep/wake cycle, convulsions, confusion, dizziness or lightheadedness  Psychiatric: No changes in personality or emotional problems   Hematology: No easy bruising   Endocrine: No hot or cold flashes or deepening of voice	  All other review of systems were completed and were otherwise negative save what is reported in the HPI.    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  ETOH abuse  LORNA on CPAP  Cataract  S/P lobectomy of lung: Sept 2017  H/O endoscopy  H/O colonoscopy  H/O coronary angioplasty: x2    MEDICATIONS  (STANDING):  amphetamine/dextroamphetamine 30 milliGRAM(s) Oral two times a day  aspirin  chewable 81 milliGRAM(s) Oral daily  carvedilol 6.25 milliGRAM(s) Oral every 12 hours  ciprofloxacin   IVPB 200 milliGRAM(s) IV Intermittent every 12 hours  ciprofloxacin   IVPB      metroNIDAZOLE  IVPB      metroNIDAZOLE  IVPB 500 milliGRAM(s) IV Intermittent every 8 hours  morphine ER Tablet 30 milliGRAM(s) Oral three times a day  OLANZapine 5 milliGRAM(s) Oral daily  pantoprazole    Tablet 40 milliGRAM(s) Oral before breakfast  pregabalin 75 milliGRAM(s) Oral two times a day  sodium chloride 0.9% 1000 milliLiter(s) (80 mL/Hr) IV Continuous <Continuous>    MEDICATIONS  (PRN):  ALPRAZolam 0.5 milliGRAM(s) Oral at bedtime PRN anxiety  HYDROmorphone   Tablet 4 milliGRAM(s) Oral every 4 hours PRN Severe Pain (7 - 10)  HYDROmorphone  Injectable 2 milliGRAM(s) IV Push every 12 hours PRN Severe Pain (7 - 10)    VITAL SIGNS LAST 24 HOURS:  T(C): 36.8 (24 Aug 2018 05:30), Max: 37.1 (23 Aug 2018 20:22)  T(F): 98.3 (24 Aug 2018 05:30), Max: 98.8 (23 Aug 2018 20:22)  HR: 81 (24 Aug 2018 05:30) (72 - 100)  BP: 95/55 (24 Aug 2018 08:51) (92/54 - 108/64)  RR: 18 (24 Aug 2018 05:30) (18 - 18)  SpO2: 98% (23 Aug 2018 20:22) (97% - 98%)    PHYSICAL EXAM:  Constitutional: Well-developed, well-nourished Male in no apparent distress  Eyes: Sclerae anicteric, conjunctivae normal  ENMT: Mucus membranes moist, no oropharyngeal thrush noted  Neck: No thyroid nodules appreciated, no significant cervical or supraclavicular lymphadenopathy  Respiratory: Breathing nonlabored; clear to auscultation  Cardiovascular: Regular rate and rhythm  Gastrointestinal: Soft, mild tenderness at former PEG tube site; surrounding erythema and induration.  Abdomen otherwise nondistended, normoactive bowel sounds; no hepatosplenomegaly appreciated; no rebound tenderness or involuntary guarding  Extremities: No clubbing, cyanosis or edema  Neurological: Alert and oriented to person, place and time; no asterixis  Skin: No jaundice  Lymph Nodes: No significant lymphadenopathy  Musculoskeletal: No significant peripheral atrophy  Psychiatric: Affect and mood appropriate                            9.9    23.2  )-----------( 290      ( 23 Aug 2018 07:39 )             30.6     08-23    137  |  96<L>  |  8.0  ----------------------------<  103  2.6<LL>   |  25.0  |  0.76    Ca    8.7      23 Aug 2018 07:39  Phos  3.3     08-22  Mg     1.8     08-22    TPro  6.6  /  Alb  3.3  /  TBili  0.3<L>  /  DBili  x   /  AST  23  /  ALT  16  /  AlkPhos  162<H>  08-22    LIVER FUNCTIONS - ( 22 Aug 2018 19:55 )  Alb: 3.3 g/dL / Pro: 6.6 g/dL / ALK PHOS: 162 U/L / ALT: 16 U/L / AST: 23 U/L / GGT: x             IMAGING: I personally reviewed the CT A/P, and I agree with the radiologist's interpretation.

## 2018-08-24 NOTE — PROGRESS NOTE ADULT - PROBLEM SELECTOR PLAN 8
Erythema and TTP of abdomen, surrounding prior PEG site, started Cefazolin for cellulitis. CT shows no intraabdominal pathology.

## 2018-08-24 NOTE — PROGRESS NOTE ADULT - ASSESSMENT
In summary, this is a 58-year-old man, currently undergoing chemotx for sinus maxillary cancer who presented to the ED with severe abdominal pain and diarrhea.  PEG tube was dislodged, likely buried bumper that eroded through the stomach wall into his ventral peritoneum.  His leukocytosis may be secondary to this event, and his diarrhea may be a stress reaction to it as well.  Need to rule out C diff given recent history and leukemoid WBC count.  Follow up GI PCR, C. diff.  Recommend treating for cellulitis with cefazolin.  Will continue to follow.  Please call with questions or concerns.    MAMIE Encinas MD  [facility]  Gastroenterology at [place]  [phone]

## 2018-08-24 NOTE — CONSULT NOTE ADULT - SUBJECTIVE AND OBJECTIVE BOX
ACUTE CARE SURGERY CONSULT    Consulting surgical team: [facility] - Acute Care Surgery  Consulting attending: Dr. Aaron  Patient seen and examined: 18 @ 14:06    HPI: 59 y/o M h/o maxillary sinus cancer s/p chemotherapy, PEG placement 18 that presents with epigastric abdominal pain and diarrhea for 2 days. Wife states he has not received tube feeds in about 1 week due to leakage. Abdominal pain started 2 days ago, and patient has had several episodes of diarrhea. No blood per rectum. No nausea, vomiting, fever, chills, chest pain, or dyspnea. No dysuria.       PAST MEDICAL HISTORY:  YANET (mycobacterium avium-intracellulare)  Neoplasm of maxillary sinus  SIRS (systemic inflammatory response syndrome)  Bipolar disorder, unspecified  Renal stones  CAD (coronary artery disease)  CHF (congestive heart failure)  HLD (hyperlipidemia)  HTN (hypertension)  Diaphragm dysfunction  ETOH abuse  HF (heart failure)  LORNA on CPAP  Smoker  Chronic obstructive pulmonary disease, unspecified COPD type      PAST SURGICAL HISTORY:  Cataract  S/P lobectomy of lung  H/O endoscopy  H/O colonoscopy  H/O coronary angioplasty  No significant past surgical history      ALLERGIES:  hospital socks (Rash)  lisinopril (Anaphylaxis)  statins (Anaphylaxis)      MEDICATIONS  (STANDING):  carvedilol 6.25 milliGRAM(s) Oral every 12 hours  ciprofloxacin   IVPB 200 milliGRAM(s) IV Intermittent every 12 hours  ciprofloxacin   IVPB      metroNIDAZOLE  IVPB      metroNIDAZOLE  IVPB 500 milliGRAM(s) IV Intermittent every 8 hours  morphine ER Tablet 30 milliGRAM(s) Oral three times a day  OLANZapine 5 milliGRAM(s) Oral daily  pantoprazole    Tablet 40 milliGRAM(s) Oral before breakfast  potassium chloride    Tablet ER 40 milliEquivalent(s) Oral every 4 hours  potassium chloride    Tablet ER 20 milliEquivalent(s) Oral every 2 hours  pregabalin 75 milliGRAM(s) Oral two times a day  sodium chloride 0.9% 1000 milliLiter(s) (80 mL/Hr) IV Continuous <Continuous>    MEDICATIONS  (PRN):  ALPRAZolam 0.5 milliGRAM(s) Oral at bedtime PRN anxiety  HYDROmorphone   Tablet 4 milliGRAM(s) Oral every 4 hours PRN Severe Pain (7 - 10)      VITALS & I/Os:  Vital Signs Last 24 Hrs  T(C): 36.7 (23 Aug 2018 04:29), Max: 37.1 (23 Aug 2018 03:10)  T(F): 98 (23 Aug 2018 04:29), Max: 98.8 (23 Aug 2018 03:10)  HR: 93 (23 Aug 2018 04:29) (93 - 105)  BP: 89/53 (23 Aug 2018 04:29) (89/53 - 102/71)  BP(mean): 81 (23 Aug 2018 00:26) (81 - 81)  RR: 16 (23 Aug 2018 03:10) (16 - 18)  SpO2: 94% (23 Aug 2018 03:10) (94% - 96%)  CAPILLARY BLOOD GLUCOSE      General: NAD, AOx3, comfortable on examination  HEENT: PERRLA, EOMI, moist mucous membranes  Neck: supple, nontender  Cardiovascular: heart RRR, no murmurs  Respiratory: no respiratory distress, CTAB  Abdomen: soft, moderate tenderness to LUQ and epigastric region of abdomen. No distention. No guarding. Rebound tenderness to epigastric region. PEG in place, no surrounding erythema. Small amount of purulent discharge from PEG site.   Extremities: no peripheral edema. Normal ROM.  Integumentary: warm, no rash  Neuro: no motor or sensory deficits    LABS:                        9.9    23.2  )-----------( 290      ( 23 Aug 2018 07:39 )             30.6     08-23    137  |  96<L>  |  8.0  ----------------------------<  103  2.6<LL>   |  25.0  |  0.76    Ca    8.7      23 Aug 2018 07:39  Phos  3.3     08-  Mg     1.8     08-    TPro  6.6  /  Alb  3.3  /  TBili  0.3<L>  /  DBili  x   /  AST  23  /  ALT  16  /  AlkPhos  162<H>       Urinalysis Basic - ( 23 Aug 2018 00:19 )    Color: Yellow / Appearance: Clear / S.015 / pH: x  Gluc: x / Ketone: Negative  / Bili: Negative / Urobili: Negative mg/dL   Blood: x / Protein: 15 mg/dL / Nitrite: Negative   Leuk Esterase: Negative / RBC: 3-5 /HPF / WBC Negative   Sq Epi: x / Non Sq Epi: Occasional / Bacteria: x        IMAGING:    EXAM:  CT ABDOMEN AND PELVIS                          PROCEDURE DATE:  2018      INTERPRETATION:  CLINICAL INFORMATION: Abdominal pain.    COMPARISON: Chest CT dated 2018 and abdominal/pelvic CT dated   2017.    PROCEDURE: CT of the Abdomen and Pelvis was performed without intravenous   contrast.   Intravenous contrast: None.  Oral contrast: None.  Sagittal and coronal reformats were performed.    FINDINGS:    LOWER CHEST: Improvement of moderate right lower lobe multifocal   subsegmental atelectasis and small consolidation infiltrate. Resolution   of left basilar atelectasis/infiltrates since chest CT of 2018.    LIVER: Within normal limits.  BILE DUCTS: Normal caliber.  GALLBLADDER: Cholelithiasis, otherwise the gallbladder is within normal   limits.  SPLEEN: Within normal limits.  PANCREAS: Within normal limits.  ADRENALS: Within normal limits.  KIDNEYS/URETERS: No obstructive uropathy or hydronephrosis. Within the   midpole of the right kidney there is a 0.5 calculus which may represent   vascular calcifications versus nonobstructing intrarenal/calyceal   calculus. Bilateral renal cysts, measuring 1.8 cm on the right and 2.6 cm   on the left.    BLADDER: Within normal limits.  REPRODUCTIVE ORGANS: Prostate is normal in size, with coarse   calcifications.  GROIN AND PELVIC SIDEWALL: No lymphadenopathy.    BOWEL: Redemonstration of percutaneous gastrostomy tube, with interval   development of balloon-tip outside the lumen of the stomach and within   the ventral peritoneum with associated surrounding mesenteric fat   stranding. No fluid collection. No free air. No bowel obstruction.   Appendix is unremarkable (axial images 110-122). Diverticulosis of the   descending/sigmoid colon without acute diverticulitis.  PERITONEUM: No ascites.  VESSELS:  No abdominal aortic aneurysm.  RETROPERITONEUM: No lymphadenopathy.    ABDOMINAL WALL/SOFT TISSUES: Within normal limits.  BONES: Within normal limits.    IMPRESSION:     Percutaneous gastrostomy tube balloon/tip is outside the stomach lumen   and within the ventral peritoneum with surrounding inflammation, new   since chest CT of 18. No fluid collection or free air.    Improving right lower lobe infiltrate/atelectasis since chest CT of   18, follow-up until resolution is advised. Resolution of left lower   lobe infiltrate/atelectasis.    Cholelithiasis.    Descending/sigmoid colon diverticulosis.    Preliminary report: Discussed with NORMAN Brown at 4:35 AM. Read back   confirmed.
REASON FOR CONSULTATION    HPI:  58 years old male with PMH of HTN, Bipolar disorder, maxillary sinus cancer undergoing chemo and recent h/o C.Diff colitis presented to the ER with abdominal pin, started on Tuesday. Pain is constant, periumbilical, 8/10 and not associated with fever, chills, nausea, vomiting, constipation or urinary complaints. Patient also c/o watery diarrhea having 2-3 BMs daily. On August 22 he was seen by his oncologist,  who referred him to the ER. (23 Aug 2018 00:26). Subsequent workup on this admission showed that his PEG had dislodged, and it has subsequently been pulled.    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 Friable 4 cm mass arose from right maxillary sinus and extended into nasal cavity, premaxillary and retromaxillary fat, infperineural V2 extension, right alveolar ridge. TPF chemotherapy  initiated July 12th, but patient lost weight, required PEG placement on 7/19 admission to [facility], treated for multifocal pneumonia. A second cycle was delivered on August 10th.  Plan was for 2-3 cycles of induction TPF chemo then surgery with Dr. Olivo. There has been a marked response to chemotherapy with significant shrinkage in mass in right superior roof of mouth.      REVIEW OF SYSTEMS:    CONSTITUTIONAL: No fever, but + weight loss.  EYES: No eye pain, visual disturbances, or discharge  ENMT:  Mass has shrunk on TPF chemotherapy  NECK: No pain or stiffness  RESPIRATORY: No cough, wheezing, chills or hemoptysis; No shortness of breath  CARDIOVASCULAR: No chest pain, palpitations, dizziness, or leg swelling  GASTROINTESTINAL: Initial pain at dislodged PEG site. Diarrhea improving. Able to eat a bagel on day of admission.  GENITOURINARY: No dysuria, frequency, hematuria, or incontinence  NEUROLOGICAL: No headaches, memory loss, loss of strength, numbness, or tremors  SKIN: No itching, burning, rashes, or lesions   LYMPH NODES: No enlarged glands  MUSCULOSKELETAL: No joint pain or swelling; No muscle, back, or extremity pain  PSYCHIATRIC: No depression, anxiety, mood swings, or difficulty sleeping      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- was followed by ID after lung surgery  Neoplasm of maxillary sinus  Bipolar disorder, unspecified  CHF (congestive heart failure): 2014  Diaphragm dysfunction: partial paralysis- now resolved as per wife  ETOH abuse  LORNA on CPAP  Cataract  S/P lobectomy of lung: Sept 2017  H/O endoscopy  H/O colonoscopy  H/O coronary angioplasty: x2      SOCIAL HISTORY:    FAMILY HISTORY:  No family history of COPD  Family history of hypertension in mother (Sibling)      SOCIAL HISTORY:    Allergies    hospital socks (Rash)  lisinopril (Anaphylaxis)  statins (Anaphylaxis)    Intolerances        MEDICATIONS  (STANDING):  amphetamine/dextroamphetamine 30 milliGRAM(s) Oral two times a day  aspirin  chewable 81 milliGRAM(s) Oral daily  carvedilol 6.25 milliGRAM(s) Oral every 12 hours  ceFAZolin   IVPB      ceFAZolin   IVPB 1000 milliGRAM(s) IV Intermittent every 8 hours  morphine ER Tablet 30 milliGRAM(s) Oral three times a day  OLANZapine 5 milliGRAM(s) Oral daily  pantoprazole    Tablet 40 milliGRAM(s) Oral before breakfast  pregabalin 75 milliGRAM(s) Oral two times a day  sodium chloride 0.9% 1000 milliLiter(s) (80 mL/Hr) IV Continuous <Continuous>    MEDICATIONS  (PRN):  ALPRAZolam 0.5 milliGRAM(s) Oral at bedtime PRN anxiety  HYDROmorphone   Tablet 4 milliGRAM(s) Oral every 4 hours PRN Severe Pain (7 - 10)  HYDROmorphone  Injectable 2 milliGRAM(s) IV Push every 12 hours PRN Severe Pain (7 - 10)      Vital Signs Last 24 Hrs  T(C): 36.8 (24 Aug 2018 05:30), Max: 37.1 (23 Aug 2018 20:22)  T(F): 98.3 (24 Aug 2018 05:30), Max: 98.8 (23 Aug 2018 20:22)  HR: 81 (24 Aug 2018 05:30) (72 - 100)  BP: 95/55 (24 Aug 2018 11:38) (92/54 - 104/60)  BP(mean): --  RR: 18 (24 Aug 2018 05:30) (18 - 18)  SpO2: 98% (23 Aug 2018 20:22) (97% - 98%)    PHYSICAL EXAM:    GENERAL: NAD, well-groomed, well-developed  HEAD:  Atraumatic, Normocephalic  EYES: EOMI, PERRLA, conjunctiva and sclera clear  ENMT: Right maxillary sinus mass markedly smaller roof of mouth, right side  NECK: Supple, No JVD, Normal thyroid  NERVOUS SYSTEM:  Alert & Oriented X3, Good concentration; Motor Strength 5/5 B/L upper and lower extremities; DTRs 2+ intact and symmetric  CHEST/LUNG: Clear to percussion bilaterally; No rales, rhonchi, wheezing, or rubs  HEART: Regular rate and rhythm; No murmurs, rubs, or gallops  ABDOMEN: inflammatory changes at site of prior PEG  EXTREMITIES:  2+ Peripheral Pulses, No clubbing, cyanosis, or edema  LYMPH: No lymphadenopathy noted  SKIN: No rashes or lesions      LABS:                        9.2    15.6  )-----------( 263      ( 24 Aug 2018 09:36 )             29.7     08-24    139  |  105  |  7.0<L>  ----------------------------<  104  3.5   |  22.0  |  0.67    Ca    7.9<L>      24 Aug 2018 09:36  Phos  3.3     08-22  Mg     1.8     08-22    TPro  6.6  /  Alb  3.3  /  TBili  0.3<L>  /  DBili  x   /  AST  23  /  ALT  16  /  AlkPhos  162<H>  08-22
Patient is a 58y old  Male who presents with a chief complaint of abdominal pain (23 Aug 2018 00:26)      HPI:  58 years old male with PMH of HTN, Bipolar disorder, maxillary sinus cancer undergoing chemo and recent h/o C.Diff colitis presented to the ER with onset of abdominal pain one weeks ago immediately after getting a slow bolus feed thru the G tube at home followed by profuse diarrhea. The pain has been constant since that time and is described as sharp. The g tube has not been utilized since that time other than a single flush yesterday. The diarrhea is gradually resolving with one loose nonbloody BM yesterday. After the second round of chemo the maxillary tumor shrunk and he has been able to eat without difficultly for the past week. Prior to the time that tumor was so bulky that he was having trouble eating. There is no nausea or vomiting. A cat scan shows the G tube is out the stomach and appears to be intraperitoneal.      REVIEW OF SYSTEMS:    CONSTITUTIONAL: No fever, weight loss, or fatigue  EYES: No eye pain, visual disturbances, or discharge  ENMT:  No difficulty hearing, tinnitus, vertigo; No sinus or throat pain  NECK: No pain or stiffness  RESPIRATORY: No cough, wheezing, chills or hemoptysis; No shortness of breath  CARDIOVASCULAR: No chest pain, palpitations, dizziness, or leg swelling  GASTROINTESTINAL: as above  NEUROLOGICAL: No headaches, memory loss, loss of strength, numbness, or tremors  SKIN: No itching, burning, rashes, or lesions   LYMPH NODES: No enlarged glands  MUSCULOSKELETAL: No joint pain or swelling; No muscle, back, or extremity pain  PSYCHIATRIC: No depression, anxiety, mood swings, or difficulty sleeping  HEME/LYMPH: No easy bruising, or bleeding gums  ALLERY AND IMMUNOLOGIC: No hives or eczema      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- was followed by ID after lung surgery  Neoplasm of maxillary sinus  Bipolar disorder, unspecified  CHF (congestive heart failure): 2014  Diaphragm dysfunction: partial paralysis- now resolved as per wife  ETOH abuse  LORNA on CPAP  Cataract  S/P lobectomy of lung: Sept 2017  H/O endoscopy  H/O colonoscopy  H/O coronary angioplasty: x2      FAMILY HISTORY:  No family history of COPD  Family history of hypertension in mother (Sibling)      SOCIAL HISTORY:  Smoking Status: [ ] Current, [ ] Former, [ ] Never  Pack Years:  Alcohol Use:    Home Medications:      MEDICATIONS:  MEDICATIONS  (STANDING):  carvedilol 6.25 milliGRAM(s) Oral every 12 hours  ciprofloxacin   IVPB 200 milliGRAM(s) IV Intermittent every 12 hours  ciprofloxacin   IVPB      heparin  Injectable 5000 Unit(s) SubCutaneous every 12 hours  metroNIDAZOLE  IVPB      metroNIDAZOLE  IVPB 500 milliGRAM(s) IV Intermittent every 8 hours  morphine ER Tablet 30 milliGRAM(s) Oral three times a day  OLANZapine 5 milliGRAM(s) Oral daily  pantoprazole    Tablet 40 milliGRAM(s) Oral before breakfast  pregabalin 75 milliGRAM(s) Oral two times a day  sodium chloride 0.9%. 1000 milliLiter(s) (100 mL/Hr) IV Continuous <Continuous>    MEDICATIONS  (PRN):  ALPRAZolam 0.5 milliGRAM(s) Oral at bedtime PRN anxiety  HYDROmorphone   Tablet 4 milliGRAM(s) Oral every 4 hours PRN Severe Pain (7 - 10)      Allergies    hospital socks (Rash)  lisinopril (Anaphylaxis)  statins (Anaphylaxis)    Intolerances        Vital Signs Last 24 Hrs  T(C): 36.7 (23 Aug 2018 04:29), Max: 37.1 (23 Aug 2018 03:10)  T(F): 98 (23 Aug 2018 04:29), Max: 98.8 (23 Aug 2018 03:10)  HR: 93 (23 Aug 2018 04:29) (93 - 105)  BP: 89/53 (23 Aug 2018 04:29) (89/53 - 102/71)  BP(mean): 81 (23 Aug 2018 00:26) (81 - 81)  RR: 16 (23 Aug 2018 03:10) (16 - 18)  SpO2: 94% (23 Aug 2018 03:10) (94% - 96%)        PHYSICAL EXAM:    General: Well developed; well nourished; in no acute distress, eating solid food during hy interview without difficulty  HEENT: MMM, conjunctiva and sclera clear, edentulous  Lungs: Clear  Heart: Rhythm Regular, No Murmurs  Gastrointestinal: Soft but with marked tenderness around G tube site with induration as well. + guarding but no rebound.  No distention. The G tube does not move freely c/w being dislodged.  No Organomegaly & No Masses  Extremities: Normal range of motion, No clubbing, cyanosis or edema  Neurological: Alert and oriented x3, Non-focal  Skin: Warm and dry. No obvious rash        LABS:                        10.2   26.5  )-----------( 314      ( 22 Aug 2018 19:55 )             31.6     08-22    132<L>  |  92<L>  |  9.0  ----------------------------<  103  2.6<LL>   |  24.0  |  0.90    Ca    8.6      22 Aug 2018 19:55  Phos  3.3     08-22  Mg     1.8     08-22    TPro  6.6  /  Alb  3.3  /  TBili  0.3<L>  /  DBili  x   /  AST  23  /  ALT  16  /  AlkPhos  162<H>  08-22          RADIOLOGY & ADDITIONAL STUDIES:   < from: CT Abdomen and Pelvis No Cont (08.23.18 @ 04:33) >  ******PRELIMINARY REPORT******    ******PRELIMINARY REPORT******           EXAM:  CT ABDOMEN AND PELVIS                          PROCEDURE DATE:  08/23/2018        ******PRELIMINARY REPORT******    ******PRELIMINARY REPORT******          INTERPRETATION:  Prelim:    Improving right lower lobe airspace opacity. Resolved left lower lobe   opacity compared to 7/25/2018.    Small gallstones    The percutaneous gastrostomy tube balloon is inflated deep to the   anterior abdominal wall, however outside of the stomach and there are   mild surrounding inflammatory changes. Repositioning necessary.    Discussed with NORMAN Brown at 4:35 AM. Read back confirmed.    Follow-up final report            ******PRELIMINARY REPORT******    ******PRELIMINARY REPORT******              MARCUS MILLER M.D., ATTENDING RADIOLOGIST      < end of copied text >

## 2018-08-24 NOTE — PROGRESS NOTE ADULT - ASSESSMENT
58 years old male with PMH of HTN, Bipolar disorder, maxillary sinus cancer undergoing chemo presented to the ER with abdominal pain / diarrhea, noted to have disloged PEG on CT scan and admitted for dislodged PEG tube. Pt was evaluated by GI /Surgery team, tube removed and Pt tolerating PO food- Per GI no need to replace PEG.  Pt now w decreased diarrhea, more formed stool. Being treated w ABX for cellulitis surrounding PEG site.       Dispo: trend WBC, monitor improvement of abd cellulitis, monitor tolerance of regular diet, pain control, surgery and GI recommendations    Likely discharge in 1-2 days 58 years old male with PMH of HTN, Bipolar disorder, maxillary sinus cancer undergoing chemo presented to the ER with abdominal pain / diarrhea, noted to have disloged PEG on CT scan and admitted for dislodged PEG tube. Pt was evaluated by GI /Surgery team, tube removed and Pt tolerating PO food- Per GI no need to replace PEG.  Pt now w decreased diarrhea, more formed stool. Being treated w ABX for cellulitis surrounding PEG site.

## 2018-08-24 NOTE — CONSULT NOTE ADULT - ASSESSMENT
It appears that the dislodged PEG was the source of the abdominal presentation. As far as the maxillary sinus tumor, it is responding to induction chemotherapy and Sunny Loza and Shanelle will follow post-discharge re: plans for future chemotherapy/surgery.  Now being treated for cellulitis around the prior PEG site, and followup stool studies to exclude infectious diarrhea.

## 2018-08-24 NOTE — PROGRESS NOTE ADULT - PROBLEM SELECTOR PLAN 6
Trending down from 23K yesterday to 15K today. May be reactive due to inflammatory process surrounding PEG tube. Cont with empiric ABX.   Cont to tend WBC. Trending down from 23K yesterday to 15K today. Likely due to inflammatory process / cellulitis surrounding PEG tube. Cont with ABX.   Cont to tend WBC.

## 2018-08-24 NOTE — PROGRESS NOTE ADULT - PROBLEM SELECTOR PLAN 2
Diarrhea decreased, now more formed stool. Normal potassium today. GI following. C Diff ruled out-formed stool. Culture and PCR pending. F/u stool studies. Cipro/Flagyl discontinued at this time, switched ABX to Cefazolin for cellulitis. Diarrhea decreased, now more formed stool. Normal potassium today. GI following. C Diff ruled out-formed stool. Culture and PCR pending. F/u stool studies. Cipro/Flagyl discontinued at this time, switched ABX to Cefazolin for cellulitis around PEG site.

## 2018-08-25 LAB
ANION GAP SERPL CALC-SCNC: 10 MMOL/L — SIGNIFICANT CHANGE UP (ref 5–17)
BUN SERPL-MCNC: 4 MG/DL — LOW (ref 8–20)
CALCIUM SERPL-MCNC: 7.9 MG/DL — LOW (ref 8.6–10.2)
CHLORIDE SERPL-SCNC: 104 MMOL/L — SIGNIFICANT CHANGE UP (ref 98–107)
CO2 SERPL-SCNC: 24 MMOL/L — SIGNIFICANT CHANGE UP (ref 22–29)
CREAT SERPL-MCNC: 0.66 MG/DL — SIGNIFICANT CHANGE UP (ref 0.5–1.3)
GLUCOSE SERPL-MCNC: 99 MG/DL — SIGNIFICANT CHANGE UP (ref 70–115)
HCT VFR BLD CALC: 28.1 % — LOW (ref 42–52)
HGB BLD-MCNC: 8.9 G/DL — LOW (ref 14–18)
MCHC RBC-ENTMCNC: 28.3 PG — SIGNIFICANT CHANGE UP (ref 27–31)
MCHC RBC-ENTMCNC: 31.7 G/DL — LOW (ref 32–36)
MCV RBC AUTO: 89.5 FL — SIGNIFICANT CHANGE UP (ref 80–94)
PLATELET # BLD AUTO: 264 K/UL — SIGNIFICANT CHANGE UP (ref 150–400)
POTASSIUM SERPL-MCNC: 3.8 MMOL/L — SIGNIFICANT CHANGE UP (ref 3.5–5.3)
POTASSIUM SERPL-SCNC: 3.8 MMOL/L — SIGNIFICANT CHANGE UP (ref 3.5–5.3)
RBC # BLD: 3.14 M/UL — LOW (ref 4.6–6.2)
RBC # FLD: 15.5 % — SIGNIFICANT CHANGE UP (ref 11–15.6)
SODIUM SERPL-SCNC: 138 MMOL/L — SIGNIFICANT CHANGE UP (ref 135–145)
WBC # BLD: 13.6 K/UL — HIGH (ref 4.8–10.8)
WBC # FLD AUTO: 13.6 K/UL — HIGH (ref 4.8–10.8)

## 2018-08-25 PROCEDURE — 99233 SBSQ HOSP IP/OBS HIGH 50: CPT

## 2018-08-25 PROCEDURE — 99232 SBSQ HOSP IP/OBS MODERATE 35: CPT

## 2018-08-25 RX ADMIN — PANTOPRAZOLE SODIUM 40 MILLIGRAM(S): 20 TABLET, DELAYED RELEASE ORAL at 06:02

## 2018-08-25 RX ADMIN — Medication 81 MILLIGRAM(S): at 11:13

## 2018-08-25 RX ADMIN — Medication 75 MILLIGRAM(S): at 06:02

## 2018-08-25 RX ADMIN — Medication 100 MILLIGRAM(S): at 21:41

## 2018-08-25 RX ADMIN — MORPHINE SULFATE 30 MILLIGRAM(S): 50 CAPSULE, EXTENDED RELEASE ORAL at 14:15

## 2018-08-25 RX ADMIN — MORPHINE SULFATE 30 MILLIGRAM(S): 50 CAPSULE, EXTENDED RELEASE ORAL at 06:30

## 2018-08-25 RX ADMIN — MORPHINE SULFATE 30 MILLIGRAM(S): 50 CAPSULE, EXTENDED RELEASE ORAL at 13:34

## 2018-08-25 RX ADMIN — HYDROMORPHONE HYDROCHLORIDE 1 MILLIGRAM(S): 2 INJECTION INTRAMUSCULAR; INTRAVENOUS; SUBCUTANEOUS at 16:35

## 2018-08-25 RX ADMIN — HYDROMORPHONE HYDROCHLORIDE 1 MILLIGRAM(S): 2 INJECTION INTRAMUSCULAR; INTRAVENOUS; SUBCUTANEOUS at 11:41

## 2018-08-25 RX ADMIN — HYDROMORPHONE HYDROCHLORIDE 1 MILLIGRAM(S): 2 INJECTION INTRAMUSCULAR; INTRAVENOUS; SUBCUTANEOUS at 11:13

## 2018-08-25 RX ADMIN — SODIUM CHLORIDE 80 MILLILITER(S): 9 INJECTION, SOLUTION INTRAVENOUS at 05:50

## 2018-08-25 RX ADMIN — MORPHINE SULFATE 30 MILLIGRAM(S): 50 CAPSULE, EXTENDED RELEASE ORAL at 22:15

## 2018-08-25 RX ADMIN — Medication 100 MILLIGRAM(S): at 13:34

## 2018-08-25 RX ADMIN — HYDROMORPHONE HYDROCHLORIDE 1 MILLIGRAM(S): 2 INJECTION INTRAMUSCULAR; INTRAVENOUS; SUBCUTANEOUS at 06:39

## 2018-08-25 RX ADMIN — Medication 75 MILLIGRAM(S): at 18:25

## 2018-08-25 RX ADMIN — MORPHINE SULFATE 30 MILLIGRAM(S): 50 CAPSULE, EXTENDED RELEASE ORAL at 21:41

## 2018-08-25 RX ADMIN — Medication 100 MILLIGRAM(S): at 05:53

## 2018-08-25 RX ADMIN — OLANZAPINE 5 MILLIGRAM(S): 15 TABLET, FILM COATED ORAL at 11:13

## 2018-08-25 RX ADMIN — HYDROMORPHONE HYDROCHLORIDE 1 MILLIGRAM(S): 2 INJECTION INTRAMUSCULAR; INTRAVENOUS; SUBCUTANEOUS at 18:19

## 2018-08-25 RX ADMIN — HYDROMORPHONE HYDROCHLORIDE 1 MILLIGRAM(S): 2 INJECTION INTRAMUSCULAR; INTRAVENOUS; SUBCUTANEOUS at 07:00

## 2018-08-25 RX ADMIN — MORPHINE SULFATE 30 MILLIGRAM(S): 50 CAPSULE, EXTENDED RELEASE ORAL at 06:02

## 2018-08-25 NOTE — PROGRESS NOTE ADULT - PROBLEM SELECTOR PLAN 1
Likely due to dislodged PEG tube with cellulitis at PEG site.   GI and surgical consults appreciated. Tube removed by surgery team.  Now tolerating regular diet.  Pain improving.

## 2018-08-25 NOTE — PROGRESS NOTE ADULT - ASSESSMENT
58 years old male with PMH of HTN, Bipolar disorder, maxillary sinus cancer undergoing chemo presented to the ER with abdominal pain / diarrhea, noted to have disloged PEG on CT scan and admitted for dislodged PEG tube with cellulitis around tube site. Pt was evaluated by GI /Surgery team, tube removed and Pt tolerating PO food- Per GI no need to replace PEG.  Pt now with improved diarrhea and abdomen pain. Being treated w ABX for cellulitis surrounding PEG site.

## 2018-08-25 NOTE — PROGRESS NOTE ADULT - SUBJECTIVE AND OBJECTIVE BOX
NITIN DAVALOS Male 58y [id number]    Patient is a 58y old  Male who presents with a chief complaint of abdominal pain (23 Aug 2018 00:26)      Subjective/objective:  Pt seen and examined, wife at bedside, no over night event reported by night staff. Pt reports feeling much better today, no more diarrhea, abdomen pain improving, offers no other complaints.     Review of system:  No fever, chills, nausea, vomiting, headache, dizziness, chest pain, SOB or palpitation.      PHYSICAL EXAM:    Vital Signs Last 24 Hrs  T(C): 36.6 (25 Aug 2018 09:15), Max: 36.9 (24 Aug 2018 21:37)  T(F): 97.9 (25 Aug 2018 09:15), Max: 98.5 (24 Aug 2018 21:37)  HR: 76 (25 Aug 2018 11:12) (74 - 102)  BP: 112/62 (25 Aug 2018 11:12) (84/60 - 113/72)  BP(mean): --  RR: 16 (25 Aug 2018 09:15) (16 - 20)  SpO2: 98% (25 Aug 2018 09:15) (96% - 98%)    GENERAL: Pt lying comfortably, NAD.  CHEST/LUNG: Clear to auscultation bilaterally; No wheezing.  HEART: S1S2+, Regular rate and rhythm; No murmurs.  ABDOMEN: Soft, Nontender, Nondistended; Bowel sounds present. Erythema around peg site improved.   Extremities: No LE edema, pulses +  NEURO: AAOX3, no focal deficits, no motor r sensory loss.  PSYCH: normal mood.          MEDICATIONS  (STANDING):  amphetamine/dextroamphetamine 30 milliGRAM(s) Oral two times a day  aspirin  chewable 81 milliGRAM(s) Oral daily  carvedilol 6.25 milliGRAM(s) Oral every 12 hours  ceFAZolin   IVPB      ceFAZolin   IVPB 1000 milliGRAM(s) IV Intermittent every 8 hours  morphine ER Tablet 30 milliGRAM(s) Oral three times a day  OLANZapine 5 milliGRAM(s) Oral daily  pantoprazole    Tablet 40 milliGRAM(s) Oral before breakfast  pregabalin 75 milliGRAM(s) Oral two times a day  sodium chloride 0.9% 1000 milliLiter(s) (80 mL/Hr) IV Continuous <Continuous>    MEDICATIONS  (PRN):  ALPRAZolam 0.5 milliGRAM(s) Oral at bedtime PRN anxiety  HYDROmorphone   Tablet 4 milliGRAM(s) Oral every 4 hours PRN Severe Pain (7 - 10)  HYDROmorphone  Injectable 1 milliGRAM(s) IV Push every 4 hours PRN Severe Pain (7 - 10)        Labs:  LABS:                        9.2    15.6  )-----------( 263      ( 24 Aug 2018 09:36 )             29.7     08-24    139  |  105  |  7.0<L>  ----------------------------<  104  3.5   |  22.0  |  0.67    Ca    7.9<L>      24 Aug 2018 09:36

## 2018-08-25 NOTE — PROGRESS NOTE ADULT - ASSESSMENT
Persistent clinical improvement after peritoneal infusions of Jevity and water.  No peritoneal signs.  WBC downtrending.  Not toxic.    Tolerates regular diet.  Therefore no residual gastric leak.    Plan Mobilize and consider for discharge soon.  Complete a 10 day course of antibiotics orally, Convert to Keflex.  Reconsult as needed.

## 2018-08-25 NOTE — PROGRESS NOTE ADULT - PROBLEM SELECTOR PLAN 2
Diarrhea improved, no more diarrhea today, more formed stool.   C Diff negative on 8/18, GI PCR negative.   Cipro/Flagyl discontinued at this time, switched ABX to Cefazolin for cellulitis around PEG site.  GI following.

## 2018-08-25 NOTE — PROGRESS NOTE ADULT - ATTENDING COMMENTS
Possible d/c tomorrow if blood cx negative.
I have seen and examined the patient with PA and agree with the above assessment and plan.
I have seen and examined the patient with PA and agree with the above assessment and plan.

## 2018-08-26 ENCOUNTER — TRANSCRIPTION ENCOUNTER (OUTPATIENT)
Age: 58
End: 2018-08-26

## 2018-08-26 VITALS — WEIGHT: 179.9 LBS

## 2018-08-26 LAB
-  AMPICILLIN/SULBACTAM: SIGNIFICANT CHANGE UP
-  CEFAZOLIN: SIGNIFICANT CHANGE UP
-  CLINDAMYCIN: SIGNIFICANT CHANGE UP
-  DAPTOMYCIN: SIGNIFICANT CHANGE UP
-  ERYTHROMYCIN: SIGNIFICANT CHANGE UP
-  GENTAMICIN: SIGNIFICANT CHANGE UP
-  OXACILLIN: SIGNIFICANT CHANGE UP
-  PENICILLIN: SIGNIFICANT CHANGE UP
-  RIFAMPIN: SIGNIFICANT CHANGE UP
-  TETRACYCLINE: SIGNIFICANT CHANGE UP
-  TRIMETHOPRIM/SULFAMETHOXAZOLE: SIGNIFICANT CHANGE UP
-  VANCOMYCIN: SIGNIFICANT CHANGE UP
CULTURE RESULTS: SIGNIFICANT CHANGE UP
METHOD TYPE: SIGNIFICANT CHANGE UP
ORGANISM # SPEC MICROSCOPIC CNT: SIGNIFICANT CHANGE UP
ORGANISM # SPEC MICROSCOPIC CNT: SIGNIFICANT CHANGE UP
SPECIMEN SOURCE: SIGNIFICANT CHANGE UP

## 2018-08-26 PROCEDURE — 99239 HOSP IP/OBS DSCHRG MGMT >30: CPT

## 2018-08-26 RX ORDER — CEPHALEXIN 500 MG
1 CAPSULE ORAL
Qty: 16 | Refills: 0 | OUTPATIENT
Start: 2018-08-26 | End: 2018-09-02

## 2018-08-26 RX ADMIN — Medication 75 MILLIGRAM(S): at 06:01

## 2018-08-26 RX ADMIN — HYDROMORPHONE HYDROCHLORIDE 1 MILLIGRAM(S): 2 INJECTION INTRAMUSCULAR; INTRAVENOUS; SUBCUTANEOUS at 11:28

## 2018-08-26 RX ADMIN — HYDROMORPHONE HYDROCHLORIDE 1 MILLIGRAM(S): 2 INJECTION INTRAMUSCULAR; INTRAVENOUS; SUBCUTANEOUS at 11:10

## 2018-08-26 RX ADMIN — Medication 81 MILLIGRAM(S): at 11:10

## 2018-08-26 RX ADMIN — Medication 100 MILLIGRAM(S): at 06:01

## 2018-08-26 RX ADMIN — MORPHINE SULFATE 30 MILLIGRAM(S): 50 CAPSULE, EXTENDED RELEASE ORAL at 06:38

## 2018-08-26 RX ADMIN — MORPHINE SULFATE 30 MILLIGRAM(S): 50 CAPSULE, EXTENDED RELEASE ORAL at 06:02

## 2018-08-26 RX ADMIN — PANTOPRAZOLE SODIUM 40 MILLIGRAM(S): 20 TABLET, DELAYED RELEASE ORAL at 06:02

## 2018-08-26 RX ADMIN — OLANZAPINE 5 MILLIGRAM(S): 15 TABLET, FILM COATED ORAL at 11:10

## 2018-08-26 RX ADMIN — HYDROMORPHONE HYDROCHLORIDE 1 MILLIGRAM(S): 2 INJECTION INTRAMUSCULAR; INTRAVENOUS; SUBCUTANEOUS at 06:35

## 2018-08-26 NOTE — DISCHARGE NOTE ADULT - HOSPITAL COURSE
58 years old male with PMH of HTN, Bipolar disorder, maxillary sinus cancer undergoing chemo presented to the ER with abdominal pain / diarrhea, noted to have disloged PEG on CT scan and admitted for dislodged PEG tube with cellulitis around tube site. Pt was evaluated by GI /Surgery team, tube removed and Pt tolerating PO food- Per GI no need to replace PEG.  Pt now with no more diarrhea and abdomen pain improved. Being treated w ABX for cellulitis surrounding PEG site. Pt cleared by GI and will be discharged hmoe today on PO keflex to complete the course. Pt to continue rest his home meds and outpatient follow up.     PHYSICAL EXAM:    Vital Signs Last 24 Hrs  T(C): 37.1 (26 Aug 2018 08:47), Max: 37.3 (25 Aug 2018 21:39)  T(F): 98.7 (26 Aug 2018 08:47), Max: 99.2 (25 Aug 2018 21:39)  HR: 94 (26 Aug 2018 08:47) (76 - 99)  BP: 108/62 (26 Aug 2018 08:47) (97/52 - 112/62)  BP(mean): --  RR: 18 (26 Aug 2018 08:47) (18 - 18)  SpO2: 94% (26 Aug 2018 08:47) (94% - 94%)    GENERAL: Pt lying comfortably, NAD.  CHEST/LUNG: Clear to auscultation bilaterally; No wheezing.  HEART: S1S2+, Regular rate and rhythm; No murmurs.  ABDOMEN: Soft, Nontender, Nondistended; Bowel sounds present. Erythema around peg site improved.   Extremities: No LE edema, pulses +  NEURO: AAOX3, no focal deficits, no motor r sensory loss.  PSYCH: normal mood.    Total time spent on discharge 42 58 years old male with PMH of HTN, Bipolar disorder, maxillary sinus cancer undergoing chemo presented to the ER with abdominal pain / diarrhea, noted to have disloged PEG on CT scan and admitted for dislodged PEG tube with cellulitis around tube site. Pt was evaluated by GI /Surgery team, tube removed and Pt tolerating PO food- Per GI no need to replace PEG.  Pt now with no more diarrhea and abdomen pain improved. Being treated w ABX for cellulitis surrounding PEG site. Pt cleared by GI and will be discharged hmoe today on PO keflex to complete the course. Pt to continue rest his home meds and outpatient follow up.     PHYSICAL EXAM:    Vital Signs Last 24 Hrs  T(C): 37.1 (26 Aug 2018 08:47), Max: 37.3 (25 Aug 2018 21:39)  T(F): 98.7 (26 Aug 2018 08:47), Max: 99.2 (25 Aug 2018 21:39)  HR: 94 (26 Aug 2018 08:47) (76 - 99)  BP: 108/62 (26 Aug 2018 08:47) (97/52 - 112/62)  BP(mean): --  RR: 18 (26 Aug 2018 08:47) (18 - 18)  SpO2: 94% (26 Aug 2018 08:47) (94% - 94%)    GENERAL: Pt lying comfortably, NAD.  CHEST/LUNG: Clear to auscultation bilaterally; No wheezing.  HEART: S1S2+, Regular rate and rhythm; No murmurs.  ABDOMEN: Soft, Nontender, Nondistended; Bowel sounds present. Erythema around peg site improved.   Extremities: No LE edema, pulses +  NEURO: AAOX3, no focal deficits, no motor r sensory loss.  PSYCH: normal mood. 58 years old male with PMH of HTN, Bipolar disorder, maxillary sinus cancer undergoing chemo presented to the ER with abdominal pain / diarrhea, noted to have disloged PEG on CT scan and admitted for dislodged PEG tube with cellulitis around tube site. Pt was evaluated by GI /Surgery team, tube removed and Pt tolerating PO food- Per GI no need to replace PEG.  Pt now with no more diarrhea and abdomen pain improved. Being treated w ABX for cellulitis surrounding PEG site. Pt cleared by GI and will be discharged hmoe today on PO keflex to complete the course. Pt to continue rest his home meds and outpatient follow up.   PS- Pt has MRSA in stool, discussed with GI Dr MIRELES and recommended no treatment needed for MRSA in feces.    PHYSICAL EXAM:    Vital Signs Last 24 Hrs  T(C): 37.1 (26 Aug 2018 08:47), Max: 37.3 (25 Aug 2018 21:39)  T(F): 98.7 (26 Aug 2018 08:47), Max: 99.2 (25 Aug 2018 21:39)  HR: 94 (26 Aug 2018 08:47) (76 - 99)  BP: 108/62 (26 Aug 2018 08:47) (97/52 - 112/62)  BP(mean): --  RR: 18 (26 Aug 2018 08:47) (18 - 18)  SpO2: 94% (26 Aug 2018 08:47) (94% - 94%)    GENERAL: Pt lying comfortably, NAD.  CHEST/LUNG: Clear to auscultation bilaterally; No wheezing.  HEART: S1S2+, Regular rate and rhythm; No murmurs.  ABDOMEN: Soft, Nontender, Nondistended; Bowel sounds present. Erythema around peg site improved.   Extremities: No LE edema, pulses +  NEURO: AAOX3, no focal deficits, no motor r sensory loss.  PSYCH: normal mood.

## 2018-08-26 NOTE — DISCHARGE NOTE ADULT - PLAN OF CARE
Resolved. PEG removed, per GI no need to place new peg, Pt eating well. Follow up with PCP. Abdomen wall cellulitis around peg site. Take antibiotics to complete the course. Follow up with PCP C/w home medicine and follow up with PCP. F/u with your oncologist for further treatment and management. C/w your home medicine.

## 2018-08-26 NOTE — DISCHARGE NOTE ADULT - MEDICATION SUMMARY - MEDICATIONS TO TAKE
I will START or STAY ON the medications listed below when I get home from the hospital:    morphine 30 mg/12 hr oral tablet, extended release  -- 1 tab(s) by mouth 3 times a day MDD:3  -- Indication: For Home emds for pain    HYDROmorphone 4 mg oral tablet  -- 1 tab(s) by mouth every 4 hours, As needed, Severe Pain (7 - 10) MDD:6  -- Indication: For Home meds for pain    aspirin 81 mg oral tablet, chewable  -- 1 tab(s) by mouth once a day  -- Indication: For Primary prevention    pregabalin 75 mg oral capsule  -- 1 cap(s) by mouth 2 times a day MDD:2  -- Indication: For Home meds    Imodium 2 mg oral capsule  -- 1 cap(s) by mouth 3 times a day  -- Indication: For Home meds.     diphenoxylate-atropine 2.5 mg-0.025 mg oral tablet  -- 2 tab(s) by mouth 4 times a day  -- Indication: For Home meds    meclizine 25 mg oral tablet  -- 1 tab(s) by mouth once a day, As Needed -Dizziness   -- Indication: For Home meds    Zofran 8 mg oral tablet  -- 1 tab(s) by mouth 3 times a day, As Needed  -- Indication: For Nausea /vomiting    Allegra 180 mg oral tablet  -- 1 tab(s) by mouth once a day  -- Indication: For Home meds    OLANZapine 5 mg oral tablet  -- 1 tab(s) by mouth once a day  -- Indication: For HOme meds    Xanax 0.5 mg oral tablet  -- 1 tab(s) by mouth once a day (at bedtime), As needed, anxiety MDD:3  -- Indication: For Home meds    carvedilol 6.25 mg oral tablet  -- 1 tab(s) by mouth every 12 hours  -- Indication: For Home meds    Keflex 500 mg oral capsule  -- 1 cap(s) by mouth 2 times a day   -- Finish all this medication unless otherwise directed by prescriber.    -- Indication: For Cellulitis    Adderall 30 mg oral tablet  -- 1 tab(s) by mouth 2 times a day  -- Indication: For Home meds    Colace  -- Indication: For Home meds    pantoprazole 40 mg oral delayed release tablet  -- 1 tab(s) by mouth once a day (before a meal)  -- Indication: For Home meds

## 2018-08-26 NOTE — DISCHARGE NOTE ADULT - CARE PLAN
Principal Discharge DX:	PEG tube malfunction  Goal:	Resolved.  Assessment and plan of treatment:	PEG removed, per GI no need to place new peg, Pt eating well. Follow up with PCP.  Secondary Diagnosis:	Cellulitis  Assessment and plan of treatment:	Abdomen wall cellulitis around peg site. Take antibiotics to complete the course. Follow up with PCP  Secondary Diagnosis:	HTN (hypertension)  Assessment and plan of treatment:	C/w home medicine and follow up with PCP.  Secondary Diagnosis:	Maxillary sinus cancer  Assessment and plan of treatment:	F/u with your oncologist for further treatment and management.  Secondary Diagnosis:	Bipolar disease, chronic  Assessment and plan of treatment:	C/w your home medicine.

## 2018-08-26 NOTE — DISCHARGE NOTE ADULT - PATIENT PORTAL LINK FT
You can access the [facility] Patient Portal, offered by [facility], by registering with the following website: [email or web address]

## 2018-08-26 NOTE — DISCHARGE NOTE ADULT - CARE PROVIDER_API CALL
PCP,   Phone: (   )    -  Fax: (   )    -    Akiko Loza), Medical Oncology  [facility]  [address] [place] [id number]  Phone: [phone]  Fax: [phone]

## 2018-08-29 ENCOUNTER — RESULT REVIEW (OUTPATIENT)
Age: 58
End: 2018-08-29

## 2018-08-29 ENCOUNTER — APPOINTMENT (OUTPATIENT)
Dept: HEMATOLOGY ONCOLOGY | Facility: CLINIC | Age: 58
End: 2018-08-29
Payer: MEDICARE

## 2018-08-29 VITALS
SYSTOLIC BLOOD PRESSURE: 113 MMHG | TEMPERATURE: 97.7 F | HEART RATE: 98 BPM | WEIGHT: 184.63 LBS | HEIGHT: 71 IN | DIASTOLIC BLOOD PRESSURE: 71 MMHG | BODY MASS INDEX: 25.85 KG/M2 | OXYGEN SATURATION: 97 %

## 2018-08-29 LAB
ALBUMIN SERPL ELPH-MCNC: 3.4 G/DL
ALP BLD-CCNC: 87 U/L
ALT SERPL-CCNC: 9 U/L
ANION GAP SERPL CALC-SCNC: 12 MMOL/L
AST SERPL-CCNC: 17 U/L
BASOPHILS # BLD AUTO: 0.1 K/UL — SIGNIFICANT CHANGE UP (ref 0–0.2)
BASOPHILS NFR BLD AUTO: 0.8 % — SIGNIFICANT CHANGE UP (ref 0–2)
BILIRUB SERPL-MCNC: 0.2 MG/DL
BUN SERPL-MCNC: 7 MG/DL
CALCIUM SERPL-MCNC: 8.8 MG/DL
CHLORIDE SERPL-SCNC: 99 MMOL/L
CO2 SERPL-SCNC: 27 MMOL/L
CREAT SERPL-MCNC: 0.73 MG/DL
CULTURE RESULTS: SIGNIFICANT CHANGE UP
CULTURE RESULTS: SIGNIFICANT CHANGE UP
EOSINOPHIL # BLD AUTO: 0 K/UL — SIGNIFICANT CHANGE UP (ref 0–0.5)
EOSINOPHIL NFR BLD AUTO: 0.3 % — SIGNIFICANT CHANGE UP (ref 0–6)
GLUCOSE SERPL-MCNC: 145 MG/DL
HCT VFR BLD CALC: 31.3 % — LOW (ref 39–50)
HGB BLD-MCNC: 10.3 G/DL — LOW (ref 13–17)
LYMPHOCYTES # BLD AUTO: 1.8 K/UL — SIGNIFICANT CHANGE UP (ref 1–3.3)
LYMPHOCYTES # BLD AUTO: 19.7 % — SIGNIFICANT CHANGE UP (ref 13–44)
MAGNESIUM SERPL-MCNC: 1.6 MG/DL
MCHC RBC-ENTMCNC: 29.1 PG — SIGNIFICANT CHANGE UP (ref 27–34)
MCHC RBC-ENTMCNC: 32.9 GM/DL — SIGNIFICANT CHANGE UP (ref 32–36)
MCV RBC AUTO: 88.4 FL — SIGNIFICANT CHANGE UP (ref 80–100)
MONOCYTES # BLD AUTO: 0.5 K/UL — SIGNIFICANT CHANGE UP (ref 0–0.9)
MONOCYTES NFR BLD AUTO: 5.8 % — SIGNIFICANT CHANGE UP (ref 2–14)
NEUTROPHILS # BLD AUTO: 6.5 K/UL — SIGNIFICANT CHANGE UP (ref 1.8–7.4)
NEUTROPHILS NFR BLD AUTO: 73.5 % — SIGNIFICANT CHANGE UP (ref 43–77)
PLATELET # BLD AUTO: 405 K/UL — HIGH (ref 150–400)
POTASSIUM SERPL-SCNC: 4 MMOL/L
PROT SERPL-MCNC: 6.4 G/DL
RBC # BLD: 3.54 M/UL — LOW (ref 4.2–5.8)
RBC # FLD: 15.5 % — HIGH (ref 10.3–14.5)
SODIUM SERPL-SCNC: 138 MMOL/L
SPECIMEN SOURCE: SIGNIFICANT CHANGE UP
SPECIMEN SOURCE: SIGNIFICANT CHANGE UP
WBC # BLD: 8.9 K/UL — SIGNIFICANT CHANGE UP (ref 3.8–10.5)
WBC # FLD AUTO: 8.9 K/UL — SIGNIFICANT CHANGE UP (ref 3.8–10.5)

## 2018-08-29 PROCEDURE — 99215 OFFICE O/P EST HI 40 MIN: CPT

## 2018-08-29 RX ORDER — CARVEDILOL PHOSPHATE 80 MG/1
1 CAPSULE, EXTENDED RELEASE ORAL
Qty: 0 | Refills: 0 | COMMUNITY

## 2018-08-29 RX ORDER — CHOLECALCIFEROL (VITAMIN D3) 125 MCG
50000 CAPSULE ORAL
Qty: 0 | Refills: 0 | COMMUNITY

## 2018-08-29 RX ORDER — PROCHLORPERAZINE MALEATE 5 MG
1 TABLET ORAL
Qty: 0 | Refills: 0 | COMMUNITY

## 2018-08-29 RX ORDER — DEXTROAMPHETAMINE SACCHARATE, AMPHETAMINE ASPARTATE, DEXTROAMPHETAMINE SULFATE AND AMPHETAMINE SULFATE 1.875; 1.875; 1.875; 1.875 MG/1; MG/1; MG/1; MG/1
1 TABLET ORAL
Qty: 0 | Refills: 0 | COMMUNITY

## 2018-08-29 RX ORDER — DOCUSATE SODIUM 100 MG
1 CAPSULE ORAL
Qty: 0 | Refills: 0 | COMMUNITY

## 2018-08-29 RX ORDER — ASPIRIN/CALCIUM CARB/MAGNESIUM 324 MG
1 TABLET ORAL
Qty: 0 | Refills: 0 | COMMUNITY

## 2018-08-29 RX ORDER — ONDANSETRON 8 MG/1
1 TABLET, FILM COATED ORAL
Qty: 0 | Refills: 0 | COMMUNITY

## 2018-08-29 RX ORDER — ALPRAZOLAM 0.25 MG
1 TABLET ORAL
Qty: 0 | Refills: 0 | COMMUNITY

## 2018-08-29 RX ORDER — FEXOFENADINE HCL 30 MG
1 TABLET ORAL
Qty: 0 | Refills: 0 | COMMUNITY

## 2018-08-29 RX ORDER — ALPRAZOLAM 0.25 MG
3 TABLET ORAL
Qty: 0 | Refills: 0 | COMMUNITY

## 2018-08-29 RX ORDER — OLANZAPINE 15 MG/1
1 TABLET, FILM COATED ORAL
Qty: 0 | Refills: 0 | COMMUNITY

## 2018-08-29 RX ORDER — MECLIZINE HCL 12.5 MG
1 TABLET ORAL
Qty: 0 | Refills: 0 | COMMUNITY

## 2018-08-29 RX ORDER — VANCOMYCIN HYDROCHLORIDE 125 MG/1
125 CAPSULE ORAL 4 TIMES DAILY
Qty: 56 | Refills: 0 | Status: ACTIVE | COMMUNITY
Start: 2018-08-29 | End: 1900-01-01

## 2018-08-29 RX ORDER — CEFDINIR 250 MG/5ML
1 POWDER, FOR SUSPENSION ORAL
Qty: 0 | Refills: 0 | COMMUNITY

## 2018-08-29 RX ORDER — ALPRAZOLAM 0.25 MG
4 TABLET ORAL
Qty: 0 | Refills: 0 | COMMUNITY

## 2018-08-29 RX ORDER — ARIPIPRAZOLE 15 MG/1
0.5 TABLET ORAL
Qty: 0 | Refills: 0 | COMMUNITY

## 2018-08-29 RX ORDER — DEXTROAMPHETAMINE SACCHARATE, AMPHETAMINE ASPARTATE, DEXTROAMPHETAMINE SULFATE AND AMPHETAMINE SULFATE 1.875; 1.875; 1.875; 1.875 MG/1; MG/1; MG/1; MG/1
0.5 TABLET ORAL
Qty: 0 | Refills: 0 | COMMUNITY

## 2018-08-29 RX ORDER — PREDNISOLONE 5 MG
1 TABLET ORAL
Qty: 0 | Refills: 0 | COMMUNITY

## 2018-08-29 RX ORDER — VITAMIN E 100 UNIT
1 CAPSULE ORAL
Qty: 0 | Refills: 0 | COMMUNITY

## 2018-08-29 RX ORDER — MOMETASONE FUROATE AND FORMOTEROL FUMARATE DIHYDRATE 200; 5 UG/1; UG/1
2 AEROSOL RESPIRATORY (INHALATION)
Qty: 0 | Refills: 0 | COMMUNITY

## 2018-08-29 RX ORDER — FLUTICASONE PROPIONATE 50 MCG
1 SPRAY, SUSPENSION NASAL
Qty: 0 | Refills: 0 | COMMUNITY

## 2018-08-29 RX ORDER — DIPHENOXYLATE HCL/ATROPINE 2.5-.025MG
2 TABLET ORAL
Qty: 0 | Refills: 0 | COMMUNITY

## 2018-08-30 ENCOUNTER — FORM ENCOUNTER (OUTPATIENT)
Age: 58
End: 2018-08-30

## 2018-08-30 PROBLEM — I10 ESSENTIAL (PRIMARY) HYPERTENSION: Chronic | Status: INACTIVE | Noted: 2017-07-24 | Resolved: 2018-07-20

## 2018-08-30 PROBLEM — I25.10 ATHEROSCLEROTIC HEART DISEASE OF NATIVE CORONARY ARTERY WITHOUT ANGINA PECTORIS: Chronic | Status: INACTIVE | Noted: 2017-09-01 | Resolved: 2018-07-20

## 2018-08-30 PROBLEM — A31.0 PULMONARY MYCOBACTERIAL INFECTION: Chronic | Status: ACTIVE | Noted: 2018-06-13

## 2018-08-30 PROBLEM — N20.0 CALCULUS OF KIDNEY: Chronic | Status: INACTIVE | Noted: 2017-09-01 | Resolved: 2018-07-20

## 2018-08-30 PROBLEM — I50.9 HEART FAILURE, UNSPECIFIED: Chronic | Status: INACTIVE | Noted: 2017-07-24 | Resolved: 2018-07-20

## 2018-08-30 PROBLEM — I50.9 HEART FAILURE, UNSPECIFIED: Chronic | Status: ACTIVE | Noted: 2017-09-01

## 2018-08-30 PROBLEM — J44.9 CHRONIC OBSTRUCTIVE PULMONARY DISEASE, UNSPECIFIED: Chronic | Status: INACTIVE | Noted: 2017-07-24 | Resolved: 2018-07-20

## 2018-08-30 PROBLEM — F17.200 NICOTINE DEPENDENCE, UNSPECIFIED, UNCOMPLICATED: Chronic | Status: INACTIVE | Noted: 2017-07-24 | Resolved: 2018-07-20

## 2018-08-30 PROBLEM — R65.10 SYSTEMIC INFLAMMATORY RESPONSE SYNDROME (SIRS) OF NON-INFECTIOUS ORIGIN WITHOUT ACUTE ORGAN DYSFUNCTION: Chronic | Status: INACTIVE | Noted: 2017-09-01 | Resolved: 2018-07-20

## 2018-08-30 PROBLEM — E78.5 HYPERLIPIDEMIA, UNSPECIFIED: Chronic | Status: INACTIVE | Noted: 2017-07-24 | Resolved: 2018-07-20

## 2018-08-30 PROBLEM — J98.6 DISORDERS OF DIAPHRAGM: Chronic | Status: ACTIVE | Noted: 2017-07-24

## 2018-08-31 ENCOUNTER — OUTPATIENT (OUTPATIENT)
Dept: OUTPATIENT SERVICES | Facility: HOSPITAL | Age: 58
LOS: 1 days | End: 2018-08-31

## 2018-08-31 DIAGNOSIS — Z90.2 ACQUIRED ABSENCE OF LUNG [PART OF]: Chronic | ICD-10-CM

## 2018-08-31 DIAGNOSIS — Z98.890 OTHER SPECIFIED POSTPROCEDURAL STATES: Chronic | ICD-10-CM

## 2018-08-31 DIAGNOSIS — Z98.61 CORONARY ANGIOPLASTY STATUS: Chronic | ICD-10-CM

## 2018-08-31 DIAGNOSIS — H26.9 UNSPECIFIED CATARACT: Chronic | ICD-10-CM

## 2018-08-31 DIAGNOSIS — C31.0 MALIGNANT NEOPLASM OF MAXILLARY SINUS: ICD-10-CM

## 2018-08-31 PROCEDURE — 70543 MRI ORBT/FAC/NCK W/O &W/DYE: CPT | Mod: 26

## 2018-08-31 PROCEDURE — 78815 PET IMAGE W/CT SKULL-THIGH: CPT | Mod: 26,PS

## 2018-09-05 ENCOUNTER — APPOINTMENT (OUTPATIENT)
Dept: NUCLEAR MEDICINE | Facility: CLINIC | Age: 58
End: 2018-09-05
Payer: MEDICARE

## 2018-09-05 ENCOUNTER — APPOINTMENT (OUTPATIENT)
Dept: HEMATOLOGY ONCOLOGY | Facility: CLINIC | Age: 58
End: 2018-09-05

## 2018-09-05 ENCOUNTER — APPOINTMENT (OUTPATIENT)
Dept: MRI IMAGING | Facility: CLINIC | Age: 58
End: 2018-09-05
Payer: MEDICARE

## 2018-09-06 PROBLEM — G47.33 OBSTRUCTIVE SLEEP APNEA (ADULT) (PEDIATRIC): Chronic | Status: ACTIVE | Noted: 2017-07-24

## 2018-09-06 PROBLEM — F10.10 ALCOHOL ABUSE, UNCOMPLICATED: Chronic | Status: ACTIVE | Noted: 2017-07-24

## 2018-09-06 PROBLEM — F31.9 BIPOLAR DISORDER, UNSPECIFIED: Chronic | Status: ACTIVE | Noted: 2017-09-01

## 2018-09-06 PROBLEM — D49.1 NEOPLASM OF UNSPECIFIED BEHAVIOR OF RESPIRATORY SYSTEM: Chronic | Status: ACTIVE | Noted: 2018-06-13

## 2018-09-07 ENCOUNTER — LABORATORY RESULT (OUTPATIENT)
Age: 58
End: 2018-09-07

## 2018-09-07 ENCOUNTER — APPOINTMENT (OUTPATIENT)
Dept: INFUSION THERAPY | Facility: CLINIC | Age: 58
End: 2018-09-07

## 2018-09-07 ENCOUNTER — APPOINTMENT (OUTPATIENT)
Dept: HEMATOLOGY ONCOLOGY | Facility: CLINIC | Age: 58
End: 2018-09-07
Payer: MEDICARE

## 2018-09-07 ENCOUNTER — RESULT REVIEW (OUTPATIENT)
Age: 58
End: 2018-09-07

## 2018-09-07 ENCOUNTER — APPOINTMENT (OUTPATIENT)
Dept: HEMATOLOGY ONCOLOGY | Facility: CLINIC | Age: 58
End: 2018-09-07

## 2018-09-07 LAB
BASOPHILS # BLD AUTO: 0.1 K/UL — SIGNIFICANT CHANGE UP (ref 0–0.2)
BASOPHILS NFR BLD AUTO: 1.3 % — SIGNIFICANT CHANGE UP (ref 0–2)
EOSINOPHIL # BLD AUTO: 1.1 K/UL — HIGH (ref 0–0.5)
EOSINOPHIL NFR BLD AUTO: 11.6 % — HIGH (ref 0–6)
HCT VFR BLD CALC: 37.5 % — LOW (ref 39–50)
HGB BLD-MCNC: 12.1 G/DL — LOW (ref 13–17)
LYMPHOCYTES # BLD AUTO: 1.9 K/UL — SIGNIFICANT CHANGE UP (ref 1–3.3)
LYMPHOCYTES # BLD AUTO: 20.2 % — SIGNIFICANT CHANGE UP (ref 13–44)
MCHC RBC-ENTMCNC: 28.8 PG — SIGNIFICANT CHANGE UP (ref 27–34)
MCHC RBC-ENTMCNC: 32.4 GM/DL — SIGNIFICANT CHANGE UP (ref 32–36)
MCV RBC AUTO: 88.9 FL — SIGNIFICANT CHANGE UP (ref 80–100)
MONOCYTES # BLD AUTO: 0.8 K/UL — SIGNIFICANT CHANGE UP (ref 0–0.9)
MONOCYTES NFR BLD AUTO: 7.9 % — SIGNIFICANT CHANGE UP (ref 2–14)
NEUTROPHILS # BLD AUTO: 5.7 K/UL — SIGNIFICANT CHANGE UP (ref 1.8–7.4)
NEUTROPHILS NFR BLD AUTO: 59 % — SIGNIFICANT CHANGE UP (ref 43–77)
PLATELET # BLD AUTO: 376 K/UL — SIGNIFICANT CHANGE UP (ref 150–400)
RBC # BLD: 4.21 M/UL — SIGNIFICANT CHANGE UP (ref 4.2–5.8)
RBC # FLD: 15.3 % — HIGH (ref 10.3–14.5)
WBC # BLD: 9.6 K/UL — SIGNIFICANT CHANGE UP (ref 3.8–10.5)
WBC # FLD AUTO: 9.6 K/UL — SIGNIFICANT CHANGE UP (ref 3.8–10.5)

## 2018-09-07 PROCEDURE — 99214 OFFICE O/P EST MOD 30 MIN: CPT

## 2018-09-11 ENCOUNTER — APPOINTMENT (OUTPATIENT)
Dept: INFUSION THERAPY | Facility: CLINIC | Age: 58
End: 2018-09-11

## 2018-09-11 LAB
APPEARANCE: ABNORMAL
BILIRUBIN URINE: NEGATIVE
BLOOD URINE: ABNORMAL
COLOR: ABNORMAL
GLUCOSE QUALITATIVE U: NEGATIVE
KETONES URINE: NEGATIVE
LEUKOCYTE ESTERASE URINE: NORMAL
NITRITE URINE: NEGATIVE
PH URINE: 6
PROTEIN URINE: 100
SPECIFIC GRAVITY URINE: 1.03
UROBILINOGEN URINE: NEGATIVE

## 2018-09-12 ENCOUNTER — LABORATORY RESULT (OUTPATIENT)
Age: 58
End: 2018-09-12

## 2018-09-12 ENCOUNTER — APPOINTMENT (OUTPATIENT)
Age: 58
End: 2018-09-12

## 2018-09-12 ENCOUNTER — RESULT REVIEW (OUTPATIENT)
Age: 58
End: 2018-09-12

## 2018-09-12 DIAGNOSIS — R11.2 NAUSEA WITH VOMITING, UNSPECIFIED: ICD-10-CM

## 2018-09-12 DIAGNOSIS — C76.0 MALIGNANT NEOPLASM OF HEAD, FACE AND NECK: ICD-10-CM

## 2018-09-12 DIAGNOSIS — Z51.11 ENCOUNTER FOR ANTINEOPLASTIC CHEMOTHERAPY: ICD-10-CM

## 2018-09-12 DIAGNOSIS — E86.0 DEHYDRATION: ICD-10-CM

## 2018-09-12 LAB
BASOPHILS # BLD AUTO: 0 K/UL — SIGNIFICANT CHANGE UP (ref 0–0.2)
BASOPHILS NFR BLD AUTO: 0.6 % — SIGNIFICANT CHANGE UP (ref 0–2)
EOSINOPHIL # BLD AUTO: 0.2 K/UL — SIGNIFICANT CHANGE UP (ref 0–0.5)
EOSINOPHIL NFR BLD AUTO: 2.5 % — SIGNIFICANT CHANGE UP (ref 0–6)
HCT VFR BLD CALC: 29.1 % — LOW (ref 39–50)
HGB BLD-MCNC: 9.7 G/DL — LOW (ref 13–17)
LYMPHOCYTES # BLD AUTO: 1 K/UL — SIGNIFICANT CHANGE UP (ref 1–3.3)
LYMPHOCYTES # BLD AUTO: 13 % — SIGNIFICANT CHANGE UP (ref 13–44)
MCHC RBC-ENTMCNC: 29.3 PG — SIGNIFICANT CHANGE UP (ref 27–34)
MCHC RBC-ENTMCNC: 33.5 GM/DL — SIGNIFICANT CHANGE UP (ref 32–36)
MCV RBC AUTO: 87.6 FL — SIGNIFICANT CHANGE UP (ref 80–100)
MONOCYTES # BLD AUTO: 0.1 K/UL — SIGNIFICANT CHANGE UP (ref 0–0.9)
MONOCYTES NFR BLD AUTO: 1.4 % — LOW (ref 2–14)
NEUTROPHILS # BLD AUTO: 6.2 K/UL — SIGNIFICANT CHANGE UP (ref 1.8–7.4)
NEUTROPHILS NFR BLD AUTO: 82.5 % — HIGH (ref 43–77)
PLATELET # BLD AUTO: 206 K/UL — SIGNIFICANT CHANGE UP (ref 150–400)
RBC # BLD: 3.32 M/UL — LOW (ref 4.2–5.8)
RBC # FLD: 15.2 % — HIGH (ref 10.3–14.5)
WBC # BLD: 7.5 K/UL — SIGNIFICANT CHANGE UP (ref 3.8–10.5)
WBC # FLD AUTO: 7.5 K/UL — SIGNIFICANT CHANGE UP (ref 3.8–10.5)

## 2018-09-14 ENCOUNTER — APPOINTMENT (OUTPATIENT)
Dept: HEMATOLOGY ONCOLOGY | Facility: CLINIC | Age: 58
End: 2018-09-14
Payer: MEDICARE

## 2018-09-14 ENCOUNTER — APPOINTMENT (OUTPATIENT)
Age: 58
End: 2018-09-14

## 2018-09-14 VITALS
OXYGEN SATURATION: 98 % | HEIGHT: 71 IN | HEART RATE: 114 BPM | SYSTOLIC BLOOD PRESSURE: 105 MMHG | WEIGHT: 178.68 LBS | DIASTOLIC BLOOD PRESSURE: 69 MMHG | TEMPERATURE: 98.4 F | BODY MASS INDEX: 25.02 KG/M2

## 2018-09-14 DIAGNOSIS — Z51.89 ENCOUNTER FOR OTHER SPECIFIED AFTERCARE: ICD-10-CM

## 2018-09-14 DIAGNOSIS — R05 COUGH: ICD-10-CM

## 2018-09-14 PROCEDURE — 99214 OFFICE O/P EST MOD 30 MIN: CPT

## 2018-09-17 PROBLEM — R05 COUGH: Status: ACTIVE | Noted: 2018-09-17

## 2018-09-17 RX ORDER — GUAIFENESIN AND CODEINE PHOSPHATE 100; 10 MG/5ML; MG/5ML
100-10 SYRUP ORAL
Qty: 1 | Refills: 0 | Status: ACTIVE | COMMUNITY
Start: 2018-09-17 | End: 1900-01-01

## 2018-09-20 ENCOUNTER — APPOINTMENT (OUTPATIENT)
Dept: PHYSICAL MEDICINE AND REHAB | Facility: CLINIC | Age: 58
End: 2018-09-20
Payer: MEDICARE

## 2018-09-20 VITALS
DIASTOLIC BLOOD PRESSURE: 66 MMHG | BODY MASS INDEX: 24.92 KG/M2 | WEIGHT: 178 LBS | SYSTOLIC BLOOD PRESSURE: 101 MMHG | HEIGHT: 71 IN

## 2018-09-20 PROCEDURE — 99214 OFFICE O/P EST MOD 30 MIN: CPT

## 2018-09-20 RX ORDER — MORPHINE SULFATE 30 MG/1
30 TABLET, FILM COATED, EXTENDED RELEASE ORAL
Qty: 90 | Refills: 0 | Status: ACTIVE | COMMUNITY
Start: 2018-07-05 | End: 1900-01-01

## 2018-09-20 RX ORDER — ALPRAZOLAM 0.5 MG/1
0.5 TABLET ORAL 3 TIMES DAILY
Qty: 90 | Refills: 0 | Status: ACTIVE | COMMUNITY
Start: 1900-01-01 | End: 1900-01-01

## 2018-09-21 ENCOUNTER — APPOINTMENT (OUTPATIENT)
Dept: UROLOGY | Facility: CLINIC | Age: 58
End: 2018-09-21
Payer: MEDICARE

## 2018-09-21 ENCOUNTER — OUTPATIENT (OUTPATIENT)
Dept: OUTPATIENT SERVICES | Facility: HOSPITAL | Age: 58
LOS: 1 days | Discharge: HOME | End: 2018-09-21

## 2018-09-21 VITALS
TEMPERATURE: 98.4 F | HEART RATE: 101 BPM | HEIGHT: 71 IN | BODY MASS INDEX: 24.92 KG/M2 | SYSTOLIC BLOOD PRESSURE: 105 MMHG | WEIGHT: 178 LBS | DIASTOLIC BLOOD PRESSURE: 69 MMHG

## 2018-09-21 DIAGNOSIS — Z98.61 CORONARY ANGIOPLASTY STATUS: Chronic | ICD-10-CM

## 2018-09-21 DIAGNOSIS — Z90.2 ACQUIRED ABSENCE OF LUNG [PART OF]: Chronic | ICD-10-CM

## 2018-09-21 DIAGNOSIS — C31.0 MALIGNANT NEOPLASM OF MAXILLARY SINUS: ICD-10-CM

## 2018-09-21 DIAGNOSIS — H26.9 UNSPECIFIED CATARACT: Chronic | ICD-10-CM

## 2018-09-21 DIAGNOSIS — Z98.890 OTHER SPECIFIED POSTPROCEDURAL STATES: Chronic | ICD-10-CM

## 2018-09-21 PROCEDURE — 99203 OFFICE O/P NEW LOW 30 MIN: CPT

## 2018-09-24 ENCOUNTER — APPOINTMENT (OUTPATIENT)
Dept: OTOLARYNGOLOGY | Facility: CLINIC | Age: 58
End: 2018-09-24
Payer: MEDICARE

## 2018-09-24 VITALS
BODY MASS INDEX: 24.83 KG/M2 | SYSTOLIC BLOOD PRESSURE: 111 MMHG | WEIGHT: 178 LBS | DIASTOLIC BLOOD PRESSURE: 74 MMHG | HEART RATE: 89 BPM

## 2018-09-24 DIAGNOSIS — R91.1 SOLITARY PULMONARY NODULE: ICD-10-CM

## 2018-09-24 PROCEDURE — 99215 OFFICE O/P EST HI 40 MIN: CPT | Mod: 25

## 2018-09-24 PROCEDURE — 31231 NASAL ENDOSCOPY DX: CPT

## 2018-09-26 ENCOUNTER — APPOINTMENT (OUTPATIENT)
Dept: HEMATOLOGY ONCOLOGY | Facility: CLINIC | Age: 58
End: 2018-09-26
Payer: MEDICARE

## 2018-10-03 ENCOUNTER — RESULT REVIEW (OUTPATIENT)
Age: 58
End: 2018-10-03

## 2018-10-03 ENCOUNTER — APPOINTMENT (OUTPATIENT)
Dept: HEMATOLOGY ONCOLOGY | Facility: CLINIC | Age: 58
End: 2018-10-03
Payer: MEDICARE

## 2018-10-03 VITALS
DIASTOLIC BLOOD PRESSURE: 71 MMHG | HEIGHT: 71 IN | BODY MASS INDEX: 12.32 KG/M2 | TEMPERATURE: 94.7 F | WEIGHT: 87.99 LBS | SYSTOLIC BLOOD PRESSURE: 115 MMHG | HEART RATE: 91 BPM | OXYGEN SATURATION: 95 %

## 2018-10-03 LAB
BASOPHILS # BLD AUTO: 0.1 K/UL — SIGNIFICANT CHANGE UP (ref 0–0.2)
BASOPHILS NFR BLD AUTO: 1.3 % — SIGNIFICANT CHANGE UP (ref 0–2)
EOSINOPHIL # BLD AUTO: 0.5 K/UL — SIGNIFICANT CHANGE UP (ref 0–0.5)
EOSINOPHIL NFR BLD AUTO: 6.6 % — HIGH (ref 0–6)
HCT VFR BLD CALC: 34.2 % — LOW (ref 39–50)
HGB BLD-MCNC: 10.7 G/DL — LOW (ref 13–17)
LYMPHOCYTES # BLD AUTO: 1.8 K/UL — SIGNIFICANT CHANGE UP (ref 1–3.3)
LYMPHOCYTES # BLD AUTO: 24.6 % — SIGNIFICANT CHANGE UP (ref 13–44)
MCHC RBC-ENTMCNC: 27.8 PG — SIGNIFICANT CHANGE UP (ref 27–34)
MCHC RBC-ENTMCNC: 31.4 GM/DL — LOW (ref 32–36)
MCV RBC AUTO: 88.3 FL — SIGNIFICANT CHANGE UP (ref 80–100)
MONOCYTES # BLD AUTO: 0.7 K/UL — SIGNIFICANT CHANGE UP (ref 0–0.9)
MONOCYTES NFR BLD AUTO: 9.9 % — SIGNIFICANT CHANGE UP (ref 2–14)
NEUTROPHILS # BLD AUTO: 4.1 K/UL — SIGNIFICANT CHANGE UP (ref 1.8–7.4)
NEUTROPHILS NFR BLD AUTO: 57.7 % — SIGNIFICANT CHANGE UP (ref 43–77)
PLATELET # BLD AUTO: 282 K/UL — SIGNIFICANT CHANGE UP (ref 150–400)
RBC # BLD: 3.87 M/UL — LOW (ref 4.2–5.8)
RBC # FLD: 17.4 % — HIGH (ref 10.3–14.5)
WBC # BLD: 7.2 K/UL — SIGNIFICANT CHANGE UP (ref 3.8–10.5)
WBC # FLD AUTO: 7.2 K/UL — SIGNIFICANT CHANGE UP (ref 3.8–10.5)

## 2018-10-03 PROCEDURE — 99215 OFFICE O/P EST HI 40 MIN: CPT

## 2018-10-04 LAB
ALBUMIN SERPL ELPH-MCNC: 3.9 G/DL
ALP BLD-CCNC: 86 U/L
ALT SERPL-CCNC: 7 U/L
ANION GAP SERPL CALC-SCNC: 13 MMOL/L
AST SERPL-CCNC: 14 U/L
BILIRUB SERPL-MCNC: 0.3 MG/DL
BUN SERPL-MCNC: 7 MG/DL
CALCIUM SERPL-MCNC: 9 MG/DL
CHLORIDE SERPL-SCNC: 104 MMOL/L
CO2 SERPL-SCNC: 26 MMOL/L
CREAT SERPL-MCNC: 0.94 MG/DL
GLUCOSE SERPL-MCNC: 96 MG/DL
MAGNESIUM SERPL-MCNC: 1.9 MG/DL
POTASSIUM SERPL-SCNC: 3.6 MMOL/L
PROT SERPL-MCNC: 6.6 G/DL
SODIUM SERPL-SCNC: 143 MMOL/L

## 2018-10-08 ENCOUNTER — OUTPATIENT (OUTPATIENT)
Dept: OUTPATIENT SERVICES | Facility: HOSPITAL | Age: 58
LOS: 1 days | Discharge: HOME | End: 2018-10-08
Payer: MEDICARE

## 2018-10-08 DIAGNOSIS — Z90.2 ACQUIRED ABSENCE OF LUNG [PART OF]: Chronic | ICD-10-CM

## 2018-10-08 DIAGNOSIS — Z98.890 OTHER SPECIFIED POSTPROCEDURAL STATES: Chronic | ICD-10-CM

## 2018-10-08 DIAGNOSIS — Z98.61 CORONARY ANGIOPLASTY STATUS: Chronic | ICD-10-CM

## 2018-10-08 DIAGNOSIS — H26.9 UNSPECIFIED CATARACT: Chronic | ICD-10-CM

## 2018-10-09 ENCOUNTER — FORM ENCOUNTER (OUTPATIENT)
Age: 58
End: 2018-10-09

## 2018-10-09 ENCOUNTER — APPOINTMENT (OUTPATIENT)
Dept: RADIATION ONCOLOGY | Facility: CLINIC | Age: 58
End: 2018-10-09
Payer: MEDICARE

## 2018-10-09 VITALS
OXYGEN SATURATION: 95 % | SYSTOLIC BLOOD PRESSURE: 111 MMHG | HEART RATE: 97 BPM | WEIGHT: 184 LBS | BODY MASS INDEX: 25.66 KG/M2 | RESPIRATION RATE: 16 BRPM | DIASTOLIC BLOOD PRESSURE: 74 MMHG

## 2018-10-09 PROCEDURE — 92511 NASOPHARYNGOSCOPY: CPT

## 2018-10-09 PROCEDURE — 99205 OFFICE O/P NEW HI 60 MIN: CPT | Mod: 25,GC

## 2018-10-09 RX ORDER — AZITHROMYCIN 250 MG/1
250 TABLET, FILM COATED ORAL
Qty: 1 | Refills: 0 | Status: COMPLETED | COMMUNITY
Start: 2018-08-01 | End: 2018-10-09

## 2018-10-09 NOTE — REASON FOR VISIT
[Consideration of Curative Therapy] : consideration of curative therapy for [Head and Neck Cancer] : head and neck cancer [Spouse] : spouse

## 2018-10-09 NOTE — LETTER GREETING
[Dear Doctor] : Dear Doctor, [Consult Letter:] : Your patient, [unfilled] was seen in my office today for consultation. [Please see my note below.] : Please see my note below. [FreeTextEntry2] : Akiko Loza MD

## 2018-10-09 NOTE — HISTORY OF PRESENT ILLNESS
[FreeTextEntry1] : Mr. Luis Campbell is a 59 y/o gentleman with  locally advanced right maxillary sinus cancer withy history of tobacco and cocaine abuse.  He presents today for radiation medicine consultation accompanied by his wife Aimee.\par \par He initially presented to Dr. Jordan Valle (ENT) with recurrent epistaxis and right-sided facial pain at the beginning of June.  At that time CT maxilofacial sinus showed expansion and opacification of the right maxillary sinus with destruction of the medial maxillary sinus wall.  MRI showed a large mass (4.1 x 3.9 x 4.3) within the right maxillary sinus extending into the nasal cavity, the premaxillary fat, retromaxillary fat, inferior pterygopalatine fossa, and evidence of V2 perineural spread of disease and questionable extension along the pterygomandibular raphe involving the anterior tonsillar pillar.  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  PET/CT 6/16/18 showed that the mass eroded the medial and posterior lateral walls and extended into the right ethmoid sinus.  CT Orbit 6/20/18 showed an expansile/destructive mass within the right maxillary sinus with extension into the nasal cavity, ethmoid complex, and orbit.  MRI of the head on 6/21/18 shows that the neoplasm is enlarging with posterior tumor extension into the sphenopalatine foramen and sphenoid sinus and intraorbital extension causing proptosis. MRI orbits 6/21/18 shows involvement of right maxillary sinus, right nasal cavity, right ethmoid sinus, anterior right sphenoid sinus, base of pterygoid plates, and greater-lesser palatine foramina, right pterygopalatine fossa, right retromaxillary space, right infratemporal fossa, extraconal space of the inferior right orbit, right hard palate, right alveolar ridge, bony walls of the maxillary sinus, and perineural spread of tumor along V2.  \par \par Initial plans for surgery at the beginning of June were modified and the patient underwent induction TPF with Dr. Akiko Loza.  Complications included placement of PEG with removal due to abdominal pain/discharge; tube was dislodged.\par \par Follow-up MRI imaging 8/31 after 2 cycles shows significant interval decrease in size/extent of disease.  There is a non-specific left level IV lymph node, subcentimeter left level III lymph node, multiple small bilateral lymph nodes.  Nodules in the right parotid tail and superficial lobe of the right parotid are stable in size compared to previous.    PET/CT that same day shows decreased intensity of right maxillary sinus mass with areas of peripheral calcification.  There is a 1.5cm RUL opacity, new since 7/25/18, thought to be inflammatory. Also right hilar/pretracheal lymph nodes, non-specific, though to be secondary to resolving RLL pneumonia.  CT chest recommended for follow-up.  Continues to be subcentimeter right intraparotid soft tissue nodule and nodule adjacent to right mandibular condyle, possibly metastatic disease.  An indeterminant left level III focus is without CT correlate.\par \par He met with Dr. Olivo again on 9/24, s/p 3 cycles of induction chemotherapy.  Dr. Olivo recommended non-surgical therapy.  He has continued to smoke during this time period and has been recommended to quit.  He met with Dr. Loza 10/3/18 and discussed restaging scans and concurrent therapy.\par \par In clinic today he feels well.  His last chemotherapy was approximately 1 month ago.  His restaging scans are tomorrow.  He has started to have intermittent nose bleeds again.  He notes occasional double vision and continued tingling/numbness along his right cheek.  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  His difficulty swallowing has resolved and he is able to eat normal foods.  No other significant complaints.  He continues to smoke but is close to quitting.

## 2018-10-09 NOTE — DISEASE MANAGEMENT
[Clinical] : TNM Stage: c [IVB] : IVB [TTNM] : 4b [NTNM] : 1 [MTNM] : 0 [de-identified] : 7000cGy [de-identified] : head and neck

## 2018-10-09 NOTE — VITALS
[Maximal Pain Intensity: 2/10] : 2/10 [Least Pain Intensity: 2/10] : 2/10 [Pain Description/Quality: ___] : Pain description/quality: [unfilled] [Pain Duration: ___] : Pain duration: [unfilled] [Pain Location: ___] : Pain Location: [unfilled] [Opioid] : opioid [70: Cares for self; unalbe to carry on normal activity or do active work.] : 70: Cares for self; unable to carry on normal activity or do active work. [Pain Interferes with ADLs] : Pain does not interfere with activities of daily living [ECOG Performance Status: 2 - Ambulatory and capable of all self care but unable to carry out any work activities] : Performance Status: 2 - Ambulatory and capable of all self care but unable to carry out any work activities. Up and about more than 50% of waking hours

## 2018-10-09 NOTE — LETTER CLOSING
[Consult Closing:] : Thank you for allowing me to participate in the care of this patient.  If you have any questions, please do not hesitate to contact me. [Sincerely yours,] : Sincerely yours, [FreeTextEntry3] : Duane Butler MD\par Physician in Chief\par Department of Radiation Medicine\par [facility] [place]\par [facility] [place]\par \par  of Radiation Medicine\par [facility]\par at  [facility]\par \par Radiation \par [facility]/\par [facility] [place]\par [address]\par [place], [place] [address]\par \par Tel: [phone]\par Fax: [phone]\par

## 2018-10-09 NOTE — PROCEDURE
[Lesion] : lesion identified by mirror examination needing further evaluation [Topical Lidocaine] : topical lidocaine [Flexible Endoscope] : examined with the flexible endoscope [Serial Number: ___] : Serial Number: [unfilled] [Normal] : the true vocal cords ~T were normal [de-identified] : minimal bogginess of right nasal tubinates; no bleeding

## 2018-10-09 NOTE — PHYSICAL EXAM
[Thin] : thin [Normal] : normal skin color and pigmentation and no rash [de-identified] : edentulous [FreeTextEntry1] : deferred [de-identified] : deferred [de-identified] : decrease light touch over right cheek of face.

## 2018-10-09 NOTE — SOCIAL HISTORY
[Current Cigarette ___ Pack(s) per day] : current cigarette pack(s) per day:  [unfilled]  [Former  Cigarette ___ Pack(s) per day] : former cigarette pack(s) per day:  [unfilled]  [Former  Cigarette ___ Pack Year(s)] : former pack year(s) of cigarette use: [unfilled]

## 2018-10-09 NOTE — REVIEW OF SYSTEMS
[Patient Intake Form Reviewed] : Patient intake form was reviewed [Nosebleeds] : nosebleeds [Dysphagia] : no dysphagia [Loss of Hearing] : no loss of hearing [Hoarseness] : no hoarseness [Odynophagia] : no odynophagia [Mucosal Pain] : no mucosal pain [Shortness Of Breath] : no shortness of breath [Wheezing] : no wheezing [Cough] : no cough [SOB on Exertion] : no shortness of breath during exertion [Abdominal Pain] : no abdominal pain [Vomiting] : no vomiting [Negative] : Allergic/Immunologic [FreeTextEntry3] : notes double vision with lateral gaze [FreeTextEntry4] : mild nosebleeds 2X daily.  History of dysphagia and odynophagia prior to chemotherapy [FreeTextEntry7] : PEG-tube removed after becoming disloged [FreeTextEntry9] : generalized pain [de-identified] : bipolar disorder [de-identified] : s/p chemotherapy

## 2018-10-10 ENCOUNTER — OUTPATIENT (OUTPATIENT)
Dept: OUTPATIENT SERVICES | Facility: HOSPITAL | Age: 58
LOS: 1 days | End: 2018-10-10

## 2018-10-10 ENCOUNTER — APPOINTMENT (OUTPATIENT)
Dept: CT IMAGING | Facility: CLINIC | Age: 58
End: 2018-10-10
Payer: MEDICARE

## 2018-10-10 ENCOUNTER — APPOINTMENT (OUTPATIENT)
Dept: MRI IMAGING | Facility: CLINIC | Age: 58
End: 2018-10-10
Payer: MEDICARE

## 2018-10-10 DIAGNOSIS — Z98.890 OTHER SPECIFIED POSTPROCEDURAL STATES: Chronic | ICD-10-CM

## 2018-10-10 DIAGNOSIS — R91.1 SOLITARY PULMONARY NODULE: ICD-10-CM

## 2018-10-10 DIAGNOSIS — Z98.61 CORONARY ANGIOPLASTY STATUS: Chronic | ICD-10-CM

## 2018-10-10 DIAGNOSIS — Z90.2 ACQUIRED ABSENCE OF LUNG [PART OF]: Chronic | ICD-10-CM

## 2018-10-10 DIAGNOSIS — H26.9 UNSPECIFIED CATARACT: Chronic | ICD-10-CM

## 2018-10-10 PROCEDURE — 71250 CT THORAX DX C-: CPT | Mod: 26

## 2018-10-10 PROCEDURE — 70543 MRI ORBT/FAC/NCK W/O &W/DYE: CPT | Mod: 26

## 2018-10-16 ENCOUNTER — MOBILE ON CALL (OUTPATIENT)
Age: 58
End: 2018-10-16

## 2018-10-18 ENCOUNTER — OUTPATIENT (OUTPATIENT)
Dept: OUTPATIENT SERVICES | Facility: HOSPITAL | Age: 58
LOS: 1 days | Discharge: HOME | End: 2018-10-18

## 2018-10-18 DIAGNOSIS — Z90.2 ACQUIRED ABSENCE OF LUNG [PART OF]: Chronic | ICD-10-CM

## 2018-10-18 DIAGNOSIS — Z98.890 OTHER SPECIFIED POSTPROCEDURAL STATES: Chronic | ICD-10-CM

## 2018-10-18 DIAGNOSIS — C76.0 MALIGNANT NEOPLASM OF HEAD, FACE AND NECK: ICD-10-CM

## 2018-10-18 DIAGNOSIS — H26.9 UNSPECIFIED CATARACT: Chronic | ICD-10-CM

## 2018-10-18 DIAGNOSIS — Z98.61 CORONARY ANGIOPLASTY STATUS: Chronic | ICD-10-CM

## 2018-10-18 DIAGNOSIS — C31.0 MALIGNANT NEOPLASM OF MAXILLARY SINUS: ICD-10-CM

## 2018-10-19 ENCOUNTER — APPOINTMENT (OUTPATIENT)
Dept: OTOLARYNGOLOGY | Facility: CLINIC | Age: 58
End: 2018-10-19
Payer: MEDICARE

## 2018-10-19 VITALS
SYSTOLIC BLOOD PRESSURE: 110 MMHG | OXYGEN SATURATION: 95 % | WEIGHT: 184 LBS | HEART RATE: 96 BPM | BODY MASS INDEX: 25.76 KG/M2 | DIASTOLIC BLOOD PRESSURE: 70 MMHG | HEIGHT: 71 IN

## 2018-10-19 PROCEDURE — 99215 OFFICE O/P EST HI 40 MIN: CPT | Mod: 25

## 2018-10-19 PROCEDURE — 31231 NASAL ENDOSCOPY DX: CPT

## 2018-10-24 ENCOUNTER — APPOINTMENT (OUTPATIENT)
Dept: HEMATOLOGY ONCOLOGY | Facility: CLINIC | Age: 58
End: 2018-10-24

## 2018-10-24 ENCOUNTER — APPOINTMENT (OUTPATIENT)
Dept: OTOLARYNGOLOGY | Facility: CLINIC | Age: 58
End: 2018-10-24
Payer: MEDICARE

## 2018-10-24 VITALS
HEIGHT: 71 IN | WEIGHT: 184 LBS | DIASTOLIC BLOOD PRESSURE: 76 MMHG | OXYGEN SATURATION: 98 % | BODY MASS INDEX: 25.76 KG/M2 | HEART RATE: 97 BPM | SYSTOLIC BLOOD PRESSURE: 110 MMHG

## 2018-10-24 DIAGNOSIS — R31.9 HEMATURIA, UNSPECIFIED: ICD-10-CM

## 2018-10-24 DIAGNOSIS — J18.9 PNEUMONIA, UNSPECIFIED ORGANISM: ICD-10-CM

## 2018-10-24 DIAGNOSIS — R85.5: ICD-10-CM

## 2018-10-24 PROCEDURE — 96365 THER/PROPH/DIAG IV INF INIT: CPT

## 2018-10-24 PROCEDURE — 74176 CT ABD & PELVIS W/O CONTRAST: CPT

## 2018-10-24 PROCEDURE — 36415 COLL VENOUS BLD VENIPUNCTURE: CPT

## 2018-10-24 PROCEDURE — 87040 BLOOD CULTURE FOR BACTERIA: CPT

## 2018-10-24 PROCEDURE — 80053 COMPREHEN METABOLIC PANEL: CPT

## 2018-10-24 PROCEDURE — 87186 SC STD MICRODIL/AGAR DIL: CPT

## 2018-10-24 PROCEDURE — 96375 TX/PRO/DX INJ NEW DRUG ADDON: CPT

## 2018-10-24 PROCEDURE — 96376 TX/PRO/DX INJ SAME DRUG ADON: CPT

## 2018-10-24 PROCEDURE — 99285 EMERGENCY DEPT VISIT HI MDM: CPT | Mod: 25

## 2018-10-24 PROCEDURE — 87493 C DIFF AMPLIFIED PROBE: CPT

## 2018-10-24 PROCEDURE — 76705 ECHO EXAM OF ABDOMEN: CPT

## 2018-10-24 PROCEDURE — 81001 URINALYSIS AUTO W/SCOPE: CPT

## 2018-10-24 PROCEDURE — 87046 STOOL CULTR AEROBIC BACT EA: CPT

## 2018-10-24 PROCEDURE — 93005 ELECTROCARDIOGRAM TRACING: CPT

## 2018-10-24 PROCEDURE — 80048 BASIC METABOLIC PNL TOTAL CA: CPT

## 2018-10-24 PROCEDURE — 85027 COMPLETE CBC AUTOMATED: CPT

## 2018-10-24 PROCEDURE — 87045 FECES CULTURE AEROBIC BACT: CPT

## 2018-10-24 PROCEDURE — 84100 ASSAY OF PHOSPHORUS: CPT

## 2018-10-24 PROCEDURE — 71045 X-RAY EXAM CHEST 1 VIEW: CPT

## 2018-10-24 PROCEDURE — 99203 OFFICE O/P NEW LOW 30 MIN: CPT | Mod: 25

## 2018-10-24 PROCEDURE — 31231 NASAL ENDOSCOPY DX: CPT

## 2018-10-24 PROCEDURE — 87507 IADNA-DNA/RNA PROBE TQ 12-25: CPT

## 2018-10-24 PROCEDURE — 83735 ASSAY OF MAGNESIUM: CPT

## 2018-10-29 ENCOUNTER — APPOINTMENT (OUTPATIENT)
Dept: OTOLARYNGOLOGY | Facility: CLINIC | Age: 58
End: 2018-10-29
Payer: MEDICARE

## 2018-10-29 ENCOUNTER — INPATIENT (INPATIENT)
Facility: HOSPITAL | Age: 58
LOS: 3 days | Discharge: HOME HEALTH | End: 2018-11-02
Attending: OTOLARYNGOLOGY | Admitting: OTOLARYNGOLOGY
Payer: MEDICARE

## 2018-10-29 VITALS — DIASTOLIC BLOOD PRESSURE: 76 MMHG | HEART RATE: 97 BPM | SYSTOLIC BLOOD PRESSURE: 110 MMHG

## 2018-10-29 VITALS
OXYGEN SATURATION: 96 % | HEART RATE: 87 BPM | RESPIRATION RATE: 17 BRPM | DIASTOLIC BLOOD PRESSURE: 83 MMHG | SYSTOLIC BLOOD PRESSURE: 143 MMHG | TEMPERATURE: 98 F

## 2018-10-29 DIAGNOSIS — Z98.61 CORONARY ANGIOPLASTY STATUS: Chronic | ICD-10-CM

## 2018-10-29 DIAGNOSIS — Z98.890 OTHER SPECIFIED POSTPROCEDURAL STATES: Chronic | ICD-10-CM

## 2018-10-29 DIAGNOSIS — H26.9 UNSPECIFIED CATARACT: Chronic | ICD-10-CM

## 2018-10-29 DIAGNOSIS — R22.0 LOCALIZED SWELLING, MASS AND LUMP, HEAD: ICD-10-CM

## 2018-10-29 DIAGNOSIS — Z90.2 ACQUIRED ABSENCE OF LUNG [PART OF]: Chronic | ICD-10-CM

## 2018-10-29 LAB
ALBUMIN SERPL ELPH-MCNC: 4.1 G/DL — SIGNIFICANT CHANGE UP (ref 3.3–5)
ALP SERPL-CCNC: 92 U/L — SIGNIFICANT CHANGE UP (ref 40–120)
ALT FLD-CCNC: 6 U/L — SIGNIFICANT CHANGE UP (ref 4–41)
APTT BLD: 55 SEC — HIGH (ref 27.5–37.4)
AST SERPL-CCNC: 13 U/L — SIGNIFICANT CHANGE UP (ref 4–40)
BASOPHILS # BLD AUTO: 0.04 K/UL — SIGNIFICANT CHANGE UP (ref 0–0.2)
BASOPHILS NFR BLD AUTO: 0.5 % — SIGNIFICANT CHANGE UP (ref 0–2)
BILIRUB SERPL-MCNC: 0.5 MG/DL — SIGNIFICANT CHANGE UP (ref 0.2–1.2)
BLD GP AB SCN SERPL QL: NEGATIVE — SIGNIFICANT CHANGE UP
BUN SERPL-MCNC: 11 MG/DL — SIGNIFICANT CHANGE UP (ref 7–23)
CALCIUM SERPL-MCNC: 10 MG/DL — SIGNIFICANT CHANGE UP (ref 8.4–10.5)
CHLORIDE SERPL-SCNC: 96 MMOL/L — LOW (ref 98–107)
CO2 SERPL-SCNC: 29 MMOL/L — SIGNIFICANT CHANGE UP (ref 22–31)
CREAT SERPL-MCNC: 0.87 MG/DL — SIGNIFICANT CHANGE UP (ref 0.5–1.3)
EOSINOPHIL # BLD AUTO: 0.22 K/UL — SIGNIFICANT CHANGE UP (ref 0–0.5)
EOSINOPHIL NFR BLD AUTO: 2.8 % — SIGNIFICANT CHANGE UP (ref 0–6)
GLUCOSE SERPL-MCNC: 103 MG/DL — HIGH (ref 70–99)
HCT VFR BLD CALC: 39.7 % — SIGNIFICANT CHANGE UP (ref 39–50)
HGB BLD-MCNC: 12.8 G/DL — LOW (ref 13–17)
IMM GRANULOCYTES # BLD AUTO: 0.03 # — SIGNIFICANT CHANGE UP
IMM GRANULOCYTES NFR BLD AUTO: 0.4 % — SIGNIFICANT CHANGE UP (ref 0–1.5)
INR BLD: 1.29 — HIGH (ref 0.88–1.17)
LYMPHOCYTES # BLD AUTO: 1.47 K/UL — SIGNIFICANT CHANGE UP (ref 1–3.3)
LYMPHOCYTES # BLD AUTO: 18.5 % — SIGNIFICANT CHANGE UP (ref 13–44)
MCHC RBC-ENTMCNC: 27.3 PG — SIGNIFICANT CHANGE UP (ref 27–34)
MCHC RBC-ENTMCNC: 32.2 % — SIGNIFICANT CHANGE UP (ref 32–36)
MCV RBC AUTO: 84.6 FL — SIGNIFICANT CHANGE UP (ref 80–100)
MONOCYTES # BLD AUTO: 0.6 K/UL — SIGNIFICANT CHANGE UP (ref 0–0.9)
MONOCYTES NFR BLD AUTO: 7.6 % — SIGNIFICANT CHANGE UP (ref 2–14)
NEUTROPHILS # BLD AUTO: 5.58 K/UL — SIGNIFICANT CHANGE UP (ref 1.8–7.4)
NEUTROPHILS NFR BLD AUTO: 70.2 % — SIGNIFICANT CHANGE UP (ref 43–77)
NRBC # FLD: 0 — SIGNIFICANT CHANGE UP
PLATELET # BLD AUTO: 255 K/UL — SIGNIFICANT CHANGE UP (ref 150–400)
PMV BLD: 9.9 FL — SIGNIFICANT CHANGE UP (ref 7–13)
POTASSIUM SERPL-MCNC: 3.9 MMOL/L — SIGNIFICANT CHANGE UP (ref 3.5–5.3)
POTASSIUM SERPL-SCNC: 3.9 MMOL/L — SIGNIFICANT CHANGE UP (ref 3.5–5.3)
PROT SERPL-MCNC: 8.2 G/DL — SIGNIFICANT CHANGE UP (ref 6–8.3)
PROTHROM AB SERPL-ACNC: 14.9 SEC — HIGH (ref 9.8–13.1)
RBC # BLD: 4.69 M/UL — SIGNIFICANT CHANGE UP (ref 4.2–5.8)
RBC # FLD: 15.8 % — HIGH (ref 10.3–14.5)
RH IG SCN BLD-IMP: NEGATIVE — SIGNIFICANT CHANGE UP
SODIUM SERPL-SCNC: 137 MMOL/L — SIGNIFICANT CHANGE UP (ref 135–145)
WBC # BLD: 7.94 K/UL — SIGNIFICANT CHANGE UP (ref 3.8–10.5)
WBC # FLD AUTO: 7.94 K/UL — SIGNIFICANT CHANGE UP (ref 3.8–10.5)

## 2018-10-29 PROCEDURE — 99215 OFFICE O/P EST HI 40 MIN: CPT | Mod: 25

## 2018-10-29 PROCEDURE — 31231 NASAL ENDOSCOPY DX: CPT

## 2018-10-29 RX ORDER — ALPRAZOLAM 0.25 MG
0.5 TABLET ORAL THREE TIMES A DAY
Qty: 0 | Refills: 0 | Status: DISCONTINUED | OUTPATIENT
Start: 2018-10-29 | End: 2018-10-29

## 2018-10-29 RX ORDER — OLANZAPINE 15 MG/1
5 TABLET, FILM COATED ORAL DAILY
Qty: 0 | Refills: 0 | Status: DISCONTINUED | OUTPATIENT
Start: 2018-10-29 | End: 2018-11-01

## 2018-10-29 RX ORDER — LORATADINE 10 MG/1
10 TABLET ORAL DAILY
Qty: 0 | Refills: 0 | Status: DISCONTINUED | OUTPATIENT
Start: 2018-10-29 | End: 2018-11-01

## 2018-10-29 RX ORDER — ASPIRIN/CALCIUM CARB/MAGNESIUM 324 MG
81 TABLET ORAL DAILY
Qty: 0 | Refills: 0 | Status: DISCONTINUED | OUTPATIENT
Start: 2018-10-29 | End: 2018-11-01

## 2018-10-29 RX ORDER — CLONAZEPAM 1 MG
5 TABLET ORAL AT BEDTIME
Qty: 0 | Refills: 0 | Status: DISCONTINUED | OUTPATIENT
Start: 2018-10-29 | End: 2018-10-30

## 2018-10-29 RX ORDER — HYDROMORPHONE HYDROCHLORIDE 2 MG/ML
1 INJECTION INTRAMUSCULAR; INTRAVENOUS; SUBCUTANEOUS EVERY 4 HOURS
Qty: 0 | Refills: 0 | Status: DISCONTINUED | OUTPATIENT
Start: 2018-10-29 | End: 2018-10-31

## 2018-10-29 RX ORDER — SODIUM CHLORIDE 9 MG/ML
1000 INJECTION, SOLUTION INTRAVENOUS
Qty: 0 | Refills: 0 | Status: DISCONTINUED | OUTPATIENT
Start: 2018-10-29 | End: 2018-10-29

## 2018-10-29 RX ORDER — HEPARIN SODIUM 5000 [USP'U]/ML
5000 INJECTION INTRAVENOUS; SUBCUTANEOUS EVERY 12 HOURS
Qty: 0 | Refills: 0 | Status: DISCONTINUED | OUTPATIENT
Start: 2018-10-29 | End: 2018-11-02

## 2018-10-29 RX ORDER — DEXTROAMPHETAMINE SACCHARATE, AMPHETAMINE ASPARTATE, DEXTROAMPHETAMINE SULFATE AND AMPHETAMINE SULFATE 1.875; 1.875; 1.875; 1.875 MG/1; MG/1; MG/1; MG/1
30 TABLET ORAL
Qty: 0 | Refills: 0 | Status: DISCONTINUED | OUTPATIENT
Start: 2018-10-29 | End: 2018-11-01

## 2018-10-29 RX ORDER — MORPHINE SULFATE 50 MG/1
30 CAPSULE, EXTENDED RELEASE ORAL THREE TIMES A DAY
Qty: 0 | Refills: 0 | Status: DISCONTINUED | OUTPATIENT
Start: 2018-10-29 | End: 2018-10-29

## 2018-10-29 RX ORDER — MORPHINE SULFATE 50 MG/1
30 CAPSULE, EXTENDED RELEASE ORAL THREE TIMES A DAY
Qty: 0 | Refills: 0 | Status: DISCONTINUED | OUTPATIENT
Start: 2018-10-29 | End: 2018-11-02

## 2018-10-29 RX ORDER — HYDROMORPHONE HYDROCHLORIDE 2 MG/ML
4 INJECTION INTRAMUSCULAR; INTRAVENOUS; SUBCUTANEOUS EVERY 4 HOURS
Qty: 0 | Refills: 0 | Status: DISCONTINUED | OUTPATIENT
Start: 2018-10-29 | End: 2018-10-29

## 2018-10-29 RX ORDER — CARVEDILOL PHOSPHATE 80 MG/1
6.25 CAPSULE, EXTENDED RELEASE ORAL EVERY 12 HOURS
Qty: 0 | Refills: 0 | Status: DISCONTINUED | OUTPATIENT
Start: 2018-10-29 | End: 2018-11-01

## 2018-10-29 RX ORDER — ONDANSETRON 8 MG/1
8 TABLET, FILM COATED ORAL THREE TIMES A DAY
Qty: 0 | Refills: 0 | Status: DISCONTINUED | OUTPATIENT
Start: 2018-10-29 | End: 2018-11-02

## 2018-10-29 RX ORDER — ALPRAZOLAM 0.25 MG
0.5 TABLET ORAL AT BEDTIME
Qty: 0 | Refills: 0 | Status: DISCONTINUED | OUTPATIENT
Start: 2018-10-29 | End: 2018-11-01

## 2018-10-29 RX ORDER — HYDROMORPHONE HYDROCHLORIDE 2 MG/ML
1 INJECTION INTRAMUSCULAR; INTRAVENOUS; SUBCUTANEOUS ONCE
Qty: 0 | Refills: 0 | Status: DISCONTINUED | OUTPATIENT
Start: 2018-10-29 | End: 2018-10-29

## 2018-10-29 RX ORDER — SODIUM CHLORIDE 9 MG/ML
1000 INJECTION, SOLUTION INTRAVENOUS
Qty: 0 | Refills: 0 | Status: DISCONTINUED | OUTPATIENT
Start: 2018-10-29 | End: 2018-11-01

## 2018-10-29 RX ORDER — SODIUM CHLORIDE 9 MG/ML
1000 INJECTION INTRAMUSCULAR; INTRAVENOUS; SUBCUTANEOUS ONCE
Qty: 0 | Refills: 0 | Status: COMPLETED | OUTPATIENT
Start: 2018-10-29 | End: 2018-10-29

## 2018-10-29 RX ORDER — CLONAZEPAM 1 MG
0.5 TABLET ORAL DAILY
Qty: 0 | Refills: 0 | Status: DISCONTINUED | OUTPATIENT
Start: 2018-10-29 | End: 2018-10-29

## 2018-10-29 RX ADMIN — MORPHINE SULFATE 30 MILLIGRAM(S): 50 CAPSULE, EXTENDED RELEASE ORAL at 21:08

## 2018-10-29 RX ADMIN — SODIUM CHLORIDE 1000 MILLILITER(S): 9 INJECTION INTRAMUSCULAR; INTRAVENOUS; SUBCUTANEOUS at 14:38

## 2018-10-29 RX ADMIN — HYDROMORPHONE HYDROCHLORIDE 1 MILLIGRAM(S): 2 INJECTION INTRAMUSCULAR; INTRAVENOUS; SUBCUTANEOUS at 17:54

## 2018-10-29 RX ADMIN — Medication 0.5 MILLIGRAM(S): at 21:08

## 2018-10-29 RX ADMIN — OLANZAPINE 5 MILLIGRAM(S): 15 TABLET, FILM COATED ORAL at 22:36

## 2018-10-29 RX ADMIN — HYDROMORPHONE HYDROCHLORIDE 1 MILLIGRAM(S): 2 INJECTION INTRAMUSCULAR; INTRAVENOUS; SUBCUTANEOUS at 14:50

## 2018-10-29 RX ADMIN — HEPARIN SODIUM 5000 UNIT(S): 5000 INJECTION INTRAVENOUS; SUBCUTANEOUS at 22:36

## 2018-10-29 RX ADMIN — SODIUM CHLORIDE 2000 MILLILITER(S): 9 INJECTION INTRAMUSCULAR; INTRAVENOUS; SUBCUTANEOUS at 14:28

## 2018-10-29 RX ADMIN — Medication 75 MILLIGRAM(S): at 17:54

## 2018-10-29 RX ADMIN — SODIUM CHLORIDE 100 MILLILITER(S): 9 INJECTION, SOLUTION INTRAVENOUS at 21:08

## 2018-10-29 RX ADMIN — HYDROMORPHONE HYDROCHLORIDE 1 MILLIGRAM(S): 2 INJECTION INTRAMUSCULAR; INTRAVENOUS; SUBCUTANEOUS at 22:31

## 2018-10-29 RX ADMIN — SODIUM CHLORIDE 100 MILLILITER(S): 9 INJECTION, SOLUTION INTRAVENOUS at 22:52

## 2018-10-29 RX ADMIN — HYDROMORPHONE HYDROCHLORIDE 1 MILLIGRAM(S): 2 INJECTION INTRAMUSCULAR; INTRAVENOUS; SUBCUTANEOUS at 22:16

## 2018-10-29 RX ADMIN — HYDROMORPHONE HYDROCHLORIDE 1 MILLIGRAM(S): 2 INJECTION INTRAMUSCULAR; INTRAVENOUS; SUBCUTANEOUS at 14:38

## 2018-10-29 RX ADMIN — HYDROMORPHONE HYDROCHLORIDE 1 MILLIGRAM(S): 2 INJECTION INTRAMUSCULAR; INTRAVENOUS; SUBCUTANEOUS at 00:00

## 2018-10-29 NOTE — PROVIDER CONTACT NOTE (OTHER) - ASSESSMENT
Pt A&O4, pt has labored respirations and states he feels uncomfortable. Pt denies chest pain and dizziness. VS are stable.

## 2018-10-29 NOTE — PROVIDER CONTACT NOTE (OTHER) - SITUATION
Pt admitted for neoplasm of the maxillary sinus. Pt requesting to be put on CPAP machine, ENT was paged 3 times within an hour and a jaqui about entering the order for the CPAP and did not answer pages.

## 2018-10-29 NOTE — H&P ADULT - NSHPREVIEWOFSYSTEMS_GEN_ALL_CORE
58 year old male with a history of maxillary sinus cancer with a history of resection, chemo  and radiation in 2017. Patient states he has been having worsening pain and now dysphagia.   He was evaluated by Dr. Olivo and told he has recurrent disease. No other complaints.   Sent to the ED for admission, pain control and MRI of brain and neck.    AVSS  HEENT:  Mouth; no edema, No injection, no exudates.  Tongue midline. No floor of mouth masses.     Neck:  trachea Midline,   No palpable LN's

## 2018-10-29 NOTE — ED PROVIDER NOTE - NS ED ROS FT
GENERAL: No fever or chills, EYES: no change in vision, HEENT: +trouble swallowing, no trouble speaking, CARDIAC: no chest pain, PULMONARY: no cough or SOB, unable to breathe through nose, GI: no abdominal pain, no nausea, no vomiting, no diarrhea or constipation, : No changes in urination, SKIN: no rashes, NEURO: no headache,  MSK: No joint pain ~Burt Salazar M.D. Resident

## 2018-10-29 NOTE — PATIENT PROFILE ADULT - VISION (WITH CORRECTIVE LENSES IF THE PATIENT USUALLY WEARS THEM):
S/P cataract surgery (one eye)/Partially impaired: cannot see medication labels or newsprint, but can see obstacles in path, and the surrounding layout; can count fingers at arm's length

## 2018-10-29 NOTE — ED PROVIDER NOTE - MEDICAL DECISION MAKING DETAILS
59 yo M PMHx sinus CA s/p chemotherapy, scheduled for surgical excision 11/15 by Dr. Olivo (ENT) p/w worsening difficulty swallowing, will obtain pre op labs, IVF, imaging, ENT consult

## 2018-10-29 NOTE — ED PROVIDER NOTE - OBJECTIVE STATEMENT
59 yo M PMHx sinus CA s/p chemotherapy, scheduled for surgical excision 11/15 by Dr. Olivo (ENT) p/w difficulty swallowing. Pt has not been able to tolerate PO x 3 days. He saw Dr. Olivo in the office today who directed pt to ER for admission. He denies fevers/chills, N/V, abd pain. He tries to swallow food but feels as though it is getting 57 yo M PMHx sinus CA s/p chemotherapy, scheduled for surgical excision 11/15 by Dr. Olivo (ENT) p/w difficulty swallowing. Pt has not been able to tolerate PO x 3 days. He saw Dr. Olivo in the office today who directed pt to ER for admission. He tries to swallow food but feels as though it is getting stuck. Pt is an ex smoker, ex ETOH, denies drug use. He denies fevers/chills, N/V, abd pain, SOB, URI symptoms.

## 2018-10-29 NOTE — ED ADULT TRIAGE NOTE - CHIEF COMPLAINT QUOTE
pt with sinus CA, c/o difficulty swallowing and breathing 2/2 tumor. sent in by MD for admission. pt having trouble speaking and swallowing own secretions in triage.

## 2018-10-29 NOTE — PROVIDER CONTACT NOTE (OTHER) - BACKGROUND
Pt has labored respirations and states he feels uncomfortable and needs his CPAP now. Pt on CPAP at home, settings were obtained from his personal respiratory therapist.

## 2018-10-29 NOTE — ED PROVIDER NOTE - ATTENDING CONTRIBUTION TO CARE
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 Pt has been having difficulty breathing via nose and doing oral breathing and has not had food in 3 days but no vision changes, nausea, vomiting, focal weakness, difficulty speaking but had difficulty swallowing. No fever, chills    pt is alert, well appearing male, s1s2 normal reg, b/l clear breath sounds, noted large mass in oropharynx open the rt palate extending to rt nasopharynx, peerl eomi, , mild squint noted, neuro exam aox3, cn 3-12 intact, 5/5 strength in all 4 ext, skin warm, dry, good turgor, pt able to speak in full sentences, no stridor, no tachypnea    plan to check preop  labs, admit to ent

## 2018-10-29 NOTE — ED PROVIDER NOTE - PHYSICAL EXAMINATION
Gen: AAOx3, non-toxic  Head: NCAT  HEENT: EOMI, oral mucosa moist, normal conjunctiva, mass to right roof of mouth, uvula midline  Lung: CTAB, no respiratory distress, no wheezes/rhonchi/rales B/L, speaking in full sentences  CV: RRR, no murmurs, rubs or gallops  Abd: soft, NTND, no guarding  MSK: no visible deformities  Neuro: No focal sensory or motor deficits  Skin: Warm, well perfused, no rash  Psych: normal affect.   ~Burt Salazar M.D. Resident

## 2018-10-29 NOTE — PATIENT PROFILE ADULT - RESOURCE/ENVIRONMENTAL CONCERNS
ANTICOAGULATION FOLLOW-UP CLINIC VISIT    Patient Name:  Kostas Cotto  Date:  2/7/2018  Contact Type:  Face to Face    SUBJECTIVE:     Patient Findings     Positives Other complaints    Comments INR 4.2.  Unable to start the Eliquis today.  He will hold warfarin for 2 days and will return for INR on Friday. Encouraged patient to increase vit K intake for next 2 days.           OBJECTIVE    INR Protime   Date Value Ref Range Status   02/07/2018 4.2 (A) 0.86 - 1.14 Final       ASSESSMENT / PLAN  INR assessment SUPRA    Recheck INR In: 2 DAYS    INR Location Clinic      Anticoagulation Summary as of 2/7/2018     INR goal 2.0-3.0   Today's INR 4.2!   Maintenance plan 1.25 mg (2.5 mg x 0.5) every day   Full instructions 2/7: Hold; 2/8: Hold; Otherwise 1.25 mg every day   Weekly total 8.75 mg   Plan last modified Katey Tong RN (1/29/2018)   Next INR check 2/9/2018   Target end date     Indications   Long-term (current) use of anticoagulants [Z79.01] [Z79.01]  CVA (cerebral vascular accident) (H) [I63.9]         Anticoagulation Episode Summary     INR check location     Preferred lab     Send INR reminders to [facility] POOL    Comments       Anticoagulation Care Providers     Provider Role Specialty Phone number    Halley Hidalgo, PA [facility] [phone]            See the Encounter Report to view Anticoagulation Flowsheet and Dosing Calendar (Go to Encounters tab in chart review, and find the Anticoagulation Therapy Visit)        Katey Tong, RN               
none

## 2018-10-30 ENCOUNTER — TRANSCRIPTION ENCOUNTER (OUTPATIENT)
Age: 58
End: 2018-10-30

## 2018-10-30 LAB
BUN SERPL-MCNC: 10 MG/DL — SIGNIFICANT CHANGE UP (ref 7–23)
CALCIUM SERPL-MCNC: 9.5 MG/DL — SIGNIFICANT CHANGE UP (ref 8.4–10.5)
CHLORIDE SERPL-SCNC: 98 MMOL/L — SIGNIFICANT CHANGE UP (ref 98–107)
CO2 SERPL-SCNC: 26 MMOL/L — SIGNIFICANT CHANGE UP (ref 22–31)
CREAT SERPL-MCNC: 0.72 MG/DL — SIGNIFICANT CHANGE UP (ref 0.5–1.3)
GLUCOSE SERPL-MCNC: 103 MG/DL — HIGH (ref 70–99)
MAGNESIUM SERPL-MCNC: 1.7 MG/DL — SIGNIFICANT CHANGE UP (ref 1.6–2.6)
PHOSPHATE SERPL-MCNC: 3 MG/DL — SIGNIFICANT CHANGE UP (ref 2.5–4.5)
POTASSIUM SERPL-MCNC: 3.4 MMOL/L — LOW (ref 3.5–5.3)
POTASSIUM SERPL-SCNC: 3.4 MMOL/L — LOW (ref 3.5–5.3)
PREALB SERPL-MCNC: 12 MG/DL — LOW (ref 20–40)
SODIUM SERPL-SCNC: 137 MMOL/L — SIGNIFICANT CHANGE UP (ref 135–145)
TRANSFERRIN SERPL-MCNC: 115 MG/DL — LOW (ref 200–360)

## 2018-10-30 PROCEDURE — 70488 CT MAXILLOFACIAL W/O & W/DYE: CPT | Mod: 26

## 2018-10-30 PROCEDURE — 99223 1ST HOSP IP/OBS HIGH 75: CPT | Mod: GC

## 2018-10-30 PROCEDURE — 71046 X-RAY EXAM CHEST 2 VIEWS: CPT | Mod: 26

## 2018-10-30 PROCEDURE — 70491 CT SOFT TISSUE NECK W/DYE: CPT | Mod: 26

## 2018-10-30 RX ORDER — POTASSIUM PHOSPHATE, MONOBASIC POTASSIUM PHOSPHATE, DIBASIC 236; 224 MG/ML; MG/ML
15 INJECTION, SOLUTION INTRAVENOUS ONCE
Qty: 0 | Refills: 0 | Status: COMPLETED | OUTPATIENT
Start: 2018-10-30 | End: 2018-10-30

## 2018-10-30 RX ORDER — MAGNESIUM SULFATE 500 MG/ML
2 VIAL (ML) INJECTION ONCE
Qty: 0 | Refills: 0 | Status: COMPLETED | OUTPATIENT
Start: 2018-10-30 | End: 2018-10-30

## 2018-10-30 RX ADMIN — HYDROMORPHONE HYDROCHLORIDE 1 MILLIGRAM(S): 2 INJECTION INTRAMUSCULAR; INTRAVENOUS; SUBCUTANEOUS at 14:08

## 2018-10-30 RX ADMIN — HYDROMORPHONE HYDROCHLORIDE 1 MILLIGRAM(S): 2 INJECTION INTRAMUSCULAR; INTRAVENOUS; SUBCUTANEOUS at 09:59

## 2018-10-30 RX ADMIN — MORPHINE SULFATE 30 MILLIGRAM(S): 50 CAPSULE, EXTENDED RELEASE ORAL at 14:31

## 2018-10-30 RX ADMIN — CARVEDILOL PHOSPHATE 6.25 MILLIGRAM(S): 80 CAPSULE, EXTENDED RELEASE ORAL at 18:08

## 2018-10-30 RX ADMIN — HYDROMORPHONE HYDROCHLORIDE 1 MILLIGRAM(S): 2 INJECTION INTRAMUSCULAR; INTRAVENOUS; SUBCUTANEOUS at 10:33

## 2018-10-30 RX ADMIN — MORPHINE SULFATE 30 MILLIGRAM(S): 50 CAPSULE, EXTENDED RELEASE ORAL at 22:11

## 2018-10-30 RX ADMIN — Medication 50 GRAM(S): at 13:42

## 2018-10-30 RX ADMIN — HYDROMORPHONE HYDROCHLORIDE 1 MILLIGRAM(S): 2 INJECTION INTRAMUSCULAR; INTRAVENOUS; SUBCUTANEOUS at 05:50

## 2018-10-30 RX ADMIN — HYDROMORPHONE HYDROCHLORIDE 1 MILLIGRAM(S): 2 INJECTION INTRAMUSCULAR; INTRAVENOUS; SUBCUTANEOUS at 06:05

## 2018-10-30 RX ADMIN — HEPARIN SODIUM 5000 UNIT(S): 5000 INJECTION INTRAVENOUS; SUBCUTANEOUS at 06:41

## 2018-10-30 RX ADMIN — HYDROMORPHONE HYDROCHLORIDE 1 MILLIGRAM(S): 2 INJECTION INTRAMUSCULAR; INTRAVENOUS; SUBCUTANEOUS at 14:30

## 2018-10-30 RX ADMIN — MORPHINE SULFATE 30 MILLIGRAM(S): 50 CAPSULE, EXTENDED RELEASE ORAL at 06:41

## 2018-10-30 RX ADMIN — MORPHINE SULFATE 30 MILLIGRAM(S): 50 CAPSULE, EXTENDED RELEASE ORAL at 13:49

## 2018-10-30 RX ADMIN — Medication 75 MILLIGRAM(S): at 18:08

## 2018-10-30 RX ADMIN — OLANZAPINE 5 MILLIGRAM(S): 15 TABLET, FILM COATED ORAL at 18:09

## 2018-10-30 RX ADMIN — HYDROMORPHONE HYDROCHLORIDE 1 MILLIGRAM(S): 2 INJECTION INTRAMUSCULAR; INTRAVENOUS; SUBCUTANEOUS at 18:08

## 2018-10-30 RX ADMIN — Medication 75 MILLIGRAM(S): at 06:41

## 2018-10-30 RX ADMIN — Medication 0.5 MILLIGRAM(S): at 22:11

## 2018-10-30 RX ADMIN — CARVEDILOL PHOSPHATE 6.25 MILLIGRAM(S): 80 CAPSULE, EXTENDED RELEASE ORAL at 06:41

## 2018-10-30 RX ADMIN — POTASSIUM PHOSPHATE, MONOBASIC POTASSIUM PHOSPHATE, DIBASIC 62.5 MILLIMOLE(S): 236; 224 INJECTION, SOLUTION INTRAVENOUS at 14:39

## 2018-10-30 RX ADMIN — Medication 81 MILLIGRAM(S): at 12:09

## 2018-10-30 RX ADMIN — HYDROMORPHONE HYDROCHLORIDE 1 MILLIGRAM(S): 2 INJECTION INTRAMUSCULAR; INTRAVENOUS; SUBCUTANEOUS at 18:38

## 2018-10-30 RX ADMIN — HYDROMORPHONE HYDROCHLORIDE 1 MILLIGRAM(S): 2 INJECTION INTRAMUSCULAR; INTRAVENOUS; SUBCUTANEOUS at 22:11

## 2018-10-30 RX ADMIN — HEPARIN SODIUM 5000 UNIT(S): 5000 INJECTION INTRAVENOUS; SUBCUTANEOUS at 18:08

## 2018-10-30 RX ADMIN — HYDROMORPHONE HYDROCHLORIDE 1 MILLIGRAM(S): 2 INJECTION INTRAMUSCULAR; INTRAVENOUS; SUBCUTANEOUS at 22:30

## 2018-10-30 RX ADMIN — SODIUM CHLORIDE 100 MILLILITER(S): 9 INJECTION, SOLUTION INTRAVENOUS at 09:28

## 2018-10-30 RX ADMIN — LORATADINE 10 MILLIGRAM(S): 10 TABLET ORAL at 12:09

## 2018-10-30 NOTE — DISCHARGE NOTE ADULT - INSTRUCTIONS
diet: diet: jevity 360cc via peg every 6 hours. Flush with 100cc of free water after each feed. diet: jevity 360cc via peg every 6 hours. Flush with 100cc of free water after each feed. Split gauze 4 x4 dressing around site. diet: jevity 1.5 255cc via peg 5 times a day. Flush with 100cc of free water after each feed. Split gauze 4 x4 dressing around site.

## 2018-10-30 NOTE — DISCHARGE NOTE ADULT - PATIENT PORTAL LINK FT
You can access the [facility] Patient Portal, offered by [facility], by registering with the following website: [email or web address]

## 2018-10-30 NOTE — DISCHARGE NOTE ADULT - CARE PROVIDER_API CALL
Lew Olivo), Otolaryngology  [address] [place] [address]  Phone: [phone]  Fax: [phone]

## 2018-10-30 NOTE — DISCHARGE NOTE ADULT - HOSPITAL COURSE
patient with maxillary sinus mass... patient with maxillary sinus mass c/o dysphagia. Patient had peg tube placed and CT scan done. Plan of care was made and patient will follow up out patient with dr. Olivo,

## 2018-10-30 NOTE — DISCHARGE NOTE ADULT - MEDICATION SUMMARY - MEDICATIONS TO TAKE
I will START or STAY ON the medications listed below when I get home from the hospital:    aspirin 81 mg oral tablet, chewable  -- 1 tab(s) by mouth once a day  -- Indication: For outpt med    HYDROmorphone 4 mg oral tablet  -- 1 tab(s) by mouth every 4 hours, As needed, Severe Pain (7 - 10) MDD:6  -- Indication: For pain     morphine 30 mg/12 hr oral tablet, extended release  -- 1 tab(s) by mouth 3 times a day MDD:3  -- Indication: For pain     pregabalin 75 mg oral capsule  -- 1 cap(s) by mouth 2 times a day MDD:2  -- Indication: For pain     Imodium 2 mg oral capsule  -- 1 cap(s) by mouth 3 times a day  -- Indication: For outpt mrf    diphenoxylate-atropine 2.5 mg-0.025 mg oral tablet  -- 2 tab(s) by mouth 4 times a day  -- Indication: For outpt med    meclizine 25 mg oral tablet  -- 1 tab(s) by mouth once a day, As Needed -Dizziness   -- Indication: For outpt med    Zofran 8 mg oral tablet  -- 1 tab(s) by mouth 3 times a day, As Needed  -- Indication: For outpt med    Allegra 180 mg oral tablet  -- 1 tab(s) by mouth once a day  -- Indication: For outpt med    OLANZapine 5 mg oral tablet  -- 1 tab(s) by mouth once a day  -- Indication: For outpt med    Xanax 0.5 mg oral tablet  -- 1 tab(s) by mouth once a day (at bedtime), As needed, anxiety MDD:3  -- Indication: For outpt med    carvedilol 6.25 mg oral tablet  -- 1 tab(s) by mouth every 12 hours  -- Indication: For outpt med    dextroamphetamine-amphetamine 30 mg oral tablet  -- 1 tab(s) by mouth 2 times a day  -- Indication: For outpt med    Colace  -- Indication: For outpt med    pantoprazole 40 mg oral delayed release tablet  -- 1 tab(s) by mouth once a day (before a meal)  -- Indication: For outpt med I will START or STAY ON the medications listed below when I get home from the hospital:    aspirin 81 mg oral tablet, chewable  -- 1 tab(s) by mouth once a day  -- Indication: For outpt med    HYDROmorphone 4 mg oral tablet  -- 1 tab(s) by mouth every 4 hours, As needed, Severe Pain (7 - 10) MDD:6  -- Indication: For pain     morphine 30 mg/12 hr oral tablet, extended release  -- 1 tab(s) by mouth 3 times a day MDD:3  -- Indication: For pain     acetaminophen 160 mg/5 mL oral liquid  -- 10 milliliter(s) by mouth every 6 hours, As Needed -for moderate pain   -- This product contains acetaminophen.  Do not use  with any other product containing acetaminophen to prevent possible liver damage.    -- Indication: For pain     pregabalin 75 mg oral capsule  -- 1 cap(s) by mouth 2 times a day MDD:2  -- Indication: For pain     Imodium 2 mg oral capsule  -- 1 cap(s) by mouth 3 times a day  -- Indication: For outpt mrf    diphenoxylate-atropine 2.5 mg-0.025 mg oral tablet  -- 2 tab(s) by mouth 4 times a day  -- Indication: For outpt med    meclizine 25 mg oral tablet  -- 1 tab(s) by mouth once a day, As Needed -Dizziness   -- Indication: For outpt med    Zofran 8 mg oral tablet  -- 1 tab(s) by mouth 3 times a day, As Needed  -- Indication: For outpt med    Allegra 180 mg oral tablet  -- 1 tab(s) by mouth once a day  -- Indication: For outpt med    OLANZapine 5 mg oral tablet  -- 1 tab(s) by mouth once a day  -- Indication: For outpt med    Xanax 0.5 mg oral tablet  -- 1 tab(s) by mouth once a day (at bedtime), As needed, anxiety MDD:3  -- Indication: For outpt med    carvedilol 6.25 mg oral tablet  -- 1 tab(s) by mouth every 12 hours  -- Indication: For outpt med    dextroamphetamine-amphetamine 30 mg oral tablet  -- 1 tab(s) by mouth 2 times a day  -- Indication: For outpt med    Colace  -- Indication: For outpt med    pantoprazole 40 mg oral delayed release tablet  -- 1 tab(s) by mouth once a day (before a meal)  -- Indication: For outpt med

## 2018-10-30 NOTE — DISCHARGE NOTE ADULT - CARE PLAN
Principal Discharge DX:	Maxillary sinus mass  Goal:	work up and plan of care  Assessment and plan of treatment:	same

## 2018-10-31 LAB
BUN SERPL-MCNC: 9 MG/DL — SIGNIFICANT CHANGE UP (ref 7–23)
CALCIUM SERPL-MCNC: 9.2 MG/DL — SIGNIFICANT CHANGE UP (ref 8.4–10.5)
CHLORIDE SERPL-SCNC: 96 MMOL/L — LOW (ref 98–107)
CO2 SERPL-SCNC: 26 MMOL/L — SIGNIFICANT CHANGE UP (ref 22–31)
CREAT SERPL-MCNC: 0.76 MG/DL — SIGNIFICANT CHANGE UP (ref 0.5–1.3)
GLUCOSE SERPL-MCNC: 109 MG/DL — HIGH (ref 70–99)
MAGNESIUM SERPL-MCNC: 2.1 MG/DL — SIGNIFICANT CHANGE UP (ref 1.6–2.6)
PHOSPHATE SERPL-MCNC: 3.4 MG/DL — SIGNIFICANT CHANGE UP (ref 2.5–4.5)
POTASSIUM SERPL-MCNC: 3.6 MMOL/L — SIGNIFICANT CHANGE UP (ref 3.5–5.3)
POTASSIUM SERPL-SCNC: 3.6 MMOL/L — SIGNIFICANT CHANGE UP (ref 3.5–5.3)
SODIUM SERPL-SCNC: 135 MMOL/L — SIGNIFICANT CHANGE UP (ref 135–145)

## 2018-10-31 PROCEDURE — 43246 EGD PLACE GASTROSTOMY TUBE: CPT

## 2018-10-31 RX ORDER — HYDROMORPHONE HYDROCHLORIDE 2 MG/ML
1 INJECTION INTRAMUSCULAR; INTRAVENOUS; SUBCUTANEOUS
Qty: 0 | Refills: 0 | Status: DISCONTINUED | OUTPATIENT
Start: 2018-10-31 | End: 2018-10-31

## 2018-10-31 RX ORDER — HYDROMORPHONE HYDROCHLORIDE 2 MG/ML
1 INJECTION INTRAMUSCULAR; INTRAVENOUS; SUBCUTANEOUS
Qty: 0 | Refills: 0 | Status: DISCONTINUED | OUTPATIENT
Start: 2018-10-31 | End: 2018-11-02

## 2018-10-31 RX ADMIN — Medication 75 MILLIGRAM(S): at 06:43

## 2018-10-31 RX ADMIN — HEPARIN SODIUM 5000 UNIT(S): 5000 INJECTION INTRAVENOUS; SUBCUTANEOUS at 19:27

## 2018-10-31 RX ADMIN — HYDROMORPHONE HYDROCHLORIDE 1 MILLIGRAM(S): 2 INJECTION INTRAMUSCULAR; INTRAVENOUS; SUBCUTANEOUS at 09:31

## 2018-10-31 RX ADMIN — HEPARIN SODIUM 5000 UNIT(S): 5000 INJECTION INTRAVENOUS; SUBCUTANEOUS at 06:43

## 2018-10-31 RX ADMIN — MORPHINE SULFATE 30 MILLIGRAM(S): 50 CAPSULE, EXTENDED RELEASE ORAL at 06:43

## 2018-10-31 RX ADMIN — HYDROMORPHONE HYDROCHLORIDE 1 MILLIGRAM(S): 2 INJECTION INTRAMUSCULAR; INTRAVENOUS; SUBCUTANEOUS at 19:45

## 2018-10-31 RX ADMIN — HYDROMORPHONE HYDROCHLORIDE 1 MILLIGRAM(S): 2 INJECTION INTRAMUSCULAR; INTRAVENOUS; SUBCUTANEOUS at 22:40

## 2018-10-31 RX ADMIN — CARVEDILOL PHOSPHATE 6.25 MILLIGRAM(S): 80 CAPSULE, EXTENDED RELEASE ORAL at 06:43

## 2018-10-31 RX ADMIN — HYDROMORPHONE HYDROCHLORIDE 1 MILLIGRAM(S): 2 INJECTION INTRAMUSCULAR; INTRAVENOUS; SUBCUTANEOUS at 23:10

## 2018-10-31 RX ADMIN — HYDROMORPHONE HYDROCHLORIDE 1 MILLIGRAM(S): 2 INJECTION INTRAMUSCULAR; INTRAVENOUS; SUBCUTANEOUS at 04:12

## 2018-10-31 RX ADMIN — SODIUM CHLORIDE 100 MILLILITER(S): 9 INJECTION, SOLUTION INTRAVENOUS at 15:44

## 2018-10-31 RX ADMIN — HYDROMORPHONE HYDROCHLORIDE 1 MILLIGRAM(S): 2 INJECTION INTRAMUSCULAR; INTRAVENOUS; SUBCUTANEOUS at 19:26

## 2018-10-31 RX ADMIN — HYDROMORPHONE HYDROCHLORIDE 1 MILLIGRAM(S): 2 INJECTION INTRAMUSCULAR; INTRAVENOUS; SUBCUTANEOUS at 04:30

## 2018-10-31 RX ADMIN — HYDROMORPHONE HYDROCHLORIDE 1 MILLIGRAM(S): 2 INJECTION INTRAMUSCULAR; INTRAVENOUS; SUBCUTANEOUS at 15:44

## 2018-10-31 RX ADMIN — HYDROMORPHONE HYDROCHLORIDE 1 MILLIGRAM(S): 2 INJECTION INTRAMUSCULAR; INTRAVENOUS; SUBCUTANEOUS at 16:02

## 2018-10-31 NOTE — CONSULT NOTE ADULT - SUBJECTIVE AND OBJECTIVE BOX
THORACIC SURGERY CONSULT NOTE    Patient is a 58y old  Male who presents with a chief complaint of dysphagia (30 Oct 2018 08:36)      HPI: 58M w/ hx LAMAR wedge 2017 w/ Dr. 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 He had had a PEG placed at [facility] in July, but it was removed in August as he was able to tolerate some PO. However he is again experiencing dysphagia from his lesion. He denies dyspnea, although he says it is uncomfortable breathing through his nose. He denies any other abdominal surgeries. He has been using CPAP for LORNA nightly. He denies CP, palpitations, abd pain, n/v.          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- was followed by ID after lung surgery  Neoplasm of maxillary sinus  Bipolar disorder, unspecified  CHF (congestive heart failure): 2014  Diaphragm dysfunction: partial paralysis- now resolved as per wife  ETOH abuse  LORNA on CPAP  Cataract  S/P lobectomy of lung: Sept 2017  H/O endoscopy  H/O colonoscopy  H/O coronary angioplasty: x2    [  ] No significant past history as reviewed with the patient and family    FAMILY HISTORY:  No family history of COPD  Family history of hypertension in mother (Sibling)    [  ] Family history not pertinent as reviewed with the patient and family    SOCIAL HISTORY:    MEDICATIONS  (STANDING):  ALPRAZolam 0.5 milliGRAM(s) Oral at bedtime  amphetamine/dextroamphetamine 30 milliGRAM(s) Oral two times a day  aspirin  chewable 81 milliGRAM(s) Oral daily  carvedilol 6.25 milliGRAM(s) Oral every 12 hours  dextrose 5% + sodium chloride 0.45%. 1000 milliLiter(s) (100 mL/Hr) IV Continuous <Continuous>  heparin  Injectable 5000 Unit(s) SubCutaneous every 12 hours  loratadine 10 milliGRAM(s) Oral daily  morphine ER Tablet 30 milliGRAM(s) Oral three times a day  OLANZapine 5 milliGRAM(s) Oral daily  pregabalin 75 milliGRAM(s) Oral two times a day    MEDICATIONS  (PRN):  HYDROmorphone  Injectable 1 milliGRAM(s) IV Push every 4 hours PRN Severe Pain (7 - 10)  ondansetron    Tablet 8 milliGRAM(s) Oral three times a day PRN Nausea and/or Vomiting    Allergies    hospital socks (Rash)  lisinopril (Anaphylaxis)  statins (Anaphylaxis)    Intolerances        Vital Signs Last 24 Hrs  T(C): 36.6 (31 Oct 2018 06:41), Max: 36.7 (30 Oct 2018 13:50)  T(F): 97.9 (31 Oct 2018 06:41), Max: 98 (30 Oct 2018 13:50)  HR: 76 (31 Oct 2018 06:41) (69 - 87)  BP: 148/96 (31 Oct 2018 06:41) (123/85 - 151/99)  BP(mean): --  RR: 18 (31 Oct 2018 06:41) (18 - 20)  SpO2: 96% (31 Oct 2018 06:41) (95% - 100%)  Daily     Daily     NAD, awake and alert  No rashes  No jaundice or scleral icterus  Respirations nonlabored  Dark yellow nasal discharge R>L  Oropharynx with palatine bulge, but not obstructing  CV Regular  Abdomen soft, nontender, nondistended  No guarding or rebound tenderness  Previous PEG site well healed in  LUQ  Extremities warm                         12.8   7.94  )-----------( 255      ( 29 Oct 2018 14:15 )             39.7     10-31    135  |  96<L>  |  9   ----------------------------<  109<H>  3.6   |  26  |  0.76    Ca    9.2      31 Oct 2018 06:00  Phos  3.4     10-31  Mg     2.1     10-31    TPro  8.2  /  Alb  4.1  /  TBili  0.5  /  DBili  x   /  AST  13  /  ALT  6   /  AlkPhos  92  10-29    PT/INR - ( 29 Oct 2018 14:15 )   PT: 14.9 SEC;   INR: 1.29          PTT - ( 29 Oct 2018 14:15 )  PTT:55.0 SEC      IMAGING STUDIES:  < from: CT Maxillofacial w/wo IV Cont (10.30.18 @ 15:47) >  COMPARISON: Comparison is made to the incomplete MRI study performed on   the  same day, and comparison is also made to the prior MRI study from   10/10/2018.    90 cc intravenous Omnipaque 350 contrast was administered, 10 cc contrast  was discarded.    Axial thin section images with coronal and sagittal reformatted series   were  performed.    A large mass is again noted involving the right maxillary sinus, right  maxillary alveolus, right hardpalate with extension across midline to  involve the left hard palate, right greater than left nasal cavities, and  right greater than left ethmoid sinus regions. Tumor extends through the  choana into the anterior aspect of the nasopharyngeal airway. The tumor  bulges inferiorly into the oral cavity. Invasion of the inferior aspect of  the right orbit is noted with bony destruction of portions of the right  orbital floor, with tumor extending into the extraconal fat, abutting and  inseparable from the inferior rectus muscle. The right inferior rectus  muscle appears enlarged, most likely reflecting tumor invasion. Tumor  involves the canal for the right infraorbital nerve consistent with  perineural spread of tumor along V2. Tumor also involves the lower aspect   of  the medial wall of the right orbit, and may also involve the nasal   lacrimal  duct. The bony foramina for the greater and lesser palatine nerves on the  right is obliterated, also consistent with perineural involvement. Tumor  involves the right pterygoid plates and maxillary tuberosity, and involves  the base of the pterygoid musculature. Neoplastic extension into the right  pterygopalatine fossa is noted. Bony destruction involves the posterior  lateral wall of the right maxillary sinus, with tumor extension into the  right infratemporal fossa. Minimal tumor extension is noted to the right  premaxillary soft tissues. Sclerosis and irregularity involves the ventral  surface of the clivus suspicion for bony invasionof the skull base. Soft  tissue is noted abutting the bilateral cribriform plates and right fovea  ethmoidalis which could reflect obstructed secretions, tumor extension to  the floor of the anterior cranial fossa, or a combination of both.  Evaluation for intracranial extension of tumor along the floor of the  anterior cranial fossa is limited with CT technique, as opposed to MRI  technique; no large areas of dural thickening or enhancement are   appreciated  within the limitations of CT imagingtechnique. Nonspecific soft tissue  involves the anterior aspects of the sphenoid sinus which could reflect  mucosal thickening, tumor extension, or a combination of both.    Meckel's caves appear symmetric. No large asymmetry is appreciated within  the cavernous sinuses (evaluation is limited with CT technique as opposed   to  MRI technique).    Multiple nonspecific lymph nodes are present in the bilateral levels 1, 2,  3, 4, and 5 locations. The retropharyngeal lymph nodes are better   visualized  on the prior MRI study from 10/10/2018. No necrotic lymph nodes are noted.    Small subcentimeter nodules involve both parotid glands could reflect  intraparotid lymph nodes or small primary parotid lesions. The salivary  glands are otherwise unremarkable.    Mild enlargement of the lingual tonsils at the tongue base is noted.    The larynx is unremarkable.    Streak artifact limits evaluation of the thyroid gland.    Emphysematous changes are noted in the right greater and left lungs.    IMPRESSION:    There has been a marked interval progression of the large sinonasal mass  compared to the 10/10/2018 MRI study, with associated skull base invasion  and perineural spread of tumor as described.        < end of copied text >

## 2018-10-31 NOTE — BRIEF OPERATIVE NOTE - PROCEDURE
<<-----Click on this checkbox to enter Procedure PEG (percutaneous endoscopic gastrostomy)  10/31/2018    Active  KYRA  EGD  10/31/2018    Active  KYRA

## 2018-10-31 NOTE — PROGRESS NOTE ADULT - ASSESSMENT
58 year old male s/p PEG tube placement    Plan:  Continue PEG to gravity drain for 24 hours  May use peg tube after 24 hours   Pain management   Care as per primary team

## 2018-10-31 NOTE — CONSULT NOTE ADULT - ASSESSMENT
58M w/ sinus neoplasm, for PEG    - will add on for PEG today  - consent in chart  - keep NPO  - d/w Dr. Vishal Reyes MD  Thoracic Sx  [id number]

## 2018-11-01 RX ORDER — MORPHINE SULFATE 50 MG/1
4 CAPSULE, EXTENDED RELEASE ORAL ONCE
Qty: 0 | Refills: 0 | Status: DISCONTINUED | OUTPATIENT
Start: 2018-11-01 | End: 2018-11-01

## 2018-11-01 RX ORDER — OLANZAPINE 15 MG/1
5 TABLET, FILM COATED ORAL DAILY
Qty: 0 | Refills: 0 | Status: DISCONTINUED | OUTPATIENT
Start: 2018-11-01 | End: 2018-11-02

## 2018-11-01 RX ORDER — CARVEDILOL PHOSPHATE 80 MG/1
6.25 CAPSULE, EXTENDED RELEASE ORAL EVERY 12 HOURS
Qty: 0 | Refills: 0 | Status: DISCONTINUED | OUTPATIENT
Start: 2018-11-01 | End: 2018-11-02

## 2018-11-01 RX ORDER — ASPIRIN/CALCIUM CARB/MAGNESIUM 324 MG
81 TABLET ORAL DAILY
Qty: 0 | Refills: 0 | Status: DISCONTINUED | OUTPATIENT
Start: 2018-11-01 | End: 2018-11-02

## 2018-11-01 RX ORDER — ALPRAZOLAM 0.25 MG
0.5 TABLET ORAL AT BEDTIME
Qty: 0 | Refills: 0 | Status: DISCONTINUED | OUTPATIENT
Start: 2018-11-01 | End: 2018-11-02

## 2018-11-01 RX ORDER — LORATADINE 10 MG/1
10 TABLET ORAL DAILY
Qty: 0 | Refills: 0 | Status: DISCONTINUED | OUTPATIENT
Start: 2018-11-01 | End: 2018-11-02

## 2018-11-01 RX ORDER — DEXTROAMPHETAMINE SACCHARATE, AMPHETAMINE ASPARTATE, DEXTROAMPHETAMINE SULFATE AND AMPHETAMINE SULFATE 1.875; 1.875; 1.875; 1.875 MG/1; MG/1; MG/1; MG/1
30 TABLET ORAL
Qty: 0 | Refills: 0 | Status: DISCONTINUED | OUTPATIENT
Start: 2018-11-01 | End: 2018-11-02

## 2018-11-01 RX ADMIN — LORATADINE 10 MILLIGRAM(S): 10 TABLET ORAL at 11:07

## 2018-11-01 RX ADMIN — Medication 75 MILLIGRAM(S): at 16:49

## 2018-11-01 RX ADMIN — HYDROMORPHONE HYDROCHLORIDE 1 MILLIGRAM(S): 2 INJECTION INTRAMUSCULAR; INTRAVENOUS; SUBCUTANEOUS at 17:04

## 2018-11-01 RX ADMIN — HYDROMORPHONE HYDROCHLORIDE 1 MILLIGRAM(S): 2 INJECTION INTRAMUSCULAR; INTRAVENOUS; SUBCUTANEOUS at 13:35

## 2018-11-01 RX ADMIN — MORPHINE SULFATE 4 MILLIGRAM(S): 50 CAPSULE, EXTENDED RELEASE ORAL at 11:21

## 2018-11-01 RX ADMIN — HYDROMORPHONE HYDROCHLORIDE 1 MILLIGRAM(S): 2 INJECTION INTRAMUSCULAR; INTRAVENOUS; SUBCUTANEOUS at 07:04

## 2018-11-01 RX ADMIN — HEPARIN SODIUM 5000 UNIT(S): 5000 INJECTION INTRAVENOUS; SUBCUTANEOUS at 16:48

## 2018-11-01 RX ADMIN — SODIUM CHLORIDE 100 MILLILITER(S): 9 INJECTION, SOLUTION INTRAVENOUS at 03:20

## 2018-11-01 RX ADMIN — MORPHINE SULFATE 30 MILLIGRAM(S): 50 CAPSULE, EXTENDED RELEASE ORAL at 22:07

## 2018-11-01 RX ADMIN — HYDROMORPHONE HYDROCHLORIDE 1 MILLIGRAM(S): 2 INJECTION INTRAMUSCULAR; INTRAVENOUS; SUBCUTANEOUS at 04:00

## 2018-11-01 RX ADMIN — HYDROMORPHONE HYDROCHLORIDE 1 MILLIGRAM(S): 2 INJECTION INTRAMUSCULAR; INTRAVENOUS; SUBCUTANEOUS at 19:50

## 2018-11-01 RX ADMIN — OLANZAPINE 5 MILLIGRAM(S): 15 TABLET, FILM COATED ORAL at 11:07

## 2018-11-01 RX ADMIN — MORPHINE SULFATE 30 MILLIGRAM(S): 50 CAPSULE, EXTENDED RELEASE ORAL at 21:37

## 2018-11-01 RX ADMIN — MORPHINE SULFATE 30 MILLIGRAM(S): 50 CAPSULE, EXTENDED RELEASE ORAL at 13:35

## 2018-11-01 RX ADMIN — HYDROMORPHONE HYDROCHLORIDE 1 MILLIGRAM(S): 2 INJECTION INTRAMUSCULAR; INTRAVENOUS; SUBCUTANEOUS at 10:31

## 2018-11-01 RX ADMIN — HYDROMORPHONE HYDROCHLORIDE 1 MILLIGRAM(S): 2 INJECTION INTRAMUSCULAR; INTRAVENOUS; SUBCUTANEOUS at 13:50

## 2018-11-01 RX ADMIN — HYDROMORPHONE HYDROCHLORIDE 1 MILLIGRAM(S): 2 INJECTION INTRAMUSCULAR; INTRAVENOUS; SUBCUTANEOUS at 20:10

## 2018-11-01 RX ADMIN — SODIUM CHLORIDE 100 MILLILITER(S): 9 INJECTION, SOLUTION INTRAVENOUS at 11:06

## 2018-11-01 RX ADMIN — HYDROMORPHONE HYDROCHLORIDE 1 MILLIGRAM(S): 2 INJECTION INTRAMUSCULAR; INTRAVENOUS; SUBCUTANEOUS at 22:52

## 2018-11-01 RX ADMIN — MORPHINE SULFATE 4 MILLIGRAM(S): 50 CAPSULE, EXTENDED RELEASE ORAL at 11:06

## 2018-11-01 RX ADMIN — HYDROMORPHONE HYDROCHLORIDE 1 MILLIGRAM(S): 2 INJECTION INTRAMUSCULAR; INTRAVENOUS; SUBCUTANEOUS at 10:46

## 2018-11-01 RX ADMIN — Medication 0.5 MILLIGRAM(S): at 21:37

## 2018-11-01 RX ADMIN — HYDROMORPHONE HYDROCHLORIDE 1 MILLIGRAM(S): 2 INJECTION INTRAMUSCULAR; INTRAVENOUS; SUBCUTANEOUS at 03:26

## 2018-11-01 RX ADMIN — HYDROMORPHONE HYDROCHLORIDE 1 MILLIGRAM(S): 2 INJECTION INTRAMUSCULAR; INTRAVENOUS; SUBCUTANEOUS at 07:34

## 2018-11-01 RX ADMIN — Medication 81 MILLIGRAM(S): at 11:07

## 2018-11-01 RX ADMIN — HYDROMORPHONE HYDROCHLORIDE 1 MILLIGRAM(S): 2 INJECTION INTRAMUSCULAR; INTRAVENOUS; SUBCUTANEOUS at 16:49

## 2018-11-01 RX ADMIN — CARVEDILOL PHOSPHATE 6.25 MILLIGRAM(S): 80 CAPSULE, EXTENDED RELEASE ORAL at 16:48

## 2018-11-01 RX ADMIN — HYDROMORPHONE HYDROCHLORIDE 1 MILLIGRAM(S): 2 INJECTION INTRAMUSCULAR; INTRAVENOUS; SUBCUTANEOUS at 23:07

## 2018-11-02 ENCOUNTER — INBOUND DOCUMENT (OUTPATIENT)
Age: 58
End: 2018-11-02

## 2018-11-02 VITALS
HEART RATE: 72 BPM | RESPIRATION RATE: 18 BRPM | SYSTOLIC BLOOD PRESSURE: 134 MMHG | OXYGEN SATURATION: 96 % | DIASTOLIC BLOOD PRESSURE: 78 MMHG

## 2018-11-02 LAB
BUN SERPL-MCNC: 21 MG/DL — SIGNIFICANT CHANGE UP (ref 7–23)
CALCIUM SERPL-MCNC: 9.2 MG/DL — SIGNIFICANT CHANGE UP (ref 8.4–10.5)
CHLORIDE SERPL-SCNC: 94 MMOL/L — LOW (ref 98–107)
CO2 SERPL-SCNC: 28 MMOL/L — SIGNIFICANT CHANGE UP (ref 22–31)
CREAT SERPL-MCNC: 0.76 MG/DL — SIGNIFICANT CHANGE UP (ref 0.5–1.3)
GLUCOSE SERPL-MCNC: 114 MG/DL — HIGH (ref 70–99)
MAGNESIUM SERPL-MCNC: 2.3 MG/DL — SIGNIFICANT CHANGE UP (ref 1.6–2.6)
PHOSPHATE SERPL-MCNC: 3.1 MG/DL — SIGNIFICANT CHANGE UP (ref 2.5–4.5)
POTASSIUM SERPL-MCNC: 3.4 MMOL/L — LOW (ref 3.5–5.3)
POTASSIUM SERPL-SCNC: 3.4 MMOL/L — LOW (ref 3.5–5.3)
SODIUM SERPL-SCNC: 135 MMOL/L — SIGNIFICANT CHANGE UP (ref 135–145)

## 2018-11-02 PROCEDURE — 99239 HOSP IP/OBS DSCHRG MGMT >30: CPT

## 2018-11-02 RX ORDER — DEXTROAMPHETAMINE SACCHARATE, AMPHETAMINE ASPARTATE, DEXTROAMPHETAMINE SULFATE AND AMPHETAMINE SULFATE 1.875; 1.875; 1.875; 1.875 MG/1; MG/1; MG/1; MG/1
1 TABLET ORAL
Qty: 0 | Refills: 0 | COMMUNITY

## 2018-11-02 RX ORDER — DEXTROAMPHETAMINE SACCHARATE, AMPHETAMINE ASPARTATE, DEXTROAMPHETAMINE SULFATE AND AMPHETAMINE SULFATE 1.875; 1.875; 1.875; 1.875 MG/1; MG/1; MG/1; MG/1
1 TABLET ORAL
Qty: 0 | Refills: 0 | COMMUNITY
Start: 2018-11-02

## 2018-11-02 RX ORDER — MORPHINE SULFATE 50 MG/1
1 CAPSULE, EXTENDED RELEASE ORAL
Qty: 15 | Refills: 0 | OUTPATIENT
Start: 2018-11-02 | End: 2018-11-06

## 2018-11-02 RX ORDER — HYDROMORPHONE HYDROCHLORIDE 2 MG/ML
1 INJECTION INTRAMUSCULAR; INTRAVENOUS; SUBCUTANEOUS
Qty: 30 | Refills: 0 | OUTPATIENT
Start: 2018-11-02 | End: 2018-11-06

## 2018-11-02 RX ORDER — ACETAMINOPHEN 500 MG
10 TABLET ORAL
Qty: 200 | Refills: 0 | OUTPATIENT
Start: 2018-11-02 | End: 2018-11-06

## 2018-11-02 RX ADMIN — LORATADINE 10 MILLIGRAM(S): 10 TABLET ORAL at 11:04

## 2018-11-02 RX ADMIN — MORPHINE SULFATE 30 MILLIGRAM(S): 50 CAPSULE, EXTENDED RELEASE ORAL at 06:20

## 2018-11-02 RX ADMIN — HYDROMORPHONE HYDROCHLORIDE 1 MILLIGRAM(S): 2 INJECTION INTRAMUSCULAR; INTRAVENOUS; SUBCUTANEOUS at 05:12

## 2018-11-02 RX ADMIN — MORPHINE SULFATE 30 MILLIGRAM(S): 50 CAPSULE, EXTENDED RELEASE ORAL at 05:49

## 2018-11-02 RX ADMIN — Medication 81 MILLIGRAM(S): at 11:05

## 2018-11-02 RX ADMIN — HYDROMORPHONE HYDROCHLORIDE 1 MILLIGRAM(S): 2 INJECTION INTRAMUSCULAR; INTRAVENOUS; SUBCUTANEOUS at 02:13

## 2018-11-02 RX ADMIN — HYDROMORPHONE HYDROCHLORIDE 1 MILLIGRAM(S): 2 INJECTION INTRAMUSCULAR; INTRAVENOUS; SUBCUTANEOUS at 11:52

## 2018-11-02 RX ADMIN — HYDROMORPHONE HYDROCHLORIDE 1 MILLIGRAM(S): 2 INJECTION INTRAMUSCULAR; INTRAVENOUS; SUBCUTANEOUS at 15:05

## 2018-11-02 RX ADMIN — MORPHINE SULFATE 30 MILLIGRAM(S): 50 CAPSULE, EXTENDED RELEASE ORAL at 13:58

## 2018-11-02 RX ADMIN — HYDROMORPHONE HYDROCHLORIDE 1 MILLIGRAM(S): 2 INJECTION INTRAMUSCULAR; INTRAVENOUS; SUBCUTANEOUS at 05:20

## 2018-11-02 RX ADMIN — HYDROMORPHONE HYDROCHLORIDE 1 MILLIGRAM(S): 2 INJECTION INTRAMUSCULAR; INTRAVENOUS; SUBCUTANEOUS at 08:23

## 2018-11-02 RX ADMIN — HYDROMORPHONE HYDROCHLORIDE 1 MILLIGRAM(S): 2 INJECTION INTRAMUSCULAR; INTRAVENOUS; SUBCUTANEOUS at 01:58

## 2018-11-02 RX ADMIN — Medication 75 MILLIGRAM(S): at 05:49

## 2018-11-02 RX ADMIN — HEPARIN SODIUM 5000 UNIT(S): 5000 INJECTION INTRAVENOUS; SUBCUTANEOUS at 05:49

## 2018-11-02 RX ADMIN — HYDROMORPHONE HYDROCHLORIDE 1 MILLIGRAM(S): 2 INJECTION INTRAMUSCULAR; INTRAVENOUS; SUBCUTANEOUS at 12:15

## 2018-11-02 RX ADMIN — HYDROMORPHONE HYDROCHLORIDE 1 MILLIGRAM(S): 2 INJECTION INTRAMUSCULAR; INTRAVENOUS; SUBCUTANEOUS at 08:38

## 2018-11-02 RX ADMIN — CARVEDILOL PHOSPHATE 6.25 MILLIGRAM(S): 80 CAPSULE, EXTENDED RELEASE ORAL at 05:49

## 2018-11-02 NOTE — PROVIDER CONTACT NOTE (OTHER) - RECOMMENDATIONS
Adjust pain regimen. Per Romel (pain management), add tylenol to regimen and recommend f/u with outpatient pain MD.

## 2018-11-02 NOTE — PROGRESS NOTE ADULT - SUBJECTIVE AND OBJECTIVE BOX
Pt seen and examined.     No acute events, transitioned to bolus feeds which he has been tolerating, complains of baseline significant chronic pain, insists he wants to go home today and declines seeing pain management to see if meds can be adjusted for better pain control, says he has been passing gas but has not had a bowel movement for about 6 days, still he does not want any bowel regimen medication at this time and rather just wants to go home and take care of it. Patient's wife insists she knows how to take care of the feeds and knows to give patient medication for his constipation which is a problem they are used to and equipped to deal with.    AVSS  NAD, awake and alert  Breathing comfortably on room air  Nose: obstructed 2/2 mass  OC/OP: tongue wnl, OP clear  Neck: soft/flat, no cervical LAD  Abd soft, mildly distended but not tympanic, PEG tube in place with dressing c/d/i    A/P   58F with hx of maxillary sinus cancer presenting with decreased PO tolerance, failure to thrive, increased difficulty breathing.  - bolus tube feeds and free water  - nutrition consult  - IVF  - home meds  - BIPAP at night   - pain control  - SQH  - OOB ad denton  - discharge today
ANESTHESIA POSTOP CHECK    58y Male POSTOP DAY 1 S/P     Vital Signs Last 24 Hrs  T(C): 36.7 (01 Nov 2018 10:28), Max: 36.8 (31 Oct 2018 15:41)  T(F): 98 (01 Nov 2018 10:28), Max: 98.2 (31 Oct 2018 15:41)  HR: 77 (01 Nov 2018 10:28) (67 - 82)  BP: 145/89 (01 Nov 2018 10:28) (139/88 - 158/95)  BP(mean): --  RR: 17 (01 Nov 2018 10:28) (11 - 26)  SpO2: 92% (01 Nov 2018 10:28) (92% - 98%)  I&O's Summary    31 Oct 2018 07:01  -  01 Nov 2018 07:00  --------------------------------------------------------  IN: 0 mL / OUT: 1750 mL / NET: -1750 mL    01 Nov 2018 07:01  -  01 Nov 2018 11:19  --------------------------------------------------------  IN: 0 mL / OUT: 200 mL / NET: -200 mL        [ x] NO APPARENT ANESTHESIA COMPLICATIONS      Comments:
Patient s/p Peg tube placement, tube connected to gravity drainage.  Incision with dressing clean and dry.  No signs of bleeding noted.    Vital Signs Last 24 Hrs  T(C): 36.3 (31 Oct 2018 17:22), Max: 36.8 (31 Oct 2018 15:41)  T(F): 97.3 (31 Oct 2018 17:22), Max: 98.2 (31 Oct 2018 15:41)  HR: 78 (31 Oct 2018 20:57) (67 - 80)  BP: 142/90 (31 Oct 2018 17:22) (129/94 - 156/95)  BP(mean): --  RR: 18 (31 Oct 2018 17:22) (11 - 26)  SpO2: 98% (31 Oct 2018 20:57) (95% - 100%)  I&O's Detail    30 Oct 2018 07:01  -  31 Oct 2018 07:00  --------------------------------------------------------  IN:  Total IN: 0 mL    OUT:    Voided: 750 mL  Total OUT: 750 mL    Total NET: -750 mL      31 Oct 2018 07:01  -  31 Oct 2018 21:01  --------------------------------------------------------  IN:  Total IN: 0 mL    OUT:    Drain: 550 mL    Voided: 300 mL  Total OUT: 850 mL    Total NET: -850 mL      MEDICATIONS  (STANDING):  ALPRAZolam 0.5 milliGRAM(s) Oral at bedtime  amphetamine/dextroamphetamine 30 milliGRAM(s) Oral two times a day  aspirin  chewable 81 milliGRAM(s) Oral daily  carvedilol 6.25 milliGRAM(s) Oral every 12 hours  dextrose 5% + sodium chloride 0.45%. 1000 milliLiter(s) (100 mL/Hr) IV Continuous <Continuous>  heparin  Injectable 5000 Unit(s) SubCutaneous every 12 hours  loratadine 10 milliGRAM(s) Oral daily  morphine ER Tablet 30 milliGRAM(s) Oral three times a day  OLANZapine 5 milliGRAM(s) Oral daily  pregabalin 75 milliGRAM(s) Oral two times a day
Pt seen and examined at bedside. No acute events overnight. Complaining of mild breathing difficulty and pain this morning. Unable to tolerate PO.    AVSS  Exam  NAD, awake and alert  Breathing in room air  Nose: obstructed 2/2 mass  OC/OP: tongue wnl, OP clear  Neck: soft/flat, no cervical LAD    A/P 58F with hx of maxillary sinus cancer presenting with decreased PO tolerance, failure to thrive, increased difficulty breathing.  - f/u MRI head/neck  - f/u CT head/neck  - IVF  - pureed diet  - home meds  - CPAP  - pain control  - SQH  - OOB ad denton
Pt seen and examined. No acute events overnight. Complaining of mild breathing difficulty and pain this morning. NPO at midnight.     AVSS  Exam  NAD, awake and alert  Breathing in room air  Nose: obstructed 2/2 mass  OC/OP: tongue wnl, OP clear  Neck: soft/flat, no cervical LAD    CT face/neck: A large mass is again noted involving the right maxillary sinus, right   maxillary alveolus, right hard palate with extension across midline to   involve the left hard palate, right greater than left nasal cavities, and   right greater than left ethmoid sinus regions. Tumor extends through the   choana into the anterior aspect of the nasopharyngeal airway. The tumor   bulges inferiorly into the oral cavity. Invasion of the inferior aspect of   the right orbit is noted with bony destruction of portions of the right   orbital floor, with tumor extending into the extraconal fat, abutting and   inseparable from the inferior rectus muscle. The right inferior rectus   muscle appears enlarged, most likely reflecting tumor invasion. Tumor   involves the canal for the right infraorbital nerve consistent with   perineural spread of tumor along V2. Tumor also involves the lower aspect of   the medial wall of the right orbit, and may also involve the nasal lacrimal   duct. The bony foramina for the greater and lesser palatine nerves on the   right is obliterated, also consistent with perineural involvement. Tumor   involves the right pterygoid plates and maxillary tuberosity, and involves   the base of the pterygoid musculature. Neoplastic extension into the right   pterygopalatine fossa is noted. Bony destruction involves the posterior   lateral wall of the right maxillary sinus, with tumor extension into the   right infratemporal fossa. Minimal tumor extension is noted to the right   premaxillary soft tissues. Sclerosis and irregularity involves the ventral   surface of the clivus suspicion for bony invasion of the skull base. Soft   tissue is noted abutting the bilateral cribriform plates and right fovea   ethmoidalis which could reflect obstructed secretions, tumor extension to   the floor of the anterior cranial fossa, or a combination of both.   Evaluation for intracranial extension of tumor along the floor of the   anterior cranial fossa is limited with CT technique, as opposed to MRI   technique; no large areas of dural thickening or enhancement are appreciated   within the limitations of CT imaging technique. Nonspecific soft tissue   involves the anterior aspects of the sphenoid sinus which could reflect   mucosal thickening, tumor extension, or a combination of both.     Meckel's caves appear symmetric. No large asymmetry is appreciated within   the cavernous sinuses (evaluation is limited with CT technique as opposed to   MRI technique).     Multiple nonspecific lymph nodes are present in the bilateral levels 1, 2,   3, 4, and 5 locations. The retropharyngeal lymph nodes are better visualized   on the prior MRI study from 10/10/2018. No necrotic lymph nodes are noted.     Small subcentimeter nodules involve both parotid glands could reflect   intraparotid lymph nodes or small primary parotid lesions. The salivary   glands are otherwise unremarkable.     Mild enlargement of the lingual tonsils at the tongue base is noted.     The larynx is unremarkable.     Streak artifact limits evaluation of the thyroid gland.     Emphysematous changes are noted in the right greater and left lungs.       A/P 58F with hx of maxillary sinus cancer presenting with decreased PO tolerance, failure to thrive, increased difficulty breathing.  -NPO  - IVF  - home meds  - CPAP overnight  - pain control  - SQH  - OOB ad denton  -poss PEG today; appreciate CTS recs
Pt seen and examined. No acute events overnight. PEG tube placed by CT surgery yesterday. NPO overnight. PEG to gravity.    AVSS  Exam  NAD, awake and alert  Breathing in room air  Nose: obstructed 2/2 mass  OC/OP: tongue wnl, OP clear  Neck: soft/flat, no cervical LAD  PEG tube in place    CT face/neck: A large mass is again noted involving the right maxillary sinus, right   maxillary alveolus, right hard palate with extension across midline to   involve the left hard palate, right greater than left nasal cavities, and   right greater than left ethmoid sinus regions. Tumor extends through the   choana into the anterior aspect of the nasopharyngeal airway. The tumor   bulges inferiorly into the oral cavity. Invasion of the inferior aspect of   the right orbit is noted with bony destruction of portions of the right   orbital floor, with tumor extending into the extraconal fat, abutting and   inseparable from the inferior rectus muscle. The right inferior rectus   muscle appears enlarged, most likely reflecting tumor invasion. Tumor   involves the canal for the right infraorbital nerve consistent with   perineural spread of tumor along V2. Tumor also involves the lower aspect of   the medial wall of the right orbit, and may also involve the nasal lacrimal   duct. The bony foramina for the greater and lesser palatine nerves on the   right is obliterated, also consistent with perineural involvement. Tumor   involves the right pterygoid plates and maxillary tuberosity, and involves   the base of the pterygoid musculature. Neoplastic extension into the right   pterygopalatine fossa is noted. Bony destruction involves the posterior   lateral wall of the right maxillary sinus, with tumor extension into the   right infratemporal fossa. Minimal tumor extension is noted to the right   premaxillary soft tissues. Sclerosis and irregularity involves the ventral   surface of the clivus suspicion for bony invasion of the skull base. Soft   tissue is noted abutting the bilateral cribriform plates and right fovea   ethmoidalis which could reflect obstructed secretions, tumor extension to   the floor of the anterior cranial fossa, or a combination of both.   Evaluation for intracranial extension of tumor along the floor of the   anterior cranial fossa is limited with CT technique, as opposed to MRI   technique; no large areas of dural thickening or enhancement are appreciated   within the limitations of CT imaging technique. Nonspecific soft tissue   involves the anterior aspects of the sphenoid sinus which could reflect   mucosal thickening, tumor extension, or a combination of both.     Meckel's caves appear symmetric. No large asymmetry is appreciated within   the cavernous sinuses (evaluation is limited with CT technique as opposed to   MRI technique).     Multiple nonspecific lymph nodes are present in the bilateral levels 1, 2,   3, 4, and 5 locations. The retropharyngeal lymph nodes are better visualized   on the prior MRI study from 10/10/2018. No necrotic lymph nodes are noted.     Small subcentimeter nodules involve both parotid glands could reflect   intraparotid lymph nodes or small primary parotid lesions. The salivary   glands are otherwise unremarkable.     Mild enlargement of the lingual tonsils at the tongue base is noted.     The larynx is unremarkable.     Streak artifact limits evaluation of the thyroid gland.     Emphysematous changes are noted in the right greater and left lungs.       A/P 58F with hx of maxillary sinus cancer presenting with decreased PO tolerance, failure to thrive, increased difficulty breathing.  - NPO  - IVF  - start PEG feeds and PO meds via PEG today  - home meds  - CPAP overnight  - pain control  - SQH  - OOB ad denton

## 2018-11-02 NOTE — PROVIDER CONTACT NOTE (OTHER) - SITUATION
Pt. receiving 1mg dilaudid IV q3hrs. Pt to be d/c today, takes 4mg PO dilaudid at home. Pt. states 4mg PO does not relieve pain.

## 2018-11-02 NOTE — DIETITIAN INITIAL EVALUATION ADULT. - OTHER INFO
Received nutrition consult for TF assessment. Pt remains s/p PEG placement---received 350 ml bolus feeds over one hr via pump per Registered Nurse. No reported nausea, vomiting, diarrhea, constipation. Pt has hx of PEG and was being followed by outpatient Nutritionist per Wife. Pt was on Jevity 1.5 bolus feeds (240 ml 3x daily). Currently on Jevity 1.2 in house. Would suggest d/c home on Jevity 1.5, care discussed with . Also to note Pt has lost significant amount of weight, 70# within one year.

## 2018-11-02 NOTE — DIETITIAN INITIAL EVALUATION ADULT. - NS AS NUTRI INTERV ENTERAL NUTRITION
1. Pt is for D/C today: Recommend Jevity 1.5, 255 ml bolus feeds x 5 daily as tolerated.  2. Recommend follow up with outpatient Registered Dietitian.

## 2018-11-02 NOTE — DIETITIAN INITIAL EVALUATION ADULT. - SOURCE
other (specify)/patient/EMR Reviewed, Registered Nurse, Case Manger, Spouse at bedside/family/significant other

## 2018-11-02 NOTE — DIETITIAN INITIAL EVALUATION ADULT. - PHYSICAL APPEARANCE
Subcutaneous FAT LOSS:  [ ] Orbital fat pads region, [SEVERE] Buccal fat region, [ ]Triceps region,  [ ]Ribs region  MUSCLE WASTING: [SEVERE]Temples region, [SEVERE]Clavicle region, [ ]Shoulder region, [ ]Scapula region, [SEVERE] Interosseous region,  [ ]thigh region, [ ]Calf region.

## 2018-11-05 ENCOUNTER — CHART COPY (OUTPATIENT)
Age: 58
End: 2018-11-05

## 2018-11-05 PROCEDURE — 77263 THER RADIOLOGY TX PLNG CPLX: CPT

## 2018-11-06 PROCEDURE — 77338 DESIGN MLC DEVICE FOR IMRT: CPT | Mod: 26

## 2018-11-06 PROCEDURE — 77301 RADIOTHERAPY DOSE PLAN IMRT: CPT | Mod: 26

## 2018-11-06 PROCEDURE — 77300 RADIATION THERAPY DOSE PLAN: CPT | Mod: 26

## 2018-11-12 ENCOUNTER — RESULT REVIEW (OUTPATIENT)
Age: 58
End: 2018-11-12

## 2018-11-12 ENCOUNTER — APPOINTMENT (OUTPATIENT)
Age: 58
End: 2018-11-12

## 2018-11-12 ENCOUNTER — LABORATORY RESULT (OUTPATIENT)
Age: 58
End: 2018-11-12

## 2018-11-12 ENCOUNTER — APPOINTMENT (OUTPATIENT)
Dept: PHYSICAL MEDICINE AND REHAB | Facility: CLINIC | Age: 58
End: 2018-11-12
Payer: MEDICARE

## 2018-11-12 ENCOUNTER — APPOINTMENT (OUTPATIENT)
Dept: HEMATOLOGY ONCOLOGY | Facility: CLINIC | Age: 58
End: 2018-11-12
Payer: MEDICARE

## 2018-11-12 VITALS
HEIGHT: 71 IN | BODY MASS INDEX: 23.94 KG/M2 | DIASTOLIC BLOOD PRESSURE: 79 MMHG | HEART RATE: 97 BPM | WEIGHT: 171 LBS | SYSTOLIC BLOOD PRESSURE: 113 MMHG

## 2018-11-12 VITALS
BODY MASS INDEX: 22.82 KG/M2 | DIASTOLIC BLOOD PRESSURE: 80 MMHG | WEIGHT: 163 LBS | OXYGEN SATURATION: 95 % | SYSTOLIC BLOOD PRESSURE: 119 MMHG | HEART RATE: 106 BPM | RESPIRATION RATE: 16 BRPM | HEIGHT: 71 IN

## 2018-11-12 DIAGNOSIS — Z93.1 GASTROSTOMY STATUS: ICD-10-CM

## 2018-11-12 DIAGNOSIS — F41.9 ANXIETY DISORDER, UNSPECIFIED: ICD-10-CM

## 2018-11-12 DIAGNOSIS — R10.9 UNSPECIFIED ABDOMINAL PAIN: ICD-10-CM

## 2018-11-12 LAB
BASOPHILS # BLD AUTO: 0.1 K/UL — SIGNIFICANT CHANGE UP (ref 0–0.2)
BASOPHILS NFR BLD AUTO: 0.7 % — SIGNIFICANT CHANGE UP (ref 0–2)
EOSINOPHIL # BLD AUTO: 0.5 K/UL — SIGNIFICANT CHANGE UP (ref 0–0.5)
EOSINOPHIL NFR BLD AUTO: 5 % — SIGNIFICANT CHANGE UP (ref 0–6)
HCT VFR BLD CALC: 40.3 % — SIGNIFICANT CHANGE UP (ref 39–50)
HGB BLD-MCNC: 12.6 G/DL — LOW (ref 13–17)
LYMPHOCYTES # BLD AUTO: 19.7 % — SIGNIFICANT CHANGE UP (ref 13–44)
LYMPHOCYTES # BLD AUTO: 2 K/UL — SIGNIFICANT CHANGE UP (ref 1–3.3)
MCHC RBC-ENTMCNC: 26.4 PG — LOW (ref 27–34)
MCHC RBC-ENTMCNC: 31.3 GM/DL — LOW (ref 32–36)
MCV RBC AUTO: 84.5 FL — SIGNIFICANT CHANGE UP (ref 80–100)
MONOCYTES # BLD AUTO: 1 K/UL — HIGH (ref 0–0.9)
MONOCYTES NFR BLD AUTO: 9.9 % — SIGNIFICANT CHANGE UP (ref 2–14)
NEUTROPHILS # BLD AUTO: 6.5 K/UL — SIGNIFICANT CHANGE UP (ref 1.8–7.4)
NEUTROPHILS NFR BLD AUTO: 64.8 % — SIGNIFICANT CHANGE UP (ref 43–77)
PLATELET # BLD AUTO: 389 K/UL — SIGNIFICANT CHANGE UP (ref 150–400)
RBC # BLD: 4.77 M/UL — SIGNIFICANT CHANGE UP (ref 4.2–5.8)
RBC # FLD: 14.9 % — HIGH (ref 10.3–14.5)
WBC # BLD: 10 K/UL — SIGNIFICANT CHANGE UP (ref 3.8–10.5)
WBC # FLD AUTO: 10 K/UL — SIGNIFICANT CHANGE UP (ref 3.8–10.5)

## 2018-11-12 PROCEDURE — 99215 OFFICE O/P EST HI 40 MIN: CPT

## 2018-11-12 PROCEDURE — 99214 OFFICE O/P EST MOD 30 MIN: CPT

## 2018-11-12 PROCEDURE — 77387B: CUSTOM | Mod: 26

## 2018-11-12 RX ORDER — HYDROMORPHONE HYDROCHLORIDE 8 MG/1
8 TABLET ORAL
Qty: 150 | Refills: 0 | Status: ACTIVE | COMMUNITY
Start: 2018-07-03 | End: 1900-01-01

## 2018-11-12 RX ORDER — DIPHENOXYLATE HCL/ATROPINE 2.5-.025MG
2 TABLET ORAL
Qty: 0 | Refills: 0 | COMMUNITY

## 2018-11-12 RX ORDER — FEXOFENADINE HCL 30 MG
1 TABLET ORAL
Qty: 0 | Refills: 0 | COMMUNITY

## 2018-11-12 RX ORDER — ASPIRIN/CALCIUM CARB/MAGNESIUM 324 MG
1 TABLET ORAL
Qty: 0 | Refills: 0 | COMMUNITY

## 2018-11-12 RX ORDER — MORPHINE SULFATE 50 MG/1
50 CAPSULE, EXTENDED RELEASE ORAL
Qty: 90 | Refills: 0 | Status: ACTIVE | COMMUNITY
Start: 2018-11-12 | End: 1900-01-01

## 2018-11-12 RX ORDER — LOPERAMIDE HCL 2 MG
1 TABLET ORAL
Qty: 0 | Refills: 0 | COMMUNITY

## 2018-11-12 RX ORDER — PREGABALIN 75 MG/1
75 CAPSULE ORAL
Qty: 90 | Refills: 0 | Status: ACTIVE | COMMUNITY
Start: 2018-07-02 | End: 1900-01-01

## 2018-11-12 RX ORDER — DOCUSATE SODIUM 100 MG
0 CAPSULE ORAL
Qty: 0 | Refills: 0 | COMMUNITY

## 2018-11-12 RX ORDER — ONDANSETRON 8 MG/1
1 TABLET, FILM COATED ORAL
Qty: 0 | Refills: 0 | COMMUNITY

## 2018-11-12 RX ORDER — LIDOCAINE AND PRILOCAINE 25; 25 MG/G; MG/G
2.5-2.5 CREAM TOPICAL
Qty: 30 | Refills: 0 | Status: ACTIVE | COMMUNITY
Start: 2018-11-12 | End: 1900-01-01

## 2018-11-12 NOTE — REVIEW OF SYSTEMS
[Dysphagia: Grade 2 - Symptomatic and altered eating/swallowing] : Dysphagia: Grade 2 - Symptomatic and altered eating/swallowing [Blurred Vision: Grade 0] : Blurred Vision: Grade 0 [Mucositis Oral: Grade 0] : Mucositis Oral: Grade 0

## 2018-11-12 NOTE — PHYSICAL EXAM
[Hearing Threshold Finger Rub Not Obion] : hearing was normal [Normal] : no JVD, no calf tenderness [Feeding Tube] : a feeding tube was present

## 2018-11-12 NOTE — VITALS
[Maximal Pain Intensity: 10/10] : 10/10 [Least Pain Intensity: 7/10] : 7/10 [Pain Description/Quality: ___] : Pain description/quality: [unfilled] [Pain Duration: ___] : Pain duration: [unfilled] [Pain Location: ___] : Pain Location: [unfilled] [Pain Interferes with ADLs] : Pain interferes with activities of daily living. [Opioid] : opioid [70: Cares for self; unalbe to carry on normal activity or do active work.] : 70: Cares for self; unable to carry on normal activity or do active work. [ECOG Performance Status: 2 - Ambulatory and capable of all self care but unable to carry out any work activities] : Performance Status: 2 - Ambulatory and capable of all self care but unable to carry out any work activities. Up and about more than 50% of waking hours

## 2018-11-12 NOTE — DISEASE MANAGEMENT
[IVB] : IVB [Clinical] : TNM Stage: c [FreeTextEntry4] : squamous cell carcinoma [TTNM] : 4b [NTNM] : 0 [MTNM] : 0 [de-identified] : 200 cGy [de-identified] : 5,000 cGy [de-identified] : right maxillary sinus

## 2018-11-13 ENCOUNTER — OUTPATIENT (OUTPATIENT)
Dept: OUTPATIENT SERVICES | Facility: HOSPITAL | Age: 58
LOS: 1 days | Discharge: HOME | End: 2018-11-13

## 2018-11-13 DIAGNOSIS — H26.9 UNSPECIFIED CATARACT: Chronic | ICD-10-CM

## 2018-11-13 DIAGNOSIS — Z98.61 CORONARY ANGIOPLASTY STATUS: Chronic | ICD-10-CM

## 2018-11-13 DIAGNOSIS — Z51.11 ENCOUNTER FOR ANTINEOPLASTIC CHEMOTHERAPY: ICD-10-CM

## 2018-11-13 DIAGNOSIS — Z98.890 OTHER SPECIFIED POSTPROCEDURAL STATES: Chronic | ICD-10-CM

## 2018-11-13 DIAGNOSIS — R11.2 NAUSEA WITH VOMITING, UNSPECIFIED: ICD-10-CM

## 2018-11-13 DIAGNOSIS — C31.0 MALIGNANT NEOPLASM OF MAXILLARY SINUS: ICD-10-CM

## 2018-11-13 DIAGNOSIS — C76.0 MALIGNANT NEOPLASM OF HEAD, FACE AND NECK: ICD-10-CM

## 2018-11-13 DIAGNOSIS — Z90.2 ACQUIRED ABSENCE OF LUNG [PART OF]: Chronic | ICD-10-CM

## 2018-11-13 DIAGNOSIS — E86.0 DEHYDRATION: ICD-10-CM

## 2018-11-13 PROCEDURE — 77387B: CUSTOM | Mod: 26

## 2018-11-14 PROCEDURE — 77387B: CUSTOM | Mod: 26

## 2018-11-15 PROCEDURE — 77387B: CUSTOM | Mod: 26

## 2018-11-15 RX ORDER — PROCHLORPERAZINE MALEATE 10 MG/1
10 TABLET ORAL EVERY 6 HOURS
Qty: 120 | Refills: 5 | Status: ACTIVE | COMMUNITY
Start: 2018-07-02 | End: 1900-01-01

## 2018-11-18 ENCOUNTER — TRANSCRIPTION ENCOUNTER (OUTPATIENT)
Age: 58
End: 2018-11-18

## 2018-11-19 ENCOUNTER — APPOINTMENT (OUTPATIENT)
Dept: HEMATOLOGY ONCOLOGY | Facility: CLINIC | Age: 58
End: 2018-11-19
Payer: MEDICARE

## 2018-11-19 ENCOUNTER — APPOINTMENT (OUTPATIENT)
Age: 58
End: 2018-11-19

## 2018-11-19 ENCOUNTER — INBOUND DOCUMENT (OUTPATIENT)
Age: 58
End: 2018-11-19

## 2018-11-19 ENCOUNTER — INPATIENT (INPATIENT)
Facility: HOSPITAL | Age: 58
LOS: 19 days | End: 2018-12-09
Attending: OTOLARYNGOLOGY | Admitting: OTOLARYNGOLOGY
Payer: MEDICARE

## 2018-11-19 ENCOUNTER — APPOINTMENT (OUTPATIENT)
Dept: OTOLARYNGOLOGY | Facility: CLINIC | Age: 58
End: 2018-11-19
Payer: MEDICARE

## 2018-11-19 VITALS
SYSTOLIC BLOOD PRESSURE: 109 MMHG | DIASTOLIC BLOOD PRESSURE: 77 MMHG | HEART RATE: 95 BPM | BODY MASS INDEX: 22.18 KG/M2 | WEIGHT: 159 LBS

## 2018-11-19 VITALS
DIASTOLIC BLOOD PRESSURE: 72 MMHG | SYSTOLIC BLOOD PRESSURE: 104 MMHG | RESPIRATION RATE: 18 BRPM | OXYGEN SATURATION: 94 % | HEART RATE: 95 BPM

## 2018-11-19 VITALS
TEMPERATURE: 99 F | HEART RATE: 114 BPM | SYSTOLIC BLOOD PRESSURE: 122 MMHG | DIASTOLIC BLOOD PRESSURE: 86 MMHG | RESPIRATION RATE: 18 BRPM | OXYGEN SATURATION: 98 %

## 2018-11-19 VITALS
DIASTOLIC BLOOD PRESSURE: 77 MMHG | WEIGHT: 159.06 LBS | SYSTOLIC BLOOD PRESSURE: 109 MMHG | BODY MASS INDEX: 22.27 KG/M2 | HEART RATE: 95 BPM | OXYGEN SATURATION: 96 % | HEIGHT: 71 IN

## 2018-11-19 DIAGNOSIS — J98.8 OTHER SPECIFIED RESPIRATORY DISORDERS: ICD-10-CM

## 2018-11-19 DIAGNOSIS — Z98.890 OTHER SPECIFIED POSTPROCEDURAL STATES: Chronic | ICD-10-CM

## 2018-11-19 DIAGNOSIS — C31.0 MALIGNANT NEOPLASM OF MAXILLARY SINUS: ICD-10-CM

## 2018-11-19 DIAGNOSIS — Z90.2 ACQUIRED ABSENCE OF LUNG [PART OF]: Chronic | ICD-10-CM

## 2018-11-19 DIAGNOSIS — H26.9 UNSPECIFIED CATARACT: Chronic | ICD-10-CM

## 2018-11-19 DIAGNOSIS — R63.4 ABNORMAL WEIGHT LOSS: ICD-10-CM

## 2018-11-19 DIAGNOSIS — J39.2 OTHER DISEASES OF PHARYNX: ICD-10-CM

## 2018-11-19 DIAGNOSIS — G89.3 NEOPLASM RELATED PAIN (ACUTE) (CHRONIC): ICD-10-CM

## 2018-11-19 DIAGNOSIS — Z98.61 CORONARY ANGIOPLASTY STATUS: Chronic | ICD-10-CM

## 2018-11-19 DIAGNOSIS — R19.7 DIARRHEA, UNSPECIFIED: ICD-10-CM

## 2018-11-19 LAB
ALBUMIN SERPL ELPH-MCNC: 3.9 G/DL — SIGNIFICANT CHANGE UP (ref 3.3–5)
ALP SERPL-CCNC: 178 U/L — HIGH (ref 40–120)
ALT FLD-CCNC: 17 U/L — SIGNIFICANT CHANGE UP (ref 4–41)
APTT BLD: 40 SEC — HIGH (ref 27.5–36.3)
AST SERPL-CCNC: 16 U/L — SIGNIFICANT CHANGE UP (ref 4–40)
BASE EXCESS BLDV CALC-SCNC: 4.6 MMOL/L — SIGNIFICANT CHANGE UP
BASOPHILS # BLD AUTO: 0.02 K/UL — SIGNIFICANT CHANGE UP (ref 0–0.2)
BASOPHILS NFR BLD AUTO: 0.2 % — SIGNIFICANT CHANGE UP (ref 0–2)
BILIRUB SERPL-MCNC: 0.9 MG/DL — SIGNIFICANT CHANGE UP (ref 0.2–1.2)
BLD GP AB SCN SERPL QL: NEGATIVE — SIGNIFICANT CHANGE UP
BLOOD GAS VENOUS - CREATININE: SIGNIFICANT CHANGE UP MG/DL (ref 0.5–1.3)
BUN SERPL-MCNC: 23 MG/DL — SIGNIFICANT CHANGE UP (ref 7–23)
CALCIUM SERPL-MCNC: 9.8 MG/DL — SIGNIFICANT CHANGE UP (ref 8.4–10.5)
CHLORIDE BLDV-SCNC: 89 MMOL/L — LOW (ref 96–108)
CHLORIDE SERPL-SCNC: 87 MMOL/L — LOW (ref 98–107)
CO2 SERPL-SCNC: 24 MMOL/L — SIGNIFICANT CHANGE UP (ref 22–31)
CREAT SERPL-MCNC: 0.92 MG/DL — SIGNIFICANT CHANGE UP (ref 0.5–1.3)
EOSINOPHIL # BLD AUTO: 0.09 K/UL — SIGNIFICANT CHANGE UP (ref 0–0.5)
EOSINOPHIL NFR BLD AUTO: 1 % — SIGNIFICANT CHANGE UP (ref 0–6)
GAS PNL BLDV: 126 MMOL/L — LOW (ref 136–146)
GLUCOSE BLDV-MCNC: 93 — SIGNIFICANT CHANGE UP (ref 70–99)
GLUCOSE SERPL-MCNC: 85 MG/DL — SIGNIFICANT CHANGE UP (ref 70–99)
HCO3 BLDV-SCNC: 27 MMOL/L — SIGNIFICANT CHANGE UP (ref 20–27)
HCT VFR BLD CALC: 33 % — LOW (ref 39–50)
HCT VFR BLDV CALC: 34.2 % — LOW (ref 39–51)
HGB BLD-MCNC: 11 G/DL — LOW (ref 13–17)
HGB BLDV-MCNC: 11.1 G/DL — LOW (ref 13–17)
IMM GRANULOCYTES # BLD AUTO: 0.08 # — SIGNIFICANT CHANGE UP
IMM GRANULOCYTES NFR BLD AUTO: 0.8 % — SIGNIFICANT CHANGE UP (ref 0–1.5)
INR BLD: 1.81 — HIGH (ref 0.88–1.17)
LACTATE BLDV-MCNC: 1.3 MMOL/L — SIGNIFICANT CHANGE UP (ref 0.5–2)
LYMPHOCYTES # BLD AUTO: 1.19 K/UL — SIGNIFICANT CHANGE UP (ref 1–3.3)
LYMPHOCYTES # BLD AUTO: 12.6 % — LOW (ref 13–44)
MCHC RBC-ENTMCNC: 26.7 PG — LOW (ref 27–34)
MCHC RBC-ENTMCNC: 33.3 % — SIGNIFICANT CHANGE UP (ref 32–36)
MCV RBC AUTO: 80.1 FL — SIGNIFICANT CHANGE UP (ref 80–100)
MONOCYTES # BLD AUTO: 0.98 K/UL — HIGH (ref 0–0.9)
MONOCYTES NFR BLD AUTO: 10.4 % — SIGNIFICANT CHANGE UP (ref 2–14)
NEUTROPHILS # BLD AUTO: 7.1 K/UL — SIGNIFICANT CHANGE UP (ref 1.8–7.4)
NEUTROPHILS NFR BLD AUTO: 75 % — SIGNIFICANT CHANGE UP (ref 43–77)
NRBC # FLD: 0 — SIGNIFICANT CHANGE UP
PCO2 BLDV: 39 MMHG — LOW (ref 41–51)
PH BLDV: 7.48 PH — HIGH (ref 7.32–7.43)
PLATELET # BLD AUTO: 370 K/UL — SIGNIFICANT CHANGE UP (ref 150–400)
PMV BLD: 9.4 FL — SIGNIFICANT CHANGE UP (ref 7–13)
PO2 BLDV: 12 MMHG — LOW (ref 35–40)
POTASSIUM BLDV-SCNC: 4 MMOL/L — SIGNIFICANT CHANGE UP (ref 3.4–4.5)
POTASSIUM SERPL-MCNC: 4.1 MMOL/L — SIGNIFICANT CHANGE UP (ref 3.5–5.3)
POTASSIUM SERPL-SCNC: 4.1 MMOL/L — SIGNIFICANT CHANGE UP (ref 3.5–5.3)
PROT SERPL-MCNC: 8.2 G/DL — SIGNIFICANT CHANGE UP (ref 6–8.3)
PROTHROM AB SERPL-ACNC: 20.5 SEC — HIGH (ref 9.8–13.1)
RBC # BLD: 4.12 M/UL — LOW (ref 4.2–5.8)
RBC # FLD: 15.3 % — HIGH (ref 10.3–14.5)
RH IG SCN BLD-IMP: NEGATIVE — SIGNIFICANT CHANGE UP
SAO2 % BLDV: 11.6 % — LOW (ref 60–85)
SODIUM SERPL-SCNC: 127 MMOL/L — LOW (ref 135–145)
WBC # BLD: 9.46 K/UL — SIGNIFICANT CHANGE UP (ref 3.8–10.5)
WBC # FLD AUTO: 9.46 K/UL — SIGNIFICANT CHANGE UP (ref 3.8–10.5)

## 2018-11-19 PROCEDURE — 31231 NASAL ENDOSCOPY DX: CPT

## 2018-11-19 PROCEDURE — 71045 X-RAY EXAM CHEST 1 VIEW: CPT | Mod: 26,77

## 2018-11-19 PROCEDURE — 99215 OFFICE O/P EST HI 40 MIN: CPT | Mod: PD

## 2018-11-19 PROCEDURE — 71045 X-RAY EXAM CHEST 1 VIEW: CPT | Mod: 26

## 2018-11-19 PROCEDURE — 99215 OFFICE O/P EST HI 40 MIN: CPT | Mod: 25

## 2018-11-19 RX ORDER — CARVEDILOL PHOSPHATE 80 MG/1
6.25 CAPSULE, EXTENDED RELEASE ORAL EVERY 12 HOURS
Qty: 0 | Refills: 0 | Status: DISCONTINUED | OUTPATIENT
Start: 2018-11-19 | End: 2018-11-19

## 2018-11-19 RX ORDER — HYDROMORPHONE HYDROCHLORIDE 2 MG/ML
8 INJECTION INTRAMUSCULAR; INTRAVENOUS; SUBCUTANEOUS EVERY 4 HOURS
Qty: 0 | Refills: 0 | Status: DISCONTINUED | OUTPATIENT
Start: 2018-11-19 | End: 2018-11-19

## 2018-11-19 RX ORDER — SODIUM CHLORIDE 9 MG/ML
1000 INJECTION, SOLUTION INTRAVENOUS
Qty: 0 | Refills: 0 | Status: DISCONTINUED | OUTPATIENT
Start: 2018-11-19 | End: 2018-11-19

## 2018-11-19 RX ORDER — HYDROMORPHONE HYDROCHLORIDE 2 MG/ML
8 INJECTION INTRAMUSCULAR; INTRAVENOUS; SUBCUTANEOUS EVERY 8 HOURS
Qty: 0 | Refills: 0 | Status: DISCONTINUED | OUTPATIENT
Start: 2018-11-19 | End: 2018-11-19

## 2018-11-19 RX ORDER — HYDROMORPHONE HYDROCHLORIDE 2 MG/ML
1 INJECTION INTRAMUSCULAR; INTRAVENOUS; SUBCUTANEOUS
Qty: 0 | Refills: 0 | Status: DISCONTINUED | OUTPATIENT
Start: 2018-11-19 | End: 2018-11-20

## 2018-11-19 RX ORDER — ONDANSETRON 8 MG/1
4 TABLET, FILM COATED ORAL ONCE
Qty: 0 | Refills: 0 | Status: DISCONTINUED | OUTPATIENT
Start: 2018-11-19 | End: 2018-11-20

## 2018-11-19 RX ORDER — HYDROMORPHONE HYDROCHLORIDE 2 MG/ML
1 INJECTION INTRAMUSCULAR; INTRAVENOUS; SUBCUTANEOUS ONCE
Qty: 0 | Refills: 0 | Status: DISCONTINUED | OUTPATIENT
Start: 2018-11-19 | End: 2018-11-19

## 2018-11-19 RX ORDER — ONDANSETRON 8 MG/1
8 TABLET, FILM COATED ORAL THREE TIMES A DAY
Qty: 0 | Refills: 0 | Status: DISCONTINUED | OUTPATIENT
Start: 2018-11-19 | End: 2018-11-19

## 2018-11-19 RX ORDER — ASPIRIN/CALCIUM CARB/MAGNESIUM 324 MG
81 TABLET ORAL DAILY
Qty: 0 | Refills: 0 | Status: DISCONTINUED | OUTPATIENT
Start: 2018-11-19 | End: 2018-11-21

## 2018-11-19 RX ORDER — HYDROMORPHONE HYDROCHLORIDE 2 MG/ML
2 INJECTION INTRAMUSCULAR; INTRAVENOUS; SUBCUTANEOUS
Qty: 0 | Refills: 0 | Status: DISCONTINUED | OUTPATIENT
Start: 2018-11-19 | End: 2018-11-19

## 2018-11-19 RX ORDER — LORATADINE 10 MG/1
10 TABLET ORAL DAILY
Qty: 0 | Refills: 0 | Status: DISCONTINUED | OUTPATIENT
Start: 2018-11-19 | End: 2018-11-21

## 2018-11-19 RX ORDER — MORPHINE SULFATE 50 MG/1
30 CAPSULE, EXTENDED RELEASE ORAL EVERY 8 HOURS
Qty: 0 | Refills: 0 | Status: DISCONTINUED | OUTPATIENT
Start: 2018-11-19 | End: 2018-11-20

## 2018-11-19 RX ORDER — ALPRAZOLAM 0.25 MG
0.5 TABLET ORAL AT BEDTIME
Qty: 0 | Refills: 0 | Status: DISCONTINUED | OUTPATIENT
Start: 2018-11-19 | End: 2018-11-19

## 2018-11-19 RX ORDER — CARVEDILOL PHOSPHATE 80 MG/1
6.25 CAPSULE, EXTENDED RELEASE ORAL EVERY 12 HOURS
Qty: 0 | Refills: 0 | Status: DISCONTINUED | OUTPATIENT
Start: 2018-11-19 | End: 2018-11-25

## 2018-11-19 RX ORDER — HYDROMORPHONE HYDROCHLORIDE 2 MG/ML
2 INJECTION INTRAMUSCULAR; INTRAVENOUS; SUBCUTANEOUS
Qty: 0 | Refills: 0 | Status: DISCONTINUED | OUTPATIENT
Start: 2018-11-19 | End: 2018-11-20

## 2018-11-19 RX ORDER — ACETAMINOPHEN 500 MG
1000 TABLET ORAL ONCE
Qty: 0 | Refills: 0 | Status: COMPLETED | OUTPATIENT
Start: 2018-11-19 | End: 2018-11-19

## 2018-11-19 RX ORDER — ALPRAZOLAM 0.25 MG
0.5 TABLET ORAL AT BEDTIME
Qty: 0 | Refills: 0 | Status: DISCONTINUED | OUTPATIENT
Start: 2018-11-19 | End: 2018-11-20

## 2018-11-19 RX ORDER — FENTANYL CITRATE 50 UG/ML
25 INJECTION INTRAVENOUS
Qty: 0 | Refills: 0 | Status: DISCONTINUED | OUTPATIENT
Start: 2018-11-19 | End: 2018-11-20

## 2018-11-19 RX ORDER — ASPIRIN/CALCIUM CARB/MAGNESIUM 324 MG
81 TABLET ORAL DAILY
Qty: 0 | Refills: 0 | Status: DISCONTINUED | OUTPATIENT
Start: 2018-11-19 | End: 2018-11-19

## 2018-11-19 RX ADMIN — FENTANYL CITRATE 25 MICROGRAM(S): 50 INJECTION INTRAVENOUS at 18:53

## 2018-11-19 RX ADMIN — SODIUM CHLORIDE 140 MILLILITER(S): 9 INJECTION, SOLUTION INTRAVENOUS at 16:21

## 2018-11-19 RX ADMIN — HYDROMORPHONE HYDROCHLORIDE 1 MILLIGRAM(S): 2 INJECTION INTRAMUSCULAR; INTRAVENOUS; SUBCUTANEOUS at 16:21

## 2018-11-19 RX ADMIN — Medication 400 MILLIGRAM(S): at 18:54

## 2018-11-19 RX ADMIN — HYDROMORPHONE HYDROCHLORIDE 2 MILLIGRAM(S): 2 INJECTION INTRAMUSCULAR; INTRAVENOUS; SUBCUTANEOUS at 20:48

## 2018-11-19 RX ADMIN — HYDROMORPHONE HYDROCHLORIDE 1 MILLIGRAM(S): 2 INJECTION INTRAMUSCULAR; INTRAVENOUS; SUBCUTANEOUS at 16:12

## 2018-11-19 RX ADMIN — Medication 1000 MILLIGRAM(S): at 19:24

## 2018-11-19 RX ADMIN — FENTANYL CITRATE 25 MICROGRAM(S): 50 INJECTION INTRAVENOUS at 19:21

## 2018-11-19 RX ADMIN — HYDROMORPHONE HYDROCHLORIDE 2 MILLIGRAM(S): 2 INJECTION INTRAMUSCULAR; INTRAVENOUS; SUBCUTANEOUS at 21:00

## 2018-11-19 RX ADMIN — FENTANYL CITRATE 25 MICROGRAM(S): 50 INJECTION INTRAVENOUS at 19:36

## 2018-11-19 RX ADMIN — FENTANYL CITRATE 25 MICROGRAM(S): 50 INJECTION INTRAVENOUS at 19:08

## 2018-11-19 RX ADMIN — SODIUM CHLORIDE 140 MILLILITER(S): 9 INJECTION, SOLUTION INTRAVENOUS at 18:54

## 2018-11-19 NOTE — ED ADULT NURSE NOTE - ED STAT RN HANDOFF DETAILS
Patient is A&Ox4, aware of plan of care, called for by OR.  Report given to Or NORMAN Porras. Pt transported with RT on BIPAP Patient awaiting transportation.  Will continue to monitor patient closely. ARNULFO Manzano R.N.

## 2018-11-19 NOTE — PHYSICAL EXAM
[Restricted in physically strenuous activity but ambulatory and able to carry out work of a light or sedentary nature] : Status 1- Restricted in physically strenuous activity but ambulatory and able to carry out work of a light or sedentary nature, e.g., light house work, office work [Normal] : affect appropriate [de-identified] : right ptosis [de-identified] : 5 x 4 cm mass,  ovoid, heterogenous, soft and fleshy, fixed - right superior mouth. right lateral hard palate extending to soft palate. extensive necrotic soft tissue mass without evidence of bleeding or ulceration. No oral thrush

## 2018-11-19 NOTE — CONSULT NOTE ADULT - SUBJECTIVE AND OBJECTIVE BOX
HISTORY OF PRESENT ILLNESS:  NITIN DAVALOS is a 57yo male PMH bipolar disorder, congestive heart failure, diaphragm paralysis, YANET, maxillary sinus neoplasm presenting from ENT's office (Dr. Blanca) for worsening shortness of breath. 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 Patient has intermittently been using BiPAP. Since Friday, patient has had increased difficulty breathing and is unable to lay flat, saw his ENT today who sent him to ED for urgent tracheostomy. Patient is c/o pain at site of tumor.     Taken to OR emergently for awake tracheostomy placement, tolerated procedure without issue. SICU consulted for hemodynamic monitoring .     PAST MEDICAL HISTORY: YANET (mycobacterium avium-intracellulare)  Neoplasm of maxillary sinus  SIRS (systemic inflammatory response syndrome)  Bipolar disorder, unspecified  Renal stones  CAD (coronary artery disease)  CHF (congestive heart failure)  HLD (hyperlipidemia)  HTN (hypertension)  Diaphragm dysfunction  ETOH abuse  HF (heart failure)  LORNA on CPAP  Smoker  Chronic obstructive pulmonary disease, unspecified COPD type    PAST SURGICAL HISTORY: Cataract  S/P lobectomy of lung  H/O endoscopy  H/O colonoscopy  H/O coronary angioplasty  No significant past surgical history    FAMILY HISTORY: No family history of COPD  Family history of hypertension in mother (Sibling)    SOCIAL HISTORY: denied toxic habits x 3     CODE STATUS: FULL    HOME MEDICATIONS:  aspirin 81 mg oral tablet, chewable: 1 tab(s) orally once a day (12 Nov 2018 14:38)  HYDROmorphone 8 mg oral tablet: 1 tab(s) orally every 4 hours, As Needed - 10) MDD:6 (12 Nov 2018 14:38)  morphine 50 mg/24 hours oral capsule, extended release: 1 tab(s) orally 3 times a day (12 Nov 2018 14:38)  OLANZapine 5 mg oral tablet: 1 tab(s) orally once a day (at bedtime) (12 Nov 2018 14:38)  Xanax 0.5 mg oral tablet: 3 tab(s) orally once a day (at bedtime), As Needed - anxiety (12 Nov 2018 14:38)  Zofran 8 mg oral tablet: 1 tab(s) orally 3 times a day, As Needed (12 Nov 2018 14:38)    ALLERGIES: hospital socks (Rash)  lisinopril (Anaphylaxis)  statins (Anaphylaxis)      VITAL SIGNS:  ICU Vital Signs Last 24 Hrs  T(C): 36.5 (19 Nov 2018 19:00), Max: 37.3 (19 Nov 2018 15:15)  T(F): 97.7 (19 Nov 2018 19:00), Max: 99.1 (19 Nov 2018 15:15)  HR: 90 (19 Nov 2018 19:30) (90 - 114)  BP: 116/70 (19 Nov 2018 19:30) (101/69 - 135/76)  RR: 17 (19 Nov 2018 19:30) (16 - 26)  SpO2: 99% (19 Nov 2018 19:30) (97% - 100%)    NEURO  Exam: awake and alert, following commands   ALPRAZolam 0.5 milliGRAM(s) Oral at bedtime PRN anxiety  fentaNYL    Injectable 25 MICROGram(s) IV Push every 5 minutes PRN Moderate Pain (4 - 6)  HYDROmorphone   Tablet 8 milliGRAM(s) Oral every 8 hours PRN Severe Pain (7 - 10)  HYDROmorphone  Injectable 2 milliGRAM(s) IV Push every 10 minutes PRN Severe Pain (7 - 10)  HYDROmorphone  Injectable 1 milliGRAM(s) IV Push every 3 hours PRN Severe Pain (7 - 10)  morphine ER Tablet 30 milliGRAM(s) Oral every 8 hours  ondansetron    Tablet 8 milliGRAM(s) Oral three times a day PRN Nausea and/or Vomiting  ondansetron Injectable 4 milliGRAM(s) IV Push once PRN Nausea and/or Vomiting  pregabalin 75 milliGRAM(s) Oral two times a day      RESPIRATORY  Exam: Trach on collar  loratadine 10 milliGRAM(s) Oral daily      CARDIOVASCULAR  VBG - ( 19 Nov 2018 15:40 )  pH: 7.48  /  pCO2: 39    /  pO2: 12    / HCO3: 27    / Base Excess: 4.6   /  SaO2: 11.6   Lactate: 1.3      Exam: s1s2  Cardiac Rhythm: RRR  carvedilol 6.25 milliGRAM(s) Oral every 12 hours      GI/NUTRITION  Exam: soft, nontender, nondistended  Diet: NPO      GENITOURINARY/RENAL  lactated ringers. 1000 milliLiter(s) IV Continuous <Continuous>      11-19 @ 07:01  -  11-19 @ 19:40  --------------------------------------------------------  IN:    IV PiggyBack: 100 mL    lactated ringers.: 140 mL  Total IN: 240 mL    OUT:    Voided: 100 mL  Total OUT: 100 mL    Total NET: 140 mL          11-19    127<L>  |  87<L>  |  23  ----------------------------<  85  4.1   |  24  |  0.92    Ca    9.8      19 Nov 2018 15:40    TPro  8.2  /  Alb  3.9  /  TBili  0.9  /  DBili  x   /  AST  16  /  ALT  17  /  AlkPhos  178<H>  11-19    [X ] Andrade catheter, indication: urine output monitoring in critically ill patient    HEMATOLOGIC  [X ] VTE Prophylaxis:  aspirin  chewable 81 milliGRAM(s) Oral daily                          11.0   9.46  )-----------( 370      ( 19 Nov 2018 15:40 )             33.0     PT/INR - ( 19 Nov 2018 15:40 )   PT: 20.5 SEC;   INR: 1.81          PTT - ( 19 Nov 2018 15:40 )  PTT:40.0 SEC  Transfusion: [0 ] PRBC	[0 ] Platelets	[0 ] FFP	[0 ] Cryoprecipitate      INFECTIOUS DISEASES    RECENT CULTURES: N/A      ENDOCRINE    CAPILLARY BLOOD GLUCOSE          PATIENT CARE ACCESS DEVICES:  [X ] Peripheral IV  [ ] Central Venous Line	[ ] R	[ ] L	[ ] IJ	[ ] Fem	[ ] SC	Placed:   [ ] Arterial Line		[ ] R	[ ] L	[ ] Fem	[ ] Rad	[ ] Ax	Placed:   [ ] PICC:					[ ] Mediport  [X ] Urinary Catheter, Date Placed:   [x] Necessity of urinary, arterial, and venous catheters discussed    OTHER MEDICATIONS:     IMAGING STUDIES:

## 2018-11-19 NOTE — HISTORY OF PRESENT ILLNESS
[de-identified] : The patient was diagnosed with SCC of the right maxillary sinus in June 2018 at the age of 57.  He saw ENT, Dr Jordan Valle, c/o recurrent epistaxis and right sided facial pain.  A CT Maxifacial Sinus showed expansion and opacification of the right maxillary sinus with destruction of the medial maxillary sinus wall.  He was referred to Dr. Olivo who performed a nasal endoscopy on 6/5/18, which showed a friable mass involving the right nasal cavity, originating in the right maxillary sinus.  An MRI showed a large mass (4.1 x 3.9 x 4.3 cm) within the right maxillary sinus extending into the nasal  cavity, premaxillary fat, retromaxillary fat, inferior pterygopalatine  fossa with V2 perineural spread of disease. Disease may extend along the  pterygomandibular raphe involving the anterior tonsillar pillar. Biopsy on 6/14/18 was consistent with sinonasal non-keratinizing squamous cell carcinoma.  PET/CT 6/16/18 showed an FDG avid mass centered in the right maxillary sinus eroding  the medial and posterior lateral walls (SUV 22.7) with extension into the right ethmoid sinus.   CT Orbit 6/20/18 showed an expansile/destructive mass within the right maxillary sinus with  extension into the nasal cavity, ethmoid complex and orbit. Areas of bone dehiscence with tumor extension into the  pterygopalatine fossa, retroantral space and premaxillary region.  An MRI Head on 6/21/18 showed an enlarging right sinonasal mass.  MRI Orbits 6/25/18 stated a large infiltrative and partially necrotic mass is again noted involving  the right maxillary sinus, right nasal cavity, right ethmoid sinus,  anterior right sphenoid sinus, base of the pterygoid plates and  greater-lesser palatine foramina, right pterygopalatine fossa, right  retromaxillary space, right infratemporal fossa, extraconal space of the  inferior right orbit with perineural spread of tumor along V2, right hard  palate, right alveolar ridge, and bony walls of the maxillary sinus. Dr Olivo has recommended primary surgery followed by CRT.    [de-identified] : Patient presents for C1D7 Cisplatin.  He is status post 3 cycles induction TPF for SCC of right maxillary sinus cancer with local fungation through the palate into the mouth - here today for C1D1 CRT with cisplatin.  .  + Epistaxis and hemoptysis constant + Abdominal cramping resolved.  + Diarrhea resolved, no BRBPR.  + Fatigue. + Right side of head and neck worse recently, 10/10.  Dysphagia and odynophagia resolved. Has a feeding tube again, but losing weight.  Unable to tolerate tube feeds s/t diarrhea.  Unable to tolerate RT x 2 fractions as not able to lie flat on RT table.  Has completed 4/6 treatments of RT as of today, of planned ~ 6 week course with disease progression, failure to thrive, on CPAP with poor nutrition.  \par \par \par

## 2018-11-19 NOTE — ED PROVIDER NOTE - OBJECTIVE STATEMENT
57yo male PMH bipolar disorder, congestive heart failure, diaphragm paralysis, YANET, maxillary sinus neoplasm presenting from ENT's office (Dr. Blanca) for worsening shortness of breath. 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 Patient has intermittently been using BiPAP. Since Friday, patient has had increased difficulty breathing and is unable to lay flat, saw his ENT today who sent him to ED for urgent tracheostomy. Patient is c/o pain at site of tumor.

## 2018-11-19 NOTE — ED ADULT NURSE NOTE - OBJECTIVE STATEMENT
pt is a 58 yr male with sinus cancer bipap dependant, transferred from ent center for trach placement, or aware, ent at bedside, pt aoox 3 c/o pain. piv placed by ems, labs sent. vss, will ctm

## 2018-11-19 NOTE — ED CLERICAL - NS ED CLERK NOTE PRE-ARRIVAL INFORMATION; ADDITIONAL PRE-ARRIVAL INFORMATION
Receiving chemo= Maxill-sinus Ca infiltrating mouth and palate.  On continuous CPAP. to go to OR for trach

## 2018-11-19 NOTE — H&P ADULT - NSHPPHYSICALEXAM_GEN_ALL_CORE
Exam  NAD, awake and alert  Breathing on CPAP, no acute distress, no retractions  OC/OP: large mass of right maxillary sinus, no bleeding noted  Neck: soft/flat, palpable cricoid and thyroid cartilage, sternal notch palpable

## 2018-11-19 NOTE — ED PROVIDER NOTE - PHYSICAL EXAMINATION
Gen: NAD  Head: NCAT  HEENT: +Tumor present on right side of hard palate, +swelling and edema right side of face, BiPAP mask present  Lung: CTAB, no respiratory distress, no wheezing, rales, rhonchi  CV: normal s1/s2, rrr, no murmurs, Normal perfusion  Abd: soft, NTND  MSK: No edema, no visible deformities, full range of motion in all 4 extremities  Neuro: No focal neurologic deficits  Skin: No rash

## 2018-11-19 NOTE — H&P ADULT - ASSESSMENT
58M with SCCa right maxillary sinus p/w increased WOB. Planning to go to OR for awake trach  -preop labs  -consent obtained  -pain control  -will admit to SICU after

## 2018-11-19 NOTE — ED ADULT NURSE NOTE - NSIMPLEMENTINTERV_GEN_ALL_ED
Implemented All Universal Safety Interventions:  Walker to call system. Call bell, personal items and telephone within reach. Instruct patient to call for assistance. Room bathroom lighting operational. Non-slip footwear when patient is off stretcher. Physically safe environment: no spills, clutter or unnecessary equipment. Stretcher in lowest position, wheels locked, appropriate side rails in place.

## 2018-11-19 NOTE — H&P ADULT - HISTORY OF PRESENT ILLNESS
58M with hx of SCCa of right maxillary sinus sent to ED from clinic for difficulty breathing. Patient completed 4 sessions of RT and is no longer tolerating lying flat. Previously required CPAP at night only but now requiring it 24/7. Currently stable on CPAP in ED.

## 2018-11-19 NOTE — ED PROVIDER NOTE - MEDICAL DECISION MAKING DETAILS
Sulema: ENT tumor growing. becoming more SOB. sent from office for tranfer due to impending airway issues. Will need trach.  Keep on bipap. get primary services involved. Would get preop labs.

## 2018-11-19 NOTE — REVIEW OF SYSTEMS
qvar submitted for pa
[Dysphagia: Grade 2 - Symptomatic and altered eating/swallowing] : Dysphagia: Grade 2 - Symptomatic and altered eating/swallowing

## 2018-11-19 NOTE — PHYSICAL EXAM
[Normal] : no JVD, no calf tenderness [Feeding Tube] : a feeding tube was present [Auscultation Breath Sounds / Voice Sounds] : lungs were clear to auscultation bilaterally [de-identified] : periorbital edema right eye [de-identified] : dried blood right nasal passage, right mouth with large dried debris [de-identified] : CTA, diminished breath sounds throughout lung field, using portable AVAP

## 2018-11-19 NOTE — RESULTS/DATA
[FreeTextEntry1] : 10/10/18 CT Chest: Status post partial upper lobe resection with associated architectural distortion and volume loss. Centrolobular emphysema is seen.  The previously noted right lower lobe consolidation has improved with residual area of small consolidation and associated areas of mucoid impaction. Small focus of groundglass opacity is noted in the right upper lobe on series 2, image 49 and is new since the previous exam and may reflect an area of mucoid impaction.\par \par 10/10/18 MRI Orbit, face and neck: In comparison with prior study on 8/31/18 increased size right maxillary sinus tumor with increased involvement of the right nasal and oropharynx, the mass now measures approximately 7.6 x 5.6 x 4.9 cm, AP, TR, CC previously approximately 5.5 x 5 x4.6 cm concerning the disease progression. Tumor extends throughout the medial right maxillary sinus with increased involvement of the right pterygoid plates inferior ptergyopalatine fossa, right sphenopalatine  foramina and internal extension into the right superior, middle, and inferior meati with obliteration of the air space in the residual nasal cavity and enhancing tumor abutting the residual nasal septum with persistent nasal septal dehiscence.\par There is interval increased tumoral involvement of the right maxillary alveolus, hard and soft palate with perineural involvement of the right V2 maxillary nerve and branches including and not limited to the right greater and lesser palatine nerves, right infraorbital nerve extending to the right foramen rotundum and right vidian nerve in the right vidian canal. There is a loss of fat planes along the inferior right pterygopalatine fossa with disruption to the right posterior medial maxillary sinus margin. There is progressive tumor involving the right infratemporal fossa, right pterygoid musculature and right buccinator muscle. There is progressive tumoral involvement of the right hard and soft palate, right zygoma and loss of the right premaxillary far pad.\par There is increased tumoral extension and involvement of the right ethmoid sinus, with extension to the right cribriform plate and fovea ethmoidalis is increased from prior with irregular enhancement extending to the performs plate margin with adjacent areas of trapped secretions and mucosal thickening noted throughout the right maxillary sinus and ethmoid air cells. There is increased tumoral enhancement and extension into the right nasopharynx and right fossa of Rosenmuller with loss of fat planes with right mastoid opacification.\par \par 8/31/18 PET: Decreased intensity and extent of right maxillary sinus mass  with peripheral FDG avid foci (SUV 10.9),  previous SUV 22.7. Interval development of areas of high attenuation  along the medial and posterior walls, probably calcification.  Increased FDG avidity soft tissue nodular focus deep to the right  mandibular condyle, which is now discretely measurable, 0.5 cm (SUV 9.8, previous SUV 5.1) and right intraparotid nodular focus, 0.4 cm  (SUV 7.4;previous SUV 4.9). FDG avid left level III focus  without CT correlate is indeterminate (SUV 4.2).   Difficult to delineate new FDG avidity right hilum (SUV 3.5) and new pretracheal lymph node, 1.8 x 0.9 cm (SUV 3.1). Right chest wall port catheter tip in the cavoatrial junction.  FDG avid 1.9 cm right upper lobe opacity with surrounding  groundglass (SUV 6.9), new since July 25, 2018. Residual  linear opacity right lower lobe with near complete resolution of  extensive airspace consolidation. Mildly FDG avid right lower lobe nodule  measuring 0.5 cm (SUV 1.8) may represent residual  infection/inflammation.   Scattered subcentimeter groundglass nodules, for example right upper lobe  measuring 0.3 cm and 0.4 cm right lower lobe, are  too small to characterize. Stable postsurgical changes of left upper  lobectomy. Resolved airspace infiltrate in the right middle lobe.\par \par 8/31/18 MRI Orbit, Face, Neck:  There has been a substantial interval decrease in size and extent of the  large mass epicentered on the right maxillary sinus compared to the prior  MRI examination. Residual tumor is noted involving the medial aspect of  the right maxillary sinus, with extension to the pterygoid plates.  Infiltration into the lower aspect of the pterygopalatine fossa and  greater-lesser palatine foramina is again noted. There has been a marked  interval decrease in size of the tumor at the level of the right nasal  cavity, right hard palate, right ethmoid sinus, and anterior right  sphenoid sinus. There has been a marked interval decrease in the  involvement of the extraconal space of the right inferior orbit, with  less thickening of V2 is noted along the infraorbital canal. The tumor  has also decreased at the level of the right infratemporal fossa and  retromaxillary space. There has been a marked interval decrease in the  enhancement along the undersurface of the right cribriform plate and  fovea ethmoidalis. No abnormal intracranial enhancement is noted along  the floor of the anterior cranial fossa on the current study. Right-sided  proptosis has decreased. No new areas of tumor involvement are noted.  A nonspecific left level 4 supraclavicular lymph node measures 1.2 cm x  0.8 cm. A subcentimeter left level 3 lymph node is noted. Multiple small  subcentimeter lymph nodes are present in bilateral levels 1, 2, 3, 4, and  5 locations. No necrotic lymph nodes are visualized.  Subcentimeter nodules involving the right parotid tail and superficial  lobe of the right parotid adjacent to the condylar head are stable in  size compared to the prior MRI study. Differential considerations include  small primary parotid lesions or stable small intraparotid lymph node  nodes.\par \par 07/25/18 CT chest: Severe emphysema. Consolidation in the right lower lobe with scattered patchy and tree-in-bud opacities, probably multifocal pneumonia. Scattered \par pulmonary nodules, including a nodule in the posterior left upper lobe consistent with multifocal pneumonia\par \par \par 6/25/18 MRI Orbits:  A large infiltrative and partially necrotic mass is again noted involving  the right maxillary sinus, right nasal cavity, right ethmoid sinus,  anterior right sphenoid sinus, base of the pterygoid plates and  greater-lesser palatine foramina, right pterygopalatine fossa, right  retromaxillary space, right infratemporal fossa, extraconal space of the  inferior right orbit with perineural spread of tumor along V2, right hard  palate, right alveolar ridge, and bony walls of the maxillary sinus.  Abnormal enhancement extends to the region of the right cribriform plate  and fovea ethmoidalis abutting the floor of the anterior cranial fossa;  it is not certain to what degree the abnormal enhancement reflects tumor,  mucosal thickening, or a combination of both. The tumor overall has  substantially progressed compared to the 06/08/2018 MRI study.   The preseptal and postseptal orbital enhancement and premaxillary soft  tissue enhancement have decreased compared to the 06/21/2018 study,  consistent with an improving infectious orbital cellulitis. The  right-sided proptosis appears mildly improved compared to the 06/21/2018  study also consistent with improving infection.\par \par 6/21/18 MRI Head:  Enlarging right sinonasal mass when compared with the prior  exam compatible with an aggressive neoplasm. There is currently posterior  tumor extension into the sphenopalatine foramen and sphenoid sinus not  seen previously.    Increasing right-sided proptosis without evidence of increased marco antonio  intraorbital tumor extension. There is cystic enlargement of the lacrimal  sac not seen previously raising the possibility of postobstructive  dacryocystitis.    Extensive infiltration of retrobulbar fat planes is seen with soft tissue  thickening and enhancement involving the periorbital, premaxillary and  retroantral soft tissues extending into the  space as described  above. While these findings are likely inflammatory in nature and may  represent orbital/soft tissue cellulitis, the possibility of tumor  infiltration is also raised.\par \par 6/20/18 CT Orbits/Sinuses:  Expansile/destructive mass within the right maxillary sinus with  extension into the nasal cavity, ethmoid complex and orbit. Areas of bone dehiscence with tumor extension into the  pterygopalatine fossa, retroantral space and premaxillary region.   Abnormal soft tissue within the right sphenoid sinus not seen previously  with associated lateral sinus wall dehiscence suspicious for tumor  progression and involvement of this region.\par \par 006/19/18:Nasal Endoscopy: The packing from the R. nostril was removed. Again noted is the erosive mass from the R. maxillary sinus extending towards the anterior ethmoids and filling the R. maxillary cavity. No obvious purulence is noted. No evidence of bleeding at this time. Stable large septal perforation. No lesions along the L. nasal cavity and PNS. Nasal endoscopy indication: Maxillary cancer, epistaxis. Anesthesia: none. Technique: examined with the flexible endoscope. Serial Number: [id number]. \par \par \par 6/16/18 PET/CT:  FDG avid mass centered in the right maxillary sinus eroding  the medial and posterior lateral walls (SUV 22.7). Extension into the right ethmoid sinus. Non-FDG avid partial  opacification of the right nasal cavity. Difficult to delineate delineate  FDG avid focus adjacent to the right mandibular condyle (SUV 5.1) and right intraparotid lymph node 0.5 cm (SUV 4.9).  Postsurgical changes of left upper lobectomy. Minimally FDG avid  airspace disease in the right middle lobe (SUV 8.0).  Diffuse gastric FDG avidity (SUV 7.1). Sigmoid diverticulosis with heterogeneous sigmoid FDG avidity,  probably mild inflammatory change (SUV 7.3).  Focal asymmetric FDG avidity in the left prostate (SUV  11.1), adjacent to prostatic calcifications.   Nonspecific mild thickening and asymmetric FDG avidity in  the left diaphragmatic damien (SUV 4.6).\par \par 6/14/18 Pathology:   Paranasal sinus, right maxilla, nasal endoscopic biopsies:  - Sinonasal non-keratinizing squamous cell carcinoma\par Immunohistochemical results:\par CK5/6...................................... focal strong positivity\par p16........................................... diffuse strong positivity\par CK7 and CK20......................... focal positivity\par CD56..........................................focal strong positivity\par Synaptophysin.............................. focal weak positivity\par Chromogranin............................... negative\par P40............................................... negative\par CD34............................................ negative\par Ki67............................................. high\par NUT............................................. negative\par proliferative index >90%\par This tumor is composed of tumor cell nests with rounded or smooth borders and an endophytic growth pattern resembling inverted papilloma.  The cells have a basaloid appearance with a peripheral palisaded layer.  Numerous mitotic figures are identified.  Focally, squamous differentiation is seen.  The differential diagnosis includes sinonasal non-keratinizing squamous cell carcinoma and sinonasal undifferentiated carcinoma (SNUC).\par \par 6/8/18 MRI Orbit, Face, Neck:  Large mass (4.1 x 3.9 x 4.3 cm) within the right maxillary sinus extending into the nasal  cavity, premaxillary fat, retromaxillary fat, inferior pterygopalatine  fossa with V2 perineural spread of disease. Disease may extend along the  pterygomandibular raphe involve the anterior tonsillar pillar. No  definite MR evidence for transcranial perineural spread of disease.\par \par 6/2/18 CT Maxifacial Sinus:  expansion and opacification of the right maxillary sinus with destruction of the medial maxillary sinus wall. Defect along the inferior medial sinus wall and soft tissue extending into the right nasal cavity. Findings are concerning for neoplasm. Mildly thickened right inferior orbital nerve.\par \par 5/9/17 CT Paranasal sinuses: there is a large anterior nasal septal defect, measuring up to 3.2 cm x 2.5 cm. There is deviation of the remaining anterior superior nasal septum toward the right. Inferior nasal turbinates are not visualized.

## 2018-11-19 NOTE — ED PROVIDER NOTE - ATTENDING CONTRIBUTION TO CARE
I performed a history and physical exam of the patient and discussed their management with the resident and /or advanced care provider. I reviewed the resident and /or ACP's note and agree with the documented findings and plan of care. My medical decison making and observations are found above.  Lungs scattered rales which clear on home Vpap. has significant pain

## 2018-11-19 NOTE — CONSULT NOTE ADULT - ASSESSMENT
58M with SCCa right maxillary sinus p/w increased WOB, now s/p tracheostomy recovering without issue    - Accepted to SICU, currently without beds, will monitor overnight in PACU    Neuro: Post operative pain, Anxiety  - Hydromorphone PRN for pain  - Alprazolam PRN for anxiety  - Pregabalin for neuropathic pain    Resp: s/p Trach  - c/w Trach Collar  - f/u AM CXR    CV: HTN  - c/w home meds: coreg 6.125 BID    GI:   - NPO on TF    Renal:  - c/w Andrade   - IVF : LR @ 140 mL/ hr     Heme:   - c/w ASA 81 mg QD  - f/u ENT chemical VTE ppx    ID:   - f/u AM labs   - Culture if febrile     Endo:  - Monitor FS on BMP    TRINITY Husain PGY-2  SICU

## 2018-11-19 NOTE — ED ADULT TRIAGE NOTE - CHIEF COMPLAINT QUOTE
pt BIBA from sinus center. pt transferred for head, neck and face surgery.  ot needs a trach.  pt arrives on bipap

## 2018-11-20 ENCOUNTER — APPOINTMENT (OUTPATIENT)
Age: 58
End: 2018-11-20

## 2018-11-20 LAB
BUN SERPL-MCNC: 18 MG/DL — SIGNIFICANT CHANGE UP (ref 7–23)
BUN SERPL-MCNC: 18 MG/DL — SIGNIFICANT CHANGE UP (ref 7–23)
CALCIUM SERPL-MCNC: 8.6 MG/DL — SIGNIFICANT CHANGE UP (ref 8.4–10.5)
CALCIUM SERPL-MCNC: 8.9 MG/DL — SIGNIFICANT CHANGE UP (ref 8.4–10.5)
CHLORIDE SERPL-SCNC: 91 MMOL/L — LOW (ref 98–107)
CHLORIDE SERPL-SCNC: 92 MMOL/L — LOW (ref 98–107)
CHLORIDE UR-SCNC: 29 MMOL/L — SIGNIFICANT CHANGE UP
CO2 SERPL-SCNC: 23 MMOL/L — SIGNIFICANT CHANGE UP (ref 22–31)
CO2 SERPL-SCNC: 26 MMOL/L — SIGNIFICANT CHANGE UP (ref 22–31)
CREAT SERPL-MCNC: 0.71 MG/DL — SIGNIFICANT CHANGE UP (ref 0.5–1.3)
CREAT SERPL-MCNC: 0.74 MG/DL — SIGNIFICANT CHANGE UP (ref 0.5–1.3)
GLUCOSE SERPL-MCNC: 111 MG/DL — HIGH (ref 70–99)
GLUCOSE SERPL-MCNC: 143 MG/DL — HIGH (ref 70–99)
HCT VFR BLD CALC: 29.8 % — LOW (ref 39–50)
HGB BLD-MCNC: 9.8 G/DL — LOW (ref 13–17)
MAGNESIUM SERPL-MCNC: 1.9 MG/DL — SIGNIFICANT CHANGE UP (ref 1.6–2.6)
MCHC RBC-ENTMCNC: 27.3 PG — SIGNIFICANT CHANGE UP (ref 27–34)
MCHC RBC-ENTMCNC: 32.9 % — SIGNIFICANT CHANGE UP (ref 32–36)
MCV RBC AUTO: 83 FL — SIGNIFICANT CHANGE UP (ref 80–100)
NRBC # FLD: 0 — SIGNIFICANT CHANGE UP
OSMOLALITY SERPL: 280 MOSMO/KG — SIGNIFICANT CHANGE UP (ref 275–295)
OSMOLALITY UR: 572 MOSMO/KG — SIGNIFICANT CHANGE UP (ref 50–1200)
PHOSPHATE SERPL-MCNC: 4.1 MG/DL — SIGNIFICANT CHANGE UP (ref 2.5–4.5)
PLATELET # BLD AUTO: 332 K/UL — SIGNIFICANT CHANGE UP (ref 150–400)
PMV BLD: 9.9 FL — SIGNIFICANT CHANGE UP (ref 7–13)
POTASSIUM SERPL-MCNC: 3.7 MMOL/L — SIGNIFICANT CHANGE UP (ref 3.5–5.3)
POTASSIUM SERPL-MCNC: 3.7 MMOL/L — SIGNIFICANT CHANGE UP (ref 3.5–5.3)
POTASSIUM SERPL-SCNC: 3.7 MMOL/L — SIGNIFICANT CHANGE UP (ref 3.5–5.3)
POTASSIUM SERPL-SCNC: 3.7 MMOL/L — SIGNIFICANT CHANGE UP (ref 3.5–5.3)
POTASSIUM UR-SCNC: 42.2 MMOL/L — SIGNIFICANT CHANGE UP
RBC # BLD: 3.59 M/UL — LOW (ref 4.2–5.8)
RBC # FLD: 15.2 % — HIGH (ref 10.3–14.5)
SODIUM SERPL-SCNC: 129 MMOL/L — LOW (ref 135–145)
SODIUM SERPL-SCNC: 130 MMOL/L — LOW (ref 135–145)
SODIUM UR-SCNC: 46 MMOL/L — SIGNIFICANT CHANGE UP
WBC # BLD: 10.53 K/UL — HIGH (ref 3.8–10.5)
WBC # FLD AUTO: 10.53 K/UL — HIGH (ref 3.8–10.5)

## 2018-11-20 RX ORDER — HYDROMORPHONE HYDROCHLORIDE 2 MG/ML
2 INJECTION INTRAMUSCULAR; INTRAVENOUS; SUBCUTANEOUS
Qty: 0 | Refills: 0 | Status: DISCONTINUED | OUTPATIENT
Start: 2018-11-20 | End: 2018-11-21

## 2018-11-20 RX ORDER — SODIUM CHLORIDE 9 MG/ML
1000 INJECTION INTRAMUSCULAR; INTRAVENOUS; SUBCUTANEOUS
Qty: 0 | Refills: 0 | Status: DISCONTINUED | OUTPATIENT
Start: 2018-11-20 | End: 2018-11-22

## 2018-11-20 RX ORDER — LOPERAMIDE HCL 2 MG
2 TABLET ORAL DAILY
Qty: 0 | Refills: 0 | Status: DISCONTINUED | OUTPATIENT
Start: 2018-11-20 | End: 2018-11-26

## 2018-11-20 RX ORDER — ACETAMINOPHEN 500 MG
1000 TABLET ORAL ONCE
Qty: 0 | Refills: 0 | Status: COMPLETED | OUTPATIENT
Start: 2018-11-20 | End: 2018-11-20

## 2018-11-20 RX ORDER — HYDROMORPHONE HYDROCHLORIDE 2 MG/ML
1 INJECTION INTRAMUSCULAR; INTRAVENOUS; SUBCUTANEOUS ONCE
Qty: 0 | Refills: 0 | Status: DISCONTINUED | OUTPATIENT
Start: 2018-11-20 | End: 2018-11-20

## 2018-11-20 RX ORDER — HEPARIN SODIUM 5000 [USP'U]/ML
5000 INJECTION INTRAVENOUS; SUBCUTANEOUS EVERY 12 HOURS
Qty: 0 | Refills: 0 | Status: DISCONTINUED | OUTPATIENT
Start: 2018-11-20 | End: 2018-11-21

## 2018-11-20 RX ORDER — ALPRAZOLAM 0.25 MG
2 TABLET ORAL AT BEDTIME
Qty: 0 | Refills: 0 | Status: DISCONTINUED | OUTPATIENT
Start: 2018-11-20 | End: 2018-11-23

## 2018-11-20 RX ORDER — OLANZAPINE 15 MG/1
5 TABLET, FILM COATED ORAL DAILY
Qty: 0 | Refills: 0 | Status: DISCONTINUED | OUTPATIENT
Start: 2018-11-20 | End: 2018-11-21

## 2018-11-20 RX ORDER — MORPHINE SULFATE 50 MG/1
30 CAPSULE, EXTENDED RELEASE ORAL THREE TIMES A DAY
Qty: 0 | Refills: 0 | Status: DISCONTINUED | OUTPATIENT
Start: 2018-11-20 | End: 2018-11-21

## 2018-11-20 RX ADMIN — HYDROMORPHONE HYDROCHLORIDE 2 MILLIGRAM(S): 2 INJECTION INTRAMUSCULAR; INTRAVENOUS; SUBCUTANEOUS at 10:00

## 2018-11-20 RX ADMIN — LORATADINE 10 MILLIGRAM(S): 10 TABLET ORAL at 11:41

## 2018-11-20 RX ADMIN — HYDROMORPHONE HYDROCHLORIDE 2 MILLIGRAM(S): 2 INJECTION INTRAMUSCULAR; INTRAVENOUS; SUBCUTANEOUS at 22:45

## 2018-11-20 RX ADMIN — CARVEDILOL PHOSPHATE 6.25 MILLIGRAM(S): 80 CAPSULE, EXTENDED RELEASE ORAL at 11:41

## 2018-11-20 RX ADMIN — HYDROMORPHONE HYDROCHLORIDE 1 MILLIGRAM(S): 2 INJECTION INTRAMUSCULAR; INTRAVENOUS; SUBCUTANEOUS at 01:00

## 2018-11-20 RX ADMIN — HYDROMORPHONE HYDROCHLORIDE 1 MILLIGRAM(S): 2 INJECTION INTRAMUSCULAR; INTRAVENOUS; SUBCUTANEOUS at 07:52

## 2018-11-20 RX ADMIN — HYDROMORPHONE HYDROCHLORIDE 1 MILLIGRAM(S): 2 INJECTION INTRAMUSCULAR; INTRAVENOUS; SUBCUTANEOUS at 00:31

## 2018-11-20 RX ADMIN — HYDROMORPHONE HYDROCHLORIDE 2 MILLIGRAM(S): 2 INJECTION INTRAMUSCULAR; INTRAVENOUS; SUBCUTANEOUS at 19:15

## 2018-11-20 RX ADMIN — HYDROMORPHONE HYDROCHLORIDE 1 MILLIGRAM(S): 2 INJECTION INTRAMUSCULAR; INTRAVENOUS; SUBCUTANEOUS at 12:15

## 2018-11-20 RX ADMIN — Medication 75 MILLIGRAM(S): at 11:40

## 2018-11-20 RX ADMIN — HYDROMORPHONE HYDROCHLORIDE 2 MILLIGRAM(S): 2 INJECTION INTRAMUSCULAR; INTRAVENOUS; SUBCUTANEOUS at 22:15

## 2018-11-20 RX ADMIN — HYDROMORPHONE HYDROCHLORIDE 2 MILLIGRAM(S): 2 INJECTION INTRAMUSCULAR; INTRAVENOUS; SUBCUTANEOUS at 19:02

## 2018-11-20 RX ADMIN — HYDROMORPHONE HYDROCHLORIDE 2 MILLIGRAM(S): 2 INJECTION INTRAMUSCULAR; INTRAVENOUS; SUBCUTANEOUS at 13:02

## 2018-11-20 RX ADMIN — HYDROMORPHONE HYDROCHLORIDE 2 MILLIGRAM(S): 2 INJECTION INTRAMUSCULAR; INTRAVENOUS; SUBCUTANEOUS at 16:15

## 2018-11-20 RX ADMIN — Medication 81 MILLIGRAM(S): at 11:41

## 2018-11-20 RX ADMIN — Medication 2 MILLIGRAM(S): at 18:25

## 2018-11-20 RX ADMIN — HYDROMORPHONE HYDROCHLORIDE 1 MILLIGRAM(S): 2 INJECTION INTRAMUSCULAR; INTRAVENOUS; SUBCUTANEOUS at 12:30

## 2018-11-20 RX ADMIN — Medication 1000 MILLIGRAM(S): at 04:52

## 2018-11-20 RX ADMIN — HYDROMORPHONE HYDROCHLORIDE 2 MILLIGRAM(S): 2 INJECTION INTRAMUSCULAR; INTRAVENOUS; SUBCUTANEOUS at 16:03

## 2018-11-20 RX ADMIN — OLANZAPINE 5 MILLIGRAM(S): 15 TABLET, FILM COATED ORAL at 18:25

## 2018-11-20 RX ADMIN — HYDROMORPHONE HYDROCHLORIDE 1 MILLIGRAM(S): 2 INJECTION INTRAMUSCULAR; INTRAVENOUS; SUBCUTANEOUS at 04:08

## 2018-11-20 RX ADMIN — HYDROMORPHONE HYDROCHLORIDE 2 MILLIGRAM(S): 2 INJECTION INTRAMUSCULAR; INTRAVENOUS; SUBCUTANEOUS at 10:15

## 2018-11-20 RX ADMIN — HYDROMORPHONE HYDROCHLORIDE 1 MILLIGRAM(S): 2 INJECTION INTRAMUSCULAR; INTRAVENOUS; SUBCUTANEOUS at 04:53

## 2018-11-20 RX ADMIN — HYDROMORPHONE HYDROCHLORIDE 1 MILLIGRAM(S): 2 INJECTION INTRAMUSCULAR; INTRAVENOUS; SUBCUTANEOUS at 08:21

## 2018-11-20 RX ADMIN — Medication 75 MILLIGRAM(S): at 00:01

## 2018-11-20 RX ADMIN — Medication 400 MILLIGRAM(S): at 03:50

## 2018-11-20 RX ADMIN — CARVEDILOL PHOSPHATE 6.25 MILLIGRAM(S): 80 CAPSULE, EXTENDED RELEASE ORAL at 00:01

## 2018-11-20 NOTE — DIETITIAN INITIAL EVALUATION ADULT. - OTHER INFO
Nutrition consult received for TF. Met with patient and his family at bedside. Prior to admission, Wife states patient was receiving bolus feeds of Jevity 1.5 240ml x 5 per day (4am, 8:30am, 10:30am, 4:30am and 8:30pm) which provides 1200ml, 1800kcal and 76.6g pro). In addition, he was also taking po intakes of potato toast, margarine, tea (6:30am) and bites of pasta with salt and butter and tea (10:30am) between bolus PEG feeds. Though as discussed with ENT oral intake contraindicated. However, wife states this regimen was discussed with MD. Pt has hx of PEG and he was being followed by outpatient Nutritionist and otherwise tolerating tube feeds until he endorsed constipation as per Wife. Patient started to endorse constipationx 7days (11/13) at which point wife tried to alleviated with administering multiple laxative inclusive of Miralax and colace. Since 11/15 patient w/ loose bowel movements. On 11/18, he was given "electrolyte" via feeds and stopped bolus feeds per wife. Patient this admission, had 1 loose brown bowel movement after 1 bolus feeds. No further episode reported. As per chart review, patient w/ hx. of c.diff. Patient was recently seen by RDN on 11/5/18 last admission, with reported lost significant amount of weight, "70# within one year," he weighed 171lbs (10/31/18) last admission, and now weighs 75kg/165lbs (11/20). No other GI distress of nausea, vomiting or any abdominal pain reported. Case discussed with ENT.

## 2018-11-20 NOTE — BRIEF OPERATIVE NOTE - OPERATION/FINDINGS
Normal neck anatomy. Incision made between second and third tracheal ring where trach tube was then placed

## 2018-11-20 NOTE — DIETITIAN INITIAL EVALUATION ADULT. - NS AS NUTRI INTERV ENTERAL NUTRITION3
1. Suggest increase TF to 270 ml of Jevity 1.2 every 4 hours to provide total volume of 1620ml, 1944kcal and 89g pro as tolerated 2. Suggest GI consult 3. Anti-diarrheal as PRN 4. May consider probiotics 5. Further adjustments to rate/volume/duration/free water provision of enteral feeds dependant on long term monitoring of patient's tolerance, weight trends and needs 1. Suggest increase TF to 270 ml of Jevity 1.2 every 4 hours to provide total volume of 1620ml, 1944kcal and ~90g pro as tolerated 2. Suggest GI consult 3. Anti-diarrheal as PRN 4. May consider probiotics 5. Further adjustments to rate/volume/duration/free water provision of enteral feeds dependant on long term monitoring of patient's tolerance, weight trends and needs

## 2018-11-20 NOTE — DIETITIAN INITIAL EVALUATION ADULT. - PERTINENT LABORATORY DATA
11-20 Na130 mmol/L<L> Glu 143 mg/dL<H> K+ 3.7 mmol/L Cr  0.71 mg/dL BUN 18 mg/dL 11-20 Phos 4.1 mg/dL 11-19 Alb 3.9 g/dL 10-30 PAB 12 mg/dL<L>

## 2018-11-20 NOTE — PROGRESS NOTE ADULT - SUBJECTIVE AND OBJECTIVE BOX
POST ANESTHESIA EVALUATION    58y Male POSTOP DAY 1 S/P     MENTAL STATUS: Patient participation [ X ] Awake     [  ] Arousable     [  ] Sedated    AIRWAY PATENCY: [X  ] Satisfactory  [  ] Other:     Vital Signs Last 24 Hrs  T(C): 36.9 (20 Nov 2018 08:00), Max: 38.1 (20 Nov 2018 03:34)  T(F): 98.4 (20 Nov 2018 08:00), Max: 100.6 (20 Nov 2018 03:34)  HR: 90 (20 Nov 2018 07:00) (87 - 114)  BP: 93/63 (20 Nov 2018 07:00) (91/59 - 135/76)  BP(mean): 70 (20 Nov 2018 07:00) (61 - 81)  RR: 18 (20 Nov 2018 07:00) (15 - 26)  SpO2: 95% (20 Nov 2018 07:00) (95% - 100%)  I&O's Summary    19 Nov 2018 07:01  -  20 Nov 2018 07:00  --------------------------------------------------------  IN: 1850 mL / OUT: 1775 mL / NET: 75 mL          NAUSEA/ VOMITTING:  [X  ] NONE  [  ] CONTROLLED [  ] OTHER     PAIN: [X  ] CONTROLLED WITH CURRENT REGIMEN  [  ] OTHER    [ X ] NO APPARENT ANESTHESIA COMPLICATIONS      Comments:

## 2018-11-20 NOTE — DIETITIAN INITIAL EVALUATION ADULT. - ENERGY NEEDS
Current weight 165lbs Ht: 6'0" BMI 22.4kg/m2  IBW: 166lbs (+/-10%) %IBW: 99%  Skin intact. No edema noted.  Protein needs based on IBW 1.0-1.5g /k-113g/d

## 2018-11-20 NOTE — PROGRESS NOTE ADULT - SUBJECTIVE AND OBJECTIVE BOX
24 HOUR EVENTS: Remained hemodynamically stable overnight in PACU. Maintained oxygenation without issue overnight. No issues with bleeding.     HISTORY  NITIN DAVALOS is a 57yo male PMH bipolar disorder, congestive heart failure, diaphragm paralysis, YANET, maxillary sinus neoplasm presenting from ENT's office (Dr. Blanca) for worsening shortness of breath. 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 Patient has intermittently been using BiPAP. Since Friday, patient has had increased difficulty breathing and is unable to lay flat, saw his ENT today who sent him to ED for urgent tracheostomy. Patient is c/o pain at site of tumor.     Taken to OR emergently for awake tracheostomy placement, tolerated procedure without issue. SICU consulted for hemodynamic monitoring .     SUBJECTIVE/ROS:  [ X] A ten-point review of systems was otherwise negative except as noted.  [ ] Due to altered mental status/intubation, subjective information were not able to be obtained from the patient. History was obtained, to the extent possible, from review of the chart and collateral sources of information.    NEURO  RASS: 0  Exam: awake, alert, oriented  Meds: ALPRAZolam 0.5 milliGRAM(s) Oral at bedtime PRN anxiety  fentaNYL    Injectable 25 MICROGram(s) IV Push every 5 minutes PRN Moderate Pain (4 - 6)  HYDROmorphone  Injectable 2 milliGRAM(s) IV Push every 10 minutes PRN Severe Pain (7 - 10)  HYDROmorphone  Injectable 1 milliGRAM(s) IV Push every 3 hours PRN Severe Pain (7 - 10)  morphine ER Tablet 30 milliGRAM(s) Oral every 8 hours  ondansetron Injectable 4 milliGRAM(s) IV Push once PRN Nausea and/or Vomiting  pregabalin 75 milliGRAM(s) Oral two times a day    [x] Adequacy of sedation and pain control has been assessed and adjusted    RESPIRATORY  RR: 18 (11-19-18 @ 23:00) (16 - 26)  SpO2: 97% (11-19-18 @ 23:00) (97% - 100%)  Exam: Trach on collar     Meds: loratadine 10 milliGRAM(s) Oral daily      CARDIOVASCULAR  HR: 91 (11-19-18 @ 23:00) (90 - 114)  BP: 117/65 (11-19-18 @ 23:00) (97/57 - 135/76)  BP(mean): 76 (11-19-18 @ 23:00) (67 - 81)  VBG - ( 19 Nov 2018 15:40 )  pH: 7.48  /  pCO2: 39    /  pO2: 12    / HCO3: 27    / Base Excess: 4.6   /  SaO2: 11.6   Lactate: 1.3      Exam: regular rate and rhythm  Cardiac Rhythm: sinus  Perfusion     [x]Adequate   [ ]Inadequate  Mentation   [x]Normal       [ ]Reduced  Extremities  [x]Warm         [ ]Cool  Volume Status [ ]Hypervolemic [x]Euvolemic [ ]Hypovolemic  Meds: carvedilol 6.25 milliGRAM(s) Oral every 12 hours      GI/NUTRITION  Exam: soft, nontender, nondistended  Diet: NPO on TF  Meds:     GENITOURINARY  I&O's Detail    11-19 @ 07:01  -  11-20 @ 00:17  --------------------------------------------------------  IN:    IV PiggyBack: 100 mL    lactated ringers.: 700 mL  Total IN: 800 mL    OUT:    Voided: 1475 mL  Total OUT: 1475 mL    Total NET: -675 mL      11-19    127<L>  |  87<L>  |  23  ----------------------------<  85  4.1   |  24  |  0.92    Ca    9.8      19 Nov 2018 15:40    TPro  8.2  /  Alb  3.9  /  TBili  0.9  /  DBili  x   /  AST  16  /  ALT  17  /  AlkPhos  178<H>  11-19    [ ] Andrade catheter, indication: N/A      HEMATOLOGIC  Meds: aspirin  chewable 81 milliGRAM(s) Oral daily    [x] VTE Prophylaxis : NONE                        11.0   9.46  )-----------( 370      ( 19 Nov 2018 15:40 )             33.0     PT/INR - ( 19 Nov 2018 15:40 )   PT: 20.5 SEC;   INR: 1.81          PTT - ( 19 Nov 2018 15:40 )  PTT:40.0 SEC  Transfusion     [0 ] PRBC   [0 ] Platelets   [0 ] FFP   [0 ] Cryoprecipitate    INFECTIOUS DISEASES  WBC Count: 9.46 K/uL (11-19 @ 15:40)    RECENT CULTURES:    Meds:     ENDOCRINE  CAPILLARY BLOOD GLUCOSE        Meds:     ACCESS DEVICES:  [X ] Peripheral IV  [ ] Central Venous Line	[ ] R	[ ] L	[ ] IJ	[ ] Fem	[ ] SC	Placed:   [ ] Arterial Line		[ ] R	[ ] L	[ ] Fem	[ ] Rad	[ ] Ax	Placed:   [ ] PICC:					[ ] Mediport  [ ] Urinary Catheter, Date Placed:  N/A  [x] Necessity of urinary, arterial, and venous catheters discussed    OTHER MEDICATIONS:      CODE STATUS: FULL      IMAGING: 24 HOUR EVENTS: Remained hemodynamically stable overnight in PACU. Maintained oxygenation without issue overnight. Febrile overnight to 38.1 at 3:30 am , received Ofirmev.     HISTORY  NITIN DAVALOS is a 59yo male PMH bipolar disorder, congestive heart failure, diaphragm paralysis, YANET, maxillary sinus neoplasm presenting from ENT's office (Dr. Blanca) for worsening shortness of breath. 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 Patient has intermittently been using BiPAP. Since Friday, patient has had increased difficulty breathing and is unable to lay flat, saw his ENT today who sent him to ED for urgent tracheostomy. Patient is c/o pain at site of tumor.     Taken to OR emergently for awake tracheostomy placement, tolerated procedure without issue. SICU consulted for hemodynamic monitoring .     SUBJECTIVE/ROS:  [ X] A ten-point review of systems was otherwise negative except as noted.  [ ] Due to altered mental status/intubation, subjective information were not able to be obtained from the patient. History was obtained, to the extent possible, from review of the chart and collateral sources of information.    NEURO  RASS: 0  Exam: awake, alert, oriented  Meds: ALPRAZolam 0.5 milliGRAM(s) Oral at bedtime PRN anxiety  fentaNYL    Injectable 25 MICROGram(s) IV Push every 5 minutes PRN Moderate Pain (4 - 6)  HYDROmorphone  Injectable 2 milliGRAM(s) IV Push every 10 minutes PRN Severe Pain (7 - 10)  HYDROmorphone  Injectable 1 milliGRAM(s) IV Push every 3 hours PRN Severe Pain (7 - 10)  morphine ER Tablet 30 milliGRAM(s) Oral every 8 hours  ondansetron Injectable 4 milliGRAM(s) IV Push once PRN Nausea and/or Vomiting  pregabalin 75 milliGRAM(s) Oral two times a day    [x] Adequacy of sedation and pain control has been assessed and adjusted    RESPIRATORY  RR: 18 (11-19-18 @ 23:00) (16 - 26)  SpO2: 97% (11-19-18 @ 23:00) (97% - 100%)  Exam: Trach on collar     Meds: loratadine 10 milliGRAM(s) Oral daily      CARDIOVASCULAR  HR: 91 (11-19-18 @ 23:00) (90 - 114)  BP: 117/65 (11-19-18 @ 23:00) (97/57 - 135/76)  BP(mean): 76 (11-19-18 @ 23:00) (67 - 81)  VBG - ( 19 Nov 2018 15:40 )  pH: 7.48  /  pCO2: 39    /  pO2: 12    / HCO3: 27    / Base Excess: 4.6   /  SaO2: 11.6   Lactate: 1.3      Exam: regular rate and rhythm  Cardiac Rhythm: sinus  Perfusion     [x]Adequate   [ ]Inadequate  Mentation   [x]Normal       [ ]Reduced  Extremities  [x]Warm         [ ]Cool  Volume Status [ ]Hypervolemic [x]Euvolemic [ ]Hypovolemic  Meds: carvedilol 6.25 milliGRAM(s) Oral every 12 hours      GI/NUTRITION  Exam: soft, nontender, nondistended  Diet: NPO on TF  Meds:     GENITOURINARY  I&O's Detail    11-19 @ 07:01  -  11-20 @ 00:17  --------------------------------------------------------  IN:    IV PiggyBack: 100 mL    lactated ringers.: 700 mL  Total IN: 800 mL    OUT:    Voided: 1475 mL  Total OUT: 1475 mL    Total NET: -675 mL      11-19    127<L>  |  87<L>  |  23  ----------------------------<  85  4.1   |  24  |  0.92    Ca    9.8      19 Nov 2018 15:40    TPro  8.2  /  Alb  3.9  /  TBili  0.9  /  DBili  x   /  AST  16  /  ALT  17  /  AlkPhos  178<H>  11-19    [ ] Andrade catheter, indication: N/A      HEMATOLOGIC  Meds: aspirin  chewable 81 milliGRAM(s) Oral daily    [x] VTE Prophylaxis : NONE                        11.0   9.46  )-----------( 370      ( 19 Nov 2018 15:40 )             33.0     PT/INR - ( 19 Nov 2018 15:40 )   PT: 20.5 SEC;   INR: 1.81          PTT - ( 19 Nov 2018 15:40 )  PTT:40.0 SEC  Transfusion     [0 ] PRBC   [0 ] Platelets   [0 ] FFP   [0 ] Cryoprecipitate    INFECTIOUS DISEASES  WBC Count: 9.46 K/uL (11-19 @ 15:40)    RECENT CULTURES:    Meds:     ENDOCRINE  CAPILLARY BLOOD GLUCOSE        Meds:     ACCESS DEVICES:  [X ] Peripheral IV  [ ] Central Venous Line	[ ] R	[ ] L	[ ] IJ	[ ] Fem	[ ] SC	Placed:   [ ] Arterial Line		[ ] R	[ ] L	[ ] Fem	[ ] Rad	[ ] Ax	Placed:   [ ] PICC:					[ ] Mediport  [ ] Urinary Catheter, Date Placed:  N/A  [x] Necessity of urinary, arterial, and venous catheters discussed    OTHER MEDICATIONS:      CODE STATUS: FULL      IMAGING:

## 2018-11-20 NOTE — PROGRESS NOTE ADULT - SUBJECTIVE AND OBJECTIVE BOX
Pt seen and examined. No acute events overnight. No desats overnight, no bleeding     AVSS  NAD, awake and alert  Breathing comfortably on humidified TC  6LPC trach in place with sutures, cuff deflated   Neck soft and flat, stoma intact     CXR: unchanged pneumoperitoneum     A/P: POD1 s/p awake tracheotomy for airway protection   -NPO, PEG dependent  -pain control   -resume home meds   -can leave PACU for floor this AM, will d/w SICU   -ambulate ad denton

## 2018-11-20 NOTE — DIETITIAN INITIAL EVALUATION ADULT. - ETIOLOGY
Patient meets criteria for severe malnutrition in the context of acute/chronic illness related to infused volume not reached

## 2018-11-20 NOTE — DIETITIAN INITIAL EVALUATION ADULT. - PERTINENT MEDS FT
MEDICATIONS  (STANDING):  ALPRAZolam 2 milliGRAM(s) Oral at bedtime  aspirin  chewable 81 milliGRAM(s) Oral daily  carvedilol 6.25 milliGRAM(s) Oral every 12 hours  loratadine 10 milliGRAM(s) Oral daily  OLANZapine 5 milliGRAM(s) Oral daily  pregabalin 75 milliGRAM(s) Oral two times a day  sodium chloride 0.9%. 1000 milliLiter(s) (75 mL/Hr) IV Continuous <Continuous>    MEDICATIONS  (PRN):  HYDROmorphone  Injectable 2 milliGRAM(s) IV Push every 3 hours PRN Severe Pain (7 - 10)  morphine  IR 30 milliGRAM(s) Oral three times a day PRN Moderate Pain (4 - 6)

## 2018-11-20 NOTE — DIETITIAN INITIAL EVALUATION ADULT. - SOURCE
electronic chart, RN, ENT, Spouse and son at bedside/family/significant other/patient/other (specify)

## 2018-11-20 NOTE — DIETITIAN INITIAL EVALUATION ADULT. - SIGNS/SYMPTOMS
As evidenced by significant weight loss, severe muscle wasting and fat loss As evidenced by insufficient intake of kcal/pro when compared to estimated needs

## 2018-11-20 NOTE — DIETITIAN INITIAL EVALUATION ADULT. - PHYSICAL APPEARANCE
Subcutaneous FAT LOSS:  [Severe ] Orbital fat pads region, [SEVERE] Buccal fat region, MUSCLE WASTING: [SEVERE]Temples region, [SEVERE]Clavicle region, [Severe ]Shoulder region,[ [SEVERE] Interosseous region./underweight

## 2018-11-20 NOTE — BRIEF OPERATIVE NOTE - PROCEDURE
<<-----Click on this checkbox to enter Procedure Tracheotomy  11/20/2018    Active  [id number] Tracheotomy  11/20/2018    Active  Enrico Jaramillo

## 2018-11-20 NOTE — PROGRESS NOTE ADULT - ASSESSMENT
58M with SCCa right maxillary sinus p/w increased WOB, now s/p tracheostomy recovering without issue    Neuro: Post operative pain, Anxiety  - Hydromorphone PRN for pain  - Alprazolam PRN for anxiety  - Pregabalin for neuropathic pain    Resp: s/p Trach  - c/w Trach Collar  - f/u AM CXR    CV: HTN  - c/w home meds: coreg 6.125 BID    GI:   - NPO on TF    Renal:  - c/w Andrade   - IVF : LR @ 140 mL/ hr     Heme:   - c/w ASA 81 mg QD  - f/u ENT chemical VTE ppx    ID:   - f/u AM labs   - Culture if febrile     Endo:  - Monitor FS on BMP    S Lisha PGY-2  SICU

## 2018-11-21 DIAGNOSIS — R53.81 OTHER MALAISE: ICD-10-CM

## 2018-11-21 DIAGNOSIS — Z51.5 ENCOUNTER FOR PALLIATIVE CARE: ICD-10-CM

## 2018-11-21 DIAGNOSIS — I50.9 HEART FAILURE, UNSPECIFIED: ICD-10-CM

## 2018-11-21 DIAGNOSIS — F41.9 ANXIETY DISORDER, UNSPECIFIED: ICD-10-CM

## 2018-11-21 DIAGNOSIS — G89.3 NEOPLASM RELATED PAIN (ACUTE) (CHRONIC): ICD-10-CM

## 2018-11-21 DIAGNOSIS — D62 ACUTE POSTHEMORRHAGIC ANEMIA: ICD-10-CM

## 2018-11-21 DIAGNOSIS — G47.33 OBSTRUCTIVE SLEEP APNEA (ADULT) (PEDIATRIC): ICD-10-CM

## 2018-11-21 DIAGNOSIS — F31.9 BIPOLAR DISORDER, UNSPECIFIED: ICD-10-CM

## 2018-11-21 DIAGNOSIS — E87.1 HYPO-OSMOLALITY AND HYPONATREMIA: ICD-10-CM

## 2018-11-21 DIAGNOSIS — D49.1 NEOPLASM OF UNSPECIFIED BEHAVIOR OF RESPIRATORY SYSTEM: ICD-10-CM

## 2018-11-21 LAB
ALBUMIN SERPL ELPH-MCNC: 3 G/DL — LOW (ref 3.3–5)
ALP SERPL-CCNC: 313 U/L — HIGH (ref 40–120)
ALT FLD-CCNC: 22 U/L — SIGNIFICANT CHANGE UP (ref 4–41)
APTT BLD: 35.4 SEC — SIGNIFICANT CHANGE UP (ref 27.5–36.3)
AST SERPL-CCNC: 21 U/L — SIGNIFICANT CHANGE UP (ref 4–40)
BASOPHILS # BLD AUTO: 0.03 K/UL — SIGNIFICANT CHANGE UP (ref 0–0.2)
BASOPHILS NFR BLD AUTO: 0.3 % — SIGNIFICANT CHANGE UP (ref 0–2)
BILIRUB SERPL-MCNC: 0.8 MG/DL — SIGNIFICANT CHANGE UP (ref 0.2–1.2)
BLD GP AB SCN SERPL QL: NEGATIVE — SIGNIFICANT CHANGE UP
BUN SERPL-MCNC: 13 MG/DL — SIGNIFICANT CHANGE UP (ref 7–23)
BUN SERPL-MCNC: 15 MG/DL — SIGNIFICANT CHANGE UP (ref 7–23)
CALCIUM SERPL-MCNC: 8.6 MG/DL — SIGNIFICANT CHANGE UP (ref 8.4–10.5)
CALCIUM SERPL-MCNC: 8.7 MG/DL — SIGNIFICANT CHANGE UP (ref 8.4–10.5)
CHLORIDE SERPL-SCNC: 94 MMOL/L — LOW (ref 98–107)
CHLORIDE SERPL-SCNC: 95 MMOL/L — LOW (ref 98–107)
CO2 SERPL-SCNC: 25 MMOL/L — SIGNIFICANT CHANGE UP (ref 22–31)
CO2 SERPL-SCNC: 27 MMOL/L — SIGNIFICANT CHANGE UP (ref 22–31)
CREAT SERPL-MCNC: 0.61 MG/DL — SIGNIFICANT CHANGE UP (ref 0.5–1.3)
CREAT SERPL-MCNC: 0.65 MG/DL — SIGNIFICANT CHANGE UP (ref 0.5–1.3)
EOSINOPHIL # BLD AUTO: 0.15 K/UL — SIGNIFICANT CHANGE UP (ref 0–0.5)
EOSINOPHIL NFR BLD AUTO: 1.7 % — SIGNIFICANT CHANGE UP (ref 0–6)
GLUCOSE SERPL-MCNC: 79 MG/DL — SIGNIFICANT CHANGE UP (ref 70–99)
GLUCOSE SERPL-MCNC: 90 MG/DL — SIGNIFICANT CHANGE UP (ref 70–99)
HCT VFR BLD CALC: 24.9 % — LOW (ref 39–50)
HCT VFR BLD CALC: 29.7 % — LOW (ref 39–50)
HCT VFR BLD CALC: 30.6 % — LOW (ref 39–50)
HCT VFR BLD CALC: 31.1 % — LOW (ref 39–50)
HGB BLD-MCNC: 10 G/DL — LOW (ref 13–17)
HGB BLD-MCNC: 8 G/DL — LOW (ref 13–17)
HGB BLD-MCNC: 9.4 G/DL — LOW (ref 13–17)
HGB BLD-MCNC: 9.7 G/DL — LOW (ref 13–17)
IMM GRANULOCYTES # BLD AUTO: 0.05 # — SIGNIFICANT CHANGE UP
IMM GRANULOCYTES NFR BLD AUTO: 0.6 % — SIGNIFICANT CHANGE UP (ref 0–1.5)
INR BLD: 1.5 — HIGH (ref 0.88–1.17)
LYMPHOCYTES # BLD AUTO: 1.23 K/UL — SIGNIFICANT CHANGE UP (ref 1–3.3)
LYMPHOCYTES # BLD AUTO: 14.3 % — SIGNIFICANT CHANGE UP (ref 13–44)
MAGNESIUM SERPL-MCNC: 1.8 MG/DL — SIGNIFICANT CHANGE UP (ref 1.6–2.6)
MCHC RBC-ENTMCNC: 26.9 PG — LOW (ref 27–34)
MCHC RBC-ENTMCNC: 27 PG — SIGNIFICANT CHANGE UP (ref 27–34)
MCHC RBC-ENTMCNC: 27.6 PG — SIGNIFICANT CHANGE UP (ref 27–34)
MCHC RBC-ENTMCNC: 27.7 PG — SIGNIFICANT CHANGE UP (ref 27–34)
MCHC RBC-ENTMCNC: 31.2 % — LOW (ref 32–36)
MCHC RBC-ENTMCNC: 31.6 % — LOW (ref 32–36)
MCHC RBC-ENTMCNC: 32.1 % — SIGNIFICANT CHANGE UP (ref 32–36)
MCHC RBC-ENTMCNC: 32.7 % — SIGNIFICANT CHANGE UP (ref 32–36)
MCV RBC AUTO: 84.5 FL — SIGNIFICANT CHANGE UP (ref 80–100)
MCV RBC AUTO: 85.1 FL — SIGNIFICANT CHANGE UP (ref 80–100)
MCV RBC AUTO: 86.2 FL — SIGNIFICANT CHANGE UP (ref 80–100)
MCV RBC AUTO: 86.6 FL — SIGNIFICANT CHANGE UP (ref 80–100)
MONOCYTES # BLD AUTO: 0.57 K/UL — SIGNIFICANT CHANGE UP (ref 0–0.9)
MONOCYTES NFR BLD AUTO: 6.6 % — SIGNIFICANT CHANGE UP (ref 2–14)
NEUTROPHILS # BLD AUTO: 6.58 K/UL — SIGNIFICANT CHANGE UP (ref 1.8–7.4)
NEUTROPHILS NFR BLD AUTO: 76.5 % — SIGNIFICANT CHANGE UP (ref 43–77)
NRBC # FLD: 0 — SIGNIFICANT CHANGE UP
PHOSPHATE SERPL-MCNC: 2.8 MG/DL — SIGNIFICANT CHANGE UP (ref 2.5–4.5)
PLATELET # BLD AUTO: 263 K/UL — SIGNIFICANT CHANGE UP (ref 150–400)
PLATELET # BLD AUTO: 317 K/UL — SIGNIFICANT CHANGE UP (ref 150–400)
PLATELET # BLD AUTO: 319 K/UL — SIGNIFICANT CHANGE UP (ref 150–400)
PLATELET # BLD AUTO: 335 K/UL — SIGNIFICANT CHANGE UP (ref 150–400)
PMV BLD: 10.1 FL — SIGNIFICANT CHANGE UP (ref 7–13)
PMV BLD: 9.6 FL — SIGNIFICANT CHANGE UP (ref 7–13)
PMV BLD: 9.7 FL — SIGNIFICANT CHANGE UP (ref 7–13)
PMV BLD: 9.9 FL — SIGNIFICANT CHANGE UP (ref 7–13)
POTASSIUM SERPL-MCNC: 3.7 MMOL/L — SIGNIFICANT CHANGE UP (ref 3.5–5.3)
POTASSIUM SERPL-MCNC: 3.8 MMOL/L — SIGNIFICANT CHANGE UP (ref 3.5–5.3)
POTASSIUM SERPL-SCNC: 3.7 MMOL/L — SIGNIFICANT CHANGE UP (ref 3.5–5.3)
POTASSIUM SERPL-SCNC: 3.8 MMOL/L — SIGNIFICANT CHANGE UP (ref 3.5–5.3)
PROT SERPL-MCNC: 6.7 G/DL — SIGNIFICANT CHANGE UP (ref 6–8.3)
PROTHROM AB SERPL-ACNC: 17.3 SEC — HIGH (ref 9.8–13.1)
RBC # BLD: 2.89 M/UL — LOW (ref 4.2–5.8)
RBC # BLD: 3.49 M/UL — LOW (ref 4.2–5.8)
RBC # BLD: 3.59 M/UL — LOW (ref 4.2–5.8)
RBC # BLD: 3.62 M/UL — LOW (ref 4.2–5.8)
RBC # FLD: 15.1 % — HIGH (ref 10.3–14.5)
RBC # FLD: 15.2 % — HIGH (ref 10.3–14.5)
RBC # FLD: 15.2 % — HIGH (ref 10.3–14.5)
RBC # FLD: 15.3 % — HIGH (ref 10.3–14.5)
RH IG SCN BLD-IMP: NEGATIVE — SIGNIFICANT CHANGE UP
SODIUM SERPL-SCNC: 130 MMOL/L — LOW (ref 135–145)
SODIUM SERPL-SCNC: 131 MMOL/L — LOW (ref 135–145)
WBC # BLD: 10.16 K/UL — SIGNIFICANT CHANGE UP (ref 3.8–10.5)
WBC # BLD: 10.21 K/UL — SIGNIFICANT CHANGE UP (ref 3.8–10.5)
WBC # BLD: 10.69 K/UL — HIGH (ref 3.8–10.5)
WBC # BLD: 8.61 K/UL — SIGNIFICANT CHANGE UP (ref 3.8–10.5)
WBC # FLD AUTO: 10.16 K/UL — SIGNIFICANT CHANGE UP (ref 3.8–10.5)
WBC # FLD AUTO: 10.21 K/UL — SIGNIFICANT CHANGE UP (ref 3.8–10.5)
WBC # FLD AUTO: 10.69 K/UL — HIGH (ref 3.8–10.5)
WBC # FLD AUTO: 8.61 K/UL — SIGNIFICANT CHANGE UP (ref 3.8–10.5)

## 2018-11-21 PROCEDURE — 99223 1ST HOSP IP/OBS HIGH 75: CPT

## 2018-11-21 PROCEDURE — 99223 1ST HOSP IP/OBS HIGH 75: CPT | Mod: GC

## 2018-11-21 PROCEDURE — 70498 CT ANGIOGRAPHY NECK: CPT | Mod: 26

## 2018-11-21 PROCEDURE — 93010 ELECTROCARDIOGRAM REPORT: CPT

## 2018-11-21 RX ORDER — FENTANYL CITRATE 50 UG/ML
3 INJECTION INTRAVENOUS
Qty: 2500 | Refills: 0 | Status: DISCONTINUED | OUTPATIENT
Start: 2018-11-21 | End: 2018-11-23

## 2018-11-21 RX ORDER — CEFAZOLIN SODIUM 1 G
VIAL (EA) INJECTION
Qty: 0 | Refills: 0 | Status: DISCONTINUED | OUTPATIENT
Start: 2018-11-21 | End: 2018-11-25

## 2018-11-21 RX ORDER — PROPOFOL 10 MG/ML
50 INJECTION, EMULSION INTRAVENOUS
Qty: 1000 | Refills: 0 | Status: DISCONTINUED | OUTPATIENT
Start: 2018-11-21 | End: 2018-11-28

## 2018-11-21 RX ORDER — PROPOFOL 10 MG/ML
50 INJECTION, EMULSION INTRAVENOUS
Qty: 1000 | Refills: 0 | Status: DISCONTINUED | OUTPATIENT
Start: 2018-11-21 | End: 2018-11-21

## 2018-11-21 RX ORDER — FENTANYL CITRATE 50 UG/ML
50 INJECTION INTRAVENOUS
Qty: 0 | Refills: 0 | Status: DISCONTINUED | OUTPATIENT
Start: 2018-11-21 | End: 2018-11-21

## 2018-11-21 RX ORDER — CEFAZOLIN SODIUM 1 G
2000 VIAL (EA) INJECTION EVERY 8 HOURS
Qty: 0 | Refills: 0 | Status: DISCONTINUED | OUTPATIENT
Start: 2018-11-22 | End: 2018-11-25

## 2018-11-21 RX ORDER — MIDAZOLAM HYDROCHLORIDE 1 MG/ML
2 INJECTION, SOLUTION INTRAMUSCULAR; INTRAVENOUS ONCE
Qty: 0 | Refills: 0 | Status: DISCONTINUED | OUTPATIENT
Start: 2018-11-21 | End: 2018-11-21

## 2018-11-21 RX ORDER — HYDROMORPHONE HYDROCHLORIDE 2 MG/ML
1 INJECTION INTRAMUSCULAR; INTRAVENOUS; SUBCUTANEOUS ONCE
Qty: 0 | Refills: 0 | Status: DISCONTINUED | OUTPATIENT
Start: 2018-11-21 | End: 2018-11-21

## 2018-11-21 RX ORDER — OLANZAPINE 15 MG/1
5 TABLET, FILM COATED ORAL DAILY
Qty: 0 | Refills: 0 | Status: DISCONTINUED | OUTPATIENT
Start: 2018-11-21 | End: 2018-11-23

## 2018-11-21 RX ORDER — HYDROMORPHONE HYDROCHLORIDE 2 MG/ML
2 INJECTION INTRAMUSCULAR; INTRAVENOUS; SUBCUTANEOUS
Qty: 0 | Refills: 0 | Status: DISCONTINUED | OUTPATIENT
Start: 2018-11-21 | End: 2018-11-24

## 2018-11-21 RX ORDER — SODIUM CHLORIDE 9 MG/ML
1 INJECTION INTRAMUSCULAR; INTRAVENOUS; SUBCUTANEOUS THREE TIMES A DAY
Qty: 0 | Refills: 0 | Status: COMPLETED | OUTPATIENT
Start: 2018-11-21 | End: 2018-11-24

## 2018-11-21 RX ORDER — METHADONE HYDROCHLORIDE 40 MG/1
5 TABLET ORAL THREE TIMES A DAY
Qty: 0 | Refills: 0 | Status: DISCONTINUED | OUTPATIENT
Start: 2018-11-21 | End: 2018-11-23

## 2018-11-21 RX ORDER — CEFAZOLIN SODIUM 1 G
2000 VIAL (EA) INJECTION ONCE
Qty: 0 | Refills: 0 | Status: COMPLETED | OUTPATIENT
Start: 2018-11-21 | End: 2018-11-21

## 2018-11-21 RX ADMIN — HYDROMORPHONE HYDROCHLORIDE 2 MILLIGRAM(S): 2 INJECTION INTRAMUSCULAR; INTRAVENOUS; SUBCUTANEOUS at 01:45

## 2018-11-21 RX ADMIN — SODIUM CHLORIDE 75 MILLILITER(S): 9 INJECTION INTRAMUSCULAR; INTRAVENOUS; SUBCUTANEOUS at 08:53

## 2018-11-21 RX ADMIN — HYDROMORPHONE HYDROCHLORIDE 1 MILLIGRAM(S): 2 INJECTION INTRAMUSCULAR; INTRAVENOUS; SUBCUTANEOUS at 13:40

## 2018-11-21 RX ADMIN — Medication 100 MILLIGRAM(S): at 19:07

## 2018-11-21 RX ADMIN — FENTANYL CITRATE 22.5 MICROGRAM(S)/KG/HR: 50 INJECTION INTRAVENOUS at 23:15

## 2018-11-21 RX ADMIN — FENTANYL CITRATE 50 MICROGRAM(S): 50 INJECTION INTRAVENOUS at 18:28

## 2018-11-21 RX ADMIN — HYDROMORPHONE HYDROCHLORIDE 2 MILLIGRAM(S): 2 INJECTION INTRAMUSCULAR; INTRAVENOUS; SUBCUTANEOUS at 10:46

## 2018-11-21 RX ADMIN — HYDROMORPHONE HYDROCHLORIDE 2 MILLIGRAM(S): 2 INJECTION INTRAMUSCULAR; INTRAVENOUS; SUBCUTANEOUS at 01:14

## 2018-11-21 RX ADMIN — HEPARIN SODIUM 5000 UNIT(S): 5000 INJECTION INTRAVENOUS; SUBCUTANEOUS at 05:53

## 2018-11-21 RX ADMIN — Medication 75 MILLIGRAM(S): at 15:32

## 2018-11-21 RX ADMIN — HYDROMORPHONE HYDROCHLORIDE 1 MILLIGRAM(S): 2 INJECTION INTRAMUSCULAR; INTRAVENOUS; SUBCUTANEOUS at 13:23

## 2018-11-21 RX ADMIN — PROPOFOL 22.5 MICROGRAM(S)/KG/MIN: 10 INJECTION, EMULSION INTRAVENOUS at 18:27

## 2018-11-21 RX ADMIN — OLANZAPINE 5 MILLIGRAM(S): 15 TABLET, FILM COATED ORAL at 15:32

## 2018-11-21 RX ADMIN — SODIUM CHLORIDE 75 MILLILITER(S): 9 INJECTION INTRAMUSCULAR; INTRAVENOUS; SUBCUTANEOUS at 15:32

## 2018-11-21 RX ADMIN — HYDROMORPHONE HYDROCHLORIDE 1 MILLIGRAM(S): 2 INJECTION INTRAMUSCULAR; INTRAVENOUS; SUBCUTANEOUS at 02:46

## 2018-11-21 RX ADMIN — Medication 75 MILLIGRAM(S): at 00:15

## 2018-11-21 RX ADMIN — SODIUM CHLORIDE 1 GRAM(S): 9 INJECTION INTRAMUSCULAR; INTRAVENOUS; SUBCUTANEOUS at 15:32

## 2018-11-21 RX ADMIN — FENTANYL CITRATE 50 MICROGRAM(S): 50 INJECTION INTRAVENOUS at 18:11

## 2018-11-21 RX ADMIN — HYDROMORPHONE HYDROCHLORIDE 1 MILLIGRAM(S): 2 INJECTION INTRAMUSCULAR; INTRAVENOUS; SUBCUTANEOUS at 03:00

## 2018-11-21 RX ADMIN — HYDROMORPHONE HYDROCHLORIDE 2 MILLIGRAM(S): 2 INJECTION INTRAMUSCULAR; INTRAVENOUS; SUBCUTANEOUS at 09:23

## 2018-11-21 RX ADMIN — HYDROMORPHONE HYDROCHLORIDE 2 MILLIGRAM(S): 2 INJECTION INTRAMUSCULAR; INTRAVENOUS; SUBCUTANEOUS at 17:25

## 2018-11-21 RX ADMIN — FENTANYL CITRATE 50 MICROGRAM(S): 50 INJECTION INTRAVENOUS at 18:25

## 2018-11-21 RX ADMIN — HYDROMORPHONE HYDROCHLORIDE 2 MILLIGRAM(S): 2 INJECTION INTRAMUSCULAR; INTRAVENOUS; SUBCUTANEOUS at 06:10

## 2018-11-21 RX ADMIN — MIDAZOLAM HYDROCHLORIDE 2 MILLIGRAM(S): 1 INJECTION, SOLUTION INTRAMUSCULAR; INTRAVENOUS at 18:20

## 2018-11-21 RX ADMIN — PROPOFOL 22.5 MICROGRAM(S)/KG/MIN: 10 INJECTION, EMULSION INTRAVENOUS at 23:15

## 2018-11-21 RX ADMIN — HYDROMORPHONE HYDROCHLORIDE 2 MILLIGRAM(S): 2 INJECTION INTRAMUSCULAR; INTRAVENOUS; SUBCUTANEOUS at 05:52

## 2018-11-21 RX ADMIN — Medication 75 MILLIGRAM(S): at 23:15

## 2018-11-21 RX ADMIN — FENTANYL CITRATE 50 MICROGRAM(S): 50 INJECTION INTRAVENOUS at 18:45

## 2018-11-21 RX ADMIN — SODIUM CHLORIDE 1 GRAM(S): 9 INJECTION INTRAMUSCULAR; INTRAVENOUS; SUBCUTANEOUS at 22:13

## 2018-11-21 RX ADMIN — HYDROMORPHONE HYDROCHLORIDE 2 MILLIGRAM(S): 2 INJECTION INTRAMUSCULAR; INTRAVENOUS; SUBCUTANEOUS at 14:30

## 2018-11-21 RX ADMIN — HYDROMORPHONE HYDROCHLORIDE 2 MILLIGRAM(S): 2 INJECTION INTRAMUSCULAR; INTRAVENOUS; SUBCUTANEOUS at 11:20

## 2018-11-21 RX ADMIN — HYDROMORPHONE HYDROCHLORIDE 2 MILLIGRAM(S): 2 INJECTION INTRAMUSCULAR; INTRAVENOUS; SUBCUTANEOUS at 08:53

## 2018-11-21 RX ADMIN — HYDROMORPHONE HYDROCHLORIDE 2 MILLIGRAM(S): 2 INJECTION INTRAMUSCULAR; INTRAVENOUS; SUBCUTANEOUS at 17:40

## 2018-11-21 RX ADMIN — HYDROMORPHONE HYDROCHLORIDE 2 MILLIGRAM(S): 2 INJECTION INTRAMUSCULAR; INTRAVENOUS; SUBCUTANEOUS at 14:13

## 2018-11-21 NOTE — PROGRESS NOTE ADULT - SUBJECTIVE AND OBJECTIVE BOX
Pt seen and examined. No acute events overnight. No desats overnight, no bleeding.     AVSS  NAD, awake and alert  Breathing comfortably on humidified TC  6LPC trach in place with sutures, cuff deflated   Neck soft and flat, stoma intact     CXR: unchanged pneumoperitoneum     A/P: s/p awake tracheotomy 11/19 for airway protection   -NPO, PEG dependent  -pain control   -trach care and teaching  -appreciate palliative pain med recs as pt can no longer take meds by mouth and ms contin ER cannot be crushed  -appreciate medicine hospitalist recs for hypoNa  -resume home meds through PEG   -ambulate ad denton   -dvt ppx: sqh  -appreciate nutrition consult for feeds   -dispo: pending trach supplies

## 2018-11-21 NOTE — CONSULT NOTE ADULT - ASSESSMENT
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 Was recovering well but began to have recurrence of oral bleeding this AM in setting of critical airway. SICU consulted and accepted pt to unit.    Neuro: Post operative pain, Anxiety  - Palliative care on board - will assist w/ pain management, GOC  - Alprazolam PRN for anxiety  - Pregabalin for neuropathic pain    Resp: s/p Trach  - c/w Trach Collar, currently satting well  - airway packed, f/u ENT regarding duration of packing. Will closely monitor for worsening of bleed given critical airway.    CV: Hx HTN  - HR, BP stable  - c/w home carvedilol 6.25mg BID    GI:   - NPO. Will speak w/ ENT about restarting TF.    Renal:  - hyponatremic to low 130s.   - c/w Andrade  - IVF : LR @ 140 mL/ hr while NPO    Heme:   - H/H currently stable. Hgb ~10, cont to trend  - c/w ASA 81 mg QD  - SCDs for now, will f/u w/ ENT regarding chemical ppx    ID:   - afebrile, WBC mildly elevated  - no current signs of infection    Endo:  - no issues 57 y/o M with squamous cell carcinoma of maxillary sinus presenting 11/19 increased WOB, now s/p tracheostomy 11/20. Was recovering well but began to have recurrence of oral bleeding this AM in setting of critical airway. SICU consulted and accepted pt to unit.    Neuro: Post operative pain, Anxiety  - Palliative care on board - will assist w/ pain management, GOC  - Alprazolam PRN for anxiety  - Pregabalin for neuropathic pain    Resp: s/p Trach  - c/w Trach Collar, currently satting well  - airway packed, f/u ENT regarding duration of packing. Will closely monitor for worsening of bleed given critical airway.    CV: Hx HTN  - HR, BP stable  - c/w home carvedilol 6.25mg BID    GI:   - keep NPO for now per ENT in case pt needs to be taken back to OR    Renal:  - hyponatremic to low 130s.   - c/w Andrade  - IVF : NS 75cc while NPO    Heme:   - H/H currently stable. Hgb ~10, cont to trend  - c/w ASA 81 mg QD  - SCDs for now, hold off chemical DVT ppx    ID:   - afebrile, WBC mildly elevated  - no current signs of infection    Endo:  - no issues 57 y/o M with squamous cell carcinoma of maxillary sinus presenting 11/19 increased WOB, now s/p tracheostomy 11/20. Was recovering well but began to have recurrence of oral bleeding this AM in setting of critical airway. SICU consulted and accepted pt to unit.    Neuro: Post operative pain, Anxiety  - Palliative care on board - will assist w/ pain management, GOC  - Per palliative: methadone 5mg q8; dilaudid 2 q3 PRN for moderate pain (qTC 435)  - Alprazolam PRN for anxiety  - Pregabalin for neuropathic pain    Resp: s/p Trach  - c/w Trach Collar, currently satting well  - airway packed, f/u ENT regarding duration of packing. Will closely monitor for worsening of bleed given critical airway.    CV: Hx HTN  - HR, BP stable  - c/w home carvedilol 6.25mg BID    GI:   - keep NPO for now per ENT in case pt needs to be taken back to OR    Renal:  - hyponatremic to low 130s.   - c/w Andrade  - IVF : NS 75cc while NPO    Heme:   - H/H currently stable. Hgb ~10, cont to trend  - c/w ASA 81 mg QD  - SCDs for now, hold off chemical DVT ppx    ID:   - afebrile, WBC mildly elevated  - no current signs of infection    Endo:  - no issues

## 2018-11-21 NOTE — CONSULT NOTE ADULT - ASSESSMENT
58M with hx of CHF in 2014 (LVEF 40-45%), bipolar d/o, YANET, diaphragmatic dysfunction, lung lobectomy, invasive SCCa of right maxillary sinus s/p chemo/RT, recent PEG, prior c.diff, LORNA on BiPAP,  admitted for worsening dyspnea and invasive sinus cancer that has infiltrated into hard palate and obstructing his breathing, difficulty breathing, s/p urgent tracheostomy on 11/20. Medicine consult requested for hyponatremia postop.

## 2018-11-21 NOTE — CONSULT NOTE ADULT - PROBLEM SELECTOR RECOMMENDATION 5
c/w psych meds (zyprexa, xanax)
Will continue to follow for ongoing symptom management.  Psychosocial support provided.

## 2018-11-21 NOTE — CONSULT NOTE ADULT - SUBJECTIVE AND OBJECTIVE BOX
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 Also with difficulty breathing, s/p urgent tracheostomy on 11/20. Patient had bleeding from sinus tumor this morning, packed and controlled by ENT. Overall bleeding, CBC stable but given critical airway, SICU reconsulted for further monitoring.    Wife reports pt does not have h/o CAD/MI/stent, had prior lobectomy and partial diaphragmatic dysfunction.      HISTORY  58y Male  HPI:  58M with hx of SCCa of right maxillary sinus sent to ED from clinic for difficulty breathing. Patient completed 4 sessions of RT and is no longer tolerating lying flat. Previously required CPAP at night only but now requiring it 24/7. Currently stable on CPAP in ED. 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- was followed by ID after lung surgery  Neoplasm of maxillary sinus  Bipolar disorder, unspecified  CHF (congestive heart failure): 2014  Diaphragm dysfunction: partial paralysis- now resolved as per wife  ETOH abuse  LORNA on CPAP  Cataract  S/P lobectomy of lung: Sept 2017  H/O endoscopy  H/O colonoscopy  H/O coronary angioplasty: x2    FAMILY HISTORY:  No family history of COPD  Family history of hypertension in mother (Sibling)      SOCIAL HISTORY:  ***    ADVANCE DIRECTIVES: Presumed Full Code  ***    REVIEW OF SYSTEMS:  ***  [ ] All ROS negative except those noted in HPI  [ ] ROS could not be obtained given pt intubated/sedated    HOME MEDICATIONS:  ***    CURRENT MEDICATIONS:   --------------------------------------------------------------------------------------  Neurologic Medications  ALPRAZolam 2 milliGRAM(s) Oral at bedtime  HYDROmorphone  Injectable 2 milliGRAM(s) IV Push every 3 hours PRN Severe Pain (7 - 10)  morphine  IR 30 milliGRAM(s) Oral three times a day PRN Moderate Pain (4 - 6)  OLANZapine 5 milliGRAM(s) Oral daily  pregabalin 75 milliGRAM(s) Oral two times a day    Respiratory Medications  loratadine 10 milliGRAM(s) Oral daily    Cardiovascular Medications  carvedilol 6.25 milliGRAM(s) Oral every 12 hours    Gastrointestinal Medications  loperamide Solution 2 milliGRAM(s) Oral daily PRN Diarrhea  sodium chloride 1 Gram(s) Oral three times a day  sodium chloride 0.9%. 1000 milliLiter(s) IV Continuous <Continuous>    Genitourinary Medications    Hematologic/Oncologic Medications    Antimicrobial/Immunologic Medications    Endocrine/Metabolic Medications    Topical/Other Medications    --------------------------------------------------------------------------------------    VITAL SIGNS, INS/OUTS (last 24 hours):  --------------------------------------------------------------------------------------  ((Insert SICU Vitals / Is+Os here)) ***  --------------------------------------------------------------------------------------    EXAM  NEUROLOGY  RASS:   	GCS:    Exam: Normal, NAD, alert, oriented x 3, no focal deficits. ***    HEENT  Exam: Normocephalic, atraumatic.  EOMI ***    RESPIRATORY  Exam: Lungs clear to auscultation, Normal expansion/effort.  ***  Mechanical Ventilation:     CARDIOVASCULAR  Exam: S1, S2.  Regular rate and rhythm.  Peripheral edema  ***    GI/NUTRITION  Exam: Abdomen soft, Non-tender, Non-distended.  ***  Wound:   ***  Current Diet:  NPO ***    VASCULAR  Exam: Extremities warm, pink, well-perfused.  ***    MUSCULOSKELETAL  Exam: All extremities moving spontaneously without limitations.  ***    SKIN:  Exam: Good skin turgor, no skin breakdown.  ***    METABOLIC/FLUIDS/ELECTROLYTES  sodium chloride 1 Gram(s) Oral three times a day  sodium chloride 0.9%. 1000 milliLiter(s) IV Continuous <Continuous>      HEMATOLOGIC  [x] DVT Prophylaxis:   Transfusions:	[] PRBC	[] Platelets		[] FFP	[] Cryoprecipitate    INFECTIOUS DISEASE  Antimicrobials/Immunologic Medications:    Day #  	of    ***    Tubes/Lines/Drains  ***  [x] Peripheral IV  [] Central Venous Line     	[] R	[] L	[] IJ	[] Fem	[] SC	Date Placed:   [] Arterial Line		[] R	[] L	[] Fem	[] Rad	[] Ax	Date Placed:   [] PICC:         	[] Midline		[] Mediport  [] Urinary Catheter		Date Placed:     LABS  --------------------------------------------------------------------------------------                        9.7    10.69 )-----------( 335      ( 21 Nov 2018 11:40 )             31.1       11-21    130<L>  |  95<L>  |  15  ----------------------------<  90  3.7   |  25  |  0.65    Ca    8.7      21 Nov 2018 06:25  Phos  4.1     11-20  Mg     1.9     11-20    TPro  8.2  /  Alb  3.9  /  TBili  0.9  /  DBili  x   /  AST  16  /  ALT  17  /  AlkPhos  178<H>  11-19      CAPILLARY BLOOD GLUCOSE          LIVER FUNCTIONS - ( 19 Nov 2018 15:40 )  Alb: 3.9 g/dL / Pro: 8.2 g/dL / ALK PHOS: 178 u/L / ALT: 17 u/L / AST: 16 u/L / GGT: x             PT/INR - ( 19 Nov 2018 15:40 )   PT: 20.5 SEC;   INR: 1.81          PTT - ( 19 Nov 2018 15:40 )  PTT:40.0 SEC      --------------------------------------------------------------------------------------    OTHER LABS    IMAGING RESULTS  Echo:   CT:   Xray:     ASSESSMENT:  58y Male ***    PLAN:  ***  Neurologic:   Respiratory:   Cardiovascular:   Gastrointestinal/Nutrition:   Renal/Genitourinary:   Hematologic:   Infectious Disease:   Tubes/Lines/Drains:   Endocrine:   Disposition:     --------------------------------------------------------------------------------------    Critical Care Diagnoses: SICU Consultation Note  =====================================================  HPI:  58M with hx of CHF in 2014 (LVEF 40-45%), bipolar d/o, YANET, diaphragmatic dysfunction, lung lobectomy, 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 Also with difficulty breathing, s/p urgent tracheostomy on 11/20. Patient had bleeding from sinus tumor this morning, packed and controlled by ENT. Overall bleeding, CBC stable but given critical airway, SICU re-consulted for close monitoring of bleed & airway.    Of note, wife reports pt does not have h/o CAD/MI/stent, had prior lobectomy and partial diaphragmatic dysfunction.    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- was followed by ID after lung surgery  Neoplasm of maxillary sinus  Bipolar disorder, unspecified  CHF (congestive heart failure): 2014  Diaphragm dysfunction: partial paralysis- now resolved as per wife  ETOH abuse  LORNA on CPAP  Cataract  S/P lobectomy of lung: Sept 2017  H/O endoscopy  H/O colonoscopy  H/O coronary angioplasty: x2    FAMILY HISTORY:  No family history of COPD  Family history of hypertension in mother (Sibling)      SOCIAL HISTORY:  denied toxic habits x3    ADVANCE DIRECTIVES: Full Code    REVIEW OF SYSTEMS:  [x] All ROS negative except those noted in HPI  [ ] ROS could not be obtained given pt intubated/sedated    HOME MEDICATIONS:  ***    CURRENT MEDICATIONS:   --------------------------------------------------------------------------------------  Neurologic Medications  ALPRAZolam 2 milliGRAM(s) Oral at bedtime  HYDROmorphone  Injectable 2 milliGRAM(s) IV Push every 3 hours PRN Severe Pain (7 - 10)  morphine  IR 30 milliGRAM(s) Oral three times a day PRN Moderate Pain (4 - 6)  OLANZapine 5 milliGRAM(s) Oral daily  pregabalin 75 milliGRAM(s) Oral two times a day    Respiratory Medications  loratadine 10 milliGRAM(s) Oral daily    Cardiovascular Medications  carvedilol 6.25 milliGRAM(s) Oral every 12 hours    Gastrointestinal Medications  loperamide Solution 2 milliGRAM(s) Oral daily PRN Diarrhea  sodium chloride 1 Gram(s) Oral three times a day  sodium chloride 0.9%. 1000 milliLiter(s) IV Continuous <Continuous>    Genitourinary Medications    Hematologic/Oncologic Medications    Antimicrobial/Immunologic Medications    Endocrine/Metabolic Medications    Topical/Other Medications    --------------------------------------------------------------------------------------    VITAL SIGNS, INS/OUTS (last 24 hours):  --------------------------------------------------------------------------------------  Vital Signs Last 24 Hrs  T(C): 37.2 (21 Nov 2018 13:04), Max: 37.2 (21 Nov 2018 13:04)  T(F): 99 (21 Nov 2018 13:04), Max: 99 (21 Nov 2018 13:04)  HR: 96 (21 Nov 2018 14:00) (90 - 101)  BP: 122/78 (21 Nov 2018 13:04) (100/64 - 128/82)  BP(mean): 88 (21 Nov 2018 13:04) (88 - 88)  RR: 19 (21 Nov 2018 14:00) (16 - 19)  SpO2: 98% (21 Nov 2018 14:00) (95% - 98%)    I&O's Summary    20 Nov 2018 07:01  -  21 Nov 2018 07:00  --------------------------------------------------------  IN: 2400 mL / OUT: 500 mL / NET: 1900 mL  --------------------------------------------------------------------------------------    EXAM  NEUROLOGY  Exam: Normal, NAD, alert, oriented x 3, no focal deficits, following commands    HEENT  Exam: Normocephalic, atraumatic. EOMI. R-sided maxillary mass.    RESPIRATORY  Exam: Lungs clear to auscultation, Normal expansion/effort.  Trach collar    CARDIOVASCULAR  Exam: S1, S2.  Regular rate and rhythm.     GI/NUTRITION  Exam: Abdomen soft, Non-tender, Non-distended.  Current Diet:  NPO    VASCULAR  Exam: Extremities warm, pink, well-perfused.    MUSCULOSKELETAL  Exam: All extremities moving spontaneously without limitations.    METABOLIC/FLUIDS/ELECTROLYTES  sodium chloride 1 Gram(s) Oral three times a day  sodium chloride 0.9%. 1000 milliLiter(s) IV Continuous <Continuous>    HEMATOLOGIC  [x] DVT Prophylaxis: SCDs      Tubes/Lines/Drains  ***  [x] Peripheral IV  [] Central Venous Line     	[] R	[] L	[] IJ	[] Fem	[] SC	Date Placed:   [] Arterial Line		[] R	[] L	[] Fem	[] Rad	[] Ax	Date Placed:   [] PICC:         	[] Midline		[] Mediport  [] Urinary Catheter		Date Placed:     LABS  --------------------------------------------------------------------------------------                        9.7    10.69 )-----------( 335      ( 21 Nov 2018 11:40 )             31.1       11-21    130<L>  |  95<L>  |  15  ----------------------------<  90  3.7   |  25  |  0.65    Ca    8.7      21 Nov 2018 06:25  Phos  4.1     11-20  Mg     1.9     11-20    TPro  8.2  /  Alb  3.9  /  TBili  0.9  /  DBili  x   /  AST  16  /  ALT  17  /  AlkPhos  178<H>  11-19      CAPILLARY BLOOD GLUCOSE          LIVER FUNCTIONS - ( 19 Nov 2018 15:40 )  Alb: 3.9 g/dL / Pro: 8.2 g/dL / ALK PHOS: 178 u/L / ALT: 17 u/L / AST: 16 u/L / GGT: x             PT/INR - ( 19 Nov 2018 15:40 )   PT: 20.5 SEC;   INR: 1.81          PTT - ( 19 Nov 2018 15:40 )  PTT:40.0 SEC    IMAGING  < from: CT Angio Neck w/ IV Cont (11.21.18 @ 12:54) >  IMPRESSION:  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 There is no definite   evidence for contrast extravasation from vascularity seen within   neoplastic disease. There are however, large dysplastic appearing vessels   within tumoral parenchyma.     Tumoral disease is described above extending across the midline, invading   the right orbit,  space, and pterygopalatine fossa as described   with extensive bony destructive change. Contrast-enhanced MR imaging may   be done for further evaluation of transcranial/perineural spread of   disease.

## 2018-11-21 NOTE — CONSULT NOTE ADULT - SUBJECTIVE AND OBJECTIVE BOX
HPI:  Obtained from H&P  58M with hx of SCCa of right maxillary sinus sent to ED from clinic for difficulty breathing. Patient completed 4 sessions of RT and is no longer tolerating lying flat. Previously required CPAP at night only but now requiring it 24/7. Currently stable on CPAP in ED. (19 Nov 2018 16:25)    Patient currently sitting up in bed with intraoral packing s/p trach in no acute distress     PERTINENT PM/SXH:   YANET (mycobacterium avium-intracellulare)  Neoplasm of maxillary sinus  SIRS (systemic inflammatory response syndrome)  Bipolar disorder, unspecified  Renal stones  CAD (coronary artery disease)  CHF (congestive heart failure)  HLD (hyperlipidemia)  HTN (hypertension)  Diaphragm dysfunction  ETOH abuse  HF (heart failure)  LORNA on CPAP  Smoker  Chronic obstructive pulmonary disease, unspecified COPD type    Cataract  S/P lobectomy of lung  H/O endoscopy  H/O colonoscopy  H/O coronary angioplasty  No significant past surgical history    FAMILY HISTORY:  No family history of COPD  Family history of hypertension in mother (Sibling)      SOCIAL HISTORY:   Significant other/partner: Yes [x ]  No [ ] Children:  Yes [x ]  No [ ] Episcopal/Spirituality:  Adventism   Substance hx: Yes[ ]  No [x ]   Tobacco hx:  Yes [x ] - quit one week ago   Alcohol hx: Yes [ ] No [ x] c  Home Opioid hx:  Yes [x ] Ms Contin 50mg PO q8h with Dilaudid 8mg PO q4h PRN  Living Situation: [ x]Home  [ ]Long term care  [ ]Rehab [ ]Other    ADVANCE DIRECTIVES:  Full code     [ ] Health Care Proxy(s)  [x ] Surrogate(s)  [ ] Guardian           Name(s): Phone Number(s): Akiko Campbell - [phone]    BASELINE (I)ADL(s) (prior to admission):  [place]: [x ]Total  [ ] Moderate [ ]Dependent    Allergies    hospital socks (Rash)  lisinopril (Anaphylaxis)  statins (Anaphylaxis)    Intolerances    MEDICATIONS  (STANDING):  ALPRAZolam 2 milliGRAM(s) Oral at bedtime  carvedilol 6.25 milliGRAM(s) Oral every 12 hours  methadone    Tablet 5 milliGRAM(s) Oral three times a day  OLANZapine 5 milliGRAM(s) Oral daily  pregabalin 75 milliGRAM(s) Oral two times a day  sodium chloride 1 Gram(s) Oral three times a day  sodium chloride 0.9%. 1000 milliLiter(s) (75 mL/Hr) IV Continuous <Continuous>    MEDICATIONS  (PRN):  HYDROmorphone  Injectable 2 milliGRAM(s) IV Push every 3 hours PRN Moderate Pain (4 - 6)  loperamide Solution 2 milliGRAM(s) Oral daily PRN Diarrhea    PRESENT SYMPTOMS: [ ]Unable to obtain due to poor mentation   Source if other than patient:  [ ]Family   [ ]Team     Pain (Impact on QOL):  Patient with right facial/head pain - radiating to the back/side of his head - 10/10 at its worst, aggravated by coughing, relieved with opioids - acute on chronic - currently 4/10     PAIN AD Score:     [email or web address] (press ctrl +  left click to view)    Dyspnea:  Yes [ ] No [x ] - [ ]Mild [ ]Moderate [ ]Severe  Anxiety:    Yes [x ] No [ ] - [ x]Mild [ ]Moderate [ ]Severe  Fatigue:    Yes [ ] No [x ] - [ ]Mild [ ]Moderate [ ]Severe  Nausea:    Yes [ ] No [x ] - [ ]Mild [ ]Moderate [ ]Severe                         Loss of appetite: PEG             Constipation:  Yes [ ] No [x ] - [ ]Mild [ ]Moderate [ ]Severe    Other Symptoms:  [x ]All other review of systems negative     Karnofsky Performance Score/Palliative Performance Status Version 2:  60%    http://palliative.info/resource_material/[email or web address].pdf    PHYSICAL EXAM:  Vital Signs Last 24 Hrs  T(C): 37.2 (21 Nov 2018 13:04), Max: 37.2 (21 Nov 2018 13:04)  T(F): 99 (21 Nov 2018 13:04), Max: 99 (21 Nov 2018 13:04)  HR: 94 (21 Nov 2018 15:00) (90 - 101)  BP: 109/65 (21 Nov 2018 15:00) (100/64 - 128/82)  BP(mean): 73 (21 Nov 2018 15:00) (73 - 88)  RR: 14 (21 Nov 2018 15:00) (14 - 19)  SpO2: 98% (21 Nov 2018 15:00) (95% - 98%) I&O's Summary    20 Nov 2018 07:01  -  21 Nov 2018 07:00  --------------------------------------------------------  IN: 2400 mL / OUT: 500 mL / NET: 1900 mL    21 Nov 2018 07:01  -  21 Nov 2018 16:27  --------------------------------------------------------  IN: 225 mL / OUT: 300 mL / NET: -75 mL    GENERAL:  [x ]Alert, writing - wife at bedside   HEENT:  [ ]Normal   [ ]Dry mouth   [x ]ET Tube/Trach  [x ]Oral lesions - right sided oral tumor noted   PULMONARY:   [x ]Clear anteriorly   [ ]Rhonchi        [ ]Right [ ]Left [ ]Bilateral  [ ]Crackles        [ ]Right [ ]Left [ ]Bilateral  [ ]Wheezing     [ ]Right [ ]Left [ ]Bilateral  CARDIOVASCULAR:    [x ]Regular [ ]Irregular [ ]Tachy  [ ]Jimmie [ ]Murmur [ ]Other  GASTROINTESTINAL:  [x ]Soft  [ ]Distended   [x ]+BS  [ x]Non tender [ ]Tender  [x ]PEG [ ]OGT/ NGT  Last BM: 11/20/18    GENITOURINARY:  [x ]Normal [ ] Incontinent   [ ]Oliguria/Anuria   [ ]Andrade  MUSCULOSKELETAL:   [ ]Normal   [ xx]Weakness  [ ]Bed/Wheelchair bound [ ]Edema  NEUROLOGIC:   [x ]No focal deficits  [ ] Cognitive impairment  [ ] Dysphagia [ ]Dysarthria [ ] Paresis [ ]Other   SKIN:   Intraoral tumor with intraoral packing in place with small amounts of bright red blood     LABS:                        9.7    10.69 )-----------( 335      ( 21 Nov 2018 11:40 )             31.1   11-21    130<L>  |  95<L>  |  15  ----------------------------<  90  3.7   |  25  |  0.65    Ca    8.7      21 Nov 2018 06:25  Phos  4.1     11-20  Mg     1.9     11-20          RADIOLOGY & ADDITIONAL STUDIES: Reviewed  CT angio neck  IMPRESSION:  Hypertrophy of the right maxillary and descending pharyngeal arteries   which supply the large right sinonasal mass. There is no definite   evidence for contrast extravasation from vascularity seen within   neoplastic disease. There are however, large dysplastic appearing vessels   within tumoral parenchyma.     Tumoral disease is described above extending across the midline, invading   the right orbit,  space, and pterygopalatine fossa as described   with extensive bony destructive change. Contrast-enhanced MR imaging may   be done for further evaluation of transcranial/perineural spread of   disease.      PROTEIN CALORIE MALNUTRITION PRESENT: [ ] Yes [ ] No  [ ] PPSV2 < or = to 30% [ ] significant weight loss  [ ] poor nutritional intake [ ] catabolic state [ ] anasarca     Albumin, Serum: 3.9 g/dL (11-19-18 @ 15:40)      REFERRALS:   [ ]Chaplaincy  [ ] Hospice  [ ]Child Life  [ ]Social Work  [ ]Case management [ ]Holistic Therapy   Goals of Care Discussion Document: HPI:  Obtained from H&P  58M with hx of SCCa of right maxillary sinus sent to ED from clinic for difficulty breathing. Patient completed 4 sessions of RT and is no longer tolerating lying flat. Previously required CPAP at night only but now requiring it 24/7. Currently stable on CPAP in ED. (19 Nov 2018 16:25)    Patient currently sitting up in bed with intraoral packing s/p trach in no acute distress     PERTINENT PM/SXH:   YANET (mycobacterium avium-intracellulare)  Neoplasm of maxillary sinus  SIRS (systemic inflammatory response syndrome)  Bipolar disorder, unspecified  Renal stones  CAD (coronary artery disease)  CHF (congestive heart failure)  HLD (hyperlipidemia)  HTN (hypertension)  Diaphragm dysfunction  ETOH abuse  HF (heart failure)  LORNA on CPAP  Smoker  Chronic obstructive pulmonary disease, unspecified COPD type    Cataract  S/P lobectomy of lung  H/O endoscopy  H/O colonoscopy  H/O coronary angioplasty  No significant past surgical history    FAMILY HISTORY:  No family history of COPD  Family history of hypertension in mother (Sibling)      SOCIAL HISTORY:   Significant other/partner: Yes [x ]  No [ ] Children:  Yes [x ]  No [ ] Anabaptist/Spirituality:  Zoroastrianism   Substance hx: Yes[ ]  No [x ]   Tobacco hx:  Yes [x ] - quit one week ago - declined nicotine patch   Alcohol hx: Yes [ ] No [ x] c  Home Opioid hx:  Yes [x ] Ms Contin 50mg PO q8h with Dilaudid 8mg PO q4h PRN  Living Situation: [ x]Home  [ ]Long term care  [ ]Rehab [ ]Other    ADVANCE DIRECTIVES:  Full code     [ ] Health Care Proxy(s)  [x ] Surrogate(s)  [ ] Guardian           Name(s): Phone Number(s): Akiko Campbell - [phone]    BASELINE (I)ADL(s) (prior to admission):  [place]: [x ]Total  [ ] Moderate [ ]Dependent    Allergies    hospital socks (Rash)  lisinopril (Anaphylaxis)  statins (Anaphylaxis)    Intolerances    MEDICATIONS  (STANDING):  ALPRAZolam 2 milliGRAM(s) Oral at bedtime  carvedilol 6.25 milliGRAM(s) Oral every 12 hours  methadone    Tablet 5 milliGRAM(s) Oral three times a day  OLANZapine 5 milliGRAM(s) Oral daily  pregabalin 75 milliGRAM(s) Oral two times a day  sodium chloride 1 Gram(s) Oral three times a day  sodium chloride 0.9%. 1000 milliLiter(s) (75 mL/Hr) IV Continuous <Continuous>    MEDICATIONS  (PRN):  HYDROmorphone  Injectable 2 milliGRAM(s) IV Push every 3 hours PRN Moderate Pain (4 - 6)  loperamide Solution 2 milliGRAM(s) Oral daily PRN Diarrhea    PRESENT SYMPTOMS: [ ]Unable to obtain due to poor mentation   Source if other than patient:  [ ]Family   [ ]Team     Pain (Impact on QOL):  Patient with right facial/head pain - radiating to the back/side of his head - 10/10 at its worst, aggravated by coughing, relieved with opioids - acute on chronic - currently 4/10     PAIN AD Score:     [email or web address] (press ctrl +  left click to view)    Dyspnea:  Yes [ ] No [x ] - [ ]Mild [ ]Moderate [ ]Severe  Anxiety:    Yes [x ] No [ ] - [ x]Mild [ ]Moderate [ ]Severe  Fatigue:    Yes [ ] No [x ] - [ ]Mild [ ]Moderate [ ]Severe  Nausea:    Yes [ ] No [x ] - [ ]Mild [ ]Moderate [ ]Severe                         Loss of appetite: PEG             Constipation:  Yes [ ] No [x ] - [ ]Mild [ ]Moderate [ ]Severe    Other Symptoms:  [x ]All other review of systems negative     Karnofsky Performance Score/Palliative Performance Status Version 2:  60%    http://palliative.info/resource_material/[email or web address].pdf    PHYSICAL EXAM:  Vital Signs Last 24 Hrs  T(C): 37.2 (21 Nov 2018 13:04), Max: 37.2 (21 Nov 2018 13:04)  T(F): 99 (21 Nov 2018 13:04), Max: 99 (21 Nov 2018 13:04)  HR: 94 (21 Nov 2018 15:00) (90 - 101)  BP: 109/65 (21 Nov 2018 15:00) (100/64 - 128/82)  BP(mean): 73 (21 Nov 2018 15:00) (73 - 88)  RR: 14 (21 Nov 2018 15:00) (14 - 19)  SpO2: 98% (21 Nov 2018 15:00) (95% - 98%) I&O's Summary    20 Nov 2018 07:01  -  21 Nov 2018 07:00  --------------------------------------------------------  IN: 2400 mL / OUT: 500 mL / NET: 1900 mL    21 Nov 2018 07:01  -  21 Nov 2018 16:27  --------------------------------------------------------  IN: 225 mL / OUT: 300 mL / NET: -75 mL    GENERAL:  [x ]Alert, writing - wife at bedside   HEENT:  [ ]Normal   [ ]Dry mouth   [x ]ET Tube/Trach  [x ]Oral lesions - right sided oral tumor noted   PULMONARY:   [x ]Clear anteriorly   [ ]Rhonchi        [ ]Right [ ]Left [ ]Bilateral  [ ]Crackles        [ ]Right [ ]Left [ ]Bilateral  [ ]Wheezing     [ ]Right [ ]Left [ ]Bilateral  CARDIOVASCULAR:    [x ]Regular [ ]Irregular [ ]Tachy  [ ]Jimmie [ ]Murmur [ ]Other  GASTROINTESTINAL:  [x ]Soft  [ ]Distended   [x ]+BS  [ x]Non tender [ ]Tender  [x ]PEG [ ]OGT/ NGT  Last BM: 11/20/18    GENITOURINARY:  [x ]Normal [ ] Incontinent   [ ]Oliguria/Anuria   [ ]Andrade  MUSCULOSKELETAL:   [ ]Normal   [ xx]Weakness  [ ]Bed/Wheelchair bound [ ]Edema  NEUROLOGIC:   [x ]No focal deficits  [ ] Cognitive impairment  [ ] Dysphagia [ ]Dysarthria [ ] Paresis [ ]Other   SKIN:   Intraoral tumor with intraoral packing in place with small amounts of bright red blood     LABS:                        9.7    10.69 )-----------( 335      ( 21 Nov 2018 11:40 )             31.1   11-21    130<L>  |  95<L>  |  15  ----------------------------<  90  3.7   |  25  |  0.65    Ca    8.7      21 Nov 2018 06:25  Phos  4.1     11-20  Mg     1.9     11-20          RADIOLOGY & ADDITIONAL STUDIES: Reviewed  CT angio neck  IMPRESSION:  Hypertrophy of the right maxillary and descending pharyngeal arteries   which supply the large right sinonasal mass. There is no definite   evidence for contrast extravasation from vascularity seen within   neoplastic disease. There are however, large dysplastic appearing vessels   within tumoral parenchyma.     Tumoral disease is described above extending across the midline, invading   the right orbit,  space, and pterygopalatine fossa as described   with extensive bony destructive change. Contrast-enhanced MR imaging may   be done for further evaluation of transcranial/perineural spread of   disease.      PROTEIN CALORIE MALNUTRITION PRESENT: [ ] Yes [ ] No  [ ] PPSV2 < or = to 30% [ ] significant weight loss  [ ] poor nutritional intake [ ] catabolic state [ ] anasarca     Albumin, Serum: 3.9 g/dL (11-19-18 @ 15:40)      REFERRALS:   [ ]Chaplaincy  [ ] Hospice  [ ]Child Life  [ ]Social Work  [ ]Case management [ ]Holistic Therapy   Goals of Care Discussion Document:

## 2018-11-21 NOTE — CONSULT NOTE ADULT - PROBLEM SELECTOR RECOMMENDATION 4
-LVEF 40-45% and mod AI on TTE 7/2018  -not on diuretic at home  -appear euvolemic at this time, no active CHF  -monitor closely on IVF (NS), avoid fluid overload  -c/w coreg, no specific indication for ASA (can discontinue)
Assist with ADL's as needed.  Fall precautions.

## 2018-11-21 NOTE — CONSULT NOTE ADULT - PROBLEM SELECTOR RECOMMENDATION 9
Patient of Dr. Olivo.  As per patient's wife, Dr. Olivo had recommended hospice, but patient was not accepting of this and would like to continue chemo/RT as an outpatient.  Continue management as per Dr. Olivo/ENT.
-invasive maxillary sinus SCCa with airway compromise, s/p emergent tracheostomy on 11/20  -postop management per ENT, pain control, incentive spirometry, DVT prophylaxis  -bleeding from sinus tumor this am, check CBC, transfuse prn, CT angio and SICU consult

## 2018-11-21 NOTE — CONSULT NOTE ADULT - SUBJECTIVE AND OBJECTIVE BOX
Emily Watters MD  Pager [phone]    HPI:  58M with hx of CHF in 2014 (LVEF 40-45%), bipolar d/o, YANET, diaphragmatic dysfunction, lung lobectomy, invasive SCCa of right maxillary sinus s/p chemo/RT, recent PEG, prior c.diff, LORNA on BiPAP,  admitted for worsening dyspnea and invasive sinus cancer that has infiltrated into hard palate and obstructing his breathing, difficulty breathing, s/p urgent tracheostomy on 11/20. Medicine consult requested for hyponatremia postop. Patient had bleeding from sinus tumor this morning, packed and controlled by ENT.     Wife reports pt does not have h/o CAD/MI/stent, had prior lobectomy and partial diaphragmatic dysfunction and is on bipap at home for lorna.     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- was followed by ID after lung surgery  Neoplasm of maxillary sinus  Bipolar disorder, unspecified  CHF (congestive heart failure): 2014  Diaphragm dysfunction: partial paralysis- now resolved as per wife  ETOH abuse  LORNA on CPAP  Cataract  S/P lobectomy of lung: Sept 2017  H/O endoscopy  H/O colonoscopy  H/O coronary angioplasty: x2      Review of Systems:   CONSTITUTIONAL: No fever, +weight loss   EYES: No eye pain, visual disturbances, or discharge  ENMT:  No difficulty hearing, tinnitus, vertigo; No sinus or throat pain  NECK: No pain or stiffness  BREASTS: No pain, masses, or nipple discharge  RESPIRATORY: dyspnea, worsening difficulty breathing   CARDIOVASCULAR: No chest pain, palpitations, dizziness, or leg swelling  GASTROINTESTINAL: No abdominal or epigastric pain. No nausea, vomiting, or hematemesis; No diarrhea or constipation. No melena or hematochezia.  GENITOURINARY: No dysuria, frequency, hematuria, or incontinence  NEUROLOGICAL: No headaches, memory loss, loss of strength, numbness, or tremors  SKIN: No itching, burning, rashes, or lesions   LYMPH NODES: No enlarged glands  ENDOCRINE: No heat or cold intolerance; No hair loss  MUSCULOSKELETAL: No joint pain or swelling; No muscle, back, or extremity pain  PSYCHIATRIC: No depression, anxiety, mood swings, or difficulty sleeping  HEME/LYMPH: No easy bruising, or bleeding gums  ALLERY AND IMMUNOLOGIC: No hives or eczema    Allergies    hospital socks (Rash)  lisinopril (Anaphylaxis)  statins (Anaphylaxis)    Intolerances    Social History: no smoking/etoh    FAMILY HISTORY:  No family history of COPD  Family history of hypertension in mother (Sibling)      Home Medications:  aspirin 81 mg oral tablet, chewable: 1 tab(s) orally once a day (12 Nov 2018 14:38)  HYDROmorphone 8 mg oral tablet: 1 tab(s) orally every 4 hours, As Needed - 10) MDD:6 (12 Nov 2018 14:38)  morphine 50 mg/24 hours oral capsule, extended release: 1 tab(s) orally 3 times a day (12 Nov 2018 14:38)  OLANZapine 5 mg oral tablet: 1 tab(s) orally once a day (at bedtime) (12 Nov 2018 14:38)  Xanax 0.5 mg oral tablet: 3 tab(s) orally once a day (at bedtime), As Needed - anxiety (12 Nov 2018 14:38)  Zofran 8 mg oral tablet: 1 tab(s) orally 3 times a day, As Needed (12 Nov 2018 14:38)      MEDICATIONS  (STANDING):  ALPRAZolam 2 milliGRAM(s) Oral at bedtime  carvedilol 6.25 milliGRAM(s) Oral every 12 hours  loratadine 10 milliGRAM(s) Oral daily  OLANZapine 5 milliGRAM(s) Oral daily  pregabalin 75 milliGRAM(s) Oral two times a day  sodium chloride 0.9%. 1000 milliLiter(s) (75 mL/Hr) IV Continuous <Continuous>    MEDICATIONS  (PRN):  HYDROmorphone  Injectable 2 milliGRAM(s) IV Push every 3 hours PRN Severe Pain (7 - 10)  loperamide Solution 2 milliGRAM(s) Oral daily PRN Diarrhea  morphine  IR 30 milliGRAM(s) Oral three times a day PRN Moderate Pain (4 - 6)      Vital Signs Last 24 Hrs  T(C): 36.6 (21 Nov 2018 05:50), Max: 36.8 (20 Nov 2018 18:31)  T(F): 97.8 (21 Nov 2018 05:50), Max: 98.2 (20 Nov 2018 18:31)  HR: 92 (21 Nov 2018 09:04) (90 - 101)  BP: 112/72 (21 Nov 2018 09:04) (100/64 - 128/82)  BP(mean): --  RR: 17 (21 Nov 2018 05:50) (16 - 18)  SpO2: 95% (21 Nov 2018 05:50) (95% - 100%)  CAPILLARY BLOOD GLUCOSE        I&O's Summary    20 Nov 2018 07:01  -  21 Nov 2018 07:00  --------------------------------------------------------  IN: 2400 mL / OUT: 500 mL / NET: 1900 mL        PHYSICAL EXAM:  GENERAL: NAD, well-developed  HEAD:  Atraumatic, Normocephalic  EYES: EOMI, conjunctiva and sclera clear  NECK: Supple, +trach  CHEST/LUNG: mainly clear, decreased breath sound lung base  HEART: Regular rate and rhythm; No murmurs, rubs, or gallops  ABDOMEN: Soft ,+peg,  Nontender, Nondistended; Bowel sounds present  EXTREMITIES:  2+ Peripheral Pulses, No clubbing, cyanosis, or edema  PSYCH: AAOx3  NEUROLOGY: non-focal  SKIN: No rashes or lesions    LABS:                        10.0   10.21 )-----------( 317      ( 21 Nov 2018 06:25 )             30.6     11-21    130<L>  |  95<L>  |  15  ----------------------------<  90  3.7   |  25  |  0.65    Ca    8.7      21 Nov 2018 06:25  Phos  4.1     11-20  Mg     1.9     11-20    TPro  8.2  /  Alb  3.9  /  TBili  0.9  /  DBili  x   /  AST  16  /  ALT  17  /  AlkPhos  178<H>  11-19    PT/INR - ( 19 Nov 2018 15:40 )   PT: 20.5 SEC;   INR: 1.81          PTT - ( 19 Nov 2018 15:40 )  PTT:40.0 SEC        Microbiology     RADIOLOGY & ADDITIONAL TESTS:    Imaging Personally Reviewed:  < from: Xray Chest 1 View- PORTABLE-Urgent (11.19.18 @ 19:41) >  IMPRESSION:  Follow-uppost tracheostomy tube placement adjustment   without complication and with persistent pneumoperitoneum of uncertain   etiology.      Consultant(s) Notes Reviewed:      Care Discussed with Consultants/Other Providers: ENT LORENA Yanes, bleeding from sinus tumor, CT angio and SICU eval

## 2018-11-21 NOTE — CONSULT NOTE ADULT - ASSESSMENT
58M with hx of SCCa of right maxillary sinus sent to ED from clinic for difficulty breathing, s/p trach on 11/20/18 yesterday.  Palliative consulted for pain management.

## 2018-11-21 NOTE — CONSULT NOTE ADULT - PROBLEM SELECTOR RECOMMENDATION 6
H/o diaphragmatic dysfunction and lung lobectomy  was on bipap at home, now s/p trach on trach collar, management per ENT

## 2018-11-22 LAB
BUN SERPL-MCNC: 12 MG/DL — SIGNIFICANT CHANGE UP (ref 7–23)
CALCIUM SERPL-MCNC: 8.4 MG/DL — SIGNIFICANT CHANGE UP (ref 8.4–10.5)
CHLORIDE SERPL-SCNC: 98 MMOL/L — SIGNIFICANT CHANGE UP (ref 98–107)
CO2 SERPL-SCNC: 25 MMOL/L — SIGNIFICANT CHANGE UP (ref 22–31)
CREAT SERPL-MCNC: 0.61 MG/DL — SIGNIFICANT CHANGE UP (ref 0.5–1.3)
GLUCOSE SERPL-MCNC: 81 MG/DL — SIGNIFICANT CHANGE UP (ref 70–99)
HBV CORE AB SER-ACNC: NONREACTIVE — SIGNIFICANT CHANGE UP
HBV SURFACE AB SER-ACNC: NONREACTIVE — SIGNIFICANT CHANGE UP
HBV SURFACE AG SER-ACNC: NONREACTIVE — SIGNIFICANT CHANGE UP
HCT VFR BLD CALC: 24.8 % — LOW (ref 39–50)
HCT VFR BLD CALC: 25.8 % — LOW (ref 39–50)
HCV AB S/CO SERPL IA: 0.18 S/CO — SIGNIFICANT CHANGE UP
HCV AB SERPL-IMP: SIGNIFICANT CHANGE UP
HGB BLD-MCNC: 7.9 G/DL — LOW (ref 13–17)
HGB BLD-MCNC: 8.2 G/DL — LOW (ref 13–17)
HIV COMBO RESULT: SIGNIFICANT CHANGE UP
HIV1+2 AB SPEC QL: SIGNIFICANT CHANGE UP
MAGNESIUM SERPL-MCNC: 1.6 MG/DL — SIGNIFICANT CHANGE UP (ref 1.6–2.6)
MCHC RBC-ENTMCNC: 26.9 PG — LOW (ref 27–34)
MCHC RBC-ENTMCNC: 27.2 PG — SIGNIFICANT CHANGE UP (ref 27–34)
MCHC RBC-ENTMCNC: 31.8 % — LOW (ref 32–36)
MCHC RBC-ENTMCNC: 31.9 % — LOW (ref 32–36)
MCV RBC AUTO: 84.4 FL — SIGNIFICANT CHANGE UP (ref 80–100)
MCV RBC AUTO: 85.4 FL — SIGNIFICANT CHANGE UP (ref 80–100)
NRBC # FLD: 0 — SIGNIFICANT CHANGE UP
NRBC # FLD: 0 — SIGNIFICANT CHANGE UP
PHOSPHATE SERPL-MCNC: 3.6 MG/DL — SIGNIFICANT CHANGE UP (ref 2.5–4.5)
PLATELET # BLD AUTO: 282 K/UL — SIGNIFICANT CHANGE UP (ref 150–400)
PLATELET # BLD AUTO: 299 K/UL — SIGNIFICANT CHANGE UP (ref 150–400)
PMV BLD: 9.7 FL — SIGNIFICANT CHANGE UP (ref 7–13)
PMV BLD: 9.8 FL — SIGNIFICANT CHANGE UP (ref 7–13)
POTASSIUM SERPL-MCNC: 3.8 MMOL/L — SIGNIFICANT CHANGE UP (ref 3.5–5.3)
POTASSIUM SERPL-SCNC: 3.8 MMOL/L — SIGNIFICANT CHANGE UP (ref 3.5–5.3)
RBC # BLD: 2.94 M/UL — LOW (ref 4.2–5.8)
RBC # BLD: 3.02 M/UL — LOW (ref 4.2–5.8)
RBC # FLD: 15.3 % — HIGH (ref 10.3–14.5)
RBC # FLD: 15.3 % — HIGH (ref 10.3–14.5)
SODIUM SERPL-SCNC: 134 MMOL/L — LOW (ref 135–145)
WBC # BLD: 9.5 K/UL — SIGNIFICANT CHANGE UP (ref 3.8–10.5)
WBC # BLD: 9.87 K/UL — SIGNIFICANT CHANGE UP (ref 3.8–10.5)
WBC # FLD AUTO: 9.5 K/UL — SIGNIFICANT CHANGE UP (ref 3.8–10.5)
WBC # FLD AUTO: 9.87 K/UL — SIGNIFICANT CHANGE UP (ref 3.8–10.5)

## 2018-11-22 PROCEDURE — 99221 1ST HOSP IP/OBS SF/LOW 40: CPT

## 2018-11-22 PROCEDURE — 99223 1ST HOSP IP/OBS HIGH 75: CPT

## 2018-11-22 RX ORDER — PANTOPRAZOLE SODIUM 20 MG/1
40 TABLET, DELAYED RELEASE ORAL DAILY
Qty: 0 | Refills: 0 | Status: DISCONTINUED | OUTPATIENT
Start: 2018-11-22 | End: 2018-11-25

## 2018-11-22 RX ORDER — MAGNESIUM SULFATE 500 MG/ML
2 VIAL (ML) INJECTION ONCE
Qty: 0 | Refills: 0 | Status: COMPLETED | OUTPATIENT
Start: 2018-11-22 | End: 2018-11-22

## 2018-11-22 RX ORDER — SODIUM CHLORIDE 9 MG/ML
1000 INJECTION, SOLUTION INTRAVENOUS
Qty: 0 | Refills: 0 | Status: DISCONTINUED | OUTPATIENT
Start: 2018-11-22 | End: 2018-11-22

## 2018-11-22 RX ORDER — SODIUM CHLORIDE 9 MG/ML
3 INJECTION INTRAMUSCULAR; INTRAVENOUS; SUBCUTANEOUS EVERY 8 HOURS
Qty: 0 | Refills: 0 | Status: DISCONTINUED | OUTPATIENT
Start: 2018-11-22 | End: 2018-11-26

## 2018-11-22 RX ADMIN — SODIUM CHLORIDE 75 MILLILITER(S): 9 INJECTION INTRAMUSCULAR; INTRAVENOUS; SUBCUTANEOUS at 08:02

## 2018-11-22 RX ADMIN — SODIUM CHLORIDE 75 MILLILITER(S): 9 INJECTION, SOLUTION INTRAVENOUS at 10:30

## 2018-11-22 RX ADMIN — OLANZAPINE 5 MILLIGRAM(S): 15 TABLET, FILM COATED ORAL at 12:22

## 2018-11-22 RX ADMIN — PROPOFOL 22.5 MICROGRAM(S)/KG/MIN: 10 INJECTION, EMULSION INTRAVENOUS at 08:03

## 2018-11-22 RX ADMIN — CARVEDILOL PHOSPHATE 6.25 MILLIGRAM(S): 80 CAPSULE, EXTENDED RELEASE ORAL at 12:22

## 2018-11-22 RX ADMIN — Medication 100 MILLIGRAM(S): at 14:05

## 2018-11-22 RX ADMIN — SODIUM CHLORIDE 1 GRAM(S): 9 INJECTION INTRAMUSCULAR; INTRAVENOUS; SUBCUTANEOUS at 23:00

## 2018-11-22 RX ADMIN — SODIUM CHLORIDE 1 GRAM(S): 9 INJECTION INTRAMUSCULAR; INTRAVENOUS; SUBCUTANEOUS at 06:30

## 2018-11-22 RX ADMIN — Medication 100 MILLIGRAM(S): at 23:00

## 2018-11-22 RX ADMIN — SODIUM CHLORIDE 3 MILLILITER(S): 9 INJECTION INTRAMUSCULAR; INTRAVENOUS; SUBCUTANEOUS at 22:11

## 2018-11-22 RX ADMIN — SODIUM CHLORIDE 3 MILLILITER(S): 9 INJECTION INTRAMUSCULAR; INTRAVENOUS; SUBCUTANEOUS at 14:05

## 2018-11-22 RX ADMIN — SODIUM CHLORIDE 1 GRAM(S): 9 INJECTION INTRAMUSCULAR; INTRAVENOUS; SUBCUTANEOUS at 14:05

## 2018-11-22 RX ADMIN — Medication 50 GRAM(S): at 07:08

## 2018-11-22 RX ADMIN — Medication 75 MILLIGRAM(S): at 23:05

## 2018-11-22 RX ADMIN — PANTOPRAZOLE SODIUM 40 MILLIGRAM(S): 20 TABLET, DELAYED RELEASE ORAL at 12:21

## 2018-11-22 RX ADMIN — Medication 100 MILLIGRAM(S): at 06:30

## 2018-11-22 RX ADMIN — FENTANYL CITRATE 22.5 MICROGRAM(S)/KG/HR: 50 INJECTION INTRAVENOUS at 08:03

## 2018-11-22 RX ADMIN — Medication 75 MILLIGRAM(S): at 12:22

## 2018-11-22 NOTE — CONSULT NOTE ADULT - SUBJECTIVE AND OBJECTIVE BOX
HPI:  58M with hx of SCCa of right maxillary sinus sent to ED from clinic for difficulty breathing. Patient completed 4 sessions of RT and is no longer tolerating lying flat. Previously required CPAP at night only but now requiring it 24/7.      Oncology History:  He follows with Dr. Loza at [facility].  He was diagnosed with maxillary SCC in 6/2018 after presenting with epistaxis and right facial pain and MRI showed a large mass (4.1x3.9x4.3cm) in the right maxillary sinus extending into nasal cavity, inferior pterygopalatine fossa with V2 perineural spread.  PET/CT also done in 6/2018 showed maxillary sinus mass with SUV of 22.  He was started on induction TPF x 3 cycles in hopes of removing the mass surgically but had POD.  Subsequently he was started on Cisplatin/RT.  So far he hs received one dose of cisplatin and 4 sessions of RT.  He could not tolerate the 5th session due to inability to lie flat due to SOB and thus s/p tracheostomy this admission.  Of note, CT scan 11/21 showed a large hypervascular mass within the right sinonasal compartment measuring approximately 9.7 x 6.9 x 8.5 cm.    Allergies  hospital socks (Rash)  lisinopril (Anaphylaxis)  statins (Anaphylaxis)    MEDICATIONS  (STANDING):  ALPRAZolam 2 milliGRAM(s) Oral at bedtime  carvedilol 6.25 milliGRAM(s) Oral every 12 hours  ceFAZolin   IVPB 2000 milliGRAM(s) IV Intermittent every 8 hours  ceFAZolin   IVPB      fentaNYL   Infusion. 3 MICROgram(s)/kG/Hr (22.5 mL/Hr) IV Continuous <Continuous>  methadone    Tablet 5 milliGRAM(s) Oral three times a day  OLANZapine 5 milliGRAM(s) Oral daily  pantoprazole  Injectable 40 milliGRAM(s) IV Push daily  pregabalin 75 milliGRAM(s) Oral two times a day  propofol Infusion 50 MICROgram(s)/kG/Min (22.5 mL/Hr) IV Continuous <Continuous>  sodium chloride 1 Gram(s) Oral three times a day  sodium chloride 0.9% lock flush 3 milliLiter(s) IV Push every 8 hours    MEDICATIONS  (PRN):  HYDROmorphone  Injectable 2 milliGRAM(s) IV Push every 3 hours PRN Moderate Pain (4 - 6)  loperamide Solution 2 milliGRAM(s) Oral daily PRN Diarrhea      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- was followed by ID after lung surgery  Neoplasm of maxillary sinus  Bipolar disorder, unspecified  CHF (congestive heart failure): 2014  Diaphragm dysfunction: partial paralysis- now resolved as per wife  ETOH abuse  LORNA on CPAP  Cataract  S/P lobectomy of lung: Sept 2017  H/O endoscopy  H/O colonoscopy  H/O coronary angioplasty: x2      FAMILY HISTORY:  No family history of COPD  Family history of hypertension in mother (Sibling)      SOCIAL HISTORY: No EtOH, no tobacco    REVIEW OF SYSTEMS:  All other review of systems is negative unless indicated above.        T(F): 99.6 (11-22-18 @ 12:00), Max: 99.6 (11-22-18 @ 12:00)  HR: 88 (11-22-18 @ 15:29)  BP: 102/65 (11-22-18 @ 13:00)  RR: 17 (11-22-18 @ 14:00)  SpO2: 100% (11-22-18 @ 15:29)  Wt(kg): --    GENERAL: sedated  HEENT: trach in place; right facial swelling  CHEST/LUNG: Clear to auscultation anteriorly  HEART: Regular rate and rhythm; No murmurs, rubs, or gallops  ABDOMEN: Soft, G-tube, Nondistended; Bowel sounds present  EXTREMITIES:  2+ Peripheral Pulses, No clubbing, cyanosis, or edema  NEUROLOGY: non-focal  SKIN: No rashes or lesions  : [place] in place with dark urine                        8.2    9.87  )-----------( 299      ( 22 Nov 2018 10:39 )             25.8       11-22    134<L>  |  98  |  12  ----------------------------<  81  3.8   |  25  |  0.61    Ca    8.4      22 Nov 2018 03:18  Phos  3.6     11-22  Mg     1.6     11-22    TPro  6.7  /  Alb  3.0<L>  /  TBili  0.8  /  DBili  x   /  AST  21  /  ALT  22  /  AlkPhos  313<H>  11-21      Magnesium, Serum: 1.6 mg/dL (11-22 @ 03:18)  Phosphorus Level, Serum: 3.6 mg/dL (11-22 @ 03:18)  Phosphorus Level, Serum: 2.8 mg/dL (11-21 @ 17:38)  Magnesium, Serum: 1.8 mg/dL (11-21 @ 17:38)

## 2018-11-22 NOTE — PROGRESS NOTE ADULT - SUBJECTIVE AND OBJECTIVE BOX
SICU AM PROGRESS NOTE       24 HOUR EVENTS:     [facility] Fent and Prop gtt . ENT packed the floor of the mouth earlier in the shift . 16 F Coude was placed .     HISTORY  NITIN DAVALOS is a 57yo male PMH bipolar disorder, congestive heart failure, diaphragm paralysis, YANET, maxillary sinus neoplasm presenting from ENT's office (Dr. Moscoso) for worsening shortness of breath. 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 Patient has intermittently been using BiPAP. Since Friday, patient has had increased difficulty breathing and is unable to lay flat, saw his ENT today who sent him to ED for urgent tracheostomy. Patient is c/o pain at site of tumor.     ======================================  Neurologic Medications:  ALPRAZolam 2 milliGRAM(s) Oral at bedtime  fentaNYL   Infusion. 3 MICROgram(s)/kG/Hr IV Continuous <Continuous>  HYDROmorphone  Injectable 2 milliGRAM(s) IV Push every 3 hours PRN  methadone    Tablet 5 milliGRAM(s) Oral three times a day  OLANZapine 5 milliGRAM(s) Oral daily  pregabalin 75 milliGRAM(s) Oral two times a day  propofol Infusion 50 MICROgram(s)/kG/Min IV Continuous <Continuous>    Respiratory Medications:    Cardiovascular Medications:  carvedilol 6.25 milliGRAM(s) Oral every 12 hours    Gastrointestinal Medications:  loperamide Solution 2 milliGRAM(s) Oral daily PRN  sodium chloride 1 Gram(s) Oral three times a day  sodium chloride 0.9%. 1000 milliLiter(s) IV Continuous <Continuous>    Genitourinary Medications:    Hematologic/Oncologic Medications:    Antimicrobials/Immunologic Medications:  ceFAZolin   IVPB 2000 milliGRAM(s) IV Intermittent every 8 hours  ceFAZolin   IVPB        Endocrine/Metabolic Medications:    Topical/Other Medications:    ============================================================    T(C): 37.3 (11-22-18 @ 00:00), Max: 37.4 (11-21-18 @ 16:00)  HR: 100 (11-22-18 @ 01:00) (90 - 108)  BP: 102/62 (11-22-18 @ 01:00) (88/57 - 128/82)  BP(mean): 70 (11-22-18 @ 01:00) (63 - 88)  ABP: --  ABP(mean): --  RR: 17 (11-22-18 @ 01:00) (14 - 20)  SpO2: 99% (11-22-18 @ 01:00) (95% - 100%)  Wt(kg): --  CVP(mm Hg): --  CI: --  CAPILLARY BLOOD GLUCOSE       N/A      11-20 @ 07:01  -  11-21 @ 07:00  --------------------------------------------------------  IN:    Free Water: 530 mL    IV PiggyBack: 275 mL    ns in tub fed  [id number]: 620 mL    sodium chloride 0.9%.: 975 mL  Total IN: 2400 mL    OUT:    Voided: 500 mL  Total OUT: 500 mL    Total NET: 1900 mL      11-21 @ 07:01  -  11-22 @ 03:13  --------------------------------------------------------  IN:    fentaNYL Infusion.: 22.5 mL    IV PiggyBack: 50 mL    propofol Infusion: 65 mL    sodium chloride 0.9%.: 525 mL  Total IN: 662.5 mL    OUT:    Indwelling Catheter - Urethral: 175 mL    Voided: 300 mL  Total OUT: 475 mL    Total NET: 187.5 mL        =========================================================    PHYSICAL EXAM     SUBJECTIVE/ROS:  [ X] A ten-point review of systems was otherwise negative except as noted.  [ ] Due to altered mental status/intubation, subjective information were not able to be obtained from the patient. History was obtained, to the extent possible, from review of the chart and collateral sources of information.    NEURO  RASS: - 2  Exam: Intubated sedated    RESPIRATORY  Exam: Coarse breath sounds b/l    CARDIOVASCULAR  Exam: regular rate and rhythm  Cardiac Rhythm: sinus  Perfusion     [x]Adequate   [ ]Inadequate  Mentation   [x]Normal       [ ]Reduced  Extremities  [x]Warm         [ ]Cool    GI/NUTRITION  Exam: soft, nontender, nondistended  Diet: NPO        Meds:     ACCESS DEVICES:  [X ] Peripheral IV  [ ] Central Venous Line	[ ] R	[ ] L	[ ] IJ	[ ] Fem	[ ] SC	Placed:   [ ] Arterial Line		[ ] R	[ ] L	[ ] Fem	[ ] Rad	[ ] Ax	Placed:   [ ] PICC:					[ ] Mediport  [ ] Urinary Catheter, Date Placed:  N/A  [x] Necessity of urinary, arterial, and venous catheters discussed    OTHER MEDICATIONS:      CODE STATUS: FULL      IMAGING:

## 2018-11-22 NOTE — PROGRESS NOTE ADULT - SUBJECTIVE AND OBJECTIVE BOX
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 Also with difficulty breathing, s/p urgent tracheostomy on 11/20. Patient had bleeding from sinus tumor this morning, packed and controlled by ENT. Overall bleeding, CBC stable but given critical airway, SICU re-consulted for close monitoring of bleed & airway.    Of note, wife reports pt does not have h/o CAD/MI/stent, had prior lobectomy and partial diaphragmatic dysfunction.    24 HOUR EVENTS:  PAtient experienced bleeding from tumor site - ENT came to bedside to pack floor of mouth, in discussion w/ family for family meeting for goals of care. Sedated on vent. Andrade 16F coude placed. Ancef 2g q8 started.     SUBJECTIVE/ROS:  [ ] A ten-point review of systems was otherwise negative except as noted.  [x] Due to altered mental status/intubation, subjective information were not able to be obtained from the patient. History was obtained, to the extent possible, from review of the chart and collateral sources of information.      NEURO  Meds: ALPRAZolam 2 milliGRAM(s) Oral at bedtime  fentaNYL   Infusion. 3 MICROgram(s)/kG/Hr IV Continuous <Continuous>  HYDROmorphone  Injectable 2 milliGRAM(s) IV Push every 3 hours PRN Moderate Pain (4 - 6)  methadone    Tablet 5 milliGRAM(s) Oral three times a day  OLANZapine 5 milliGRAM(s) Oral daily  pregabalin 75 milliGRAM(s) Oral two times a day  propofol Infusion 50 MICROgram(s)/kG/Min IV Continuous <Continuous>    [x] Adequacy of sedation and pain control has been assessed and adjusted      RESPIRATORY  RR: 17 (11-22-18 @ 01:00) (14 - 20)  SpO2: 99% (11-22-18 @ 01:00) (95% - 100%)  Mechanical Ventilation: Mode: AC/ CMV (Assist Control/ Continuous Mandatory Ventilation), RR (machine): 16, RR (patient): 16, TV (machine): 450, FiO2: 40, PEEP: 10, MAP: 11, PIP: 16      CARDIOVASCULAR  HR: 100 (11-22-18 @ 01:00) (90 - 108)  BP: 102/62 (11-22-18 @ 01:00) (88/57 - 128/82)  BP(mean): 70 (11-22-18 @ 01:00) (63 - 88)      GI/NUTRITION  Meds: loperamide Solution 2 milliGRAM(s) Oral daily PRN Diarrhea      GENITOURINARY  I&O's Detail    11-20 @ 07:01  -  11-21 @ 07:00  --------------------------------------------------------  IN:    Free Water: 530 mL    IV PiggyBack: 275 mL    ns in tub fed  [id number]: 620 mL    sodium chloride 0.9%.: 975 mL  Total IN: 2400 mL    OUT:    Voided: 500 mL  Total OUT: 500 mL    Total NET: 1900 mL      11-21 @ 07:01  -  11-22 @ 03:06  --------------------------------------------------------  IN:    fentaNYL Infusion.: 22.5 mL    IV PiggyBack: 50 mL    propofol Infusion: 65 mL    sodium chloride 0.9%.: 525 mL  Total IN: 662.5 mL    OUT:    Indwelling Catheter - Urethral: 175 mL    Voided: 300 mL  Total OUT: 475 mL    Total NET: 187.5 mL          11-21    131<L>  |  94<L>  |  13  ----------------------------<  79  3.8   |  27  |  0.61    Ca    8.6      21 Nov 2018 17:38  Phos  2.8     11-21  Mg     1.8     11-21    TPro  6.7  /  Alb  3.0<L>  /  TBili  0.8  /  DBili  x   /  AST  21  /  ALT  22  /  AlkPhos  313<H>  11-21    [ ] Andrade catheter, indication: N/A  Meds: sodium chloride 1 Gram(s) Oral three times a day  sodium chloride 0.9%. 1000 milliLiter(s) IV Continuous <Continuous>        HEMATOLOGIC  Meds:   [x] VTE Prophylaxis                        8.0    8.61  )-----------( 263      ( 21 Nov 2018 21:00 )             24.9     PT/INR - ( 21 Nov 2018 17:34 )   PT: 17.3 SEC;   INR: 1.50          PTT - ( 21 Nov 2018 17:34 )  PTT:35.4 SEC  Transfusion     [ ] PRBC   [ ] Platelets   [ ] FFP   [ ] Cryoprecipitate      INFECTIOUS DISEASES  WBC Count: 8.61 K/uL (11-21 @ 21:00)  WBC Count: 10.16 K/uL (11-21 @ 17:38)  WBC Count: 10.69 K/uL (11-21 @ 11:40)  WBC Count: 10.21 K/uL (11-21 @ 06:25)    RECENT CULTURES:    Meds: ceFAZolin   IVPB 2000 milliGRAM(s) IV Intermittent every 8 hours  ceFAZolin   IVPB            ENDOCRINE  CAPILLARY BLOOD GLUCOSE        Meds:       ACCESS DEVICES:  [ ] Peripheral IV  [ ] Central Venous Line	[ ] R	[ ] L	[ ] IJ	[ ] Fem	[ ] SC	Placed:   [ ] Arterial Line		[ ] R	[ ] L	[ ] Fem	[ ] Rad	[ ] Ax	Placed:   [ ] PICC:					[ ] Mediport  [ ] Urinary Catheter, Date Placed:   [x] Necessity of urinary, arterial, and venous catheters discussed    OTHER MEDICATIONS:      CODE STATUS:      IMAGING:

## 2018-11-22 NOTE — PROGRESS NOTE ADULT - ASSESSMENT
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 Was recovering well but began to have recurrence of oral bleeding this AM in setting of critical airway. SICU consulted and accepted pt to unit.    Neuro: Post operative pain, Anxiety  - Palliative care on board - will assist w/ pain management, GOC  - Per palliative: methadone 5mg q8; dilaudid 2 q3 PRN for moderate pain (qTC 435)  - Alprazolam PRN for anxiety  - Pregabalin for neuropathic pain    Resp: s/p Trach  - Airway packed by ENT 11/21 - observe carefully for further bleeding  - c/w Trach Collar, currently satting well  - airway packed, f/u ENT regarding duration of packing. Will closely monitor for worsening of bleed given critical airway.    CV: Hx HTN  - HR, BP stable  - c/w home carvedilol 6.25mg BID    GI:   - keep NPO for now per ENT in case pt needs to be taken back to OR    Renal:  - hyponatremic to low 130s.   - c/w Andrade  - IVF : NS 75cc while NPO    Heme:   - H/H currently stable. Hgb ~10, cont to trend  - c/w ASA 81 mg QD  - SCDs for now, hold off chemical DVT ppx    ID:   - afebrile, WBC mildly elevated  - no current signs of infection    Endo:  - no issues

## 2018-11-22 NOTE — CONSULT NOTE ADULT - ASSESSMENT
58M with hx of SCCa of right maxillary sinus SCC (being tx at [facility]) with POD on TPF induction chemo, recently started on Cis/RT but difficult to tolerate 2/2 to SOB when lying flat from disease burden is now s/p tracheostomy in SICU for hemodynamic monitoring.  -discussed with family (wife and son) at bedside the aggressive nature of this tumor given it had progressed so rapidly on TPF induction chemotherapy.  He just recently started Cis/RT and had difficulty completing this cycle due to the increasing tumor burden causing SOB.  He is now s/p tracheostomy without complications and recovering in the SICU.  We discussed that this current treatment regimen has a low likelihood of cure at this point given the rapid rate of tumor growth especially with signs of tumor bleeding.  Treatment is palliative at this point and family is aware he has a poor prognosis.  Family did not want to pursue hospice at this time and wants to continue treatment as long as he still has energy.  Would optimize his pain regimen and follow up outpt to discuss further treatment options.    Please call us with any questions. Case discussed with Dr. Gutierres.    Shana Salazar  Hematology Fellow  [phone] 58M with hx of SCCa of right maxillary sinus SCC (being tx at [facility]) with POD on TPF induction chemo, recently started on Cis/RT but difficult to tolerate 2/2 to SOB when lying flat from disease burden is now s/p tracheostomy in SICU for hemodynamic monitoring.  -discussed with family (wife and sons) at bedside that this is an aggressive tumor given it had progressed so rapidly on TPF induction chemotherapy.  He just recently started Cis/RT and had difficulty completing this cycle due to the increasing tumor burden causing SOB.  He is now s/p tracheostomy without complications and recovering in the SICU.  We discussed that this current treatment regimen is not likely to be curative at this point given the rapid rate of tumor growth and progression on TPF and radiation.  Treatment is palliative at this point and family is aware he has a poor prognosis.  Family did not want to pursue hospice at this time and wants to continue treatment as long as he still has energy.  Would optimize his pain regimen and follow up outpatient to discuss further palliative treatment options if patient is in any condition to receive any palliative systemic treatment .    Please call us with any questions. Case discussed with Dr. Gutierres.    Shana Salazar  Hematology Fellow  [phone]

## 2018-11-22 NOTE — PROGRESS NOTE ADULT - ASSESSMENT
58M with SCCa right maxillary sinus p/w increased WOB, now s/p tracheostomy recovering without issue, in SICU for critical airway - Ptis on MEch vent , floor of the mouth now has packing     Neuro: Post operative pain, Anxiety  - On Propofol and fentanyl gtt     Resp: s/p Trach  -On Mech Vent - AC mode - trend blood gas     CV: HTN  SBP is within range     GI:   - NPO     Renal:  - c/w Andrade   - IVF : LR @ 75 mL/ hr     Heme:   - trend H/H closely     ID:   - Continue Ancef     Endo:  - Monitor FS on BMP

## 2018-11-22 NOTE — CONSULT NOTE ADULT - ATTENDING COMMENTS
58M with SCCa right maxillary sinus p/w increased WOB, now s/p tracheostomy recovering without issue    - Accepted to SICU, currently without beds, will monitor overnight in PACU    Neuro: Post operative pain, Anxiety  - Hydromorphone PRN for pain  - Alprazolam PRN for anxiety  - Pregabalin for neuropathic pain    Resp: s/p Trach  - c/w Trach Collar  - f/u AM CXR    CV: HTN  - c/w home meds: coreg 6.125 BID    GI:   - NPO on TF    Renal:  - c/w Andrade   - IVF : LR @ 140 mL/ hr     Heme:   - c/w ASA 81 mg QD  - f/u ENT chemical VTE ppx    ID:   - f/u AM labs   - Culture if febrile     Endo:  - Monitor FS on BMP    The patient is a critical care patient with life threatening hemodynamic and metabolic instability in SICU.  I have personally interviewed when possible and examined the patient, reviewed data and laboratory tests/x-rays and all pertinent electronic images.  I was physically present for the key portions of the evaluation and management (E/M) service provided.   The SICU team has a constant risk benefit analyzes discussion with the primary team, all consultants, House Staff and PA's on all decisions.  These diagnoses are unrelated to the surgical procedure noted above.  I meet with family if needed to get further history, discuss the case and make care decisions for this patient who might not be able to participate.  Time involved in performance of separately billable procedures was not counted toward my critical care time. There is no overlap.  I spent 55-75 minutes of critical care time for the diagnoses, assessment, plan and interventions.
I discussed the case and saw the patient with the fellow and agree with the above with the addendum- discussed at length with wife and sons at bedside. Explained that unfortunately, Mr. Campbell's tumor progressed on TPF chemotherapy and in such cases, the prognosis is dismal. Patient's tumor also seem to have not responded to cisplatin and radiation. Explained that any further chemotherapy or immunotherapy would not be curative in this scenario and will carry side effects. Family not willing to go for hospice and "wants everything done". Upon patient's discharge, he and his family are welcome to be seen again as outpatient to further discuss the possibility of any palliative systemic therapy should his condition allow for it.     Jeanine Gutierres MD
Pt seen with NP. Symptom management as above. Plan discussed in detail with pt and wife. Please page for uncontrolled symptoms.

## 2018-11-22 NOTE — PROGRESS NOTE ADULT - SUBJECTIVE AND OBJECTIVE BOX
Pt seen and examined. pt transferred to sicu yesterday for acute oral bleeding. sedated and placed on vent; oral cavity was packed with kerlix. no further bleeding.     AVSS  NAD, awake and alert  on mech vent, sedated  6LPC trach in place with sutures, cuff inflated  NC: mass, non bleeding  oc/op: kerlix in place, no pooling or active bleeding  Neck soft and flat, stoma intact     A/P: s/p awake tracheotomy 11/19 for airway protection   -NPO, PEG dependent  -pain control   -trach care   -appreciate palliative pain recs: important to revisit goc discussion in case of rebleed which is highly likely   -will remove oral packing tomorrow  -fu labs   -resume home meds through PEG   -ambulate ad denton   -dvt ppx: sqh  -appreciate nutrition consult for feeds   -dispo: pending trach supplies  -seen and dw dr lazo

## 2018-11-23 DIAGNOSIS — R53.2 FUNCTIONAL QUADRIPLEGIA: ICD-10-CM

## 2018-11-23 LAB
APTT BLD: 35.1 SEC — SIGNIFICANT CHANGE UP (ref 27.5–36.3)
BUN SERPL-MCNC: 11 MG/DL — SIGNIFICANT CHANGE UP (ref 7–23)
CALCIUM SERPL-MCNC: 8.3 MG/DL — LOW (ref 8.4–10.5)
CHLORIDE SERPL-SCNC: 98 MMOL/L — SIGNIFICANT CHANGE UP (ref 98–107)
CO2 SERPL-SCNC: 24 MMOL/L — SIGNIFICANT CHANGE UP (ref 22–31)
CREAT SERPL-MCNC: 0.6 MG/DL — SIGNIFICANT CHANGE UP (ref 0.5–1.3)
GLUCOSE SERPL-MCNC: 99 MG/DL — SIGNIFICANT CHANGE UP (ref 70–99)
HCT VFR BLD CALC: 24.3 % — LOW (ref 39–50)
HCV RNA SERPL NAA DL=5-ACNC: NOT DETECTED IU/ML — SIGNIFICANT CHANGE UP
HCV RNA SPEC NAA+PROBE-LOG IU: SIGNIFICANT CHANGE UP LOGIU/ML
HGB BLD-MCNC: 7.8 G/DL — LOW (ref 13–17)
INR BLD: 1.36 — HIGH (ref 0.88–1.17)
MAGNESIUM SERPL-MCNC: 1.6 MG/DL — SIGNIFICANT CHANGE UP (ref 1.6–2.6)
MCHC RBC-ENTMCNC: 27.4 PG — SIGNIFICANT CHANGE UP (ref 27–34)
MCHC RBC-ENTMCNC: 32.1 % — SIGNIFICANT CHANGE UP (ref 32–36)
MCV RBC AUTO: 85.3 FL — SIGNIFICANT CHANGE UP (ref 80–100)
NRBC # FLD: 0 — SIGNIFICANT CHANGE UP
PHOSPHATE SERPL-MCNC: 3.4 MG/DL — SIGNIFICANT CHANGE UP (ref 2.5–4.5)
PLATELET # BLD AUTO: 278 K/UL — SIGNIFICANT CHANGE UP (ref 150–400)
PMV BLD: 9.6 FL — SIGNIFICANT CHANGE UP (ref 7–13)
POTASSIUM SERPL-MCNC: 3.6 MMOL/L — SIGNIFICANT CHANGE UP (ref 3.5–5.3)
POTASSIUM SERPL-SCNC: 3.6 MMOL/L — SIGNIFICANT CHANGE UP (ref 3.5–5.3)
PROTHROM AB SERPL-ACNC: 15.7 SEC — HIGH (ref 9.8–13.1)
RBC # BLD: 2.85 M/UL — LOW (ref 4.2–5.8)
RBC # FLD: 15.1 % — HIGH (ref 10.3–14.5)
SODIUM SERPL-SCNC: 132 MMOL/L — LOW (ref 135–145)
WBC # BLD: 8.54 K/UL — SIGNIFICANT CHANGE UP (ref 3.8–10.5)
WBC # FLD AUTO: 8.54 K/UL — SIGNIFICANT CHANGE UP (ref 3.8–10.5)

## 2018-11-23 PROCEDURE — 99291 CRITICAL CARE FIRST HOUR: CPT

## 2018-11-23 PROCEDURE — 99233 SBSQ HOSP IP/OBS HIGH 50: CPT | Mod: GC

## 2018-11-23 PROCEDURE — 71045 X-RAY EXAM CHEST 1 VIEW: CPT | Mod: 26

## 2018-11-23 RX ORDER — ACETAMINOPHEN 500 MG
1000 TABLET ORAL ONCE
Qty: 0 | Refills: 0 | Status: COMPLETED | OUTPATIENT
Start: 2018-11-23 | End: 2018-11-23

## 2018-11-23 RX ORDER — FENTANYL CITRATE 50 UG/ML
4.5 INJECTION INTRAVENOUS
Qty: 2500 | Refills: 0 | Status: DISCONTINUED | OUTPATIENT
Start: 2018-11-23 | End: 2018-11-23

## 2018-11-23 RX ORDER — POTASSIUM CHLORIDE 20 MEQ
20 PACKET (EA) ORAL EVERY 4 HOURS
Qty: 0 | Refills: 0 | Status: COMPLETED | OUTPATIENT
Start: 2018-11-23 | End: 2018-11-23

## 2018-11-23 RX ORDER — FENTANYL CITRATE 50 UG/ML
6 INJECTION INTRAVENOUS
Qty: 2500 | Refills: 0 | Status: DISCONTINUED | OUTPATIENT
Start: 2018-11-23 | End: 2018-11-23

## 2018-11-23 RX ORDER — FENTANYL CITRATE 50 UG/ML
7 INJECTION INTRAVENOUS
Qty: 2500 | Refills: 0 | Status: DISCONTINUED | OUTPATIENT
Start: 2018-11-23 | End: 2018-11-28

## 2018-11-23 RX ORDER — MAGNESIUM SULFATE 500 MG/ML
2 VIAL (ML) INJECTION ONCE
Qty: 0 | Refills: 0 | Status: COMPLETED | OUTPATIENT
Start: 2018-11-23 | End: 2018-11-23

## 2018-11-23 RX ORDER — CHLORHEXIDINE GLUCONATE 213 G/1000ML
1 SOLUTION TOPICAL
Qty: 0 | Refills: 0 | Status: DISCONTINUED | OUTPATIENT
Start: 2018-11-23 | End: 2018-12-01

## 2018-11-23 RX ADMIN — Medication 100 MILLIGRAM(S): at 22:29

## 2018-11-23 RX ADMIN — SODIUM CHLORIDE 1 GRAM(S): 9 INJECTION INTRAMUSCULAR; INTRAVENOUS; SUBCUTANEOUS at 22:29

## 2018-11-23 RX ADMIN — Medication 100 MILLIGRAM(S): at 13:25

## 2018-11-23 RX ADMIN — FENTANYL CITRATE 33.75 MICROGRAM(S)/KG/HR: 50 INJECTION INTRAVENOUS at 07:52

## 2018-11-23 RX ADMIN — SODIUM CHLORIDE 1 GRAM(S): 9 INJECTION INTRAMUSCULAR; INTRAVENOUS; SUBCUTANEOUS at 05:43

## 2018-11-23 RX ADMIN — FENTANYL CITRATE 45 MICROGRAM(S)/KG/HR: 50 INJECTION INTRAVENOUS at 18:04

## 2018-11-23 RX ADMIN — Medication 20 MILLIEQUIVALENT(S): at 05:43

## 2018-11-23 RX ADMIN — SODIUM CHLORIDE 3 MILLILITER(S): 9 INJECTION INTRAMUSCULAR; INTRAVENOUS; SUBCUTANEOUS at 22:24

## 2018-11-23 RX ADMIN — SODIUM CHLORIDE 3 MILLILITER(S): 9 INJECTION INTRAMUSCULAR; INTRAVENOUS; SUBCUTANEOUS at 05:34

## 2018-11-23 RX ADMIN — SODIUM CHLORIDE 3 MILLILITER(S): 9 INJECTION INTRAMUSCULAR; INTRAVENOUS; SUBCUTANEOUS at 13:20

## 2018-11-23 RX ADMIN — Medication 75 MILLIGRAM(S): at 13:24

## 2018-11-23 RX ADMIN — PROPOFOL 22.5 MICROGRAM(S)/KG/MIN: 10 INJECTION, EMULSION INTRAVENOUS at 14:02

## 2018-11-23 RX ADMIN — FENTANYL CITRATE 45 MICROGRAM(S)/KG/HR: 50 INJECTION INTRAVENOUS at 14:02

## 2018-11-23 RX ADMIN — Medication 400 MILLIGRAM(S): at 18:04

## 2018-11-23 RX ADMIN — PROPOFOL 22.5 MICROGRAM(S)/KG/MIN: 10 INJECTION, EMULSION INTRAVENOUS at 10:20

## 2018-11-23 RX ADMIN — SODIUM CHLORIDE 1 GRAM(S): 9 INJECTION INTRAMUSCULAR; INTRAVENOUS; SUBCUTANEOUS at 13:24

## 2018-11-23 RX ADMIN — Medication 100 MILLIGRAM(S): at 05:43

## 2018-11-23 RX ADMIN — PROPOFOL 22.5 MICROGRAM(S)/KG/MIN: 10 INJECTION, EMULSION INTRAVENOUS at 07:53

## 2018-11-23 RX ADMIN — FENTANYL CITRATE 45 MICROGRAM(S)/KG/HR: 50 INJECTION INTRAVENOUS at 10:40

## 2018-11-23 RX ADMIN — FENTANYL CITRATE 48.75 MICROGRAM(S)/KG/HR: 50 INJECTION INTRAVENOUS at 22:35

## 2018-11-23 RX ADMIN — PANTOPRAZOLE SODIUM 40 MILLIGRAM(S): 20 TABLET, DELAYED RELEASE ORAL at 13:24

## 2018-11-23 RX ADMIN — Medication 50 GRAM(S): at 05:43

## 2018-11-23 RX ADMIN — CHLORHEXIDINE GLUCONATE 1 APPLICATION(S): 213 SOLUTION TOPICAL at 10:19

## 2018-11-23 RX ADMIN — PROPOFOL 22.5 MICROGRAM(S)/KG/MIN: 10 INJECTION, EMULSION INTRAVENOUS at 18:04

## 2018-11-23 RX ADMIN — Medication 20 MILLIEQUIVALENT(S): at 10:20

## 2018-11-23 NOTE — PROGRESS NOTE ADULT - PROBLEM SELECTOR PLAN 4
Extensive discussion with patient's wife and children at bedside.  Family having a difficult time grasping the lack of options for treatment.  They are requesting a second opinion from another ENT physician and evaluation for transfer to another hospital.  SICU team aware.  Psychosocial support provided.  Will continue to follow up for symptom management and goals of care.

## 2018-11-23 NOTE — PROGRESS NOTE ADULT - ASSESSMENT
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 Was recovering well but began to have recurrence of oral bleeding this AM in setting of critical airway. SICU consulted and accepted pt to unit.    Neuro: Post operative pain, Anxiety  - Palliative care on board - will assist w/ pain management, GOC  - Per palliative: methadone 5mg q8; dilaudid 2 q3 PRN for moderate pain (qTC 435)  - Alprazolam PRN for anxiety  - Pregabalin for neuropathic pain    Resp: s/p Trach  - Airway packed by ENT 11/21 - observe carefully for further bleeding  - c/w Trach Collar, currently satting well  - airway packed, f/u ENT regarding duration of packing. Will closely monitor for worsening of bleed given critical airway.    CV: Hx HTN  - HR, BP stable  - c/w home carvedilol 6.25mg BID    GI:   -Started on tube feeds Pivot @70    Renal:  - c/w Lupe      Heme:   - H/H currently stable-cont to trend  - c/w ASA 81 mg QD  - SCDs for now, hold off chemical DVT ppx    ID:   - afebrile  - no current signs of infection    Endo:  - no issues        DISPO: SICU          CRITICAL CARE DIAGNOSIS: 59 y/o M with Hx CHF (EF 40-45%), squamous cell carcinoma of maxillary sinus presenting 11/19 increased WOB, now s/p tracheostomy 11/20. Was recovering well but began to have recurrence of oral bleeding this AM in setting of critical airway. SICU consulted and accepted pt to unit.    Neuro: Post operative pain, Anxiety  - Palliative care on board - will assist w/ pain management, GOC  - Per palliative: methadone 5mg q8; dilaudid 2 q3 PRN for moderate pain (qTC 435) -> however haven't had to give since pt on fentanyl, propofol gtt.  - Alprazolam PRN for anxiety  - Pregabalin for neuropathic pain    Resp: s/p Trach  - Airway packed by ENT 11/21 - observe carefully for further bleeding  - c/w Trach Collar, currently satting well  - airway packed, f/u ENT regarding duration of packing. Will closely monitor for worsening of bleed given critical airway.    CV: Hx HTN  - HR, BP stable  - c/w home carvedilol 6.25mg BID    GI:   -Started on tube feeds Pivot @70    Renal:  - c/w Lupe    Heme:   - H/H currently stable-cont to trend  - c/w ASA 81 mg QD  - SCDs for now, hold off chemical DVT ppx    ID:   - afebrile  - no current signs of infection    Endo:  - no issues        DISPO: SICU          CRITICAL CARE DIAGNOSIS:

## 2018-11-23 NOTE — PROGRESS NOTE ADULT - SUBJECTIVE AND OBJECTIVE BOX
SICU DAILY PROGRESS NOTE:    24 HOUR EVENTS:  Pt was started on tube feeds. 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 Also with difficulty breathing, s/p urgent tracheostomy on 11/20. Patient had bleeding from sinus tumor this morning, packed and controlled by ENT. Overall bleeding, CBC stable but given critical airway, SICU re-consulted for close monitoring of bleed & airway.    Of note, wife reports pt does not have h/o CAD/MI/stent, had prior lobectomy and partial diaphragmatic dysfunction.      VITALS and I&O's    T(C): 37.4 (11-23-18 @ 00:00), Max: 37.6 (11-22-18 @ 12:00)  HR: 94 (11-23-18 @ 01:00) (85 - 96)  BP: 104/67 (11-23-18 @ 01:00) (91/56 - 108/70)  ABP: --  ABP(mean): --  RR: 17 (11-23-18 @ 01:00) (15 - 19)  SpO2: 100% (11-23-18 @ 01:00) (99% - 100%)  Wt(kg): --  CVP(mm Hg): --      11-21 @ 07:01  -  11-22 @ 07:00  --------------------------------------------------------  IN:    fentaNYL Infusion.: 270 mL    IV PiggyBack: 50 mL    propofol Infusion: 65 mL    propofol Infusion: 175 mL    sodium chloride 0.9%: 1425 mL  Total IN: 1985 mL    OUT:    Indwelling Catheter - Urethral: 610 mL    Voided: 300 mL  Total OUT: 910 mL    Total NET: 1075 mL      11-22 @ 07:01  -  11-23 @ 01:57  --------------------------------------------------------  IN:    dextrose 5% + sodium chloride 0.9%: 150 mL    fentaNYL Infusion.: 450 mL    Pivot: 300 mL    propofol Infusion: 282.2 mL    sodium chloride 0.9%: 225 mL  Total IN: 1407.2 mL    OUT:    Indwelling Catheter - Urethral: 800 mL  Total OUT: 800 mL    Total NET: 607.2 mL      SUBJECTIVE/ROS:  [ ] A ten-point review of systems was otherwise negative except as noted.  [x] Due to altered mental status/intubation, subjective information were not able to be obtained from the patient. History was obtained, to the extent possible, from review of the chart and collateral sources of information.    NEURO  Meds: ALPRAZolam 2 milliGRAM(s) Oral at bedtime  fentaNYL   Infusion. 3 MICROgram(s)/kG/Hr IV Continuous <Continuous>  HYDROmorphone  Injectable 2 milliGRAM(s) IV Push every 3 hours PRN Moderate Pain (4 - 6)  methadone    Tablet 5 milliGRAM(s) Oral three times a day  OLANZapine 5 milliGRAM(s) Oral daily  pregabalin 75 milliGRAM(s) Oral two times a day  propofol Infusion 50 MICROgram(s)/kG/Min IV Continuous <Continuous>    [x] Adequacy of sedation and pain control has been assessed and adjusted      RESPIRATORY  RR: 17 (11-22-18 @ 01:00) (14 - 20)  SpO2: 99% (11-22-18 @ 01:00) (95% - 100%)  Mechanical Ventilation: Mode: AC/ CMV (Assist Control/ Continuous Mandatory Ventilation), RR (machine): 16, RR (patient): 16, TV (machine): 450, FiO2: 40, PEEP: 10, MAP: 11, PIP: 16      CARDIOVASCULAR      GI/NUTRITION  Meds: loperamide Solution 2 milliGRAM(s) Oral daily PRN Diarrhea      GENITOURINARY[ ] Andrade catheter, indication: N/A  Meds: sodium chloride 1 Gram(s) Oral three times a day  sodium chloride 0.9%. 1000 milliLiter(s) IV Continuous <Continuous>    HEMATOLOGIC  Meds:   [x] VTE Prophylaxis             Transfusion     [ ] PRBC   [ ] Platelets   [ ] FFP   [ ] Cryoprecipitate      INFECTIOUS DISEASES    RECENT CULTURES:  Meds: ceFAZolin   IVPB 2000 milliGRAM(s) IV Intermittent every 8 hours  ceFAZolin   IVPB            ACCESS DEVICES:  [ ] Peripheral IV  [ ] Central Venous Line	[ ] R	[ ] L	[ ] IJ	[ ] Fem	[ ] SC	Placed:   [ ] Arterial Line		[ ] R	[ ] L	[ ] Fem	[ ] Rad	[ ] Ax	Placed:   [ ] PICC:					[ ] Mediport  [ ] Urinary Catheter, Date Placed:   [x] Necessity of urinary, arterial, and venous catheters discussed        LABS:    CBC (11-22 @ 10:39)                              8.2<L>                         9.87    )----------------(  299        --    % Neutrophils, --    % Lymphocytes, ANC: --                                  25.8<L>  CBC (11-22 @ 03:18)                              7.9<L>                         9.50    )----------------(  282        --    % Neutrophils, --    % Lymphocytes, ANC: --                                  24.8<L>    BMP (11-22 @ 03:18)             134<L>  |  98      |  12    		Ca++ --      Ca 8.4                ---------------------------------( 81    		Mg 1.6                3.8     |  25      |  0.61  			Ph 3.6     BMP (11-21 @ 17:38)             131<L>  |  94<L>   |  13    		Ca++ --      Ca 8.6                ---------------------------------( 79    		Mg 1.8                3.8     |  27      |  0.61  			Ph 2.8       LFTs (11-21 @ 17:38)      TPro 6.7 / Alb 3.0<L> / TBili 0.8 / DBili -- / AST 21 / ALT 22 / AlkPhos 313<H>    Coags (11-21 @ 17:34)  aPTT 35.4 / INR 1.50<H> / PT 17.3<H>

## 2018-11-23 NOTE — PROGRESS NOTE ADULT - PROBLEM SELECTOR PLAN 1
No disease modifying treatment at this time.  Family requesting second opinion from ENT.  SICU team aware.

## 2018-11-23 NOTE — PROGRESS NOTE ADULT - PROBLEM SELECTOR PLAN 2
Patient sedated on propofol and fentanyl.  Methadone and dilaudid discontinued.  Continue management as per SICU.  Please page [phone] for any questions or concerns. Patient sedated on propofol and fentanyl.  Methadone and dilaudid discontinued.  Continue management as per SICU.  Please page [phone] for any questions or concerns.  Given high dose opioids, would recommend initiating a bowel regimen.  No documented BM since 11/20/18.

## 2018-11-23 NOTE — PROGRESS NOTE ADULT - SUBJECTIVE AND OBJECTIVE BOX
INTERVAL HPI/OVERNIGHT EVENTS:  Patient sedated to the ventilator in SICU     Code Status: Full Code   Allergies    hospital socks (Rash)  lisinopril (Anaphylaxis)  statins (Anaphylaxis)    Intolerances    MEDICATIONS  (STANDING):  carvedilol 6.25 milliGRAM(s) Oral every 12 hours  ceFAZolin   IVPB 2000 milliGRAM(s) IV Intermittent every 8 hours  ceFAZolin   IVPB      chlorhexidine 4% Liquid 1 Application(s) Topical <User Schedule>  fentaNYL   Infusion. 6 MICROgram(s)/kG/Hr (45 mL/Hr) IV Continuous <Continuous>  pantoprazole  Injectable 40 milliGRAM(s) IV Push daily  pregabalin 75 milliGRAM(s) Oral two times a day  propofol Infusion 50 MICROgram(s)/kG/Min (22.5 mL/Hr) IV Continuous <Continuous>  sodium chloride 1 Gram(s) Oral three times a day  sodium chloride 0.9% lock flush 3 milliLiter(s) IV Push every 8 hours    MEDICATIONS  (PRN):  HYDROmorphone  Injectable 2 milliGRAM(s) IV Push every 3 hours PRN Moderate Pain (4 - 6)  loperamide Solution 2 milliGRAM(s) Oral daily PRN Diarrhea      PRESENT SYMPTOMS: [x ]Unable to obtain due to poor mentation - sedated to vent   Source if other than patient:  [ ]Family   [ ]Team     Pain (Impact on QOL):    Location:  Severity:  Minimal acceptable level (0-10 scale):       Quality:       Onset:  Duration:  Aggravating factors:  Relieving Factors  Radiation:    Dyspnea:  Yes [ ] No [ ] - [ ]Mild [ ]Moderate [ ]Severe  Anxiety:    Yes [ ] No [ ] - [ ]Mild [ ]Moderate [ ]Severe  Fatigue:    Yes [ ] No [ ] - [ ]Mild [ ]Moderate [ ]Severe  Nausea:    Yes [ ] No [ ] - [ ]Mild [ ]Moderate [ ]Severe                         Loss of appetite: Yes [ ] No [ ] - [ ]Mild [ ]Moderate [ ]Severe             Constipation:  Yes [ ] No [ ] - [ ]Mild [ ]Moderate [ ]Severe    PAIN AD Score:	  [email or web address]/painad.pdf (Ctrl + left click to view)    Other Symptoms:  [ ]All other review of systems negative     Karnofsky Performance Score/Palliative Performance Status Version 2:   30%    http://palliative.info/resource_material/PPSv2.pdf    PHYSICAL EXAM:  Vital Signs Last 24 Hrs  T(C): 37.3 (23 Nov 2018 12:00), Max: 37.8 (23 Nov 2018 08:00)  T(F): 99.2 (23 Nov 2018 12:00), Max: 100.1 (23 Nov 2018 08:00)  HR: 103 (23 Nov 2018 13:00) (85 - 103)  BP: 115/72 (23 Nov 2018 13:00) (91/56 - 121/85)  BP(mean): 83 (23 Nov 2018 13:00) (65 - 90)  RR: 20 (23 Nov 2018 13:00) (13 - 20)  SpO2: 100% (23 Nov 2018 13:00) (99% - 100%) I&O's Summary    22 Nov 2018 07:01  -  23 Nov 2018 07:00  --------------------------------------------------------  IN: 2021.3 mL / OUT: 1095 mL / NET: 926.3 mL    23 Nov 2018 07:01  -  23 Nov 2018 13:56  --------------------------------------------------------  IN: 971.9 mL / OUT: 315 mL / NET: 656.9 mL     GENERAL:  Sedated to vent   HEENT:  [ ]Normal   [ ]Dry mouth   [ x]ET Tube/Trach  [ ]Oral lesions  PULMONARY:   [x ]Clear anteriorly   [ ]Rhonchi        [ ]Right [ ]Left [ ]Bilateral  [ ]Crackles        [ ]Right [ ]Left [ ]Bilateral  [ ]Wheezing     [ ]Right [ ]Left [ ]Bilateral  CARDIOVASCULAR:    [x ]Regular [ ]Irregular [ ]Tachy  [ ]Jimmie [ ]Murmur [ ]Other  GASTROINTESTINAL:  [x ]Soft  [ ]Distended   [x ]+BS  [x ]Non tender [ ]Tender  [ ]PEG [ ]OGT/ NGT   Last BM: 11/20/18     GENITOURINARY:  [ ]Normal [ ] Incontinent   [ ]Oliguria/Anuria   [ x]Andrade  MUSCULOSKELETAL:   [ ]Normal   [ ]Weakness  [x ]Bed/Wheelchair bound [ ]Edema  NEUROLOGIC:   Sedated   SKIN:   [x ]Normal   [ ]Pressure ulcer(s)  [ ]Rash    CRITICAL CARE:  [ ] Shock Present  [ ]Septic [ ]Cardiogenic [ ]Neurologic [ ]Hypovolemic  [ ]  Vasopressors [ ]  Inotropes   [ ] Respiratory failure present  [ ] Acute  [ ] Chronic [ ] Hypoxic  [ ] Hypercarbic [ ] Other  [ ] Other organ failure     LABS:                        7.8    8.54  )-----------( 278      ( 23 Nov 2018 02:30 )             24.3   11-23    132<L>  |  98  |  11  ----------------------------<  99  3.6   |  24  |  0.60    Ca    8.3<L>      23 Nov 2018 02:30  Phos  3.4     11-23  Mg     1.6     11-23    TPro  6.7  /  Alb  3.0<L>  /  TBili  0.8  /  DBili  x   /  AST  21  /  ALT  22  /  AlkPhos  313<H>  11-21  PT/INR - ( 23 Nov 2018 02:30 )   PT: 15.7 SEC;   INR: 1.36          PTT - ( 23 Nov 2018 02:30 )  PTT:35.1 SEC      RADIOLOGY & ADDITIONAL STUDIES:    Protein Calorie Malnutrition Present: [ ] yes [ ] no  [ ] PPSV2 < or = 30%  [ ] significant weight loss [ ] poor nutritional intake [ ] anasarca [ ] catabolic state Albumin, Serum: 3.0 g/dL (11-21-18 @ 17:38)      REFERRALS:   [ ]Chaplaincy  [ ] Hospice  [ ]Child Life  [ ]Social Work  [ ]Case management [ ]Holistic Therapy   Goals of Care Document: INTERVAL HPI/OVERNIGHT EVENTS:  Patient sedated to the ventilator in SICU     Code Status: Full Code   Allergies    hospital socks (Rash)  lisinopril (Anaphylaxis)  statins (Anaphylaxis)    Intolerances    MEDICATIONS  (STANDING):  carvedilol 6.25 milliGRAM(s) Oral every 12 hours  ceFAZolin   IVPB 2000 milliGRAM(s) IV Intermittent every 8 hours  ceFAZolin   IVPB      chlorhexidine 4% Liquid 1 Application(s) Topical <User Schedule>  fentaNYL   Infusion. 6 MICROgram(s)/kG/Hr (45 mL/Hr) IV Continuous <Continuous>  pantoprazole  Injectable 40 milliGRAM(s) IV Push daily  pregabalin 75 milliGRAM(s) Oral two times a day  propofol Infusion 50 MICROgram(s)/kG/Min (22.5 mL/Hr) IV Continuous <Continuous>  sodium chloride 1 Gram(s) Oral three times a day  sodium chloride 0.9% lock flush 3 milliLiter(s) IV Push every 8 hours    MEDICATIONS  (PRN):  HYDROmorphone  Injectable 2 milliGRAM(s) IV Push every 3 hours PRN Moderate Pain (4 - 6)  loperamide Solution 2 milliGRAM(s) Oral daily PRN Diarrhea    PRESENT SYMPTOMS: [x ]Unable to obtain due to poor mentation - sedated to vent   Source if other than patient:  [ ]Family   [ ]Team     Pain (Impact on QOL):    Location:  Severity:  Minimal acceptable level (0-10 scale):       Quality:       Onset:  Duration:  Aggravating factors:  Relieving Factors  Radiation:    Dyspnea:  Yes [ ] No [ ] - [ ]Mild [ ]Moderate [ ]Severe  Anxiety:    Yes [ ] No [ ] - [ ]Mild [ ]Moderate [ ]Severe  Fatigue:    Yes [ ] No [ ] - [ ]Mild [ ]Moderate [ ]Severe  Nausea:    Yes [ ] No [ ] - [ ]Mild [ ]Moderate [ ]Severe                         Loss of appetite: Yes [ ] No [ ] - [ ]Mild [ ]Moderate [ ]Severe             Constipation:  Yes [ ] No [ ] - [ ]Mild [ ]Moderate [ ]Severe    PAIN AD Score:	  [email or web address].[email or web address]/cog/painad.pdf (Ctrl + left click to view)    Other Symptoms:  [ ]All other review of systems negative     Karnofsky Performance Score/Palliative Performance Status Version 2:   30%    http://palliative.info/resource_material/PPSv2.pdf    PHYSICAL EXAM:  Vital Signs Last 24 Hrs  T(C): 37.3 (23 Nov 2018 12:00), Max: 37.8 (23 Nov 2018 08:00)  T(F): 99.2 (23 Nov 2018 12:00), Max: 100.1 (23 Nov 2018 08:00)  HR: 103 (23 Nov 2018 13:00) (85 - 103)  BP: 115/72 (23 Nov 2018 13:00) (91/56 - 121/85)  BP(mean): 83 (23 Nov 2018 13:00) (65 - 90)  RR: 20 (23 Nov 2018 13:00) (13 - 20)  SpO2: 100% (23 Nov 2018 13:00) (99% - 100%) I&O's Summary    22 Nov 2018 07:01  -  23 Nov 2018 07:00  --------------------------------------------------------  IN: 2021.3 mL / OUT: 1095 mL / NET: 926.3 mL    23 Nov 2018 07:01  -  23 Nov 2018 13:56  --------------------------------------------------------  IN: 971.9 mL / OUT: 315 mL / NET: 656.9 mL    GENERAL:  Sedated to vent   HEENT:  [ ]Normal   [ ]Dry mouth   [ x]ET Tube/Trach  [ ]Oral lesions  PULMONARY:   [x ]Clear anteriorly   [ ]Rhonchi        [ ]Right [ ]Left [ ]Bilateral  [ ]Crackles        [ ]Right [ ]Left [ ]Bilateral  [ ]Wheezing     [ ]Right [ ]Left [ ]Bilateral  CARDIOVASCULAR:    [x ]Regular [ ]Irregular [ ]Tachy  [ ]Jimmie [ ]Murmur [ ]Other  GASTROINTESTINAL:  [x ]Soft  [ ]Distended   [x ]+BS  [x ]Non tender [ ]Tender  [ ]PEG [ ]OGT/ NGT   Last BM: 11/20/18     GENITOURINARY:  [ ]Normal [ ] Incontinent   [ ]Oliguria/Anuria   [ x]Andrade  MUSCULOSKELETAL:   [ ]Normal   [ ]Weakness  [x ]Bed/Wheelchair bound [ ]Edema  NEUROLOGIC:   Sedated   SKIN:   [x ]Normal   [ ]Pressure ulcer(s)  [ ]Rash    CRITICAL CARE:  [ ] Shock Present  [ ]Septic [ ]Cardiogenic [ ]Neurologic [ ]Hypovolemic  [ ]  Vasopressors [ ]  Inotropes   [ ] Respiratory failure present  [ ] Acute  [ ] Chronic [ ] Hypoxic  [ ] Hypercarbic [ ] Other  [ ] Other organ failure     LABS:                        7.8    8.54  )-----------( 278      ( 23 Nov 2018 02:30 )             24.3   11-23    132<L>  |  98  |  11  ----------------------------<  99  3.6   |  24  |  0.60    Ca    8.3<L>      23 Nov 2018 02:30  Phos  3.4     11-23  Mg     1.6     11-23    TPro  6.7  /  Alb  3.0<L>  /  TBili  0.8  /  DBili  x   /  AST  21  /  ALT  22  /  AlkPhos  313<H>  11-21  PT/INR - ( 23 Nov 2018 02:30 )   PT: 15.7 SEC;   INR: 1.36        PTT - ( 23 Nov 2018 02:30 )  PTT:35.1 SEC    RADIOLOGY & ADDITIONAL STUDIES: reviewed     Protein Calorie Malnutrition Present: [ ] yes [ ] no  [ ] PPSV2 < or = 30%  [ ] significant weight loss [ ] poor nutritional intake [ ] anasarca [ ] catabolic state Albumin, Serum: 3.0 g/dL (11-21-18 @ 17:38)    REFERRALS:   [ ]Chaplaincy  [ ] Hospice  [ ]Child Life  [ ]Social Work  [ ]Case management [ ]Holistic Therapy   Goals of Care Document:

## 2018-11-24 LAB
BASOPHILS # BLD AUTO: 0.01 K/UL — SIGNIFICANT CHANGE UP (ref 0–0.2)
BASOPHILS NFR BLD AUTO: 0.1 % — SIGNIFICANT CHANGE UP (ref 0–2)
BLD GP AB SCN SERPL QL: NEGATIVE — SIGNIFICANT CHANGE UP
BUN SERPL-MCNC: 13 MG/DL — SIGNIFICANT CHANGE UP (ref 7–23)
BUN SERPL-MCNC: 14 MG/DL — SIGNIFICANT CHANGE UP (ref 7–23)
CALCIUM SERPL-MCNC: 8 MG/DL — LOW (ref 8.4–10.5)
CALCIUM SERPL-MCNC: 8 MG/DL — LOW (ref 8.4–10.5)
CHLORIDE SERPL-SCNC: 96 MMOL/L — LOW (ref 98–107)
CHLORIDE SERPL-SCNC: 96 MMOL/L — LOW (ref 98–107)
CO2 SERPL-SCNC: 30 MMOL/L — SIGNIFICANT CHANGE UP (ref 22–31)
CO2 SERPL-SCNC: 30 MMOL/L — SIGNIFICANT CHANGE UP (ref 22–31)
CREAT SERPL-MCNC: 0.49 MG/DL — LOW (ref 0.5–1.3)
CREAT SERPL-MCNC: 0.54 MG/DL — SIGNIFICANT CHANGE UP (ref 0.5–1.3)
EOSINOPHIL # BLD AUTO: 0.16 K/UL — SIGNIFICANT CHANGE UP (ref 0–0.5)
EOSINOPHIL NFR BLD AUTO: 2.2 % — SIGNIFICANT CHANGE UP (ref 0–6)
GLUCOSE SERPL-MCNC: 104 MG/DL — HIGH (ref 70–99)
GLUCOSE SERPL-MCNC: 133 MG/DL — HIGH (ref 70–99)
HCT VFR BLD CALC: 22 % — LOW (ref 39–50)
HCT VFR BLD CALC: 22.2 % — LOW (ref 39–50)
HCT VFR BLD CALC: 23.6 % — LOW (ref 39–50)
HCT VFR BLD CALC: 27.2 % — LOW (ref 39–50)
HGB BLD-MCNC: 6.8 G/DL — CRITICAL LOW (ref 13–17)
HGB BLD-MCNC: 7 G/DL — CRITICAL LOW (ref 13–17)
HGB BLD-MCNC: 7.6 G/DL — LOW (ref 13–17)
HGB BLD-MCNC: 8.6 G/DL — LOW (ref 13–17)
IMM GRANULOCYTES # BLD AUTO: 0.04 # — SIGNIFICANT CHANGE UP
IMM GRANULOCYTES NFR BLD AUTO: 0.6 % — SIGNIFICANT CHANGE UP (ref 0–1.5)
LYMPHOCYTES # BLD AUTO: 0.86 K/UL — LOW (ref 1–3.3)
LYMPHOCYTES # BLD AUTO: 11.9 % — LOW (ref 13–44)
MAGNESIUM SERPL-MCNC: 1.5 MG/DL — LOW (ref 1.6–2.6)
MAGNESIUM SERPL-MCNC: 1.7 MG/DL — SIGNIFICANT CHANGE UP (ref 1.6–2.6)
MCHC RBC-ENTMCNC: 27.2 PG — SIGNIFICANT CHANGE UP (ref 27–34)
MCHC RBC-ENTMCNC: 27.7 PG — SIGNIFICANT CHANGE UP (ref 27–34)
MCHC RBC-ENTMCNC: 27.8 PG — SIGNIFICANT CHANGE UP (ref 27–34)
MCHC RBC-ENTMCNC: 27.9 PG — SIGNIFICANT CHANGE UP (ref 27–34)
MCHC RBC-ENTMCNC: 30.9 % — LOW (ref 32–36)
MCHC RBC-ENTMCNC: 31.5 % — LOW (ref 32–36)
MCHC RBC-ENTMCNC: 31.6 % — LOW (ref 32–36)
MCHC RBC-ENTMCNC: 32.2 % — SIGNIFICANT CHANGE UP (ref 32–36)
MCV RBC AUTO: 86.8 FL — SIGNIFICANT CHANGE UP (ref 80–100)
MCV RBC AUTO: 87.5 FL — SIGNIFICANT CHANGE UP (ref 80–100)
MCV RBC AUTO: 88 FL — SIGNIFICANT CHANGE UP (ref 80–100)
MCV RBC AUTO: 88.1 FL — SIGNIFICANT CHANGE UP (ref 80–100)
MONOCYTES # BLD AUTO: 0.56 K/UL — SIGNIFICANT CHANGE UP (ref 0–0.9)
MONOCYTES NFR BLD AUTO: 7.7 % — SIGNIFICANT CHANGE UP (ref 2–14)
NEUTROPHILS # BLD AUTO: 5.62 K/UL — SIGNIFICANT CHANGE UP (ref 1.8–7.4)
NEUTROPHILS NFR BLD AUTO: 77.5 % — HIGH (ref 43–77)
NRBC # FLD: 0 — SIGNIFICANT CHANGE UP
PHOSPHATE SERPL-MCNC: 2.3 MG/DL — LOW (ref 2.5–4.5)
PHOSPHATE SERPL-MCNC: 2.8 MG/DL — SIGNIFICANT CHANGE UP (ref 2.5–4.5)
PLATELET # BLD AUTO: 242 K/UL — SIGNIFICANT CHANGE UP (ref 150–400)
PLATELET # BLD AUTO: 249 K/UL — SIGNIFICANT CHANGE UP (ref 150–400)
PLATELET # BLD AUTO: 268 K/UL — SIGNIFICANT CHANGE UP (ref 150–400)
PLATELET # BLD AUTO: 275 K/UL — SIGNIFICANT CHANGE UP (ref 150–400)
PMV BLD: 10 FL — SIGNIFICANT CHANGE UP (ref 7–13)
PMV BLD: 10 FL — SIGNIFICANT CHANGE UP (ref 7–13)
PMV BLD: 9.4 FL — SIGNIFICANT CHANGE UP (ref 7–13)
PMV BLD: 9.7 FL — SIGNIFICANT CHANGE UP (ref 7–13)
POTASSIUM SERPL-MCNC: 3.8 MMOL/L — SIGNIFICANT CHANGE UP (ref 3.5–5.3)
POTASSIUM SERPL-MCNC: 4.2 MMOL/L — SIGNIFICANT CHANGE UP (ref 3.5–5.3)
POTASSIUM SERPL-SCNC: 3.8 MMOL/L — SIGNIFICANT CHANGE UP (ref 3.5–5.3)
POTASSIUM SERPL-SCNC: 4.2 MMOL/L — SIGNIFICANT CHANGE UP (ref 3.5–5.3)
RBC # BLD: 2.5 M/UL — LOW (ref 4.2–5.8)
RBC # BLD: 2.52 M/UL — LOW (ref 4.2–5.8)
RBC # BLD: 2.72 M/UL — LOW (ref 4.2–5.8)
RBC # BLD: 3.11 M/UL — LOW (ref 4.2–5.8)
RBC # FLD: 15.1 % — HIGH (ref 10.3–14.5)
RBC # FLD: 15.1 % — HIGH (ref 10.3–14.5)
RBC # FLD: 15.3 % — HIGH (ref 10.3–14.5)
RBC # FLD: 15.7 % — HIGH (ref 10.3–14.5)
RH IG SCN BLD-IMP: NEGATIVE — SIGNIFICANT CHANGE UP
SODIUM SERPL-SCNC: 135 MMOL/L — SIGNIFICANT CHANGE UP (ref 135–145)
SODIUM SERPL-SCNC: 135 MMOL/L — SIGNIFICANT CHANGE UP (ref 135–145)
SPECIMEN SOURCE: SIGNIFICANT CHANGE UP
SPECIMEN SOURCE: SIGNIFICANT CHANGE UP
WBC # BLD: 6.11 K/UL — SIGNIFICANT CHANGE UP (ref 3.8–10.5)
WBC # BLD: 6.25 K/UL — SIGNIFICANT CHANGE UP (ref 3.8–10.5)
WBC # BLD: 6.39 K/UL — SIGNIFICANT CHANGE UP (ref 3.8–10.5)
WBC # BLD: 7.25 K/UL — SIGNIFICANT CHANGE UP (ref 3.8–10.5)
WBC # FLD AUTO: 6.11 K/UL — SIGNIFICANT CHANGE UP (ref 3.8–10.5)
WBC # FLD AUTO: 6.25 K/UL — SIGNIFICANT CHANGE UP (ref 3.8–10.5)
WBC # FLD AUTO: 6.39 K/UL — SIGNIFICANT CHANGE UP (ref 3.8–10.5)
WBC # FLD AUTO: 7.25 K/UL — SIGNIFICANT CHANGE UP (ref 3.8–10.5)

## 2018-11-24 PROCEDURE — 99291 CRITICAL CARE FIRST HOUR: CPT

## 2018-11-24 RX ORDER — MAGNESIUM SULFATE 500 MG/ML
2 VIAL (ML) INJECTION ONCE
Qty: 0 | Refills: 0 | Status: COMPLETED | OUTPATIENT
Start: 2018-11-24 | End: 2018-11-24

## 2018-11-24 RX ORDER — POTASSIUM PHOSPHATE, MONOBASIC POTASSIUM PHOSPHATE, DIBASIC 236; 224 MG/ML; MG/ML
30 INJECTION, SOLUTION INTRAVENOUS ONCE
Qty: 0 | Refills: 0 | Status: DISCONTINUED | OUTPATIENT
Start: 2018-11-24 | End: 2018-11-24

## 2018-11-24 RX ORDER — POTASSIUM CHLORIDE 20 MEQ
10 PACKET (EA) ORAL ONCE
Qty: 0 | Refills: 0 | Status: DISCONTINUED | OUTPATIENT
Start: 2018-11-24 | End: 2018-11-24

## 2018-11-24 RX ORDER — MAGNESIUM SULFATE 500 MG/ML
1 VIAL (ML) INJECTION ONCE
Qty: 0 | Refills: 0 | Status: COMPLETED | OUTPATIENT
Start: 2018-11-24 | End: 2018-11-24

## 2018-11-24 RX ORDER — POTASSIUM PHOSPHATE, MONOBASIC POTASSIUM PHOSPHATE, DIBASIC 236; 224 MG/ML; MG/ML
15 INJECTION, SOLUTION INTRAVENOUS ONCE
Qty: 0 | Refills: 0 | Status: COMPLETED | OUTPATIENT
Start: 2018-11-24 | End: 2018-11-24

## 2018-11-24 RX ADMIN — Medication 100 MILLIGRAM(S): at 14:01

## 2018-11-24 RX ADMIN — Medication 100 MILLIGRAM(S): at 21:54

## 2018-11-24 RX ADMIN — Medication 75 MILLIGRAM(S): at 12:10

## 2018-11-24 RX ADMIN — Medication 100 MILLIGRAM(S): at 05:30

## 2018-11-24 RX ADMIN — FENTANYL CITRATE 48.75 MICROGRAM(S)/KG/HR: 50 INJECTION INTRAVENOUS at 19:28

## 2018-11-24 RX ADMIN — SODIUM CHLORIDE 3 MILLILITER(S): 9 INJECTION INTRAMUSCULAR; INTRAVENOUS; SUBCUTANEOUS at 21:14

## 2018-11-24 RX ADMIN — Medication 100 GRAM(S): at 21:54

## 2018-11-24 RX ADMIN — POTASSIUM PHOSPHATE, MONOBASIC POTASSIUM PHOSPHATE, DIBASIC 62.5 MILLIMOLE(S): 236; 224 INJECTION, SOLUTION INTRAVENOUS at 06:45

## 2018-11-24 RX ADMIN — FENTANYL CITRATE 48.75 MICROGRAM(S)/KG/HR: 50 INJECTION INTRAVENOUS at 21:13

## 2018-11-24 RX ADMIN — CARVEDILOL PHOSPHATE 6.25 MILLIGRAM(S): 80 CAPSULE, EXTENDED RELEASE ORAL at 12:10

## 2018-11-24 RX ADMIN — Medication 50 GRAM(S): at 06:45

## 2018-11-24 RX ADMIN — CHLORHEXIDINE GLUCONATE 1 APPLICATION(S): 213 SOLUTION TOPICAL at 16:04

## 2018-11-24 RX ADMIN — PROPOFOL 22.5 MICROGRAM(S)/KG/MIN: 10 INJECTION, EMULSION INTRAVENOUS at 08:00

## 2018-11-24 RX ADMIN — PROPOFOL 22.5 MICROGRAM(S)/KG/MIN: 10 INJECTION, EMULSION INTRAVENOUS at 19:27

## 2018-11-24 RX ADMIN — FENTANYL CITRATE 48.75 MICROGRAM(S)/KG/HR: 50 INJECTION INTRAVENOUS at 11:45

## 2018-11-24 RX ADMIN — SODIUM CHLORIDE 3 MILLILITER(S): 9 INJECTION INTRAMUSCULAR; INTRAVENOUS; SUBCUTANEOUS at 05:28

## 2018-11-24 RX ADMIN — Medication 63.75 MILLIMOLE(S): at 17:20

## 2018-11-24 RX ADMIN — Medication 50 GRAM(S): at 16:04

## 2018-11-24 RX ADMIN — PANTOPRAZOLE SODIUM 40 MILLIGRAM(S): 20 TABLET, DELAYED RELEASE ORAL at 12:10

## 2018-11-24 RX ADMIN — Medication 75 MILLIGRAM(S): at 00:25

## 2018-11-24 RX ADMIN — SODIUM CHLORIDE 3 MILLILITER(S): 9 INJECTION INTRAMUSCULAR; INTRAVENOUS; SUBCUTANEOUS at 13:39

## 2018-11-24 RX ADMIN — SODIUM CHLORIDE 1 GRAM(S): 9 INJECTION INTRAMUSCULAR; INTRAVENOUS; SUBCUTANEOUS at 05:30

## 2018-11-24 RX ADMIN — FENTANYL CITRATE 48.75 MICROGRAM(S)/KG/HR: 50 INJECTION INTRAVENOUS at 00:25

## 2018-11-24 NOTE — PROGRESS NOTE ADULT - ASSESSMENT
59 y/o M with Hx CHF (EF 40-45%), squamous cell carcinoma of maxillary sinus presenting 11/19 increased WOB, now s/p tracheostomy 11/20. Was recovering well but began to have recurrence of oral bleeding this AM in setting of critical airway. SICU consulted and accepted pt to unit.    Neuro: Post operative pain, Anxiety  - Palliative care on board - will assist w/ pain management, GOC  - pt on fentanyl, propofol gtt, dilaudid PRN   - Pregabalin for neuropathic pain    Resp: s/p Trach  - Airway packed by ENT 11/21 - observe carefully for further bleeding  - c/w Trach Collar, currently satting well  - Will closely monitor for worsening of bleed given critical airway.    CV: Hx HTN  - HR, BP stable  - c/w home carvedilol 6.25mg BID    GI:   -Started on tube feeds Pivot @70    Renal:  - c/w Andrade    Heme:   - H/H currently stable-cont to trend  - c/w ASA 81 mg QD  - SCDs for now, hold off chemical DVT ppx    ID:   - afebrile  - no current signs of infection    Endo:  - no issues        DISPO: SICU          CRITICAL CARE DIAGNOSIS: 59 y/o M with Hx CHF (EF 40-45%), squamous cell carcinoma of maxillary sinus presenting 11/19 increased WOB, now s/p tracheostomy 11/20. Was recovering well but began to have recurrence of oral bleeding this AM in setting of critical airway. SICU consulted and accepted pt to unit.    Neuro: Post operative pain, Anxiety  - Palliative care on board - will assist w/ pain management, GOC  - pt on fentanyl, propofol gtt, dilaudid PRN   - Pregabalin for neuropathic pain    Resp: s/p Trach  - Airway packed by ENT 11/21 - observe carefully for further bleeding  - c/w Trach Collar, currently satting well  - Will closely monitor for worsening of bleed given critical airway.  - f/u blood gas    CV: Hx HTN  - HR, BP stable  - c/w home carvedilol 6.25mg BID    GI:   -Started on tube feeds Pivot @70    Renal:  - c/w Andrade    Heme:   - Drop in H/H overnight, s/p 2 units tranfusion- will f/u post transfusion CBC  - c/w ASA 81 mg QD  - SCDs for now, hold off chemical DVT ppx    ID:   - afebrile  - no current signs of infection; c/w Ancef for prophylaxis while packing place    Endo:  - no issues      DISPO: SICU          CRITICAL CARE DIAGNOSIS: 57 y/o M with Hx CHF (EF 40-45%), squamous cell carcinoma of maxillary sinus presenting 11/19 increased WOB, now s/p tracheostomy 11/20. Was recovering well but began to have recurrence of oral bleeding this AM in setting of critical airway. SICU consulted and accepted pt to unit.    Neuro: Post operative pain, Anxiety  - Palliative care on board - will assist w/ pain management, GOC  - pt on fentanyl, propofol gtt, dilaudid PRN   - Pregabalin for neuropathic pain    Resp: s/p Trach  - f/u CXR 11/25  - 11/24: family still requesting transfer - ENT has been calling other hospitals, no takers  - Airway packed by ENT 11/21 - observe carefully for further bleeding  - c/w Trach Collar, currently satting well; monitor for worsening of bleed given critical airway.  - f/u blood gas    CV: Hx HTN  - HR, BP stable  - c/w home carvedilol 6.25mg BID    GI:   - Started on tube feeds Pivot @70  - Stable pneumoperitoneum s/p PEG  - Protonix    Renal:  - f/u rpt BMP 11/24 5pm s/p Mg/phos repletion  - c/w Andrade    Heme:   - Drop in H/H 11/23, hb incr 6.8 to 7.6 after transfusion 11/24  - c/w ASA 81 mg QD  - SCDs for now, hold off chemical DVT ppx    ID:   - afebrile, no current signs of infection  - c/w Ancef for prophylaxis while packing place    Endo:  - no issues    DISPO: SICU  CRITICAL CARE DIAGNOSIS:

## 2018-11-24 NOTE — PROGRESS NOTE ADULT - SUBJECTIVE AND OBJECTIVE BOX
Pt seen and examined. No acute events overnight. No desats overnight, no bleeding overnight   Dr. Olivo had extensive family discussion yesterday.   Called facilities to request transfer, currently no accepting facilities   s/p 1U PRBC this AM for Hgb 6.8    AVSS  NAD, awake and alert  on mech vent, sedated  6LPC trach in place with sutures, cuff inflated  NC: mass, non bleeding  oc/op: kerlix in place, no pooling or active bleeding  Neck soft and flat, stoma intact     A/P: s/p awake tracheotomy 11/19 for airway protection. Patient needs to remain on vent and sedated while oral packing is in place. Oral packing cannot be removed, if patient continues to bleed inside his oral cavity. Family still wants everything to be done, requesting transfer   -trend CBC   -currently no facilities accepting transfer  -discussed with [facility] neuro IR embolization team, not a candidate for embolization   -NPO, PEG dependent  -pain control   -trach care   -appreciate palliative pain recs: important to revisit goc discussion in case of rebleed which is highly likely   -maintain oral packing   -resume home meds through PEG   -ambulate ad denton   -dvt ppx: sqh  -appreciate nutrition consult for feeds   -will discuss with attending and update with recs

## 2018-11-24 NOTE — PROGRESS NOTE ADULT - SUBJECTIVE AND OBJECTIVE BOX
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 Also with difficulty breathing, s/p urgent tracheostomy on 11/20. Patient had bleeding from sinus tumor this morning, packed and controlled by ENT. Overall bleeding, CBC stable but given critical airway, SICU re-consulted for close monitoring of bleed & airway.    Of note, wife reports pt does not have h/o CAD/MI/stent, had prior lobectomy and partial diaphragmatic dysfunction.      VITALS and I&O's    T(C): 36.6 (11-24-18 @ 00:00), Max: 38.2 (11-23-18 @ 16:00)  HR: 87 (11-24-18 @ 00:00) (87 - 109)  BP: 107/62 (11-24-18 @ 00:00) (90/52 - 121/85)  ABP: --  ABP(mean): --  RR: 15 (11-24-18 @ 00:00) (13 - 20)  SpO2: 100% (11-24-18 @ 00:00) (100% - 100%)  Wt(kg): --  CVP(mm Hg): --      11-22 @ 07:01  -  11-23 @ 07:00  --------------------------------------------------------  IN:    dextrose 5% + sodium chloride 0.9%: 150 mL    fentaNYL Infusion.: 633.8 mL    Pivot: 620 mL    propofol Infusion: 392.5 mL    sodium chloride 0.9%: 225 mL  Total IN: 2021.3 mL    OUT:    Indwelling Catheter - Urethral: 1095 mL  Total OUT: 1095 mL    Total NET: 926.3 mL      11-23 @ 07:01  -  11-24 @ 01:44  --------------------------------------------------------  IN:    Enteral Tube Flush: 120 mL    fentaNYL Infusion.: 71.3 mL    fentaNYL Infusion.: 450 mL    fentaNYL Infusion.: 146.4 mL    IV PiggyBack: 150 mL    Pivot: 1050 mL    propofol Infusion: 319.9 mL  Total IN: 2307.6 mL    OUT:    Indwelling Catheter - Urethral: 830 mL  Total OUT: 830 mL    Total NET: 1477.6 mL                  SUBJECTIVE/ROS:  [ ] A ten-point review of systems was otherwise negative except as noted.  [x] Due to altered mental status/intubation, subjective information were not able to be obtained from the patient. History was obtained, to the extent possible, from review of the chart and collateral sources of information.    NEURO  Meds: ALPRAZolam 2 milliGRAM(s) Oral at bedtime  fentaNYL   Infusion. 3 MICROgram(s)/kG/Hr IV Continuous <Continuous>  HYDROmorphone  Injectable 2 milliGRAM(s) IV Push every 3 hours PRN Moderate Pain (4 - 6)  methadone    Tablet 5 milliGRAM(s) Oral three times a day  OLANZapine 5 milliGRAM(s) Oral daily  pregabalin 75 milliGRAM(s) Oral two times a day  propofol Infusion 50 MICROgram(s)/kG/Min IV Continuous <Continuous>    [x] Adequacy of sedation and pain control has been assessed and adjusted      RESPIRATORY  RR: 17 (11-22-18 @ 01:00) (14 - 20)  SpO2: 99% (11-22-18 @ 01:00) (95% - 100%)  Mechanical Ventilation: Mode: AC/ CMV (Assist Control/ Continuous Mandatory Ventilation), RR (machine): 16, RR (patient): 16, TV (machine): 450, FiO2: 40, PEEP: 10, MAP: 11, PIP: 16      CARDIOVASCULAR      GI/NUTRITION  Meds: loperamide Solution 2 milliGRAM(s) Oral daily PRN Diarrhea      GENITOURINARY[ ] Andrade catheter, indication: N/A  Meds: sodium chloride 1 Gram(s) Oral three times a day  sodium chloride 0.9%. 1000 milliLiter(s) IV Continuous <Continuous>    HEMATOLOGIC  Meds:   [x] VTE Prophylaxis             Transfusion     [ ] PRBC   [ ] Platelets   [ ] FFP   [ ] Cryoprecipitate      INFECTIOUS DISEASES    RECENT CULTURES:  Meds: ceFAZolin   IVPB 2000 milliGRAM(s) IV Intermittent every 8 hours  ceFAZolin   IVPB            ACCESS DEVICES:  [ ] Peripheral IV  [ ] Central Venous Line	[ ] R	[ ] L	[ ] IJ	[ ] Fem	[ ] SC	Placed:   [ ] Arterial Line		[ ] R	[ ] L	[ ] Fem	[ ] Rad	[ ] Ax	Placed:   [ ] PICC:					[ ] Mediport  [ ] Urinary Catheter, Date Placed:   [x] Necessity of urinary, arterial, and venous catheters discussed            LABS:      CBC (11-23 @ 02:30)                              7.8<L>                         8.54    )----------------(  278        --    % Neutrophils, --    % Lymphocytes, ANC: --                                  24.3<L>    BMP (11-23 @ 02:30)             132<L>  |  98      |  11    		Ca++ --      Ca 8.3<L>             ---------------------------------( 99    		Mg 1.6                3.6     |  24      |  0.60  			Ph 3.4         Coags (11-23 @ 02:30)  aPTT 35.1 / INR 1.36<H> / PT 15.7<H>

## 2018-11-25 LAB
APPEARANCE UR: CLEAR — SIGNIFICANT CHANGE UP
BASE EXCESS BLDA CALC-SCNC: 8.3 MMOL/L — SIGNIFICANT CHANGE UP
BASE EXCESS BLDA CALC-SCNC: 9 MMOL/L — SIGNIFICANT CHANGE UP
BILIRUB UR-MCNC: NEGATIVE — SIGNIFICANT CHANGE UP
BLOOD UR QL VISUAL: SIGNIFICANT CHANGE UP
BUN SERPL-MCNC: 14 MG/DL — SIGNIFICANT CHANGE UP (ref 7–23)
CA-I BLDA-SCNC: 1.16 MMOL/L — SIGNIFICANT CHANGE UP (ref 1.15–1.29)
CALCIUM SERPL-MCNC: 8 MG/DL — LOW (ref 8.4–10.5)
CHLORIDE BLDA-SCNC: 95 MMOL/L — LOW (ref 96–108)
CHLORIDE SERPL-SCNC: 95 MMOL/L — LOW (ref 98–107)
CO2 SERPL-SCNC: 31 MMOL/L — SIGNIFICANT CHANGE UP (ref 22–31)
COLOR SPEC: YELLOW — SIGNIFICANT CHANGE UP
CREAT SERPL-MCNC: 0.5 MG/DL — SIGNIFICANT CHANGE UP (ref 0.5–1.3)
GLUCOSE BLDA-MCNC: 98 MG/DL — SIGNIFICANT CHANGE UP (ref 70–99)
GLUCOSE BLDA-MCNC: 99 MG/DL — SIGNIFICANT CHANGE UP (ref 70–99)
GLUCOSE SERPL-MCNC: 94 MG/DL — SIGNIFICANT CHANGE UP (ref 70–99)
GLUCOSE UR-MCNC: NEGATIVE — SIGNIFICANT CHANGE UP
HCO3 BLDA-SCNC: 32 MMOL/L — HIGH (ref 22–26)
HCO3 BLDA-SCNC: 32 MMOL/L — HIGH (ref 22–26)
HCT VFR BLD CALC: 24.5 % — LOW (ref 39–50)
HCT VFR BLDA CALC: 19.5 % — CRITICAL LOW (ref 39–51)
HCT VFR BLDA CALC: 33.2 % — LOW (ref 39–51)
HGB BLD-MCNC: 7.9 G/DL — LOW (ref 13–17)
HGB BLDA-MCNC: 10.8 G/DL — LOW (ref 13–17)
HGB BLDA-MCNC: 6.2 G/DL — CRITICAL LOW (ref 13–17)
KETONES UR-MCNC: NEGATIVE — SIGNIFICANT CHANGE UP
LACTATE BLDA-SCNC: 0.6 MMOL/L — SIGNIFICANT CHANGE UP (ref 0.5–2)
LACTATE BLDA-SCNC: 0.6 MMOL/L — SIGNIFICANT CHANGE UP (ref 0.5–2)
LEUKOCYTE ESTERASE UR-ACNC: NEGATIVE — SIGNIFICANT CHANGE UP
MAGNESIUM SERPL-MCNC: 1.9 MG/DL — SIGNIFICANT CHANGE UP (ref 1.6–2.6)
MCHC RBC-ENTMCNC: 28 PG — SIGNIFICANT CHANGE UP (ref 27–34)
MCHC RBC-ENTMCNC: 32.2 % — SIGNIFICANT CHANGE UP (ref 32–36)
MCV RBC AUTO: 86.9 FL — SIGNIFICANT CHANGE UP (ref 80–100)
NITRITE UR-MCNC: NEGATIVE — SIGNIFICANT CHANGE UP
NRBC # FLD: 0 — SIGNIFICANT CHANGE UP
PCO2 BLDA: 46 MMHG — SIGNIFICANT CHANGE UP (ref 35–48)
PCO2 BLDA: 52 MMHG — HIGH (ref 35–48)
PH BLDA: 7.42 PH — SIGNIFICANT CHANGE UP (ref 7.35–7.45)
PH BLDA: 7.47 PH — HIGH (ref 7.35–7.45)
PH UR: 6.5 — SIGNIFICANT CHANGE UP (ref 5–8)
PHOSPHATE SERPL-MCNC: 2.8 MG/DL — SIGNIFICANT CHANGE UP (ref 2.5–4.5)
PLATELET # BLD AUTO: 244 K/UL — SIGNIFICANT CHANGE UP (ref 150–400)
PMV BLD: 9.3 FL — SIGNIFICANT CHANGE UP (ref 7–13)
PO2 BLDA: 119 MMHG — HIGH (ref 83–108)
PO2 BLDA: 96 MMHG — SIGNIFICANT CHANGE UP (ref 83–108)
POTASSIUM BLDA-SCNC: 3.9 MMOL/L — SIGNIFICANT CHANGE UP (ref 3.4–4.5)
POTASSIUM BLDA-SCNC: 3.9 MMOL/L — SIGNIFICANT CHANGE UP (ref 3.4–4.5)
POTASSIUM SERPL-MCNC: 4 MMOL/L — SIGNIFICANT CHANGE UP (ref 3.5–5.3)
POTASSIUM SERPL-SCNC: 4 MMOL/L — SIGNIFICANT CHANGE UP (ref 3.5–5.3)
PROT UR-MCNC: 10 — SIGNIFICANT CHANGE UP
RBC # BLD: 2.82 M/UL — LOW (ref 4.2–5.8)
RBC # FLD: 15.7 % — HIGH (ref 10.3–14.5)
RBC CASTS # UR COMP ASSIST: SIGNIFICANT CHANGE UP (ref 0–?)
SAO2 % BLDA: 97.8 % — SIGNIFICANT CHANGE UP (ref 95–99)
SAO2 % BLDA: 98.6 % — SIGNIFICANT CHANGE UP (ref 95–99)
SODIUM BLDA-SCNC: 131 MMOL/L — LOW (ref 136–146)
SODIUM BLDA-SCNC: 132 MMOL/L — LOW (ref 136–146)
SODIUM SERPL-SCNC: 134 MMOL/L — LOW (ref 135–145)
SP GR SPEC: 1.02 — SIGNIFICANT CHANGE UP (ref 1–1.04)
UROBILINOGEN FLD QL: HIGH
WBC # BLD: 6.91 K/UL — SIGNIFICANT CHANGE UP (ref 3.8–10.5)
WBC # FLD AUTO: 6.91 K/UL — SIGNIFICANT CHANGE UP (ref 3.8–10.5)
WBC UR QL: SIGNIFICANT CHANGE UP (ref 0–?)

## 2018-11-25 PROCEDURE — 99291 CRITICAL CARE FIRST HOUR: CPT

## 2018-11-25 PROCEDURE — 71045 X-RAY EXAM CHEST 1 VIEW: CPT | Mod: 26

## 2018-11-25 RX ORDER — DIPHENHYDRAMINE HCL 50 MG
25 CAPSULE ORAL ONCE
Qty: 0 | Refills: 0 | Status: COMPLETED | OUTPATIENT
Start: 2018-11-25 | End: 2018-11-25

## 2018-11-25 RX ORDER — ACETAMINOPHEN 500 MG
1000 TABLET ORAL ONCE
Qty: 0 | Refills: 0 | Status: COMPLETED | OUTPATIENT
Start: 2018-11-25 | End: 2018-11-25

## 2018-11-25 RX ORDER — ACETAMINOPHEN 500 MG
650 TABLET ORAL ONCE
Qty: 0 | Refills: 0 | Status: COMPLETED | OUTPATIENT
Start: 2018-11-25 | End: 2018-11-25

## 2018-11-25 RX ADMIN — Medication 650 MILLIGRAM(S): at 23:42

## 2018-11-25 RX ADMIN — Medication 75 MILLIGRAM(S): at 23:42

## 2018-11-25 RX ADMIN — Medication 400 MILLIGRAM(S): at 00:31

## 2018-11-25 RX ADMIN — SODIUM CHLORIDE 3 MILLILITER(S): 9 INJECTION INTRAMUSCULAR; INTRAVENOUS; SUBCUTANEOUS at 13:37

## 2018-11-25 RX ADMIN — PROPOFOL 22.5 MICROGRAM(S)/KG/MIN: 10 INJECTION, EMULSION INTRAVENOUS at 07:53

## 2018-11-25 RX ADMIN — Medication 75 MILLIGRAM(S): at 13:42

## 2018-11-25 RX ADMIN — CHLORHEXIDINE GLUCONATE 1 APPLICATION(S): 213 SOLUTION TOPICAL at 12:33

## 2018-11-25 RX ADMIN — CARVEDILOL PHOSPHATE 6.25 MILLIGRAM(S): 80 CAPSULE, EXTENDED RELEASE ORAL at 00:12

## 2018-11-25 RX ADMIN — FENTANYL CITRATE 48.75 MICROGRAM(S)/KG/HR: 50 INJECTION INTRAVENOUS at 07:53

## 2018-11-25 RX ADMIN — FENTANYL CITRATE 52.5 MICROGRAM(S)/KG/HR: 50 INJECTION INTRAVENOUS at 19:07

## 2018-11-25 RX ADMIN — FENTANYL CITRATE 48.75 MICROGRAM(S)/KG/HR: 50 INJECTION INTRAVENOUS at 02:29

## 2018-11-25 RX ADMIN — Medication 100 MILLIGRAM(S): at 05:41

## 2018-11-25 RX ADMIN — FENTANYL CITRATE 52.5 MICROGRAM(S)/KG/HR: 50 INJECTION INTRAVENOUS at 16:02

## 2018-11-25 RX ADMIN — SODIUM CHLORIDE 3 MILLILITER(S): 9 INJECTION INTRAMUSCULAR; INTRAVENOUS; SUBCUTANEOUS at 21:08

## 2018-11-25 RX ADMIN — Medication 75 MILLIGRAM(S): at 00:12

## 2018-11-25 RX ADMIN — FENTANYL CITRATE 52.5 MICROGRAM(S)/KG/HR: 50 INJECTION INTRAVENOUS at 17:36

## 2018-11-25 RX ADMIN — Medication 25 MILLIGRAM(S): at 19:06

## 2018-11-25 RX ADMIN — SODIUM CHLORIDE 3 MILLILITER(S): 9 INJECTION INTRAMUSCULAR; INTRAVENOUS; SUBCUTANEOUS at 05:30

## 2018-11-25 RX ADMIN — PROPOFOL 22.5 MICROGRAM(S)/KG/MIN: 10 INJECTION, EMULSION INTRAVENOUS at 10:00

## 2018-11-25 RX ADMIN — PROPOFOL 22.5 MICROGRAM(S)/KG/MIN: 10 INJECTION, EMULSION INTRAVENOUS at 15:00

## 2018-11-25 RX ADMIN — PROPOFOL 22.5 MICROGRAM(S)/KG/MIN: 10 INJECTION, EMULSION INTRAVENOUS at 19:06

## 2018-11-25 RX ADMIN — FENTANYL CITRATE 48.75 MICROGRAM(S)/KG/HR: 50 INJECTION INTRAVENOUS at 12:33

## 2018-11-25 NOTE — PROGRESS NOTE ADULT - ASSESSMENT
57 y/o M with Hx CHF (EF 40-45%), squamous cell carcinoma of maxillary sinus presenting 11/19 increased WOB, now s/p tracheostomy 11/20. Was recovering well but began to have recurrence of oral bleeding this AM in setting of critical airway. SICU consulted and accepted pt to unit.    Neuro: Post operative pain, Anxiety  - Palliative care on board - will assist w/ pain management, GOC  - pt on fentanyl, propofol gtt, dilaudid PRN   - Pregabalin for neuropathic pain    Resp: s/p Trach  - f/u CXR 11/25  - 11/24: family still requesting transfer - ENT has been calling other hospitals, no takers  - Airway packed by ENT 11/21 - observe carefully for further bleeding  - c/w Trach Collar, currently satting well; monitor for worsening of bleed given critical airway.  - f/u blood gas    CV: Hx HTN  - HR, BP stable  - c/w home carvedilol 6.25mg BID    GI:   - TF: Pivot @70  - Stable pneumoperitoneum s/p PEG  - Protonix    Renal:  - continue monitoring lytes, UOP  - c/w Andrade    Heme:   - s/p 2u prbc 11/24 w/ appropriate response - continue trending  - c/w ASA 81 mg QD  - SCDs for now, hold off chemical DVT ppx    ID:   - fever 11/24: f/u blood and urine cultures  - c/w Ancef for prophylaxis while packing place    Endo:  - no issues    DISPO: SICU 59 y/o M with Hx CHF (EF 40-45%), squamous cell carcinoma of maxillary sinus presenting 11/19 increased WOB, now s/p tracheostomy 11/20. Was recovering well but began to have recurrence of oral bleeding this AM in setting of critical airway. SICU consulted and accepted pt to unit.    Neuro: Post operative pain, Anxiety  - Palliative care on board - will assist w/ pain management, GOC  - pt on fentanyl, propofol gtt, dilaudid PRN   - Pregabalin for neuropathic pain    Resp: s/p Trach  - Airway packed by ENT 11/21 - observe carefully for further bleeding  - family was requesting transfer 11/24 but per ENT, no takers  - c/w Trach Collar, currently satting well; monitor for worsening of bleed given critical airway.  - no daily CXR or ABG    CV: Hx HTN  - HR, BP stable  - c/w home carvedilol 6.25mg BID    GI:   - TF: Pivot @70  - Stable pneumoperitoneum s/p PEG  - Protonix    Renal:  - c/w Andrade  - no daily labs    Heme:   - c/w ASA 81 mg QD  - SCDs for now, hold off chemical DVT ppx  - no daily CBC    ID:   - fever 11/24: f/u blood and urine cultures  - c/w Ancef for prophylaxis while packing place    Endo:  - no issues    GOC: DNR, comfort care  DISPO: SICU

## 2018-11-25 NOTE — PROGRESS NOTE ADULT - SUBJECTIVE AND OBJECTIVE BOX
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 Also with difficulty breathing, s/p urgent tracheostomy on 11/20. Patient had bleeding from sinus tumor this morning, packed and controlled by ENT. Overall bleeding, CBC stable but given critical airway, SICU re-consulted for close monitoring of bleed & airway.    Of note, wife reports pt does not have h/o CAD/MI/stent, had prior lobectomy and partial diaphragmatic dysfunction.      24 HOUR EVENTS:  Patient was transfused 2u PRBC for decreasing hb down to 6.8; responded appropriately with new hb 8.6. Family requested transfer to other hospital to received second ENT opinion - extensive GOC conversation w/ ENT attending, unable to find accepting attending. UA and blood cultures sent for fevers around midnight. Repleted magnesium.      SUBJECTIVE/ROS:  [ ] A ten-point review of systems was otherwise negative except as noted.  [x] Due to altered mental status/intubation, subjective information were not able to be obtained from the patient. History was obtained, to the extent possible, from review of the chart and collateral sources of information.      NEURO  RASS:     GCS:     CAM ICU:  Exam: awake, alert, oriented  Meds: fentaNYL   Infusion. 6.5 MICROgram(s)/kG/Hr IV Continuous <Continuous>  pregabalin 75 milliGRAM(s) Oral two times a day  propofol Infusion 50 MICROgram(s)/kG/Min IV Continuous <Continuous>    [x] Adequacy of sedation and pain control has been assessed and adjusted    RESPIRATORY  RR: 16 (11-25-18 @ 00:00) (15 - 18)  SpO2: 99% (11-25-18 @ 00:00) (98% - 100%)    Exam: unlabored, clear to auscultation bilaterally  Mechanical Ventilation: Mode: AC/ CMV (Assist Control/ Continuous Mandatory Ventilation), RR (machine): 16, RR (patient): 18, TV (machine): 450, FiO2: 40, PEEP: 10, MAP: 13, PIP: 2      CARDIOVASCULAR  HR: 98 (11-25-18 @ 00:00) (79 - 102)  BP: 94/55 (11-25-18 @ 00:00) (88/56 - 125/65)  BP(mean): 65 (11-25-18 @ 00:00) (63 - 80)      GI/NUTRITION  Exam: soft, nontender, nondistended, incision C/D/I  Meds: loperamide Solution 2 milliGRAM(s) Oral daily PRN Diarrhea  pantoprazole  Injectable 40 milliGRAM(s) IV Push daily      GENITOURINARY  I&O's Details    11-23 @ 07:01  -  11-24 @ 07:00  --------------------------------------------------------  IN:    Enteral Tube Flush: 120 mL    fentaNYL Infusion.: 71.3 mL    fentaNYL Infusion.: 450 mL    fentaNYL Infusion.: 506.8 mL    IV PiggyBack: 150 mL    Packed Red Blood Cells: 300 mL    Pivot: 1190 mL    propofol Infusion: 468.5 mL  Total IN: 3256.6 mL    OUT:    Indwelling Catheter - Urethral: 1355 mL  Total OUT: 1355 mL    Total NET: 1901.6 mL      11-24 @ 07:01 - 11-25 @ 00:57  --------------------------------------------------------  IN:    Enteral Tube Flush: 135 mL    fentaNYL Infusion.: 829.6 mL    IV PiggyBack: 600 mL    Packed Red Blood Cells: 300 mL    Pivot: 980 mL    propofol Infusion: 371.3 mL  Total IN: 3215.9 mL    OUT:    Indwelling Catheter - Urethral: 975 mL  Total OUT: 975 mL    Total NET: 2240.9 mL          11-24    135  |  96<L>  |  13  ----------------------------<  104<H>  4.2   |  30  |  0.49<L>    Ca    8.0<L>      24 Nov 2018 12:33  Phos  2.8     11-24  Mg     1.7     11-24      [ ] Andrade catheter, indication: N/A  Meds: sodium chloride 0.9% lock flush 3 milliLiter(s) IV Push every 8 hours        HEMATOLOGIC  Meds:   [x] VTE Prophylaxis                        8.6    6.39  )-----------( 268      ( 24 Nov 2018 18:50 )             27.2     PT/INR - ( 23 Nov 2018 02:30 )   PT: 15.7 SEC;   INR: 1.36          PTT - ( 23 Nov 2018 02:30 )  PTT:35.1 SEC  Transfusion     [ ] PRBC   [ ] Platelets   [ ] FFP   [ ] Cryoprecipitate      INFECTIOUS DISEASES  WBC Count: 6.39 K/uL (11-24 @ 18:50)  WBC Count: 7.25 K/uL (11-24 @ 12:33)  WBC Count: 6.11 K/uL (11-24 @ 03:14)  WBC Count: 6.25 K/uL (11-24 @ 02:12)    RECENT CULTURES:  Specimen Source: BLOOD  Date/Time: 11-23 @ 21:54  Culture Results: --  Gram Stain: --  Organism: --  Specimen Source: BLOOD VENOUS  Date/Time: 11-23 @ 19:52  Culture Results: --  Gram Stain: --  Organism: --    Meds: ceFAZolin   IVPB 2000 milliGRAM(s) IV Intermittent every 8 hours  ceFAZolin   IVPB            ENDOCRINE  CAPILLARY BLOOD GLUCOSE        Meds:       ACCESS DEVICES:  [ ] Peripheral IV  [ ] Central Venous Line	[ ] R	[ ] L	[ ] IJ	[ ] Fem	[ ] SC	Placed:   [ ] Arterial Line		[ ] R	[ ] L	[ ] Fem	[ ] Rad	[ ] Ax	Placed:   [ ] PICC:					[ ] Mediport  [ ] Urinary Catheter, Date Placed:   [x] Necessity of urinary, arterial, and venous catheters discussed    OTHER MEDICATIONS:  chlorhexidine 4% Liquid 1 Application(s) Topical <User Schedule>      CODE STATUS:      IMAGING: HISTORY  58M with hx of CHF in 2014 (LVEF 40-45%), bipolar d/o, YANET, diaphragmatic dysfunction, lung lobectomy, 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 Also with difficulty breathing, s/p urgent tracheostomy on 11/20. Patient had bleeding from sinus tumor this morning, packed and controlled by ENT. Overall bleeding, CBC stable but given critical airway, SICU re-consulted for close monitoring of bleed & airway.    Of note, wife reports pt does not have h/o CAD/MI/stent, had prior lobectomy and partial diaphragmatic dysfunction.      24 HOUR EVENTS:  Patient was transfused 2u PRBC for decreasing hb down to 6.8; responded appropriately with new hb 8.6. Family requested transfer to other hospital to received second ENT opinion - extensive GOC conversation w/ ENT attending, unable to find accepting attending. UA and blood cultures sent for fevers around midnight. Repleted magnesium.      SUBJECTIVE/ROS:  [ ] A ten-point review of systems was otherwise negative except as noted.  [x] Due to altered mental status/intubation, subjective information were not able to be obtained from the patient. History was obtained, to the extent possible, from review of the chart and collateral sources of information.      NEURO  RASS:     GCS:     CAM ICU:  Exam: awake, alert, oriented  Meds: fentaNYL   Infusion. 6.5 MICROgram(s)/kG/Hr IV Continuous <Continuous>  pregabalin 75 milliGRAM(s) Oral two times a day  propofol Infusion 50 MICROgram(s)/kG/Min IV Continuous <Continuous>    [x] Adequacy of sedation and pain control has been assessed and adjusted    RESPIRATORY  RR: 16 (11-25-18 @ 00:00) (15 - 18)  SpO2: 99% (11-25-18 @ 00:00) (98% - 100%)    Exam: unlabored, clear to auscultation bilaterally  Mechanical Ventilation: Mode: AC/ CMV (Assist Control/ Continuous Mandatory Ventilation), RR (machine): 16, RR (patient): 18, TV (machine): 450, FiO2: 40, PEEP: 10, MAP: 13, PIP: 2      CARDIOVASCULAR  HR: 98 (11-25-18 @ 00:00) (79 - 102)  BP: 94/55 (11-25-18 @ 00:00) (88/56 - 125/65)  BP(mean): 65 (11-25-18 @ 00:00) (63 - 80)      GI/NUTRITION  Exam: soft, nontender, nondistended, incision C/D/I  Meds: loperamide Solution 2 milliGRAM(s) Oral daily PRN Diarrhea  pantoprazole  Injectable 40 milliGRAM(s) IV Push daily      GENITOURINARY  I&O's Details    11-23 @ 07:01  -  11-24 @ 07:00  --------------------------------------------------------  IN:    Enteral Tube Flush: 120 mL    fentaNYL Infusion.: 71.3 mL    fentaNYL Infusion.: 450 mL    fentaNYL Infusion.: 506.8 mL    IV PiggyBack: 150 mL    Packed Red Blood Cells: 300 mL    Pivot: 1190 mL    propofol Infusion: 468.5 mL  Total IN: 3256.6 mL    OUT:    Indwelling Catheter - Urethral: 1355 mL  Total OUT: 1355 mL    Total NET: 1901.6 mL      11-24 @ 07:01 - 11-25 @ 00:57  --------------------------------------------------------  IN:    Enteral Tube Flush: 135 mL    fentaNYL Infusion.: 829.6 mL    IV PiggyBack: 600 mL    Packed Red Blood Cells: 300 mL    Pivot: 980 mL    propofol Infusion: 371.3 mL  Total IN: 3215.9 mL    OUT:    Indwelling Catheter - Urethral: 975 mL  Total OUT: 975 mL    Total NET: 2240.9 mL          11-24    135  |  96<L>  |  13  ----------------------------<  104<H>  4.2   |  30  |  0.49<L>    Ca    8.0<L>      24 Nov 2018 12:33  Phos  2.8     11-24  Mg     1.7     11-24      [ ] Andrade catheter, indication: N/A  Meds: sodium chloride 0.9% lock flush 3 milliLiter(s) IV Push every 8 hours        HEMATOLOGIC  Meds:   [x] VTE Prophylaxis                        8.6    6.39  )-----------( 268      ( 24 Nov 2018 18:50 )             27.2     PT/INR - ( 23 Nov 2018 02:30 )   PT: 15.7 SEC;   INR: 1.36          PTT - ( 23 Nov 2018 02:30 )  PTT:35.1 SEC  Transfusion     [ ] PRBC   [ ] Platelets   [ ] FFP   [ ] Cryoprecipitate      INFECTIOUS DISEASES  WBC Count: 6.39 K/uL (11-24 @ 18:50)  WBC Count: 7.25 K/uL (11-24 @ 12:33)  WBC Count: 6.11 K/uL (11-24 @ 03:14)  WBC Count: 6.25 K/uL (11-24 @ 02:12)    RECENT CULTURES:  Specimen Source: BLOOD  Date/Time: 11-23 @ 21:54  Culture Results: --  Gram Stain: --  Organism: --  Specimen Source: BLOOD VENOUS  Date/Time: 11-23 @ 19:52  Culture Results: --  Gram Stain: --  Organism: --    Meds: ceFAZolin   IVPB 2000 milliGRAM(s) IV Intermittent every 8 hours  ceFAZolin   IVPB            ENDOCRINE  CAPILLARY BLOOD GLUCOSE        Meds:       ACCESS DEVICES:  [x] Peripheral IV  [ ] Central Venous Line	[ ] R	[ ] L	[ ] IJ	[ ] Fem	[ ] SC	Placed:   [ ] Arterial Line		[ ] R	[ ] L	[ ] Fem	[ ] Rad	[ ] Ax	Placed:   [ ] PICC:					[ ] Mediport  [ ] Urinary Catheter, Date Placed:   [x] Necessity of urinary, arterial, and venous catheters discussed    OTHER MEDICATIONS:  chlorhexidine 4% Liquid 1 Application(s) Topical <User Schedule>      CODE STATUS: DNR, comfort measures only

## 2018-11-25 NOTE — PROGRESS NOTE ADULT - SUBJECTIVE AND OBJECTIVE BOX
Pt seen and examined. Dr. Olivo again had extensive family discussion yesterday after packing was removed but had to be replaced due to continued bleeding. Was febrile with Tmax 101.6 last night. UA neg, CXT appears clear, bcx sent. H/h dropped again this morning after transfusion yesterday. Code status changed to DNR this morning and patient now on comfort measures only.     AVSS  NAD, awake and alert  6LPC trach in place with sutures, on TC  NC: mass, non bleeding  oc/op: kerlix in place, no pooling or active bleeding  Neck soft and flat, stoma intact     A/P: s/p awake tracheotomy 11/19 for airway protection now w/ oral packing which has to remain in place due to continued bleeding from the tumor. Patient now DNR, comfort measures only.  -maintain oral packing in place, keflex for TTS ppx while packing remains in place  -DNR, comfort measures only   -NPO, PEG feeds  -routine trach care   -appreciate palliative recs  -SICU care

## 2018-11-26 ENCOUNTER — APPOINTMENT (OUTPATIENT)
Dept: HEMATOLOGY ONCOLOGY | Facility: CLINIC | Age: 58
End: 2018-11-26

## 2018-11-26 LAB
METHOD TYPE: SIGNIFICANT CHANGE UP
ORGANISM # SPEC MICROSCOPIC CNT: SIGNIFICANT CHANGE UP
ORGANISM # SPEC MICROSCOPIC CNT: SIGNIFICANT CHANGE UP
SPECIMEN SOURCE: SIGNIFICANT CHANGE UP
SPECIMEN SOURCE: SIGNIFICANT CHANGE UP

## 2018-11-26 PROCEDURE — 99291 CRITICAL CARE FIRST HOUR: CPT

## 2018-11-26 PROCEDURE — 99233 SBSQ HOSP IP/OBS HIGH 50: CPT

## 2018-11-26 RX ORDER — DEXMEDETOMIDINE HYDROCHLORIDE IN 0.9% SODIUM CHLORIDE 4 UG/ML
0.2 INJECTION INTRAVENOUS
Qty: 200 | Refills: 0 | Status: DISCONTINUED | OUTPATIENT
Start: 2018-11-26 | End: 2018-11-28

## 2018-11-26 RX ADMIN — FENTANYL CITRATE 52.5 MICROGRAM(S)/KG/HR: 50 INJECTION INTRAVENOUS at 07:42

## 2018-11-26 RX ADMIN — FENTANYL CITRATE 52.5 MICROGRAM(S)/KG/HR: 50 INJECTION INTRAVENOUS at 03:11

## 2018-11-26 RX ADMIN — FENTANYL CITRATE 52.5 MICROGRAM(S)/KG/HR: 50 INJECTION INTRAVENOUS at 12:06

## 2018-11-26 RX ADMIN — FENTANYL CITRATE 52.5 MICROGRAM(S)/KG/HR: 50 INJECTION INTRAVENOUS at 07:37

## 2018-11-26 RX ADMIN — FENTANYL CITRATE 52.5 MICROGRAM(S)/KG/HR: 50 INJECTION INTRAVENOUS at 17:08

## 2018-11-26 RX ADMIN — PROPOFOL 22.5 MICROGRAM(S)/KG/MIN: 10 INJECTION, EMULSION INTRAVENOUS at 07:37

## 2018-11-26 RX ADMIN — PROPOFOL 22.5 MICROGRAM(S)/KG/MIN: 10 INJECTION, EMULSION INTRAVENOUS at 11:54

## 2018-11-26 RX ADMIN — PROPOFOL 22.5 MICROGRAM(S)/KG/MIN: 10 INJECTION, EMULSION INTRAVENOUS at 03:11

## 2018-11-26 RX ADMIN — Medication 75 MILLIGRAM(S): at 17:08

## 2018-11-26 RX ADMIN — PROPOFOL 22.5 MICROGRAM(S)/KG/MIN: 10 INJECTION, EMULSION INTRAVENOUS at 15:44

## 2018-11-26 RX ADMIN — CHLORHEXIDINE GLUCONATE 1 APPLICATION(S): 213 SOLUTION TOPICAL at 06:19

## 2018-11-26 NOTE — PROGRESS NOTE ADULT - SUBJECTIVE AND OBJECTIVE BOX
24 HOUR EVENTS: Family made decision to move towards comfort measures only, Pt now  DNR .    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 Also with difficulty breathing, s/p urgent tracheostomy on 11/20. Patient had bleeding from sinus tumor this morning, packed and controlled by ENT. Overall bleeding, CBC stable but given critical airway, SICU re-consulted for close monitoring of bleed & airway.    Of note, wife reports pt does not have h/o CAD/MI/stent, had prior lobectomy and partial diaphragmatic dysfunction.      SUBJECTIVE/ROS:  [ ] A ten-point review of systems was otherwise negative except as noted.  [x ] Due to altered mental status/intubation, subjective information were not able to be obtained from the patient. History was obtained, to the extent possible, from review of the chart and collateral sources of information.    NEURO  RASS:-2   Exam: lightly sedated , reacts to voice with eye opening  Meds: fentaNYL   Infusion. 7 MICROgram(s)/kG/Hr IV Continuous <Continuous>  pregabalin 75 milliGRAM(s) Oral two times a day  propofol Infusion 50 MICROgram(s)/kG/Min IV Continuous <Continuous>    [x] Adequacy of sedation and pain control has been assessed and adjusted    RESPIRATORY  RR: 20 (11-25-18 @ 20:00) (16 - 20)  SpO2: 97% (11-25-18 @ 22:32) (95% - 100%)  Exam: Trach in place on ventilator. Packing in oropharynx saturated with serosanguinous fluid  Mechanical Ventilation: Mode: AC/ CMV (Assist Control/ Continuous Mandatory Ventilation), RR (machine): 20, RR (patient): 20, TV (machine): 500, FiO2: 40, PEEP: 10, MAP: 15, PIP: 23  ABG - ( 25 Nov 2018 04:35 )  pH: 7.47  /  pCO2: 46    /  pO2: 96    / HCO3: 32    / Base Excess: 9.0   /  SaO2: 97.8    Lactate: 0.6        CARDIOVASCULAR  HR: 84 (11-25-18 @ 22:32) (72 - 97)  BP: 91/54 (11-25-18 @ 20:00) (83/50 - 106/59)  BP(mean): 63 (11-25-18 @ 20:00) (57 - 71)      Exam: regular rate and rhythm  Cardiac Rhythm: sinus  Perfusion     [x]Adequate   [ ]Inadequate  Mentation   []Normal       [X ]Reduced  Extremities  [x]Warm         [ ]Cool  Volume Status [ ]Hypervolemic [x]Euvolemic [ ]Hypovolemic  Meds:     GI/NUTRITION  Exam: soft, nontender, nondistended . PEG in place  Diet: NPO with TF  Meds: loperamide Solution 2 milliGRAM(s) Oral daily PRN Diarrhea      GENITOURINARY  I&O's Detail    11-24 @ 07:01  -  11-25 @ 07:00  --------------------------------------------------------  IN:    Enteral Tube Flush: 135 mL    fentaNYL Infusion.: 1171.2 mL    IV PiggyBack: 650 mL    Packed Red Blood Cells: 300 mL    Pivot: 1120 mL    propofol Infusion: 522.1 mL  Total IN: 3898.3 mL    OUT:    Indwelling Catheter - Urethral: 1530 mL  Total OUT: 1530 mL    Total NET: 2368.3 mL      11-25 @ 07:01  -  11-26 @ 00:04  --------------------------------------------------------  IN:    Enteral Tube Flush: 60 mL    fentaNYL Infusion.: 442.9 mL    fentaNYL Infusion.: 367.5 mL    Pivot: 1120 mL    propofol Infusion: 360 mL  Total IN: 2350.4 mL    OUT:    Indwelling Catheter - Urethral: 740 mL  Total OUT: 740 mL    Total NET: 1610.4 mL          11-25    134<L>  |  95<L>  |  14  ----------------------------<  94  4.0   |  31  |  0.50    Ca    8.0<L>      25 Nov 2018 03:20  Phos  2.8     11-25  Mg     1.9     11-25      [x ] Andrade catheter, indication: N/A  Meds: sodium chloride 0.9% lock flush 3 milliLiter(s) IV Push every 8 hours      HEMATOLOGIC  Meds:   [x] VTE Prophylaxis                        7.9    6.91  )-----------( 244      ( 25 Nov 2018 03:20 )             24.5       Transfusion     [ ] PRBC   [ ] Platelets   [ ] FFP   [ ] Cryoprecipitate    INFECTIOUS DISEASES  WBC Count: 6.91 K/uL (11-25 @ 03:20)    RECENT CULTURES:  Specimen Source: BLOOD  Date/Time: 11-23 @ 21:54  Culture Results: --  Gram Stain: --  Organism: --  Specimen Source: BLOOD VENOUS  Date/Time: 11-23 @ 19:52  Culture Results: --  Gram Stain: --  Organism: --    Meds:     ENDOCRINE  CAPILLARY BLOOD GLUCOSE        Meds:     ACCESS DEVICES:  [x ] Peripheral IV  [ ] Central Venous Line	[ ] R	[ ] L	[ ] IJ	[ ] Fem	[ ] SC	Placed:   [ ] Arterial Line		[ ] R	[ ] L	[ ] Fem	[ ] Rad	[ ] Ax	Placed:   [ ] PICC:					[ ] Mediport  [x ] Urinary Catheter, Date Placed: 11/21/18  [x] Necessity of urinary, arterial, and venous catheters discussed    OTHER MEDICATIONS:  chlorhexidine 4% Liquid 1 Application(s) Topical <User Schedule>      CODE STATUS:  Yes      IMAGING: 24 HOUR EVENTS: Family made decision to move towards comfort measures only, Pt now  DNR . Most recent BCx growing coag negative staph.     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 Also with difficulty breathing, s/p urgent tracheostomy on 11/20. Patient had bleeding from sinus tumor this morning, packed and controlled by ENT. Overall bleeding, CBC stable but given critical airway, SICU re-consulted for close monitoring of bleed & airway.    Of note, wife reports pt does not have h/o CAD/MI/stent, had prior lobectomy and partial diaphragmatic dysfunction.      SUBJECTIVE/ROS:  [ ] A ten-point review of systems was otherwise negative except as noted.  [x ] Due to altered mental status/intubation, subjective information were not able to be obtained from the patient. History was obtained, to the extent possible, from review of the chart and collateral sources of information.    NEURO  RASS:-2   Exam: lightly sedated , reacts to voice with eye opening  Meds: fentaNYL   Infusion. 7 MICROgram(s)/kG/Hr IV Continuous <Continuous>  pregabalin 75 milliGRAM(s) Oral two times a day  propofol Infusion 50 MICROgram(s)/kG/Min IV Continuous <Continuous>    [x] Adequacy of sedation and pain control has been assessed and adjusted    RESPIRATORY  RR: 20 (11-25-18 @ 20:00) (16 - 20)  SpO2: 97% (11-25-18 @ 22:32) (95% - 100%)  Exam: Trach in place on ventilator. Packing in oropharynx saturated with serosanguinous fluid  Mechanical Ventilation: Mode: AC/ CMV (Assist Control/ Continuous Mandatory Ventilation), RR (machine): 20, RR (patient): 20, TV (machine): 500, FiO2: 40, PEEP: 10, MAP: 15, PIP: 23  ABG - ( 25 Nov 2018 04:35 )  pH: 7.47  /  pCO2: 46    /  pO2: 96    / HCO3: 32    / Base Excess: 9.0   /  SaO2: 97.8    Lactate: 0.6        CARDIOVASCULAR  HR: 84 (11-25-18 @ 22:32) (72 - 97)  BP: 91/54 (11-25-18 @ 20:00) (83/50 - 106/59)  BP(mean): 63 (11-25-18 @ 20:00) (57 - 71)      Exam: regular rate and rhythm  Cardiac Rhythm: sinus  Perfusion     [x]Adequate   [ ]Inadequate  Mentation   []Normal       [X ]Reduced  Extremities  [x]Warm         [ ]Cool  Volume Status [ ]Hypervolemic [x]Euvolemic [ ]Hypovolemic  Meds:     GI/NUTRITION  Exam: soft, nontender, nondistended . PEG in place  Diet: NPO with TF  Meds: loperamide Solution 2 milliGRAM(s) Oral daily PRN Diarrhea      GENITOURINARY  I&O's Detail    11-24 @ 07:01  -  11-25 @ 07:00  --------------------------------------------------------  IN:    Enteral Tube Flush: 135 mL    fentaNYL Infusion.: 1171.2 mL    IV PiggyBack: 650 mL    Packed Red Blood Cells: 300 mL    Pivot: 1120 mL    propofol Infusion: 522.1 mL  Total IN: 3898.3 mL    OUT:    Indwelling Catheter - Urethral: 1530 mL  Total OUT: 1530 mL    Total NET: 2368.3 mL      11-25 @ 07:01  -  11-26 @ 00:04  --------------------------------------------------------  IN:    Enteral Tube Flush: 60 mL    fentaNYL Infusion.: 442.9 mL    fentaNYL Infusion.: 367.5 mL    Pivot: 1120 mL    propofol Infusion: 360 mL  Total IN: 2350.4 mL    OUT:    Indwelling Catheter - Urethral: 740 mL  Total OUT: 740 mL    Total NET: 1610.4 mL          11-25    134<L>  |  95<L>  |  14  ----------------------------<  94  4.0   |  31  |  0.50    Ca    8.0<L>      25 Nov 2018 03:20  Phos  2.8     11-25  Mg     1.9     11-25      [x ] Andrade catheter, indication: N/A  Meds: sodium chloride 0.9% lock flush 3 milliLiter(s) IV Push every 8 hours      HEMATOLOGIC  Meds:   [x] VTE Prophylaxis                        7.9    6.91  )-----------( 244      ( 25 Nov 2018 03:20 )             24.5       Transfusion     [ ] PRBC   [ ] Platelets   [ ] FFP   [ ] Cryoprecipitate    INFECTIOUS DISEASES  WBC Count: 6.91 K/uL (11-25 @ 03:20)    RECENT CULTURES:  Specimen Source: BLOOD  Date/Time: 11-23 @ 21:54  Culture Results: --  Gram Stain: --  Organism: --  Specimen Source: BLOOD VENOUS  Date/Time: 11-23 @ 19:52  Culture Results: --  Gram Stain: --  Organism: --    Meds:     ENDOCRINE  CAPILLARY BLOOD GLUCOSE        Meds:     ACCESS DEVICES:  [x ] Peripheral IV  [ ] Central Venous Line	[ ] R	[ ] L	[ ] IJ	[ ] Fem	[ ] SC	Placed:   [ ] Arterial Line		[ ] R	[ ] L	[ ] Fem	[ ] Rad	[ ] Ax	Placed:   [ ] PICC:					[ ] Mediport  [x ] Urinary Catheter, Date Placed: 11/21/18  [x] Necessity of urinary, arterial, and venous catheters discussed    OTHER MEDICATIONS:  chlorhexidine 4% Liquid 1 Application(s) Topical <User Schedule>      CODE STATUS:  Yes      IMAGING:

## 2018-11-26 NOTE — PROGRESS NOTE ADULT - PROBLEM SELECTOR PLAN 4
Extensive discussion with patient's wife and children at bedside.  Family having a difficult time grasping the lack of options for treatment.  They are still requesting a second opinion prior attempts at transfer/second opinion concurred with current management per SICU team aware.    They are aware that currently pt is DNR, no further blood draws, still conflicting with current plan.   Psychosocial support provided.  Will continue to follow up for symptom management and goals of care.

## 2018-11-26 NOTE — PROGRESS NOTE ADULT - SUBJECTIVE AND OBJECTIVE BOX
INTERVAL HPI/OVERNIGHT EVENTS:  Patient sedated on ventilator support in SICU     Code Status: DNR  Allergies    hospital socks (Rash)  lisinopril (Anaphylaxis)  statins (Anaphylaxis)    Intolerances    MEDICATIONS  (STANDING):  carvedilol 6.25 milliGRAM(s) Oral every 12 hours  ceFAZolin   IVPB 2000 milliGRAM(s) IV Intermittent every 8 hours  ceFAZolin   IVPB      chlorhexidine 4% Liquid 1 Application(s) Topical <User Schedule>  fentaNYL   Infusion. 6 MICROgram(s)/kG/Hr (45 mL/Hr) IV Continuous <Continuous>  pantoprazole  Injectable 40 milliGRAM(s) IV Push daily  pregabalin 75 milliGRAM(s) Oral two times a day  propofol Infusion 50 MICROgram(s)/kG/Min (22.5 mL/Hr) IV Continuous <Continuous>  sodium chloride 1 Gram(s) Oral three times a day  sodium chloride 0.9% lock flush 3 milliLiter(s) IV Push every 8 hours    MEDICATIONS  (PRN):  HYDROmorphone  Injectable 2 milliGRAM(s) IV Push every 3 hours PRN Moderate Pain (4 - 6)  loperamide Solution 2 milliGRAM(s) Oral daily PRN Diarrhea    PRESENT SYMPTOMS: [x ]Unable to obtain due to poor mentation - sedated to vent   Source if other than patient:  [ ]Family   [ ]Team     Pain (Impact on QOL):    Location:  Severity:  Minimal acceptable level (0-10 scale):       Quality:       Onset:  Duration:  Aggravating factors:  Relieving Factors  Radiation:    Dyspnea:  Yes [ ] No [ ] - [ ]Mild [ ]Moderate [ ]Severe  Anxiety:    Yes [ ] No [ ] - [ ]Mild [ ]Moderate [ ]Severe  Fatigue:    Yes [ ] No [ ] - [ ]Mild [ ]Moderate [ ]Severe  Nausea:    Yes [ ] No [ ] - [ ]Mild [ ]Moderate [ ]Severe                         Loss of appetite: Yes [ ] No [ ] - [ ]Mild [ ]Moderate [ ]Severe             Constipation:  Yes [ ] No [ ] - [ ]Mild [ ]Moderate [ ]Severe    PAIN AD Score:	  [email or web address].[email or web address]/painad.pdf (Ctrl + left click to view)    Other Symptoms:  [ ]All other review of systems negative     Karnofsky Performance Score/Palliative Performance Status Version 2:   30%    http://palliative.info/resource_material/PPSv2.pdf    PHYSICAL EXAM:  Vital Signs Last 24 Hrs  T(C): 37.3 (23 Nov 2018 12:00), Max: 37.8 (23 Nov 2018 08:00)  T(F): 99.2 (23 Nov 2018 12:00), Max: 100.1 (23 Nov 2018 08:00)  HR: 103 (23 Nov 2018 13:00) (85 - 103)  BP: 115/72 (23 Nov 2018 13:00) (91/56 - 121/85)  BP(mean): 83 (23 Nov 2018 13:00) (65 - 90)  RR: 20 (23 Nov 2018 13:00) (13 - 20)  SpO2: 100% (23 Nov 2018 13:00) (99% - 100%) I&O's Summary    22 Nov 2018 07:01  -  23 Nov 2018 07:00  --------------------------------------------------------  IN: 2021.3 mL / OUT: 1095 mL / NET: 926.3 mL    23 Nov 2018 07:01  -  23 Nov 2018 13:56  --------------------------------------------------------  IN: 971.9 mL / OUT: 315 mL / NET: 656.9 mL    GENERAL:  Sedated on vent support  HEENT:  [ ]Normal   [ ]Dry mouth   [ x]ET Tube/Trach  [x ]Oral lesions mass with packing.   PULMONARY:   [x ]Clear anteriorly   [ ]Rhonchi        [ ]Right [ ]Left [ ]Bilateral  [ ]Crackles        [ ]Right [ ]Left [ ]Bilateral  [ ]Wheezing     [ ]Right [ ]Left [ ]Bilateral  CARDIOVASCULAR:    [x ]Regular [ ]Irregular [ ]Tachy  [ ]Jimmie [ ]Murmur [ ]Other  GASTROINTESTINAL:  [x ]Soft  [ ]Distended   [x ]+BS  [x ]Non tender [ ]Tender  [ ]PEG [ ]OGT/ NGT   Last BM: 11/24     GENITOURINARY:  [ ]Normal [ ] Incontinent   [ ]Oliguria/Anuria   [ x]Andrade  MUSCULOSKELETAL:   [ ]Normal   [ ]Weakness  [x ]Bed/Wheelchair bound [ ]Edema  NEUROLOGIC:   Sedated   SKIN:   [x ]Normal   [ ]Pressure ulcer(s)  [ ]Rash    CRITICAL CARE:  [ ] Shock Present  [ ]Septic [ ]Cardiogenic [ ]Neurologic [ ]Hypovolemic  [ ]  Vasopressors [ ]  Inotropes   [ ] Respiratory failure present  [ ] Acute  [ ] Chronic [ ] Hypoxic  [ ] Hypercarbic [ ] Other  [ ] Other organ failure     LABS:                        7.8    8.54  )-----------( 278      ( 23 Nov 2018 02:30 )             24.3   11-23    132<L>  |  98  |  11  ----------------------------<  99  3.6   |  24  |  0.60    Ca    8.3<L>      23 Nov 2018 02:30  Phos  3.4     11-23  Mg     1.6     11-23    TPro  6.7  /  Alb  3.0<L>  /  TBili  0.8  /  DBili  x   /  AST  21  /  ALT  22  /  AlkPhos  313<H>  11-21  PT/INR - ( 23 Nov 2018 02:30 )   PT: 15.7 SEC;   INR: 1.36        PTT - ( 23 Nov 2018 02:30 )  PTT:35.1 SEC    RADIOLOGY & ADDITIONAL STUDIES: reviewed     Protein Calorie Malnutrition Present: [ ] yes [ ] no  [ ] PPSV2 < or = 30%  [ ] significant weight loss [ ] poor nutritional intake [ ] anasarca [ ] catabolic state Albumin, Serum: 3.0 g/dL (11-21-18 @ 17:38)    REFERRALS:   [ ]Chaplaincy  [ ] Hospice  [ ]Child Life  [ ]Social Work  [ ]Case management [ ]Holistic Therapy   Goals of Care Document:

## 2018-11-26 NOTE — PROGRESS NOTE ADULT - ASSESSMENT
57 y/o M with Hx CHF (EF 40-45%), squamous cell carcinoma of maxillary sinus presenting 11/19 increased WOB, now s/p tracheostomy 11/20. Was recovering well but began to have recurrence of oral bleeding this AM in setting of critical airway. SICU consulted and accepted pt to unit.    Neuro: Post operative pain, Anxiety  - Palliative care on board - will assist w/ pain management, GOC  - pt on fentanyl, propofol gtt, dilaudid PRN for sedation and pain control    - Pregabalin for neuropathic pain    Resp: s/p Trach  - Airway packed by ENT 11/25, will leave packing in place no further plan for removal given persistent bleeding   - Will continue with vent , hold off on trach collar to avoid increased work of breathing  - no daily CXR or ABG    CV: Hx HTN  - Discontinued antihypertensives, only comfort measures    GI:   - c/w TF: Pivot @70  - Loperamide PRN for diarrhea    Renal:  - c/w Andrade  - no daily labs    Heme:   - SCDs for now, hold off chemical DVT ppx  - no daily CBC    ID:   -Tylenol PRN for fevers      Endo:  - no issues    GOC: DNR, comfort care    TRINITY Husain PGY-2  SICU 57 y/o M with Hx CHF (EF 40-45%), squamous cell carcinoma of maxillary sinus presenting 11/19 increased WOB, now s/p tracheostomy 11/20. Was recovering well but began to have recurrence of oral bleeding this AM in setting of critical airway. SICU consulted and accepted pt to unit.    Neuro: Post operative pain, Anxiety  - pt on fentanyl, propofol gtt, dilaudid PRN for sedation and pain control    - Pregabalin for neuropathic pain  - Palliative care on board - will assist w/ pain management, GOC    Resp: s/p Trach  - No more daily CXR, last 11/25: improving intraperitoneal air, R atelectasis  - Airway packed by ENT 11/25, will leave packing in place no further plan for removal given persistent bleeding   - Will continue with vent , hold off on trach collar to avoid increased work of breathing  - no daily CXR or ABG    CV: Hx HTN  - Discontinued antihypertensives, only comfort measures    GI:   - c/w TF: Pivot @70 by PEG (placed 10/31)  - Loperamide PRN for diarrhea    Renal:  - c/w Andrade  - no daily labs since 11/25    Heme:   - SCDs for now, hold off chemical DVT ppx  - no daily CBC since 11/25    ID:   -Tylenol PRN for fevers      Endo:  - no issues    GOC: DNR, comfort care    TRINITY Husain PGY-2  SICU 57 y/o M with Hx CHF (EF 40-45%), squamous cell carcinoma of maxillary sinus presenting 11/19 increased WOB, now s/p tracheostomy 11/20. Was recovering well but began to have recurrence of oral bleeding in setting of critical airway. SICU consulted and accepted pt to unit.    Neuro: Post operative pain, Anxiety  - pt on fentanyl, propofol gtt, dilaudid PRN for sedation and pain control - consider fentanyl patch  - Pregabalin for neuropathic pain  - Palliative care on board - will assist w/ pain management, GOC    Resp: s/p Trach  - No more daily CXR, last 11/25: improving intraperitoneal air, R atelectasis  - Airway packed by ENT 11/25, will leave packing in place no further plan for removal given persistent bleeding   - For trach collar trial  - no daily CXR or ABG    CV: Hx HTN  - Discontinued antihypertensives, only comfort measures    GI:   - c/w TF: Pivot @70 by PEG (placed 10/31)  - Loperamide PRN for diarrhea    Renal:  - c/w Andrade  - no daily labs since 11/25    Heme:   - SCDs for now, hold off chemical DVT ppx  - no daily CBC since 11/25    ID:   -Tylenol PRN for fevers    Endo:  - no issues    GOC: DNR, comfort care    Dispo: consider transfer to advanced illness bed 57 y/o M with Hx CHF (EF 40-45%), squamous cell carcinoma of maxillary sinus presenting 11/19 increased WOB, now s/p tracheostomy 11/20. Was recovering well but began to have recurrence of oral bleeding in setting of critical airway. SICU consulted and accepted pt to unit.    Neuro: Post operative pain, Anxiety  - pt on fentanyl, propofol gtt, dilaudid PRN for sedation and pain control - consider fentanyl patch  - Pregabalin for neuropathic pain  - Palliative care on board - will assist w/ pain management, GOC    Resp: s/p Trach  - No more daily CXR, last 11/25: improving intraperitoneal air, R atelectasis  - Airway packed by ENT 11/25, will leave packing in place no further plan for removal given persistent bleeding   - For trach collar trial  - no daily CXR or ABG    CV: Hx HTN  - Discontinued antihypertensives, only comfort measures    GI:   - c/w TF: Pivot @70 by PEG (placed 10/31)  - Loperamide PRN for diarrhea    Renal:  - c/w Andrade  - no daily labs since 11/25    Heme:   - SCDs for now, hold off chemical DVT ppx  - no daily CBC since 11/25    ID:   -Tylenol PRN for fevers  - Most recent BCx growing coag negative staph, no abx    Endo:  - no issues    GOC: DNR, comfort care    Dispo: consider transfer to advanced illness bed

## 2018-11-26 NOTE — PROGRESS NOTE ADULT - PROBLEM SELECTOR PLAN 2
Patient sedated on propofol and fentanyl. Continue management per SICU.  Please page [phone] for any questions or concerns.    Given high dose opioids, would recommend initiating a bowel regimen. Last BM documented on 11/24.

## 2018-11-26 NOTE — PROGRESS NOTE ADULT - SUBJECTIVE AND OBJECTIVE BOX
Pt seen this AM.  No acute events ovenright, no bleeding with packing.   Wife requested 2nd packing placed on left side as well   Code status DNR and patient now on comfort measures only.     AVSS  NAD, awake and alert  6LPC trach in place with sutures, on TC  NC: mass, non bleeding  oc/op: kerlix x2 in place, no pooling or active bleeding  Neck soft and flat, stoma intact     A/P: s/p awake tracheotomy 11/19 for airway protection now w/ oral packing which has to remain in place due to continued bleeding from the tumor. Patient now DNR, comfort measures only.  -maintain oral packing in place, ancef for TTS ppx while packing remains in place  -DNR, comfort measures only   -NPO, PEG feeds  -routine trach care   -appreciate palliative recs  -SICU care Pt seen this AM.  No acute events ovenright, no bleeding with packing.   Wife requested 2nd packing placed on left side as well   Code status DNR and patient now on comfort measures only.     AVSS  NAD, awake and alert  6LPC trach in place with sutures, on TC  NC: mass, non bleeding  oc/op: kerlix x2 in place, no pooling or active bleeding  Neck soft and flat, stoma intact     A/P: s/p awake tracheotomy 11/19 for airway protection now w/ oral packing which has to remain in place due to continued bleeding from the tumor. Patient now DNR, comfort measures only.  -maintain oral packing in place, do NOT remove   -DNR, comfort measures only   -NPO, PEG feeds  -routine trach care   -appreciate palliative recs  -SICU care

## 2018-11-27 ENCOUNTER — APPOINTMENT (OUTPATIENT)
Age: 58
End: 2018-11-27

## 2018-11-27 LAB
-  COAGULASE NEGATIVE STAPHYLOCOCCUS: SIGNIFICANT CHANGE UP
BACTERIA BLD CULT: SIGNIFICANT CHANGE UP
BASE EXCESS BLDA CALC-SCNC: 5.7 MMOL/L — SIGNIFICANT CHANGE UP
BASE EXCESS BLDV CALC-SCNC: 9.1 MMOL/L — SIGNIFICANT CHANGE UP
BLOOD GAS VENOUS - CREATININE: < 0.36 MG/DL — LOW (ref 0.5–1.3)
BUN SERPL-MCNC: 14 MG/DL — SIGNIFICANT CHANGE UP (ref 7–23)
CA-I BLDA-SCNC: 1.15 MMOL/L — SIGNIFICANT CHANGE UP (ref 1.15–1.29)
CALCIUM SERPL-MCNC: 8.2 MG/DL — LOW (ref 8.4–10.5)
CHLORIDE BLDV-SCNC: 99 MMOL/L — SIGNIFICANT CHANGE UP (ref 96–108)
CHLORIDE SERPL-SCNC: 98 MMOL/L — SIGNIFICANT CHANGE UP (ref 98–107)
CK MB BLD-MCNC: 3.14 NG/ML — SIGNIFICANT CHANGE UP (ref 1–6.6)
CK SERPL-CCNC: 34 U/L — SIGNIFICANT CHANGE UP (ref 30–200)
CO2 SERPL-SCNC: 30 MMOL/L — SIGNIFICANT CHANGE UP (ref 22–31)
CREAT SERPL-MCNC: 0.46 MG/DL — LOW (ref 0.5–1.3)
GAS PNL BLDV: 135 MMOL/L — LOW (ref 136–146)
GLUCOSE BLDA-MCNC: 143 MG/DL — HIGH (ref 70–99)
GLUCOSE BLDV-MCNC: 115 — HIGH (ref 70–99)
GLUCOSE SERPL-MCNC: 113 MG/DL — HIGH (ref 70–99)
HCO3 BLDA-SCNC: 30 MMOL/L — HIGH (ref 22–26)
HCO3 BLDV-SCNC: 32 MMOL/L — HIGH (ref 20–27)
HCT VFR BLD CALC: 26 % — LOW (ref 39–50)
HCT VFR BLDA CALC: 31.7 % — LOW (ref 39–51)
HCT VFR BLDV CALC: 25.5 % — LOW (ref 39–51)
HGB BLD-MCNC: 8.2 G/DL — LOW (ref 13–17)
HGB BLDA-MCNC: 10.3 G/DL — LOW (ref 13–17)
HGB BLDV-MCNC: 8.2 G/DL — LOW (ref 13–17)
LACTATE BLDA-SCNC: 1.2 MMOL/L — SIGNIFICANT CHANGE UP (ref 0.5–2)
LACTATE BLDV-MCNC: 0.7 MMOL/L — SIGNIFICANT CHANGE UP (ref 0.5–2)
MAGNESIUM SERPL-MCNC: 1.5 MG/DL — LOW (ref 1.6–2.6)
MCHC RBC-ENTMCNC: 27.6 PG — SIGNIFICANT CHANGE UP (ref 27–34)
MCHC RBC-ENTMCNC: 31.5 % — LOW (ref 32–36)
MCV RBC AUTO: 87.5 FL — SIGNIFICANT CHANGE UP (ref 80–100)
NRBC # FLD: 0 — SIGNIFICANT CHANGE UP
ORGANISM # SPEC MICROSCOPIC CNT: SIGNIFICANT CHANGE UP
ORGANISM # SPEC MICROSCOPIC CNT: SIGNIFICANT CHANGE UP
PCO2 BLDA: 41 MMHG — SIGNIFICANT CHANGE UP (ref 35–48)
PCO2 BLDV: 57 MMHG — HIGH (ref 41–51)
PH BLDA: 7.47 PH — HIGH (ref 7.35–7.45)
PH BLDV: 7.4 PH — SIGNIFICANT CHANGE UP (ref 7.32–7.43)
PHOSPHATE SERPL-MCNC: 3.5 MG/DL — SIGNIFICANT CHANGE UP (ref 2.5–4.5)
PLATELET # BLD AUTO: 322 K/UL — SIGNIFICANT CHANGE UP (ref 150–400)
PMV BLD: 9.4 FL — SIGNIFICANT CHANGE UP (ref 7–13)
PO2 BLDA: 207 MMHG — HIGH (ref 83–108)
PO2 BLDV: 51 MMHG — HIGH (ref 35–40)
POTASSIUM BLDA-SCNC: 4 MMOL/L — SIGNIFICANT CHANGE UP (ref 3.4–4.5)
POTASSIUM BLDV-SCNC: 3.4 MMOL/L — SIGNIFICANT CHANGE UP (ref 3.4–4.5)
POTASSIUM SERPL-MCNC: 3.6 MMOL/L — SIGNIFICANT CHANGE UP (ref 3.5–5.3)
POTASSIUM SERPL-SCNC: 3.6 MMOL/L — SIGNIFICANT CHANGE UP (ref 3.5–5.3)
RBC # BLD: 2.97 M/UL — LOW (ref 4.2–5.8)
RBC # FLD: 15.5 % — HIGH (ref 10.3–14.5)
SAO2 % BLDA: 99.5 % — HIGH (ref 95–99)
SAO2 % BLDV: 82.1 % — SIGNIFICANT CHANGE UP (ref 60–85)
SODIUM BLDA-SCNC: 132 MMOL/L — LOW (ref 136–146)
SODIUM SERPL-SCNC: 137 MMOL/L — SIGNIFICANT CHANGE UP (ref 135–145)
TROPONIN T, HIGH SENSITIVITY: 177 NG/L — CRITICAL HIGH (ref ?–14)
TROPONIN T, HIGH SENSITIVITY: 211 NG/L — CRITICAL HIGH (ref ?–14)
WBC # BLD: 5.2 K/UL — SIGNIFICANT CHANGE UP (ref 3.8–10.5)
WBC # FLD AUTO: 5.2 K/UL — SIGNIFICANT CHANGE UP (ref 3.8–10.5)

## 2018-11-27 PROCEDURE — 99292 CRITICAL CARE ADDL 30 MIN: CPT

## 2018-11-27 PROCEDURE — 71045 X-RAY EXAM CHEST 1 VIEW: CPT | Mod: 26,76

## 2018-11-27 PROCEDURE — 99233 SBSQ HOSP IP/OBS HIGH 50: CPT | Mod: GC

## 2018-11-27 PROCEDURE — 93010 ELECTROCARDIOGRAM REPORT: CPT

## 2018-11-27 PROCEDURE — 99291 CRITICAL CARE FIRST HOUR: CPT

## 2018-11-27 RX ORDER — LABETALOL HCL 100 MG
5 TABLET ORAL EVERY 6 HOURS
Qty: 0 | Refills: 0 | Status: DISCONTINUED | OUTPATIENT
Start: 2018-11-27 | End: 2018-11-28

## 2018-11-27 RX ORDER — OLANZAPINE 15 MG/1
5 TABLET, FILM COATED ORAL DAILY
Qty: 0 | Refills: 0 | Status: DISCONTINUED | OUTPATIENT
Start: 2018-11-27 | End: 2018-12-07

## 2018-11-27 RX ORDER — METHADONE HYDROCHLORIDE 40 MG/1
10 TABLET ORAL EVERY 8 HOURS
Qty: 0 | Refills: 0 | Status: DISCONTINUED | OUTPATIENT
Start: 2018-11-27 | End: 2018-11-28

## 2018-11-27 RX ORDER — PIPERACILLIN AND TAZOBACTAM 4; .5 G/20ML; G/20ML
3.38 INJECTION, POWDER, LYOPHILIZED, FOR SOLUTION INTRAVENOUS EVERY 8 HOURS
Qty: 0 | Refills: 0 | Status: DISCONTINUED | OUTPATIENT
Start: 2018-11-27 | End: 2018-12-01

## 2018-11-27 RX ORDER — DOCUSATE SODIUM 100 MG
100 CAPSULE ORAL
Qty: 0 | Refills: 0 | Status: DISCONTINUED | OUTPATIENT
Start: 2018-11-27 | End: 2018-11-30

## 2018-11-27 RX ORDER — ONDANSETRON 8 MG/1
4 TABLET, FILM COATED ORAL ONCE
Qty: 0 | Refills: 0 | Status: DISCONTINUED | OUTPATIENT
Start: 2018-11-27 | End: 2018-11-27

## 2018-11-27 RX ORDER — SODIUM CHLORIDE 9 MG/ML
1000 INJECTION INTRAMUSCULAR; INTRAVENOUS; SUBCUTANEOUS ONCE
Qty: 0 | Refills: 0 | Status: COMPLETED | OUTPATIENT
Start: 2018-11-27 | End: 2018-11-27

## 2018-11-27 RX ORDER — ACETAMINOPHEN 500 MG
1000 TABLET ORAL ONCE
Qty: 0 | Refills: 0 | Status: COMPLETED | OUTPATIENT
Start: 2018-11-27 | End: 2018-11-27

## 2018-11-27 RX ORDER — ONDANSETRON 8 MG/1
4 TABLET, FILM COATED ORAL ONCE
Qty: 0 | Refills: 0 | Status: COMPLETED | OUTPATIENT
Start: 2018-11-27 | End: 2018-11-27

## 2018-11-27 RX ORDER — DOCUSATE SODIUM 100 MG
100 CAPSULE ORAL
Qty: 0 | Refills: 0 | Status: DISCONTINUED | OUTPATIENT
Start: 2018-11-27 | End: 2018-11-27

## 2018-11-27 RX ORDER — ACETAMINOPHEN 500 MG
1000 TABLET ORAL ONCE
Qty: 0 | Refills: 0 | Status: DISCONTINUED | OUTPATIENT
Start: 2018-11-27 | End: 2018-11-28

## 2018-11-27 RX ORDER — POLYETHYLENE GLYCOL 3350 17 G/17G
17 POWDER, FOR SOLUTION ORAL DAILY
Qty: 0 | Refills: 0 | Status: DISCONTINUED | OUTPATIENT
Start: 2018-11-27 | End: 2018-11-30

## 2018-11-27 RX ORDER — PANTOPRAZOLE SODIUM 20 MG/1
40 TABLET, DELAYED RELEASE ORAL DAILY
Qty: 0 | Refills: 0 | Status: DISCONTINUED | OUTPATIENT
Start: 2018-11-27 | End: 2018-12-07

## 2018-11-27 RX ORDER — VANCOMYCIN HCL 1 G
1000 VIAL (EA) INTRAVENOUS EVERY 12 HOURS
Qty: 0 | Refills: 0 | Status: DISCONTINUED | OUTPATIENT
Start: 2018-11-27 | End: 2018-11-29

## 2018-11-27 RX ORDER — CARVEDILOL PHOSPHATE 80 MG/1
6.25 CAPSULE, EXTENDED RELEASE ORAL EVERY 12 HOURS
Qty: 0 | Refills: 0 | Status: DISCONTINUED | OUTPATIENT
Start: 2018-11-27 | End: 2018-11-28

## 2018-11-27 RX ORDER — MAGNESIUM SULFATE 500 MG/ML
2 VIAL (ML) INJECTION ONCE
Qty: 0 | Refills: 0 | Status: COMPLETED | OUTPATIENT
Start: 2018-11-27 | End: 2018-11-27

## 2018-11-27 RX ADMIN — METHADONE HYDROCHLORIDE 10 MILLIGRAM(S): 40 TABLET ORAL at 12:57

## 2018-11-27 RX ADMIN — PIPERACILLIN AND TAZOBACTAM 25 GRAM(S): 4; .5 INJECTION, POWDER, LYOPHILIZED, FOR SOLUTION INTRAVENOUS at 22:40

## 2018-11-27 RX ADMIN — Medication 75 MILLIGRAM(S): at 05:31

## 2018-11-27 RX ADMIN — PROPOFOL 22.5 MICROGRAM(S)/KG/MIN: 10 INJECTION, EMULSION INTRAVENOUS at 19:00

## 2018-11-27 RX ADMIN — FENTANYL CITRATE 52.5 MICROGRAM(S)/KG/HR: 50 INJECTION INTRAVENOUS at 07:45

## 2018-11-27 RX ADMIN — SODIUM CHLORIDE 1000 MILLILITER(S): 9 INJECTION INTRAMUSCULAR; INTRAVENOUS; SUBCUTANEOUS at 23:03

## 2018-11-27 RX ADMIN — Medication 50 GRAM(S): at 17:06

## 2018-11-27 RX ADMIN — OLANZAPINE 5 MILLIGRAM(S): 15 TABLET, FILM COATED ORAL at 12:59

## 2018-11-27 RX ADMIN — CHLORHEXIDINE GLUCONATE 1 APPLICATION(S): 213 SOLUTION TOPICAL at 05:00

## 2018-11-27 RX ADMIN — CARVEDILOL PHOSPHATE 6.25 MILLIGRAM(S): 80 CAPSULE, EXTENDED RELEASE ORAL at 12:59

## 2018-11-27 RX ADMIN — FENTANYL CITRATE 52.5 MICROGRAM(S)/KG/HR: 50 INJECTION INTRAVENOUS at 17:14

## 2018-11-27 RX ADMIN — PROPOFOL 22.5 MICROGRAM(S)/KG/MIN: 10 INJECTION, EMULSION INTRAVENOUS at 07:46

## 2018-11-27 RX ADMIN — FENTANYL CITRATE 52.5 MICROGRAM(S)/KG/HR: 50 INJECTION INTRAVENOUS at 12:15

## 2018-11-27 RX ADMIN — Medication 100 MILLIGRAM(S): at 12:58

## 2018-11-27 RX ADMIN — PROPOFOL 22.5 MICROGRAM(S)/KG/MIN: 10 INJECTION, EMULSION INTRAVENOUS at 10:18

## 2018-11-27 RX ADMIN — PANTOPRAZOLE SODIUM 40 MILLIGRAM(S): 20 TABLET, DELAYED RELEASE ORAL at 12:58

## 2018-11-27 RX ADMIN — ONDANSETRON 4 MILLIGRAM(S): 8 TABLET, FILM COATED ORAL at 16:50

## 2018-11-27 RX ADMIN — Medication 400 MILLIGRAM(S): at 18:50

## 2018-11-27 RX ADMIN — Medication 250 MILLIGRAM(S): at 20:08

## 2018-11-27 NOTE — PROGRESS NOTE ADULT - PROBLEM SELECTOR PLAN 4
Patient currently DNR - focus on comfort.  Plan to wean down sedation and Fentanyl with initiation of Methadone. Psychosocial support provided.  Will continue to follow up for symptom management and goals of care.

## 2018-11-27 NOTE — PROGRESS NOTE ADULT - PROBLEM SELECTOR PLAN 2
Patient currently on Fentanyl, Propofol, and precedex.  When sedation lowered, patient complaining of pain to his mouth 10/10 despite high dose Fentanyl infusion. Given patient's current infusion rate of 525mcq/hr and continued pain, would recommend Methadone 10mg via PEG q8h - with goal for slow titration down of Fentanyl infusion (given Methadone's long half-life) and sedation.  (QTc 435 on 11/21/18) - Discussed with team and bedside RN.  Bowel regimen. Patient currently on Fentanyl, Propofol, and Precedex.  When sedation lowered, patient complaining of pain to his mouth 10/10 despite high dose Fentanyl infusion. Given patient's current infusion rate of 525mcq/hr and continued pain, would recommend Methadone 10mg via PEG q8h - with goal for slow titration down of Fentanyl infusion (given Methadone's long half-life) and sedation.  (QTc 435 on 11/21/18) - Discussed with team and bedside RN.  Bowel regimen.

## 2018-11-27 NOTE — PROGRESS NOTE ADULT - SUBJECTIVE AND OBJECTIVE BOX
Pt seen this AM.  No acute events overnight, no bleeding with packing.   Code status DNR and patient now on comfort measures only.   bl nares suctioned at bedside    AVSS  NAD, awake and alert  6LPC trach in place with sutures, on vent  NC: mass, non bleeding  oc/op: kerlix x2 in place, no pooling or active bleeding  Neck soft and flat, stoma intact     A/P: s/p awake tracheotomy 11/19 for airway protection now w/ oral packing which has to remain in place due to continued bleeding from the tumor. Patient now DNR, comfort measures only.  -maintain oral packing in place, do NOT remove   -DNR, comfort measures only   -NPO, PEG feeds  -routine trach care   -explained to wife that ent will not change oral packing unless there is active bleeding  -appreciate palliative recs  -SICU care

## 2018-11-27 NOTE — PROGRESS NOTE ADULT - SUBJECTIVE AND OBJECTIVE BOX
SICU Daily Progress Note  =====================================================  Interval/Overnight Events:       Family made decision to move towards comfort measures only, Pt now  DNR . Precedex gtt started overnight, plan to wean propofol today.      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 Also with difficulty breathing, s/p urgent tracheostomy on 11/20. Patient had bleeding from sinus tumor this morning, packed and controlled by ENT. Overall bleeding, CBC stable but given critical airway, SICU re-consulted for close monitoring of bleed & airway.    Of note, wife reports pt does not have h/o CAD/MI/stent, had prior lobectomy and partial diaphragmatic dysfunction.    MEDICATIONS:   --------------------------------------------------------------------------------------  Neurologic Medications  dexmedetomidine Infusion 0.2 MICROgram(s)/kG/Hr IV Continuous <Continuous>  dexmedetomidine Infusion 0.2 MICROgram(s)/kG/Hr IV Continuous <Continuous>  fentaNYL   Infusion. 7 MICROgram(s)/kG/Hr IV Continuous <Continuous>  pregabalin 75 milliGRAM(s) Oral two times a day  propofol Infusion 50 MICROgram(s)/kG/Min IV Continuous <Continuous>    Respiratory Medications    Cardiovascular Medications    Gastrointestinal Medications    Genitourinary Medications    Hematologic/Oncologic Medications    Antimicrobial/Immunologic Medications    Endocrine/Metabolic Medications    Topical/Other Medications  chlorhexidine 4% Liquid 1 Application(s) Topical <User Schedule>    --------------------------------------------------------------------------------------    VITAL SIGNS, INS/OUTS (last 24 hours):  --------------------------------------------------------------------------------------  ICU Vital Signs Last 24 Hrs  T(C): 37.4 (27 Nov 2018 04:00), Max: 37.4 (27 Nov 2018 04:00)  T(F): 99.3 (27 Nov 2018 04:00), Max: 99.3 (27 Nov 2018 04:00)  HR: 77 (27 Nov 2018 04:19) (70 - 89)  BP: 121/69 (27 Nov 2018 04:00) (93/60 - 121/69)  BP(mean): 82 (27 Nov 2018 04:00) (64 - 82)  ABP: --  ABP(mean): --  RR: 20 (27 Nov 2018 04:00) (18 - 20)  SpO2: 99% (27 Nov 2018 04:19) (98% - 100%)    --------------------------------------------------------------------------------------    EXAM  Neuro  Exam: awake, alert, oriented      RESPIRATORY  Exam: unlabored, clear to auscultation bilaterally      CARDIOVASCULAR      GI/NUTRITION  Exam: soft, nontender, nondistended, incision C/D/I      METABOLIC/FLUIDS/ELECTROLYTES      HEMATOLOGIC  [x] VTE Prophylaxis:   Transfusions:	[] PRBC	[] Platelets		[] FFP	[] Cryoprecipitate    INFECTIOUS DISEASE  Antimicrobials/Immunologic Medications:    Day #      of     ***    Tubes/Lines/Drains  ***  [x] Peripheral IV  [] Central Venous Line     	[] R	[] L	[] IJ	[] Fem	[] SC	Date Placed:   [] Arterial Line		[] R	[] L	[] Fem	[] Rad	[] Ax	Date Placed:   [] PICC		[] Midline		[] Mediport  [] Urinary Catheter		Date Placed:   [x] Necessity of urinary, arterial, and venous catheters discussed    LABS  --------------------------------------------------------------------------------------              -> BLOOD PERIPHERAL Culture (11-25 @ 03:55)       ***** CRITICAL RESULT *****  PERSON CALLED / READ-BACK: BOYD CARRILLO/Y  DATE / TIME CALLED: 11/26/18 0359  CALLED BY: KEILA HARO                *******************************                * This is an appended result. *                *******************************  A prior result that was reported as final has been changed.  GPCCL^Gram Pos Cocci In Clusters  AFTER: 24 HOURS INCUBATION  BOTTLE: ANAEROBIC BOTTLE    BLOOD CULTURE PCR  NG    -> BLOOD Culture (11-23 @ 21:54)     NG    NG  NG    -> BLOOD VENOUS Culture (11-23 @ 19:52)     NG    NG  NG      --------------------------------------------------------------------------------------    OTHER LABORATORY:     IMAGING STUDIES:   CXR:     ASSESSMENT:  57 y/o M with Hx CHF (EF 40-45%), squamous cell carcinoma of maxillary sinus presenting 11/19 increased WOB, now s/p tracheostomy 11/20. Was recovering well but began to have recurrence of oral bleeding in setting of critical airway. SICU consulted and accepted pt to unit.    Neuro: Post operative pain, Anxiety  - pt on fentanyl, propofol gtt, precedex gtt, dilaudid PRN for sedation and pain control, plan wean propofol today  - Pregabalin for neuropathic pain  - Palliative care on board - will assist w/ pain management, GOC    Resp: s/p Trach  - No more daily CXR, last 11/25: improving intraperitoneal air, R atelectasis  - Airway packed by ENT 11/25, will leave packing in place no further plan for removal given persistent bleeding   - For trach collar trial  - no daily CXR or ABG    CV: Hx HTN  - Discontinued antihypertensives, only comfort measures    GI:   - c/w TF: Pivot @70 by PEG (placed 10/31)  - Loperamide PRN for diarrhea    Renal:  - c/w Andrade  - no daily labs since 11/25    Heme:   - SCDs for now, hold off chemical DVT ppx  - no daily CBC since 11/25    ID:   -Tylenol PRN for fevers  - Most recent BCx growing coag negative staph, no abx    Endo:  - no issues    GOC: DNR, comfort care    Dispo: consider transfer to advanced illness bed      --------------------------------------------------------------------------------------    Critical Care Diagnoses:

## 2018-11-27 NOTE — PROGRESS NOTE ADULT - SUBJECTIVE AND OBJECTIVE BOX
INTERVAL HPI/OVERNIGHT EVENTS:  Patient DNR - reporting pain     Code Status: DNR  Allergies    hospital socks (Rash)  lisinopril (Anaphylaxis)  statins (Anaphylaxis)    Intolerances    MEDICATIONS  (STANDING):  carvedilol 6.25 milliGRAM(s) Oral every 12 hours  chlorhexidine 4% Liquid 1 Application(s) Topical <User Schedule>  dexmedetomidine Infusion 0.2 MICROgram(s)/kG/Hr (3.75 mL/Hr) IV Continuous <Continuous>  dexmedetomidine Infusion 0.2 MICROgram(s)/kG/Hr (3.75 mL/Hr) IV Continuous <Continuous>  docusate sodium Liquid 100 milliGRAM(s) Oral two times a day  fentaNYL   Infusion. 7 MICROgram(s)/kG/Hr (52.5 mL/Hr) IV Continuous <Continuous>  methadone   Solution 10 milliGRAM(s) Oral every 8 hours  OLANZapine 5 milliGRAM(s) Oral daily  pantoprazole   Suspension 40 milliGRAM(s) Oral daily  pregabalin 75 milliGRAM(s) Oral two times a day  propofol Infusion 50 MICROgram(s)/kG/Min (22.5 mL/Hr) IV Continuous <Continuous>    MEDICATIONS  (PRN):  polyethylene glycol 3350 17 Gram(s) Oral daily PRN Constipation      PRESENT SYMPTOMS: [x ]Unable to obtain due to poor mentation   Source if other than patient:  [ ]Family   [ ]Team     Pain (Impact on QOL):    Location:  Severity:  Minimal acceptable level (0-10 scale):       Quality:       Onset:  Duration:  Aggravating factors:  Relieving Factors  Radiation:    Dyspnea:  Yes [ ] No [ ] - [ ]Mild [ ]Moderate [ ]Severe  Anxiety:    Yes [ ] No [ ] - [ ]Mild [ ]Moderate [ ]Severe  Fatigue:    Yes [ ] No [ ] - [ ]Mild [ ]Moderate [ ]Severe  Nausea:    Yes [ ] No [ ] - [ ]Mild [ ]Moderate [ ]Severe                         Loss of appetite: Yes [ ] No [ ] - [ ]Mild [ ]Moderate [ ]Severe             Constipation:  Yes [ ] No [ ] - [ ]Mild [ ]Moderate [ ]Severe    PAIN AD Score:	  [email or web address]/[email or web address] (Ctrl + left click to view)    Other Symptoms:  [ ]All other review of systems negative     Karnofsky Performance Score/Palliative Performance Status Version 2:   30%    http://palliative.info/resource_material/PPSv2.pdf    PHYSICAL EXAM:  Vital Signs Last 24 Hrs  T(C): 38 (27 Nov 2018 16:00), Max: 38 (27 Nov 2018 16:00)  T(F): 100.4 (27 Nov 2018 16:00), Max: 100.4 (27 Nov 2018 16:00)  HR: 95 (27 Nov 2018 17:00) (70 - 95)  BP: 152/94 (27 Nov 2018 17:00) (97/55 - 152/94)  BP(mean): 108 (27 Nov 2018 17:00) (64 - 108)  RR: 20 (27 Nov 2018 17:00) (18 - 20)  SpO2: 99% (27 Nov 2018 17:00) (96% - 100%) I&O's Summary    26 Nov 2018 07:01  -  27 Nov 2018 07:00  --------------------------------------------------------  IN: 3077 mL / OUT: 2195 mL / NET: 882 mL    27 Nov 2018 07:01  -  27 Nov 2018 18:22  --------------------------------------------------------  IN: 1185.3 mL / OUT: 1095 mL / NET: 90.3 mL     GENERAL:  Sedated in the SICU   HEENT:  Intraoral packing in place   PULMONARY:   Trach to vent - clear anteriorly   CARDIOVASCULAR:    [ ]Regular [ ]Irregular [ ]Tachy  [ ]Jimmie [ ]Murmur [ ]Other  GASTROINTESTINAL:  [x ]Soft  [ ]Distended   [ x]+BS  [x ]Non tender [ ]Tender  [x ]PEG [ ]OGT/ NGT   Last BM: 11/25/18  GENITOURINARY:  Condom catheter in place   MUSCULOSKELETAL:   [ ]Normal   [ ]Weakness  [x ]Bed/Wheelchair bound [ ]Edema  NEUROLOGIC:   Sedated    SKIN:   Intraoral packing in place     CRITICAL CARE:  [ ] Shock Present  [ ]Septic [ ]Cardiogenic [ ]Neurologic [ ]Hypovolemic  [ ]  Vasopressors [ ]  Inotropes   [ ] Respiratory failure present  [ ] Acute  [ ] Chronic [ ] Hypoxic  [ ] Hypercarbic [ ] Other  [ ] Other organ failure     LABS:                        8.2    5.20  )-----------( 322      ( 27 Nov 2018 11:35 )             26.0   11-27    137  |  98  |  14  ----------------------------<  113<H>  3.6   |  30  |  0.46<L>    Ca    8.2<L>      27 Nov 2018 11:35  Phos  3.5     11-27  Mg     1.5     11-27          RADIOLOGY & ADDITIONAL STUDIES:    Protein Calorie Malnutrition Present: [ ] yes [ ] no  [ ] PPSV2 < or = 30%  [ ] significant weight loss [ ] poor nutritional intake [ ] anasarca [ ] catabolic state Albumin, Serum: 3.0 g/dL (11-21-18 @ 17:38)      REFERRALS:   [ ]Chaplaincy  [ ] Hospice  [ ]Child Life  [ ]Social Work  [ ]Case management [ ]Holistic Therapy   Goals of Care Document: INTERVAL HPI/OVERNIGHT EVENTS:      Palliative Care following for symptom management and GOC.   Patient DNR - reporting pain     Code Status: DNR  Allergies    hospital socks (Rash)  lisinopril (Anaphylaxis)  statins (Anaphylaxis)    Intolerances    MEDICATIONS  (STANDING):  carvedilol 6.25 milliGRAM(s) Oral every 12 hours  chlorhexidine 4% Liquid 1 Application(s) Topical <User Schedule>  dexmedetomidine Infusion 0.2 MICROgram(s)/kG/Hr (3.75 mL/Hr) IV Continuous <Continuous>  dexmedetomidine Infusion 0.2 MICROgram(s)/kG/Hr (3.75 mL/Hr) IV Continuous <Continuous>  docusate sodium Liquid 100 milliGRAM(s) Oral two times a day  fentaNYL   Infusion. 7 MICROgram(s)/kG/Hr (52.5 mL/Hr) IV Continuous <Continuous>  methadone   Solution 10 milliGRAM(s) Oral every 8 hours  OLANZapine 5 milliGRAM(s) Oral daily  pantoprazole   Suspension 40 milliGRAM(s) Oral daily  pregabalin 75 milliGRAM(s) Oral two times a day  propofol Infusion 50 MICROgram(s)/kG/Min (22.5 mL/Hr) IV Continuous <Continuous>    MEDICATIONS  (PRN):  polyethylene glycol 3350 17 Gram(s) Oral daily PRN Constipation      PRESENT SYMPTOMS: [x ]Unable to obtain due to poor mentation   Source if other than patient:  [ ]Family   [ ]Team     Pain (Impact on QOL):    Location:  Severity:  Minimal acceptable level (0-10 scale):       Quality:       Onset:  Duration:  Aggravating factors:  Relieving Factors  Radiation:    Dyspnea:  Yes [ ] No [ ] - [ ]Mild [ ]Moderate [ ]Severe  Anxiety:    Yes [ ] No [ ] - [ ]Mild [ ]Moderate [ ]Severe  Fatigue:    Yes [ ] No [ ] - [ ]Mild [ ]Moderate [ ]Severe  Nausea:    Yes [ ] No [ ] - [ ]Mild [ ]Moderate [ ]Severe                         Loss of appetite: Yes [ ] No [ ] - [ ]Mild [ ]Moderate [ ]Severe             Constipation:  Yes [ ] No [ ] - [ ]Mild [ ]Moderate [ ]Severe    PAIN AD Score:	  [email or web address]/[email or web address] (Ctrl + left click to view)    Other Symptoms:  [ ]All other review of systems negative     Karnofsky Performance Score/Palliative Performance Status Version 2:   30%    http://palliative.info/resource_material/PPSv2.pdf    PHYSICAL EXAM:  Vital Signs Last 24 Hrs  T(C): 38 (27 Nov 2018 16:00), Max: 38 (27 Nov 2018 16:00)  T(F): 100.4 (27 Nov 2018 16:00), Max: 100.4 (27 Nov 2018 16:00)  HR: 95 (27 Nov 2018 17:00) (70 - 95)  BP: 152/94 (27 Nov 2018 17:00) (97/55 - 152/94)  BP(mean): 108 (27 Nov 2018 17:00) (64 - 108)  RR: 20 (27 Nov 2018 17:00) (18 - 20)  SpO2: 99% (27 Nov 2018 17:00) (96% - 100%) I&O's Summary    26 Nov 2018 07:01  -  27 Nov 2018 07:00  --------------------------------------------------------  IN: 3077 mL / OUT: 2195 mL / NET: 882 mL    27 Nov 2018 07:01  -  27 Nov 2018 18:22  --------------------------------------------------------  IN: 1185.3 mL / OUT: 1095 mL / NET: 90.3 mL     GENERAL:  Sedated in the SICU   HEENT:  Intraoral packing in place   PULMONARY:   Trach to vent - clear anteriorly   CARDIOVASCULAR:    [ ]Regular [ ]Irregular [ ]Tachy  [ ]Jimmie [ ]Murmur [ ]Other  GASTROINTESTINAL:  [x ]Soft  [ ]Distended   [ x]+BS  [x ]Non tender [ ]Tender  [x ]PEG [ ]OGT/ NGT   Last BM: 11/25/18  GENITOURINARY:  Condom catheter in place   MUSCULOSKELETAL:   [ ]Normal   [ ]Weakness  [x ]Bed/Wheelchair bound [ ]Edema  NEUROLOGIC:   Sedated    SKIN:   Intraoral packing in place     CRITICAL CARE:  [ ] Shock Present  [ ]Septic [ ]Cardiogenic [ ]Neurologic [ ]Hypovolemic  [ ]  Vasopressors [ ]  Inotropes   [ ] Respiratory failure present  [ ] Acute  [ ] Chronic [ ] Hypoxic  [ ] Hypercarbic [ ] Other  [ ] Other organ failure     LABS:                        8.2    5.20  )-----------( 322      ( 27 Nov 2018 11:35 )             26.0   11-27    137  |  98  |  14  ----------------------------<  113<H>  3.6   |  30  |  0.46<L>    Ca    8.2<L>      27 Nov 2018 11:35  Phos  3.5     11-27  Mg     1.5     11-27    RADIOLOGY & ADDITIONAL STUDIES: reviewed     Protein Calorie Malnutrition Present: [ ] yes [ ] no  [x ] PPSV2 < or = 30%  [ ] significant weight loss [ ] poor nutritional intake [ ] anasarca [ ] catabolic state Albumin, Serum: 3.0 g/dL (11-21-18 @ 17:38)      REFERRALS:   [ ]Chaplaincy  [ ] Hospice  [ ]Child Life  [ ]Social Work  [ ]Case management [ ]Holistic Therapy   Goals of Care Document:

## 2018-11-27 NOTE — PROGRESS NOTE ADULT - ASSESSMENT
57 y/o M with Hx CHF (EF 40-45%), squamous cell carcinoma of maxillary sinus presenting 11/19 increased WOB, now s/p tracheostomy 11/20. Was recovering well but began to have recurrence of oral bleeding in setting of critical airway. SICU consulted and accepted pt to unit.    Neuro: Post operative pain, Anxiety  - pt on fentanyl, propofol gtt - started precedex 11/26  - Pregabalin for neuropathic pain  - Palliative care on board - will assist w/ pain management, GOC    Resp: s/p Trach  - No more daily CXR, last 11/25: improving intraperitoneal air, R atelectasis  - Airway packed by ENT 11/25, will leave packing in place no further plan for removal given persistent bleeding   - For trach collar trial  - no daily CXR or ABG    CV: Hx HTN  - Discontinued antihypertensives, only comfort measures    GI:   - c/w TF: Pivot @70 by PEG (placed 10/31)  - Loperamide PRN for diarrhea    Renal:  - c/w Andrade  - no daily labs since 11/25    Heme:   - SCDs for now, hold off chemical DVT ppx  - no daily CBC since 11/25    ID:   -Tylenol PRN for fevers  - Most recent BCx growing coag negative staph, no abx    Endo:  - no issues 57 y/o M with Hx CHF (EF 40-45%), 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 Was recovering well but began to have recurrence of oral bleeding in setting of critical airway. SICU consulted and accepted pt to unit. Unable to remove packing given recurrence of bleeding.    Neuro: Post operative pain, Anxiety  - pt on fentanyl, propofol gtt, precedex started 11/26 with intention to wean gtt, patient with pain 7/10, requires improved pain control before tapering gtts, consider methadone, will discuss with palliative (following pt)  - c/w pregabalin for neuropathic pain  - Restart olanzapine    Resp: s/p Trach  - No more daily CXR/abg (last CXR 11/25: improving intraperitoneal air, R atelectasis)  - Airway packed by ENT 11/25, will leave packing in place no further plan for removal given persistent bleeding   - Mode: AC/ CMV (Assist Control/ Continuous Mandatory Ventilation), RR (machine): 20, TV (machine): 500, FiO2: 40, PEEP: 5, MAP: 10, PIP: 19  - For trach collar trial once sedatives have been weaned  - Bedside sono to look for b-lines, pneumonia    CV: Hx HTN  - Comfort measures only  - Restart home dose carvedilol    GI:   - c/w TF: Pivot @70 by PEG (placed 10/31)  - Colace PRN constipation  - start protonix    Renal:  - d/c tr[place], will place condom cath  - no daily labs since 11/25    Heme:   - SCDs for now, hold off chemical DVT ppx  - no daily CBC since 11/25    ID:   -Tylenol PRN for fevers  - Most recent BCx growing coag negative staph, no abx    Endo:  - no issues

## 2018-11-27 NOTE — PROGRESS NOTE ADULT - SUBJECTIVE AND OBJECTIVE BOX
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 Also with difficulty breathing, s/p urgent tracheostomy on 11/20. Patient had bleeding from sinus tumor this morning, packed and controlled by ENT. Overall bleeding, CBC stable but given critical airway, SICU re-consulted for close monitoring of bleed & airway.    Of note, wife reports pt does not have h/o CAD/MI/stent, had prior lobectomy and partial diaphragmatic dysfunction.      24 HOUR EVENTS:  Patient was started on precedex last night, plan to wean down on propofol 11/27 w/ precedex on, ultimate goal for trach collar.    SUBJECTIVE/ROS:  [x] A ten-point review of systems was otherwise negative except as noted.  [ ] Due to altered mental status/intubation, subjective information were not able to be obtained from the patient. History was obtained, to the extent possible, from review of the chart and collateral sources of information.      NEURO  RASS:     GCS:     CAM ICU:  Exam: awake, alert, oriented  Meds: dexmedetomidine Infusion 0.2 MICROgram(s)/kG/Hr IV Continuous <Continuous>  dexmedetomidine Infusion 0.2 MICROgram(s)/kG/Hr IV Continuous <Continuous>  fentaNYL   Infusion. 7 MICROgram(s)/kG/Hr IV Continuous <Continuous>  pregabalin 75 milliGRAM(s) Oral two times a day  propofol Infusion 50 MICROgram(s)/kG/Min IV Continuous <Continuous>    [x] Adequacy of sedation and pain control has been assessed and adjusted      RESPIRATORY  RR: 20 (11-27-18 @ 04:00) (18 - 20)  SpO2: 99% (11-27-18 @ 04:19) (98% - 100%)  Wt(kg): --  Exam: unlabored, clear to auscultation bilaterally  Mechanical Ventilation: Mode: AC/ CMV (Assist Control/ Continuous Mandatory Ventilation), RR (machine): 20, RR (patient): 20, TV (machine): 500, FiO2: 40, PEEP: 5, MAP: 9, PIP: 16      CARDIOVASCULAR  HR: 77 (11-27-18 @ 04:19) (70 - 89)  BP: 121/69 (11-27-18 @ 04:00) (93/60 - 121/69)  BP(mean): 82 (11-27-18 @ 04:00) (64 - 82)      GI/NUTRITION      GENITOURINARY  I&O's Detail    11-25 @ 07:01  -  11-26 @ 07:00  --------------------------------------------------------  IN:    Enteral Tube Flush: 60 mL    fentaNYL Infusion.: 442.9 mL    fentaNYL Infusion.: 787.5 mL    Pivot: 1260 mL    propofol Infusion: 540 mL  Total IN: 3090.4 mL    OUT:    Indwelling Catheter - Urethral: 1275 mL  Total OUT: 1275 mL    Total NET: 1815.4 mL      11-26 @ 07:01  -  11-27 @ 06:37  --------------------------------------------------------  IN:    Enteral Tube Flush: 60 mL    fentaNYL Infusion.: 1102.5 mL    Pivot: 1120 mL    propofol Infusion: 429.5 mL  Total IN: 2712 mL    OUT:    Indwelling Catheter - Urethral: 1895 mL  Total OUT: 1895 mL    Total NET: 817 mL                [ ] Andrade catheter, indication: N/A  Meds:       HEMATOLOGIC  Meds:   [x] VTE Prophylaxis      Transfusion     [ ] PRBC   [ ] Platelets   [ ] FFP   [ ] Cryoprecipitate      INFECTIOUS DISEASES    ACCESS DEVICES:  [ ] Peripheral IV  [ ] Central Venous Line	[ ] R	[ ] L	[ ] IJ	[ ] Fem	[ ] SC	Placed:   [ ] Arterial Line		[ ] R	[ ] L	[ ] Fem	[ ] Rad	[ ] Ax	Placed:   [ ] PICC:					[ ] Mediport  [ ] Urinary Catheter, Date Placed:   [x] Necessity of urinary, arterial, and venous catheters discussed    OTHER MEDICATIONS:  chlorhexidine 4% Liquid 1 Application(s) Topical <User Schedule>      CODE STATUS:  Yes      IMAGING: HISTORY  58M with hx of CHF in 2014 (LVEF 40-45%), bipolar d/o, YANET, diaphragmatic dysfunction, lung lobectomy, 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 Also with difficulty breathing, s/p urgent tracheostomy on 11/20. Patient had bleeding from sinus tumor this morning, packed and controlled by ENT. Overall bleeding, CBC stable but given critical airway, SICU re-consulted for close monitoring of bleed & airway.    Of note, wife reports pt does not have h/o CAD/MI/stent, had prior lobectomy and partial diaphragmatic dysfunction.      24 HOUR EVENTS:  Patient was started on precedex last night, plan to wean down on propofol 11/27 w/ precedex on, ultimate goal for trach collar.    SUBJECTIVE/ROS:  [x] A ten-point review of systems was otherwise negative except as noted.  [ ] Due to altered mental status/intubation, subjective information were not able to be obtained from the patient. History was obtained, to the extent possible, from review of the chart and collateral sources of information.      NEURO  RASS:     GCS:     CAM ICU:  Exam: awake, alert, oriented  Meds: dexmedetomidine Infusion 0.2 MICROgram(s)/kG/Hr IV Continuous <Continuous>  dexmedetomidine Infusion 0.2 MICROgram(s)/kG/Hr IV Continuous <Continuous>  fentaNYL   Infusion. 7 MICROgram(s)/kG/Hr IV Continuous <Continuous>  pregabalin 75 milliGRAM(s) Oral two times a day  propofol Infusion 50 MICROgram(s)/kG/Min IV Continuous <Continuous>    [x] Adequacy of sedation and pain control has been assessed and adjusted      RESPIRATORY  RR: 20 (11-27-18 @ 04:00) (18 - 20)  SpO2: 99% (11-27-18 @ 04:19) (98% - 100%)  Wt(kg): --  Exam: unlabored, clear to auscultation bilaterally  Mechanical Ventilation: Mode: AC/ CMV (Assist Control/ Continuous Mandatory Ventilation), RR (machine): 20, RR (patient): 20, TV (machine): 500, FiO2: 40, PEEP: 5, MAP: 9, PIP: 16      CARDIOVASCULAR  HR: 77 (11-27-18 @ 04:19) (70 - 89)  BP: 121/69 (11-27-18 @ 04:00) (93/60 - 121/69)  BP(mean): 82 (11-27-18 @ 04:00) (64 - 82)      GI/NUTRITION      GENITOURINARY  I&O's Detail    11-25 @ 07:01  -  11-26 @ 07:00  --------------------------------------------------------  IN:    Enteral Tube Flush: 60 mL    fentaNYL Infusion.: 442.9 mL    fentaNYL Infusion.: 787.5 mL    Pivot: 1260 mL    propofol Infusion: 540 mL  Total IN: 3090.4 mL    OUT:    Indwelling Catheter - Urethral: 1275 mL  Total OUT: 1275 mL    Total NET: 1815.4 mL      11-26 @ 07:01  -  11-27 @ 06:37  --------------------------------------------------------  IN:    Enteral Tube Flush: 60 mL    fentaNYL Infusion.: 1102.5 mL    Pivot: 1120 mL    propofol Infusion: 429.5 mL  Total IN: 2712 mL    OUT:    Indwelling Catheter - Urethral: 1895 mL  Total OUT: 1895 mL    Total NET: 817 mL      [ ] Andrade catheter, indication: N/A  Meds:       HEMATOLOGIC  Meds:   [x] VTE Prophylaxis      Transfusion     [ ] PRBC   [ ] Platelets   [ ] FFP   [ ] Cryoprecipitate      INFECTIOUS DISEASES    ACCESS DEVICES:  [ ] Peripheral IV  [ ] Central Venous Line	[ ] R	[ ] L	[ ] IJ	[ ] Fem	[ ] SC	Placed:   [ ] Arterial Line		[ ] R	[ ] L	[ ] Fem	[ ] Rad	[ ] Ax	Placed:   [ ] PICC:					[ ] Mediport  [ ] Urinary Catheter, Date Placed:   [x] Necessity of urinary, arterial, and venous catheters discussed    OTHER MEDICATIONS:  chlorhexidine 4% Liquid 1 Application(s) Topical <User Schedule>      CODE STATUS:  Yes      IMAGING:

## 2018-11-28 DIAGNOSIS — K59.00 CONSTIPATION, UNSPECIFIED: ICD-10-CM

## 2018-11-28 LAB
BACTERIA BLD CULT: SIGNIFICANT CHANGE UP
BACTERIA BLD CULT: SIGNIFICANT CHANGE UP
BASE EXCESS BLDV CALC-SCNC: 8.6 MMOL/L — SIGNIFICANT CHANGE UP
BASOPHILS # BLD AUTO: 0.02 K/UL — SIGNIFICANT CHANGE UP (ref 0–0.2)
BASOPHILS NFR BLD AUTO: 0.2 % — SIGNIFICANT CHANGE UP (ref 0–2)
BLOOD GAS VENOUS - CREATININE: < 0.36 MG/DL — LOW (ref 0.5–1.3)
BUN SERPL-MCNC: 13 MG/DL — SIGNIFICANT CHANGE UP (ref 7–23)
CALCIUM SERPL-MCNC: 8.3 MG/DL — LOW (ref 8.4–10.5)
CHLORIDE BLDV-SCNC: 98 MMOL/L — SIGNIFICANT CHANGE UP (ref 96–108)
CHLORIDE SERPL-SCNC: 98 MMOL/L — SIGNIFICANT CHANGE UP (ref 98–107)
CK MB BLD-MCNC: 6.55 NG/ML — SIGNIFICANT CHANGE UP (ref 1–6.6)
CK SERPL-CCNC: 62 U/L — SIGNIFICANT CHANGE UP (ref 30–200)
CO2 SERPL-SCNC: 26 MMOL/L — SIGNIFICANT CHANGE UP (ref 22–31)
CREAT SERPL-MCNC: 0.48 MG/DL — LOW (ref 0.5–1.3)
EOSINOPHIL # BLD AUTO: 0.08 K/UL — SIGNIFICANT CHANGE UP (ref 0–0.5)
EOSINOPHIL NFR BLD AUTO: 0.8 % — SIGNIFICANT CHANGE UP (ref 0–6)
GAS PNL BLDV: 134 MMOL/L — LOW (ref 136–146)
GLUCOSE BLDV-MCNC: 118 — HIGH (ref 70–99)
GLUCOSE SERPL-MCNC: 108 MG/DL — HIGH (ref 70–99)
HCO3 BLDV-SCNC: 32 MMOL/L — HIGH (ref 20–27)
HCT VFR BLD CALC: 27.7 % — LOW (ref 39–50)
HCT VFR BLDV CALC: 26.7 % — LOW (ref 39–51)
HGB BLD-MCNC: 8.7 G/DL — LOW (ref 13–17)
HGB BLDV-MCNC: 8.6 G/DL — LOW (ref 13–17)
IMM GRANULOCYTES # BLD AUTO: 0.03 # — SIGNIFICANT CHANGE UP
IMM GRANULOCYTES NFR BLD AUTO: 0.3 % — SIGNIFICANT CHANGE UP (ref 0–1.5)
LACTATE BLDV-MCNC: 1.4 MMOL/L — SIGNIFICANT CHANGE UP (ref 0.5–2)
LYMPHOCYTES # BLD AUTO: 0.95 K/UL — LOW (ref 1–3.3)
LYMPHOCYTES # BLD AUTO: 9.3 % — LOW (ref 13–44)
MAGNESIUM SERPL-MCNC: 1.7 MG/DL — SIGNIFICANT CHANGE UP (ref 1.6–2.6)
MCHC RBC-ENTMCNC: 27.6 PG — SIGNIFICANT CHANGE UP (ref 27–34)
MCHC RBC-ENTMCNC: 31.4 % — LOW (ref 32–36)
MCV RBC AUTO: 87.9 FL — SIGNIFICANT CHANGE UP (ref 80–100)
MONOCYTES # BLD AUTO: 0.81 K/UL — SIGNIFICANT CHANGE UP (ref 0–0.9)
MONOCYTES NFR BLD AUTO: 7.9 % — SIGNIFICANT CHANGE UP (ref 2–14)
NEUTROPHILS # BLD AUTO: 8.34 K/UL — HIGH (ref 1.8–7.4)
NEUTROPHILS NFR BLD AUTO: 81.5 % — HIGH (ref 43–77)
NRBC # FLD: 0 — SIGNIFICANT CHANGE UP
PCO2 BLDV: 56 MMHG — HIGH (ref 41–51)
PH BLDV: 7.4 PH — SIGNIFICANT CHANGE UP (ref 7.32–7.43)
PHOSPHATE SERPL-MCNC: 4.1 MG/DL — SIGNIFICANT CHANGE UP (ref 2.5–4.5)
PLATELET # BLD AUTO: 401 K/UL — HIGH (ref 150–400)
PMV BLD: 9.7 FL — SIGNIFICANT CHANGE UP (ref 7–13)
PO2 BLDV: 50 MMHG — HIGH (ref 35–40)
POTASSIUM BLDV-SCNC: 3.8 MMOL/L — SIGNIFICANT CHANGE UP (ref 3.4–4.5)
POTASSIUM SERPL-MCNC: 4.1 MMOL/L — SIGNIFICANT CHANGE UP (ref 3.5–5.3)
POTASSIUM SERPL-SCNC: 4.1 MMOL/L — SIGNIFICANT CHANGE UP (ref 3.5–5.3)
RBC # BLD: 3.15 M/UL — LOW (ref 4.2–5.8)
RBC # FLD: 15.3 % — HIGH (ref 10.3–14.5)
SAO2 % BLDV: 80.3 % — SIGNIFICANT CHANGE UP (ref 60–85)
SODIUM SERPL-SCNC: 137 MMOL/L — SIGNIFICANT CHANGE UP (ref 135–145)
SPECIMEN SOURCE: SIGNIFICANT CHANGE UP
SPECIMEN SOURCE: SIGNIFICANT CHANGE UP
TROPONIN T, HIGH SENSITIVITY: 143 NG/L — CRITICAL HIGH (ref ?–14)
WBC # BLD: 10.23 K/UL — SIGNIFICANT CHANGE UP (ref 3.8–10.5)
WBC # FLD AUTO: 10.23 K/UL — SIGNIFICANT CHANGE UP (ref 3.8–10.5)

## 2018-11-28 PROCEDURE — 93306 TTE W/DOPPLER COMPLETE: CPT | Mod: 26

## 2018-11-28 PROCEDURE — 36620 INSERTION CATHETER ARTERY: CPT

## 2018-11-28 PROCEDURE — 71045 X-RAY EXAM CHEST 1 VIEW: CPT | Mod: 26

## 2018-11-28 PROCEDURE — 99233 SBSQ HOSP IP/OBS HIGH 50: CPT | Mod: GC

## 2018-11-28 PROCEDURE — 99232 SBSQ HOSP IP/OBS MODERATE 35: CPT | Mod: GC

## 2018-11-28 PROCEDURE — 99291 CRITICAL CARE FIRST HOUR: CPT

## 2018-11-28 RX ORDER — NOREPINEPHRINE BITARTRATE/D5W 8 MG/250ML
0.05 PLASTIC BAG, INJECTION (ML) INTRAVENOUS
Qty: 16 | Refills: 0 | Status: DISCONTINUED | OUTPATIENT
Start: 2018-11-28 | End: 2018-11-29

## 2018-11-28 RX ORDER — PROPOFOL 10 MG/ML
50 INJECTION, EMULSION INTRAVENOUS
Qty: 1000 | Refills: 0 | Status: DISCONTINUED | OUTPATIENT
Start: 2018-11-28 | End: 2018-12-03

## 2018-11-28 RX ORDER — FENTANYL CITRATE 50 UG/ML
7 INJECTION INTRAVENOUS
Qty: 2500 | Refills: 0 | Status: DISCONTINUED | OUTPATIENT
Start: 2018-11-28 | End: 2018-12-04

## 2018-11-28 RX ORDER — SENNA PLUS 8.6 MG/1
2 TABLET ORAL AT BEDTIME
Qty: 0 | Refills: 0 | Status: DISCONTINUED | OUTPATIENT
Start: 2018-11-28 | End: 2018-11-30

## 2018-11-28 RX ADMIN — Medication 100 MILLIGRAM(S): at 01:12

## 2018-11-28 RX ADMIN — Medication 75 MILLIGRAM(S): at 05:40

## 2018-11-28 RX ADMIN — PANTOPRAZOLE SODIUM 40 MILLIGRAM(S): 20 TABLET, DELAYED RELEASE ORAL at 11:45

## 2018-11-28 RX ADMIN — Medication 250 MILLIGRAM(S): at 06:01

## 2018-11-28 RX ADMIN — Medication 75 MILLIGRAM(S): at 17:58

## 2018-11-28 RX ADMIN — CHLORHEXIDINE GLUCONATE 1 APPLICATION(S): 213 SOLUTION TOPICAL at 10:09

## 2018-11-28 RX ADMIN — PIPERACILLIN AND TAZOBACTAM 25 GRAM(S): 4; .5 INJECTION, POWDER, LYOPHILIZED, FOR SOLUTION INTRAVENOUS at 13:04

## 2018-11-28 RX ADMIN — Medication 250 MILLIGRAM(S): at 17:54

## 2018-11-28 RX ADMIN — PROPOFOL 22.5 MICROGRAM(S)/KG/MIN: 10 INJECTION, EMULSION INTRAVENOUS at 08:01

## 2018-11-28 RX ADMIN — FENTANYL CITRATE 52.5 MICROGRAM(S)/KG/HR: 50 INJECTION INTRAVENOUS at 08:02

## 2018-11-28 RX ADMIN — Medication 3.52 MICROGRAM(S)/KG/MIN: at 08:01

## 2018-11-28 RX ADMIN — OLANZAPINE 5 MILLIGRAM(S): 15 TABLET, FILM COATED ORAL at 11:45

## 2018-11-28 RX ADMIN — Medication 100 MILLIGRAM(S): at 11:45

## 2018-11-28 RX ADMIN — Medication 10 MILLIGRAM(S): at 10:08

## 2018-11-28 RX ADMIN — PROPOFOL 22.5 MICROGRAM(S)/KG/MIN: 10 INJECTION, EMULSION INTRAVENOUS at 05:40

## 2018-11-28 RX ADMIN — SENNA PLUS 2 TABLET(S): 8.6 TABLET ORAL at 22:29

## 2018-11-28 RX ADMIN — PIPERACILLIN AND TAZOBACTAM 25 GRAM(S): 4; .5 INJECTION, POWDER, LYOPHILIZED, FOR SOLUTION INTRAVENOUS at 22:29

## 2018-11-28 RX ADMIN — PIPERACILLIN AND TAZOBACTAM 25 GRAM(S): 4; .5 INJECTION, POWDER, LYOPHILIZED, FOR SOLUTION INTRAVENOUS at 05:40

## 2018-11-28 NOTE — PROCEDURE NOTE - NSPROCDETAILS_GEN_ALL_CORE
sutured in place/Seldinger technique/ultrasound guidance/location identified, draped/prepped, sterile technique used, needle inserted/introduced/positive blood return obtained via catheter/connected to a pressurized flush line/all materials/supplies accounted for at end of procedure

## 2018-11-28 NOTE — PROGRESS NOTE ADULT - ASSESSMENT
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 Hospital course complicated with oral cavity bleeding s/p packing, fever 2/2 to aspiration PNA and elevated troponins and TWI.     #Right maxillary SCC   Patient with POD after induction chemotherapy, was receiving cis/RT before admission. Currently with aspiration PNA, NSTEMI and bleeding from oral cavity s/p packing. Clinically tenuous to receive chemotherapy, at this point chemotherapy will be futile in the setting of active infection and bleeding.   -Discussion with the family about the reasoning of holding chemotherapy, but wife wishes to pursue active therapy. She is asking about the recently approved medication larotrectinib.  -We will plan a family meeting with primary oncologist (Dr Loza) to discuss the next step.   -Appreciated SICU care   -Appreciated ENT recs.   -Appreciated Palliative care recs.     Case d/w Dr Ibarra and Dr Loza

## 2018-11-28 NOTE — PROGRESS NOTE ADULT - SUBJECTIVE AND OBJECTIVE BOX
INTERVAL HPI/OVERNIGHT EVENTS:  Patient with vomiting, EKG changes and aspiration event     Code Status: Full Code   Allergies    hospital socks (Rash)  lisinopril (Anaphylaxis)  statins (Anaphylaxis)    Intolerances    MEDICATIONS  (STANDING):  chlorhexidine 4% Liquid 1 Application(s) Topical <User Schedule>  docusate sodium Liquid 100 milliGRAM(s) Oral two times a day  fentaNYL   Infusion. 7 MICROgram(s)/kG/Hr (52.5 mL/Hr) IV Continuous <Continuous>  norepinephrine Infusion 0.05 MICROgram(s)/kG/Min (3.516 mL/Hr) IV Continuous <Continuous>  OLANZapine 5 milliGRAM(s) Oral daily  pantoprazole   Suspension 40 milliGRAM(s) Oral daily  piperacillin/tazobactam IVPB. 3.375 Gram(s) IV Intermittent every 8 hours  pregabalin 75 milliGRAM(s) Oral two times a day  propofol Infusion 50 MICROgram(s)/kG/Min (22.5 mL/Hr) IV Continuous <Continuous>  senna 2 Tablet(s) Oral at bedtime  vancomycin  IVPB 1000 milliGRAM(s) IV Intermittent every 12 hours    MEDICATIONS  (PRN):  bisacodyl Suppository 10 milliGRAM(s) Rectal daily PRN Constipation  polyethylene glycol 3350 17 Gram(s) Oral daily PRN Constipation      PRESENT SYMPTOMS: [x ]Unable to obtain due to poor mentation   Source if other than patient:  [ ]Family   [ ]Team     Pain (Impact on QOL):    Location:  Severity:  Minimal acceptable level (0-10 scale):       Quality:       Onset:  Duration:  Aggravating factors:  Relieving Factors  Radiation:    Dyspnea:  Yes [ ] No [ ] - [ ]Mild [ ]Moderate [ ]Severe  Anxiety:    Yes [ ] No [ ] - [ ]Mild [ ]Moderate [ ]Severe  Fatigue:    Yes [ ] No [ ] - [ ]Mild [ ]Moderate [ ]Severe  Nausea:    Yes [ ] No [ ] - [ ]Mild [ ]Moderate [ ]Severe                         Loss of appetite: Yes [ ] No [ ] - [ ]Mild [ ]Moderate [ ]Severe             Constipation:  Yes [ ] No [ ] - [ ]Mild [ ]Moderate [ ]Severe    PAIN AD Score:	  [email or web address] (Ctrl + left click to view)    Other Symptoms:  [ ]All other review of systems negative     Karnofsky Performance Score/Palliative Performance Status Version 2:         %    http://palliative.info/resource_material/[email or web address].pdf    PHYSICAL EXAM:  Vital Signs Last 24 Hrs  T(C): 35.8 (28 Nov 2018 12:00), Max: 39.1 (27 Nov 2018 18:30)  T(F): 96.5 (28 Nov 2018 12:00), Max: 102.3 (27 Nov 2018 18:30)  HR: 80 (28 Nov 2018 12:00) (74 - 134)  BP: 120/72 (28 Nov 2018 12:00) (78/51 - 152/94)  BP(mean): 84 (28 Nov 2018 12:00) (57 - 108)  RR: 20 (28 Nov 2018 12:00) (16 - 24)  SpO2: 100% (28 Nov 2018 12:00) (97% - 100%) I&O's Summary    27 Nov 2018 07:01  -  28 Nov 2018 07:00  --------------------------------------------------------  IN: 3660.3 mL / OUT: 2845 mL / NET: 815.3 mL    28 Nov 2018 07:01  -  28 Nov 2018 12:33  --------------------------------------------------------  IN: 513.3 mL / OUT: 755 mL / NET: -241.7 mL     GENERAL:  sedated with trach to vent     HEENT:  [ ]Normal   [ ]Dry mouth   [x ]ET Tube/Trach - intraoral packing in place   PULMONARY:   [x ]Clear anteriorly   [ ]Rhonchi        [ ]Right [ ]Left [ ]Bilateral  [ ]Crackles        [ ]Right [ ]Left [ ]Bilateral  [ ]Wheezing     [ ]Right [ ]Left [ ]Bilateral  CARDIOVASCULAR:    [x ]Regular [ ]Irregular [ ]Tachy  [ ]Jimmie [ ]Murmur [ ]Other  GASTROINTESTINAL: -   /[ x]Soft  [ ]Distended   [x ]+BS  [x ]Non tender [ ]Tender  [x ]PEG [ ]OGT/ NGT   Last BM: 11/27/18 - wife reports it was a minimal amount of stool      GENITOURINARY:  [ ]Normal [ ] Incontinent   [ ]Oliguria/Anuria   [x ]Andrade  MUSCULOSKELETAL:   [ ]Normal   [ ]Weakness  [ x]Bed/Wheelchair bound [ ]Edema  NEUROLOGIC:   sedated   SKIN:   Intraoral packing in place     CRITICAL CARE:  [ ] Shock Present  [ ]Septic [ ]Cardiogenic [ ]Neurologic [ ]Hypovolemic  [ ]  Vasopressors [ ]  Inotropes   [ ] Respiratory failure present  [ ] Acute  [ ] Chronic [ ] Hypoxic  [ ] Hypercarbic [ ] Other  [ ] Other organ failure     LABS:                        8.7    10.23 )-----------( 401      ( 28 Nov 2018 05:00 )             27.7   11-28    137  |  98  |  13  ----------------------------<  108<H>  4.1   |  26  |  0.48<L>    Ca    8.3<L>      28 Nov 2018 05:00  Phos  4.1     11-28  Mg     1.7     11-28          RADIOLOGY & ADDITIONAL STUDIES:    Protein Calorie Malnutrition Present: [ ] yes [ ] no  [ ] PPSV2 < or = 30%  [ ] significant weight loss [ ] poor nutritional intake [ ] anasarca [ ] catabolic state Albumin, Serum: 3.0 g/dL (11-21-18 @ 17:38)      REFERRALS:   [ ]Chaplaincy  [ ] Hospice  [ ]Child Life  [ ]Social Work  [ ]Case management [ ]Holistic Therapy   Goals of Care Document: INTERVAL HPI/OVERNIGHT EVENTS:      Patient with vomiting, EKG changes and aspiration event     Code Status: Full Code   Allergies    hospital socks (Rash)  lisinopril (Anaphylaxis)  statins (Anaphylaxis)    Intolerances    MEDICATIONS  (STANDING):  chlorhexidine 4% Liquid 1 Application(s) Topical <User Schedule>  docusate sodium Liquid 100 milliGRAM(s) Oral two times a day  fentaNYL   Infusion. 7 MICROgram(s)/kG/Hr (52.5 mL/Hr) IV Continuous <Continuous>  norepinephrine Infusion 0.05 MICROgram(s)/kG/Min (3.516 mL/Hr) IV Continuous <Continuous>  OLANZapine 5 milliGRAM(s) Oral daily  pantoprazole   Suspension 40 milliGRAM(s) Oral daily  piperacillin/tazobactam IVPB. 3.375 Gram(s) IV Intermittent every 8 hours  pregabalin 75 milliGRAM(s) Oral two times a day  propofol Infusion 50 MICROgram(s)/kG/Min (22.5 mL/Hr) IV Continuous <Continuous>  senna 2 Tablet(s) Oral at bedtime  vancomycin  IVPB 1000 milliGRAM(s) IV Intermittent every 12 hours    MEDICATIONS  (PRN):  bisacodyl Suppository 10 milliGRAM(s) Rectal daily PRN Constipation  polyethylene glycol 3350 17 Gram(s) Oral daily PRN Constipation    PRESENT SYMPTOMS: [x ]Unable to obtain due to poor mentation   Source if other than patient:  [ ]Family   [ ]Team     Pain (Impact on QOL):    Location:  Severity:  Minimal acceptable level (0-10 scale):       Quality:       Onset:  Duration:  Aggravating factors:  Relieving Factors  Radiation:    Dyspnea:  Yes [ ] No [ ] - [ ]Mild [ ]Moderate [ ]Severe  Anxiety:    Yes [ ] No [ ] - [ ]Mild [ ]Moderate [ ]Severe  Fatigue:    Yes [ ] No [ ] - [ ]Mild [ ]Moderate [ ]Severe  Nausea:    Yes [ ] No [ ] - [ ]Mild [ ]Moderate [ ]Severe                         Loss of appetite: Yes [ ] No [ ] - [ ]Mild [ ]Moderate [ ]Severe             Constipation:  Yes [ ] No [ ] - [ ]Mild [ ]Moderate [ ]Severe    PAIN AD Score:	  [email or web address] (Ctrl + left click to view)    Other Symptoms:  [ ]All other review of systems negative     Karnofsky Performance Score/Palliative Performance Status Version 2: 30 %    http://palliative.info/resource_material/[email or web address].pdf    PHYSICAL EXAM:  Vital Signs Last 24 Hrs  T(C): 35.8 (28 Nov 2018 12:00), Max: 39.1 (27 Nov 2018 18:30)  T(F): 96.5 (28 Nov 2018 12:00), Max: 102.3 (27 Nov 2018 18:30)  HR: 80 (28 Nov 2018 12:00) (74 - 134)  BP: 120/72 (28 Nov 2018 12:00) (78/51 - 152/94)  BP(mean): 84 (28 Nov 2018 12:00) (57 - 108)  RR: 20 (28 Nov 2018 12:00) (16 - 24)  SpO2: 100% (28 Nov 2018 12:00) (97% - 100%) I&O's Summary    27 Nov 2018 07:01  -  28 Nov 2018 07:00  --------------------------------------------------------  IN: 3660.3 mL / OUT: 2845 mL / NET: 815.3 mL    28 Nov 2018 07:01  -  28 Nov 2018 12:33  --------------------------------------------------------  IN: 513.3 mL / OUT: 755 mL / NET: -241.7 mL    GENERAL:  sedated with trach to vent     HEENT:  [ ]Normal   [ ]Dry mouth   [x ]ET Tube/Trach - intraoral packing in place   PULMONARY:   [x ]Clear anteriorly   [ ]Rhonchi        [ ]Right [ ]Left [ ]Bilateral  [ ]Crackles        [ ]Right [ ]Left [ ]Bilateral  [ ]Wheezing     [ ]Right [ ]Left [ ]Bilateral  CARDIOVASCULAR:    [x ]Regular [ ]Irregular [ ]Tachy  [ ]Jimmie [ ]Murmur [ ]Other  GASTROINTESTINAL: -   /[ x]Soft  [ ]Distended   [x ]+BS  [x ]Non tender [ ]Tender  [x ]PEG [ ]OGT/ NGT   Last BM: 11/27/18 - wife reports it was a minimal amount of stool      GENITOURINARY:  [ ]Normal [ ] Incontinent   [ ]Oliguria/Anuria   [x ]Andrade  MUSCULOSKELETAL:   [ ]Normal   [ ]Weakness  [ x]Bed/Wheelchair bound [ ]Edema  NEUROLOGIC:   sedated   SKIN:   Intraoral packing in place     CRITICAL CARE:  [ ] Shock Present  [ ]Septic [ ]Cardiogenic [ ]Neurologic [ ]Hypovolemic  [ ]  Vasopressors [ ]  Inotropes   [ ] Respiratory failure present  [ ] Acute  [ ] Chronic [ ] Hypoxic  [ ] Hypercarbic [ ] Other  [ ] Other organ failure     LABS:                        8.7    10.23 )-----------( 401      ( 28 Nov 2018 05:00 )             27.7   11-28    137  |  98  |  13  ----------------------------<  108<H>  4.1   |  26  |  0.48<L>    Ca    8.3<L>      28 Nov 2018 05:00  Phos  4.1     11-28  Mg     1.7     11-28    RADIOLOGY & ADDITIONAL STUDIES: reviewed.     Protein Calorie Malnutrition Present: [ ] yes [x ] no  [ ] PPSV2 < or = 30%  [ ] significant weight loss [ ] poor nutritional intake [ ] anasarca [ ] catabolic state Albumin, Serum: 3.0 g/dL (11-21-18 @ 17:38)      REFERRALS:   [ ]Chaplaincy  [ ] Hospice  [ ]Child Life  [ ]Social Work  [ ]Case management [ ]Holistic Therapy   Goals of Care Document:

## 2018-11-28 NOTE — PROGRESS NOTE ADULT - PROBLEM SELECTOR PLAN 1
No disease modifying treatment at this time. Continue management as per SICU team No disease modifying treatment at this time. Continue management as per SICU team.

## 2018-11-28 NOTE — PROGRESS NOTE ADULT - SUBJECTIVE AND OBJECTIVE BOX
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 Also with difficulty breathing, s/p urgent tracheostomy on 11/20. Patient had bleeding from sinus tumor this morning, packed and controlled by ENT. Overall bleeding, CBC stable but given critical airway, SICU re-consulted for close monitoring of bleed & airway.    24 HOUR EVENTS:  Andrade replaced by condom catheter, started on carvedilol and colace. Was made DNR/comfort measures only last night, however patient aspirated 1:30pm yesterday into trach and packing, desaturated, and became agitated. Wife was upset and decided to rescind the DNR/CMO - and made patient full code, repeatedly requesting that all medications be reverted back to 'normal' (ie fent and prop gtt only), ***    SUBJECTIVE/ROS:  [ ] A ten-point review of systems was otherwise negative except as noted.  [ ] Due to altered mental status/intubation, subjective information were not able to be obtained from the patient. History was obtained, to the extent possible, from review of the chart and collateral sources of information.      NEURO  RASS:     GCS:     [facility] ICU:  Exam: awake, alert, oriented  Meds: dexmedetomidine Infusion 0.2 MICROgram(s)/kG/Hr IV Continuous <Continuous>  dexmedetomidine Infusion 0.2 MICROgram(s)/kG/Hr IV Continuous <Continuous>  fentaNYL   Infusion. 7 MICROgram(s)/kG/Hr IV Continuous <Continuous>  methadone   Solution 10 milliGRAM(s) Oral every 8 hours  OLANZapine 5 milliGRAM(s) Oral daily  pregabalin 75 milliGRAM(s) Oral two times a day  propofol Infusion 50 MICROgram(s)/kG/Min IV Continuous <Continuous>    [x] Adequacy of sedation and pain control has been assessed and adjusted      RESPIRATORY  RR: 19 (11-28-18 @ 00:00) (17 - 24)  SpO2: 100% (11-28-18 @ 00:00) (96% - 100%)  Wt(kg): --  Exam: unlabored, clear to auscultation bilaterally  Mechanical Ventilation: Mode: AC/ CMV (Assist Control/ Continuous Mandatory Ventilation), RR (machine): 20, RR (patient): 20, TV (machine): 500, FiO2: 50, PEEP: 5, MAP: 9, PIP: 18  ABG - ( 27 Nov 2018 19:00 )  pH: 7.47  /  pCO2: 41    /  pO2: 207   / HCO3: 30    / Base Excess: 5.7   /  SaO2: 99.5    Lactate: 1.2              [N/A] Extubation Readiness Assessed  Meds:       CARDIOVASCULAR  HR: 87 (11-28-18 @ 00:00) (74 - 134)  BP: 88/57 (11-28-18 @ 00:00) (83/54 - 152/94)  BP(mean): 64 (11-28-18 @ 00:00) (61 - 108)  ABP: --  ABP(mean): --  Wt(kg): --  CVP(cm H2O): --  VBG - ( 27 Nov 2018 11:35 )  pH: 7.40  /  pCO2: 57    /  pO2: 51    / HCO3: 32    / Base Excess: 9.1   /  SaO2: 82.1   Lactate: 0.7                Exam: regular rate and rhythm  Cardiac Rhythm: sinus  Perfusion     [x]Adequate   [ ]Inadequate  Mentation   [x]Normal       [ ]Reduced  Extremities  [x]Warm         [ ]Cool  Volume Status [ ]Hypervolemic [x]Euvolemic [ ]Hypovolemic  Meds: carvedilol 6.25 milliGRAM(s) Oral every 12 hours  labetalol Injectable 5 milliGRAM(s) IV Push every 6 hours  norepinephrine Infusion 0.05 MICROgram(s)/kG/Min IV Continuous <Continuous>        GI/NUTRITION  Exam: soft, nontender, nondistended, incision C/D/I  Diet:  Meds: docusate sodium Liquid 100 milliGRAM(s) Oral two times a day  pantoprazole   Suspension 40 milliGRAM(s) Oral daily  polyethylene glycol 3350 17 Gram(s) Oral daily PRN Constipation      GENITOURINARY  I&O's Detail    11-26 @ 07:01  -  11-27 @ 07:00  --------------------------------------------------------  IN:    Enteral Tube Flush: 60 mL    fentaNYL Infusion.: 1260 mL    Pivot: 1260 mL    propofol Infusion: 497 mL  Total IN: 3077 mL    OUT:    Indwelling Catheter - Urethral: 2195 mL  Total OUT: 2195 mL    Total NET: 882 mL      11-27 @ 07:01  -  11-28 @ 02:43  --------------------------------------------------------  IN:    dexmedetomidine Infusion: 56.3 mL    Enteral Tube Flush: 230 mL    fentaNYL Infusion.: 630 mL    IV PiggyBack: 400 mL    Pivot: 280 mL    propofol Infusion: 141.9 mL  Total IN: 1738.2 mL    OUT:    Emesis: 200 mL    Indwelling Catheter - Urethral: 445 mL    Nasoenteral Tube: 450 mL    Voided: 800 mL  Total OUT: 1895 mL    Total NET: -156.8 mL          11-27    137  |  98  |  14  ----------------------------<  113<H>  3.6   |  30  |  0.46<L>    Ca    8.2<L>      27 Nov 2018 11:35  Phos  3.5     11-27  Mg     1.5     11-27      [ ] Andrade catheter, indication: N/A  Meds: sodium chloride 0.9% Bolus 1000 milliLiter(s) IV Bolus once        HEMATOLOGIC  Meds:   [x] VTE Prophylaxis                        8.2    5.20  )-----------( 322      ( 27 Nov 2018 11:35 )             26.0       Transfusion     [ ] PRBC   [ ] Platelets   [ ] FFP   [ ] Cryoprecipitate      INFECTIOUS DISEASES  WBC Count: 5.20 K/uL (11-27 @ 11:35)    RECENT CULTURES:  Specimen Source: BLOOD PERIPHERAL  Date/Time: 11-25 @ 03:55  Culture Results: --  Gram Stain: --  Organism: BLOOD CULTURE PCR  Staphylococcus sp.,coag neg  Specimen Source: BLOOD  Date/Time: 11-23 @ 21:54  Culture Results: --  Gram Stain: --  Organism: --  Specimen Source: BLOOD VENOUS  Date/Time: 11-23 @ 19:52  Culture Results: --  Gram Stain: --  Organism: --    Meds: piperacillin/tazobactam IVPB. 3.375 Gram(s) IV Intermittent every 8 hours  vancomycin  IVPB 1000 milliGRAM(s) IV Intermittent every 12 hours        ENDOCRINE  CAPILLARY BLOOD GLUCOSE        Meds:       ACCESS DEVICES:  [ ] Peripheral IV  [ ] Central Venous Line	[ ] R	[ ] L	[ ] IJ	[ ] Fem	[ ] SC	Placed:   [ ] Arterial Line		[ ] R	[ ] L	[ ] Fem	[ ] Rad	[ ] Ax	Placed:   [ ] PICC:					[ ] Mediport  [ ] Urinary Catheter, Date Placed:   [x] Necessity of urinary, arterial, and venous catheters discussed    OTHER MEDICATIONS:  chlorhexidine 4% Liquid 1 Application(s) Topical <User Schedule>      CODE STATUS:  Yes      IMAGING: HISTORY  58M with hx of CHF in 2014 (LVEF 40-45%), bipolar d/o, YANET, diaphragmatic dysfunction, lung lobectomy, 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 Also with difficulty breathing, s/p urgent tracheostomy on 11/20. Patient had bleeding from sinus tumor this morning, packed and controlled by ENT. Overall bleeding, CBC stable but given critical airway, SICU re-consulted for close monitoring of bleed & airway.    24 HOUR EVENTS:  Andrade replaced by condom catheter, started on carvedilol and colace. Was made DNR/comfort measures only last night, however patient aspirated 1:30pm yesterday into trach and packing, desaturated, and became agitated. Wife was upset and decided to rescind the DNR/CMO - and made patient full code, repeatedly requesting that all medications be reverted back to 'normal' (ie fent and prop gtt only) - precedex, methadone, olanzapine, all discontinued. Packing was replaced by ENT at bedside, no significant bleeding during packing change. Vanc and zosyn started for empiric coverage of suspected pneumonia. 6:30pm last night became tachycardiac, febrile.EKG showed V4-6 T wave inversions, along with other nonspecific findings different from prior EKG one week ago. Given contraindication to AC as well as poor prognosis precluding cath lab, trops were sent and bedside echo showed markedly decreased EF and SV. Patient's MAPs began trending towards 65; levophed hung at bedside in case. Port okay to access.     SUBJECTIVE/ROS:  [ ] A ten-point review of systems was otherwise negative except as noted.  [x] Due to altered mental status/intubation, subjective information were not able to be obtained from the patient. History was obtained, to the extent possible, from review of the chart and collateral sources of information.      NEURO  -oriented  Meds: dexmedetomidine Infusion 0.2 MICROgram(s)/kG/Hr IV Continuous <Continuous>  dexmedetomidine Infusion 0.2 MICROgram(s)/kG/Hr IV Continuous <Continuous>  fentaNYL   Infusion. 7 MICROgram(s)/kG/Hr IV Continuous <Continuous>  methadone   Solution 10 milliGRAM(s) Oral every 8 hours  OLANZapine 5 milliGRAM(s) Oral daily  pregabalin 75 milliGRAM(s) Oral two times a day  propofol Infusion 50 MICROgram(s)/kG/Min IV Continuous <Continuous>    [x] Adequacy of sedation and pain control has been assessed and adjusted      RESPIRATORY  RR: 19 (11-28-18 @ 00:00) (17 - 24)  SpO2: 100% (11-28-18 @ 00:00) (96% - 100%)  Wt(kg): --  Exam: unlabored, clear to auscultation bilaterally  Mechanical Ventilation: Mode: AC/ CMV (Assist Control/ Continuous Mandatory Ventilation), RR (machine): 20, RR (patient): 20, TV (machine): 500, FiO2: 50, PEEP: 5, MAP: 9, PIP: 18  ABG - ( 27 Nov 2018 19:00 )  pH: 7.47  /  pCO2: 41    /  pO2: 207   / HCO3: 30    / Base Excess: 5.7   /  SaO2: 99.5    Lactate: 1.2              CARDIOVASCULAR  HR: 87 (11-28-18 @ 00:00) (74 - 134)  BP: 88/57 (11-28-18 @ 00:00) (83/54 - 152/94)  BP(mean): 64 (11-28-18 @ 00:00) (61 - 108)  VBG - ( 27 Nov 2018 11:35 )  pH: 7.40  /  pCO2: 57    /  pO2: 51    / HCO3: 32    / Base Excess: 9.1   /  SaO2: 82.1   Lactate: 0.7            GI/NUTRITION  Exam: soft, nontender, nondistended, incision C/D/I  Meds: docusate sodium Liquid 100 milliGRAM(s) Oral two times a day  pantoprazole   Suspension 40 milliGRAM(s) Oral daily  polyethylene glycol 3350 17 Gram(s) Oral daily PRN Constipation      GENITOURINARY  I&O's Detail    11-26 @ 07:01  -  11-27 @ 07:00  --------------------------------------------------------  IN:    Enteral Tube Flush: 60 mL    fentaNYL Infusion.: 1260 mL    Pivot: 1260 mL    propofol Infusion: 497 mL  Total IN: 3077 mL    OUT:    Indwelling Catheter - Urethral: 2195 mL  Total OUT: 2195 mL    Total NET: 882 mL      11-27 @ 07:01  -  11-28 @ 02:43  --------------------------------------------------------  IN:    dexmedetomidine Infusion: 56.3 mL    Enteral Tube Flush: 230 mL    fentaNYL Infusion.: 630 mL    IV PiggyBack: 400 mL    Pivot: 280 mL    propofol Infusion: 141.9 mL  Total IN: 1738.2 mL    OUT:    Emesis: 200 mL    Indwelling Catheter - Urethral: 445 mL    Nasoenteral Tube: 450 mL    Voided: 800 mL  Total OUT: 1895 mL    Total NET: -156.8 mL          11-27    137  |  98  |  14  ----------------------------<  113<H>  3.6   |  30  |  0.46<L>    Ca    8.2<L>      27 Nov 2018 11:35  Phos  3.5     11-27  Mg     1.5     11-27      [ ] Andrade catheter, indication: N/A  Meds: sodium chloride 0.9% Bolus 1000 milliLiter(s) IV Bolus once        HEMATOLOGIC  Meds:   [x] VTE Prophylaxis                        8.2    5.20  )-----------( 322      ( 27 Nov 2018 11:35 )             26.0       Transfusion     [ ] PRBC   [ ] Platelets   [ ] FFP   [ ] Cryoprecipitate      INFECTIOUS DISEASES  WBC Count: 5.20 K/uL (11-27 @ 11:35)    RECENT CULTURES:  Specimen Source: BLOOD PERIPHERAL  Date/Time: 11-25 @ 03:55  Culture Results: --  Gram Stain: --  Organism: BLOOD CULTURE PCR  Staphylococcus sp.,coag neg  Specimen Source: BLOOD  Date/Time: 11-23 @ 21:54  Culture Results: --  Gram Stain: --  Organism: --  Specimen Source: BLOOD VENOUS  Date/Time: 11-23 @ 19:52  Culture Results: --  Gram Stain: --  Organism: --    Meds: piperacillin/tazobactam IVPB. 3.375 Gram(s) IV Intermittent every 8 hours  vancomycin  IVPB 1000 milliGRAM(s) IV Intermittent every 12 hours        ENDOCRINE  CAPILLARY BLOOD GLUCOSE        Meds:       ACCESS DEVICES:  [ ] Peripheral IV  [ ] Central Venous Line	[ ] R	[ ] L	[ ] IJ	[ ] Fem	[ ] SC	Placed:   [ ] Arterial Line		[ ] R	[ ] L	[ ] Fem	[ ] Rad	[ ] Ax	Placed:   [ ] PICC:					[ ] Mediport  [ ] Urinary Catheter, Date Placed:   [x] Necessity of urinary, arterial, and venous catheters discussed    OTHER MEDICATIONS:  chlorhexidine 4% Liquid 1 Application(s) Topical <User Schedule>      CODE STATUS:  Yes      IMAGING: HISTORY  58M with hx of CHF in 2014 (LVEF 40-45%), bipolar d/o, YANET, diaphragmatic dysfunction, lung lobectomy, 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 Also with difficulty breathing, s/p urgent tracheostomy on 11/20. Patient had bleeding from sinus tumor this morning, packed and controlled by ENT. Overall bleeding, CBC stable but given critical airway, SICU re-consulted for close monitoring of bleed & airway.    24 HOUR EVENTS:  Andrade replaced by condom catheter, started on carvedilol and colace. Was made DNR/comfort measures only last night, however patient aspirated 1:30pm yesterday into trach and packing, desaturated, and became agitated. Wife was upset and decided to rescind the DNR/CMO - and made patient full code, repeatedly requesting that all medications be reverted back to 'normal' (ie fent and prop gtt only) - precedex, methadone, olanzapine, all discontinued. Packing was replaced by ENT at bedside, no significant bleeding during packing change. Vanc and zosyn started for empiric coverage of suspected pneumonia. 6:30pm last night became tachycardiac, febrile.EKG showed V4-6 T wave inversions, along with other nonspecific findings different from prior EKG one week ago. Given contraindication to AC as well as poor prognosis precluding cath lab, trops were sent and bedside echo showed markedly decreased EF and SV. Patient's MAPs began trending towards 65; levophed hung at bedside in case. Port okay to access. Bedside echo shows markedly decreased EF and increased hypervolemia compared with prior study.    SUBJECTIVE/ROS:  [ ] A ten-point review of systems was otherwise negative except as noted.  [x] Due to altered mental status/intubation, subjective information were not able to be obtained from the patient. History was obtained, to the extent possible, from review of the chart and collateral sources of information.      NEURO  Meds: dexmedetomidine Infusion 0.2 MICROgram(s)/kG/Hr IV Continuous <Continuous>  dexmedetomidine Infusion 0.2 MICROgram(s)/kG/Hr IV Continuous <Continuous>  fentaNYL   Infusion. 7 MICROgram(s)/kG/Hr IV Continuous <Continuous>  methadone   Solution 10 milliGRAM(s) Oral every 8 hours  OLANZapine 5 milliGRAM(s) Oral daily  pregabalin 75 milliGRAM(s) Oral two times a day  propofol Infusion 50 MICROgram(s)/kG/Min IV Continuous <Continuous>    [x] Adequacy of sedation and pain control has been assessed and adjusted      RESPIRATORY  RR: 19 (11-28-18 @ 00:00) (17 - 24)  SpO2: 100% (11-28-18 @ 00:00) (96% - 100%)  Wt(kg): --  Exam: unlabored, clear to auscultation bilaterally  Mechanical Ventilation: Mode: AC/ CMV (Assist Control/ Continuous Mandatory Ventilation), RR (machine): 20, RR (patient): 20, TV (machine): 500, FiO2: 50, PEEP: 5, MAP: 9, PIP: 18  ABG - ( 27 Nov 2018 19:00 )  pH: 7.47  /  pCO2: 41    /  pO2: 207   / HCO3: 30    / Base Excess: 5.7   /  SaO2: 99.5    Lactate: 1.2              CARDIOVASCULAR  HR: 87 (11-28-18 @ 00:00) (74 - 134)  BP: 88/57 (11-28-18 @ 00:00) (83/54 - 152/94)  BP(mean): 64 (11-28-18 @ 00:00) (61 - 108)  VBG - ( 27 Nov 2018 11:35 )  pH: 7.40  /  pCO2: 57    /  pO2: 51    / HCO3: 32    / Base Excess: 9.1   /  SaO2: 82.1   Lactate: 0.7            GI/NUTRITION  Exam: soft, nontender, nondistended, incision C/D/I  Meds: docusate sodium Liquid 100 milliGRAM(s) Oral two times a day  pantoprazole   Suspension 40 milliGRAM(s) Oral daily  polyethylene glycol 3350 17 Gram(s) Oral daily PRN Constipation      GENITOURINARY  I&O's Detail    11-26 @ 07:01  -  11-27 @ 07:00  --------------------------------------------------------  IN:    Enteral Tube Flush: 60 mL    fentaNYL Infusion.: 1260 mL    Pivot: 1260 mL    propofol Infusion: 497 mL  Total IN: 3077 mL    OUT:    Indwelling Catheter - Urethral: 2195 mL  Total OUT: 2195 mL    Total NET: 882 mL      11-27 @ 07:01  -  11-28 @ 02:43  --------------------------------------------------------  IN:    dexmedetomidine Infusion: 56.3 mL    Enteral Tube Flush: 230 mL    fentaNYL Infusion.: 630 mL    IV PiggyBack: 400 mL    Pivot: 280 mL    propofol Infusion: 141.9 mL  Total IN: 1738.2 mL    OUT:    Emesis: 200 mL    Indwelling Catheter - Urethral: 445 mL    Nasoenteral Tube: 450 mL    Voided: 800 mL  Total OUT: 1895 mL    Total NET: -156.8 mL          11-27    137  |  98  |  14  ----------------------------<  113<H>  3.6   |  30  |  0.46<L>    Ca    8.2<L>      27 Nov 2018 11:35  Phos  3.5     11-27  Mg     1.5     11-27      [ ] Andrade catheter, indication: N/A  Meds: sodium chloride 0.9% Bolus 1000 milliLiter(s) IV Bolus once        HEMATOLOGIC  Meds:   [x] VTE Prophylaxis                        8.2    5.20  )-----------( 322      ( 27 Nov 2018 11:35 )             26.0       Transfusion     [ ] PRBC   [ ] Platelets   [ ] FFP   [ ] Cryoprecipitate      INFECTIOUS DISEASES  WBC Count: 5.20 K/uL (11-27 @ 11:35)    RECENT CULTURES:  Specimen Source: BLOOD PERIPHERAL  Date/Time: 11-25 @ 03:55  Culture Results: --  Gram Stain: --  Organism: BLOOD CULTURE PCR  Staphylococcus sp.,coag neg  Specimen Source: BLOOD  Date/Time: 11-23 @ 21:54  Culture Results: --  Gram Stain: --  Organism: --  Specimen Source: BLOOD VENOUS  Date/Time: 11-23 @ 19:52  Culture Results: --  Gram Stain: --  Organism: --    Meds: piperacillin/tazobactam IVPB. 3.375 Gram(s) IV Intermittent every 8 hours  vancomycin  IVPB 1000 milliGRAM(s) IV Intermittent every 12 hours        ENDOCRINE  CAPILLARY BLOOD GLUCOSE        Meds:       ACCESS DEVICES:  [ ] Peripheral IV  [ ] Central Venous Line	[ ] R	[ ] L	[ ] IJ	[ ] Fem	[ ] SC	Placed:   [ ] Arterial Line		[ ] R	[ ] L	[ ] Fem	[ ] Rad	[ ] Ax	Placed:   [ ] PICC:					[ ] Mediport  [ ] Urinary Catheter, Date Placed:   [x] Necessity of urinary, arterial, and venous catheters discussed    OTHER MEDICATIONS:  chlorhexidine 4% Liquid 1 Application(s) Topical <User Schedule>      CODE STATUS:  Yes      IMAGING:

## 2018-11-28 NOTE — PROGRESS NOTE ADULT - ASSESSMENT
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 Was recovering well but began to have recurrence of oral bleeding in setting of critical airway. SICU consulted and accepted pt to unit. Unable to remove packing given recurrence of bleeding.    Neuro: Post operative pain, Anxiety  - pt on fentanyl, propofol gtt  - wife refusing any other pain / sedating medications    Resp: s/p Trach  - f/u CXR 11/28  - Airway repacked by ENT 11/27  - Mode: AC/ CMV (Assist Control/ Continuous Mandatory Ventilation), RR (machine): 20, TV (machine): 500, FiO2: 40, PEEP: 5, MAP: 10, PIP: 19  - For trach collar trial once sedatives have been weaned    CV: Hx HTN  - concern for NSTEMI - likely demand ischemia  - major contraindications to both anticoagulation therapy and cath  - continue monitoring troponins, ekg    GI:   - TF on hold after aspiration episode  - c/w TF: Pivot @70 by PEG (placed 10/31)    Renal:  - condom cath    Heme:   - SCDs for now, hold off chemical DVT ppx  - trend H/H    ID:   -Tylenol PRN for fevers  - Most recent BCx growing coag negative staph, no abx    Endo:  - no issues    Patient is now FULL CODE again as of 11/27 59 y/o M with Hx CHF (EF 40-45%), 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 Was recovering well but began to have recurrence of oral bleeding in setting of critical airway. SICU consulted  given bleeding for airway management. Patient made DNR/comfort however patient aspirated 11/28 into trach and packing, desaturated, and became agitated. Wife was upset and decided to rescind the DNR/CMO. Packing was replaced by ENT at bedside, no significant bleeding. Vanc and zosyn started for empiric coverage of suspected pneumonia. Same day EKG showed V4-6 T wave inversions, along with other nonspecific findings different from prior EKG one week ago. Given contraindication to AC as well as poor prognosis precluding cath lab, trops were sent and bedside echo showed markedly decreased EF and SV. Patient's MAPs began trending towards 65; levophed hung at bedside in case.    Neuro: Post operative pain, Anxiety  - will start methadone 10mg TID  - pt on fentanyl, propofol gtt - will wean both as tolerated    Resp: s/p Trach  - Airway repacked by ENT 11/27, minimal bleeding at that time  - Aspiration episode 11/27   - Mode: AC/ CMV (Assist Control/ Continuous Mandatory Ventilation), RR (machine): 20, TV (machine): 500, FiO2: 40, PEEP: 5, MAP: 10, PIP: 19  - For trach collar trial once sedatives have been weaned    CV: Hx HTN  - concern for NSTEMI - likely demand ischemia  - major contraindications to both anticoagulation therapy and cath  - continue monitoring troponins, ekg  - bedside echo 11/27: markedly decreased EF  - obtain formal echo (last EF 40-45%)  - started on levo overnight, will place a-line  - holding home coreg, labetalol    GI:   - TFs held, Pivot @70 by PEG (placed 10/31)  - Trial flush of 400cc water, check residuals, if pt tolerates, can resume TFs  - continue with Colace  - suppository today    Renal:  - condom cath  - replete lytes PRN    Heme:   - SCDs for now, hold off chemical DVT ppx  - trend H/H  - wife asking to speak with oncologist    ID:   - CTM Tmax, WBC  - Most recent BCx growing coag negative staph - not given abx given DNR  - Aspiration episode 11/27, tachy and febrile, DNR resversed, started on abx for aspiration pneumonia vs. 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 Was recovering well but began to have recurrence of oral bleeding in setting of critical airway. SICU consulted  given bleeding for airway management. Patient made DNR/comfort however patient aspirated 11/28 into trach and packing, desaturated, and became agitated. Wife was upset and decided to rescind the DNR/CMO. Packing was replaced by ENT at bedside, no significant bleeding. Vanc and zosyn started for empiric coverage of suspected pneumonia. Same day EKG showed V4-6 T wave inversions, along with other nonspecific findings different from prior EKG one week ago. Given contraindication to AC as well as poor prognosis precluding cath lab, trops were sent and bedside echo showed markedly decreased EF and SV. Patient's MAPs began trending towards 65; levophed hung at bedside in case.    Neuro: Post operative pain, Anxiety  - planned to start methadone 10mg TID however pt with recent demand ischemia, not tolerated PO, and has prolonged QTc, will reassess once tolerating PO and obtain repeat EKG  - pt on fentanyl, propofol gtt    Resp: s/p Trach  - Airway repacked by ENT 11/27, minimal bleeding at that time  - Aspiration episode 11/27   - Mode: AC/ CMV (Assist Control/ Continuous Mandatory Ventilation), RR (machine): 20, TV (machine): 500, FiO2: 40, PEEP: 5, MAP: 10, PIP: 19  - For trach collar trial once sedatives have been weaned    CV: Hx HTN  - concern for NSTEMI - likely demand ischemia  - major contraindications to both anticoagulation therapy and cath  - continue monitoring troponins, ekg  - bedside echo 11/27: markedly decreased EF  - obtain formal echo (last EF 40-45%)  - started on levo overnight, will place a-line  - holding home coreg, labetalol    GI:   - TFs held, Pivot @70 by PEG (placed 10/31)  - Trial flush of 400cc water, check residuals, if pt tolerates, can resume TFs  - continue with Colace  - suppository today  - will start Senna BID per palliative recs    Renal:  - condom cath  - replete lytes PRN    Heme:   - SCDs for now, hold off chemical DVT ppx  - trend H/H  - wife asking to speak with oncologist    ID:   - CTM Tmax, WBC  - Most recent BCx growing coag negative staph - not given abx given DNR  - Aspiration episode 11/27, tachy and febrile, DNR resversed, started on abx for aspiration pneumonia vs. pneumonitis  - c/w vanc/zosyn (11/27-)  - f/u resp cultures  - Tylenol PRN for fevers    Endo:  - no issues    Patient is now FULL CODE again as of 11/27    Dispo: SICU    Critical care diagnosis: requires airway management, aspiration pneumonia, severe sepsis, requires pressors

## 2018-11-28 NOTE — PROGRESS NOTE ADULT - SUBJECTIVE AND OBJECTIVE BOX
Pt seen this AM. Yesterday had an episode of vomiting, packing was removed and replaced. Palliative's note signed yesterday at 10:30p says patient still DNR however patient's wife has since then rescinded the DNR order and patient is back to being full code. Hypotensive to 83/59 MAP ~65, IVF bolus given not started on pressors but levophed at bedside if needed, on full mech vent support, Tmax 102.3, started on vanc/zosyn, WBC 5 -> 10, h/h stable 8.7/27.7, T wave changes on EKG with troponins elevated 177->211->143 concerning for NSTEMI but patient not a candidate for AC or cath, CXR with concern for multifocal PNA, TFs currently on hold 2/2 vomiting and aspiration episode yesterday, repeat bcx pending.    AVSS  NAD, awake and alert  6LPC trach in place with sutures, on vent  NC: mass, no bleeding  oc/op: kerlix x2 in place, no pooling or active bleeding  Neck soft and flat, stoma intact   Andrade in place    CXR  IMPRESSION:    1. Persistent pneumoperitoneum.  2. Right lung base opacity consistent with aspiration pneumonia.  3. Developing interstitial edema versus multifocal pneumonia.    A/P: s/p awake tracheotomy 11/19 for airway protection now w/ oral packing which has to remain in place due to continued bleeding from the tumor. Patient was DNR, comfort measures only however wife rescinded DNR and patient is now full code with active issues as noted above.   -maintain oral packing in place, do NOT remove, abx for TTS ppx while packing remains in place, currently on vanc/zosyn  -ent will not change oral packing unless there is active bleeding  -routine trach care   -will confirm with Dr. Olivo that ethics should be consulted  -SICU care

## 2018-11-28 NOTE — PROGRESS NOTE ADULT - SUBJECTIVE AND OBJECTIVE BOX
Oncology Follow-up    INTERVAL HPI/OVERNIGHT EVENTS:  Patient S&E at bedside.     Oncology history: ma  Maxillary SCC in 6/2018 after presenting with epistaxis and right facial pain and MRI showed a large mass (4.1x3.9x4.3cm) in the right maxillary sinus extending into nasal cavity, inferior pterygopalatine fossa with V2 perineural spread.  PET/CT also done in 6/2018 showed maxillary sinus mass with SUV of 22.  He was started on induction TPF x 3 cycles in hopes of removing the mass surgically but had POD.  Subsequently he was started on Cisplatin/RT.  So far he hs received one dose of cisplatin and 4 sessions of RT.  He could not tolerate the 5th session due to inability to lie flat due to SOB and thus s/p tracheostomy this admission.  Of note, CT scan 11/21 showed a large hypervascular mass within the right sinonasal compartment measuring approximately 9.7 x 6.9 x 8.5 cm.      VITAL SIGNS:  T(F): 96.5 (11-28-18 @ 12:00)  HR: 80 (11-28-18 @ 12:00)  BP: 120/72 (11-28-18 @ 12:00)  RR: 20 (11-28-18 @ 12:00)  SpO2: 100% (11-28-18 @ 12:00)  Wt(kg): --    PHYSICAL EXAM:    Constitutional: AAOx3, NAD,   Eyes: PERRL, EOMI, sclera non-icteric  Neck: supple, no masses, no JVD  Respiratory: CTA b/l, good air entry b/l, no wheezing, rhonchi, rales, with normal respiratory effort and no intercostal retractions  Cardiovascular: RRR, normal S1S2, no M/R/G  Gastrointestinal: soft, NTND, no masses palpable, BS normal in all four quadrants, no HSM  Extremities:  no c/c/e  Neurological: Grossly intact  Skin: Normal temperature    MEDICATIONS  (STANDING):  chlorhexidine 4% Liquid 1 Application(s) Topical <User Schedule>  docusate sodium Liquid 100 milliGRAM(s) Oral two times a day  fentaNYL   Infusion. 7 MICROgram(s)/kG/Hr (52.5 mL/Hr) IV Continuous <Continuous>  norepinephrine Infusion 0.05 MICROgram(s)/kG/Min (3.516 mL/Hr) IV Continuous <Continuous>  OLANZapine 5 milliGRAM(s) Oral daily  pantoprazole   Suspension 40 milliGRAM(s) Oral daily  piperacillin/tazobactam IVPB. 3.375 Gram(s) IV Intermittent every 8 hours  pregabalin 75 milliGRAM(s) Oral two times a day  propofol Infusion 50 MICROgram(s)/kG/Min (22.5 mL/Hr) IV Continuous <Continuous>  senna 2 Tablet(s) Oral at bedtime  vancomycin  IVPB 1000 milliGRAM(s) IV Intermittent every 12 hours    MEDICATIONS  (PRN):  bisacodyl Suppository 10 milliGRAM(s) Rectal daily PRN Constipation  polyethylene glycol 3350 17 Gram(s) Oral daily PRN Constipation      hospital socks (Rash)  lisinopril (Anaphylaxis)  statins (Anaphylaxis)      LABS:                        8.7    10.23 )-----------( 401      ( 28 Nov 2018 05:00 )             27.7     11-28    137  |  98  |  13  ----------------------------<  108<H>  4.1   |  26  |  0.48<L>    Ca    8.3<L>      28 Nov 2018 05:00  Phos  4.1     11-28  Mg     1.7     11-28             RADIOLOGY & ADDITIONAL TESTS:  Studies reviewed. Oncology Follow-up    INTERVAL HPI/OVERNIGHT EVENTS:  Patient S&E at bedside. Intubated and packed to control mouth bleeding.     Oncology history: ma  Maxillary SCC in 6/2018 after presenting with epistaxis and right facial pain and MRI showed a large mass (4.1x3.9x4.3cm) in the right maxillary sinus extending into nasal cavity, inferior pterygopalatine fossa with V2 perineural spread.  PET/CT also done in 6/2018 showed maxillary sinus mass with SUV of 22.  He was started on induction TPF x 3 cycles in hopes of removing the mass surgically but had POD.  Subsequently he was started on Cisplatin/RT.  So far he hs received one dose of cisplatin and 4 sessions of RT.  He could not tolerate the 5th session due to inability to lie flat due to SOB and thus s/p tracheostomy this admission.  Of note, CT scan 11/21 showed a large hypervascular mass within the right sinonasal compartment measuring approximately 9.7 x 6.9 x 8.5 cm.      VITAL SIGNS:  T(F): 96.5 (11-28-18 @ 12:00)  HR: 80 (11-28-18 @ 12:00)  BP: 120/72 (11-28-18 @ 12:00)  RR: 20 (11-28-18 @ 12:00)  SpO2: 100% (11-28-18 @ 12:00)  Wt(kg): --    PHYSICAL EXAM:    Constitutional: sedated and intubated   Neck: supple, no masses, no JVD  Respiratory:crackles in the anterior chest   Cardiovascular: RRR, normal S1S2, no M/R/G  Gastrointestinal: soft, NTND, no masses palpable, BS normal in all four quadrants, no HSM  Extremities:  no c/c/e  Neurological: sedated   Skin: Normal temperature    MEDICATIONS  (STANDING):  chlorhexidine 4% Liquid 1 Application(s) Topical <User Schedule>  docusate sodium Liquid 100 milliGRAM(s) Oral two times a day  fentaNYL   Infusion. 7 MICROgram(s)/kG/Hr (52.5 mL/Hr) IV Continuous <Continuous>  norepinephrine Infusion 0.05 MICROgram(s)/kG/Min (3.516 mL/Hr) IV Continuous <Continuous>  OLANZapine 5 milliGRAM(s) Oral daily  pantoprazole   Suspension 40 milliGRAM(s) Oral daily  piperacillin/tazobactam IVPB. 3.375 Gram(s) IV Intermittent every 8 hours  pregabalin 75 milliGRAM(s) Oral two times a day  propofol Infusion 50 MICROgram(s)/kG/Min (22.5 mL/Hr) IV Continuous <Continuous>  senna 2 Tablet(s) Oral at bedtime  vancomycin  IVPB 1000 milliGRAM(s) IV Intermittent every 12 hours    MEDICATIONS  (PRN):  bisacodyl Suppository 10 milliGRAM(s) Rectal daily PRN Constipation  polyethylene glycol 3350 17 Gram(s) Oral daily PRN Constipation      hospital socks (Rash)  lisinopril (Anaphylaxis)  statins (Anaphylaxis)      LABS:                        8.7    10.23 )-----------( 401      ( 28 Nov 2018 05:00 )             27.7     11-28    137  |  98  |  13  ----------------------------<  108<H>  4.1   |  26  |  0.48<L>    Ca    8.3<L>      28 Nov 2018 05:00  Phos  4.1     11-28  Mg     1.7     11-28             RADIOLOGY & ADDITIONAL TESTS:  Studies reviewed.

## 2018-11-28 NOTE — PROGRESS NOTE ADULT - PROBLEM SELECTOR PLAN 5
DNR recinded by patient's wife post vomiting event yesterday.  She understands his medical condition, but believes his care will be better without the DNR in place.  Will continue to discuss with family. Psychosocial support provided.  Will continue to follow up for symptom management and goals of care. DNR rescinded by patient's wife post vomiting event yesterday.  She understands his medical condition, but believes his care will be better without the DNR in place.  Will continue to discuss with family. Psychosocial support provided.  Will continue to follow up for symptom management and goals of care.

## 2018-11-28 NOTE — PROGRESS NOTE ADULT - PROBLEM SELECTOR PLAN 3
Wife reports last significant BM was 10 days ago.  Patient will need a bowel regimen via PEG once able to tolerate given high dose opioids.  Dulcolax GA given this AM.

## 2018-11-28 NOTE — PROGRESS NOTE ADULT - PROBLEM SELECTOR PLAN 2
Patient currently on Fentanyl, Propofol, and Precedex.  Would not recommend Methadone at this time given patient's EKG changes yesterday with QTc 481 - and vomiting.  Would ensure patient is able to tolerate feeds prior to initiating regimen via PEG and would need to recheck EKG to assess QTc prior to initiation.  Will re-assess patient's ability to tolerate PO tomorrow and determine appropriate plan.  Would recommend continuing Fentanyl infusion at this time and allow patient's cardiac status and GI status to stabilize before any changes are made to pain regimen.  Discussed with wife and team.  In agreement. Patient currently on Fentanyl and Propofol.  Would not recommend Methadone at this time given patient's EKG changes yesterday with QTc 481 - and vomiting.  Would ensure patient is able to tolerate feeds prior to initiating regimen via PEG and would need to recheck EKG to assess QTc prior to initiation.  Will re-assess patient's ability to tolerate PO tomorrow and determine appropriate plan.  Would recommend continuing Fentanyl infusion at this time and allow patient's cardiac status and GI status to stabilize before any changes are made to pain regimen.  Discussed with wife and team.  In agreement.

## 2018-11-29 LAB
BASE EXCESS BLDA CALC-SCNC: 8.4 MMOL/L — SIGNIFICANT CHANGE UP
BUN SERPL-MCNC: 10 MG/DL — SIGNIFICANT CHANGE UP (ref 7–23)
CALCIUM SERPL-MCNC: 8.3 MG/DL — LOW (ref 8.4–10.5)
CHLORIDE BLDA-SCNC: 101 MMOL/L — SIGNIFICANT CHANGE UP (ref 96–108)
CHLORIDE SERPL-SCNC: 100 MMOL/L — SIGNIFICANT CHANGE UP (ref 98–107)
CO2 SERPL-SCNC: 30 MMOL/L — SIGNIFICANT CHANGE UP (ref 22–31)
CREAT SERPL-MCNC: 0.52 MG/DL — SIGNIFICANT CHANGE UP (ref 0.5–1.3)
GLUCOSE BLDA-MCNC: 91 MG/DL — SIGNIFICANT CHANGE UP (ref 70–99)
GLUCOSE SERPL-MCNC: 86 MG/DL — SIGNIFICANT CHANGE UP (ref 70–99)
HCO3 BLDA-SCNC: 32 MMOL/L — HIGH (ref 22–26)
HCT VFR BLD CALC: 26.8 % — LOW (ref 39–50)
HCT VFR BLDA CALC: 25.1 % — LOW (ref 39–51)
HGB BLD-MCNC: 8.5 G/DL — LOW (ref 13–17)
HGB BLDA-MCNC: 8.1 G/DL — LOW (ref 13–17)
LACTATE BLDA-SCNC: 1 MMOL/L — SIGNIFICANT CHANGE UP (ref 0.5–2)
MAGNESIUM SERPL-MCNC: 1.6 MG/DL — SIGNIFICANT CHANGE UP (ref 1.6–2.6)
MCHC RBC-ENTMCNC: 27.7 PG — SIGNIFICANT CHANGE UP (ref 27–34)
MCHC RBC-ENTMCNC: 31.7 % — LOW (ref 32–36)
MCV RBC AUTO: 87.3 FL — SIGNIFICANT CHANGE UP (ref 80–100)
NRBC # FLD: 0 — SIGNIFICANT CHANGE UP
PCO2 BLDA: 46 MMHG — SIGNIFICANT CHANGE UP (ref 35–48)
PH BLDA: 7.46 PH — HIGH (ref 7.35–7.45)
PHOSPHATE SERPL-MCNC: 3.1 MG/DL — SIGNIFICANT CHANGE UP (ref 2.5–4.5)
PLATELET # BLD AUTO: 399 K/UL — SIGNIFICANT CHANGE UP (ref 150–400)
PMV BLD: 9.5 FL — SIGNIFICANT CHANGE UP (ref 7–13)
PO2 BLDA: 120 MMHG — HIGH (ref 83–108)
POTASSIUM BLDA-SCNC: 3.2 MMOL/L — LOW (ref 3.4–4.5)
POTASSIUM SERPL-MCNC: 3.5 MMOL/L — SIGNIFICANT CHANGE UP (ref 3.5–5.3)
POTASSIUM SERPL-SCNC: 3.5 MMOL/L — SIGNIFICANT CHANGE UP (ref 3.5–5.3)
RBC # BLD: 3.07 M/UL — LOW (ref 4.2–5.8)
RBC # FLD: 15.2 % — HIGH (ref 10.3–14.5)
SAO2 % BLDA: 98.7 % — SIGNIFICANT CHANGE UP (ref 95–99)
SODIUM BLDA-SCNC: 138 MMOL/L — SIGNIFICANT CHANGE UP (ref 136–146)
SODIUM SERPL-SCNC: 140 MMOL/L — SIGNIFICANT CHANGE UP (ref 135–145)
TROPONIN T, HIGH SENSITIVITY: 115 NG/L — CRITICAL HIGH (ref ?–14)
VANCOMYCIN TROUGH SERPL-MCNC: 7.6 UG/ML — LOW (ref 10–20)
WBC # BLD: 8.3 K/UL — SIGNIFICANT CHANGE UP (ref 3.8–10.5)
WBC # FLD AUTO: 8.3 K/UL — SIGNIFICANT CHANGE UP (ref 3.8–10.5)

## 2018-11-29 PROCEDURE — 49465 FLUORO EXAM OF G/COLON TUBE: CPT

## 2018-11-29 PROCEDURE — 93010 ELECTROCARDIOGRAM REPORT: CPT

## 2018-11-29 PROCEDURE — 99232 SBSQ HOSP IP/OBS MODERATE 35: CPT | Mod: GC

## 2018-11-29 PROCEDURE — 99291 CRITICAL CARE FIRST HOUR: CPT

## 2018-11-29 PROCEDURE — 74018 RADEX ABDOMEN 1 VIEW: CPT | Mod: 26

## 2018-11-29 PROCEDURE — 71045 X-RAY EXAM CHEST 1 VIEW: CPT | Mod: 26

## 2018-11-29 RX ORDER — VANCOMYCIN HCL 1 G
1000 VIAL (EA) INTRAVENOUS EVERY 8 HOURS
Qty: 0 | Refills: 0 | Status: DISCONTINUED | OUTPATIENT
Start: 2018-11-29 | End: 2018-12-01

## 2018-11-29 RX ORDER — NOREPINEPHRINE BITARTRATE/D5W 8 MG/250ML
0.05 PLASTIC BAG, INJECTION (ML) INTRAVENOUS
Qty: 16 | Refills: 0 | Status: DISCONTINUED | OUTPATIENT
Start: 2018-11-29 | End: 2018-12-01

## 2018-11-29 RX ORDER — POTASSIUM CHLORIDE 20 MEQ
10 PACKET (EA) ORAL
Qty: 0 | Refills: 0 | Status: COMPLETED | OUTPATIENT
Start: 2018-11-29 | End: 2018-11-29

## 2018-11-29 RX ORDER — CARVEDILOL PHOSPHATE 80 MG/1
6.25 CAPSULE, EXTENDED RELEASE ORAL EVERY 12 HOURS
Qty: 0 | Refills: 0 | Status: DISCONTINUED | OUTPATIENT
Start: 2018-11-29 | End: 2018-12-07

## 2018-11-29 RX ORDER — ACETAMINOPHEN 500 MG
1000 TABLET ORAL ONCE
Qty: 0 | Refills: 0 | Status: COMPLETED | OUTPATIENT
Start: 2018-11-29 | End: 2018-11-29

## 2018-11-29 RX ADMIN — Medication 100 MILLIEQUIVALENT(S): at 12:35

## 2018-11-29 RX ADMIN — Medication 100 MILLIEQUIVALENT(S): at 14:26

## 2018-11-29 RX ADMIN — PANTOPRAZOLE SODIUM 40 MILLIGRAM(S): 20 TABLET, DELAYED RELEASE ORAL at 11:13

## 2018-11-29 RX ADMIN — PROPOFOL 22.5 MICROGRAM(S)/KG/MIN: 10 INJECTION, EMULSION INTRAVENOUS at 08:00

## 2018-11-29 RX ADMIN — Medication 166.67 MILLIGRAM(S): at 13:01

## 2018-11-29 RX ADMIN — CHLORHEXIDINE GLUCONATE 1 APPLICATION(S): 213 SOLUTION TOPICAL at 10:01

## 2018-11-29 RX ADMIN — PIPERACILLIN AND TAZOBACTAM 25 GRAM(S): 4; .5 INJECTION, POWDER, LYOPHILIZED, FOR SOLUTION INTRAVENOUS at 23:00

## 2018-11-29 RX ADMIN — Medication 100 MILLIGRAM(S): at 23:30

## 2018-11-29 RX ADMIN — PROPOFOL 22.5 MICROGRAM(S)/KG/MIN: 10 INJECTION, EMULSION INTRAVENOUS at 19:30

## 2018-11-29 RX ADMIN — Medication 100 MILLIGRAM(S): at 11:13

## 2018-11-29 RX ADMIN — Medication 100 MILLIGRAM(S): at 00:40

## 2018-11-29 RX ADMIN — FENTANYL CITRATE 52.5 MICROGRAM(S)/KG/HR: 50 INJECTION INTRAVENOUS at 08:00

## 2018-11-29 RX ADMIN — Medication 75 MILLIGRAM(S): at 17:10

## 2018-11-29 RX ADMIN — PIPERACILLIN AND TAZOBACTAM 25 GRAM(S): 4; .5 INJECTION, POWDER, LYOPHILIZED, FOR SOLUTION INTRAVENOUS at 14:26

## 2018-11-29 RX ADMIN — Medication 100 MILLIEQUIVALENT(S): at 10:57

## 2018-11-29 RX ADMIN — Medication 166.67 MILLIGRAM(S): at 21:48

## 2018-11-29 RX ADMIN — Medication 3.52 MICROGRAM(S)/KG/MIN: at 19:30

## 2018-11-29 RX ADMIN — OLANZAPINE 5 MILLIGRAM(S): 15 TABLET, FILM COATED ORAL at 11:13

## 2018-11-29 RX ADMIN — Medication 3.52 MICROGRAM(S)/KG/MIN: at 07:59

## 2018-11-29 RX ADMIN — Medication 75 MILLIGRAM(S): at 05:59

## 2018-11-29 RX ADMIN — Medication 166.67 MILLIGRAM(S): at 06:39

## 2018-11-29 RX ADMIN — FENTANYL CITRATE 52.5 MICROGRAM(S)/KG/HR: 50 INJECTION INTRAVENOUS at 19:30

## 2018-11-29 RX ADMIN — PIPERACILLIN AND TAZOBACTAM 25 GRAM(S): 4; .5 INJECTION, POWDER, LYOPHILIZED, FOR SOLUTION INTRAVENOUS at 05:59

## 2018-11-29 RX ADMIN — SENNA PLUS 2 TABLET(S): 8.6 TABLET ORAL at 21:48

## 2018-11-29 RX ADMIN — Medication 400 MILLIGRAM(S): at 20:45

## 2018-11-29 RX ADMIN — Medication 1000 MILLIGRAM(S): at 21:00

## 2018-11-29 NOTE — CHART NOTE - NSCHARTNOTEFT_GEN_A_CORE
Source: nursing and EMR     Diet : NPO     Patient lethargic, unable to speak, constipated , medical therapy to assist  in BM initiated, family meeting for GOC being scheduled today per EMR ,  pt. currently NPO , EN on hold .  Noted the poor prognosis and palliative involvement in assisting GOC .      Current wt. - 78.7 kg on 11/26/18 ; Admit wt. - 75 kg ; UBW - 77.5 kg ; IBW - 81 KG ; BMI - 23 .5     Pertinent Medications: MEDICATIONS  (STANDING)    :chlorhexidine 4% Liquid 1 Application(s) Topical <User Schedule>  docusate sodium Liquid 100 milliGRAM(s) Oral two times a day  fentaNYL   Infusion. 7 MICROgram(s)/kG/Hr (52.5 mL/Hr) IV Continuous <Continuous>  norepinephrine Infusion 0.05 MICROgram(s)/kG/Min (3.516 mL/Hr) IV Continuous <Continuous>  OLANZapine 5 milliGRAM(s) Oral daily  pantoprazole   Suspension 40 milliGRAM(s) Oral daily  piperacillin/tazobactam IVPB. 3.375 Gram(s) IV Intermittent every 8 hours  potassium chloride  10 mEq/100 mL IVPB 10 milliEquivalent(s) IV Intermittent every 1 hour  pregabalin 75 milliGRAM(s) Oral two times a day  propofol Infusion 50 MICROgram(s)/kG/Min (22.5 mL/Hr) IV Continuous <Continuous>  senna 2 Tablet(s) Oral at bedtime  vancomycin  IVPB 1000 milliGRAM(s) IV Intermittent every 8 hours    MEDICATIONS  (PRN):  bisacodyl Suppository 10 milliGRAM(s) Rectal daily PRN Constipation  polyethylene glycol 3350 17 Gram(s) Oral daily PRN Constipation    Pertinent Labs:  11-29 Na140 mmol/L Glu 86 mg/dL K+ 3.5 mmol/L Cr  0.52 mg/dL BUN 10 mg/dL 11-29 Phos 3.1 mg/dL      Estimated Needs:  1574 - 1967 kcal/d ( 20 - 25 kcal/kg wt. ) protein  81 - 121 gm/d ( 1.0 - 1.5 gm/kg IBW ) -   recalculated:       Recommend EN if consistent with GOC - Pivot 1.5 @ 60 ml/hr 18 hrs

## 2018-11-29 NOTE — PROGRESS NOTE ADULT - SUBJECTIVE AND OBJECTIVE BOX
Oncology Follow-up    INTERVAL HPI/OVERNIGHT EVENTS:  Family meeting today.     VITAL SIGNS:  T(F): 98.7 (11-29-18 @ 16:00)  HR: 80 (11-29-18 @ 16:00)  BP: 113/69 (11-28-18 @ 22:30)  RR: 12 (11-29-18 @ 16:00)  SpO2: 100% (11-29-18 @ 16:00)  Wt(kg): --    PHYSICAL EXAM:    Constitutional: intubated and sedated   Eyes: closed   Oral: packed   Respiratory: crackles bilateral   Cardiovascular: RRR, normal S1S2, no M/R/G  Gastrointestinal: soft, NTND, no masses palpable, BS normal in all four quadrants, no HSM  Extremities:  no c/c/e  Neurological: sedated and intubate d      MEDICATIONS  (STANDING):  carvedilol 6.25 milliGRAM(s) Oral every 12 hours  chlorhexidine 4% Liquid 1 Application(s) Topical <User Schedule>  docusate sodium Liquid 100 milliGRAM(s) Oral two times a day  fentaNYL   Infusion. 7 MICROgram(s)/kG/Hr (52.5 mL/Hr) IV Continuous <Continuous>  norepinephrine Infusion 0.05 MICROgram(s)/kG/Min (3.516 mL/Hr) IV Continuous <Continuous>  OLANZapine 5 milliGRAM(s) Oral daily  pantoprazole   Suspension 40 milliGRAM(s) Oral daily  piperacillin/tazobactam IVPB. 3.375 Gram(s) IV Intermittent every 8 hours  pregabalin 75 milliGRAM(s) Oral two times a day  propofol Infusion 50 MICROgram(s)/kG/Min (22.5 mL/Hr) IV Continuous <Continuous>  senna 2 Tablet(s) Oral at bedtime  vancomycin  IVPB 1000 milliGRAM(s) IV Intermittent every 8 hours    MEDICATIONS  (PRN):  bisacodyl Suppository 10 milliGRAM(s) Rectal daily PRN Constipation  polyethylene glycol 3350 17 Gram(s) Oral daily PRN Constipation      hospital socks (Rash)  lisinopril (Anaphylaxis)  statins (Anaphylaxis)      LABS:                        8.5    8.30  )-----------( 399      ( 29 Nov 2018 05:00 )             26.8     11-29    140  |  100  |  10  ----------------------------<  86  3.5   |  30  |  0.52    Ca    8.3<L>      29 Nov 2018 05:00  Phos  3.1     11-29  Mg     1.6     11-29             RADIOLOGY & ADDITIONAL TESTS:  Studies reviewed.

## 2018-11-29 NOTE — PROGRESS NOTE ADULT - SUBJECTIVE AND OBJECTIVE BOX
Pt seen this AM. No further oral bleeding.     AVSS  NAD, awake and alert  6LPC trach in place with sutures, on vent  NC: mass, non-bleeding  oc/op: kerlix in place, no pooling or active bleeding  Neck soft and flat, stoma intact     A/P: s/p awake tracheotomy 11/19 for airway protection now w/ oral packing which has to remain in place due to continued bleeding from the tumor. DNR rescinded.   -maintain oral packing in place, do NOT remove   -GOC and family meeting today 3pm; Dr Olivo aware   -NPO, PEG feeds  -routine trach care   -ent will not change oral packing unless there is active bleeding  -appreciate palliative recs  -SICU care

## 2018-11-29 NOTE — PROGRESS NOTE ADULT - SUBJECTIVE AND OBJECTIVE BOX
SICU AM PROGRESS NOTE :    Overnight VEnets:    Pt continues to be on AC mode . Fentanyl and propofol gtt. Pt was on Levophed through the [facility] -  On 0.09---> 0.08 . Left RAdial A line was placed overnight . Pt was weaned of Levophed from 10: 30 pm . Per Patient family - Pt is a full Code now .         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 Also with difficulty breathing, s/p urgent tracheostomy on 11/20. Patient had bleeding from sinus tumor this morning, packed and controlled by ENT. Overall bleeding, CBC stable but given critical airway, SICU re-consulted for close monitoring of bleed & airway.    ========================================================================    Neurologic Medications:  fentaNYL   Infusion. 7 MICROgram(s)/kG/Hr IV Continuous <Continuous>  OLANZapine 5 milliGRAM(s) Oral daily  pregabalin 75 milliGRAM(s) Oral two times a day  propofol Infusion 50 MICROgram(s)/kG/Min IV Continuous <Continuous>    Respiratory Medications:    Cardiovascular Medications:  norepinephrine Infusion 0.05 MICROgram(s)/kG/Min IV Continuous <Continuous>    Gastrointestinal Medications:  bisacodyl Suppository 10 milliGRAM(s) Rectal daily PRN  docusate sodium Liquid 100 milliGRAM(s) Oral two times a day  pantoprazole   Suspension 40 milliGRAM(s) Oral daily  polyethylene glycol 3350 17 Gram(s) Oral daily PRN  senna 2 Tablet(s) Oral at bedtime    Genitourinary Medications:    Hematologic/Oncologic Medications:    Antimicrobials/Immunologic Medications:  piperacillin/tazobactam IVPB. 3.375 Gram(s) IV Intermittent every 8 hours  vancomycin  IVPB 1000 milliGRAM(s) IV Intermittent every 12 hours    Endocrine/Metabolic Medications:    Topical/Other Medications:  chlorhexidine 4% Liquid 1 Application(s) Topical <User Schedule>    ===============================================================================    T(C): 36.7 (11-28-18 @ 20:00), Max: 36.7 (11-28-18 @ 20:00)  HR: 92 (11-28-18 @ 23:00) (74 - 95)  BP: 113/69 (11-28-18 @ 22:30) (78/51 - 120/72)  BP(mean): 79 (11-28-18 @ 22:30) (56 - 84)  ABP: 122/67 (11-28-18 @ 23:00) (122/67 - 123/64)  ABP(mean): 84 (11-28-18 @ 23:00) (83 - 84)  RR: 20 (11-28-18 @ 23:00) (16 - 20)  SpO2: 97% (11-28-18 @ 23:00) (97% - 100%)  Wt(kg): --  CVP(mm Hg): --  CI: --  CAPILLARY BLOOD GLUCOSE       N/A      11-27 @ 07:01  -  11-28 @ 07:00  --------------------------------------------------------  IN:    dexmedetomidine Infusion: 56.3 mL    Enteral Tube Flush: 230 mL    fentaNYL Infusion.: 892.5 mL    fentaNYL Infusion.: 157.5 mL    IV PiggyBack: 675 mL    norepinephrine Infusion: 2.1 mL    Pivot: 280 mL    propofol Infusion: 112.5 mL    propofol Infusion: 254.4 mL    Sodium Chloride 0.9% IV Bolus: 1000 mL  Total IN: 3660.3 mL    OUT:    Emesis: 200 mL    Indwelling Catheter - Urethral: 1395 mL    Nasoenteral Tube: 450 mL    Voided: 800 mL  Total OUT: 2845 mL    Total NET: 815.3 mL      11-28 @ 07:01  -  11-29 @ 00:30  --------------------------------------------------------  IN:    Enteral Tube Flush: 60 mL    fentaNYL Infusion.: 892.5 mL    Free Water: 250 mL    IV PiggyBack: 440 mL    norepinephrine Infusion: 64.9 mL    propofol Infusion: 386.5 mL  Total IN: 2093.9 mL    OUT:    Gastrostomy Tube: 200 mL    Indwelling Catheter - Urethral: 2765 mL  Total OUT: 2965 mL    Total NET: -871.1 mL    ===================================================================    PHYSICAL EXAM         NEURO  Intubated , Sedated     RESPIRATORY  Exam: unlabored, clear to auscultation bilaterally  Mechanical Ventilation: Mode: AC/ CMV (Assist Control/ Continuous Mandatory Ventilation), RR (machine): 20, RR (patient): 20, TV (machine): 500, FiO2: 50, PEEP: 5, MAP: 9, PIP: 18    CARDIOVASCULAR  S1S2 heard     GI/NUTRITION  Exam: soft, nontender, nondistended, incision C/D/I        Meds:       ACCESS DEVICES:  [X ] Peripheral IV  [ ] Central Venous Line	[ ] R	[ ] L	[ ] IJ	[ ] Fem	[ ] SC	Placed:   [X ] Arterial Line		[ ] R	[X ] L	[ ] Fem	[X ] Rad	[ ] Ax	Placed:   [ ] PICC:					[ ] Mediport  [ ] Urinary Catheter, Date Placed:   [x] Necessity of urinary, arterial, and venous catheters discussed    OTHER MEDICATIONS:  chlorhexidine 4% Liquid 1 Application(s) Topical <User Schedule>      CODE STATUS:  Yes      IMAGING: SICU AM PROGRESS NOTE :    Overnight VEnets:    Pt continues to be on AC mode . Fentanyl and propofol gtt. Pt was on Levophed through the [facility] -  On 0.09---> 0.08 . Left RAdial A line was placed overnight . Pt was weaned of Levophed from 10: 30 pm but restarted 11/29 am. Per Patient family - Pt is a full Code now .         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 Also with difficulty breathing, s/p urgent tracheostomy on 11/20. Patient had bleeding from sinus tumor this morning, packed and controlled by ENT. Overall bleeding, CBC stable but given critical airway, SICU re-consulted for close monitoring of bleed & airway.    ========================================================================    Neurologic Medications:  fentaNYL   Infusion. 7 MICROgram(s)/kG/Hr IV Continuous <Continuous>  OLANZapine 5 milliGRAM(s) Oral daily  pregabalin 75 milliGRAM(s) Oral two times a day  propofol Infusion 50 MICROgram(s)/kG/Min IV Continuous <Continuous>    Respiratory Medications:    Cardiovascular Medications:  norepinephrine Infusion 0.05 MICROgram(s)/kG/Min IV Continuous <Continuous>    Gastrointestinal Medications:  bisacodyl Suppository 10 milliGRAM(s) Rectal daily PRN  docusate sodium Liquid 100 milliGRAM(s) Oral two times a day  pantoprazole   Suspension 40 milliGRAM(s) Oral daily  polyethylene glycol 3350 17 Gram(s) Oral daily PRN  senna 2 Tablet(s) Oral at bedtime    Genitourinary Medications:    Hematologic/Oncologic Medications:    Antimicrobials/Immunologic Medications:  piperacillin/tazobactam IVPB. 3.375 Gram(s) IV Intermittent every 8 hours  vancomycin  IVPB 1000 milliGRAM(s) IV Intermittent every 12 hours    Endocrine/Metabolic Medications:    Topical/Other Medications:  chlorhexidine 4% Liquid 1 Application(s) Topical <User Schedule>    ===============================================================================    T(C): 36.7 (11-28-18 @ 20:00), Max: 36.7 (11-28-18 @ 20:00)  HR: 92 (11-28-18 @ 23:00) (74 - 95)  BP: 113/69 (11-28-18 @ 22:30) (78/51 - 120/72)  BP(mean): 79 (11-28-18 @ 22:30) (56 - 84)  ABP: 122/67 (11-28-18 @ 23:00) (122/67 - 123/64)  ABP(mean): 84 (11-28-18 @ 23:00) (83 - 84)  RR: 20 (11-28-18 @ 23:00) (16 - 20)  SpO2: 97% (11-28-18 @ 23:00) (97% - 100%)  Wt(kg): --  CVP(mm Hg): --  CI: --  CAPILLARY BLOOD GLUCOSE       N/A      11-27 @ 07:01  -  11-28 @ 07:00  --------------------------------------------------------  IN:    dexmedetomidine Infusion: 56.3 mL    Enteral Tube Flush: 230 mL    fentaNYL Infusion.: 892.5 mL    fentaNYL Infusion.: 157.5 mL    IV PiggyBack: 675 mL    norepinephrine Infusion: 2.1 mL    Pivot: 280 mL    propofol Infusion: 112.5 mL    propofol Infusion: 254.4 mL    Sodium Chloride 0.9% IV Bolus: 1000 mL  Total IN: 3660.3 mL    OUT:    Emesis: 200 mL    Indwelling Catheter - Urethral: 1395 mL    Nasoenteral Tube: 450 mL    Voided: 800 mL  Total OUT: 2845 mL    Total NET: 815.3 mL      11-28 @ 07:01 - 11-29 @ 00:30  --------------------------------------------------------  IN:    Enteral Tube Flush: 60 mL    fentaNYL Infusion.: 892.5 mL    Free Water: 250 mL    IV PiggyBack: 440 mL    norepinephrine Infusion: 64.9 mL    propofol Infusion: 386.5 mL  Total IN: 2093.9 mL    OUT:    Gastrostomy Tube: 200 mL    Indwelling Catheter - Urethral: 2765 mL  Total OUT: 2965 mL    Total NET: -871.1 mL    ===================================================================    PHYSICAL EXAM         NEURO  Intubated , Sedated     RESPIRATORY  Exam: unlabored, clear to auscultation bilaterally  Mechanical Ventilation: Mode: AC/ CMV (Assist Control/ Continuous Mandatory Ventilation), RR (machine): 20, RR (patient): 20, TV (machine): 500, FiO2: 50, PEEP: 5, MAP: 9, PIP: 18    CARDIOVASCULAR  S1S2 heard     GI/NUTRITION  Exam: soft, nontender, nondistended, incision C/D/I        Meds:       ACCESS DEVICES:  [X ] Peripheral IV  [ ] Central Venous Line	[ ] R	[ ] L	[ ] IJ	[ ] Fem	[ ] SC	Placed:   [X ] Arterial Line		[ ] R	[X ] L	[ ] Fem	[X ] Rad	[ ] Ax	Placed:   [ ] PICC:					[ ] Mediport  [ ] Urinary Catheter, Date Placed:   [x] Necessity of urinary, arterial, and venous catheters discussed    OTHER MEDICATIONS:  chlorhexidine 4% Liquid 1 Application(s) Topical <User Schedule>      CODE STATUS:  Yes      IMAGING:

## 2018-11-29 NOTE — PROGRESS NOTE ADULT - ASSESSMENT
59yo M with Right maxillary SCC s/p induction TPF but with POD, recently started on Cis/RT but difficult to tolerate 2/2 to SOB when lying flat from disease burden is now s/p tracheostomy in SICU initially for hemodynamic monitoring. Hospital course complicated with oral cavity bleeding s/p packing, fever 2/2 to aspiration PNA and NSTEMI.     #Right maxillary SCC   Patient with POD after induction chemotherapy, she was receiving cis/RT before admission. Currently with aspiration PNA, NSTEMI and bleeding from oral cavity s/p packing. Clinically tenuous to receive chemotherapy, at this point chemotherapy will be futile in the setting of active infection and bleeding.   Family meeting today with family (wife and the son) , SICU team, Rad-Onc, Anastacia Watson (patient family relation RN) and oncology team. Length discussion about current clinical status, cancer treatment and foundation testing. Next disease modifying agent for recurrent Right maxillary SCC will be pembrolizumab (immunotherapy) IF CLINICALLY stable, but no at this point with no performance status. Wife understands that active therapy for his cancer is not an option but wishes to send foundation testing to check for NTRK1, NTRK2, and NTRK3. Wife understands that testing takes ~ 2-3 weeks to be resulted; but wishes to check NTRK status to see if patient is a candidate for recently approved medication larotrectinib.   -Appreciated SICU care   -Appreciated ENT recs.   -Appreciated Palliative care recs.     Case d/w Dr Carlos and Dr Loza

## 2018-11-29 NOTE — CONSULT NOTE ADULT - ASSESSMENT
Discussions:   Team meetinpm-2:30- Phone conference with Dr. AMERICA Hansen, Dr. GEMA Carlos, TOBY Sim     Discussion regarding request for testing for TRK fusion for Larotrectinib and applicablility due to the patient’s medical status. Patient’s condition discussed and updated to include newly prognosticated  NSTEMI which further complicates his prognosis. Medical Oncology now deferring offering testing for TRK fusion.  Discussion ensued regarding patient’s family being extremely proactive and their mentality of "refusing to give up and not leaving any stone unturned." Time was also discussed as to a "wait and see" how the patient does post NSTEMI and deferring testing decision .    Bioethics analysis: This case highlights the conflict between social justice and proportionality in an era of precision medicine. At issue is whether utilizing gene fusion testing has a benefit in this case versus the risks of not transitioning to palliative care in a patient whose hospital course has deteriorated to the use of IV pressors, myocardial infarction and ventilator dependence.     Beneficence speaks to a physician’s duty to recommend and perform medical treatments that would be of maximal benefit to the patient and impose the least amount of harm (non-maleficence). Physicians utilize the concept of proportionality (the balance between beneficence and non-maleficence) to inform their own medical expertise for what treatments to provide and offer. In this case, the test requested by the wife is not applicable to current treatment options according to current research. There have been scattered case reports of patients with TRK fusions in hematologic malignancies, but not a real systematic study. In rare instances, oncogenic tropomyosin receptor kinase (TRK) gene fusions occur in hematologic cancers, and these malignancies might respond to larotrectinib. In May 2018,  Larotrectinib a highly-selective TRK inhibitor received FDA approval. Nnamdi et al. (2018) demonstrated that less than 5% of SCC of the head and neck have TRK gene fusions (<http://www.cancernetwork.com/novel-targets/trk-fusions-identified-hematologic-cancers)>. Therefore, the likelihood of TRK expression is a slim prospect. Proportionality would suggest the current priority is treatment of his life threatening condition and deferring testing.    Additionally, physicians are not obligated to provide medical treatment that would be harmful, or otherwise not beneficial to the patient. Key to nonmaleficence is addressing cardiopulmonary status and consisderation of palliation for his terminal cardiac condition. Crucial to a palliative framework is addressing the existential suffering of caregivers. Therefore, realistically transitioning hope for quality of life to quality of death is important in this case. Larotrectinib is contraindicated at present so testing shuld be deferred at best and communicated realistically to the surrogates.      The principle of social justice also needs to be addressed. We are in an era of value based health care considerations which would suggest that in fairness and equity that the test would be indicated if applicable.  At this juncture due to the patient’s extremely poor prognosis, testing need not be offered.     RECOMMENDATION:  The ethics team commends the team for their virtue in the care of Mr. Campbell and his family.  Open and honest communication about prognosis is of utmost importance when supporting a patient’s family in this situation. In this case, the physician’s duty toward beneficence calls on his or her duty to evaluate the risks and benefits of all interventions including tests that don’t support or are applicable in this case. Ethics will journey with the family and palliative care to express that we are not opposed to TRK testing however at this juncture the priority is to stabilize the acute life threatening condition and utilizing the tincture of time to address the psychospiritual grieving of this soledad family.    Thank you for this most challenging case.              Case discussed with and co-written by Medical Ethicist Katya Bowser, RAYNE Director of medical ethics consultation    More than 50% of the time of this consultation was spent in coordination of Care of Patient

## 2018-11-29 NOTE — CONSULT NOTE ADULT - SUBJECTIVE AND OBJECTIVE BOX
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 Patient currently with rescinded DNR for tracheostomy to support and accommodate need for chemo/radiation therapy. 11/21 CT Scan showed a large hypervascular mass within the right sinonasal compartment measuring approximately 9.7 x 6.9 x 8.5 cm. Hospital course complicated with oral cavity bleeding s/p packing, aspiration and fever due to pneumonia and currently patient with NSTEMI, ventilated with propofol, fentynal and levophed support.       On November 29th, team meeting with wife and son. Wife requesting precision medicine testing for tropomyosin receptor kinase (TRK) gene fusions  in order to be considered for recently approved Larotrectinib. At present oncology has communicated that the patient is not a candidate for chemotherapy nor radiation at this time. Next immunotherapy if the patient were clinical stable would have been pembrolizumab (Keytruda®) The results of TRK fusion can take 2-3 weeks. The patient’s wife is requesting the test be done now so that if the patient is recovered enough for the medication, he can be considered sooner rather than later. The clinical teams are discussing risks/benefits of offering precision medicine testing that cannot be exected to aid this patient but may acquiesce and pacify wife’s request.    Ethics approached by medicine/oncology to discuss the wife’s request prior to Medical Oncology having a 3pm meeting with wife and family.        Prognosis Estimate (survival in hrs, days, wks, mos, yrs): days to weeks  Patient Decision-Making Capacity: Has Capacity Lacks capacity.    Patient Aware of:  Diagnosis:   Yes    No   Unknown    Prognosis:   Yes    No   Unknown       Name of medical decision-maker should patient lack capacity (relationship): Akiko Rosen- Wife  Role:   Health Care Agent      Legal Surrogate   Contact #(s): [phone]  Other Decision-Maker (i.e., HCA or Surrogate) Aware of:  Diagnosis: Yes   No      Prognosis:   Yes     No  Other Stake-Holders:  adult children  Evidence of Patient’s Preference of Life-Sustaining Treatment (Written or Oral): none  Resuscitation status:   DNR:  Yes   No      DNI:  Yes   No        DNR rescinded by wife as she felt pressured

## 2018-11-29 NOTE — PROGRESS NOTE ADULT - ASSESSMENT
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 Was recovering well but began to have recurrence of oral bleeding in setting of critical airway. SICU consulted  given bleeding for airway management. Patient made DNR/comfort however patient aspirated 11/28 into trach and packing, desaturated, and became agitated. Wife was upset and decided to rescind the DNR/CMO. Packing was replaced by ENT at bedside, no significant bleeding. Vanc and zosyn started for empiric coverage of suspected pneumonia. Same day EKG showed V4-6 T wave inversions, along with other nonspecific findings different from prior EKG one week ago. Given contraindication to AC as well as poor prognosis precluding cath lab, trops were sent and bedside echo showed markedly decreased EF and SV. Patient's MAPs began trending towards 65; levophed hung at bedside in case.    Neuro: Post operative pain, Anxiety  - planned to start methadone 10mg TID however pt with recent demand ischemia, not tolerated PO, and has prolonged QTc, will reassess once tolerating PO and obtain repeat EKG  - pt on fentanyl, propofol gtt    Resp: s/p Trach  - Airway repacked by ENT 11/27, minimal bleeding at that time  - Mode: AC/ CMV (Assist Control/ Continuous Mandatory Ventilation), RR (machine): 20, TV (machine): 500, FiO2: 40, PEEP: 5, MAP: 10, PIP: 19  - For trach collar trial once sedatives have been weaned    CV: Hx HTN  - concern for NSTEMI - likely demand ischemia  - major contraindications to both anticoagulation therapy and cath  - continue monitoring troponins, ekg  - bedside echo 11/27: markedly decreased EF  - obtain formal echo (last EF 40-45%)  -Weaned off Levophed since 10 : 30 pm - S/p left Radial A line   - holding home coreg, labetalol    GI:   - TFs held, Pivot @70 by PEG (placed 10/31)  - Trial flush of 400cc water, check residuals, if pt tolerates, can resume TFs  - continue with Colace  - suppository today  - will start Senna BID per palliative recs    Renal:  - condom cath  - replete lytes PRN    Heme:   - SCDs for now, hold off chemical DVT ppx  - trend H/H  - wife asking to speak with oncologist    ID:   - CTM Tmax, WBC  - Most recent BCx growing coag negative staph - not given abx given DNR  - Aspiration episode 11/27, tachy and febrile, DNR resversed, started on abx for aspiration pneumonia vs. 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 Was recovering well but began to have recurrence of oral bleeding in setting of critical airway. SICU consulted  given bleeding for airway management. Patient made DNR/comfort however patient aspirated 11/28 into trach and packing, desaturated, and became agitated. Wife was upset and decided to rescind the DNR/CMO. Packing was replaced by ENT at bedside, no significant bleeding. Vanc and zosyn started for empiric coverage of suspected pneumonia. Same day EKG showed V4-6 T wave inversions, along with other nonspecific findings different from prior EKG one week ago. Given contraindication to AC as well as poor prognosis precluding cath lab, trops were sent and bedside echo showed markedly decreased EF and SV. Patient's MAPs began trending towards 65; levophed hung at bedside in case.    Neuro: Post operative pain, Anxiety  - planned to start methadone 10mg TID however pt with recent demand ischemia, not tolerated PO, and has prolonged QTc, will reassess once tolerating PO and obtain repeat EKG  - pt on fentanyl, propofol gtt    Resp: s/p Trach  - Concern for aspiration pneumonia vs pneumonitis; bedside US 11/29: b lines bilateral bases L > R  - Airway repacked by ENT 11/27, minimal bleeding at that time  - Mode: AC/ CMV (Assist Control/ Continuous Mandatory Ventilation), RR (machine): 20, TV (machine): 500, FiO2: 40, PEEP: 5, MAP: 10, PIP: 19  - For trach collar trial once sedatives have been weaned    CV: Hx HTN  - on levo for hypotension  - concern for NSTEMI - likely demand ischemia  - f/u rpt EKG and trop 11/29  - major contraindications to both anticoagulation therapy and cath  - bedside echo 11/27: markedly decreased EF  - obtain formal echo (last EF 40-45%)  - holding home coreg; labetalol to be restarted when BP improved    GI:   - XR abd to rule out intestinal obstruction; start trickle feeds if looks ok  - TFs held, Pivot @70 by PEG (placed 10/31)  - continue with Colace  - suppository today  - will start Senna BID per palliative recs    Renal:  - condom cath  - replete lytes PRN    Heme:   - SCDs for now, hold off chemical DVT ppx  - trend H/H  - onc family meeting 11/29 for biopsy and chemo discussion    ID:   - CTM Tmax, WBC  - Most recent BCx growing coag negative staph - not given abx given DNR  - Aspiration episode 11/27, tachy and febrile, DNR resversed, started on abx for aspiration pneumonia vs. 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 Was recovering well but began to have recurrence of oral bleeding in setting of critical airway. SICU consulted  given bleeding for airway management. Patient made DNR/comfort however patient aspirated 11/28 into trach and packing, desaturated, and became agitated. Wife was upset and decided to rescind the DNR/CMO. Packing was replaced by ENT at bedside, no significant bleeding. Vanc and zosyn started for empiric coverage of suspected pneumonia. Same day EKG showed V4-6 T wave inversions, along with other nonspecific findings different from prior EKG one week ago. Given contraindication to AC as well as poor prognosis precluding cath lab, trops were sent and bedside echo showed markedly decreased EF and SV. Patient's MAPs began trending towards 65; levophed hung at bedside in case.    Neuro: Post operative pain, Anxiety  - planned to start methadone 10mg TID however pt with recent demand ischemia, not tolerated PO, and has prolonged QTc, will reassess once tolerating PO and obtain repeat EKG  - pt on fentanyl, propofol gtt    Resp: s/p Trach  - Concern for aspiration pneumonia vs pneumonitis; bedside US 11/29: b lines bilateral bases L > R  - Airway repacked by ENT 11/27, minimal bleeding at that time  - Mode: AC/ CMV (Assist Control/ Continuous Mandatory Ventilation), RR (machine): 20, TV (machine): 500, FiO2: 40, PEEP: 5, MAP: 10, PIP: 19  - For trach collar trial once sedatives have been weaned    CV: Hx HTN  - on levo for hypotension  - concern for NSTEMI - likely demand ischemia  - bedside echo 11/27: markedly decreased EF, formal echo EF 27% (from 40-45% prior)  - f/u rpt EKG and trop 11/29  - major contraindications to both anticoagulation therapy and cath  - holding home coreg; labetalol to be restarted when BP improved    GI:   - XR abd to rule out intestinal obstruction; start trickle feeds if looks ok  - TFs held, Pivot @70 by PEG (placed 10/31)  - continue with Colace  - suppository today  - will start Senna BID per palliative recs    Renal:  - condom cath  - replete lytes PRN    Heme:   - SCDs for now, hold off chemical DVT ppx  - trend H/H  - onc family meeting 11/29 for biopsy and chemo discussion    ID:   - CTM Tmax, WBC  - Most recent BCx growing coag negative staph - not given abx given DNR  - Aspiration episode 11/27, tachy and febrile, DNR resversed, started on abx for aspiration pneumonia vs. pneumonitis  - c/w vanc/zosyn (11/27-)  - f/u resp cultures  - Tylenol PRN for fevers    Endo:  - no issues    Patient is now FULL CODE again as of 11/27    Dispo: SICU    Critical care diagnosis: requires airway management, aspiration pneumonia, severe sepsis, requires pressors

## 2018-11-30 LAB
APTT BLD: 37.4 SEC — HIGH (ref 27.5–36.3)
BACTERIA BLD CULT: SIGNIFICANT CHANGE UP
BASE EXCESS BLDA CALC-SCNC: 6.7 MMOL/L — SIGNIFICANT CHANGE UP
BUN SERPL-MCNC: 9 MG/DL — SIGNIFICANT CHANGE UP (ref 7–23)
CA-I BLD-SCNC: 1.11 MMOL/L — SIGNIFICANT CHANGE UP (ref 1.03–1.23)
CALCIUM SERPL-MCNC: 8.3 MG/DL — LOW (ref 8.4–10.5)
CHLORIDE SERPL-SCNC: 98 MMOL/L — SIGNIFICANT CHANGE UP (ref 98–107)
CO2 SERPL-SCNC: 29 MMOL/L — SIGNIFICANT CHANGE UP (ref 22–31)
CORTIS SERPL-MCNC: 16.5 UG/DL — SIGNIFICANT CHANGE UP (ref 2.7–18.4)
CREAT SERPL-MCNC: 0.5 MG/DL — SIGNIFICANT CHANGE UP (ref 0.5–1.3)
GLUCOSE SERPL-MCNC: 99 MG/DL — SIGNIFICANT CHANGE UP (ref 70–99)
HCO3 BLDA-SCNC: 30 MMOL/L — HIGH (ref 22–26)
HCT VFR BLD CALC: 27.8 % — LOW (ref 39–50)
HGB BLD-MCNC: 8.6 G/DL — LOW (ref 13–17)
INR BLD: 1.36 — HIGH (ref 0.88–1.17)
LACTATE BLDA-SCNC: 0.6 MMOL/L — SIGNIFICANT CHANGE UP (ref 0.5–2)
MAGNESIUM SERPL-MCNC: 1.5 MG/DL — LOW (ref 1.6–2.6)
MCHC RBC-ENTMCNC: 26.8 PG — LOW (ref 27–34)
MCHC RBC-ENTMCNC: 30.9 % — LOW (ref 32–36)
MCV RBC AUTO: 86.6 FL — SIGNIFICANT CHANGE UP (ref 80–100)
NRBC # FLD: 0 — SIGNIFICANT CHANGE UP
PCO2 BLDA: 48 MMHG — SIGNIFICANT CHANGE UP (ref 35–48)
PH BLDA: 7.43 PH — SIGNIFICANT CHANGE UP (ref 7.35–7.45)
PHOSPHATE SERPL-MCNC: 3.5 MG/DL — SIGNIFICANT CHANGE UP (ref 2.5–4.5)
PLATELET # BLD AUTO: 402 K/UL — HIGH (ref 150–400)
PMV BLD: 9.4 FL — SIGNIFICANT CHANGE UP (ref 7–13)
PO2 BLDA: 132 MMHG — HIGH (ref 83–108)
POTASSIUM SERPL-MCNC: 3.4 MMOL/L — LOW (ref 3.5–5.3)
POTASSIUM SERPL-SCNC: 3.4 MMOL/L — LOW (ref 3.5–5.3)
PROTHROM AB SERPL-ACNC: 15.2 SEC — HIGH (ref 9.8–13.1)
RBC # BLD: 3.21 M/UL — LOW (ref 4.2–5.8)
RBC # FLD: 15.1 % — HIGH (ref 10.3–14.5)
SAO2 % BLDA: 98.7 % — SIGNIFICANT CHANGE UP (ref 95–99)
SODIUM SERPL-SCNC: 137 MMOL/L — SIGNIFICANT CHANGE UP (ref 135–145)
SPECIMEN SOURCE: SIGNIFICANT CHANGE UP
SPECIMEN SOURCE: SIGNIFICANT CHANGE UP
VANCOMYCIN TROUGH SERPL-MCNC: 13.8 UG/ML — SIGNIFICANT CHANGE UP (ref 10–20)
WBC # BLD: 7.86 K/UL — SIGNIFICANT CHANGE UP (ref 3.8–10.5)
WBC # FLD AUTO: 7.86 K/UL — SIGNIFICANT CHANGE UP (ref 3.8–10.5)

## 2018-11-30 PROCEDURE — 99233 SBSQ HOSP IP/OBS HIGH 50: CPT | Mod: GC

## 2018-11-30 PROCEDURE — 99232 SBSQ HOSP IP/OBS MODERATE 35: CPT

## 2018-11-30 PROCEDURE — 71045 X-RAY EXAM CHEST 1 VIEW: CPT | Mod: 26

## 2018-11-30 PROCEDURE — 99291 CRITICAL CARE FIRST HOUR: CPT

## 2018-11-30 PROCEDURE — 99292 CRITICAL CARE ADDL 30 MIN: CPT

## 2018-11-30 RX ORDER — POTASSIUM CHLORIDE 20 MEQ
20 PACKET (EA) ORAL ONCE
Qty: 0 | Refills: 0 | Status: COMPLETED | OUTPATIENT
Start: 2018-11-30 | End: 2018-11-30

## 2018-11-30 RX ORDER — FENTANYL CITRATE 50 UG/ML
1 INJECTION INTRAVENOUS
Qty: 0 | Refills: 0 | Status: DISCONTINUED | OUTPATIENT
Start: 2018-11-30 | End: 2018-11-30

## 2018-11-30 RX ORDER — MAGNESIUM SULFATE 500 MG/ML
1 VIAL (ML) INJECTION ONCE
Qty: 0 | Refills: 0 | Status: COMPLETED | OUTPATIENT
Start: 2018-11-30 | End: 2018-11-30

## 2018-11-30 RX ORDER — DEXMEDETOMIDINE HYDROCHLORIDE IN 0.9% SODIUM CHLORIDE 4 UG/ML
0.2 INJECTION INTRAVENOUS
Qty: 200 | Refills: 0 | Status: DISCONTINUED | OUTPATIENT
Start: 2018-11-30 | End: 2018-12-04

## 2018-11-30 RX ORDER — MAGNESIUM SULFATE 500 MG/ML
2 VIAL (ML) INJECTION ONCE
Qty: 0 | Refills: 0 | Status: COMPLETED | OUTPATIENT
Start: 2018-11-30 | End: 2018-11-30

## 2018-11-30 RX ADMIN — CHLORHEXIDINE GLUCONATE 1 APPLICATION(S): 213 SOLUTION TOPICAL at 17:40

## 2018-11-30 RX ADMIN — PROPOFOL 22.5 MICROGRAM(S)/KG/MIN: 10 INJECTION, EMULSION INTRAVENOUS at 07:39

## 2018-11-30 RX ADMIN — Medication 75 MILLIGRAM(S): at 06:00

## 2018-11-30 RX ADMIN — FENTANYL CITRATE 52.5 MICROGRAM(S)/KG/HR: 50 INJECTION INTRAVENOUS at 19:30

## 2018-11-30 RX ADMIN — Medication 75 MILLIGRAM(S): at 17:40

## 2018-11-30 RX ADMIN — Medication 166.67 MILLIGRAM(S): at 14:49

## 2018-11-30 RX ADMIN — Medication 50 GRAM(S): at 07:00

## 2018-11-30 RX ADMIN — PANTOPRAZOLE SODIUM 40 MILLIGRAM(S): 20 TABLET, DELAYED RELEASE ORAL at 14:56

## 2018-11-30 RX ADMIN — Medication 166.67 MILLIGRAM(S): at 22:00

## 2018-11-30 RX ADMIN — CARVEDILOL PHOSPHATE 6.25 MILLIGRAM(S): 80 CAPSULE, EXTENDED RELEASE ORAL at 06:00

## 2018-11-30 RX ADMIN — PIPERACILLIN AND TAZOBACTAM 25 GRAM(S): 4; .5 INJECTION, POWDER, LYOPHILIZED, FOR SOLUTION INTRAVENOUS at 23:30

## 2018-11-30 RX ADMIN — Medication 3.52 MICROGRAM(S)/KG/MIN: at 07:39

## 2018-11-30 RX ADMIN — Medication 100 MILLIEQUIVALENT(S): at 07:49

## 2018-11-30 RX ADMIN — PIPERACILLIN AND TAZOBACTAM 25 GRAM(S): 4; .5 INJECTION, POWDER, LYOPHILIZED, FOR SOLUTION INTRAVENOUS at 13:49

## 2018-11-30 RX ADMIN — OLANZAPINE 5 MILLIGRAM(S): 15 TABLET, FILM COATED ORAL at 14:56

## 2018-11-30 RX ADMIN — FENTANYL CITRATE 52.5 MICROGRAM(S)/KG/HR: 50 INJECTION INTRAVENOUS at 07:38

## 2018-11-30 RX ADMIN — PROPOFOL 22.5 MICROGRAM(S)/KG/MIN: 10 INJECTION, EMULSION INTRAVENOUS at 19:30

## 2018-11-30 RX ADMIN — Medication 166.67 MILLIGRAM(S): at 06:00

## 2018-11-30 RX ADMIN — PIPERACILLIN AND TAZOBACTAM 25 GRAM(S): 4; .5 INJECTION, POWDER, LYOPHILIZED, FOR SOLUTION INTRAVENOUS at 07:48

## 2018-11-30 RX ADMIN — Medication 100 GRAM(S): at 17:33

## 2018-11-30 RX ADMIN — Medication 100 MILLIEQUIVALENT(S): at 10:20

## 2018-11-30 NOTE — PROGRESS NOTE ADULT - ASSESSMENT
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 Was recovering well but began to have recurrence of oral bleeding in setting of critical airway. SICU consulted  given bleeding for airway management. Patient made DNR/comfort however patient aspirated 11/28 into trach and packing, desaturated, and became agitated. Wife was upset and decided to rescind the DNR/CMO. Packing was replaced by ENT at bedside, no significant bleeding. Vanc and zosyn started for empiric coverage of suspected pneumonia. Same day EKG showed V4-6 T wave inversions, along with other nonspecific findings different from prior EKG one week ago. Given contraindication to AC as well as poor prognosis precluding cath lab, trops were sent and bedside echo showed markedly decreased EF and SV. Currently on Levophed     Neuro: Post operative pain, Anxiety  - planned to start methadone 10mg TID however pt with recent demand ischemia, not tolerated PO, and has prolonged QTc, will reassess once tolerating PO and obtain repeat EKG  - pt on fentanyl, propofol gtt    Resp: s/p Trach  - Concern for aspiration pneumonia vs pneumonitis; bedside US 11/29: b lines bilateral bases L > R  - Airway repacked by ENT 11/27, minimal bleeding at that time  - Mode: AC/ CMV (Assist Control/ Continuous Mandatory Ventilation), RR (machine): 20, TV (machine): 500, FiO2: 40, PEEP: 5, MAP: 10, PIP: 19  - For trach collar trial once sedatives have been weaned    CV: Hx HTN  - on levo for hypotension  - concern for NSTEMI - likely demand ischemia  - bedside echo 11/27: markedly decreased EF, formal echo EF 27% (from 40-45% prior)  - f/u rpt EKG and trop 11/29  - major contraindications to both anticoagulation therapy and cath  - holding home coreg; labetalol to be restarted when BP improved    GI:   - XR abd to rule out intestinal obstruction; start trickle feeds if looks ok  - TFs held, Pivot @70 by PEG (placed 10/31)  - continue with Colace  - suppository today  - will start Senna BID per palliative recs    Renal:  - condom cath  - replete lytes PRN    Heme:   - SCDs for now, hold off chemical DVT ppx  - trend H/H  - onc family meeting 11/29 for biopsy and chemo discussion    ID:   - CTM Tmax, WBC  - Most recent BCx growing coag negative staph - not given abx given DNR  - Aspiration episode 11/27, tachy and febrile, DNR resversed, started on abx for aspiration pneumonia vs. 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 Was recovering well but began to have recurrence of oral bleeding in setting of critical airway. SICU consulted  given bleeding for airway management. Patient made DNR/comfort however patient aspirated 11/28 into trach and packing, desaturated, and became agitated. Wife was upset and decided to rescind the DNR/CMO. Packing was replaced by ENT at bedside, no significant bleeding. Vanc and zosyn started for empiric coverage of suspected pneumonia. Same day EKG showed V4-6 T wave inversions, along with other nonspecific findings different from prior EKG one week ago. Given contraindication to AC as well as poor prognosis precluding cath lab, trops were sent and bedside echo showed markedly decreased EF and SV. Currently on Levophed     Neuro: Post operative pain, Anxiety  - Pt on fentanyl, propofol gtt  - Planned to start methadone 10mg TID however pt with recent demand ischemia, not tolerated PO, and has prolonged QTc, will reassess once tolerating PO and obtain repeat EKG  - Will start Fentanyl patch and wean gtts    Resp: s/p Trach  - Concern for aspiration pneumonia vs pneumonitis; bedside US 11/29: b lines bilateral bases L > R  - Airway repacked by ENT 11/27, minimal bleeding at that time  - Mode: AC/ CMV (Assist Control/ Continuous Mandatory Ventilation), RR (machine): 20, TV (machine): 500, FiO2: 40, PEEP: 5, MAP: 10, PIP: 19  - For trach collar trial once sedatives have been weaned    CV: Hx HTN  - on levo for hypotension - wean as tolerated  - concern for NSTEMI - likely demand ischemia  - bedside echo 11/27: markedly decreased EF, formal echo EF 27% (from 40-45% prior)  - major contraindications to both anticoagulation therapy and cath  - troponins downtrending  - holding home coreg; labetalol to be restarted when BP improved    GI:   - TFs at goal, Jevity @70 by PEG (placed 10/31)  - continue with Colace, Senna, suppository PRN    Renal:  - condom cath  - replete lytes PRN    Heme:   - SCDs for now, hold off chemical DVT ppx  - Trend H/H    ID:   - CTM Tmax, WBC  - Most recent BCx growing coag negative staph - not given abx given DNR  - Aspiration episode 11/27, tachy and febrile, DNR resversed, started on abx for aspiration pneumonia vs. 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 Was recovering well but began to have recurrence of oral bleeding in setting of critical airway. SICU consulted  given bleeding for airway management. Patient made DNR/comfort however patient aspirated 11/28 into trach and packing, desaturated, and became agitated. Wife was upset and decided to rescind the DNR/CMO. Packing was replaced by ENT at bedside, no significant bleeding. Vanc and zosyn started for empiric coverage of suspected pneumonia. Same day EKG showed V4-6 T wave inversions, along with other nonspecific findings different from prior EKG one week ago. Given contraindication to AC as well as poor prognosis precluding cath lab, trops were sent and bedside echo showed markedly decreased EF and SV. Currently on Levophed     Neuro: Post operative pain, Anxiety  - Pt on fentanyl, propofol gtt  - Planned to start methadone 10mg TID however pt with recent demand ischemia, not tolerated PO, and has prolonged QTc, will reassess once tolerating PO and obtain repeat EKG  - Will start Precedex and wean gtts    Resp: s/p Trach  - Concern for aspiration pneumonia vs pneumonitis; bedside US 11/29: b lines bilateral bases L > R  - Airway repacked by ENT 11/27, minimal bleeding at that time  - Mode: AC/ CMV (Assist Control/ Continuous Mandatory Ventilation), RR (machine): 20, TV (machine): 500, FiO2: 40, PEEP: 5, MAP: 10, PIP: 19  - For trach collar trial once sedatives have been weaned    CV: Hx HTN  - on levo for hypotension - wean as tolerated  - concern for NSTEMI - likely demand ischemia  - bedside echo 11/27: markedly decreased EF, formal echo EF 27% (from 40-45% prior)  - major contraindications to both anticoagulation therapy and cath  - troponins downtrending  - holding home coreg; labetalol to be restarted when BP improved    GI:   - TFs at goal, Jevity @70 by PEG (placed 10/31)  - d/c Colace, Senna, suppository PRN as patient is making BMs    Renal:  - condom cath  - replete lytes PRN    Heme:   - SCDs for now, hold off chemical DVT ppx  - Trend H/H    ID:   - CTM Tmax, WBC  - Most recent BCx growing coag negative staph - not given abx given DNR  - Aspiration episode 11/27, tachy and febrile, DNR resversed, started on abx for aspiration pneumonia vs. pneumonitis  - c/w Vanc / Zosyn (11/27-) - d/c tomorrow  - f/u resp cultures, blood cultures  - Tylenol PRN for fevers    Endo:  - No issues  - Will send cortisol level    Patient is now FULL CODE again as of 11/27    Dispo: SICU    Critical care diagnosis: requires airway management, aspiration pneumonia, severe sepsis, requires pressors, severe calorie protein malnutrition, severe muscle hypotrophy, temporal wasting

## 2018-11-30 NOTE — CONSULT NOTE ADULT - REASON FOR ADMISSION
SOB, tracheostomy for orthnopnea
Maxillary squamous cell carcinoma
Oral cancer
SOB
invasive maxillary sinus cancer, s/p trach

## 2018-11-30 NOTE — CONSULT NOTE ADULT - CONSULT REQUESTED DATE/TIME
29-Nov-2018 11:00
19-Nov-2018 19:32
21-Nov-2018 11:30
21-Nov-2018 16:27
22-Nov-2018 15:00
30-Nov-2018 15:58
29-Nov-2018 13:00
21-Nov-2018 11:07

## 2018-11-30 NOTE — CONSULT NOTE ADULT - ASSESSMENT
A/P:   58M with hx of CHF in 2014 (LVEF 40-45%), bipolar d/o, YANET, diaphragmatic dysfunction, lung lobectomy, invasive SCCa of right maxillary sinus with cards c/s for possible concern of NSTEMI. Patient likely had an NSTEMI type I vs. II with elevated hsT , WMA on TTE and EKG changes. myocardial insult has past as the hsT is now downtrending.   Plan:  - would treat conservatively with medical therapy  - monitor I/O given decreased EF  - consider switch from Zosyn (as zosyn with high salt load)   - when deemed safe start ASA 81 mg   - continue beta blocker.     - cardiology to sign off, please re consult if any new questions    Ta Thrasher MD

## 2018-11-30 NOTE — PROGRESS NOTE ADULT - SUBJECTIVE AND OBJECTIVE BOX
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 Also with difficulty breathing, s/p urgent tracheostomy on 11/20. Patient had bleeding from sinus tumor this morning, packed and controlled by ENT. Overall bleeding, CBC stable but given critical airway, SICU re-consulted for close monitoring of bleed & airway.    24 HOUR EVENTS:  Precedex started. Patient still in pain with fentanyl drip at 7; per discussion with palliative, given patient's prolonged QTc methadone is not an option, and it remains     SUBJECTIVE/ROS:  [ ] A ten-point review of systems was otherwise negative except as noted.  [x] Due to altered mental status/intubation, subjective information were not able to be obtained from the patient. History was obtained, to the extent possible, from review of the chart and collateral sources of information.      NEURO  RASS:     GCS:     CAM ICU:  Exam: awake, alert, oriented  Meds: fentaNYL   Infusion. 7 MICROgram(s)/kG/Hr IV Continuous <Continuous>  OLANZapine 5 milliGRAM(s) Oral daily  pregabalin 75 milliGRAM(s) Oral two times a day  propofol Infusion 50 MICROgram(s)/kG/Min IV Continuous <Continuous>    [x] Adequacy of sedation and pain control has been assessed and adjusted      RESPIRATORY  RR: 20 (11-30-18 @ 13:00) (6 - 24)  SpO2: 99% (11-30-18 @ 13:00) (95% - 100%)  Wt(kg): --  Exam: unlabored, clear to auscultation bilaterally  Mechanical Ventilation: Mode: AC/ CMV (Assist Control/ Continuous Mandatory Ventilation), RR (machine): 20, RR (patient): 20, TV (machine): 500, FiO2: 40, PEEP: 5, ITime: 1, MAP: 10, PIP: 17  ABG - ( 30 Nov 2018 04:30 )  pH: 7.43  /  pCO2: 48    /  pO2: 132   / HCO3: 30    / Base Excess: 6.7   /  SaO2: 98.7    Lactate: 0.6              [N/A] Extubation Readiness Assessed  Meds:       CARDIOVASCULAR  HR: 79 (11-30-18 @ 13:00) (75 - 97)  BP: --  BP(mean): --  ABP: 112/59 (11-30-18 @ 13:00) (91/51 - 172/84)  ABP(mean): 75 (11-30-18 @ 13:00) (63 - 112)  Wt(kg): --  CVP(cm H2O): --      Exam: regular rate and rhythm  Cardiac Rhythm: sinus  Perfusion     [x]Adequate   [ ]Inadequate  Mentation   [x]Normal       [ ]Reduced  Extremities  [x]Warm         [ ]Cool  Volume Status [ ]Hypervolemic [x]Euvolemic [ ]Hypovolemic  Meds: carvedilol 6.25 milliGRAM(s) Oral every 12 hours  norepinephrine Infusion 0.05 MICROgram(s)/kG/Min IV Continuous <Continuous>        GI/NUTRITION  Exam: soft, nontender, nondistended, incision C/D/I  Diet:  Meds: bisacodyl Suppository 10 milliGRAM(s) Rectal daily PRN Constipation  docusate sodium Liquid 100 milliGRAM(s) Oral two times a day  pantoprazole   Suspension 40 milliGRAM(s) Oral daily  polyethylene glycol 3350 17 Gram(s) Oral daily PRN Constipation  senna 2 Tablet(s) Oral at bedtime      GENITOURINARY  I&O's Detail    11-29 @ 07:01  -  11-30 @ 07:00  --------------------------------------------------------  IN:    Enteral Tube Flush: 230 mL    fentaNYL Infusion.: 1260 mL    IV PiggyBack: 900 mL    norepinephrine Infusion: 63.7 mL    ns in tub fed  [id number]: 640 mL    propofol Infusion: 482.6 mL  Total IN: 3576.3 mL    OUT:    Indwelling Catheter - Urethral: 2085 mL  Total OUT: 2085 mL    Total NET: 1491.3 mL      11-30 @ 07:01  -  11-30 @ 13:36  --------------------------------------------------------  IN:    fentaNYL Infusion.: 262.5 mL    norepinephrine Infusion: 15.4 mL    ns in tub fed  [id number]: 350 mL    propofol Infusion: 79 mL  Total IN: 706.9 mL    OUT:    Indwelling Catheter - Urethral: 600 mL  Total OUT: 600 mL    Total NET: 106.9 mL          11-30    137  |  98  |  9   ----------------------------<  99  3.4<L>   |  29  |  0.50    Ca    8.3<L>      30 Nov 2018 04:30  Phos  3.5     11-30  Mg     1.5     11-30      [ ] Andrade catheter, indication: N/A  Meds:       HEMATOLOGIC  Meds:   [x] VTE Prophylaxis                        8.6    7.86  )-----------( 402      ( 30 Nov 2018 04:30 )             27.8     PT/INR - ( 30 Nov 2018 04:30 )   PT: 15.2 SEC;   INR: 1.36          PTT - ( 30 Nov 2018 04:30 )  PTT:37.4 SEC  Transfusion     [ ] PRBC   [ ] Platelets   [ ] FFP   [ ] Cryoprecipitate      INFECTIOUS DISEASES  WBC Count: 7.86 K/uL (11-30 @ 04:30)    RECENT CULTURES:  Specimen Source: BLOOD PERIPHERAL  Date/Time: 11-27 @ 20:02  Culture Results: --  Gram Stain: --  Organism: --    Meds: piperacillin/tazobactam IVPB. 3.375 Gram(s) IV Intermittent every 8 hours  vancomycin  IVPB 1000 milliGRAM(s) IV Intermittent every 8 hours        ENDOCRINE  CAPILLARY BLOOD GLUCOSE        Meds:       ACCESS DEVICES:  [ ] Peripheral IV  [ ] Central Venous Line	[ ] R	[ ] L	[ ] IJ	[ ] Fem	[ ] SC	Placed:   [ ] Arterial Line		[ ] R	[ ] L	[ ] Fem	[ ] Rad	[ ] Ax	Placed:   [ ] PICC:					[ ] Mediport  [ ] Urinary Catheter, Date Placed:   [x] Necessity of urinary, arterial, and venous catheters discussed    OTHER MEDICATIONS:  chlorhexidine 4% Liquid 1 Application(s) Topical <User Schedule>      CODE STATUS:  Yes      IMAGING: HISTORY  58M with hx of CHF in 2014 (LVEF 40-45%), bipolar d/o, YANET, diaphragmatic dysfunction, lung lobectomy, 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 Also with difficulty breathing, s/p urgent tracheostomy on 11/20. Patient had bleeding from sinus tumor this morning, packed and controlled by ENT. Overall bleeding, CBC stable but given critical airway, SICU re-consulted for close monitoring of bleed & airway.    24 HOUR EVENTS:  Precedex started. Patient still in pain with fentanyl drip at 7; per discussion with palliative, given patient's prolonged QTc methadone is not an option, and topical lidocaine patches will also not be feasible. Patient's wife requesting cardiology and pulmonary consults.    SUBJECTIVE/ROS:  [ ] A ten-point review of systems was otherwise negative except as noted.  [x] Due to altered mental status/intubation, subjective information were not able to be obtained from the patient. History was obtained, to the extent possible, from review of the chart and collateral sources of information.      NEURO  Exam: awake  Meds: fentaNYL   Infusion. 7 MICROgram(s)/kG/Hr IV Continuous <Continuous>  OLANZapine 5 milliGRAM(s) Oral daily  pregabalin 75 milliGRAM(s) Oral two times a day  propofol Infusion 50 MICROgram(s)/kG/Min IV Continuous <Continuous>    [x] Adequacy of sedation and pain control has been assessed and adjusted      RESPIRATORY  RR: 20 (11-30-18 @ 13:00) (6 - 24)  SpO2: 99% (11-30-18 @ 13:00) (95% - 100%)  Exam: unlabored, clear to auscultation bilaterally  Mechanical Ventilation: Mode: AC/ CMV (Assist Control/ Continuous Mandatory Ventilation), RR (machine): 20, RR (patient): 20, TV (machine): 500, FiO2: 40, PEEP: 5, ITime: 1, MAP: 10, PIP: 17  ABG - ( 30 Nov 2018 04:30 )  pH: 7.43  /  pCO2: 48    /  pO2: 132   / HCO3: 30    / Base Excess: 6.7   /  SaO2: 98.7    Lactate: 0.6            CARDIOVASCULAR  HR: 79 (11-30-18 @ 13:00) (75 - 97)  ABP: 112/59 (11-30-18 @ 13:00) (91/51 - 172/84)  ABP(mean): 75 (11-30-18 @ 13:00) (63 - 112)    Meds: carvedilol 6.25 milliGRAM(s) Oral every 12 hours  norepinephrine Infusion 0.05 MICROgram(s)/kG/Min IV Continuous <Continuous>        GI/NUTRITION  Meds: bisacodyl Suppository 10 milliGRAM(s) Rectal daily PRN Constipation  docusate sodium Liquid 100 milliGRAM(s) Oral two times a day  pantoprazole   Suspension 40 milliGRAM(s) Oral daily  polyethylene glycol 3350 17 Gram(s) Oral daily PRN Constipation  senna 2 Tablet(s) Oral at bedtime      GENITOURINARY  I&O's Detail    11-29 @ 07:01  -  11-30 @ 07:00  --------------------------------------------------------  IN:    Enteral Tube Flush: 230 mL    fentaNYL Infusion.: 1260 mL    IV PiggyBack: 900 mL    norepinephrine Infusion: 63.7 mL    ns in tub fed  [id number]: 640 mL    propofol Infusion: 482.6 mL  Total IN: 3576.3 mL    OUT:    Indwelling Catheter - Urethral: 2085 mL  Total OUT: 2085 mL    Total NET: 1491.3 mL      11-30 @ 07:01  -  11-30 @ 13:36  --------------------------------------------------------  IN:    fentaNYL Infusion.: 262.5 mL    norepinephrine Infusion: 15.4 mL    ns in tub fed  [id number]: 350 mL    propofol Infusion: 79 mL  Total IN: 706.9 mL    OUT:    Indwelling Catheter - Urethral: 600 mL  Total OUT: 600 mL    Total NET: 106.9 mL          11-30    137  |  98  |  9   ----------------------------<  99  3.4<L>   |  29  |  0.50    Ca    8.3<L>      30 Nov 2018 04:30  Phos  3.5     11-30  Mg     1.5     11-30      [ ] Andrade catheter, indication: N/A  Meds:       HEMATOLOGIC  Meds:   [x] VTE Prophylaxis                        8.6    7.86  )-----------( 402      ( 30 Nov 2018 04:30 )             27.8     PT/INR - ( 30 Nov 2018 04:30 )   PT: 15.2 SEC;   INR: 1.36          PTT - ( 30 Nov 2018 04:30 )  PTT:37.4 SEC  Transfusion     [ ] PRBC   [ ] Platelets   [ ] FFP   [ ] Cryoprecipitate      INFECTIOUS DISEASES  WBC Count: 7.86 K/uL (11-30 @ 04:30)    RECENT CULTURES:  Specimen Source: BLOOD PERIPHERAL  Date/Time: 11-27 @ 20:02  Culture Results: --  Gram Stain: --  Organism: --    Meds: piperacillin/tazobactam IVPB. 3.375 Gram(s) IV Intermittent every 8 hours  vancomycin  IVPB 1000 milliGRAM(s) IV Intermittent every 8 hours        ENDOCRINE  CAPILLARY BLOOD GLUCOSE        Meds:       ACCESS DEVICES:  [ ] Peripheral IV  [ ] Central Venous Line	[ ] R	[ ] L	[ ] IJ	[ ] Fem	[ ] SC	Placed:   [ ] Arterial Line		[ ] R	[ ] L	[ ] Fem	[ ] Rad	[ ] Ax	Placed:   [ ] PICC:					[ ] Mediport  [ ] Urinary Catheter, Date Placed:   [x] Necessity of urinary, arterial, and venous catheters discussed    OTHER MEDICATIONS:  chlorhexidine 4% Liquid 1 Application(s) Topical <User Schedule>      CODE STATUS:  Yes      IMAGING:

## 2018-11-30 NOTE — CONSULT NOTE ADULT - SUBJECTIVE AND OBJECTIVE BOX
Patient seen and evaluated at bedside      HPI:  58M with hx of CHF in 2014 (LVEF 40-45%), bipolar d/o, YANET, diaphragmatic dysfunction, lung lobectomy, 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 Also with difficulty breathing, s/p urgent tracheostomy on 11/20. Patient had bleeding from sinus tumor.     His hospital course has been complicated by aspiration PNA.       PMH:   YANET (mycobacterium avium-intracellulare)  Neoplasm of maxillary sinus  SIRS (systemic inflammatory response syndrome)  Bipolar disorder, unspecified  Renal stones  CAD (coronary artery disease)  CHF (congestive heart failure)  HLD (hyperlipidemia)  HTN (hypertension)  Diaphragm dysfunction  ETOH abuse  HF (heart failure)  LORNA on CPAP  Smoker  Chronic obstructive pulmonary disease, unspecified COPD type      PSH:   Cataract  S/P lobectomy of lung  H/O endoscopy  H/O colonoscopy  H/O coronary angioplasty  No significant past surgical history      Medications:   bisacodyl Suppository 10 milliGRAM(s) Rectal daily PRN  carvedilol 6.25 milliGRAM(s) Oral every 12 hours  chlorhexidine 4% Liquid 1 Application(s) Topical <User Schedule>  fentaNYL   Infusion. 7 MICROgram(s)/kG/Hr IV Continuous <Continuous>  magnesium sulfate  IVPB 1 Gram(s) IV Intermittent once  norepinephrine Infusion 0.05 MICROgram(s)/kG/Min IV Continuous <Continuous>  OLANZapine 5 milliGRAM(s) Oral daily  pantoprazole   Suspension 40 milliGRAM(s) Oral daily  piperacillin/tazobactam IVPB. 3.375 Gram(s) IV Intermittent every 8 hours  pregabalin 75 milliGRAM(s) Oral two times a day  propofol Infusion 50 MICROgram(s)/kG/Min IV Continuous <Continuous>  vancomycin  IVPB 1000 milliGRAM(s) IV Intermittent every 8 hours      Allergies:  hospital socks (Rash)  lisinopril (Anaphylaxis)  statins (Anaphylaxis)      FAMILY HISTORY:  No family history of COPD  Family history of hypertension in mother (Sibling)      Social History:  Smoking History:  Alcohol Use:  Drug Use:    Review of Systems:  REVIEW OF SYSTEMS:  CONSTITUTIONAL: No weakness, fevers or chills  EYES/ENT: No visual changes;  No dysphagia  NECK: No pain or stiffness  RESPIRATORY: No cough, wheezing, hemoptysis; No shortness of breath  CARDIOVASCULAR: No chest pain or palpitations; No lower extremity edema  GASTROINTESTINAL: No abdominal or epigastric pain. No nausea, vomiting, or hematemesis; No diarrhea or constipation. No melena or hematochezia.  BACK: No back pain  GENITOURINARY: No dysuria, frequency or hematuria  NEUROLOGICAL: No numbness or weakness  SKIN: No itching, burning, rashes, or lesions   All other review of systems is negative unless indicated above.    Physical Exam:  T(F): 98 (11-30), Max: 101.1 (11-29)  HR: 91 (11-30) (75 - 97)  BP: --  RR: 13 (11-30)  SpO2: 98% (11-30)  GENERAL: No acute distress, well-developed  HEAD:  Atraumatic, Normocephalic  ENT: EOMI, PERRLA, conjunctiva and sclera clear, Neck supple, No JVD, moist mucosa  CHEST/LUNG: Clear to auscultation bilaterally; No wheeze, equal breath sounds bilaterally   BACK: No spinal tenderness  HEART: Regular rate and rhythm; No murmurs, rubs, or gallops  ABDOMEN: Soft, Nontender, Nondistended; Bowel sounds present  EXTREMITIES:  No clubbing, cyanosis, or edema  PSYCH: Nl behavior, nl affect  NEUROLOGY: AAOx3, non-focal, cranial nerves intact  SKIN: Normal color, No rashes or lesions  LINES:    Cardiovascular Diagnostic Testing:    ECG: Personally reviewed    Echo:    Stress Testing:    Cath:    Interpretation of Telemetry:    Imaging:    Labs: Personally reviewed                        8.6    7.86  )-----------( 402      ( 30 Nov 2018 04:30 )             27.8     11-30    137  |  98  |  9   ----------------------------<  99  3.4<L>   |  29  |  0.50    Ca    8.3<L>      30 Nov 2018 04:30  Phos  3.5     11-30  Mg     1.5     11-30      PT/INR - ( 30 Nov 2018 04:30 )   PT: 15.2 SEC;   INR: 1.36          PTT - ( 30 Nov 2018 04:30 )  PTT:37.4 SEC  CARDIAC MARKERS ( 28 Nov 2018 05:00 )  x     / x     / x     / 62 u/L / 6.55 ng/mL / x      CARDIAC MARKERS ( 27 Nov 2018 21:00 )  x     / x     / x     / 34 u/L / 3.14 ng/mL / x Patient seen and evaluated at bedside      HPI:  58M with hx of CHF in 2014 (LVEF 40-45%), bipolar d/o, AYNET, diaphragmatic dysfunction, lung lobectomy, 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 Also with difficulty breathing, s/p urgent tracheostomy on 11/20. Patient had bleeding from sinus tumor.     His hospital course has been complicated by aspiration PNA, and troponin leak, and TTE with newly depressed EF with segmental WMA . EKG with deep TWI in precordial leads.       PMH:   YANET (mycobacterium avium-intracellulare)  Neoplasm of maxillary sinus  SIRS (systemic inflammatory response syndrome)  Bipolar disorder, unspecified  Renal stones  CAD (coronary artery disease)  CHF (congestive heart failure)  HLD (hyperlipidemia)  HTN (hypertension)  Diaphragm dysfunction  ETOH abuse  HF (heart failure)  LORNA on CPAP  Smoker  Chronic obstructive pulmonary disease, unspecified COPD type      PSH:   Cataract  S/P lobectomy of lung  H/O endoscopy  H/O colonoscopy  H/O coronary angioplasty  No significant past surgical history      Medications:   bisacodyl Suppository 10 milliGRAM(s) Rectal daily PRN  carvedilol 6.25 milliGRAM(s) Oral every 12 hours  chlorhexidine 4% Liquid 1 Application(s) Topical <User Schedule>  fentaNYL   Infusion. 7 MICROgram(s)/kG/Hr IV Continuous <Continuous>  magnesium sulfate  IVPB 1 Gram(s) IV Intermittent once  norepinephrine Infusion 0.05 MICROgram(s)/kG/Min IV Continuous <Continuous>  OLANZapine 5 milliGRAM(s) Oral daily  pantoprazole   Suspension 40 milliGRAM(s) Oral daily  piperacillin/tazobactam IVPB. 3.375 Gram(s) IV Intermittent every 8 hours  pregabalin 75 milliGRAM(s) Oral two times a day  propofol Infusion 50 MICROgram(s)/kG/Min IV Continuous <Continuous>  vancomycin  IVPB 1000 milliGRAM(s) IV Intermittent every 8 hours      Allergies:  hospital socks (Rash)  lisinopril (Anaphylaxis)  statins (Anaphylaxis)      FAMILY HISTORY:  No family history of COPD  Family history of hypertension in mother (Sibling)      Social History:  Smoking History:  Alcohol Use:  Drug Use:    Review of Systems:  unable to obtain     Physical Exam:  T(F): 98 (11-30), Max: 101.1 (11-29)  HR: 91 (11-30) (75 - 97)  BP: --  RR: 13 (11-30)  SpO2: 98% (11-30)  GENERAL: intubated and sedated   HEAD:  OP packing   CHEST/LUNG: Clear to auscultation bilaterally  HEART: Regular rate and rhythm; No murmurs, rubs, or gallops  ABDOMEN: Soft, Nontender, Nondistended; Bowel sounds present  EXTREMITIES:  No clubbing, cyanosis, or edema        ECG: Personally reviewed    NSR with deep TWI in precordial leads suggestive of ischemia     Echo:  < from: Transthoracic Echocardiogram (11.28.18 @ 12:17) >  Patient name: NITIN DAVALOS  YOB: 1960   Age: 58 (M)   MR#: [id number]  Study Date: 11/28/2018  [facility] Lo[facility] RD[facility]  Study quality: Technically good  Referring Physician: Lew Olivo MD  Blood Pressure: 122/75 mmHg  Height: 173 cm  Weight: 78 kg  BSA: 1.9 m2  ------------------------------------------------------------------------  PROCEDURE: Transthoracic echocardiogram with 2-D, M-Mode  and complete spectral and color flow Doppler.  INDICATION: Abnormal electrocardiogram (ECG) (EKG)  (R94.31), Heart failure, unspecified (I50.9)  ------------------------------------------------------------------------  DIMENSIONS:  Dimensions:     Normal Values:  LA:     4.3 cm    2.0 - 4.0 cm  Ao:   4.2 cm    2.0 - 3.8 cm  SEPTUM: 0.8 cm    0.6 - 1.2 cm  PWT:    0.8 cm    0.6 - 1.1 cm  LVIDd:  6.2 cm    3.0 - 5.6 cm  LVIDs:  5.4 cm    1.8 - 4.0 cm  Derived Variables:  LVMI: 103 g/m2  RWT: 0.25  Fractional short: 13 %  Ejection Fraction (Teicholtz): 27 %  ------------------------------------------------------------------------  OBSERVATIONS:  Mitral Valve: Mitral annular calcification, otherwise  normal mitral valve. Minimal mitral regurgitation.  Aortic Root: Aortic Root: 4.2 cm.  Aortic Valve: Aortic valve not well visualized; probably  normal. Mild aortic regurgitation.  Left Atrium: Normal left atrium.  LA volume index = 19  cc/m2.  Left Ventricle: Severe segmental left ventricular systolic  dysfunction.  The basal lateral wall is best preserved, all  other walls are hypokinetic.  Normal left ventricular  internal dimensions and wall thicknesses.  Right Heart: Normal right atrium. Normal right ventricular  size and function. Normal tricuspid valve. Minimal  tricuspid regurgitation. Normal pulmonic valve.  Pericardium/PleuraNormal pericardium with no pericardial  effusion.  Hemodynamic: Estimated right ventricular systolic pressure  equals 59 mm Hg, assuming right atrial pressure equals 10  mm Hg, consistent with moderate pulmonary hypertension.  ------------------------------------------------------------------------  CONCLUSIONS:  1. Mitral annular calcification, otherwise normal mitral  valve.  2. Aortic valve not well visualized; probably normal. Mild  aortic regurgitation.  3. Normal left ventricular internal dimensions and wall  thicknesses.  4. Severe segmental left ventricular systolic dysfunction.  The basal lateral wall is best preserved, all other walls  are hypokinetic.  5. Normal right ventricular size and function.  ---------------------------------------------------------------------    < end of copied text >      Stress Testing:    Cath:  < from: Cardiac Cath Lab - Adult (07.24.17 @ 10:47) >  CORONARY VESSELS: The coronary circulation is right dominant.  LM:   --  LM: Normal.  LAD:   --  LAD: Angiography showed minor luminal irregularities with no  flow limiting lesions.  CX:   --  Circumflex: Angiography showed minor luminal irregularities with  no flow limiting lesions.  RCA:   --  RCA: Angiography showed minor luminal irregularities with no  flow limiting lesions.  AORTA: The root exhibited mild fibrocalcific change. Ascending aorta: The  segment was normal in size.  COMPLICATIONS: There were no complications.  DIAGNOSTIC RECOMMENDATIONS: The patient should continue with the present  medications.  Prepared and signed by  Vini Freeman M.D.  Signed 07/24/2017 13:38:28    < end of copied text >      Labs: Personally reviewed                        8.6    7.86  )-----------( 402      ( 30 Nov 2018 04:30 )             27.8     11-30    137  |  98  |  9   ----------------------------<  99  3.4<L>   |  29  |  0.50    Ca    8.3<L>      30 Nov 2018 04:30  Phos  3.5     11-30  Mg     1.5     11-30      PT/INR - ( 30 Nov 2018 04:30 )   PT: 15.2 SEC;   INR: 1.36          PTT - ( 30 Nov 2018 04:30 )  PTT:37.4 SEC  CARDIAC MARKERS ( 28 Nov 2018 05:00 )  x     / x     / x     / 62 u/L / 6.55 ng/mL / x      CARDIAC MARKERS ( 27 Nov 2018 21:00 )  x     / x     / x     / 34 u/L / 3.14 ng/mL / x

## 2018-11-30 NOTE — PROGRESS NOTE ADULT - SUBJECTIVE AND OBJECTIVE BOX
SICU AM PROGRESS NOTE :    Overnight Events:    Pt continues to be on AC mode . 7 of  Fentanyl and  35 of propofol gtt. Pt was on Levophed through the [place] - 0.02---> 0.04 . Pt was febrile to 101 - around 8 pm . 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 Also with difficulty breathing, s/p urgent tracheostomy on 11/20. Patient had bleeding from sinus tumor this morning, packed and controlled by ENT. Overall bleeding, CBC stable but given critical airway, SICU re-consulted for close monitoring of bleed & airway.    ========================================================================    Neurologic Medications:  fentaNYL   Infusion. 7 MICROgram(s)/kG/Hr IV Continuous <Continuous>  OLANZapine 5 milliGRAM(s) Oral daily  pregabalin 75 milliGRAM(s) Oral two times a day  propofol Infusion 50 MICROgram(s)/kG/Min IV Continuous <Continuous>    Respiratory Medications:    Cardiovascular Medications:  norepinephrine Infusion 0.05 MICROgram(s)/kG/Min IV Continuous <Continuous>    Gastrointestinal Medications:  bisacodyl Suppository 10 milliGRAM(s) Rectal daily PRN  docusate sodium Liquid 100 milliGRAM(s) Oral two times a day  pantoprazole   Suspension 40 milliGRAM(s) Oral daily  polyethylene glycol 3350 17 Gram(s) Oral daily PRN  senna 2 Tablet(s) Oral at bedtime    Genitourinary Medications:    Hematologic/Oncologic Medications:    Antimicrobials/Immunologic Medications:  piperacillin/tazobactam IVPB. 3.375 Gram(s) IV Intermittent every 8 hours  vancomycin  IVPB 1000 milliGRAM(s) IV Intermittent every 12 hours    Endocrine/Metabolic Medications:    Topical/Other Medications:  chlorhexidine 4% Liquid 1 Application(s) Topical <User Schedule>    ===============================================================================    T(C): 37.2 (11-30-18 @ 00:00), Max: 38.4 (11-29-18 @ 20:00)  HR: 83 (11-30-18 @ 02:00) (75 - 97)  BP: --  BP(mean): --  ABP: 124/63 (11-30-18 @ 02:00) (82/43 - 141/72)  ABP(mean): 82 (11-30-18 @ 02:00) (55 - 94)  RR: 20 (11-30-18 @ 02:00) (6 - 24)  SpO2: 100% (11-30-18 @ 02:00) (95% - 100%)  Wt(kg): --  CVP(mm Hg): --  CI: --  CAPILLARY BLOOD GLUCOSE       N/A      11-28 @ 07:01  -  11-29 @ 07:00  --------------------------------------------------------  IN:    Enteral Tube Flush: 60 mL    fentaNYL Infusion.: 1207.5 mL    Free Water: 250 mL    IV PiggyBack: 465 mL    norepinephrine Infusion: 71.9 mL    propofol Infusion: 561.3 mL  Total IN: 2615.7 mL    OUT:    Gastrostomy Tube: 200 mL    Indwelling Catheter - Urethral: 3130 mL  Total OUT: 3330 mL    Total NET: -714.3 mL      11-29 @ 07:01  -  11-30 @ 02:31  --------------------------------------------------------  IN:    Enteral Tube Flush: 230 mL    fentaNYL Infusion.: 945 mL    IV PiggyBack: 400 mL    norepinephrine Infusion: 46.9 mL    ns in tub fed  [id number]: 260 mL    propofol Infusion: 387.8 mL  Total IN: 2269.7 mL    OUT:    Indwelling Catheter - Urethral: 1310 mL  Total OUT: 1310 mL    Total NET: 959.7 mL         ===================================================================    PHYSICAL EXAM         NEURO  Intubated , Sedated     RESPIRATORY  Exam: unlabored, clear to auscultation bilaterally  Mechanical Ventilation: Mode: AC/ CMV (Assist Control/ Continuous Mandatory Ventilation), RR (machine): 20, RR (patient): 20, TV (machine): 500, FiO2: 50, PEEP: 5, MAP: 9, PIP: 18    CARDIOVASCULAR  S1S2 heard     GI/NUTRITION  Exam: soft, nontender, nondistended, incision C/D/I        Meds:       ACCESS DEVICES:  [X ] Peripheral IV  [ ] Central Venous Line	[ ] R	[ ] L	[ ] IJ	[ ] Fem	[ ] SC	Placed:   [X ] Arterial Line		[ ] R	[X ] L	[ ] Fem	[X ] Rad	[ ] Ax	Placed:   [ ] PICC:					[ ] Mediport  [ ] Urinary Catheter, Date Placed:   [x] Necessity of urinary, arterial, and venous catheters discussed    OTHER MEDICATIONS:  chlorhexidine 4% Liquid 1 Application(s) Topical <User Schedule>      CODE STATUS:  Yes      IMAGING: SICU AM PROGRESS NOTE :    Overnight Events: Pt continues to be on AC mode. 7 of  Fentanyl and  35 of propofol gtt. Pt was on Levophed through the [place] - 0.02---> 0.04. Pt was febrile to 101 - around 8 pm. Cultures sent.    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 Also with difficulty breathing, s/p urgent tracheostomy on 11/20. Patient had bleeding from sinus tumor this morning, packed and controlled by ENT. Overall bleeding, CBC stable but given critical airway, SICU re-consulted for close monitoring of bleed & airway.    ========================================================================    Neurologic Medications:  fentaNYL   Infusion. 7 MICROgram(s)/kG/Hr IV Continuous <Continuous>  OLANZapine 5 milliGRAM(s) Oral daily  pregabalin 75 milliGRAM(s) Oral two times a day  propofol Infusion 50 MICROgram(s)/kG/Min IV Continuous <Continuous>    Respiratory Medications:    Cardiovascular Medications:  norepinephrine Infusion 0.05 MICROgram(s)/kG/Min IV Continuous <Continuous>    Gastrointestinal Medications:  bisacodyl Suppository 10 milliGRAM(s) Rectal daily PRN  docusate sodium Liquid 100 milliGRAM(s) Oral two times a day  pantoprazole   Suspension 40 milliGRAM(s) Oral daily  polyethylene glycol 3350 17 Gram(s) Oral daily PRN  senna 2 Tablet(s) Oral at bedtime    Genitourinary Medications:    Hematologic/Oncologic Medications:    Antimicrobials/Immunologic Medications:  piperacillin/tazobactam IVPB. 3.375 Gram(s) IV Intermittent every 8 hours  vancomycin  IVPB 1000 milliGRAM(s) IV Intermittent every 12 hours    Endocrine/Metabolic Medications:    Topical/Other Medications:  chlorhexidine 4% Liquid 1 Application(s) Topical <User Schedule>    ===============================================================================    T(C): 37.2 (11-30-18 @ 00:00), Max: 38.4 (11-29-18 @ 20:00)  HR: 83 (11-30-18 @ 02:00) (75 - 97)  BP: --  BP(mean): --  ABP: 124/63 (11-30-18 @ 02:00) (82/43 - 141/72)  ABP(mean): 82 (11-30-18 @ 02:00) (55 - 94)  RR: 20 (11-30-18 @ 02:00) (6 - 24)  SpO2: 100% (11-30-18 @ 02:00) (95% - 100%)  Wt(kg): --  CVP(mm Hg): --  CI: --  CAPILLARY BLOOD GLUCOSE       N/A      11-28 @ 07:01  -  11-29 @ 07:00  --------------------------------------------------------  IN:    Enteral Tube Flush: 60 mL    fentaNYL Infusion.: 1207.5 mL    Free Water: 250 mL    IV PiggyBack: 465 mL    norepinephrine Infusion: 71.9 mL    propofol Infusion: 561.3 mL  Total IN: 2615.7 mL    OUT:    Gastrostomy Tube: 200 mL    Indwelling Catheter - Urethral: 3130 mL  Total OUT: 3330 mL    Total NET: -714.3 mL      11-29 @ 07:01  -  11-30 @ 02:31  --------------------------------------------------------  IN:    Enteral Tube Flush: 230 mL    fentaNYL Infusion.: 945 mL    IV PiggyBack: 400 mL    norepinephrine Infusion: 46.9 mL    ns in tub fed  ofmdkg05: 260 mL    propofol Infusion: 387.8 mL  Total IN: 2269.7 mL    OUT:    Indwelling Catheter - Urethral: 1310 mL  Total OUT: 1310 mL    Total NET: 959.7 mL         ===================================================================    PHYSICAL EXAM         NEURO  Intubated , Sedated     RESPIRATORY  Exam: unlabored, clear to auscultation bilaterally  Mechanical Ventilation: Mode: AC/ CMV (Assist Control/ Continuous Mandatory Ventilation), RR (machine): 20, RR (patient): 20, TV (machine): 500, FiO2: 50, PEEP: 5, MAP: 9, PIP: 18    CARDIOVASCULAR  S1S2 heard     GI/NUTRITION  Exam: soft, nontender, nondistended, incision C/D/I        Meds:       ACCESS DEVICES:  [X ] Peripheral IV  [ ] Central Venous Line	[ ] R	[ ] L	[ ] IJ	[ ] Fem	[ ] SC	Placed:   [X ] Arterial Line		[ ] R	[X ] L	[ ] Fem	[X ] Rad	[ ] Ax	Placed:   [ ] PICC:					[ ] Mediport  [ ] Urinary Catheter, Date Placed:   [x] Necessity of urinary, arterial, and venous catheters discussed    OTHER MEDICATIONS:  chlorhexidine 4% Liquid 1 Application(s) Topical <User Schedule>      CODE STATUS:  Yes      IMAGING:

## 2018-11-30 NOTE — PROGRESS NOTE ADULT - PROBLEM SELECTOR PLAN 4
Will continue to follow for ongoing symptom management and goals of care. Psychosocial support provided.

## 2018-11-30 NOTE — PROGRESS NOTE ADULT - SUBJECTIVE AND OBJECTIVE BOX
INTERVAL HPI/OVERNIGHT EVENTS: Continues to be sedated with trach to the vent in the SICU.     Code Status: Full Code   Allergies    hospital socks (Rash)  lisinopril (Anaphylaxis)  statins (Anaphylaxis)    Intolerances    MEDICATIONS  (STANDING):  carvedilol 6.25 milliGRAM(s) Oral every 12 hours  chlorhexidine 4% Liquid 1 Application(s) Topical <User Schedule>  fentaNYL   Infusion. 7 MICROgram(s)/kG/Hr (52.5 mL/Hr) IV Continuous <Continuous>  magnesium sulfate  IVPB 1 Gram(s) IV Intermittent once  norepinephrine Infusion 0.05 MICROgram(s)/kG/Min (3.516 mL/Hr) IV Continuous <Continuous>  OLANZapine 5 milliGRAM(s) Oral daily  pantoprazole   Suspension 40 milliGRAM(s) Oral daily  piperacillin/tazobactam IVPB. 3.375 Gram(s) IV Intermittent every 8 hours  pregabalin 75 milliGRAM(s) Oral two times a day  propofol Infusion 50 MICROgram(s)/kG/Min (22.5 mL/Hr) IV Continuous <Continuous>  vancomycin  IVPB 1000 milliGRAM(s) IV Intermittent every 8 hours    MEDICATIONS  (PRN):  bisacodyl Suppository 10 milliGRAM(s) Rectal daily PRN Constipation      PRESENT SYMPTOMS: [ x]Unable to obtain due to poor mentation   Source if other than patient:  [ ]Family   [ ]Team     Pain (Impact on QOL):    Location:  Severity:  Minimal acceptable level (0-10 scale):       Quality:       Onset:  Duration:  Aggravating factors:  Relieving Factors  Radiation:    Dyspnea:  Yes [ ] No [ ] - [ ]Mild [ ]Moderate [ ]Severe  Anxiety:    Yes [ ] No [ ] - [ ]Mild [ ]Moderate [ ]Severe  Fatigue:    Yes [ ] No [ ] - [ ]Mild [ ]Moderate [ ]Severe  Nausea:    Yes [ ] No [ ] - [ ]Mild [ ]Moderate [ ]Severe                         Loss of appetite: Yes [ ] No [ ] - [ ]Mild [ ]Moderate [ ]Severe             Constipation:  Yes [ ] No [ ] - [ ]Mild [ ]Moderate [ ]Severe    PAIN AD Score:	  [email or web address] (Ctrl + left click to view)    Other Symptoms:  [ ]All other review of systems negative     Karnofsky Performance Score/Palliative Performance Status Version 2: 30%    http://palliative.info/resource_material/[email or web address].pdf    PHYSICAL EXAM:  Vital Signs Last 24 Hrs  T(C): 37.4 (30 Nov 2018 12:00), Max: 38.4 (29 Nov 2018 20:00)  T(F): 99.3 (30 Nov 2018 12:00), Max: 101.1 (29 Nov 2018 20:00)  HR: 91 (30 Nov 2018 15:45) (75 - 97)  BP: --  BP(mean): --  RR: 13 (30 Nov 2018 15:00) (6 - 24)  SpO2: 98% (30 Nov 2018 15:45) (95% - 100%) I&O's Summary    29 Nov 2018 07:01  -  30 Nov 2018 07:00  --------------------------------------------------------  IN: 3576.3 mL / OUT: 2085 mL / NET: 1491.3 mL    30 Nov 2018 07:01  -  30 Nov 2018 15:49  --------------------------------------------------------  IN: 1211.8 mL / OUT: 900 mL / NET: 311.8 mL     GENERAL:  Sedated with trach to vent in the SICU  HEENT:  Trach with intraoral packing intact   PULMONARY:   Coarse bilaterally   CARDIOVASCULAR:    [x ]Regular [ ]Irregular [ ]Tachy  [ ]Jimmie [ ]Murmur [ ]Other  GASTROINTESTINAL:  [ x]Soft  [ ]Distended   [x ]+BS  [x ]Non tender [ ]Tender  [ ]PEG [x ]OGT/ NGT   Last BM: 11/30/18  GENITOURINARY:  [ ]Normal [ ] Incontinent   [ ]Oliguria/Anuria   [x ]Andrade  MUSCULOSKELETAL:   [ ]Normal   [ ]Weakness  [x ]Bed/Wheelchair bound [ ]Edema  NEUROLOGIC:   Sedated   SKIN:   Intraoral packing intact     CRITICAL CARE:  [ ] Shock Present  [ ]Septic [ ]Cardiogenic [ ]Neurologic [ ]Hypovolemic  [x ]  Vasopressors [ ]  Inotropes   [ ] Respiratory failure present  [ ] Acute  [ ] Chronic [ ] Hypoxic  [ ] Hypercarbic [ ] Other  [ ] Other organ failure     LABS:                        8.6    7.86  )-----------( 402      ( 30 Nov 2018 04:30 )             27.8   11-30    137  |  98  |  9   ----------------------------<  99  3.4<L>   |  29  |  0.50    Ca    8.3<L>      30 Nov 2018 04:30  Phos  3.5     11-30  Mg     1.5     11-30    PT/INR - ( 30 Nov 2018 04:30 )   PT: 15.2 SEC;   INR: 1.36          PTT - ( 30 Nov 2018 04:30 )  PTT:37.4 SEC      RADIOLOGY & ADDITIONAL STUDIES:    Protein Calorie Malnutrition Present: [ ] yes [ ] no  [ ] PPSV2 < or = 30%  [ ] significant weight loss [ ] poor nutritional intake [ ] anasarca [ ] catabolic state Albumin, Serum: 3.0 g/dL (11-21-18 @ 17:38)      REFERRALS:   [ ]Chaplaincy  [ ] Hospice  [ ]Child Life  [ ]Social Work  [ ]Case management [ ]Holistic Therapy   Goals of Care Document: INTERVAL HPI/OVERNIGHT EVENTS:     Palliative Care following for symptom management.   Continues to be sedated with trach to the vent in the SICU.     Code Status: Full Code   Allergies    hospital socks (Rash)  lisinopril (Anaphylaxis)  statins (Anaphylaxis)    Intolerances    MEDICATIONS  (STANDING):  carvedilol 6.25 milliGRAM(s) Oral every 12 hours  chlorhexidine 4% Liquid 1 Application(s) Topical <User Schedule>  fentaNYL   Infusion. 7 MICROgram(s)/kG/Hr (52.5 mL/Hr) IV Continuous <Continuous>  magnesium sulfate  IVPB 1 Gram(s) IV Intermittent once  norepinephrine Infusion 0.05 MICROgram(s)/kG/Min (3.516 mL/Hr) IV Continuous <Continuous>  OLANZapine 5 milliGRAM(s) Oral daily  pantoprazole   Suspension 40 milliGRAM(s) Oral daily  piperacillin/tazobactam IVPB. 3.375 Gram(s) IV Intermittent every 8 hours  pregabalin 75 milliGRAM(s) Oral two times a day  propofol Infusion 50 MICROgram(s)/kG/Min (22.5 mL/Hr) IV Continuous <Continuous>  vancomycin  IVPB 1000 milliGRAM(s) IV Intermittent every 8 hours    MEDICATIONS  (PRN):  bisacodyl Suppository 10 milliGRAM(s) Rectal daily PRN Constipation      PRESENT SYMPTOMS: [ x]Unable to obtain due to poor mentation   Source if other than patient:  [ ]Family   [ ]Team     Pain (Impact on QOL):    Location:  Severity:  Minimal acceptable level (0-10 scale):       Quality:       Onset:  Duration:  Aggravating factors:  Relieving Factors  Radiation:    Dyspnea:  Yes [ ] No [ ] - [ ]Mild [ ]Moderate [ ]Severe  Anxiety:    Yes [ ] No [ ] - [ ]Mild [ ]Moderate [ ]Severe  Fatigue:    Yes [ ] No [ ] - [ ]Mild [ ]Moderate [ ]Severe  Nausea:    Yes [ ] No [ ] - [ ]Mild [ ]Moderate [ ]Severe                         Loss of appetite: Yes [ ] No [ ] - [ ]Mild [ ]Moderate [ ]Severe             Constipation:  Yes [ ] No [ ] - [ ]Mild [ ]Moderate [ ]Severe    PAIN AD Score:	  [email or web address] (Ctrl + left click to view)    Other Symptoms:  [ ]All other review of systems negative     Karnofsky Performance Score/Palliative Performance Status Version 2: 30%    http://palliative.info/resource_material/[email or web address].pdf    PHYSICAL EXAM:  Vital Signs Last 24 Hrs  T(C): 37.4 (30 Nov 2018 12:00), Max: 38.4 (29 Nov 2018 20:00)  T(F): 99.3 (30 Nov 2018 12:00), Max: 101.1 (29 Nov 2018 20:00)  HR: 91 (30 Nov 2018 15:45) (75 - 97)  BP: --  BP(mean): --  RR: 13 (30 Nov 2018 15:00) (6 - 24)  SpO2: 98% (30 Nov 2018 15:45) (95% - 100%) I&O's Summary    29 Nov 2018 07:01  -  30 Nov 2018 07:00  --------------------------------------------------------  IN: 3576.3 mL / OUT: 2085 mL / NET: 1491.3 mL    30 Nov 2018 07:01  -  30 Nov 2018 15:49  --------------------------------------------------------  IN: 1211.8 mL / OUT: 900 mL / NET: 311.8 mL    GENERAL:  Sedated with trach to vent in the SICU  HEENT:  Trach with intraoral packing intact   PULMONARY:   Coarse bilaterally   CARDIOVASCULAR:    [x ]Regular [ ]Irregular [ ]Tachy  [ ]Jimmie [ ]Murmur [ ]Other  GASTROINTESTINAL:  [ x]Soft  [ ]Distended   [x ]+BS  [x ]Non tender [ ]Tender  [ ]PEG [x ]OGT/ NGT   Last BM: 11/30/18  GENITOURINARY:  [ ]Normal [ ] Incontinent   [ ]Oliguria/Anuria   [x ]Andrade  MUSCULOSKELETAL:   [ ]Normal   [ ]Weakness  [x ]Bed/Wheelchair bound [ ]Edema  NEUROLOGIC:   Sedated   SKIN:   Intraoral packing intact     CRITICAL CARE:  [ ] Shock Present  [ ]Septic [ ]Cardiogenic [ ]Neurologic [ ]Hypovolemic  [x ]  Vasopressors [ ]  Inotropes   [ ] Respiratory failure present  [ ] Acute  [ ] Chronic [ ] Hypoxic  [ ] Hypercarbic [ ] Other  [ ] Other organ failure     LABS:                        8.6    7.86  )-----------( 402      ( 30 Nov 2018 04:30 )             27.8   11-30    137  |  98  |  9   ----------------------------<  99  3.4<L>   |  29  |  0.50    Ca    8.3<L>      30 Nov 2018 04:30  Phos  3.5     11-30  Mg     1.5     11-30    PT/INR - ( 30 Nov 2018 04:30 )   PT: 15.2 SEC;   INR: 1.36          PTT - ( 30 Nov 2018 04:30 )  PTT:37.4 SEC      RADIOLOGY & ADDITIONAL STUDIES: reviewed.     Protein Calorie Malnutrition Present: [ ] yes [ ] no  [ ] PPSV2 < or = 30%  [ ] significant weight loss [ ] poor nutritional intake [ ] anasarca [ ] catabolic state Albumin, Serum: 3.0 g/dL (11-21-18 @ 17:38)      REFERRALS:   [ ]Chaplaincy  [ ] Hospice  [ ]Child Life  [ ]Social Work  [ ]Case management [ ]Holistic Therapy   Goals of Care Document:

## 2018-11-30 NOTE — PROGRESS NOTE ADULT - PROBLEM SELECTOR PLAN 2
Patient currently on Fentanyl and Propofol.  Would not recommend Methadone given patient's prolonged QTc.  Options for pain management limited due to high requirement, presence of a PEG, and prolonged QTc.  Patient on high dose Fentanyl for over a week.  Opioid receptors likely saturated - but given recent concern for NSTEMI - would not be a candidate for Ketamine desensitization at this time.  Can attempt opioid rotation with Dilaudid if unable to wean sedation off due to pain.  Patient was requiring 2mg IV q2h PRN prior to SICU stay - Would start infusion at 4mg/hr and titrate to patient's pain level.  Patient will need a long acting opioid going forward.  Methadone would be ideal given high opioid requirements and limited fat stores if QTc improves.  If QTc remains prolonged, will discuss options with wife and team going forward. Discussed with wife and team.  In agreement with plan to attempt Precedex and Fentanyl with a rotation to Precedex and Dilaudid if not successful.  Bowel regimen.

## 2018-11-30 NOTE — CONSULT NOTE ADULT - CONSULT REASON
Ethics requested regarding a dilemma posed by a 58 year old male with maxillary squamous cell carcinoma and poor prognosis whose surrogate is requesting tumor marker test to determine use of oncogenic tropomyosin receptor kinase (TRK) gene fusions therapy
Maxillary sinus SCC
NSTEMI
Oral bleeding, critical airway
hemodynamic monitoring
pain management
medical comanagement/hyponatremia

## 2018-11-30 NOTE — PROGRESS NOTE ADULT - SUBJECTIVE AND OBJECTIVE BOX
Pt seen this AM.    On levophed, SICU weaning sedation to try to wean off the vent.    NAD, awake and alert  6LPC trach in place with sutures, on vent  NC: mass, non-bleeding  oc/op: kerlix in place, no pooling or active bleeding  Neck soft and flat, stoma intact     A/P: s/p awake tracheotomy 11/19 for airway protection now w/ oral packing which has to remain in place due to continued bleeding from the tumor. DNR rescinded.   -maintain oral packing in place, do NOT remove, ENT to change packing q3-4 days  -continued GOC discussions, ethics now involved   -NPO, PEG feeds  -routine trach care   -SICU care

## 2018-11-30 NOTE — PROGRESS NOTE ADULT - ASSESSMENT
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 Was recovering well but began to have recurrence of oral bleeding in setting of critical airway. SICU consulted  given bleeding for airway management. Patient made DNR/comfort however patient aspirated 11/28 into trach and packing, desaturated, and became agitated. Wife was upset and decided to rescind the DNR/CMO. Packing was replaced by ENT at bedside, no significant bleeding. Vanc and zosyn started for empiric coverage of suspected pneumonia. Same day EKG showed V4-6 T wave inversions, along with other nonspecific findings different from prior EKG one week ago. Given contraindication to AC as well as poor prognosis precluding cath lab, trops were sent and bedside echo showed markedly decreased EF and SV. Currently on Levophed     Neuro: Post operative pain, Anxiety  - Pt on fentanyl, propofol gtt  - Planned to start methadone 10mg TID however pt with recent demand ischemia, not tolerated PO, and has prolonged QTc    Resp: s/p Trach  - Concern for aspiration pneumonia vs pneumonitis; bedside US 11/29: b lines bilateral bases L > R  - Airway repacked by ENT 11/27, minimal bleeding at that time  - Mode: AC/ CMV (Assist Control/ Continuous Mandatory Ventilation), RR (machine): 20, TV (machine): 500, FiO2: 40, PEEP: 5, MAP: 10, PIP: 19  - For trach collar trial once sedatives have been weaned    CV: Hx HTN  - on levo for hypotension - wean as tolerated  - concern for NSTEMI - likely demand ischemia, trops downtrended  - bedside echo 11/27: markedly decreased EF, formal echo EF 27% (from 40-45% prior)  - major contraindications to both anticoagulation therapy and cath  - holding home coreg; labetalol to be restarted when BP improved    GI:   - TFs at goal, Jevity @70 by PEG (placed 10/31)  - continue with Colace, Senna, suppository PRN    Renal:  - condom cath  - replete lytes PRN    Heme:   - SCDs for now, hold off chemical DVT ppx  - Trend H/H    ID:   - CTM Tmax, WBC  - Most recent BCx growing coag negative staph - not given abx given DNR  - Aspiration episode 11/27, tachy and febrile, DNR resversed, started on abx for aspiration pneumonia vs. pneumonitis  - c/w Vanc / Zosyn (11/27-) - d/c tomorrow  - f/u resp cultures, blood cultures  - Tylenol PRN for fevers    Endo:  - No issues  - Cortisol 11/30 wnl    Patient is now FULL CODE again as of 11/27  Dispo: SICU    Critical care diagnosis: requires airway management, aspiration pneumonia, severe sepsis, requires pressors, severe calorie protein malnutrition, severe muscle hypotrophy, temporal wasting

## 2018-12-01 PROBLEM — J98.8 AIRWAY OBSTRUCTION: Status: ACTIVE | Noted: 2018-12-01

## 2018-12-01 PROBLEM — C31.0 MAXILLARY SINUS CANCER: Status: ACTIVE | Noted: 2018-06-05

## 2018-12-01 LAB
-  COAGULASE NEGATIVE STAPHYLOCOCCUS: SIGNIFICANT CHANGE UP
BACTERIA BLD CULT: SIGNIFICANT CHANGE UP
BASE EXCESS BLDA CALC-SCNC: 7.8 MMOL/L — SIGNIFICANT CHANGE UP
BUN SERPL-MCNC: 8 MG/DL — SIGNIFICANT CHANGE UP (ref 7–23)
CA-I BLD-SCNC: 1.05 MMOL/L — SIGNIFICANT CHANGE UP (ref 1.03–1.23)
CALCIUM SERPL-MCNC: 7.9 MG/DL — LOW (ref 8.4–10.5)
CHLORIDE SERPL-SCNC: 99 MMOL/L — SIGNIFICANT CHANGE UP (ref 98–107)
CO2 SERPL-SCNC: 27 MMOL/L — SIGNIFICANT CHANGE UP (ref 22–31)
CREAT SERPL-MCNC: 0.48 MG/DL — LOW (ref 0.5–1.3)
GLUCOSE SERPL-MCNC: 113 MG/DL — HIGH (ref 70–99)
HCO3 BLDA-SCNC: 31 MMOL/L — HIGH (ref 22–26)
HCT VFR BLD CALC: 26.1 % — LOW (ref 39–50)
HGB BLD-MCNC: 8.1 G/DL — LOW (ref 13–17)
MAGNESIUM SERPL-MCNC: 1.8 MG/DL — SIGNIFICANT CHANGE UP (ref 1.6–2.6)
MCHC RBC-ENTMCNC: 27.3 PG — SIGNIFICANT CHANGE UP (ref 27–34)
MCHC RBC-ENTMCNC: 31 % — LOW (ref 32–36)
MCV RBC AUTO: 87.9 FL — SIGNIFICANT CHANGE UP (ref 80–100)
NRBC # FLD: 0 — SIGNIFICANT CHANGE UP
PCO2 BLDA: 48 MMHG — SIGNIFICANT CHANGE UP (ref 35–48)
PH BLDA: 7.44 PH — SIGNIFICANT CHANGE UP (ref 7.35–7.45)
PHOSPHATE SERPL-MCNC: 3.5 MG/DL — SIGNIFICANT CHANGE UP (ref 2.5–4.5)
PLATELET # BLD AUTO: 326 K/UL — SIGNIFICANT CHANGE UP (ref 150–400)
PMV BLD: 9.1 FL — SIGNIFICANT CHANGE UP (ref 7–13)
PO2 BLDA: 122 MMHG — HIGH (ref 83–108)
POTASSIUM SERPL-MCNC: 3.7 MMOL/L — SIGNIFICANT CHANGE UP (ref 3.5–5.3)
POTASSIUM SERPL-SCNC: 3.7 MMOL/L — SIGNIFICANT CHANGE UP (ref 3.5–5.3)
RBC # BLD: 2.97 M/UL — LOW (ref 4.2–5.8)
RBC # FLD: 15 % — HIGH (ref 10.3–14.5)
SAO2 % BLDA: 98.7 % — SIGNIFICANT CHANGE UP (ref 95–99)
SODIUM SERPL-SCNC: 137 MMOL/L — SIGNIFICANT CHANGE UP (ref 135–145)
VANCOMYCIN TROUGH SERPL-MCNC: 14.2 UG/ML — SIGNIFICANT CHANGE UP (ref 10–20)
WBC # BLD: 4.93 K/UL — SIGNIFICANT CHANGE UP (ref 3.8–10.5)
WBC # FLD AUTO: 4.93 K/UL — SIGNIFICANT CHANGE UP (ref 3.8–10.5)

## 2018-12-01 PROCEDURE — 99291 CRITICAL CARE FIRST HOUR: CPT

## 2018-12-01 PROCEDURE — 71045 X-RAY EXAM CHEST 1 VIEW: CPT | Mod: 26

## 2018-12-01 RX ORDER — CHLORHEXIDINE GLUCONATE 213 G/1000ML
1 SOLUTION TOPICAL
Qty: 0 | Refills: 0 | Status: DISCONTINUED | OUTPATIENT
Start: 2018-12-01 | End: 2018-12-09

## 2018-12-01 RX ORDER — POTASSIUM CHLORIDE 20 MEQ
20 PACKET (EA) ORAL ONCE
Qty: 0 | Refills: 0 | Status: COMPLETED | OUTPATIENT
Start: 2018-12-01 | End: 2018-12-01

## 2018-12-01 RX ADMIN — PIPERACILLIN AND TAZOBACTAM 25 GRAM(S): 4; .5 INJECTION, POWDER, LYOPHILIZED, FOR SOLUTION INTRAVENOUS at 15:26

## 2018-12-01 RX ADMIN — PANTOPRAZOLE SODIUM 40 MILLIGRAM(S): 20 TABLET, DELAYED RELEASE ORAL at 12:02

## 2018-12-01 RX ADMIN — FENTANYL CITRATE 52.5 MICROGRAM(S)/KG/HR: 50 INJECTION INTRAVENOUS at 08:30

## 2018-12-01 RX ADMIN — DEXMEDETOMIDINE HYDROCHLORIDE IN 0.9% SODIUM CHLORIDE 3.75 MICROGRAM(S)/KG/HR: 4 INJECTION INTRAVENOUS at 01:00

## 2018-12-01 RX ADMIN — DEXMEDETOMIDINE HYDROCHLORIDE IN 0.9% SODIUM CHLORIDE 3.75 MICROGRAM(S)/KG/HR: 4 INJECTION INTRAVENOUS at 19:30

## 2018-12-01 RX ADMIN — Medication 166.67 MILLIGRAM(S): at 14:26

## 2018-12-01 RX ADMIN — Medication 75 MILLIGRAM(S): at 06:01

## 2018-12-01 RX ADMIN — Medication 100 MILLIEQUIVALENT(S): at 08:29

## 2018-12-01 RX ADMIN — OLANZAPINE 5 MILLIGRAM(S): 15 TABLET, FILM COATED ORAL at 12:02

## 2018-12-01 RX ADMIN — CARVEDILOL PHOSPHATE 6.25 MILLIGRAM(S): 80 CAPSULE, EXTENDED RELEASE ORAL at 17:06

## 2018-12-01 RX ADMIN — PIPERACILLIN AND TAZOBACTAM 25 GRAM(S): 4; .5 INJECTION, POWDER, LYOPHILIZED, FOR SOLUTION INTRAVENOUS at 06:01

## 2018-12-01 RX ADMIN — Medication 166.67 MILLIGRAM(S): at 06:09

## 2018-12-01 RX ADMIN — CHLORHEXIDINE GLUCONATE 1 APPLICATION(S): 213 SOLUTION TOPICAL at 10:43

## 2018-12-01 RX ADMIN — Medication 75 MILLIGRAM(S): at 17:06

## 2018-12-01 RX ADMIN — FENTANYL CITRATE 52.5 MICROGRAM(S)/KG/HR: 50 INJECTION INTRAVENOUS at 19:30

## 2018-12-01 RX ADMIN — DEXMEDETOMIDINE HYDROCHLORIDE IN 0.9% SODIUM CHLORIDE 3.75 MICROGRAM(S)/KG/HR: 4 INJECTION INTRAVENOUS at 08:29

## 2018-12-01 NOTE — PROGRESS NOTE ADULT - SUBJECTIVE AND OBJECTIVE BOX
SICU AM PROGRESS NOTE :    Overnight  Events:    Pt remains intubated . On Fent and propofol . 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 Also with difficulty breathing, s/p urgent tracheostomy on 11/20. Patient had bleeding from sinus tumor this morning, packed and controlled by ENT. Overall bleeding, CBC stable but given critical airway, SICU re-consulted for close monitoring of bleed & airway.    24 HOUR EVENTS:  Precedex started. Patient still in pain with fentanyl drip at 7; per discussion with palliative, given patient's prolonged QTc methadone is not an option, and topical lidocaine patches will also not be feasible. Patient's wife requesting cardiology and pulmonary consults.    SUBJECTIVE/ROS:  [ ] A ten-point review of systems was otherwise negative except as noted.  [x] Due to altered mental status/intubation, subjective information were not able to be obtained from the patient. History was obtained, to the extent possible, from review of the chart and collateral sources of information.      NEURO  Exam: awake  Meds: fentaNYL   Infusion. 7 MICROgram(s)/kG/Hr IV Continuous <Continuous>  OLANZapine 5 milliGRAM(s) Oral daily  pregabalin 75 milliGRAM(s) Oral two times a day  propofol Infusion 50 MICROgram(s)/kG/Min IV Continuous <Continuous>    [x] Adequacy of sedation and pain control has been assessed and adjusted      RESPIRATORY  RR: 20 (11-30-18 @ 13:00) (6 - 24)  SpO2: 99% (11-30-18 @ 13:00) (95% - 100%)  Exam: unlabored, clear to auscultation bilaterally  Mechanical Ventilation: Mode: AC/ CMV (Assist Control/ Continuous Mandatory Ventilation), RR (machine): 20, RR (patient): 20, TV (machine): 500, FiO2: 40, PEEP: 5, ITime: 1, MAP: 10, PIP: 17  ABG - ( 30 Nov 2018 04:30 )  pH: 7.43  /  pCO2: 48    /  pO2: 132   / HCO3: 30    / Base Excess: 6.7   /  SaO2: 98.7    Lactate: 0.6            CARDIOVASCULAR  HR: 79 (11-30-18 @ 13:00) (75 - 97)  ABP: 112/59 (11-30-18 @ 13:00) (91/51 - 172/84)  ABP(mean): 75 (11-30-18 @ 13:00) (63 - 112)    Meds: carvedilol 6.25 milliGRAM(s) Oral every 12 hours  norepinephrine Infusion 0.05 MICROgram(s)/kG/Min IV Continuous <Continuous>        GI/NUTRITION  Meds: bisacodyl Suppository 10 milliGRAM(s) Rectal daily PRN Constipation  docusate sodium Liquid 100 milliGRAM(s) Oral two times a day  pantoprazole   Suspension 40 milliGRAM(s) Oral daily  polyethylene glycol 3350 17 Gram(s) Oral daily PRN Constipation  senna 2 Tablet(s) Oral at bedtime      GENITOURINARY  I&O's Detail    11-29 @ 07:01 - 11-30 @ 07:00  --------------------------------------------------------  IN:    Enteral Tube Flush: 230 mL    fentaNYL Infusion.: 1260 mL    IV PiggyBack: 900 mL    norepinephrine Infusion: 63.7 mL    ns in tub fed  [id number]: 640 mL    propofol Infusion: 482.6 mL  Total IN: 3576.3 mL    OUT:    Indwelling Catheter - Urethral: 2085 mL  Total OUT: 2085 mL    Total NET: 1491.3 mL      11-30 @ 07:01 - 11-30 @ 13:36  --------------------------------------------------------  IN:    fentaNYL Infusion.: 262.5 mL    norepinephrine Infusion: 15.4 mL    ns in tub fed  [id number]: 350 mL    propofol Infusion: 79 mL  Total IN: 706.9 mL    OUT:    Indwelling Catheter - Urethral: 600 mL  Total OUT: 600 mL    Total NET: 106.9 mL          11-30    137  |  98  |  9   ----------------------------<  99  3.4<L>   |  29  |  0.50    Ca    8.3<L>      30 Nov 2018 04:30  Phos  3.5     11-30  Mg     1.5     11-30      [ ] Andrade catheter, indication: N/A  Meds:       HEMATOLOGIC  Meds:   [x] VTE Prophylaxis                        8.6    7.86  )-----------( 402      ( 30 Nov 2018 04:30 )             27.8     PT/INR - ( 30 Nov 2018 04:30 )   PT: 15.2 SEC;   INR: 1.36          PTT - ( 30 Nov 2018 04:30 )  PTT:37.4 SEC  Transfusion     [ ] PRBC   [ ] Platelets   [ ] FFP   [ ] Cryoprecipitate      INFECTIOUS DISEASES  WBC Count: 7.86 K/uL (11-30 @ 04:30)    RECENT CULTURES:  Specimen Source: BLOOD PERIPHERAL  Date/Time: 11-27 @ 20:02  Culture Results: --  Gram Stain: --  Organism: --    Meds: piperacillin/tazobactam IVPB. 3.375 Gram(s) IV Intermittent every 8 hours  vancomycin  IVPB 1000 milliGRAM(s) IV Intermittent every 8 hours        ENDOCRINE  CAPILLARY BLOOD GLUCOSE        Meds:       ACCESS DEVICES:  [ ] Peripheral IV  [ ] Central Venous Line	[ ] R	[ ] L	[ ] IJ	[ ] Fem	[ ] SC	Placed:   [ ] Arterial Line		[ ] R	[ ] L	[ ] Fem	[ ] Rad	[ ] Ax	Placed:   [ ] PICC:					[ ] Mediport  [ ] Urinary Catheter, Date Placed:   [x] Necessity of urinary, arterial, and venous catheters discussed    OTHER MEDICATIONS:  chlorhexidine 4% Liquid 1 Application(s) Topical <User Schedule>      CODE STATUS:  Yes      IMAGING: SICU AM PROGRESS NOTE :    Overnight  Events: Cardiology consulted with recs as below. Pt continued on Fent and propofol gtts. Precedex being increased with hopes to wean fent/prop. Pt off Levophed since yesterday.    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 Was recovering well but began to have recurrence of oral bleeding in setting of critical airway. SICU consulted  given bleeding for airway management. Patient made DNR/comfort however patient aspirated 11/28 into trach and packing, desaturated, and became agitated. Wife was upset and decided to rescind the DNR/CMO. Packing was replaced by ENT at bedside, no significant bleeding. Vanc and zosyn started for empiric coverage of suspected pneumonia. Same day EKG showed V4-6 T wave inversions, along with other nonspecific findings different from prior EKG one week ago suggestive of NSTEMI. Given contraindication to AC as well as poor prognosis precluding cath lab, trops were sent and bedside echo showed markedly decreased EF and SV. Off levo, still on high dose fent/prop gtt.    SUBJECTIVE/ROS:  [ ] A ten-point review of systems was otherwise negative except as noted.  [x] Due to altered mental status/intubation, subjective information were not able to be obtained from the patient. History was obtained, to the extent possible, from review of the chart and collateral sources of information.      NEURO  Exam: awake  Meds: fentaNYL   Infusion. 7 MICROgram(s)/kG/Hr IV Continuous <Continuous>  OLANZapine 5 milliGRAM(s) Oral daily  pregabalin 75 milliGRAM(s) Oral two times a day  propofol Infusion 50 MICROgram(s)/kG/Min IV Continuous <Continuous>    [x] Adequacy of sedation and pain control has been assessed and adjusted      RESPIRATORY  RR: 20 (11-30-18 @ 13:00) (6 - 24)  SpO2: 99% (11-30-18 @ 13:00) (95% - 100%)  Exam: unlabored, clear to auscultation bilaterally  Mechanical Ventilation: Mode: AC/ CMV (Assist Control/ Continuous Mandatory Ventilation), RR (machine): 20, RR (patient): 20, TV (machine): 500, FiO2: 40, PEEP: 5, ITime: 1, MAP: 10, PIP: 17  ABG - ( 30 Nov 2018 04:30 )  pH: 7.43  /  pCO2: 48    /  pO2: 132   / HCO3: 30    / Base Excess: 6.7   /  SaO2: 98.7    Lactate: 0.6            CARDIOVASCULAR  HR: 79 (11-30-18 @ 13:00) (75 - 97)  ABP: 112/59 (11-30-18 @ 13:00) (91/51 - 172/84)  ABP(mean): 75 (11-30-18 @ 13:00) (63 - 112)    Meds: carvedilol 6.25 milliGRAM(s) Oral every 12 hours  norepinephrine Infusion 0.05 MICROgram(s)/kG/Min IV Continuous <Continuous>        GI/NUTRITION  Meds: bisacodyl Suppository 10 milliGRAM(s) Rectal daily PRN Constipation  docusate sodium Liquid 100 milliGRAM(s) Oral two times a day  pantoprazole   Suspension 40 milliGRAM(s) Oral daily  polyethylene glycol 3350 17 Gram(s) Oral daily PRN Constipation  senna 2 Tablet(s) Oral at bedtime      GENITOURINARY  I&O's Detail    11-29 @ 07:01 - 11-30 @ 07:00  --------------------------------------------------------  IN:    Enteral Tube Flush: 230 mL    fentaNYL Infusion.: 1260 mL    IV PiggyBack: 900 mL    norepinephrine Infusion: 63.7 mL    ns in tub fed  [id number]: 640 mL    propofol Infusion: 482.6 mL  Total IN: 3576.3 mL    OUT:    Indwelling Catheter - Urethral: 2085 mL  Total OUT: 2085 mL    Total NET: 1491.3 mL      11-30 @ 07:01 - 11-30 @ 13:36  --------------------------------------------------------  IN:    fentaNYL Infusion.: 262.5 mL    norepinephrine Infusion: 15.4 mL    ns in tub fed  [id number]: 350 mL    propofol Infusion: 79 mL  Total IN: 706.9 mL    OUT:    Indwelling Catheter - Urethral: 600 mL  Total OUT: 600 mL    Total NET: 106.9 mL          11-30    137  |  98  |  9   ----------------------------<  99  3.4<L>   |  29  |  0.50    Ca    8.3<L>      30 Nov 2018 04:30  Phos  3.5     11-30  Mg     1.5     11-30      [ ] Andrade catheter, indication: N/A  Meds:       HEMATOLOGIC  Meds:   [x] VTE Prophylaxis                        8.6    7.86  )-----------( 402      ( 30 Nov 2018 04:30 )             27.8     PT/INR - ( 30 Nov 2018 04:30 )   PT: 15.2 SEC;   INR: 1.36          PTT - ( 30 Nov 2018 04:30 )  PTT:37.4 SEC  Transfusion     [ ] PRBC   [ ] Platelets   [ ] FFP   [ ] Cryoprecipitate      INFECTIOUS DISEASES  WBC Count: 7.86 K/uL (11-30 @ 04:30)    RECENT CULTURES:  Specimen Source: BLOOD PERIPHERAL  Date/Time: 11-27 @ 20:02  Culture Results: --  Gram Stain: --  Organism: --    Meds: piperacillin/tazobactam IVPB. 3.375 Gram(s) IV Intermittent every 8 hours  vancomycin  IVPB 1000 milliGRAM(s) IV Intermittent every 8 hours        ENDOCRINE  CAPILLARY BLOOD GLUCOSE        Meds:       ACCESS DEVICES:  [x] Peripheral IV  [ ] Central Venous Line	[ ] R	[ ] L	[ ] IJ	[ ] Fem	[ ] SC	Placed:   [ ] Arterial Line		[ ] R	[ ] L	[ ] Fem	[ ] Rad	[ ] Ax	Placed:   [ ] PICC:					[ x Mediport  [ ] Urinary Catheter, Date Placed:   [x] Necessity of urinary, arterial, and venous catheters discussed    OTHER MEDICATIONS:  chlorhexidine 4% Liquid 1 Application(s) Topical <User Schedule>      CODE STATUS: FULL CODE    IMAGING:    < from: Xray Chest 1 View- PORTABLE-Routine (11.30.18 @ 01:08) >    EXAM:  XR CHEST PORTABLE ROUTINE 1V        PROCEDURE DATE:  Nov 30 2018         INTERPRETATION:  TIME OF EXAM: November 30, 2018 at 12:34 AM    CLINICAL INFORMATION: Tracheostomy; recent myocardial infarction.    TECHNIQUE:   Portable chest    INTERPRETATION:     Tracheostomy tube and chemotherapy port in place. Persistent small   rounded opacity in the left upper lobe appears smaller than the last exam   perhaps resolving atelectasis. No focal consolidations. The heart is not   enlarged.      COMPARISON:  November 29      IMPRESSION:  Follow-up with small rounded opacity in the left upper lobe   appears to be decreasing in size.    < end of copied text >

## 2018-12-01 NOTE — PROGRESS NOTE ADULT - ASSESSMENT
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 Was recovering well but began to have recurrence of oral bleeding in setting of critical airway. SICU consulted  given bleeding for airway management. Patient made DNR/comfort however patient aspirated 11/28 into trach and packing, desaturated, and became agitated. Wife was upset and decided to rescind the DNR/CMO. Packing was replaced by ENT at bedside, no significant bleeding. Vanc and zosyn started for empiric coverage of suspected pneumonia. Same day EKG showed V4-6 T wave inversions, along with other nonspecific findings different from prior EKG one week ago. Given contraindication to AC as well as poor prognosis precluding cath lab, trops were sent and bedside echo showed markedly decreased EF and SV. Currently on Levophed     Neuro: Post operative pain, Anxiety  - Pt on fentanyl, propofol gtt  - Planned to start methadone 10mg TID however pt with recent demand ischemia, not tolerated PO, and has prolonged QTc    Resp: s/p Trach  - Concern for aspiration pneumonia vs pneumonitis; bedside US 11/29: b lines bilateral bases L > R  - Airway repacked by ENT 11/27, minimal bleeding at that time  - Mode: AC/ CMV (Assist Control/ Continuous Mandatory Ventilation), RR (machine): 20, TV (machine): 500, FiO2: 40, PEEP: 5, MAP: 10, PIP: 19  - For trach collar trial once sedatives have been weaned    CV: Hx HTN  - on levo for hypotension - wean as tolerated  - concern for NSTEMI - likely demand ischemia, trops downtrended  - bedside echo 11/27: markedly decreased EF, formal echo EF 27% (from 40-45% prior)  - major contraindications to both anticoagulation therapy and cath  - holding home coreg; labetalol to be restarted when BP improved    GI:   - TFs at goal, Jevity @70 by PEG (placed 10/31)  - continue with Colace, Senna, suppository PRN    Renal:  - condom cath  - replete lytes PRN    Heme:   - SCDs for now, hold off chemical DVT ppx  - Trend H/H    ID:   - CTM Tmax, WBC  - Most recent BCx growing coag negative staph - not given abx given DNR  - Aspiration episode 11/27, tachy and febrile, DNR resversed, started on abx for aspiration pneumonia vs. 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 Was recovering well but began to have recurrence of oral bleeding in setting of critical airway. SICU consulted  given bleeding for airway management. Patient made DNR/comfort however patient aspirated 11/28 into trach and packing, desaturated, and became agitated. Wife was upset and decided to rescind the DNR/CMO. Packing was replaced by ENT at bedside, no significant bleeding. Vanc and zosyn started for empiric coverage of suspected pneumonia. Same day EKG showed V4-6 T wave inversions, along with other nonspecific findings different from prior EKG one week ago suggestive of NSTEMI. Given contraindication to AC as well as poor prognosis precluding cath lab, trops were sent and bedside echo showed markedly decreased EF and SV. Cardiology consulted. Off levo, still on high dose fent/prop gtt.    Neuro: Post operative pain, Anxiety  - Pt on fentanyl, propofol gtt  - Planned to start methadone 10mg TID however pt with recent demand ischemia, not tolerated PO, and has prolonged QTc  - Started on precedex, will continue to increase and wean prop/fent as tolerated    Resp: s/p Trach  - Concern for aspiration pneumonia vs pneumonitis; bedside US 11/29: b lines bilateral bases L > R  - Airway repacked by ENT 11/30, planning to change on Monday again  - Mode: AC/ CMV (Assist Control/ Continuous Mandatory Ventilation), RR (machine): 20, TV (machine): 500, FiO2: 40, PEEP: 5, MAP: 10, PIP: 19  - For trach collar trial once sedatives have been weaned    CV: Hx HTN  - Weaned off levo, resumed home Coreg  - concern for NSTEMI - likely demand ischemia, trops downtrended  - bedside echo 11/27: markedly decreased EF, formal echo EF 27% (from 40-45% prior)  - major contraindications to both anticoagulation therapy and cath  - Cardiology recs: treat conservatively, strict Is&Os, consider switch from Zosyn (as zosyn with high salt load), when deemed safe start ASA 81 mg, continue b-blocker    GI:   - TFs at goal, Jevity @70 by PEG (placed 10/31)  - d/c'ed Colace, Senna, suppository PRN given diarrhea  - Rectal tube in place    Renal:  - condom cath  - replete lytes PRN    Heme:   - SCDs for now, hold off chemical DVT ppx  - Trend H/H    ID:   - CTM Tmax, WBC  - Most recent BCx growing coag negative staph - not given abx given DNR  - Aspiration episode 11/27, tachy and febrile, DNR resversed, started on abx for aspiration pneumonia vs. pneumonitis  - Vanc / Zosyn (11/27-), will d/c at the end of day  - Start Clinda tomorrow (12/2-) in setting of oral packing  - Blood cultures NGTD  - Tylenol PRN for fevers    Endo:  - No issues  - Cortisol 11/30 wnl    Patient is now FULL CODE again as of 11/27    Dispo: SICU    FAMILY MEETING MONDAY 10AM with SICU, ENT, ethics, palliative, patient advocacy (contacting teams)    Critical care diagnosis: requires airway management, aspiration pneumonia, severe sepsis, requires pressors, severe calorie protein malnutrition, severe muscle hypotrophy, temporal wasting

## 2018-12-01 NOTE — PROGRESS NOTE ADULT - SUBJECTIVE AND OBJECTIVE BOX
Pt seen this AM. Packing changed in the mouth yesterday.   SICU weaning sedation to try to wean off the vent.    NAD, awake and alert  6LPC trach in place with sutures, on vent  NC: mass, non-bleeding  oc/op: kerlix in place, no pooling or active bleeding  Neck soft and flat, stoma intact     A/P: s/p awake tracheotomy 11/19 for airway protection now w/ oral packing which has to remain in place due to continued bleeding from the tumor. DNR rescinded.   -maintain oral packing in place, do NOT remove, ENT to change packing q3-4 days, next change is 12/3   -continued GOC discussions, ethics now involved   -plan for family discussion Monday with multidisciplinary team   -NPO, PEG feeds  -routine trach care   -SICU care

## 2018-12-02 LAB
BACTERIA BLD CULT: SIGNIFICANT CHANGE UP
BACTERIA BLD CULT: SIGNIFICANT CHANGE UP
BASE EXCESS BLDA CALC-SCNC: 6.3 MMOL/L — SIGNIFICANT CHANGE UP
BUN SERPL-MCNC: 8 MG/DL — SIGNIFICANT CHANGE UP (ref 7–23)
CA-I BLDA-SCNC: 1.21 MMOL/L — SIGNIFICANT CHANGE UP (ref 1.15–1.29)
CALCIUM SERPL-MCNC: 8.3 MG/DL — LOW (ref 8.4–10.5)
CHLORIDE SERPL-SCNC: 99 MMOL/L — SIGNIFICANT CHANGE UP (ref 98–107)
CO2 SERPL-SCNC: 28 MMOL/L — SIGNIFICANT CHANGE UP (ref 22–31)
CREAT SERPL-MCNC: 0.45 MG/DL — LOW (ref 0.5–1.3)
GLUCOSE BLDA-MCNC: 131 MG/DL — HIGH (ref 70–99)
GLUCOSE SERPL-MCNC: 121 MG/DL — HIGH (ref 70–99)
HCO3 BLDA-SCNC: 30 MMOL/L — HIGH (ref 22–26)
HCT VFR BLD CALC: 25.7 % — LOW (ref 39–50)
HCT VFR BLDA CALC: 25.5 % — LOW (ref 39–51)
HGB BLD-MCNC: 8.2 G/DL — LOW (ref 13–17)
HGB BLDA-MCNC: 8.2 G/DL — LOW (ref 13–17)
LACTATE BLDA-SCNC: 1 MMOL/L — SIGNIFICANT CHANGE UP (ref 0.5–2)
MAGNESIUM SERPL-MCNC: 1.6 MG/DL — SIGNIFICANT CHANGE UP (ref 1.6–2.6)
MCHC RBC-ENTMCNC: 27.2 PG — SIGNIFICANT CHANGE UP (ref 27–34)
MCHC RBC-ENTMCNC: 31.9 % — LOW (ref 32–36)
MCV RBC AUTO: 85.4 FL — SIGNIFICANT CHANGE UP (ref 80–100)
NRBC # FLD: 0.02 — SIGNIFICANT CHANGE UP
PCO2 BLDA: 46 MMHG — SIGNIFICANT CHANGE UP (ref 35–48)
PH BLDA: 7.44 PH — SIGNIFICANT CHANGE UP (ref 7.35–7.45)
PHOSPHATE SERPL-MCNC: 3.1 MG/DL — SIGNIFICANT CHANGE UP (ref 2.5–4.5)
PLATELET # BLD AUTO: 355 K/UL — SIGNIFICANT CHANGE UP (ref 150–400)
PMV BLD: 9.4 FL — SIGNIFICANT CHANGE UP (ref 7–13)
PO2 BLDA: 112 MMHG — HIGH (ref 83–108)
POTASSIUM BLDA-SCNC: 3.4 MMOL/L — SIGNIFICANT CHANGE UP (ref 3.4–4.5)
POTASSIUM SERPL-MCNC: 3.4 MMOL/L — LOW (ref 3.5–5.3)
POTASSIUM SERPL-SCNC: 3.4 MMOL/L — LOW (ref 3.5–5.3)
RBC # BLD: 3.01 M/UL — LOW (ref 4.2–5.8)
RBC # FLD: 14.5 % — SIGNIFICANT CHANGE UP (ref 10.3–14.5)
SAO2 % BLDA: 98.3 % — SIGNIFICANT CHANGE UP (ref 95–99)
SODIUM BLDA-SCNC: 137 MMOL/L — SIGNIFICANT CHANGE UP (ref 136–146)
SODIUM SERPL-SCNC: 136 MMOL/L — SIGNIFICANT CHANGE UP (ref 135–145)
WBC # BLD: 5.21 K/UL — SIGNIFICANT CHANGE UP (ref 3.8–10.5)
WBC # FLD AUTO: 5.21 K/UL — SIGNIFICANT CHANGE UP (ref 3.8–10.5)

## 2018-12-02 PROCEDURE — 99291 CRITICAL CARE FIRST HOUR: CPT

## 2018-12-02 PROCEDURE — 71045 X-RAY EXAM CHEST 1 VIEW: CPT | Mod: 26

## 2018-12-02 RX ORDER — POTASSIUM CHLORIDE 20 MEQ
40 PACKET (EA) ORAL ONCE
Qty: 0 | Refills: 0 | Status: COMPLETED | OUTPATIENT
Start: 2018-12-02 | End: 2018-12-02

## 2018-12-02 RX ORDER — MAGNESIUM SULFATE 500 MG/ML
2 VIAL (ML) INJECTION ONCE
Qty: 0 | Refills: 0 | Status: COMPLETED | OUTPATIENT
Start: 2018-12-02 | End: 2018-12-02

## 2018-12-02 RX ADMIN — DEXMEDETOMIDINE HYDROCHLORIDE IN 0.9% SODIUM CHLORIDE 3.75 MICROGRAM(S)/KG/HR: 4 INJECTION INTRAVENOUS at 13:02

## 2018-12-02 RX ADMIN — Medication 40 MILLIEQUIVALENT(S): at 06:06

## 2018-12-02 RX ADMIN — CHLORHEXIDINE GLUCONATE 1 APPLICATION(S): 213 SOLUTION TOPICAL at 17:28

## 2018-12-02 RX ADMIN — DEXMEDETOMIDINE HYDROCHLORIDE IN 0.9% SODIUM CHLORIDE 3.75 MICROGRAM(S)/KG/HR: 4 INJECTION INTRAVENOUS at 19:20

## 2018-12-02 RX ADMIN — CARVEDILOL PHOSPHATE 6.25 MILLIGRAM(S): 80 CAPSULE, EXTENDED RELEASE ORAL at 05:40

## 2018-12-02 RX ADMIN — CARVEDILOL PHOSPHATE 6.25 MILLIGRAM(S): 80 CAPSULE, EXTENDED RELEASE ORAL at 17:28

## 2018-12-02 RX ADMIN — Medication 75 MILLIGRAM(S): at 17:28

## 2018-12-02 RX ADMIN — FENTANYL CITRATE 52.5 MICROGRAM(S)/KG/HR: 50 INJECTION INTRAVENOUS at 18:36

## 2018-12-02 RX ADMIN — FENTANYL CITRATE 52.5 MICROGRAM(S)/KG/HR: 50 INJECTION INTRAVENOUS at 19:25

## 2018-12-02 RX ADMIN — FENTANYL CITRATE 52.5 MICROGRAM(S)/KG/HR: 50 INJECTION INTRAVENOUS at 19:21

## 2018-12-02 RX ADMIN — OLANZAPINE 5 MILLIGRAM(S): 15 TABLET, FILM COATED ORAL at 11:55

## 2018-12-02 RX ADMIN — Medication 100 MILLIGRAM(S): at 21:31

## 2018-12-02 RX ADMIN — FENTANYL CITRATE 52.5 MICROGRAM(S)/KG/HR: 50 INJECTION INTRAVENOUS at 07:18

## 2018-12-02 RX ADMIN — DEXMEDETOMIDINE HYDROCHLORIDE IN 0.9% SODIUM CHLORIDE 3.75 MICROGRAM(S)/KG/HR: 4 INJECTION INTRAVENOUS at 18:36

## 2018-12-02 RX ADMIN — Medication 50 GRAM(S): at 05:40

## 2018-12-02 RX ADMIN — Medication 100 MILLIGRAM(S): at 12:39

## 2018-12-02 RX ADMIN — DEXMEDETOMIDINE HYDROCHLORIDE IN 0.9% SODIUM CHLORIDE 3.75 MICROGRAM(S)/KG/HR: 4 INJECTION INTRAVENOUS at 10:26

## 2018-12-02 RX ADMIN — PANTOPRAZOLE SODIUM 40 MILLIGRAM(S): 20 TABLET, DELAYED RELEASE ORAL at 11:55

## 2018-12-02 RX ADMIN — DEXMEDETOMIDINE HYDROCHLORIDE IN 0.9% SODIUM CHLORIDE 3.75 MICROGRAM(S)/KG/HR: 4 INJECTION INTRAVENOUS at 07:17

## 2018-12-02 RX ADMIN — FENTANYL CITRATE 52.5 MICROGRAM(S)/KG/HR: 50 INJECTION INTRAVENOUS at 15:02

## 2018-12-02 RX ADMIN — Medication 75 MILLIGRAM(S): at 05:40

## 2018-12-02 RX ADMIN — FENTANYL CITRATE 52.5 MICROGRAM(S)/KG/HR: 50 INJECTION INTRAVENOUS at 10:26

## 2018-12-02 NOTE — PROGRESS NOTE ADULT - SUBJECTIVE AND OBJECTIVE BOX
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 Was recovering well but began to have recurrence of oral bleeding in setting of critical airway. SICU consulted  given bleeding for airway management. Patient made DNR/comfort however patient aspirated 11/28 into trach and packing, desaturated, and became agitated. Wife was upset and decided to rescind the DNR/CMO. Packing was replaced by ENT at bedside, no significant bleeding. Vanc and zosyn started for empiric coverage of suspected pneumonia. Same day EKG showed V4-6 T wave inversions, along with other nonspecific findings different from prior EKG one week ago suggestive of NSTEMI. Given contraindication to AC as well as poor prognosis precluding cath lab, trops were sent and bedside echo showed markedly decreased EF and SV. Off levo, still on high dose fent/prop gtt.     SUBJECTIVE/ROS:  [ x] A ten-point review of systems was otherwise negative except as noted.  [ ] Due to altered mental status/intubation, subjective information were not able to be obtained from the patient. History was obtained, to the extent possible, from review of the chart and collateral sources of information.    NEURO  RASS:     GCS:     CAM ICU:  Exam: awake, alert, oriented  Meds: dexmedetomidine Infusion 0.2 MICROgram(s)/kG/Hr IV Continuous <Continuous>  fentaNYL   Infusion. 7 MICROgram(s)/kG/Hr IV Continuous <Continuous>  OLANZapine 5 milliGRAM(s) Oral daily  pregabalin 75 milliGRAM(s) Oral two times a day  propofol Infusion 50 MICROgram(s)/kG/Min IV Continuous <Continuous>    [x] Adequacy of sedation and pain control has been assessed and adjusted    RESPIRATORY  RR: 20 (12-02-18 @ 00:00) (15 - 21)  SpO2: 100% (12-02-18 @ 00:00) (98% - 100%)  Exam: Trach on ventilator , no increased work of breathing   Mechanical Ventilation: Mode: AC/ CMV (Assist Control/ Continuous Mandatory Ventilation), RR (machine): 20, RR (patient): 20, TV (machine): 500, FiO2: 40, PEEP: 5, ITime: 1, MAP: 9, PIP: 15  ABG - ( 01 Dec 2018 04:00 )  pH: 7.44  /  pCO2: 48    /  pO2: 122   / HCO3: 31    / Base Excess: 7.8   /  SaO2: 98.7    Lactate: x          CARDIOVASCULAR  HR: 70 (12-02-18 @ 00:00) (69 - 86)  ABP: 166/82 (12-02-18 @ 00:00) (116/58 - 166/85)  ABP(mean): 109 (12-02-18 @ 00:00) (76 - 113)    Exam: regular rate and rhythm  Cardiac Rhythm: sinus  Perfusion     [x]Adequate   [ ]Inadequate  Mentation   []Normal       [x ]Reduced  Extremities  [x]Warm         [ ]Cool  Volume Status [ ]Hypervolemic [x]Euvolemic [ ]Hypovolemic  Meds: carvedilol 6.25 milliGRAM(s) Oral every 12 hours      GI/NUTRITION  Exam: soft, nontender, nondistended,  PEG in place   Diet: NPO on TF   Meds: bisacodyl Suppository 10 milliGRAM(s) Rectal daily PRN Constipation  pantoprazole   Suspension 40 milliGRAM(s) Oral daily      GENITOURINARY  I&O's Detail    11-30 @ 07:01  -  12-01 @ 07:00  --------------------------------------------------------  IN:    dexmedetomidine Infusion: 120.2 mL    Enteral Tube Flush: 75 mL    fentaNYL Infusion.: 1207.5 mL    IV PiggyBack: 1050 mL    norepinephrine Infusion: 19.6 mL    ns in tub fed  [id number]: 1680 mL    propofol Infusion: 252.8 mL  Total IN: 4405.1 mL    OUT:    Indwelling Catheter - Urethral: 2010 mL  Total OUT: 2010 mL    Total NET: 2395.1 mL      12-01 @ 07:01  -  12-02 @ 00:12  --------------------------------------------------------  IN:    dexmedetomidine Infusion: 300.8 mL    fentaNYL Infusion.: 840 mL    IV PiggyBack: 350 mL    ns in tub fed  [id number]: 1120 mL  Total IN: 2610.8 mL    OUT:    Indwelling Catheter - Urethral: 2000 mL  Total OUT: 2000 mL    Total NET: 610.8 mL          12-01    137  |  99  |  8   ----------------------------<  113<H>  3.7   |  27  |  0.48<L>    Ca    7.9<L>      01 Dec 2018 04:00  Phos  3.5     12-01  Mg     1.8     12-01      [ ] Andrade catheter, indication: N/A  Meds:     HEMATOLOGIC  Meds:   [x] VTE Prophylaxis: Holding in setting of bleeding pharyngeal mass                         8.1    4.93  )-----------( 326      ( 01 Dec 2018 04:00 )             26.1     PT/INR - ( 30 Nov 2018 04:30 )   PT: 15.2 SEC;   INR: 1.36          PTT - ( 30 Nov 2018 04:30 )  PTT:37.4 SEC  Transfusion     [0 ] PRBC   [0 ] Platelets   [0 ] FFP   [0 ] Cryoprecipitate    INFECTIOUS DISEASES  WBC Count: 4.93 K/uL (12-01 @ 04:00)    RECENT CULTURES:  Specimen Source: BLOOD  Date/Time: 11-29 @ 22:47  Culture Results: --  Gram Stain: --  Organism: --  Specimen Source: BLOOD  Date/Time: 11-29 @ 22:37  Culture Results: --  Gram Stain: --  Organism: --  Specimen Source: BLOOD PERIPHERAL  Date/Time: 11-27 @ 20:02  Culture Results: --  Gram Stain: --  Organism: --    Meds:     ENDOCRINE  CAPILLARY BLOOD GLUCOSE        Meds:     ACCESS DEVICES:  [ ] Peripheral IV  [ ] Central Venous Line	[ ] R	[ ] L	[ ] IJ	[ ] Fem	[ ] SC	Placed:   [ ] Arterial Line		[ ] R	[ ] L	[ ] Fem	[ ] Rad	[ ] Ax	Placed:   [ ] PICC:					[ ] Mediport  [ ] Urinary Catheter, Date Placed:   [x] Necessity of urinary, arterial, and venous catheters discussed    OTHER MEDICATIONS:  chlorhexidine 4% Liquid 1 Application(s) Topical <User Schedule>      CODE STATUS:  Yes      IMAGING: 24 HOUR EVENTS: No acute events overnight. Antibiotics stopped as of 12/1. Family meeting to further discuss GOC with ENT , Oncology and ICU team for this monday 12/3 10:30.     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 Was recovering well but began to have recurrence of oral bleeding in setting of critical airway. SICU consulted  given bleeding for airway management. Patient made DNR/comfort however patient aspirated 11/28 into trach and packing, desaturated, and became agitated. Wife was upset and decided to rescind the DNR/CMO. Packing was replaced by ENT at bedside, no significant bleeding. Vanc and zosyn started for empiric coverage of suspected pneumonia. Same day EKG showed V4-6 T wave inversions, along with other nonspecific findings different from prior EKG one week ago suggestive of NSTEMI. Given contraindication to AC as well as poor prognosis precluding cath lab, trops were sent and bedside echo showed markedly decreased EF and SV. Off levo, still on high dose fent/prop gtt.     SUBJECTIVE/ROS:  [ x] A ten-point review of systems was otherwise negative except as noted.  [ ] Due to altered mental status/intubation, subjective information were not able to be obtained from the patient. History was obtained, to the extent possible, from review of the chart and collateral sources of information.    NEURO  RASS:  -1   Exam: drowsy, awaken to voices   Meds: dexmedetomidine Infusion 0.2 MICROgram(s)/kG/Hr IV Continuous <Continuous>  fentaNYL   Infusion. 7 MICROgram(s)/kG/Hr IV Continuous <Continuous>  OLANZapine 5 milliGRAM(s) Oral daily  pregabalin 75 milliGRAM(s) Oral two times a day  propofol Infusion 50 MICROgram(s)/kG/Min IV Continuous <Continuous>    [x] Adequacy of sedation and pain control has been assessed and adjusted    RESPIRATORY  RR: 20 (12-02-18 @ 00:00) (15 - 21)  SpO2: 100% (12-02-18 @ 00:00) (98% - 100%)  Exam: Trach on ventilator , no increased work of breathing   Mechanical Ventilation: Mode: AC/ CMV (Assist Control/ Continuous Mandatory Ventilation), RR (machine): 20, RR (patient): 20, TV (machine): 500, FiO2: 40, PEEP: 5, ITime: 1, MAP: 9, PIP: 15  ABG - ( 01 Dec 2018 04:00 )  pH: 7.44  /  pCO2: 48    /  pO2: 122   / HCO3: 31    / Base Excess: 7.8   /  SaO2: 98.7    Lactate: x          CARDIOVASCULAR  HR: 70 (12-02-18 @ 00:00) (69 - 86)  ABP: 166/82 (12-02-18 @ 00:00) (116/58 - 166/85)  ABP(mean): 109 (12-02-18 @ 00:00) (76 - 113)    Exam: regular rate and rhythm  Cardiac Rhythm: sinus  Perfusion     [x]Adequate   [ ]Inadequate  Mentation   []Normal       [x ]Reduced  Extremities  [x]Warm         [ ]Cool  Volume Status [ ]Hypervolemic [x]Euvolemic [ ]Hypovolemic  Meds: carvedilol 6.25 milliGRAM(s) Oral every 12 hours      GI/NUTRITION  Exam: soft, nontender, nondistended,  PEG in place   Diet: NPO on TF   Meds: bisacodyl Suppository 10 milliGRAM(s) Rectal daily PRN Constipation  pantoprazole   Suspension 40 milliGRAM(s) Oral daily      GENITOURINARY  I&O's Detail    11-30 @ 07:01  -  12-01 @ 07:00  --------------------------------------------------------  IN:    dexmedetomidine Infusion: 120.2 mL    Enteral Tube Flush: 75 mL    fentaNYL Infusion.: 1207.5 mL    IV PiggyBack: 1050 mL    norepinephrine Infusion: 19.6 mL    ns in tub fed  [id number]: 1680 mL    propofol Infusion: 252.8 mL  Total IN: 4405.1 mL    OUT:    Indwelling Catheter - Urethral: 2010 mL  Total OUT: 2010 mL    Total NET: 2395.1 mL      12-01 @ 07:01  -  12-02 @ 00:12  --------------------------------------------------------  IN:    dexmedetomidine Infusion: 300.8 mL    fentaNYL Infusion.: 840 mL    IV PiggyBack: 350 mL    ns in tub fed  [id number]: 1120 mL  Total IN: 2610.8 mL    OUT:    Indwelling Catheter - Urethral: 2000 mL  Total OUT: 2000 mL    Total NET: 610.8 mL          12-01    137  |  99  |  8   ----------------------------<  113<H>  3.7   |  27  |  0.48<L>    Ca    7.9<L>      01 Dec 2018 04:00  Phos  3.5     12-01  Mg     1.8     12-01      [ ] Andrade catheter, indication: N/A  Meds:     HEMATOLOGIC  Meds:   [x] VTE Prophylaxis: Holding in setting of bleeding pharyngeal mass                         8.1    4.93  )-----------( 326      ( 01 Dec 2018 04:00 )             26.1     PT/INR - ( 30 Nov 2018 04:30 )   PT: 15.2 SEC;   INR: 1.36          PTT - ( 30 Nov 2018 04:30 )  PTT:37.4 SEC  Transfusion     [0 ] PRBC   [0 ] Platelets   [0 ] FFP   [0 ] Cryoprecipitate    INFECTIOUS DISEASES  WBC Count: 4.93 K/uL (12-01 @ 04:00)    RECENT CULTURES:  Specimen Source: BLOOD  Date/Time: 11-29 @ 22:47  Culture Results: --  Gram Stain: --  Organism: --  Specimen Source: BLOOD  Date/Time: 11-29 @ 22:37  Culture Results: --  Gram Stain: --  Organism: --  Specimen Source: BLOOD PERIPHERAL  Date/Time: 11-27 @ 20:02  Culture Results: --  Gram Stain: --  Organism: --    Meds:     ENDOCRINE  CAPILLARY BLOOD GLUCOSE        Meds:     ACCESS DEVICES:  [ X] Peripheral IV  [ ] Central Venous Line	[ ] R	[ ] L	[ ] IJ	[ ] Fem	[ ] SC	Placed:   [ ] Arterial Line		[ ] R	[ ] L	[ ] Fem	[ ] Rad	[ ] Ax	Placed:   [ ] PICC:					[ ] Mediport  [ ] Urinary Catheter, Date Placed:   [x] Necessity of urinary, arterial, and venous catheters discussed    OTHER MEDICATIONS:  chlorhexidine 4% Liquid 1 Application(s) Topical <User Schedule>      CODE STATUS:FULL      IMAGING:

## 2018-12-02 NOTE — PROGRESS NOTE ADULT - SUBJECTIVE AND OBJECTIVE BOX
Pt seen this AM. Packing changed in the mouth Friday evening.    NAD, awake and alert  6LPC trach in place with sutures, on vent  NC: mass, non-bleeding  oc/op: kerlix in place, no pooling or active bleeding  Neck soft and flat, stoma intact     A/P: s/p awake tracheotomy 11/19 for airway protection now w/ oral packing which has to remain in place due to continued bleeding from the tumor. DNR rescinded.   -maintain oral packing in place, do NOT remove, ENT to change packing q3-4 days, next change is 12/3   -continued GOC discussions, ethics now involved  -plan for family discussion Monday with multidisciplinary team   -NPO, PEG feeds  -routine trach care   -appreciate SICU care

## 2018-12-02 NOTE — PROGRESS NOTE ADULT - ASSESSMENT
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 Was recovering well but began to have recurrence of oral bleeding in setting of critical airway. SICU consulted  given bleeding for airway management. Patient made DNR/comfort however patient aspirated 11/28 into trach and packing, desaturated, and became agitated. Wife was upset and decided to rescind the DNR/CMO. Packing was replaced by ENT at bedside, no significant bleeding. Vanc and zosyn started for empiric coverage of suspected pneumonia. Same day EKG showed V4-6 T wave inversions, along with other nonspecific findings different from prior EKG one week ago suggestive of NSTEMI. Given contraindication to AC as well as poor prognosis precluding cath lab, trops were sent and bedside echo showed markedly decreased EF and SV. Cardiology consulted. Off levo, still on high dose fent/prop gtt.    Neuro: Post operative pain, Anxiety  - Pt on fentanyl, propofol gtt  - Planned to start methadone 10mg TID however pt with recent demand ischemia, not tolerated PO, and has prolonged QTc  - Wean sedation as able, currently on Precedex, Propofol and Fentanyl ; optimizing Precdex to wean Prop and Fent     Resp: s/p Trach  - Concern for aspiration pneumonia vs pneumonitis; bedside US 11/29: b lines bilateral bases L > R  - Airway repacked by ENT 11/30, planning to change on Monday again  - Mode: AC/ CMV (Assist Control/ Continuous Mandatory Ventilation), RR (machine): 20, TV (machine): 500, FiO2: 40, PEEP: 5, MAP: 10, PIP: 19  - Trach Collar once off sedation     CV: Hx HTN  - c/w Home anti hypertensives   - concern for NSTEMI - likely demand ischemia, trops downtrended  - bedside echo 11/27: markedly decreased EF, formal echo EF 27% (from 40-45% prior)  - major contraindications to both anticoagulation therapy and cath  - Cardiology recs: treat conservatively, strict Is&Os, consider switch from Zosyn (as zosyn with high salt load), when deemed safe start ASA 81 mg, continue b-blocker    GI:   - TFs at goal, Jevity @70 by PEG (placed 10/31)  - Holding bowel regimen given now having diarrhea   - c/w rectal tube for Strict Is/ Os    Renal:  - condom cath  - replete lytes PRN    Heme:   - SCDs for now, hold off chemical DVT ppx  - Trend H/H    ID:   - Daily CBC,  - Most recent BCx growing coag negative staph - not given abx given DNR  - Aspiration episode 11/27, tachy and febrile, DNR reversed started on abx for aspiration pneumonia vs. pneumonitis  - Vanc / Zosyn (11/27-12/1) completed   - Start Clinda today (12/2-) given oral packing cannot be removed   - Blood cultures NGTD  - Tylenol PRN for fevers    Endo:  - No issues  - Cortisol 11/30 wnl    Patient is now FULL CODE again as of 11/27    Dispo: SICU    FAMILY MEETING MONDAY 10AM with SICU, ENT, ethics, palliative, patient advocacy (contacting teams)    Critical care diagnosis: requires airway management, aspiration pneumonia, severe sepsis, requires pressors, severe calorie protein malnutrition, severe muscle hypotrophy, temporal wasting 57 y/o M with Hx CHF (EF 40-45%), 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 Was recovering well but began to have recurrence of oral bleeding in setting of critical airway. SICU consulted  given bleeding for airway management. Patient made DNR/comfort however patient aspirated 11/28 into trach and packing, desaturated, and became agitated. Wife was upset and decided to rescind the DNR/CMO. Packing was replaced by ENT at bedside, no significant bleeding. Vanc and zosyn started for empiric coverage of suspected pneumonia. Same day EKG showed V4-6 T wave inversions, along with other nonspecific findings different from prior EKG one week ago suggestive of NSTEMI. Given contraindication to AC as well as poor prognosis precluding cath lab, trops were sent and bedside echo showed markedly decreased EF and SV. Cardiology consulted. Off levo, still on high dose fent/prop gtt.    Neuro: Post operative pain, Anxiety  - Pt on fentanyl, propofol gtt  - Planned to start methadone 10mg TID however pt with recent demand ischemia, not tolerated PO, and has prolonged QTc  - Wean sedation as able, currently on Precedex, Propofol and Fentanyl ; optimizing Precdex to wean Prop and Fent     Resp: s/p Trach  - Concern for aspiration pneumonia vs pneumonitis; bedside US 11/29: b lines bilateral bases L > R  - Airway repacked by ENT 11/30, planning to change on Monday again  - Mode: AC/ CMV (Assist Control/ Continuous Mandatory Ventilation), RR (machine): 20, TV (machine): 500, FiO2: 40, PEEP: 5, MAP: 10, PIP: 19  - Trach Collar once off sedation     CV: Hx HTN  - c/w Home anti hypertensives   - concern for NSTEMI - likely demand ischemia, trops downtrended  - bedside echo 11/27: markedly decreased EF, formal echo EF 27% (from 40-45% prior)  - major contraindications to both anticoagulation therapy and cath  - Cardiology recs: treat conservatively, strict Is&Os, consider switch from Zosyn (as zosyn with high salt load), when deemed safe start ASA 81 mg, continue b-blocker    GI:   - TFs at goal, Jevity @70 by PEG (placed 10/31)  - Holding bowel regimen given now having diarrhea   - c/w rectal tube for Strict Is/ Os    Renal:  - condom cath  - replete lytes PRN    Heme:   - SCDs for now, hold off chemical DVT ppx  - Trend H/H    ID:   - Daily CBC,  - Most recent BCx growing coag negative staph - not given abx given DNR  - Aspiration episode 11/27, tachy and febrile, DNR reversed started on abx for aspiration pneumonia vs. pneumonitis  - Vanc / Zosyn (11/27-12/1) completed   - Start Clinda today (12/2-) given oral packing cannot be removed  - Blood cultures NGTD  - Tylenol PRN for fevers    Endo:  - No issues  - Cortisol 11/30 wnl    Patient is now FULL CODE again as of 11/27    Dispo: SICU    FAMILY MEETING MONDAY 10AM with SICU, ENT, ethics, palliative, patient advocacy (contacting teams)    Critical care diagnosis: requires airway management, aspiration pneumonia, severe sepsis, requires pressors, severe calorie protein malnutrition, severe muscle hypotrophy, temporal wasting

## 2018-12-03 LAB
BASE EXCESS BLDA CALC-SCNC: 5.2 MMOL/L — SIGNIFICANT CHANGE UP
BUN SERPL-MCNC: 8 MG/DL — SIGNIFICANT CHANGE UP (ref 7–23)
CA-I BLD-SCNC: 1.13 MMOL/L — SIGNIFICANT CHANGE UP (ref 1.03–1.23)
CA-I BLDA-SCNC: 1.18 MMOL/L — SIGNIFICANT CHANGE UP (ref 1.15–1.29)
CALCIUM SERPL-MCNC: 8.2 MG/DL — LOW (ref 8.4–10.5)
CHLORIDE SERPL-SCNC: 99 MMOL/L — SIGNIFICANT CHANGE UP (ref 98–107)
CO2 SERPL-SCNC: 27 MMOL/L — SIGNIFICANT CHANGE UP (ref 22–31)
CREAT SERPL-MCNC: 0.44 MG/DL — LOW (ref 0.5–1.3)
GLUCOSE BLDA-MCNC: 104 MG/DL — HIGH (ref 70–99)
GLUCOSE SERPL-MCNC: 98 MG/DL — SIGNIFICANT CHANGE UP (ref 70–99)
HCO3 BLDA-SCNC: 29 MMOL/L — HIGH (ref 22–26)
HCT VFR BLD CALC: 26 % — LOW (ref 39–50)
HCT VFR BLDA CALC: 25.5 % — LOW (ref 39–51)
HGB BLD-MCNC: 8.3 G/DL — LOW (ref 13–17)
HGB BLDA-MCNC: 8.2 G/DL — LOW (ref 13–17)
LACTATE BLDA-SCNC: 0.8 MMOL/L — SIGNIFICANT CHANGE UP (ref 0.5–2)
MAGNESIUM SERPL-MCNC: 1.6 MG/DL — SIGNIFICANT CHANGE UP (ref 1.6–2.6)
MCHC RBC-ENTMCNC: 27.6 PG — SIGNIFICANT CHANGE UP (ref 27–34)
MCHC RBC-ENTMCNC: 31.9 % — LOW (ref 32–36)
MCV RBC AUTO: 86.4 FL — SIGNIFICANT CHANGE UP (ref 80–100)
NRBC # FLD: 0 — SIGNIFICANT CHANGE UP
PCO2 BLDA: 44 MMHG — SIGNIFICANT CHANGE UP (ref 35–48)
PH BLDA: 7.44 PH — SIGNIFICANT CHANGE UP (ref 7.35–7.45)
PHOSPHATE SERPL-MCNC: 3.2 MG/DL — SIGNIFICANT CHANGE UP (ref 2.5–4.5)
PLATELET # BLD AUTO: 341 K/UL — SIGNIFICANT CHANGE UP (ref 150–400)
PMV BLD: 9.2 FL — SIGNIFICANT CHANGE UP (ref 7–13)
PO2 BLDA: 123 MMHG — HIGH (ref 83–108)
POTASSIUM BLDA-SCNC: 3.5 MMOL/L — SIGNIFICANT CHANGE UP (ref 3.4–4.5)
POTASSIUM SERPL-MCNC: 3.7 MMOL/L — SIGNIFICANT CHANGE UP (ref 3.5–5.3)
POTASSIUM SERPL-SCNC: 3.7 MMOL/L — SIGNIFICANT CHANGE UP (ref 3.5–5.3)
PREALB SERPL-MCNC: 10 MG/DL — LOW (ref 20–40)
RBC # BLD: 3.01 M/UL — LOW (ref 4.2–5.8)
RBC # FLD: 14.7 % — HIGH (ref 10.3–14.5)
SAO2 % BLDA: 98.9 % — SIGNIFICANT CHANGE UP (ref 95–99)
SODIUM BLDA-SCNC: 132 MMOL/L — LOW (ref 136–146)
SODIUM SERPL-SCNC: 136 MMOL/L — SIGNIFICANT CHANGE UP (ref 135–145)
WBC # BLD: 5.94 K/UL — SIGNIFICANT CHANGE UP (ref 3.8–10.5)
WBC # FLD AUTO: 5.94 K/UL — SIGNIFICANT CHANGE UP (ref 3.8–10.5)

## 2018-12-03 PROCEDURE — 71045 X-RAY EXAM CHEST 1 VIEW: CPT | Mod: 26

## 2018-12-03 PROCEDURE — 99233 SBSQ HOSP IP/OBS HIGH 50: CPT

## 2018-12-03 RX ORDER — DIAZEPAM 5 MG
5 TABLET ORAL ONCE
Qty: 0 | Refills: 0 | Status: DISCONTINUED | OUTPATIENT
Start: 2018-12-03 | End: 2018-12-03

## 2018-12-03 RX ADMIN — OLANZAPINE 5 MILLIGRAM(S): 15 TABLET, FILM COATED ORAL at 13:09

## 2018-12-03 RX ADMIN — Medication 1 DROP(S): at 18:04

## 2018-12-03 RX ADMIN — Medication 100 MILLIGRAM(S): at 05:30

## 2018-12-03 RX ADMIN — Medication 5 MILLIGRAM(S): at 18:04

## 2018-12-03 RX ADMIN — FENTANYL CITRATE 52.5 MICROGRAM(S)/KG/HR: 50 INJECTION INTRAVENOUS at 00:10

## 2018-12-03 RX ADMIN — PANTOPRAZOLE SODIUM 40 MILLIGRAM(S): 20 TABLET, DELAYED RELEASE ORAL at 13:09

## 2018-12-03 RX ADMIN — DEXMEDETOMIDINE HYDROCHLORIDE IN 0.9% SODIUM CHLORIDE 3.75 MICROGRAM(S)/KG/HR: 4 INJECTION INTRAVENOUS at 07:44

## 2018-12-03 RX ADMIN — FENTANYL CITRATE 52.5 MICROGRAM(S)/KG/HR: 50 INJECTION INTRAVENOUS at 07:44

## 2018-12-03 RX ADMIN — DEXMEDETOMIDINE HYDROCHLORIDE IN 0.9% SODIUM CHLORIDE 3.75 MICROGRAM(S)/KG/HR: 4 INJECTION INTRAVENOUS at 18:22

## 2018-12-03 RX ADMIN — Medication 100 MILLIGRAM(S): at 13:25

## 2018-12-03 RX ADMIN — FENTANYL CITRATE 52.5 MICROGRAM(S)/KG/HR: 50 INJECTION INTRAVENOUS at 19:24

## 2018-12-03 RX ADMIN — FENTANYL CITRATE 52.5 MICROGRAM(S)/KG/HR: 50 INJECTION INTRAVENOUS at 05:14

## 2018-12-03 RX ADMIN — DEXMEDETOMIDINE HYDROCHLORIDE IN 0.9% SODIUM CHLORIDE 3.75 MICROGRAM(S)/KG/HR: 4 INJECTION INTRAVENOUS at 19:24

## 2018-12-03 RX ADMIN — CARVEDILOL PHOSPHATE 6.25 MILLIGRAM(S): 80 CAPSULE, EXTENDED RELEASE ORAL at 05:30

## 2018-12-03 RX ADMIN — Medication 100 MILLIGRAM(S): at 21:14

## 2018-12-03 RX ADMIN — Medication 75 MILLIGRAM(S): at 05:15

## 2018-12-03 RX ADMIN — Medication 75 MILLIGRAM(S): at 18:04

## 2018-12-03 RX ADMIN — DEXMEDETOMIDINE HYDROCHLORIDE IN 0.9% SODIUM CHLORIDE 3.75 MICROGRAM(S)/KG/HR: 4 INJECTION INTRAVENOUS at 15:51

## 2018-12-03 RX ADMIN — CARVEDILOL PHOSPHATE 6.25 MILLIGRAM(S): 80 CAPSULE, EXTENDED RELEASE ORAL at 18:05

## 2018-12-03 NOTE — PROGRESS NOTE ADULT - SUBJECTIVE AND OBJECTIVE BOX
SICU AM PROGRESS NOTE :    Overnight  Events:     Pt remains on AC mode. Pt was afebrile overnight . Started on Clindamycin and Zosyn was discontinued . No pressor  requirements . Pt was dosed with Vancomycin - trough level was : 14. Blood Culture was NGTD ( 72 hrs) . H/H : 8/26. Packing is saturated - serous output .       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 Was recovering well but began to have recurrence of oral bleeding in setting of critical airway. SICU consulted  given bleeding for airway management. Patient made DNR/comfort however patient aspirated 11/28 into trach and packing, desaturated, and became agitated. Wife was upset and decided to rescind the DNR/CMO. Packing was replaced by ENT at bedside, no significant bleeding. Vanc and zosyn started for empiric coverage of suspected pneumonia. Same day EKG showed V4-6 T wave inversions, along with other nonspecific findings different from prior EKG one week ago suggestive of NSTEMI. Given contraindication to AC as well as poor prognosis precluding cath lab, trops were sent and bedside echo showed markedly decreased EF and SV. Off levo, still on high dose fent/prop gtt.    ===============================================================    Neurologic Medications:  dexmedetomidine Infusion 0.2 MICROgram(s)/kG/Hr IV Continuous <Continuous>  fentaNYL   Infusion. 7 MICROgram(s)/kG/Hr IV Continuous <Continuous>  OLANZapine 5 milliGRAM(s) Oral daily  pregabalin 75 milliGRAM(s) Oral two times a day    Respiratory Medications:    Cardiovascular Medications:  carvedilol 6.25 milliGRAM(s) Oral every 12 hours    Gastrointestinal Medications:  bisacodyl Suppository 10 milliGRAM(s) Rectal daily PRN  pantoprazole   Suspension 40 milliGRAM(s) Oral daily    Genitourinary Medications:    Hematologic/Oncologic Medications:    Antimicrobials/Immunologic Medications:  clindamycin IVPB      clindamycin IVPB 900 milliGRAM(s) IV Intermittent every 8 hours    Endocrine/Metabolic Medications:    Topical/Other Medications:  chlorhexidine 4% Liquid 1 Application(s) Topical <User Schedule>    =============================================================  T(C): 37.1 (12-03-18 @ 00:00), Max: 37.1 (12-03-18 @ 00:00)  HR: 75 (12-03-18 @ 00:00) (65 - 76)  BP: 136/89 (12-02-18 @ 08:00) (136/89 - 136/89)  BP(mean): 100 (12-02-18 @ 08:00) (100 - 100)  ABP: 158/82 (12-03-18 @ 00:00) (109/61 - 159/84)  ABP(mean): 105 (12-03-18 @ 00:00) (75 - 109)  RR: 20 (12-03-18 @ 00:00) (16 - 21)  SpO2: 100% (12-03-18 @ 00:00) (95% - 100%)  Wt(kg): --  CVP(mm Hg): --  CI: --  CAPILLARY BLOOD GLUCOSE       N/A      12-01 @ 07:01  -  12-02 @ 07:00  --------------------------------------------------------  IN:    dexmedetomidine Infusion: 432.4 mL    Enteral Tube Flush: 100 mL    fentaNYL Infusion.: 1207.5 mL    IV PiggyBack: 400 mL    ns in tub fed  [id number]: 1610 mL  Total IN: 3749.9 mL    OUT:    Indwelling Catheter - Urethral: 2700 mL  Total OUT: 2700 mL    Total NET: 1049.9 mL      12-02 @ 07:01  -  12-03 @ 01:32  --------------------------------------------------------  IN:    dexmedetomidine Infusion: 338.4 mL    Enteral Tube Flush: 300 mL    fentaNYL Infusion.: 945 mL    IV PiggyBack: 100 mL    ns in tub fed  [id number]: 1050 mL  Total IN: 2733.4 mL    OUT:    Indwelling Catheter - Urethral: 2060 mL    Rectal Tube: 15 mL  Total OUT: 2075 mL    Total NET: 658.4 mL    ===================================================    PHYSICAL EXAM     NEURO  Exam: awake    HEENT   Intraoral packing saturated .     RESPIRATORY  Exam: unlabored, clear to auscultation bilaterally.   s/p Tracheotomy - On MEch VEnt     CARDIOVASCULAR  S1 S2 heard , RRR    GI/NUTRITION  Soft , non distended , non tender   Diet : NPO / PEG - On tube feeds     GENITOURINARY  I&O's Detail    11-29 @ 07:01 - 11-30 @ 07:00  --------------------------------------------------------  IN:    Enteral Tube Flush: 230 mL    fentaNYL Infusion.: 1260 mL    IV PiggyBack: 900 mL    norepinephrine Infusion: 63.7 mL    ns in tub fed  [id number]: 640 mL    propofol Infusion: 482.6 mL  Total IN: 3576.3 mL    OUT:    Indwelling Catheter - Urethral: 2085 mL  Total OUT: 2085 mL    Total NET: 1491.3 mL      11-30 @ 07:01 - 11-30 @ 13:36  --------------------------------------------------------  IN:    fentaNYL Infusion.: 262.5 mL    norepinephrine Infusion: 15.4 mL    ns in tub fed  [id number]: 350 mL    propofol Infusion: 79 mL  Total IN: 706.9 mL    OUT:    Indwelling Catheter - Urethral: 600 mL  Total OUT: 600 mL    Total NET: 106.9 mL          11-30    137  |  98  |  9   ----------------------------<  99  3.4<L>   |  29  |  0.50    Ca    8.3<L>      30 Nov 2018 04:30  Phos  3.5     11-30  Mg     1.5     11-30      [ ] Andrade catheter, indication: N/A  Meds:       HEMATOLOGIC  Meds:   [x] VTE Prophylaxis                        8.6    7.86  )-----------( 402      ( 30 Nov 2018 04:30 )             27.8     PT/INR - ( 30 Nov 2018 04:30 )   PT: 15.2 SEC;   INR: 1.36          PTT - ( 30 Nov 2018 04:30 )  PTT:37.4 SEC  Transfusion     [ ] PRBC   [ ] Platelets   [ ] FFP   [ ] Cryoprecipitate      INFECTIOUS DISEASES  WBC Count: 7.86 K/uL (11-30 @ 04:30)    RECENT CULTURES:  Specimen Source: BLOOD PERIPHERAL  Date/Time: 11-27 @ 20:02  Culture Results: --  Gram Stain: --  Organism: --    Meds: piperacillin/tazobactam IVPB. 3.375 Gram(s) IV Intermittent every 8 hours  vancomycin  IVPB 1000 milliGRAM(s) IV Intermittent every 8 hours        ENDOCRINE  CAPILLARY BLOOD GLUCOSE        Meds:       ACCESS DEVICES:  [x] Peripheral IV  [ ] Central Venous Line	[ ] R	[ ] L	[ ] IJ	[ ] Fem	[ ] SC	Placed:   [ ] Arterial Line		[ ] R	[ ] L	[ ] Fem	[ ] Rad	[ ] Ax	Placed:   [ ] PICC:					[ x Mediport  [ ] Urinary Catheter, Date Placed:   [x] Necessity of urinary, arterial, and venous catheters discussed    OTHER MEDICATIONS:  chlorhexidine 4% Liquid 1 Application(s) Topical <User Schedule>      CODE STATUS: FULL CODE    IMAGING:    < from: Xray Chest 1 View- PORTABLE-Routine (11.30.18 @ 01:08) >    EXAM:  XR CHEST PORTABLE ROUTINE 1V        PROCEDURE DATE:  Nov 30 2018         INTERPRETATION:  TIME OF EXAM: November 30, 2018 at 12:34 AM    CLINICAL INFORMATION: Tracheostomy; recent myocardial infarction.    TECHNIQUE:   Portable chest    INTERPRETATION:     Tracheostomy tube and chemotherapy port in place. Persistent small   rounded opacity in the left upper lobe appears smaller than the last exam   perhaps resolving atelectasis. No focal consolidations. The heart is not   enlarged.      COMPARISON:  November 29      IMPRESSION:  Follow-up with small rounded opacity in the left upper lobe   appears to be decreasing in size.    < end of copied text > SICU AM PROGRESS NOTE :    Overnight  Events:     Pt remains on AC mode. Pt was afebrile overnight . Started on Clindamycin and Zosyn was discontinued . No pressor  requirements . Pt was dosed with Vancomycin - trough level was : 14. Blood Culture was NGTD ( 72 hrs) . H/H : 8/26. Packing is saturated - serous output .       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 Was recovering well but began to have recurrence of oral bleeding in setting of critical airway. SICU consulted  given bleeding for airway management. Patient made DNR/comfort however patient aspirated 11/28 into trach and packing, desaturated, and became agitated. Wife was upset and decided to rescind the DNR/CMO. Packing was replaced by ENT at bedside, no significant bleeding. Vanc and zosyn started for empiric coverage of suspected pneumonia. Same day EKG showed V4-6 T wave inversions, along with other nonspecific findings different from prior EKG one week ago suggestive of NSTEMI. Given contraindication to AC as well as poor prognosis precluding cath lab, trops were sent and bedside echo showed markedly decreased EF and SV. Off levo, still on high dose fent/prop gtt.    ===============================================================    Neurologic Medications:  dexmedetomidine Infusion 0.2 MICROgram(s)/kG/Hr IV Continuous <Continuous>  fentaNYL   Infusion. 7 MICROgram(s)/kG/Hr IV Continuous <Continuous>  OLANZapine 5 milliGRAM(s) Oral daily  pregabalin 75 milliGRAM(s) Oral two times a day    Respiratory Medications:    Cardiovascular Medications:  carvedilol 6.25 milliGRAM(s) Oral every 12 hours    Gastrointestinal Medications:  bisacodyl Suppository 10 milliGRAM(s) Rectal daily PRN  pantoprazole   Suspension 40 milliGRAM(s) Oral daily    Genitourinary Medications:    Hematologic/Oncologic Medications:    Antimicrobials/Immunologic Medications:  clindamycin IVPB      clindamycin IVPB 900 milliGRAM(s) IV Intermittent every 8 hours    Endocrine/Metabolic Medications:    Topical/Other Medications:  chlorhexidine 4% Liquid 1 Application(s) Topical <User Schedule>    =============================================================  T(C): 37.1 (12-03-18 @ 00:00), Max: 37.1 (12-03-18 @ 00:00)  HR: 75 (12-03-18 @ 00:00) (65 - 76)  BP: 136/89 (12-02-18 @ 08:00) (136/89 - 136/89)  BP(mean): 100 (12-02-18 @ 08:00) (100 - 100)  ABP: 158/82 (12-03-18 @ 00:00) (109/61 - 159/84)  ABP(mean): 105 (12-03-18 @ 00:00) (75 - 109)  RR: 20 (12-03-18 @ 00:00) (16 - 21)  SpO2: 100% (12-03-18 @ 00:00) (95% - 100%)  Wt(kg): --  CVP(mm Hg): --  CI: --  CAPILLARY BLOOD GLUCOSE       N/A      12-01 @ 07:01  -  12-02 @ 07:00  --------------------------------------------------------  IN:    dexmedetomidine Infusion: 432.4 mL    Enteral Tube Flush: 100 mL    fentaNYL Infusion.: 1207.5 mL    IV PiggyBack: 400 mL    ns in tub fed  [id number]: 1610 mL  Total IN: 3749.9 mL    OUT:    Indwelling Catheter - Urethral: 2700 mL  Total OUT: 2700 mL    Total NET: 1049.9 mL      12-02 @ 07:01  -  12-03 @ 01:32  --------------------------------------------------------  IN:    dexmedetomidine Infusion: 338.4 mL    Enteral Tube Flush: 300 mL    fentaNYL Infusion.: 945 mL    IV PiggyBack: 100 mL    ns in tub fed  [id number]: 1050 mL  Total IN: 2733.4 mL    OUT:    Indwelling Catheter - Urethral: 2060 mL    Rectal Tube: 15 mL  Total OUT: 2075 mL    Total NET: 658.4 mL    ===================================================    PHYSICAL EXAM     NEURO  Exam: awake    HEENT   Intraoral packing saturated .     RESPIRATORY  Exam: unlabored, clear to auscultation bilaterally.   s/p Tracheotomy - On MEch VEnt     CARDIOVASCULAR  S1 S2 heard , RRR    GI/NUTRITION  Soft , non distended , non tender   Diet : NPO / PEG - On tube feeds     GENITOURINARY  I&O's Detail    11-29 @ 07:01 - 11-30 @ 07:00  --------------------------------------------------------  IN:    Enteral Tube Flush: 230 mL    fentaNYL Infusion.: 1260 mL    IV PiggyBack: 900 mL    norepinephrine Infusion: 63.7 mL    ns in tub fed  [id number]: 640 mL    propofol Infusion: 482.6 mL  Total IN: 3576.3 mL    OUT:    Indwelling Catheter - Urethral: 2085 mL  Total OUT: 2085 mL    Total NET: 1491.3 mL      11-30 @ 07:01 - 11-30 @ 13:36  --------------------------------------------------------  IN:    fentaNYL Infusion.: 262.5 mL    norepinephrine Infusion: 15.4 mL    ns in tub fed  [id number]: 350 mL    propofol Infusion: 79 mL  Total IN: 706.9 mL    OUT:    Indwelling Catheter - Urethral: 600 mL  Total OUT: 600 mL    Total NET: 106.9 mL          11-30    137  |  98  |  9   ----------------------------<  99  3.4<L>   |  29  |  0.50    Ca    8.3<L>      30 Nov 2018 04:30  Phos  3.5     11-30  Mg     1.5     11-30      [ ] Andrade catheter, indication: N/A  Meds:       HEMATOLOGIC  Meds:   [x] VTE Prophylaxis                        8.6    7.86  )-----------( 402      ( 30 Nov 2018 04:30 )             27.8     PT/INR - ( 30 Nov 2018 04:30 )   PT: 15.2 SEC;   INR: 1.36          PTT - ( 30 Nov 2018 04:30 )  PTT:37.4 SEC  Transfusion     [ ] PRBC   [ ] Platelets   [ ] FFP   [ ] Cryoprecipitate      INFECTIOUS DISEASES  WBC Count: 7.86 K/uL (11-30 @ 04:30)    RECENT CULTURES:  Specimen Source: BLOOD PERIPHERAL  Date/Time: 11-27 @ 20:02  Culture Results: --  Gram Stain: --  Organism: --    Meds: piperacillin/tazobactam IVPB. 3.375 Gram(s) IV Intermittent every 8 hours  vancomycin  IVPB 1000 milliGRAM(s) IV Intermittent every 8 hours        ENDOCRINE  CAPILLARY BLOOD GLUCOSE        Meds:       ACCESS DEVICES:  [x] Peripheral IV  [ ] Central Venous Line	[ ] R	[ ] L	[ ] IJ	[ ] Fem	[ ] SC	Placed:   [x] Arterial Line		[ ] R	[ ] L	[ ] Fem	[ ] Rad	[ ] Ax	Placed:   [ ] PICC:					[ x Mediport  [ ] Urinary Catheter, Date Placed:   [x] Necessity of urinary, arterial, and venous catheters discussed    OTHER MEDICATIONS:  chlorhexidine 4% Liquid 1 Application(s) Topical <User Schedule>      CODE STATUS: FULL CODE    IMAGING:    < from: Xray Chest 1 View- PORTABLE-Routine (11.30.18 @ 01:08) >    EXAM:  XR CHEST PORTABLE ROUTINE 1V        PROCEDURE DATE:  Nov 30 2018         INTERPRETATION:  TIME OF EXAM: November 30, 2018 at 12:34 AM    CLINICAL INFORMATION: Tracheostomy; recent myocardial infarction.    TECHNIQUE:   Portable chest    INTERPRETATION:     Tracheostomy tube and chemotherapy port in place. Persistent small   rounded opacity in the left upper lobe appears smaller than the last exam   perhaps resolving atelectasis. No focal consolidations. The heart is not   enlarged.      COMPARISON:  November 29      IMPRESSION:  Follow-up with small rounded opacity in the left upper lobe   appears to be decreasing in size.    < end of copied text >

## 2018-12-03 NOTE — PROGRESS NOTE ADULT - ASSESSMENT
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 Was recovering well but began to have recurrence of oral bleeding in setting of critical airway. SICU consulted  given bleeding for airway management. Patient made DNR/comfort however patient aspirated 11/28 into trach and packing, desaturated, and became agitated. Wife was upset and decided to rescind the DNR/CMO. Packing was replaced by ENT at bedside, no significant bleeding. Vanc and zosyn started for empiric coverage of suspected pneumonia. Same day EKG showed V4-6 T wave inversions, along with other nonspecific findings different from prior EKG one week ago suggestive of NSTEMI. Given contraindication to AC as well as poor prognosis precluding cath lab, trops were sent and bedside echo showed markedly decreased EF and SV. Cardiology consulted. Off levo, still on high dose fent/prop gtt. Propofol was weaned off and changed to precedex gtt     Neuro: Post operative pain, Anxiety  - Pt on fentanyl - wean as tolerated   - Planned to start methadone 10mg TID however pt with recent demand ischemia, not tolerated PO, and has prolonged QTc  - Started on precedex gtt     Resp: s/p Trach  - Concern for aspiration pneumonia vs pneumonitis; bedside US 11/29: B lines bilateral bases L > R  - Airway repacked by ENT 11/30, planning to change on Monday again  - Mode: AC/ CMV (Assist Control/ Continuous Mandatory Ventilation), RR (machine): 20, TV (machine): 500, FiO2: 40, PEEP: 5, MAP: 10, PIP: 19  - For trach collar trial once sedatives have been weaned    CV: Hx HTN  - On Home Coreg   - concern for NSTEMI - likely demand ischemia, trops downtrended  - bedside echo 11/27: markedly decreased EF, formal echo EF 27% (from 40-45% prior)  - major contraindications to both anticoagulation therapy and cath  - Cardiology recs: treat conservatively, strict Is&Os, consider switch from Zosyn (as zosyn with high salt load), when deemed safe start ASA 81 mg, continue b-blocker    GI:   - TFs at goal, Jevity @70 by PEG (placed 10/31)  - Rectal tube in place    Renal:  - condom cath  - replete lytes PRN    Heme:   - SCDs for now, hold off chemical DVT ppx  - Trend H/H    ID:   - CTM Tmax, WBC  - Most recent BCx growing coag negative staph - not given abx given DNR  - Aspiration episode 11/27, tachy and febrile, DNR resversed, started on abx for aspiration pneumonia vs. 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 Was recovering well but began to have recurrence of oral bleeding in setting of critical airway. SICU consulted  given bleeding for airway management. Patient made DNR/comfort however patient aspirated 11/28 into trach and packing, desaturated, and became agitated. Wife was upset and decided to rescind the DNR/CMO. Packing was replaced by ENT at bedside, no significant bleeding. Vanc and zosyn started for empiric coverage of suspected pneumonia. Same day EKG showed V4-6 T wave inversions, along with other nonspecific findings different from prior EKG one week ago suggestive of NSTEMI. Given contraindication to AC as well as poor prognosis precluding cath lab, trops were sent and bedside echo showed markedly decreased EF and SV. Cardiology consulted. Off levo, still on high dose fent/prop gtt. Propofol was weaned off and changed to precedex gtt     Neuro: Post operative pain, Anxiety  - Pt on fentanyl - wean as tolerated  - c/w precedex gtt   - Planned to start methadone 10mg TID however pt with recent demand ischemia, not tolerated PO, and has prolonged QTc    Resp: s/p Trach  - Concern for aspiration pneumonia vs pneumonitis; bedside US 11/29: B lines bilateral bases L > R  - Airway repacked by ENT 11/30, planning to change on Monday again  - Mode: AC/ CMV (Assist Control/ Continuous Mandatory Ventilation), RR (machine): 20, TV (machine): 500, FiO2: 40, PEEP: 5, MAP: 10, PIP: 19  - Trach collar trial once sedatives have been weaned    CV: Hx HTN  - On Home Coreg   - concern for NSTEMI - likely demand ischemia, trops downtrended  - bedside echo 11/27: markedly decreased EF, formal echo EF 27% (from 40-45% prior)  - major contraindications to both anticoagulation therapy and cath  - Cardiology recs: treat conservatively, strict Is&Os, consider switch from Zosyn (as zosyn with high salt load), when deemed safe start ASA 81 mg, continue b-blocker    GI:   - TFs at goal, Jevity @70 by PEG (placed 10/31)  - Rectal tube in place    Renal:  - condom cath  - replete lytes PRN    Heme:   - SCDs for now, hold off chemical DVT ppx  - Trend H/H    ID:   - CTM Tmax, WBC  - Most recent BCx growing coag negative staph - not given abx given DNR  - Aspiration episode 11/27, tachy and febrile, DNR resversed, started on abx for aspiration pneumonia vs. 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 Was recovering well but began to have recurrence of oral bleeding in setting of critical airway. SICU consulted  given bleeding for airway management. Patient made DNR/comfort however patient aspirated 11/28 into trach and packing, desaturated, and became agitated. Wife was upset and decided to rescind the DNR/CMO. Packing was replaced by ENT at bedside, no significant bleeding. Vanc and zosyn started for empiric coverage of suspected pneumonia. Same day EKG showed V4-6 T wave inversions, along with other nonspecific findings different from prior EKG one week ago suggestive of NSTEMI. Given contraindication to AC as well as poor prognosis precluding cath lab, trops were sent and bedside echo showed markedly decreased EF and SV. Cardiology consulted. Off levo, still on high dose fent/prop gtt. Propofol was weaned off and changed to precedex gtt     Neuro: Post operative pain, Anxiety  - Pt on fentanyl, precedex  - c/w olanzapine, lyrica  - Planned to start methadone 10mg TID however pt with recent demand ischemia, not tolerated PO, and has prolonged QTc    Resp: s/p Trach  - Concern for aspiration pneumonia vs pneumonitis; bedside US 11/29: B lines bilateral bases L > R  - Airway repacked by ENT 11/30, planning to change on Monday again  - Mode: AC/ CMV (Assist Control/ Continuous Mandatory Ventilation), RR (machine): 20, TV (machine): 500, FiO2: 40, PEEP: 5, MAP: 10, PIP: 19  - Trach collar trial once sedatives have been weaned    CV: Hx HTN  - On Home Coreg   - concern for NSTEMI - likely demand ischemia, trops downtrended  - bedside echo 11/27: markedly decreased EF, formal echo EF 27% (from 40-45% prior)  - major contraindications to both anticoagulation therapy and cath  - Cardiology recs: treat conservatively, strict Is&Os, consider switch from Zosyn (as zosyn with high salt load), when deemed safe start ASA 81 mg, continue b-blocker    GI:   - TFs at goal, Jevity @70 by PEG (placed 10/31)  - Rectal tube in place    Renal:  - condom cath  - replete lytes PRN    Heme:   - SCDs for now, hold off chemical DVT ppx  - Trend H/H    ID:   - CTM Tmax, WBC  - Most recent BCx growing coag negative staph - not given abx given DNR  - Aspiration episode 11/27, tachy and febrile, DNR resversed, started on abx for aspiration pneumonia vs. pneumonitis  - Vanc  - c/w Clinda (start 12/2-) in setting of oral packing  - Blood cultures NGTD- 72 hrs   - Tylenol PRN for fevers    Endo:  - No issues  - Serum cortisol 11/30 wnl    Patient is now FULL CODE again as of 11/27    Dispo: SICU    FAMILY MEETING MONDAY 10AM with SICU, ENT, ethics, palliative, patient advocacy (contacting teams)    Critical care diagnosis: requires airway management, aspiration pneumonia, severe sepsis, requires pressors, severe calorie protein malnutrition, severe muscle hypotrophy, temporal wasting

## 2018-12-03 NOTE — PROGRESS NOTE ADULT - SUBJECTIVE AND OBJECTIVE BOX
INTERVAL HPI/OVERNIGHT EVENTS:     Palliative Care following for symptom management.   Pt with improved symptoms over the weekend, able to be awake more with current regimen.     Code Status: Full Code   Allergies    hospital socks (Rash)  lisinopril (Anaphylaxis)  statins (Anaphylaxis)    Intolerances    MEDICATIONS  (STANDING):  artificial  tears Solution 1 Drop(s) Both EYES every 12 hours  carvedilol 6.25 milliGRAM(s) Oral every 12 hours  chlorhexidine 4% Liquid 1 Application(s) Topical <User Schedule>  clindamycin IVPB      clindamycin IVPB 900 milliGRAM(s) IV Intermittent every 8 hours  dexmedetomidine Infusion 0.2 MICROgram(s)/kG/Hr (3.75 mL/Hr) IV Continuous <Continuous>  fentaNYL   Infusion. 7 MICROgram(s)/kG/Hr (52.5 mL/Hr) IV Continuous <Continuous>  OLANZapine 5 milliGRAM(s) Oral daily  pantoprazole   Suspension 40 milliGRAM(s) Oral daily  pregabalin 75 milliGRAM(s) Oral two times a day    MEDICATIONS  (PRN):  bisacodyl Suppository 10 milliGRAM(s) Rectal daily PRN Constipation    PRESENT SYMPTOMS: [ x]Unable to obtain due to poor mentation   Source if other than patient:  [ ]Family   [ ]Team     Pain (Impact on QOL):    Location:  Severity:  Minimal acceptable level (0-10 scale):       Quality:       Onset:  Duration:  Aggravating factors:  Relieving Factors  Radiation:    Dyspnea:  Yes [ ] No [ ] - [ ]Mild [ ]Moderate [ ]Severe  Anxiety:    Yes [ ] No [ ] - [ ]Mild [ ]Moderate [ ]Severe  Fatigue:    Yes [ ] No [ ] - [ ]Mild [ ]Moderate [ ]Severe  Nausea:    Yes [ ] No [ ] - [ ]Mild [ ]Moderate [ ]Severe                         Loss of appetite: Yes [ ] No [ ] - [ ]Mild [ ]Moderate [ ]Severe             Constipation:  Yes [ ] No [ ] - [ ]Mild [ ]Moderate [ ]Severe    PAIN AD Score:	  http://geriatrictoolkit.[employer].edu/cog/painad.pdf (Ctrl + left click to view)    Other Symptoms:  [ ]All other review of systems negative     Karnofsky Performance Score/Palliative Performance Status Version 2: 30%    http://palliative.info/resource_material/[email or web address].pdf    PHYSICAL EXAM:  Vital Signs Last 24 Hrs  T(C): 36.7 (03 Dec 2018 20:00), Max: 37.3 (03 Dec 2018 12:00)  T(F): 98.1 (03 Dec 2018 20:00), Max: 99.1 (03 Dec 2018 12:00)  HR: 71 (03 Dec 2018 20:00) (69 - 86)  BP: --  BP(mean): --  RR: 20 (03 Dec 2018 20:00) (16 - 21)  SpO2: 100% (03 Dec 2018 20:00) (95% - 100%) I&O's Summary    02 Dec 2018 07:01  -  03 Dec 2018 07:00  --------------------------------------------------------  IN: 3421.2 mL / OUT: 2985 mL / NET: 436.2 mL    03 Dec 2018 07:01  -  03 Dec 2018 20:26  --------------------------------------------------------  IN: 1936.9 mL / OUT: 940 mL / NET: 996.9 mL    GENERAL:  Sedated with trach to vent in the SICU  HEENT:  Trach with intraoral packing intact   PULMONARY:   Coarse bilaterally   CARDIOVASCULAR:    [x ]Regular [ ]Irregular [ ]Tachy  [ ]Jimmie [ ]Murmur [ ]Other  GASTROINTESTINAL:  [ x]Soft  [ ]Distended   [x ]+BS  [x ]Non tender [ ]Tender  [ ]PEG [x ]OGT/ NGT   Last BM: 11/30/18  GENITOURINARY:  [ ]Normal [ ] Incontinent   [ ]Oliguria/Anuria   [x ]Andrade  MUSCULOSKELETAL:   [ ]Normal   [ ]Weakness  [x ]Bed/Wheelchair bound [ ]Edema  NEUROLOGIC:   Sedated   SKIN:   Intraoral packing intact     CRITICAL CARE:  [ ] Shock Present  [ ]Septic [ ]Cardiogenic [ ]Neurologic [ ]Hypovolemic  [ ]  Vasopressors [ ]  Inotropes   [ ] Respiratory failure present  [ ] Acute  [ ] Chronic [ ] Hypoxic  [ ] Hypercarbic [ ] Other  [ ] Other organ failure     LABS:                        8.3    5.94  )-----------( 341      ( 03 Dec 2018 02:50 )             26.0     12-03    136  |  99  |  8   ----------------------------<  98  3.7   |  27  |  0.44<L>    Ca    8.2<L>      03 Dec 2018 02:50  Phos  3.2     12-03  Mg     1.6     12-03    RADIOLOGY & ADDITIONAL STUDIES: reviewed.     Protein Calorie Malnutrition Present: [ ] yes [ ] no  [x ] PPSV2 < or = 30%  [ ] significant weight loss [ ] poor nutritional intake [ ] anasarca [ ] catabolic state Albumin, Serum: 3.0 g/dL (11-21-18 @ 17:38)    REFERRALS:   [ ]Chaplaincy  [ ] Hospice  [ ]Child Life  [ ]Social Work  [ ]Case management [ ]Holistic Therapy   Goals of Care Document:

## 2018-12-03 NOTE — PROGRESS NOTE ADULT - PROBLEM SELECTOR PLAN 1
No disease modifying treatment at this time. Per oncology if patient can follow up outpatient immunotherapy would be considered. Continue management per SICU team.

## 2018-12-03 NOTE — PROGRESS NOTE ADULT - SUBJECTIVE AND OBJECTIVE BOX
Pt seen this AM. Packing changed in the mouth Friday evening.    NAD, awake and alert  6LPC trach in place with sutures, on vent   NC: mass, non-bleeding  oc/op: kerlix in place, no pooling or active bleeding  Neck soft and flat, stoma intact     A/P: s/p awake tracheotomy 11/19 for airway protection now w/ oral packing which has to remain in place due to continued bleeding from the tumor. DNR rescinded.   -maintain oral packing in place, do NOT remove, ENT to change packing q3-4 days, next change is today pending GOC discussion   -continued GOC discussions, ethics now involved  -plan for family discussion today at 10AM with multidisciplinary team   -NPO, PEG feeds  -routine trach care   -appreciate SICU care

## 2018-12-03 NOTE — PROGRESS NOTE ADULT - PROBLEM SELECTOR PLAN 2
Patient currently on Fentanyl and Propofol.  Would not recommend Methadone given patient's recent rise in troponin. Cardiology following as well. Options for pain management limited due to high requirement, presence of a PEG. Patient on high dose Fentanyl for over a week.  Opioid receptors likely saturated - but given recent concern for NSTEMI - would not be a candidate for Ketamine desensitization at this time.  Can attempt opioid rotation with Dilaudid if unable to wean sedation off due to pain.  Patient was requiring 2mg IV q2h PRN prior to SICU stay - Would start infusion at 4mg/hr and titrate to patient's pain level.  Patient will need a long acting opioid going forward.  Methadone would be ideal given high opioid requirements and limited fat stores if QTc improves and from cardiology standpoint is safe to start. Discussed with wife and team. Continue Bowel regimen.

## 2018-12-04 ENCOUNTER — APPOINTMENT (OUTPATIENT)
Dept: HEMATOLOGY ONCOLOGY | Facility: CLINIC | Age: 58
End: 2018-12-04

## 2018-12-04 ENCOUNTER — APPOINTMENT (OUTPATIENT)
Age: 58
End: 2018-12-04

## 2018-12-04 LAB
BACTERIA BLD CULT: SIGNIFICANT CHANGE UP
BACTERIA BLD CULT: SIGNIFICANT CHANGE UP
BASE EXCESS BLDA CALC-SCNC: 4.1 MMOL/L — SIGNIFICANT CHANGE UP
BUN SERPL-MCNC: 8 MG/DL — SIGNIFICANT CHANGE UP (ref 7–23)
CALCIUM SERPL-MCNC: 8.2 MG/DL — LOW (ref 8.4–10.5)
CHLORIDE BLDA-SCNC: 99 MMOL/L — SIGNIFICANT CHANGE UP (ref 96–108)
CHLORIDE SERPL-SCNC: 96 MMOL/L — LOW (ref 98–107)
CO2 SERPL-SCNC: 26 MMOL/L — SIGNIFICANT CHANGE UP (ref 22–31)
CREAT SERPL-MCNC: 0.45 MG/DL — LOW (ref 0.5–1.3)
GLUCOSE BLDA-MCNC: 114 MG/DL — HIGH (ref 70–99)
GLUCOSE SERPL-MCNC: 109 MG/DL — HIGH (ref 70–99)
HCO3 BLDA-SCNC: 28 MMOL/L — HIGH (ref 22–26)
HCT VFR BLD CALC: 25.8 % — LOW (ref 39–50)
HCT VFR BLD CALC: 26.1 % — LOW (ref 39–50)
HCT VFR BLD CALC: 26.3 % — LOW (ref 39–50)
HCT VFR BLDA CALC: 26.1 % — LOW (ref 39–51)
HGB BLD-MCNC: 8.2 G/DL — LOW (ref 13–17)
HGB BLD-MCNC: 8.3 G/DL — LOW (ref 13–17)
HGB BLD-MCNC: 8.5 G/DL — LOW (ref 13–17)
HGB BLDA-MCNC: 8.4 G/DL — LOW (ref 13–17)
LACTATE BLDA-SCNC: 0.8 MMOL/L — SIGNIFICANT CHANGE UP (ref 0.5–2)
MAGNESIUM SERPL-MCNC: 1.4 MG/DL — LOW (ref 1.6–2.6)
MCHC RBC-ENTMCNC: 26.8 PG — LOW (ref 27–34)
MCHC RBC-ENTMCNC: 27.5 PG — SIGNIFICANT CHANGE UP (ref 27–34)
MCHC RBC-ENTMCNC: 27.5 PG — SIGNIFICANT CHANGE UP (ref 27–34)
MCHC RBC-ENTMCNC: 31.8 % — LOW (ref 32–36)
MCHC RBC-ENTMCNC: 31.8 % — LOW (ref 32–36)
MCHC RBC-ENTMCNC: 32.3 % — SIGNIFICANT CHANGE UP (ref 32–36)
MCV RBC AUTO: 84.2 FL — SIGNIFICANT CHANGE UP (ref 80–100)
MCV RBC AUTO: 85.1 FL — SIGNIFICANT CHANGE UP (ref 80–100)
MCV RBC AUTO: 86.6 FL — SIGNIFICANT CHANGE UP (ref 80–100)
NRBC # FLD: 0 — SIGNIFICANT CHANGE UP
PCO2 BLDA: 43 MMHG — SIGNIFICANT CHANGE UP (ref 35–48)
PH BLDA: 7.43 PH — SIGNIFICANT CHANGE UP (ref 7.35–7.45)
PHOSPHATE SERPL-MCNC: 3.5 MG/DL — SIGNIFICANT CHANGE UP (ref 2.5–4.5)
PLATELET # BLD AUTO: 318 K/UL — SIGNIFICANT CHANGE UP (ref 150–400)
PLATELET # BLD AUTO: 348 K/UL — SIGNIFICANT CHANGE UP (ref 150–400)
PLATELET # BLD AUTO: 354 K/UL — SIGNIFICANT CHANGE UP (ref 150–400)
PMV BLD: 9.2 FL — SIGNIFICANT CHANGE UP (ref 7–13)
PMV BLD: 9.5 FL — SIGNIFICANT CHANGE UP (ref 7–13)
PMV BLD: 9.5 FL — SIGNIFICANT CHANGE UP (ref 7–13)
PO2 BLDA: 145 MMHG — HIGH (ref 83–108)
POTASSIUM BLDA-SCNC: 3.4 MMOL/L — SIGNIFICANT CHANGE UP (ref 3.4–4.5)
POTASSIUM SERPL-MCNC: 3.6 MMOL/L — SIGNIFICANT CHANGE UP (ref 3.5–5.3)
POTASSIUM SERPL-SCNC: 3.6 MMOL/L — SIGNIFICANT CHANGE UP (ref 3.5–5.3)
RBC # BLD: 2.98 M/UL — LOW (ref 4.2–5.8)
RBC # BLD: 3.09 M/UL — LOW (ref 4.2–5.8)
RBC # BLD: 3.1 M/UL — LOW (ref 4.2–5.8)
RBC # FLD: 14.5 % — SIGNIFICANT CHANGE UP (ref 10.3–14.5)
RBC # FLD: 14.6 % — HIGH (ref 10.3–14.5)
RBC # FLD: 14.7 % — HIGH (ref 10.3–14.5)
SAO2 % BLDA: 99 % — SIGNIFICANT CHANGE UP (ref 95–99)
SODIUM BLDA-SCNC: 131 MMOL/L — LOW (ref 136–146)
SODIUM SERPL-SCNC: 132 MMOL/L — LOW (ref 135–145)
WBC # BLD: 6.15 K/UL — SIGNIFICANT CHANGE UP (ref 3.8–10.5)
WBC # BLD: 7.03 K/UL — SIGNIFICANT CHANGE UP (ref 3.8–10.5)
WBC # BLD: 7.57 K/UL — SIGNIFICANT CHANGE UP (ref 3.8–10.5)
WBC # FLD AUTO: 6.15 K/UL — SIGNIFICANT CHANGE UP (ref 3.8–10.5)
WBC # FLD AUTO: 7.03 K/UL — SIGNIFICANT CHANGE UP (ref 3.8–10.5)
WBC # FLD AUTO: 7.57 K/UL — SIGNIFICANT CHANGE UP (ref 3.8–10.5)

## 2018-12-04 PROCEDURE — 71045 X-RAY EXAM CHEST 1 VIEW: CPT | Mod: 26

## 2018-12-04 PROCEDURE — 99291 CRITICAL CARE FIRST HOUR: CPT

## 2018-12-04 PROCEDURE — 99233 SBSQ HOSP IP/OBS HIGH 50: CPT | Mod: GC

## 2018-12-04 RX ORDER — ONDANSETRON 8 MG/1
4 TABLET, FILM COATED ORAL ONCE
Qty: 0 | Refills: 0 | Status: COMPLETED | OUTPATIENT
Start: 2018-12-04 | End: 2018-12-04

## 2018-12-04 RX ORDER — POTASSIUM CHLORIDE 20 MEQ
40 PACKET (EA) ORAL ONCE
Qty: 0 | Refills: 0 | Status: COMPLETED | OUTPATIENT
Start: 2018-12-04 | End: 2018-12-04

## 2018-12-04 RX ORDER — FENTANYL CITRATE 50 UG/ML
7 INJECTION INTRAVENOUS
Qty: 2500 | Refills: 0 | Status: DISCONTINUED | OUTPATIENT
Start: 2018-12-04 | End: 2018-12-05

## 2018-12-04 RX ORDER — DEXMEDETOMIDINE HYDROCHLORIDE IN 0.9% SODIUM CHLORIDE 4 UG/ML
0.2 INJECTION INTRAVENOUS
Qty: 200 | Refills: 0 | Status: DISCONTINUED | OUTPATIENT
Start: 2018-12-04 | End: 2018-12-06

## 2018-12-04 RX ORDER — DIAZEPAM 5 MG
5 TABLET ORAL EVERY 6 HOURS
Qty: 0 | Refills: 0 | Status: DISCONTINUED | OUTPATIENT
Start: 2018-12-04 | End: 2018-12-04

## 2018-12-04 RX ORDER — DIAZEPAM 5 MG
5 TABLET ORAL ONCE
Qty: 0 | Refills: 0 | Status: DISCONTINUED | OUTPATIENT
Start: 2018-12-04 | End: 2018-12-04

## 2018-12-04 RX ORDER — ACETAMINOPHEN 500 MG
1000 TABLET ORAL ONCE
Qty: 0 | Refills: 0 | Status: COMPLETED | OUTPATIENT
Start: 2018-12-04 | End: 2018-12-04

## 2018-12-04 RX ORDER — MAGNESIUM SULFATE 500 MG/ML
2 VIAL (ML) INJECTION ONCE
Qty: 0 | Refills: 0 | Status: COMPLETED | OUTPATIENT
Start: 2018-12-04 | End: 2018-12-04

## 2018-12-04 RX ORDER — DIAZEPAM 5 MG
2 TABLET ORAL EVERY 6 HOURS
Qty: 0 | Refills: 0 | Status: DISCONTINUED | OUTPATIENT
Start: 2018-12-04 | End: 2018-12-05

## 2018-12-04 RX ADMIN — OLANZAPINE 5 MILLIGRAM(S): 15 TABLET, FILM COATED ORAL at 12:13

## 2018-12-04 RX ADMIN — Medication 1000 MILLIGRAM(S): at 14:05

## 2018-12-04 RX ADMIN — FENTANYL CITRATE 52.5 MICROGRAM(S)/KG/HR: 50 INJECTION INTRAVENOUS at 19:32

## 2018-12-04 RX ADMIN — Medication 1 DROP(S): at 05:34

## 2018-12-04 RX ADMIN — PANTOPRAZOLE SODIUM 40 MILLIGRAM(S): 20 TABLET, DELAYED RELEASE ORAL at 05:34

## 2018-12-04 RX ADMIN — CARVEDILOL PHOSPHATE 6.25 MILLIGRAM(S): 80 CAPSULE, EXTENDED RELEASE ORAL at 05:34

## 2018-12-04 RX ADMIN — Medication 100 MILLIGRAM(S): at 05:34

## 2018-12-04 RX ADMIN — Medication 1 DROP(S): at 18:04

## 2018-12-04 RX ADMIN — Medication 5 MILLIGRAM(S): at 14:31

## 2018-12-04 RX ADMIN — DEXMEDETOMIDINE HYDROCHLORIDE IN 0.9% SODIUM CHLORIDE 3.75 MICROGRAM(S)/KG/HR: 4 INJECTION INTRAVENOUS at 10:58

## 2018-12-04 RX ADMIN — FENTANYL CITRATE 52.5 MICROGRAM(S)/KG/HR: 50 INJECTION INTRAVENOUS at 10:21

## 2018-12-04 RX ADMIN — Medication 100 MILLIGRAM(S): at 21:36

## 2018-12-04 RX ADMIN — DEXMEDETOMIDINE HYDROCHLORIDE IN 0.9% SODIUM CHLORIDE 3.75 MICROGRAM(S)/KG/HR: 4 INJECTION INTRAVENOUS at 19:41

## 2018-12-04 RX ADMIN — ONDANSETRON 4 MILLIGRAM(S): 8 TABLET, FILM COATED ORAL at 20:11

## 2018-12-04 RX ADMIN — ONDANSETRON 4 MILLIGRAM(S): 8 TABLET, FILM COATED ORAL at 13:27

## 2018-12-04 RX ADMIN — DEXMEDETOMIDINE HYDROCHLORIDE IN 0.9% SODIUM CHLORIDE 3.75 MICROGRAM(S)/KG/HR: 4 INJECTION INTRAVENOUS at 10:21

## 2018-12-04 RX ADMIN — Medication 50 GRAM(S): at 06:46

## 2018-12-04 RX ADMIN — FENTANYL CITRATE 52.5 MICROGRAM(S)/KG/HR: 50 INJECTION INTRAVENOUS at 19:42

## 2018-12-04 RX ADMIN — Medication 400 MILLIGRAM(S): at 13:35

## 2018-12-04 RX ADMIN — FENTANYL CITRATE 52.5 MICROGRAM(S)/KG/HR: 50 INJECTION INTRAVENOUS at 00:09

## 2018-12-04 RX ADMIN — Medication 75 MILLIGRAM(S): at 18:06

## 2018-12-04 RX ADMIN — Medication 40 MILLIEQUIVALENT(S): at 05:34

## 2018-12-04 RX ADMIN — Medication 75 MILLIGRAM(S): at 05:33

## 2018-12-04 RX ADMIN — FENTANYL CITRATE 52.5 MICROGRAM(S)/KG/HR: 50 INJECTION INTRAVENOUS at 04:51

## 2018-12-04 RX ADMIN — Medication 100 MILLIGRAM(S): at 14:15

## 2018-12-04 RX ADMIN — Medication 5 MILLIGRAM(S): at 06:09

## 2018-12-04 NOTE — PROGRESS NOTE ADULT - SUBJECTIVE AND OBJECTIVE BOX
Pt seen this AM. Packing changed this morning. Patient rebled with minimal packing and was repacked with full packing in oral cavity.    NAD, awake and alert  6LPC trach in place with sutures, on vent   NC: mass, non-bleeding  oc/op: kerlix in place, no pooling or active bleeding  Neck soft and flat, stoma intact     A/P: s/p awake tracheotomy 11/19 for airway protection now w/ oral packing which has to remain in place due to continued bleeding from the tumor. DNR rescinded.   -maintain oral packing in place, do NOT remove, ENT to change packing q3-4 days  -continued GOC discussions, ethics now involved  -NPO, PEG feeds  -routine trach care   -appreciate SICU care

## 2018-12-04 NOTE — PROGRESS NOTE ADULT - SUBJECTIVE AND OBJECTIVE BOX
INTERVAL HPI/OVERNIGHT EVENTS:  packing changes with signs of active bleeding - replaced by ENT    Code Status: Full Code   Allergies    hospital socks (Rash)  lisinopril (Anaphylaxis)  statins (Anaphylaxis)    Intolerances    MEDICATIONS  (STANDING):  artificial  tears Solution 1 Drop(s) Both EYES every 12 hours  carvedilol 6.25 milliGRAM(s) Oral every 12 hours  chlorhexidine 4% Liquid 1 Application(s) Topical <User Schedule>  clindamycin IVPB      clindamycin IVPB 900 milliGRAM(s) IV Intermittent every 8 hours  dexmedetomidine Infusion 0.2 MICROgram(s)/kG/Hr (3.75 mL/Hr) IV Continuous <Continuous>  fentaNYL   Infusion. 7 MICROgram(s)/kG/Hr (52.5 mL/Hr) IV Continuous <Continuous>  OLANZapine 5 milliGRAM(s) Oral daily  pantoprazole   Suspension 40 milliGRAM(s) Oral daily  pregabalin 75 milliGRAM(s) Oral two times a day    MEDICATIONS  (PRN):  bisacodyl Suppository 10 milliGRAM(s) Rectal daily PRN Constipation  diazepam    Tablet 2 milliGRAM(s) Oral every 6 hours PRN anxiety      PRESENT SYMPTOMS: [ x]Unable to obtain due to poor mentation   Source if other than patient:  [ ]Family   [ ]Team     Pain (Impact on QOL):    Location:  Severity:  Minimal acceptable level (0-10 scale):       Quality:       Onset:  Duration:  Aggravating factors:  Relieving Factors  Radiation:    Dyspnea:  Yes [ ] No [ ] - [ ]Mild [ ]Moderate [ ]Severe  Anxiety:    Yes [ ] No [ ] - [ ]Mild [ ]Moderate [ ]Severe  Fatigue:    Yes [ ] No [ ] - [ ]Mild [ ]Moderate [ ]Severe  Nausea:    Yes [ ] No [ ] - [ ]Mild [ ]Moderate [ ]Severe                         Loss of appetite: Yes [ ] No [ ] - [ ]Mild [ ]Moderate [ ]Severe             Constipation:  Yes [ ] No [ ] - [ ]Mild [ ]Moderate [ ]Severe    PAIN AD Score:	  http://geriatrictoolkit.[employer].edu/cog/painad.pdf (Ctrl + left click to view)    Other Symptoms:  [ ]All other review of systems negative     Karnofsky Performance Score/Palliative Performance Status Version 2:    30%    http://palliative.info/resource_material/[email or web address].pdf    PHYSICAL EXAM:  Vital Signs Last 24 Hrs  T(C): 36.6 (04 Dec 2018 16:00), Max: 37.3 (04 Dec 2018 12:00)  T(F): 97.8 (04 Dec 2018 16:00), Max: 99.1 (04 Dec 2018 12:00)  HR: 80 (04 Dec 2018 17:00) (66 - 87)  BP: --  BP(mean): --  RR: 20 (04 Dec 2018 17:00) (18 - 21)  SpO2: 100% (04 Dec 2018 17:00) (98% - 100%) I&O's Summary    03 Dec 2018 07:01  -  04 Dec 2018 07:00  --------------------------------------------------------  IN: 3191.2 mL / OUT: 2440 mL / NET: 751.2 mL    04 Dec 2018 07:01  -  04 Dec 2018 17:51  --------------------------------------------------------  IN: 937.7 mL / OUT: 1445 mL / NET: -507.3 mL     GENERAL:  Drowsy, arousable at times to make needs known and write.  Remains on precedex and Fentanyl   HEENT: Intraoral packing in place   PULMONARY:   [x ]Clear anteriorly - trach to vent   [ ]Rhonchi        [ ]Right [ ]Left [ ]Bilateral  [ ]Crackles        [ ]Right [ ]Left [ ]Bilateral  [ ]Wheezing     [ ]Right [ ]Left [ ]Bilateral  CARDIOVASCULAR:    [x ]Regular [ ]Irregular [ ]Tachy  [ ]Jimmie [ ]Murmur [ ]Other  GASTROINTESTINAL:  [x ]Soft  [ ]Distended   [x ]+BS  [x ]Non tender [ ]Tender  [x ]PEG [ ]OGT/ NGT   Last BM:   11-30-18 @ 07:01  -  12-01-18 @ 07:00  --------------------------------------------------------  OUT: 0 mL    12-01-18 @ 07:01  -  12-02-18 @ 07:00  --------------------------------------------------------  OUT: 0 mL    12-02-18 @ 07:01  -  12-03-18 @ 07:00  --------------------------------------------------------  OUT: 15 mL    12-03-18 @ 07:01  -  12-04-18 @ 07:00  --------------------------------------------------------  OUT: 0 mL       GENITOURINARY:  [ ]Normal [ ] Incontinent   [ ]Oliguria/Anuria   [x ]Andrade  MUSCULOSKELETAL:   [ ]Normal   [ ]Weakness  [x ]Bed/Wheelchair bound [ ]Edema  NEUROLOGIC:   [ x]No focal deficits  [ ] Cognitive impairment  [ ] Dysphagia [ ]Dysarthria [ ] Paresis [ ]Other   SKIN:   [ ]Normal   [ x]Pressure ulcer(s) - SDTI to coccyx - as per RN documentation     CRITICAL CARE:  [ ] Shock Present  [ ]Septic [ ]Cardiogenic [ ]Neurologic [ ]Hypovolemic  [ ]  Vasopressors [ ]  Inotropes   [ ] Respiratory failure present  [ ] Acute  [ ] Chronic [ ] Hypoxic  [ ] Hypercarbic [ ] Other  [ ] Other organ failure     LABS:                        8.5    7.03  )-----------( 348      ( 04 Dec 2018 12:50 )             26.3   12-04    132<L>  |  96<L>  |  8   ----------------------------<  109<H>  3.6   |  26  |  0.45<L>    Ca    8.2<L>      04 Dec 2018 02:20  Phos  3.5     12-04  Mg     1.4     12-04          RADIOLOGY & ADDITIONAL STUDIES:    Protein Calorie Malnutrition Present: [ ] yes [ ] no  [ ] PPSV2 < or = 30%  [ ] significant weight loss [ ] poor nutritional intake [ ] anasarca [ ] catabolic state Prealbumin, Serum: 10 mg/dL (12-03-18 @ 02:50)      REFERRALS:   [ ]Chaplaincy  [ ] Hospice  [ ]Child Life  [ ]Social Work  [ ]Case management [ ]Holistic Therapy   Goals of Care Document: INTERVAL HPI/OVERNIGHT EVENTS:     Palliative Care following for symptom management.   Packing changes with signs of active bleeding - replaced by ENT    Code Status: Full Code   Allergies    hospital socks (Rash)  lisinopril (Anaphylaxis)  statins (Anaphylaxis)    Intolerances    MEDICATIONS  (STANDING):  artificial  tears Solution 1 Drop(s) Both EYES every 12 hours  carvedilol 6.25 milliGRAM(s) Oral every 12 hours  chlorhexidine 4% Liquid 1 Application(s) Topical <User Schedule>  clindamycin IVPB      clindamycin IVPB 900 milliGRAM(s) IV Intermittent every 8 hours  dexmedetomidine Infusion 0.2 MICROgram(s)/kG/Hr (3.75 mL/Hr) IV Continuous <Continuous>  fentaNYL   Infusion. 7 MICROgram(s)/kG/Hr (52.5 mL/Hr) IV Continuous <Continuous>  OLANZapine 5 milliGRAM(s) Oral daily  pantoprazole   Suspension 40 milliGRAM(s) Oral daily  pregabalin 75 milliGRAM(s) Oral two times a day    MEDICATIONS  (PRN):  bisacodyl Suppository 10 milliGRAM(s) Rectal daily PRN Constipation  diazepam    Tablet 2 milliGRAM(s) Oral every 6 hours PRN anxiety    PRESENT SYMPTOMS: [ x]Unable to obtain due to poor mentation   Source if other than patient:  [ ]Family   [ ]Team     Pain (Impact on QOL):    Location:  Severity:  Minimal acceptable level (0-10 scale):       Quality:       Onset:  Duration:  Aggravating factors:  Relieving Factors  Radiation:    Dyspnea:  Yes [ ] No [ ] - [ ]Mild [ ]Moderate [ ]Severe  Anxiety:    Yes [ ] No [ ] - [ ]Mild [ ]Moderate [ ]Severe  Fatigue:    Yes [ ] No [ ] - [ ]Mild [ ]Moderate [ ]Severe  Nausea:    Yes [ ] No [ ] - [ ]Mild [ ]Moderate [ ]Severe                         Loss of appetite: Yes [ ] No [ ] - [ ]Mild [ ]Moderate [ ]Severe             Constipation:  Yes [ ] No [ ] - [ ]Mild [ ]Moderate [ ]Severe    PAIN AD Score:	  [email or web address].[place].[email or web address]/cog/painad.pdf (Ctrl + left click to view)    Other Symptoms:  [ ]All other review of systems negative     Karnofsky Performance Score/Palliative Performance Status Version 2:    30%    http://palliative.info/resource_material/[email or web address].pdf    PHYSICAL EXAM:  Vital Signs Last 24 Hrs  T(C): 36.6 (04 Dec 2018 16:00), Max: 37.3 (04 Dec 2018 12:00)  T(F): 97.8 (04 Dec 2018 16:00), Max: 99.1 (04 Dec 2018 12:00)  HR: 80 (04 Dec 2018 17:00) (66 - 87)  BP: --  BP(mean): --  RR: 20 (04 Dec 2018 17:00) (18 - 21)  SpO2: 100% (04 Dec 2018 17:00) (98% - 100%) I&O's Summary    03 Dec 2018 07:01  -  04 Dec 2018 07:00  --------------------------------------------------------  IN: 3191.2 mL / OUT: 2440 mL / NET: 751.2 mL    04 Dec 2018 07:01  -  04 Dec 2018 17:51  --------------------------------------------------------  IN: 937.7 mL / OUT: 1445 mL / NET: -507.3 mL    GENERAL:  Drowsy, arousable at times to make needs known and write.  Remains on precedex and Fentanyl   HEENT: Intraoral packing in place   PULMONARY:   [x ]Clear anteriorly - trach to vent   [ ]Rhonchi        [ ]Right [ ]Left [ ]Bilateral  [ ]Crackles        [ ]Right [ ]Left [ ]Bilateral  [ ]Wheezing     [ ]Right [ ]Left [ ]Bilateral  CARDIOVASCULAR:    [x ]Regular [ ]Irregular [ ]Tachy  [ ]Jimmie [ ]Murmur [ ]Other  GASTROINTESTINAL:  [x ]Soft  [ ]Distended   [x ]+BS  [x ]Non tender [ ]Tender  [x ]PEG [ ]OGT/ NGT   Last BM:   11-30-18 @ 07:01  -  12-01-18 @ 07:00  --------------------------------------------------------  OUT: 0 mL    12-01-18 @ 07:01  -  12-02-18 @ 07:00  --------------------------------------------------------  OUT: 0 mL    12-02-18 @ 07:01  -  12-03-18 @ 07:00  --------------------------------------------------------  OUT: 15 mL    12-03-18 @ 07:01  -  12-04-18 @ 07:00  --------------------------------------------------------  OUT: 0 mL    GENITOURINARY:  [ ]Normal [ ] Incontinent   [ ]Oliguria/Anuria   [x ]Andrade  MUSCULOSKELETAL:   [ ]Normal   [ ]Weakness  [x ]Bed/Wheelchair bound [ ]Edema  NEUROLOGIC:   [ x]No focal deficits  [ ] Cognitive impairment  [ ] Dysphagia [ ]Dysarthria [ ] Paresis [ ]Other   SKIN:   [ ]Normal   [ x]Pressure ulcer(s) - SDTI to coccyx - as per RN documentation     CRITICAL CARE:  [ ] Shock Present  [ ]Septic [ ]Cardiogenic [ ]Neurologic [ ]Hypovolemic  [ ]  Vasopressors [ ]  Inotropes   [ ] Respiratory failure present  [ ] Acute  [ ] Chronic [ ] Hypoxic  [ ] Hypercarbic [ ] Other  [ ] Other organ failure     LABS:                        8.5    7.03  )-----------( 348      ( 04 Dec 2018 12:50 )             26.3   12-04    132<L>  |  96<L>  |  8   ----------------------------<  109<H>  3.6   |  26  |  0.45<L>    Ca    8.2<L>      04 Dec 2018 02:20  Phos  3.5     12-04  Mg     1.4     12-04    RADIOLOGY & ADDITIONAL STUDIES: reviewed.     Protein Calorie Malnutrition Present: [ ] yes [ ] no  [ ] PPSV2 < or = 30%  [ ] significant weight loss [ ] poor nutritional intake [ ] anasarca [ ] catabolic state Prealbumin, Serum: 10 mg/dL (12-03-18 @ 02:50)    REFERRALS:   [ ]Chaplaincy  [ ] Hospice  [ ]Child Life  [ ]Social Work  [ ]Case management [ ]Holistic Therapy   Goals of Care Document:

## 2018-12-04 NOTE — PROGRESS NOTE ADULT - ASSESSMENT
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 Was recovering well but began to have recurrence of oral bleeding in setting of critical airway. SICU consulted  given bleeding for airway management. Patient made DNR/comfort however patient aspirated 11/28 into trach and packing, desaturated, and became agitated. Wife was upset and decided to rescind the DNR/CMO. Packing was replaced by ENT at bedside, no significant bleeding. Vanc and zosyn started for empiric coverage of suspected pneumonia. Same day EKG showed V4-6 T wave inversions, along with other nonspecific findings different from prior EKG one week ago suggestive of NSTEMI. Given contraindication to AC as well as poor prognosis precluding cath lab, trops were sent and bedside echo showed markedly decreased EF and SV. Cardiology consulted. Off levo, still on high dose fent/prop gtt. Propofol was weaned off and changed to precedex gtt     Neuro: Post operative pain, Anxiety  - Pt on fentanyl, precedex  - c/w olanzapine, lyrica  - Planned to start methadone 10mg TID however pt with recent demand ischemia, not tolerated PO, and has prolonged QTc    Resp: s/p Trach  - Concern for aspiration pneumonia vs pneumonitis; bedside US 11/29: B lines bilateral bases L > R  - Airway repacked by ENT 11/30, planning to change on Monday again  - Mode: AC/ CMV (Assist Control/ Continuous Mandatory Ventilation), RR (machine): 20, TV (machine): 500, FiO2: 40, PEEP: 5, MAP: 10, PIP: 19  - Trach collar trial once sedatives have been weaned    CV: Hx HTN  - On Home Coreg   - concern for NSTEMI - likely demand ischemia, trops downtrended  - bedside echo 11/27: markedly decreased EF, formal echo EF 27% (from 40-45% prior)  - major contraindications to both anticoagulation therapy and cath  - Cardiology recs: treat conservatively, strict Is&Os, consider switch from Zosyn (as zosyn with high salt load), when deemed safe start ASA 81 mg, continue b-blocker    GI:   - TFs at goal, Jevity @70 by PEG (placed 10/31)  - Rectal tube in place    Renal:  - condom cath  - replete lytes PRN    Heme:   - SCDs for now, hold off chemical DVT ppx  - Trend H/H    ID:   - CTM Tmax, WBC  - Most recent BCx growing coag negative staph - not given abx given DNR  - Aspiration episode 11/27, tachy and febrile, DNR resversed, started on abx for aspiration pneumonia vs. 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 Was recovering well but began to have recurrence of oral bleeding in setting of critical airway. SICU consulted  given bleeding for airway management. Patient made DNR/comfort however patient aspirated 11/28 into trach and packing, desaturated, and became agitated. Wife was upset and decided to rescind the DNR/CMO. Packing was replaced by ENT at bedside, no significant bleeding. Vanc and zosyn started for empiric coverage of suspected pneumonia. Same day EKG showed V4-6 T wave inversions, along with other nonspecific findings different from prior EKG one week ago suggestive of NSTEMI. Given contraindication to AC as well as poor prognosis precluding cath lab, trops were sent and bedside echo showed markedly decreased EF and SV. Cardiology consulted. Off levo, still on high dose fent/prop gtt. Propofol was weaned off and changed to precedex gtt     Neuro: Post operative pain, Anxiety  - Pt on fentanyl, precedex  - c/w olanzapine, lyrica  - Valium 5mg as needed for anxiety & dressing changes  - Planned to start methadone 10mg TID however pt with recent demand ischemia, not tolerated PO, and has prolonged QTc  - cont to f/u palliative rec's    Resp: s/p Trach  - Concern for aspiration pneumonia vs pneumonitis; bedside US 11/29: B lines bilateral bases L > R  - Airway repacked by ENT 12/4, will change q3-4 days (otherwise not to be removed)  - Mode: AC/ CMV (Assist Control/ Continuous Mandatory Ventilation), RR (machine): 20, TV (machine): 500, FiO2: 40, PEEP: 5, MAP: 10, PIP: 19  - Trach collar trial once sedatives have been weaned    CV: Hx HTN  - HR, BP currently stable off pressors  - On Home Coreg   - concern for NSTEMI - likely demand ischemia, trops downtrended  - bedside echo 11/27: markedly decreased EF, formal echo EF 27% (from 40-45% prior)  - major contraindications to both anticoagulation therapy and cath  - Cardiology recs: treat conservatively, strict I&Os, consider switch from Zosyn (as zosyn with high salt load), when deemed safe start ASA 81 mg, continue b-blocker    GI:   - TFs at goal, Jevity @70 by PEG (placed 10/31)  - Rectal tube in place    Renal:  - condom cath  - replete lytes PRN    Heme:   - SCDs for now, hold off chemical DVT ppx  - Trend H/H    ID:   - CTM Tmax, WBC  - Most recent BCx growing coag negative staph - not given abx given DNR  - Aspiration episode 11/27, tachy and febrile, DNR reversed, started on abx for aspiration pneumonia vs. pneumonitis  - c/w Vanc  - c/w Clinda (start 12/2-) in setting of oral packing  - Blood cultures NGTD- 72 hrs   - Tylenol PRN for fevers    Endo:  - No issues    Patient is now FULL CODE again as of 11/27    Dispo: SICU      Critical care diagnosis: requires airway management, aspiration pneumonia, severe sepsis, requires pressors, severe protein calorie malnutrition, severe muscle hypotrophy, temporal wasting

## 2018-12-04 NOTE — PROGRESS NOTE ADULT - SUBJECTIVE AND OBJECTIVE BOX
SICU AM PROGRESS NOTE :    Overnight  Events:     Family Meeting was Please call to make a follow-up appointment at [phone]. in AM yesterday . Requested ENT team to change the packing  today in AM . Pt remains on Fent gtt @ 7 and Precedex gtt @ 1 . SBP : 140-150's . afebrile overnight . Urine output : 150 ml/ hr. 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 Was recovering well but began to have recurrence of oral bleeding in setting of critical airway. SICU consulted  given bleeding for airway management. Patient made DNR/comfort however patient aspirated 11/28 into trach and packing, desaturated, and became agitated. Wife was upset and decided to rescind the DNR/CMO. Packing was replaced by ENT at bedside, no significant bleeding. Vanc and zosyn started for empiric coverage of suspected pneumonia. Same day EKG showed V4-6 T wave inversions, along with other nonspecific findings different from prior EKG one week ago suggestive of NSTEMI. Given contraindication to AC as well as poor prognosis precluding cath lab, trops were sent and bedside echo showed markedly decreased EF and SV. Off levo, still on high dose fent/prop gtt.    ===============================================================    Neurologic Medications:  dexmedetomidine Infusion 0.2 MICROgram(s)/kG/Hr IV Continuous <Continuous>  fentaNYL   Infusion. 7 MICROgram(s)/kG/Hr IV Continuous <Continuous>  OLANZapine 5 milliGRAM(s) Oral daily  pregabalin 75 milliGRAM(s) Oral two times a day    Respiratory Medications:    Cardiovascular Medications:  carvedilol 6.25 milliGRAM(s) Oral every 12 hours    Gastrointestinal Medications:  bisacodyl Suppository 10 milliGRAM(s) Rectal daily PRN  pantoprazole   Suspension 40 milliGRAM(s) Oral daily    Genitourinary Medications:    Hematologic/Oncologic Medications:    Antimicrobials/Immunologic Medications:  clindamycin IVPB      clindamycin IVPB 900 milliGRAM(s) IV Intermittent every 8 hours    Endocrine/Metabolic Medications:    Topical/Other Medications:  chlorhexidine 4% Liquid 1 Application(s) Topical <User Schedule>    =============================================================    T(C): 36.7 (12-04-18 @ 00:00), Max: 37.3 (12-03-18 @ 12:00)  HR: 75 (12-04-18 @ 02:00) (69 - 86)  BP: --  BP(mean): --  ABP: 145/72 (12-04-18 @ 02:00) (111/56 - 168/84)  ABP(mean): 93 (12-04-18 @ 02:00) (72 - 111)  RR: 20 (12-04-18 @ 02:00) (18 - 21)  SpO2: 98% (12-04-18 @ 02:00) (95% - 100%)  Wt(kg): --  CVP(mm Hg): --  CI: --  CAPILLARY BLOOD GLUCOSE       N/A      12-02 @ 07:01  -  12-03 @ 07:00  --------------------------------------------------------  IN:    dexmedetomidine Infusion: 451.2 mL    Enteral Tube Flush: 420 mL    fentaNYL Infusion.: 1260 mL    IV PiggyBack: 100 mL    ns in tub fed  [id number]: 1190 mL  Total IN: 3421.2 mL    OUT:    Indwelling Catheter - Urethral: 2970 mL    Rectal Tube: 15 mL  Total OUT: 2985 mL    Total NET: 436.2 mL      12-03 @ 07:01  -  12-04 @ 02:19  --------------------------------------------------------  IN:    dexmedetomidine Infusion: 357.2 mL    Enteral Tube Flush: 50 mL    fentaNYL Infusion.: 997.5 mL    IV PiggyBack: 150 mL    ns in tub fed  [id number]: 1180 mL  Total IN: 2734.7 mL    OUT:    Indwelling Catheter - Urethral: 1890 mL  Total OUT: 1890 mL    Total NET: 844.7 mL        ===================================================    PHYSICAL EXAM     NEURO  Exam: awake    HEENT   Intraoral packing saturated .     RESPIRATORY  Exam: unlabored, clear to auscultation bilaterally.   s/p Tracheotomy - On Mech Vent     CARDIOVASCULAR  S1 S2 heard , RRR    GI/NUTRITION  Soft , non distended , non tender   Diet : NPO / PEG - On tube feeds     GENITOURINARY  I&O's Detail    11-29 @ 07:01 - 11-30 @ 07:00  --------------------------------------------------------  IN:    Enteral Tube Flush: 230 mL    fentaNYL Infusion.: 1260 mL    IV PiggyBack: 900 mL    norepinephrine Infusion: 63.7 mL    ns in tub fed  [id number]: 640 mL    propofol Infusion: 482.6 mL  Total IN: 3576.3 mL    OUT:    Indwelling Catheter - Urethral: 2085 mL  Total OUT: 2085 mL    Total NET: 1491.3 mL      11-30 @ 07:01 - 11-30 @ 13:36  --------------------------------------------------------  IN:    fentaNYL Infusion.: 262.5 mL    norepinephrine Infusion: 15.4 mL    ns in tub fed  [id number]: 350 mL    propofol Infusion: 79 mL  Total IN: 706.9 mL    OUT:    Indwelling Catheter - Urethral: 600 mL  Total OUT: 600 mL    Total NET: 106.9 mL          11-30    137  |  98  |  9   ----------------------------<  99  3.4<L>   |  29  |  0.50    Ca    8.3<L>      30 Nov 2018 04:30  Phos  3.5     11-30  Mg     1.5     11-30      [ ] Andrade catheter, indication: N/A  Meds:       HEMATOLOGIC  Meds:   [x] VTE Prophylaxis                        8.6    7.86  )-----------( 402      ( 30 Nov 2018 04:30 )             27.8     PT/INR - ( 30 Nov 2018 04:30 )   PT: 15.2 SEC;   INR: 1.36          PTT - ( 30 Nov 2018 04:30 )  PTT:37.4 SEC  Transfusion     [ ] PRBC   [ ] Platelets   [ ] FFP   [ ] Cryoprecipitate      INFECTIOUS DISEASES  WBC Count: 7.86 K/uL (11-30 @ 04:30)    RECENT CULTURES:  Specimen Source: BLOOD PERIPHERAL  Date/Time: 11-27 @ 20:02  Culture Results: --  Gram Stain: --  Organism: --    Meds: piperacillin/tazobactam IVPB. 3.375 Gram(s) IV Intermittent every 8 hours  vancomycin  IVPB 1000 milliGRAM(s) IV Intermittent every 8 hours        ENDOCRINE  CAPILLARY BLOOD GLUCOSE        Meds:       ACCESS DEVICES:  [x] Peripheral IV  [ ] Central Venous Line	[ ] R	[ ] L	[ ] IJ	[ ] Fem	[ ] SC	Placed:   [x] Arterial Line		[ ] R	[ ] L	[ ] Fem	[ ] Rad	[ ] Ax	Placed:   [ ] PICC:					[ x Mediport  [ ] Urinary Catheter, Date Placed:   [x] Necessity of urinary, arterial, and venous catheters discussed    OTHER MEDICATIONS:  chlorhexidine 4% Liquid 1 Application(s) Topical <User Schedule>      CODE STATUS: FULL CODE    IMAGING:    < from: Xray Chest 1 View- PORTABLE-Routine (11.30.18 @ 01:08) >    EXAM:  XR CHEST PORTABLE ROUTINE 1V        PROCEDURE DATE:  Nov 30 2018         INTERPRETATION:  TIME OF EXAM: November 30, 2018 at 12:34 AM    CLINICAL INFORMATION: Tracheostomy; recent myocardial infarction.    TECHNIQUE:   Portable chest    INTERPRETATION:     Tracheostomy tube and chemotherapy port in place. Persistent small   rounded opacity in the left upper lobe appears smaller than the last exam   perhaps resolving atelectasis. No focal consolidations. The heart is not   enlarged.      COMPARISON:  November 29      IMPRESSION:  Follow-up with small rounded opacity in the left upper lobe   appears to be decreasing in size.    < end of copied text > SICU AM PROGRESS NOTE :    Overnight  Events: Requested ENT team to change the packing  today in AM . Pt remains on Fent gtt @ 7 and Precedex gtt @ 1 . SBP : 140-150's . afebrile overnight . Urine output : 150 ml/ hr. On trickle feeds.    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 Was recovering well but began to have recurrence of oral bleeding in setting of critical airway. SICU consulted  given bleeding for airway management. Patient made DNR/comfort however patient aspirated 11/28 into trach and packing, desaturated, and became agitated. Wife was upset and decided to rescind the DNR/CMO. Packing was replaced by ENT at bedside, no significant bleeding. Vanc and zosyn started for empiric coverage of suspected pneumonia. Same day EKG showed V4-6 T wave inversions, along with other nonspecific findings different from prior EKG one week ago suggestive of NSTEMI. Given contraindication to AC as well as poor prognosis precluding cath lab, trops were sent and bedside echo showed markedly decreased EF and SV. Off levo, still on high dose fent/prop gtt.    ===============================================================    Neurologic Medications:  dexmedetomidine Infusion 0.2 MICROgram(s)/kG/Hr IV Continuous <Continuous>  fentaNYL   Infusion. 7 MICROgram(s)/kG/Hr IV Continuous <Continuous>  OLANZapine 5 milliGRAM(s) Oral daily  pregabalin 75 milliGRAM(s) Oral two times a day    Respiratory Medications:    Cardiovascular Medications:  carvedilol 6.25 milliGRAM(s) Oral every 12 hours    Gastrointestinal Medications:  bisacodyl Suppository 10 milliGRAM(s) Rectal daily PRN  pantoprazole   Suspension 40 milliGRAM(s) Oral daily    Genitourinary Medications:    Hematologic/Oncologic Medications:    Antimicrobials/Immunologic Medications:  clindamycin IVPB      clindamycin IVPB 900 milliGRAM(s) IV Intermittent every 8 hours    Endocrine/Metabolic Medications:    Topical/Other Medications:  chlorhexidine 4% Liquid 1 Application(s) Topical <User Schedule>    =============================================================    T(C): 36.7 (12-04-18 @ 00:00), Max: 37.3 (12-03-18 @ 12:00)  HR: 75 (12-04-18 @ 02:00) (69 - 86)  ABP: 145/72 (12-04-18 @ 02:00) (111/56 - 168/84)  ABP(mean): 93 (12-04-18 @ 02:00) (72 - 111)  RR: 20 (12-04-18 @ 02:00) (18 - 21)  SpO2: 98% (12-04-18 @ 02:00) (95% - 100%)      12-02 @ 07:01  -  12-03 @ 07:00  --------------------------------------------------------  IN:    dexmedetomidine Infusion: 451.2 mL    Enteral Tube Flush: 420 mL    fentaNYL Infusion.: 1260 mL    IV PiggyBack: 100 mL    ns in tub fed  [id number]: 1190 mL  Total IN: 3421.2 mL    OUT:    Indwelling Catheter - Urethral: 2970 mL    Rectal Tube: 15 mL  Total OUT: 2985 mL    Total NET: 436.2 mL      12-03 @ 07:01  -  12-04 @ 02:19  --------------------------------------------------------  IN:    dexmedetomidine Infusion: 357.2 mL    Enteral Tube Flush: 50 mL    fentaNYL Infusion.: 997.5 mL    IV PiggyBack: 150 mL    ns in tub fed  [id number]: 1180 mL  Total IN: 2734.7 mL    OUT:    Indwelling Catheter - Urethral: 1890 mL  Total OUT: 1890 mL    Total NET: 844.7 mL        ===================================================    PHYSICAL EXAM     NEURO  Exam: awake    HEENT   Intraoral packing saturated .     RESPIRATORY  Exam: unlabored, clear to auscultation bilaterally.   s/p Tracheotomy - On Mech Vent     CARDIOVASCULAR  S1 S2 heard , RRR    GI/NUTRITION  Soft , non distended , non tender   Diet : NPO / PEG - On tube feeds     GENITOURINARY  I&O's Detail    11-29 @ 07:01  -  11-30 @ 07:00  --------------------------------------------------------  IN:    Enteral Tube Flush: 230 mL    fentaNYL Infusion.: 1260 mL    IV PiggyBack: 900 mL    norepinephrine Infusion: 63.7 mL    ns in tub fed  [id number]: 640 mL    propofol Infusion: 482.6 mL  Total IN: 3576.3 mL    OUT:    Indwelling Catheter - Urethral: 2085 mL  Total OUT: 2085 mL    Total NET: 1491.3 mL      11-30 @ 07:01 - 11-30 @ 13:36  --------------------------------------------------------  IN:    fentaNYL Infusion.: 262.5 mL    norepinephrine Infusion: 15.4 mL    ns in tub fed  [id number]: 350 mL    propofol Infusion: 79 mL  Total IN: 706.9 mL    OUT:    Indwelling Catheter - Urethral: 600 mL  Total OUT: 600 mL    Total NET: 106.9 mL          11-30    137  |  98  |  9   ----------------------------<  99  3.4<L>   |  29  |  0.50    Ca    8.3<L>      30 Nov 2018 04:30  Phos  3.5     11-30  Mg     1.5     11-30      [ ] Andrade catheter, indication: N/A  Meds:       HEMATOLOGIC  Meds:   [x] VTE Prophylaxis                        8.6    7.86  )-----------( 402      ( 30 Nov 2018 04:30 )             27.8     PT/INR - ( 30 Nov 2018 04:30 )   PT: 15.2 SEC;   INR: 1.36          PTT - ( 30 Nov 2018 04:30 )  PTT:37.4 SEC  Transfusion     [ ] PRBC   [ ] Platelets   [ ] FFP   [ ] Cryoprecipitate      INFECTIOUS DISEASES  WBC Count: 7.86 K/uL (11-30 @ 04:30)    RECENT CULTURES:  Specimen Source: BLOOD PERIPHERAL  Date/Time: 11-27 @ 20:02  Culture Results: --  Gram Stain: --  Organism: --    Meds: piperacillin/tazobactam IVPB. 3.375 Gram(s) IV Intermittent every 8 hours  vancomycin  IVPB 1000 milliGRAM(s) IV Intermittent every 8 hours        ENDOCRINE  CAPILLARY BLOOD GLUCOSE        Meds:       ACCESS DEVICES:  [x] Peripheral IV  [ ] Central Venous Line	[ ] R	[ ] L	[ ] IJ	[ ] Fem	[ ] SC	Placed:   [x] Arterial Line		[ ] R	[ ] L	[ ] Fem	[ ] Rad	[ ] Ax	Placed:   [ ] PICC:					[ x Mediport  [ ] Urinary Catheter, Date Placed:   [x] Necessity of urinary, arterial, and venous catheters discussed    OTHER MEDICATIONS:  chlorhexidine 4% Liquid 1 Application(s) Topical <User Schedule>      CODE STATUS: FULL CODE    IMAGING:    < from: Xray Chest 1 View- PORTABLE-Routine (12.04.18 @ 00:57) >  INTERPRETATION:     Tracheostomy and chemotherapy port in place. Since the last study, there   has been increased opacity at the right lung base may represent worsening   atelectasis. Hazy left lung base obscuring hemidiaphragm on this side   likely effusion with underlying atelectasis. Upper lungs are clear.      COMPARISON:  December 3      IMPRESSION:  Follow-up with bibasilar opacities likely combined effusion   and atelectasis.    < end of copied text >

## 2018-12-04 NOTE — PROGRESS NOTE ADULT - PROBLEM SELECTOR PLAN 3
Wife and patient report Valium 5mg via PEG helpful with dressing change and anxiety.  Recommend Valium 5mg via PEG prior to dressing changes and PRN. Wife and patient report Valium 5mg via PEG helpful with dressing change and anxiety.  Recommend Valium 2mg via PEG (as wife feels the 5mg was too sedating) prior to dressing changes and PRN.

## 2018-12-05 LAB
BASE EXCESS BLDA CALC-SCNC: 5.2 MMOL/L — SIGNIFICANT CHANGE UP
BUN SERPL-MCNC: 8 MG/DL — SIGNIFICANT CHANGE UP (ref 7–23)
CA-I BLDA-SCNC: 1.17 MMOL/L — SIGNIFICANT CHANGE UP (ref 1.15–1.29)
CALCIUM SERPL-MCNC: 8.2 MG/DL — LOW (ref 8.4–10.5)
CHLORIDE SERPL-SCNC: 97 MMOL/L — LOW (ref 98–107)
CO2 SERPL-SCNC: 25 MMOL/L — SIGNIFICANT CHANGE UP (ref 22–31)
CREAT SERPL-MCNC: 0.48 MG/DL — LOW (ref 0.5–1.3)
GLUCOSE BLDA-MCNC: 98 MG/DL — SIGNIFICANT CHANGE UP (ref 70–99)
GLUCOSE SERPL-MCNC: 91 MG/DL — SIGNIFICANT CHANGE UP (ref 70–99)
HCO3 BLDA-SCNC: 29 MMOL/L — HIGH (ref 22–26)
HCT VFR BLD CALC: 24.8 % — LOW (ref 39–50)
HCT VFR BLDA CALC: 25.6 % — LOW (ref 39–51)
HGB BLD-MCNC: 8 G/DL — LOW (ref 13–17)
HGB BLDA-MCNC: 8.2 G/DL — LOW (ref 13–17)
LACTATE BLDA-SCNC: 0.5 MMOL/L — SIGNIFICANT CHANGE UP (ref 0.5–2)
MAGNESIUM SERPL-MCNC: 1.6 MG/DL — SIGNIFICANT CHANGE UP (ref 1.6–2.6)
MCHC RBC-ENTMCNC: 27.1 PG — SIGNIFICANT CHANGE UP (ref 27–34)
MCHC RBC-ENTMCNC: 32.3 % — SIGNIFICANT CHANGE UP (ref 32–36)
MCV RBC AUTO: 84.1 FL — SIGNIFICANT CHANGE UP (ref 80–100)
NRBC # FLD: 0 — SIGNIFICANT CHANGE UP
PCO2 BLDA: 43 MMHG — SIGNIFICANT CHANGE UP (ref 35–48)
PH BLDA: 7.45 PH — SIGNIFICANT CHANGE UP (ref 7.35–7.45)
PHOSPHATE SERPL-MCNC: 3.7 MG/DL — SIGNIFICANT CHANGE UP (ref 2.5–4.5)
PLATELET # BLD AUTO: 345 K/UL — SIGNIFICANT CHANGE UP (ref 150–400)
PMV BLD: 9.5 FL — SIGNIFICANT CHANGE UP (ref 7–13)
PO2 BLDA: 115 MMHG — HIGH (ref 83–108)
POTASSIUM BLDA-SCNC: 3.8 MMOL/L — SIGNIFICANT CHANGE UP (ref 3.4–4.5)
POTASSIUM SERPL-MCNC: 4 MMOL/L — SIGNIFICANT CHANGE UP (ref 3.5–5.3)
POTASSIUM SERPL-SCNC: 4 MMOL/L — SIGNIFICANT CHANGE UP (ref 3.5–5.3)
RBC # BLD: 2.95 M/UL — LOW (ref 4.2–5.8)
RBC # FLD: 14.9 % — HIGH (ref 10.3–14.5)
SAO2 % BLDA: 98.5 % — SIGNIFICANT CHANGE UP (ref 95–99)
SODIUM BLDA-SCNC: 130 MMOL/L — LOW (ref 136–146)
SODIUM SERPL-SCNC: 132 MMOL/L — LOW (ref 135–145)
WBC # BLD: 7.14 K/UL — SIGNIFICANT CHANGE UP (ref 3.8–10.5)
WBC # FLD AUTO: 7.14 K/UL — SIGNIFICANT CHANGE UP (ref 3.8–10.5)

## 2018-12-05 PROCEDURE — 99233 SBSQ HOSP IP/OBS HIGH 50: CPT

## 2018-12-05 PROCEDURE — 71045 X-RAY EXAM CHEST 1 VIEW: CPT | Mod: 26

## 2018-12-05 RX ORDER — ALPRAZOLAM 0.25 MG
0.5 TABLET ORAL EVERY 6 HOURS
Qty: 0 | Refills: 0 | Status: DISCONTINUED | OUTPATIENT
Start: 2018-12-05 | End: 2018-12-05

## 2018-12-05 RX ORDER — DIAZEPAM 5 MG
2 TABLET ORAL EVERY 4 HOURS
Qty: 0 | Refills: 0 | Status: DISCONTINUED | OUTPATIENT
Start: 2018-12-05 | End: 2018-12-05

## 2018-12-05 RX ORDER — FENTANYL CITRATE 50 UG/ML
1 INJECTION INTRAVENOUS
Qty: 0 | Refills: 0 | Status: DISCONTINUED | OUTPATIENT
Start: 2018-12-05 | End: 2018-12-07

## 2018-12-05 RX ORDER — MAGNESIUM SULFATE 500 MG/ML
2 VIAL (ML) INJECTION ONCE
Qty: 0 | Refills: 0 | Status: COMPLETED | OUTPATIENT
Start: 2018-12-05 | End: 2018-12-05

## 2018-12-05 RX ORDER — FENTANYL CITRATE 50 UG/ML
2 INJECTION INTRAVENOUS
Qty: 2500 | Refills: 0 | Status: DISCONTINUED | OUTPATIENT
Start: 2018-12-05 | End: 2018-12-06

## 2018-12-05 RX ORDER — DIAZEPAM 5 MG
5 TABLET ORAL EVERY 6 HOURS
Qty: 0 | Refills: 0 | Status: DISCONTINUED | OUTPATIENT
Start: 2018-12-05 | End: 2018-12-05

## 2018-12-05 RX ORDER — FENTANYL CITRATE 50 UG/ML
50 INJECTION INTRAVENOUS ONCE
Qty: 0 | Refills: 0 | Status: DISCONTINUED | OUTPATIENT
Start: 2018-12-05 | End: 2018-12-05

## 2018-12-05 RX ORDER — DIAZEPAM 5 MG
2 TABLET ORAL ONCE
Qty: 0 | Refills: 0 | Status: DISCONTINUED | OUTPATIENT
Start: 2018-12-05 | End: 2018-12-05

## 2018-12-05 RX ORDER — ALPRAZOLAM 0.25 MG
0.5 TABLET ORAL AT BEDTIME
Qty: 0 | Refills: 0 | Status: DISCONTINUED | OUTPATIENT
Start: 2018-12-05 | End: 2018-12-07

## 2018-12-05 RX ORDER — DIAZEPAM 5 MG
5 TABLET ORAL EVERY 4 HOURS
Qty: 0 | Refills: 0 | Status: DISCONTINUED | OUTPATIENT
Start: 2018-12-05 | End: 2018-12-06

## 2018-12-05 RX ADMIN — Medication 0.5 MILLIGRAM(S): at 18:28

## 2018-12-05 RX ADMIN — FENTANYL CITRATE 1 PATCH: 50 INJECTION INTRAVENOUS at 18:13

## 2018-12-05 RX ADMIN — DEXMEDETOMIDINE HYDROCHLORIDE IN 0.9% SODIUM CHLORIDE 3.75 MICROGRAM(S)/KG/HR: 4 INJECTION INTRAVENOUS at 20:13

## 2018-12-05 RX ADMIN — FENTANYL CITRATE 52.5 MICROGRAM(S)/KG/HR: 50 INJECTION INTRAVENOUS at 11:41

## 2018-12-05 RX ADMIN — Medication 1 DROP(S): at 05:11

## 2018-12-05 RX ADMIN — Medication 100 MILLIGRAM(S): at 14:27

## 2018-12-05 RX ADMIN — DEXMEDETOMIDINE HYDROCHLORIDE IN 0.9% SODIUM CHLORIDE 3.75 MICROGRAM(S)/KG/HR: 4 INJECTION INTRAVENOUS at 11:41

## 2018-12-05 RX ADMIN — Medication 1 DROP(S): at 19:27

## 2018-12-05 RX ADMIN — Medication 0.5 MILLIGRAM(S): at 14:27

## 2018-12-05 RX ADMIN — CARVEDILOL PHOSPHATE 6.25 MILLIGRAM(S): 80 CAPSULE, EXTENDED RELEASE ORAL at 18:13

## 2018-12-05 RX ADMIN — Medication 2 MILLIGRAM(S): at 16:41

## 2018-12-05 RX ADMIN — FENTANYL CITRATE 52.5 MICROGRAM(S)/KG/HR: 50 INJECTION INTRAVENOUS at 20:13

## 2018-12-05 RX ADMIN — Medication 100 MILLIGRAM(S): at 05:10

## 2018-12-05 RX ADMIN — PANTOPRAZOLE SODIUM 40 MILLIGRAM(S): 20 TABLET, DELAYED RELEASE ORAL at 05:10

## 2018-12-05 RX ADMIN — Medication 75 MILLIGRAM(S): at 05:10

## 2018-12-05 RX ADMIN — Medication 0.5 MILLIGRAM(S): at 22:12

## 2018-12-05 RX ADMIN — Medication 100 MILLIGRAM(S): at 22:12

## 2018-12-05 RX ADMIN — Medication 0.5 MILLIGRAM(S): at 22:30

## 2018-12-05 RX ADMIN — Medication 75 MILLIGRAM(S): at 18:13

## 2018-12-05 RX ADMIN — FENTANYL CITRATE 52.5 MICROGRAM(S)/KG/HR: 50 INJECTION INTRAVENOUS at 00:28

## 2018-12-05 RX ADMIN — Medication 50 GRAM(S): at 06:11

## 2018-12-05 RX ADMIN — FENTANYL CITRATE 52.5 MICROGRAM(S)/KG/HR: 50 INJECTION INTRAVENOUS at 05:11

## 2018-12-05 RX ADMIN — FENTANYL CITRATE 52.5 MICROGRAM(S)/KG/HR: 50 INJECTION INTRAVENOUS at 14:58

## 2018-12-05 RX ADMIN — FENTANYL CITRATE 50 MICROGRAM(S): 50 INJECTION INTRAVENOUS at 20:12

## 2018-12-05 RX ADMIN — OLANZAPINE 5 MILLIGRAM(S): 15 TABLET, FILM COATED ORAL at 12:05

## 2018-12-05 RX ADMIN — Medication 2 MILLIGRAM(S): at 11:30

## 2018-12-05 RX ADMIN — FENTANYL CITRATE 50 MICROGRAM(S): 50 INJECTION INTRAVENOUS at 20:40

## 2018-12-05 RX ADMIN — Medication 5 MILLIGRAM(S): at 19:25

## 2018-12-05 RX ADMIN — Medication 2 MILLIGRAM(S): at 10:08

## 2018-12-05 RX ADMIN — FENTANYL CITRATE 1 PATCH: 50 INJECTION INTRAVENOUS at 19:45

## 2018-12-05 RX ADMIN — Medication 2 MILLIGRAM(S): at 09:07

## 2018-12-05 NOTE — CHART NOTE - NSCHARTNOTEFT_GEN_A_CORE
Would recommend weaning of Precedex prior to Fentanyl titration as pain is currently managed on Fentanyl infusion.  Patient having periods of apnea as per SICU staff, which is likely due to Fentanyl.  In order to appropriately assess pain and determine dosing for Fentanyl patch (while not causing apnea), weaning of Precedex is needed.  With pain managed currently, weaning of Precedex will be simpler as to not combat both anxiety and pain concurrently.  Plan discussed with Dr. Dacosta and SICU team.  Plan to wean Precedex and treat with anxiolytics appropriately with the goal to titrate Fentanyl tomorrow.  Will follow-up.

## 2018-12-05 NOTE — PROGRESS NOTE ADULT - SUBJECTIVE AND OBJECTIVE BOX
SICU Daily Progress Note  =====================================================  Interval/Overnight Events:  No acute events overnight. Oral packing changed yesterday during the day, patient rebled with minimal packing, was repacked with full packing in oral cavity.      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 Was recovering well but began to have recurrence of oral bleeding in setting of critical airway. SICU consulted  given bleeding for airway management. Patient made DNR/comfort however patient aspirated 11/28 into trach and packing, desaturated, and became agitated. Wife was upset and decided to rescind the DNR/CMO. Packing was replaced by ENT at bedside, no significant bleeding. Vanc and zosyn started for empiric coverage of suspected pneumonia. Same day EKG showed V4-6 T wave inversions, along with other nonspecific findings different from prior EKG one week ago suggestive of NSTEMI. Given contraindication to AC as well as poor prognosis precluding cath lab, trops were sent and bedside echo showed markedly decreased EF and SV. Off levo, still on high dose fent/prop gtt.    Allergies: hospital socks (Rash)  lisinopril (Anaphylaxis)  statins (Anaphylaxis)      MEDICATIONS:   --------------------------------------------------------------------------------------  Neurologic Medications  dexmedetomidine Infusion 0.2 MICROgram(s)/kG/Hr IV Continuous <Continuous>  diazepam    Tablet 2 milliGRAM(s) Oral every 6 hours PRN anxiety  fentaNYL   Infusion. 7 MICROgram(s)/kG/Hr IV Continuous <Continuous>  OLANZapine 5 milliGRAM(s) Oral daily  pregabalin 75 milliGRAM(s) Oral two times a day    Respiratory Medications    Cardiovascular Medications  carvedilol 6.25 milliGRAM(s) Oral every 12 hours    Gastrointestinal Medications  bisacodyl Suppository 10 milliGRAM(s) Rectal daily PRN Constipation  pantoprazole   Suspension 40 milliGRAM(s) Oral daily    Genitourinary Medications    Hematologic/Oncologic Medications    Antimicrobial/Immunologic Medications  clindamycin IVPB      clindamycin IVPB 900 milliGRAM(s) IV Intermittent every 8 hours    Endocrine/Metabolic Medications    Topical/Other Medications  artificial  tears Solution 1 Drop(s) Both EYES every 12 hours  chlorhexidine 4% Liquid 1 Application(s) Topical <User Schedule>    --------------------------------------------------------------------------------------    VITAL SIGNS, INS/OUTS (last 24 hours):  --------------------------------------------------------------------------------------    --------------------------------------------------------------------------------------    EXAM    NEURO  Exam: on sedation, alert and responsive     HEENT   Intraoral packing saturated .     RESPIRATORY  Exam: unlabored, clear to auscultation bilaterally.   [facility]     CARDIOVASCULAR  S1 S2 heard , RRR    GI/NUTRITION  Soft , non distended , non tender   Diet : NPO / PEG - On tube feeds     METABOLIC/FLUIDS/ELECTROLYTES      HEMATOLOGIC  [x] VTE Prophylaxis:   Transfusions:	[] PRBC	[] Platelets		[] FFP	[] Cryoprecipitate    INFECTIOUS DISEASE  Antimicrobials/Immunologic Medications:  clindamycin IVPB      clindamycin IVPB 900 milliGRAM(s) IV Intermittent every 8 hours    Day #      of     ***    Tubes/Lines/Drains  ***  [x] Peripheral IV  [] Central Venous Line     	[] R	[] L	[] IJ	[] Fem	[] SC	Date Placed:   [] Arterial Line		[] R	[] L	[] Fem	[] Rad	[] Ax	Date Placed:   [] PICC		[] Midline		[] Mediport  [] Urinary Catheter		Date Placed:   [x] Necessity of urinary, arterial, and venous catheters discussed    LABS  --------------------------------------------------------------------------------------    --------------------------------------------------------------------------------------    OTHER LABORATORY:     IMAGING STUDIES:   CXR:     ASSESSMENT:  59 y/o M with Hx CHF (EF 40-45%), 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 Was recovering well but began to have recurrence of oral bleeding in setting of critical airway. SICU consulted  given bleeding for airway management. Patient made DNR/comfort however patient aspirated 11/28 into trach and packing, desaturated, and became agitated. Wife was upset and decided to rescind the DNR/CMO. Packing was replaced by ENT at bedside, no significant bleeding. Vanc and zosyn started for empiric coverage of suspected pneumonia. Same day EKG showed V4-6 T wave inversions, along with other nonspecific findings different from prior EKG one week ago suggestive of NSTEMI. Given contraindication to AC as well as poor prognosis precluding cath lab, trops were sent and bedside echo showed markedly decreased EF and SV. Cardiology consulted. Off levo, still on high dose fent/prop gtt. Propofol was weaned off and changed to precedex gtt     Neuro: Post operative pain, Anxiety  - Pt on fentanyl and precedex, cont to wean  - c/w olanzapine, lyrica  - Valium 5mg as needed for anxiety & dressing changes  - Planned to start methadone 10mg TID however pt with recent demand ischemia, not tolerated PO, and has prolonged QTc  - cont to f/u palliative rec's    Resp: s/p Trach  - Concern for aspiration pneumonia vs pneumonitis; bedside US 11/29: B lines bilateral bases L > R, treated with abx  - Airway repacked by ENT 12/4, will change q3-4 days (otherwise not to be removed)  - Mode: AC/ CMV (Assist Control/ Continuous Mandatory Ventilation), RR (machine): 20, TV (machine): 500, FiO2: 40, PEEP: 5  - Trach collar trials as tolerated    CV: Hx HTN  - HR, BP currently stable off pressors  - On Home Coreg with holding parameters  - concern for NSTEMI - likely demand ischemia, trops downtrended  - bedside echo 11/27: markedly decreased EF, formal echo EF 27% (from 40-45% prior)  - major contraindications to both anticoagulation therapy and cath  - Cardiology recs: treat conservatively, strict I&Os, when deemed safe start ASA 81 mg, continue b-blocker    GI:   - TFs at goal, Jevity @70 by PEG (placed 10/31)  - Rectal tube in place    Renal:  - condom cath  - replete lytes PRN    Heme:   - SCDs for now, hold off chemical DVT ppx  - Trend H/H    ID:   - CTM Tmax, WBC  - Most recent BCx growing coag negative staph - not given abx given DNR  - Aspiration episode 11/27, tachy and febrile, DNR reversed, started on abx for aspiration pneumonia vs. pneumonitis  - c/w Clinda (12/2-) in setting of oral packing  - Blood cultures NGTD- 72 hrs   - Tylenol PRN for fevers    Endo:  - No issues    Patient FULL CODE again as of 11/27    Dispo: SICU      Critical care diagnosis: requires airway management, aspiration pneumonia, severe sepsis, severe protein calorie malnutrition, severe muscle hypotrophy, temporal wasting SICU Daily Progress Note  =====================================================  Interval/Overnight Events:  No acute events overnight. Oral packing changed yesterday during the day, patient rebled with minimal packing, was repacked with full packing in oral cavity.      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 Was recovering well but began to have recurrence of oral bleeding in setting of critical airway. SICU consulted  given bleeding for airway management. Patient made DNR/comfort however patient aspirated 11/28 into trach and packing, desaturated, and became agitated. Wife was upset and decided to rescind the DNR/CMO. Packing was replaced by ENT at bedside, no significant bleeding. Vanc and zosyn started for empiric coverage of suspected pneumonia. Same day EKG showed V4-6 T wave inversions, along with other nonspecific findings different from prior EKG one week ago suggestive of NSTEMI. Given contraindication to AC as well as poor prognosis precluding cath lab, trops were sent and bedside echo showed markedly decreased EF and SV. Off levo, still on high dose fent/prop gtt.    Allergies: hospital socks (Rash)  lisinopril (Anaphylaxis)  statins (Anaphylaxis)      MEDICATIONS:   --------------------------------------------------------------------------------------  Neurologic Medications  dexmedetomidine Infusion 0.2 MICROgram(s)/kG/Hr IV Continuous <Continuous>  diazepam    Tablet 2 milliGRAM(s) Oral every 6 hours PRN anxiety  fentaNYL   Infusion. 7 MICROgram(s)/kG/Hr IV Continuous <Continuous>  OLANZapine 5 milliGRAM(s) Oral daily  pregabalin 75 milliGRAM(s) Oral two times a day    Respiratory Medications    Cardiovascular Medications  carvedilol 6.25 milliGRAM(s) Oral every 12 hours    Gastrointestinal Medications  bisacodyl Suppository 10 milliGRAM(s) Rectal daily PRN Constipation  pantoprazole   Suspension 40 milliGRAM(s) Oral daily    Genitourinary Medications    Hematologic/Oncologic Medications    Antimicrobial/Immunologic Medications  clindamycin IVPB      clindamycin IVPB 900 milliGRAM(s) IV Intermittent every 8 hours    Endocrine/Metabolic Medications    Topical/Other Medications  artificial  tears Solution 1 Drop(s) Both EYES every 12 hours  chlorhexidine 4% Liquid 1 Application(s) Topical <User Schedule>    --------------------------------------------------------------------------------------    VITAL SIGNS, INS/OUTS (last 24 hours):  --------------------------------------------------------------------------------------  T(C): 36.1 (12-05-18 @ 04:00), Max: 37.3 (12-04-18 @ 12:00)  HR: 72 (12-05-18 @ 05:00) (65 - 87)  BP: --  BP(mean): --  ABP: 111/56 (12-05-18 @ 05:00) (81/43 - 168/84)  ABP(mean): 72 (12-05-18 @ 05:00) (54 - 110)  RR: 20 (12-05-18 @ 05:00) (16 - 21)  SpO2: 100% (12-05-18 @ 05:00) (98% - 100%)  Wt(kg): --  CVP(mm Hg): --  CI: --  CAPILLARY BLOOD GLUCOSE       N/A      12-03 @ 07:01  -  12-04 @ 07:00  --------------------------------------------------------  IN:    dexmedetomidine Infusion: 451.2 mL    Enteral Tube Flush: 50 mL    fentaNYL Infusion.: 1260 mL    IV PiggyBack: 250 mL    ns in tub fed  [id number]: 1180 mL  Total IN: 3191.2 mL    OUT:    Indwelling Catheter - Urethral: 2440 mL  Total OUT: 2440 mL    Total NET: 751.2 mL      12-04 @ 07:01  -  12-05 @ 05:57  --------------------------------------------------------  IN:    dexmedetomidine Infusion: 172.2 mL    dexmedetomidine Infusion: 121.8 mL    Enteral Tube Flush: 110 mL    fentaNYL Infusion.: 473.5 mL    fentaNYL Infusion.: 663 mL    IV PiggyBack: 100 mL    ns in tub fed  [id number]: 855 mL  Total IN: 2495.5 mL    OUT:    Indwelling Catheter - Urethral: 3670 mL  Total OUT: 3670 mL    Total NET: -1174.5 mL        --------------------------------------------------------------------------------------    EXAM    NEURO  Exam: on sedation, alert and responsive     HEENT   Intraoral packing saturated .     RESPIRATORY  Exam: unlabored, clear to auscultation bilaterally.   s/p Tracheotomy - On Mech Vent     CARDIOVASCULAR  S1 S2 heard , RRR    GI/NUTRITION  Soft , non distended , non tender   Diet : NPO / PEG - On tube feeds     METABOLIC/FLUIDS/ELECTROLYTES      HEMATOLOGIC  [x] VTE Prophylaxis:   Transfusions:	[] PRBC	[] Platelets		[] FFP	[] Cryoprecipitate    INFECTIOUS DISEASE  Antimicrobials/Immunologic Medications:  clindamycin IVPB      clindamycin IVPB 900 milliGRAM(s) IV Intermittent every 8 hours    Day #      of     ***    Tubes/Lines/Drains  ***  [x] Peripheral IV  [] Central Venous Line     	[] R	[] L	[] IJ	[] Fem	[] SC	Date Placed:   [] Arterial Line		[] R	[] L	[] Fem	[] Rad	[] Ax	Date Placed:   [] PICC		[] Midline		[] Mediport  [] Urinary Catheter		Date Placed:   [x] Necessity of urinary, arterial, and venous catheters discussed    LABS  --------------------------------------------------------------------------------------  CBC (12-05 @ 04:00)                              8.0<L>                         7.14    )----------------(  345        --    % Neutrophils, --    % Lymphocytes, ANC: --                                  24.8<L>              CBC (12-04 @ 17:32)                              8.2<L>                         6.15    )----------------(  318        --    % Neutrophils, --    % Lymphocytes, ANC: --                                  25.8<L>                BMP (12-05 @ 04:00)             132<L>  |  97<L>   |  8     		Ca++ --      Ca 8.2<L>             ---------------------------------( 91    		Mg 1.6                4.0     |  25      |  0.48<L>			Ph 3.7     BMP (12-04 @ 02:20)             132<L>  |  96<L>   |  8     		Ca++ --      Ca 8.2<L>             ---------------------------------( 109<H>		Mg 1.4<L>             3.6     |  26      |  0.45<L>			Ph 3.5           ABG (12-05 @ 04:00)     7.45 / 43 / 115<H> / 29<H> / 5.2 / 98.5%     Lactate: 0.5    ABG (12-04 @ 02:20)     7.43 / 43 / 145<H> / 28<H> / 4.1 / 99.0%     Lactate: 0.8        -> BLOOD Culture (11-29 @ 22:47)     NG    NG  NG    -> BLOOD Culture (11-29 @ 22:37)     NG    NG  NG    -> BLOOD PERIPHERAL Culture (11-27 @ 20:02)     NG    NG  NG      --------------------------------------------------------------------------------------    OTHER LABORATORY:     IMAGING STUDIES:   CXR:     ASSESSMENT:  57 y/o M with Hx CHF (EF 40-45%), 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 Was recovering well but began to have recurrence of oral bleeding in setting of critical airway. SICU consulted  given bleeding for airway management. Patient made DNR/comfort however patient aspirated 11/28 into trach and packing, desaturated, and became agitated. Wife was upset and decided to rescind the DNR/CMO. Packing was replaced by ENT at bedside, no significant bleeding. Vanc and zosyn started for empiric coverage of suspected pneumonia. Same day EKG showed V4-6 T wave inversions, along with other nonspecific findings different from prior EKG one week ago suggestive of NSTEMI. Given contraindication to AC as well as poor prognosis precluding cath lab, trops were sent and bedside echo showed markedly decreased EF and SV. Cardiology consulted. Off levo, still on high dose fent/prop gtt. Propofol was weaned off and changed to precedex gtt     Neuro: Post operative pain, Anxiety  - Pt on fentanyl and precedex, cont to wean  - c/w olanzapine, lyrica  - Valium 5mg as needed for anxiety & dressing changes  - Planned to start methadone 10mg TID however pt with recent demand ischemia, not tolerated PO, and has prolonged QTc  - cont to f/u palliative rec's    Resp: s/p Trach  - Concern for aspiration pneumonia vs pneumonitis; bedside US 11/29: B lines bilateral bases L > R, treated with abx  - Airway repacked by ENT 12/4, will change q3-4 days (otherwise not to be removed)  - Mode: AC/ CMV (Assist Control/ Continuous Mandatory Ventilation), RR (machine): 20, TV (machine): 500, FiO2: 40, PEEP: 5  - Trach collar trials as tolerated    CV: Hx HTN  - HR, BP currently stable off pressors  - On Home Coreg with holding parameters  - concern for NSTEMI - likely demand ischemia, trops downtrended  - bedside echo 11/27: markedly decreased EF, formal echo EF 27% (from 40-45% prior)  - major contraindications to both anticoagulation therapy and cath  - Cardiology recs: treat conservatively, strict I&Os, when deemed safe start ASA 81 mg, continue b-blocker    GI:   - TFs at goal, Jevity @70 by PEG (placed 10/31)  - Rectal tube in place    Renal:  - condom cath  - replete lytes PRN    Heme:   - SCDs for now, hold off chemical DVT ppx  - Trend H/H    ID:   - CTM Tmax, WBC  - Most recent BCx growing coag negative staph - not given abx given DNR  - Aspiration episode 11/27, tachy and febrile, DNR reversed, started on abx for aspiration pneumonia vs. pneumonitis  - c/w Clinda (12/2-) in setting of oral packing  - Blood cultures NGTD- 72 hrs   - Tylenol PRN for fevers    Endo:  - No issues    Patient FULL CODE again as of 11/27    Dispo: SICU      Critical care diagnosis: requires airway management, aspiration pneumonia, severe sepsis, severe protein calorie malnutrition, severe muscle hypotrophy, temporal wasting SICU Daily Progress Note  =====================================================  Interval/Overnight Events:  No acute events overnight. Oral packing changed yesterday during the day, patient rebled with minimal packing, was repacked with full packing in oral cavity.      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 Was recovering well but began to have recurrence of oral bleeding in setting of critical airway. SICU consulted  given bleeding for airway management. Patient made DNR/comfort however patient aspirated 11/28 into trach and packing, desaturated, and became agitated. Wife was upset and decided to rescind the DNR/CMO. Packing was replaced by ENT at bedside, no significant bleeding. Vanc and zosyn started for empiric coverage of suspected pneumonia. Same day EKG showed V4-6 T wave inversions, along with other nonspecific findings different from prior EKG one week ago suggestive of NSTEMI. Given contraindication to AC as well as poor prognosis precluding cath lab, trops were sent and bedside echo showed markedly decreased EF and SV. Off levo, still on high dose fent/prop gtt.    Allergies: hospital socks (Rash)  lisinopril (Anaphylaxis)  statins (Anaphylaxis)      MEDICATIONS:   --------------------------------------------------------------------------------------  Neurologic Medications  dexmedetomidine Infusion 0.2 MICROgram(s)/kG/Hr IV Continuous <Continuous>  diazepam    Tablet 2 milliGRAM(s) Oral every 6 hours PRN anxiety  fentaNYL   Infusion. 7 MICROgram(s)/kG/Hr IV Continuous <Continuous>  OLANZapine 5 milliGRAM(s) Oral daily  pregabalin 75 milliGRAM(s) Oral two times a day    Respiratory Medications    Cardiovascular Medications  carvedilol 6.25 milliGRAM(s) Oral every 12 hours    Gastrointestinal Medications  bisacodyl Suppository 10 milliGRAM(s) Rectal daily PRN Constipation  pantoprazole   Suspension 40 milliGRAM(s) Oral daily    Genitourinary Medications    Hematologic/Oncologic Medications    Antimicrobial/Immunologic Medications  clindamycin IVPB      clindamycin IVPB 900 milliGRAM(s) IV Intermittent every 8 hours    Endocrine/Metabolic Medications    Topical/Other Medications  artificial  tears Solution 1 Drop(s) Both EYES every 12 hours  chlorhexidine 4% Liquid 1 Application(s) Topical <User Schedule>    --------------------------------------------------------------------------------------    VITAL SIGNS, INS/OUTS (last 24 hours):  --------------------------------------------------------------------------------------  T(C): 36.1 (12-05-18 @ 04:00), Max: 37.3 (12-04-18 @ 12:00)  HR: 72 (12-05-18 @ 05:00) (65 - 87)  BP: --  BP(mean): --  ABP: 111/56 (12-05-18 @ 05:00) (81/43 - 168/84)  ABP(mean): 72 (12-05-18 @ 05:00) (54 - 110)  RR: 20 (12-05-18 @ 05:00) (16 - 21)  SpO2: 100% (12-05-18 @ 05:00) (98% - 100%)  Wt(kg): --  CVP(mm Hg): --  CI: --  CAPILLARY BLOOD GLUCOSE       N/A      12-03 @ 07:01  -  12-04 @ 07:00  --------------------------------------------------------  IN:    dexmedetomidine Infusion: 451.2 mL    Enteral Tube Flush: 50 mL    fentaNYL Infusion.: 1260 mL    IV PiggyBack: 250 mL    ns in tub fed  [id number]: 1180 mL  Total IN: 3191.2 mL    OUT:    Indwelling Catheter - Urethral: 2440 mL  Total OUT: 2440 mL    Total NET: 751.2 mL      12-04 @ 07:01  -  12-05 @ 05:57  --------------------------------------------------------  IN:    dexmedetomidine Infusion: 172.2 mL    dexmedetomidine Infusion: 121.8 mL    Enteral Tube Flush: 110 mL    fentaNYL Infusion.: 473.5 mL    fentaNYL Infusion.: 663 mL    IV PiggyBack: 100 mL    ns in tub fed  [id number]: 855 mL  Total IN: 2495.5 mL    OUT:    Indwelling Catheter - Urethral: 3670 mL  Total OUT: 3670 mL    Total NET: -1174.5 mL        --------------------------------------------------------------------------------------    EXAM    NEURO  Exam: on sedation, alert and responsive     HEENT   Intraoral packing saturated .     RESPIRATORY  Exam: unlabored, clear to auscultation bilaterally.   s/p Tracheotomy - On Mech Vent     CARDIOVASCULAR  S1 S2 heard , RRR    GI/NUTRITION  Soft , non distended , non tender   Diet : NPO / PEG - On tube feeds     METABOLIC/FLUIDS/ELECTROLYTES      HEMATOLOGIC  [x] VTE Prophylaxis:   Transfusions:	[] PRBC	[] Platelets		[] FFP	[] Cryoprecipitate    INFECTIOUS DISEASE  Antimicrobials/Immunologic Medications:  clindamycin IVPB      clindamycin IVPB 900 milliGRAM(s) IV Intermittent every 8 hours    Day #      of     ***    Tubes/Lines/Drains  ***  [x] Peripheral IV  [] Central Venous Line     	[] R	[] L	[] IJ	[] Fem	[] SC	Date Placed:   [] Arterial Line		[] R	[] L	[] Fem	[] Rad	[] Ax	Date Placed:   [] PICC		[] Midline		[] Mediport  [] Urinary Catheter		Date Placed:   [x] Necessity of urinary, arterial, and venous catheters discussed    LABS  --------------------------------------------------------------------------------------  CBC (12-05 @ 04:00)                              8.0<L>                         7.14    )----------------(  345        --    % Neutrophils, --    % Lymphocytes, ANC: --                                  24.8<L>              CBC (12-04 @ 17:32)                              8.2<L>                         6.15    )----------------(  318        --    % Neutrophils, --    % Lymphocytes, ANC: --                                  25.8<L>                BMP (12-05 @ 04:00)             132<L>  |  97<L>   |  8     		Ca++ --      Ca 8.2<L>             ---------------------------------( 91    		Mg 1.6                4.0     |  25      |  0.48<L>			Ph 3.7     BMP (12-04 @ 02:20)             132<L>  |  96<L>   |  8     		Ca++ --      Ca 8.2<L>             ---------------------------------( 109<H>		Mg 1.4<L>             3.6     |  26      |  0.45<L>			Ph 3.5           ABG (12-05 @ 04:00)     7.45 / 43 / 115<H> / 29<H> / 5.2 / 98.5%     Lactate: 0.5    ABG (12-04 @ 02:20)     7.43 / 43 / 145<H> / 28<H> / 4.1 / 99.0%     Lactate: 0.8        -> BLOOD Culture (11-29 @ 22:47)     NG    NG  NG    -> BLOOD Culture (11-29 @ 22:37)     NG    NG  NG    -> BLOOD PERIPHERAL Culture (11-27 @ 20:02)     NG    NG  NG      --------------------------------------------------------------------------------------    OTHER LABORATORY:     IMAGING STUDIES:   CXR:

## 2018-12-05 NOTE — PROGRESS NOTE ADULT - ASSESSMENT
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 Was recovering well but began to have recurrence of oral bleeding in setting of critical airway. SICU consulted  given bleeding for airway management. Patient made DNR/comfort however patient aspirated 11/28 into trach and packing, desaturated, and became agitated. Wife was upset and decided to rescind the DNR/CMO. Packing was replaced by ENT at bedside, no significant bleeding. Vanc and zosyn started for empiric coverage of suspected pneumonia. Same day EKG showed V4-6 T wave inversions, along with other nonspecific findings different from prior EKG one week ago suggestive of NSTEMI. Given contraindication to AC as well as poor prognosis precluding cath lab, trops were sent and bedside echo showed markedly decreased EF and SV. Cardiology consulted. Off levo, still on high dose fent/prop gtt. Propofol was weaned off and changed to precedex gtt.    Neuro: Post operative pain, Anxiety  - Pt on fentanyl and precedex, cont to wean  - c/w olanzapine, lyrica  - Valium 5mg q6h/prn for anxiety & dressing changes  - Planned to start methadone 10mg TID however pt with recent demand ischemia, not tolerated PO, and has prolonged QTc  - cont to f/u palliative rec's    Resp: s/p Trach  - Concern for aspiration pneumonia vs pneumonitis; bedside US 11/29: B lines bilateral bases L > R, treated with abx  - Airway repacked by ENT 12/4, will change q3-4 days (otherwise not to be removed)  - Mode: AC/ CMV (Assist Control/ Continuous Mandatory Ventilation), RR (machine): 20, TV (machine): 500, FiO2: 40, PEEP: 5  - Trach collar trials as tolerated    CV: Hx HTN  - HR, BP currently stable off pressors  - On Home Coreg with holding parameters  - concern for NSTEMI - likely demand ischemia, trops downtrended  - bedside echo 11/27: markedly decreased EF, formal echo EF 27% (from 40-45% prior)  - major contraindications to both anticoagulation therapy and cath  - Cardiology recs: treat conservatively, strict I&Os, when deemed safe start ASA 81 mg, continue b-blocker    GI:   - TFs at goal, Jevity @70 by PEG (placed 10/31)  - Rectal tube in place    Renal:  - condom cath  - replete lytes PRN    Heme:   - SCDs for now, hold off chemical DVT ppx  - Trend H/H    ID:   - CTM Tmax, WBC  - Most recent BCx growing coag negative staph - not given abx given DNR  - Aspiration episode 11/27, tachy and febrile, DNR reversed, started on abx for aspiration pneumonia vs. pneumonitis  - c/w Clinda (12/2-) in setting of oral packing  - Blood cultures NGTD- 72 hrs   - Tylenol PRN for fevers    Endo:  - No issues    Patient FULL CODE again as of 11/27    Dispo: SICU      Critical care diagnosis: requires airway management, aspiration pneumonia, severe sepsis, severe protein calorie malnutrition, severe muscle hypotrophy, temporal wasting

## 2018-12-05 NOTE — PROGRESS NOTE ADULT - SUBJECTIVE AND OBJECTIVE BOX
Pt seen this AM.     No acute events. No bleeding since packing was replaced yesterday morning and h/h has been stable.     NAD, awake and alert  6LPC trach in place with sutures, on vent   NC: mass, non-bleeding  oc/op: kerlix in place, no pooling or active bleeding  Neck soft and flat, stoma intact     A/P  58M w/ rapidly growing R maxillary sinus invasive scc s/p awake tracheotomy 11/19 for airway protection w/ oral packing in place due to continued bleeding from the tumor. No curative or palliative surgical options. Full code.   -maintain oral packing in place, do NOT remove, ENT to change packing q3-4 days  -continued GOC discussions, ethics involved  -abx per SICU, currently on clinda -patient will have to remain on abx while the packing remains in place for toxic shock syndrome ppx  -NPO, PEG feeds  -routine trach care   -appreciate SICU care

## 2018-12-05 NOTE — CHART NOTE - NSCHARTNOTEFT_GEN_A_CORE
Contacted by primary team after infusion rate brought down from 7 Manuel to 3 Micrograms. Team requested us to speak with the wife who was present. Fentanyl patch  application (added by the primary team) will achieve efficacy after approximately 12 hours. Team had given fentanyl push and benzodiazepines prior to call. Case discussed with day time palliative team. Communicated previous recommendations also in the EMR. Primary team communicated that they will commence weaning tonight with transdermal fentanyl. Consider infusion adjustment to 6 microgram in light of the current patch and issues with bradycardia in the past.    Fentanyl wean per primary team. Feel free to contact the overnight palliative care should any question arise.

## 2018-12-06 ENCOUNTER — RESULT REVIEW (OUTPATIENT)
Age: 58
End: 2018-12-06

## 2018-12-06 LAB
BASE EXCESS BLDA CALC-SCNC: 4.7 MMOL/L — SIGNIFICANT CHANGE UP
BUN SERPL-MCNC: 6 MG/DL — LOW (ref 7–23)
CA-I BLDA-SCNC: 1.17 MMOL/L — SIGNIFICANT CHANGE UP (ref 1.15–1.29)
CALCIUM SERPL-MCNC: 8.9 MG/DL — SIGNIFICANT CHANGE UP (ref 8.4–10.5)
CHLORIDE SERPL-SCNC: 92 MMOL/L — LOW (ref 98–107)
CO2 SERPL-SCNC: 28 MMOL/L — SIGNIFICANT CHANGE UP (ref 22–31)
CREAT SERPL-MCNC: 0.45 MG/DL — LOW (ref 0.5–1.3)
GLUCOSE BLDA-MCNC: 109 MG/DL — HIGH (ref 70–99)
GLUCOSE SERPL-MCNC: 100 MG/DL — HIGH (ref 70–99)
HCO3 BLDA-SCNC: 28 MMOL/L — HIGH (ref 22–26)
HCT VFR BLD CALC: 32.8 % — LOW (ref 39–50)
HCT VFR BLD CALC: 33.1 % — LOW (ref 39–50)
HCT VFR BLDA CALC: 33.9 % — LOW (ref 39–51)
HGB BLD-MCNC: 10.4 G/DL — LOW (ref 13–17)
HGB BLD-MCNC: 10.7 G/DL — LOW (ref 13–17)
HGB BLDA-MCNC: 11 G/DL — LOW (ref 13–17)
LACTATE BLDA-SCNC: 1.2 MMOL/L — SIGNIFICANT CHANGE UP (ref 0.5–2)
MAGNESIUM SERPL-MCNC: 1.7 MG/DL — SIGNIFICANT CHANGE UP (ref 1.6–2.6)
MCHC RBC-ENTMCNC: 27.2 PG — SIGNIFICANT CHANGE UP (ref 27–34)
MCHC RBC-ENTMCNC: 27.2 PG — SIGNIFICANT CHANGE UP (ref 27–34)
MCHC RBC-ENTMCNC: 31.7 % — LOW (ref 32–36)
MCHC RBC-ENTMCNC: 32.3 % — SIGNIFICANT CHANGE UP (ref 32–36)
MCV RBC AUTO: 84.2 FL — SIGNIFICANT CHANGE UP (ref 80–100)
MCV RBC AUTO: 85.6 FL — SIGNIFICANT CHANGE UP (ref 80–100)
NRBC # FLD: 0 — SIGNIFICANT CHANGE UP
NRBC # FLD: 0 — SIGNIFICANT CHANGE UP
PCO2 BLDA: 46 MMHG — SIGNIFICANT CHANGE UP (ref 35–48)
PH BLDA: 7.42 PH — SIGNIFICANT CHANGE UP (ref 7.35–7.45)
PHOSPHATE SERPL-MCNC: 3.1 MG/DL — SIGNIFICANT CHANGE UP (ref 2.5–4.5)
PLATELET # BLD AUTO: 558 K/UL — HIGH (ref 150–400)
PLATELET # BLD AUTO: 591 K/UL — HIGH (ref 150–400)
PMV BLD: 9.4 FL — SIGNIFICANT CHANGE UP (ref 7–13)
PMV BLD: 9.9 FL — SIGNIFICANT CHANGE UP (ref 7–13)
PO2 BLDA: 131 MMHG — HIGH (ref 83–108)
POTASSIUM BLDA-SCNC: 3.7 MMOL/L — SIGNIFICANT CHANGE UP (ref 3.4–4.5)
POTASSIUM SERPL-MCNC: 4 MMOL/L — SIGNIFICANT CHANGE UP (ref 3.5–5.3)
POTASSIUM SERPL-SCNC: 4 MMOL/L — SIGNIFICANT CHANGE UP (ref 3.5–5.3)
RBC # BLD: 3.83 M/UL — LOW (ref 4.2–5.8)
RBC # BLD: 3.93 M/UL — LOW (ref 4.2–5.8)
RBC # FLD: 15.1 % — HIGH (ref 10.3–14.5)
RBC # FLD: 15.1 % — HIGH (ref 10.3–14.5)
SAO2 % BLDA: 98.8 % — SIGNIFICANT CHANGE UP (ref 95–99)
SODIUM BLDA-SCNC: 130 MMOL/L — LOW (ref 136–146)
SODIUM SERPL-SCNC: 130 MMOL/L — LOW (ref 135–145)
WBC # BLD: 12.65 K/UL — HIGH (ref 3.8–10.5)
WBC # BLD: 13.82 K/UL — HIGH (ref 3.8–10.5)
WBC # FLD AUTO: 12.65 K/UL — HIGH (ref 3.8–10.5)
WBC # FLD AUTO: 13.82 K/UL — HIGH (ref 3.8–10.5)

## 2018-12-06 PROCEDURE — 99291 CRITICAL CARE FIRST HOUR: CPT

## 2018-12-06 PROCEDURE — 88363 XM ARCHIVE TISSUE MOLEC ANAL: CPT

## 2018-12-06 PROCEDURE — 99292 CRITICAL CARE ADDL 30 MIN: CPT

## 2018-12-06 RX ORDER — MIDAZOLAM HYDROCHLORIDE 1 MG/ML
0.12 INJECTION, SOLUTION INTRAMUSCULAR; INTRAVENOUS
Qty: 100 | Refills: 0 | Status: DISCONTINUED | OUTPATIENT
Start: 2018-12-06 | End: 2018-12-06

## 2018-12-06 RX ORDER — FENTANYL CITRATE 50 UG/ML
50 INJECTION INTRAVENOUS
Qty: 0 | Refills: 0 | Status: DISCONTINUED | OUTPATIENT
Start: 2018-12-06 | End: 2018-12-09

## 2018-12-06 RX ORDER — HYDROMORPHONE HYDROCHLORIDE 2 MG/ML
2 INJECTION INTRAMUSCULAR; INTRAVENOUS; SUBCUTANEOUS
Qty: 0 | Refills: 0 | Status: DISCONTINUED | OUTPATIENT
Start: 2018-12-06 | End: 2018-12-06

## 2018-12-06 RX ORDER — FENTANYL CITRATE 50 UG/ML
50 INJECTION INTRAVENOUS
Qty: 0 | Refills: 0 | Status: DISCONTINUED | OUTPATIENT
Start: 2018-12-06 | End: 2018-12-06

## 2018-12-06 RX ORDER — FENTANYL CITRATE 50 UG/ML
50 INJECTION INTRAVENOUS ONCE
Qty: 0 | Refills: 0 | Status: DISCONTINUED | OUTPATIENT
Start: 2018-12-06 | End: 2018-12-06

## 2018-12-06 RX ORDER — MIDAZOLAM HYDROCHLORIDE 1 MG/ML
0.02 INJECTION, SOLUTION INTRAMUSCULAR; INTRAVENOUS
Qty: 100 | Refills: 0 | Status: DISCONTINUED | OUTPATIENT
Start: 2018-12-06 | End: 2018-12-06

## 2018-12-06 RX ORDER — HYDROMORPHONE HYDROCHLORIDE 2 MG/ML
4 INJECTION INTRAMUSCULAR; INTRAVENOUS; SUBCUTANEOUS
Qty: 100 | Refills: 0 | Status: DISCONTINUED | OUTPATIENT
Start: 2018-12-06 | End: 2018-12-09

## 2018-12-06 RX ORDER — MIDAZOLAM HYDROCHLORIDE 1 MG/ML
0.12 INJECTION, SOLUTION INTRAMUSCULAR; INTRAVENOUS
Qty: 100 | Refills: 0 | Status: DISCONTINUED | OUTPATIENT
Start: 2018-12-06 | End: 2018-12-09

## 2018-12-06 RX ORDER — DIAZEPAM 5 MG
2 TABLET ORAL
Qty: 0 | Refills: 0 | Status: DISCONTINUED | OUTPATIENT
Start: 2018-12-06 | End: 2018-12-07

## 2018-12-06 RX ORDER — MORPHINE SULFATE 50 MG/1
2 CAPSULE, EXTENDED RELEASE ORAL
Qty: 100 | Refills: 0 | Status: DISCONTINUED | OUTPATIENT
Start: 2018-12-06 | End: 2018-12-06

## 2018-12-06 RX ORDER — HYDROMORPHONE HYDROCHLORIDE 2 MG/ML
4 INJECTION INTRAMUSCULAR; INTRAVENOUS; SUBCUTANEOUS
Qty: 0 | Refills: 0 | Status: DISCONTINUED | OUTPATIENT
Start: 2018-12-06 | End: 2018-12-06

## 2018-12-06 RX ORDER — HYDROMORPHONE HYDROCHLORIDE 2 MG/ML
2 INJECTION INTRAMUSCULAR; INTRAVENOUS; SUBCUTANEOUS
Qty: 0 | Refills: 0 | Status: DISCONTINUED | OUTPATIENT
Start: 2018-12-06 | End: 2018-12-09

## 2018-12-06 RX ORDER — FENTANYL CITRATE 50 UG/ML
100 INJECTION INTRAVENOUS ONCE
Qty: 0 | Refills: 0 | Status: DISCONTINUED | OUTPATIENT
Start: 2018-12-06 | End: 2018-12-06

## 2018-12-06 RX ORDER — MAGNESIUM SULFATE 500 MG/ML
2 VIAL (ML) INJECTION ONCE
Qty: 0 | Refills: 0 | Status: COMPLETED | OUTPATIENT
Start: 2018-12-06 | End: 2018-12-06

## 2018-12-06 RX ADMIN — MORPHINE SULFATE 2 MG/HR: 50 CAPSULE, EXTENDED RELEASE ORAL at 13:14

## 2018-12-06 RX ADMIN — MORPHINE SULFATE 2 MG/HR: 50 CAPSULE, EXTENDED RELEASE ORAL at 13:52

## 2018-12-06 RX ADMIN — CHLORHEXIDINE GLUCONATE 1 APPLICATION(S): 213 SOLUTION TOPICAL at 16:00

## 2018-12-06 RX ADMIN — FENTANYL CITRATE 50 MICROGRAM(S): 50 INJECTION INTRAVENOUS at 08:01

## 2018-12-06 RX ADMIN — Medication 1 DROP(S): at 06:12

## 2018-12-06 RX ADMIN — FENTANYL CITRATE 50 MICROGRAM(S): 50 INJECTION INTRAVENOUS at 10:46

## 2018-12-06 RX ADMIN — HYDROMORPHONE HYDROCHLORIDE 4 MG/HR: 2 INJECTION INTRAMUSCULAR; INTRAVENOUS; SUBCUTANEOUS at 17:53

## 2018-12-06 RX ADMIN — FENTANYL CITRATE 15 MICROGRAM(S)/KG/HR: 50 INJECTION INTRAVENOUS at 08:03

## 2018-12-06 RX ADMIN — Medication 2 MILLIGRAM(S): at 16:58

## 2018-12-06 RX ADMIN — Medication 75 MILLIGRAM(S): at 17:58

## 2018-12-06 RX ADMIN — OLANZAPINE 5 MILLIGRAM(S): 15 TABLET, FILM COATED ORAL at 13:30

## 2018-12-06 RX ADMIN — HYDROMORPHONE HYDROCHLORIDE 4 MG/HR: 2 INJECTION INTRAMUSCULAR; INTRAVENOUS; SUBCUTANEOUS at 15:52

## 2018-12-06 RX ADMIN — Medication 2 MILLIGRAM(S): at 21:38

## 2018-12-06 RX ADMIN — FENTANYL CITRATE 50 MICROGRAM(S): 50 INJECTION INTRAVENOUS at 09:27

## 2018-12-06 RX ADMIN — Medication 100 MILLIGRAM(S): at 15:06

## 2018-12-06 RX ADMIN — MIDAZOLAM HYDROCHLORIDE 9 MG/KG/HR: 1 INJECTION, SOLUTION INTRAMUSCULAR; INTRAVENOUS at 21:51

## 2018-12-06 RX ADMIN — Medication 100 MILLIGRAM(S): at 06:12

## 2018-12-06 RX ADMIN — CARVEDILOL PHOSPHATE 6.25 MILLIGRAM(S): 80 CAPSULE, EXTENDED RELEASE ORAL at 05:07

## 2018-12-06 RX ADMIN — HYDROMORPHONE HYDROCHLORIDE 2 MILLIGRAM(S): 2 INJECTION INTRAMUSCULAR; INTRAVENOUS; SUBCUTANEOUS at 22:00

## 2018-12-06 RX ADMIN — MIDAZOLAM HYDROCHLORIDE 1.5 MG/KG/HR: 1 INJECTION, SOLUTION INTRAMUSCULAR; INTRAVENOUS at 13:13

## 2018-12-06 RX ADMIN — Medication 50 GRAM(S): at 04:49

## 2018-12-06 RX ADMIN — HYDROMORPHONE HYDROCHLORIDE 2 MILLIGRAM(S): 2 INJECTION INTRAMUSCULAR; INTRAVENOUS; SUBCUTANEOUS at 21:38

## 2018-12-06 RX ADMIN — Medication 75 MILLIGRAM(S): at 06:12

## 2018-12-06 RX ADMIN — Medication 2 MILLIGRAM(S): at 23:26

## 2018-12-06 RX ADMIN — FENTANYL CITRATE 100 MICROGRAM(S): 50 INJECTION INTRAVENOUS at 03:30

## 2018-12-06 RX ADMIN — FENTANYL CITRATE 50 MICROGRAM(S): 50 INJECTION INTRAVENOUS at 13:29

## 2018-12-06 RX ADMIN — Medication 100 MILLIGRAM(S): at 22:34

## 2018-12-06 RX ADMIN — Medication 5 MILLIGRAM(S): at 01:37

## 2018-12-06 RX ADMIN — FENTANYL CITRATE 50 MICROGRAM(S): 50 INJECTION INTRAVENOUS at 11:11

## 2018-12-06 RX ADMIN — FENTANYL CITRATE 1 PATCH: 50 INJECTION INTRAVENOUS at 19:33

## 2018-12-06 RX ADMIN — PANTOPRAZOLE SODIUM 40 MILLIGRAM(S): 20 TABLET, DELAYED RELEASE ORAL at 05:07

## 2018-12-06 RX ADMIN — FENTANYL CITRATE 50 MICROGRAM(S): 50 INJECTION INTRAVENOUS at 08:27

## 2018-12-06 RX ADMIN — Medication 1 DROP(S): at 17:58

## 2018-12-06 RX ADMIN — FENTANYL CITRATE 1 PATCH: 50 INJECTION INTRAVENOUS at 06:13

## 2018-12-06 RX ADMIN — Medication 2 MILLIGRAM(S): at 08:27

## 2018-12-06 NOTE — CHART NOTE - NSCHARTNOTEFT_GEN_A_CORE
Further goals of care were defined in conversation with the family. The patient, to the best of his ability given his degree of sedation, has expressed to them that he does not want to prolong his current state, which seems to be causing him marked discomfort absent high doses of intravenous opioids and benzos, which as a result likely make him ventilator dependent. Family members have affirmed that this is in keeping with what they understand to be his wishes, and they would like to withdraw ventilator support. We will therefore prepare to withdraw the ventilator and provide supportive measures. Family was counseled extensively on what to expect in terms of clinical course with this approach, and they are agreeable.

## 2018-12-06 NOTE — CHART NOTE - NSCHARTNOTEFT_GEN_A_CORE
Extended family at bedside with patient's wife. Throughout the course of the day the patient has indicated increasing discomfort, requiring re-escalating doses of opioids. We discussed the persistent grim prognosis with the family given the lack of surgical options and likelihood of worsening bleeding. In light of this, and given the patient previously had a DNR order in place, the patient's wife has requested that we escalate comfort measures to include higher doses of sedative and analgesic medications and re-instate DNR/DNI status. In the event of abrupt deterioration, the family understands that invasive intervention would be fruitless in this setting, and therefore we will orient our care towards analgesia and anxiolysis. Written DNR order placed in chart with two provider witnesses, and DNR order placed in EMR.    Jonathan Weil, PGY2  [id number] Extended family at bedside with patient's wife. Throughout the course of the day the patient has indicated increasing discomfort, requiring re-escalating doses of opioids. We discussed the persistent grim prognosis with the family given the lack of surgical options and likelihood of worsening bleeding. In light of this, and given the patient previously had a DNR order in place, the patient's wife has requested that we escalate comfort measures to include higher doses of sedative and analgesic medications and re-instate DNR/DNI status. In the event of abrupt deterioration, the family understands that invasive intervention would be fruitless in this setting, and therefore we will orient our care towards analgesia and anxiolysis. Written DNR order placed in chart with two provider witnesses, and DNR order placed in EMR.    Jonathan Weil, PGY2      Agree above note and plan.

## 2018-12-06 NOTE — CHART NOTE - NSCHARTNOTEFT_GEN_A_CORE
At this time goals of care have been addressed on multiple occasions.  Recommendations regarding patients symptoms have been addressed, documented, and discussed with primary team, at this time will sign off, further management per SICU team.   Palliative can continue to provide support for patient and family.

## 2018-12-06 NOTE — PROGRESS NOTE ADULT - SUBJECTIVE AND OBJECTIVE BOX
Pt seen and examined this AM. No acute events overnight.     NAD, awake and alert  6LPC trach in place with sutures, on vent   NC: mass, non-bleeding  oc/op: kerlix in place, no pooling or active bleeding  Neck soft and flat, stoma intact     A/P: s/p awake tracheotomy 11/19 for airway protection now w/ oral packing which has to remain in place due to continued bleeding from the tumor. DNR rescinded.   -maintain oral packing in place, do NOT remove, ENT to change packing q3-4 days  -continued GOC discussions, ethics now involved  -NPO, PEG feeds  -routine trach care   -appreciate SICU care

## 2018-12-06 NOTE — PROGRESS NOTE ADULT - SUBJECTIVE AND OBJECTIVE BOX
SICU Daily Progress Note  =====================================================  Interval/Overnight Events:  Precedex titrated to off. Anxiolysis with benzodiazepines, fentanyl drip titrated down, fentanyl boluses to substitute as needed.    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 Was recovering well but began to have recurrence of oral bleeding in setting of critical airway. SICU consulted  given bleeding for airway management. Patient made DNR/comfort however patient aspirated 11/28 into trach and packing, desaturated, and became agitated. Wife was upset and decided to rescind the DNR/CMO. Packing was replaced by ENT at bedside, no significant bleeding. Vanc and zosyn started for empiric coverage of suspected pneumonia. Same day EKG showed V4-6 T wave inversions, along with other nonspecific findings different from prior EKG one week ago suggestive of NSTEMI. Given contraindication to AC as well as poor prognosis precluding cath lab, trops were sent and bedside echo showed markedly decreased EF and SV. Off levo, still on high dose fent/prop gtt.    Allergies: hospital socks (Rash)  lisinopril (Anaphylaxis)  statins (Anaphylaxis)      MEDICATIONS:   --------------------------------------------------------------------------------------  MEDICATIONS  (STANDING):  ALPRAZolam 0.5 milliGRAM(s) Oral at bedtime  artificial  tears Solution 1 Drop(s) Both EYES every 12 hours  carvedilol 6.25 milliGRAM(s) Oral every 12 hours  chlorhexidine 4% Liquid 1 Application(s) Topical <User Schedule>  clindamycin IVPB      clindamycin IVPB 900 milliGRAM(s) IV Intermittent every 8 hours  dexmedetomidine Infusion 0.2 MICROgram(s)/kG/Hr (3.75 mL/Hr) IV Continuous <Continuous>  fentaNYL   Infusion. 7 MICROgram(s)/kG/Hr (52.5 mL/Hr) IV Continuous <Continuous>  fentaNYL   Patch 100 MICROgram(s)/Hr. 1 Patch Transdermal every 48 hours  OLANZapine 5 milliGRAM(s) Oral daily  T(C): 36.1 (12-06-18 @ 00:00), Max: 36.7 (12-05-18 @ 16:00)  HR: 108 (12-06-18 @ 01:00) (69 - 117)  BP: 153/97 (12-06-18 @ 01:00) (116/72 - 184/98)  RR: 20 (12-06-18 @ 01:00) (15 - 36)  SpO2: 99% (12-06-18 @ 01:00) (95% - 100%)  Wt(kg): --  12-04 @ 07:01  -  12-05 @ 07:00  --------------------------------------------------------  IN:    dexmedetomidine Infusion: 172.2 mL    dexmedetomidine Infusion: 140.6 mL    Enteral Tube Flush: 190 mL    fentaNYL Infusion.: 578.5 mL    fentaNYL Infusion.: 663 mL    IV PiggyBack: 150 mL    ns in tub fed  [id number]: 1035 mL  Total IN: 2929.3 mL    OUT:    Indwelling Catheter - Urethral: 3945 mL  Total OUT: 3945 mL    Total NET: -1015.7 mL      12-05 @ 07:01  -  12-06 @ 01:41  --------------------------------------------------------  IN:    dexmedetomidine Infusion: 95.8 mL    Enteral Tube Flush: 290 mL    fentaNYL Infusion.: 180 mL    fentaNYL Infusion.: 465 mL    IV PiggyBack: 50 mL    ns in tub fed  [id number]: 1260 mL  Total IN: 2340.8 mL    OUT:    Incontinent per Condom Catheter: 2245 mL    Indwelling Catheter - Urethral: 570 mL  Total OUT: 2815 mL    Total NET: -474.2 mL      pantoprazole   Suspension 40 milliGRAM(s) Oral daily  pregabalin 75 milliGRAM(s) Oral two times a day    MEDICATIONS  (PRN):  bisacodyl Suppository 10 milliGRAM(s) Rectal daily PRN Constipation  diazepam    Tablet 5 milliGRAM(s) Oral every 4 hours PRN agitation  LORazepam   Injectable 0.5 milliGRAM(s) IV Push every 4 hours PRN Anxiety      --------------------------------------------------------------------------------------    --------------------------------------------------------------------------------------    EXAM    NEURO  Exam: on sedation, alert and responsive     HEENT   Intraoral packing in place, malodorous    RESPIRATORY  Exam: unlabored, clear to auscultation bilaterally.   s/p Tracheotomy - On Mech Vent     CARDIOVASCULAR  S1 S2 heard , RRR    GI/NUTRITION  Soft , non distended , non tender   Diet : NPO / PEG - On tube feeds     METABOLIC/FLUIDS/ELECTROLYTES      HEMATOLOGIC  [x] VTE Prophylaxis:   Transfusions:	[] PRBC	[] Platelets		[] FFP	[] Cryoprecipitate    INFECTIOUS DISEASE  Antimicrobials/Immunologic Medications:  clindamycin IVPB      clindamycin IVPB 900 milliGRAM(s) IV Intermittent every 8 hours      Tubes/Lines/Drains   [x] Peripheral IV  [] Central Venous Line     	[] R	[] L	[] IJ	[] Fem	[] SC	Date Placed:   [] Arterial Line		[] R	[] L	[] Fem	[] Rad	[] Ax	Date Placed:   [] PICC		[] Midline		[] Mediport  [] Urinary Catheter		Date Placed:   [x] Necessity of urinary, arterial, and venous catheters discussed    LABS  --------------------------------------------------------------------------------------                        8.0    7.14  )-----------( 345      ( 05 Dec 2018 04:00 )             24.8     12-05    132<L>  |  97<L>  |  8   ----------------------------<  91  4.0   |  25  |  0.48<L>    Ca    8.2<L>      05 Dec 2018 04:00  Phos  3.7     12-05  Mg     1.6     12-05        RADIOLOGY, EKG & ADDITIONAL TESTS: Reviewed.   -------------------------------------------------------------------------------------- SICU Daily Progress Note  =====================================================  Interval/Overnight Events:  Precedex titrated to off. Anxiolysis with benzodiazepines, fentanyl drip titrated down, fentanyl boluses to substitute as needed.    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 Was recovering well but began to have recurrence of oral bleeding in setting of critical airway. SICU consulted  given bleeding for airway management. Patient made DNR/comfort however patient aspirated 11/28 into trach and packing, desaturated, and became agitated. Wife was upset and decided to rescind the DNR/CMO. Packing was replaced by ENT at bedside, no significant bleeding. Vanc and zosyn started for empiric coverage of suspected pneumonia. Same day EKG showed V4-6 T wave inversions, along with other nonspecific findings different from prior EKG one week ago suggestive of NSTEMI. Given contraindication to AC as well as poor prognosis precluding cath lab, trops were sent and bedside echo showed markedly decreased EF and SV. Off levo, still on high dose fent/prop gtt.    Allergies: hospital socks (Rash)  lisinopril (Anaphylaxis)  statins (Anaphylaxis)      MEDICATIONS:   --------------------------------------------------------------------------------------  MEDICATIONS  (STANDING):  ALPRAZolam 0.5 milliGRAM(s) Oral at bedtime  artificial  tears Solution 1 Drop(s) Both EYES every 12 hours  carvedilol 6.25 milliGRAM(s) Oral every 12 hours  chlorhexidine 4% Liquid 1 Application(s) Topical <User Schedule>  clindamycin IVPB      clindamycin IVPB 900 milliGRAM(s) IV Intermittent every 8 hours  dexmedetomidine Infusion 0.2 MICROgram(s)/kG/Hr (3.75 mL/Hr) IV Continuous <Continuous>  fentaNYL   Infusion. 7 MICROgram(s)/kG/Hr (52.5 mL/Hr) IV Continuous <Continuous>  fentaNYL   Patch 100 MICROgram(s)/Hr. 1 Patch Transdermal every 48 hours  OLANZapine 5 milliGRAM(s) Oral daily  T(C): 36.1 (12-06-18 @ 00:00), Max: 36.7 (12-05-18 @ 16:00)  HR: 108 (12-06-18 @ 01:00) (69 - 117)  BP: 153/97 (12-06-18 @ 01:00) (116/72 - 184/98)  RR: 20 (12-06-18 @ 01:00) (15 - 36)  SpO2: 99% (12-06-18 @ 01:00) (95% - 100%)  Wt(kg): --  12-04 @ 07:01  -  12-05 @ 07:00  --------------------------------------------------------  IN:    dexmedetomidine Infusion: 172.2 mL    dexmedetomidine Infusion: 140.6 mL    Enteral Tube Flush: 190 mL    fentaNYL Infusion.: 578.5 mL    fentaNYL Infusion.: 663 mL    IV PiggyBack: 150 mL    ns in tub fed  [id number]: 1035 mL  Total IN: 2929.3 mL    OUT:    Indwelling Catheter - Urethral: 3945 mL  Total OUT: 3945 mL    Total NET: -1015.7 mL      12-05 @ 07:01  -  12-06 @ 01:41  --------------------------------------------------------  IN:    dexmedetomidine Infusion: 95.8 mL    Enteral Tube Flush: 290 mL    fentaNYL Infusion.: 180 mL    fentaNYL Infusion.: 465 mL    IV PiggyBack: 50 mL    ns in tub fed  [id number]: 1260 mL  Total IN: 2340.8 mL    OUT:    Incontinent per Condom Catheter: 2245 mL    Indwelling Catheter - Urethral: 570 mL  Total OUT: 2815 mL    Total NET: -474.2 mL      pantoprazole   Suspension 40 milliGRAM(s) Oral daily  pregabalin 75 milliGRAM(s) Oral two times a day    MEDICATIONS  (PRN):  bisacodyl Suppository 10 milliGRAM(s) Rectal daily PRN Constipation  diazepam    Tablet 5 milliGRAM(s) Oral every 4 hours PRN agitation  LORazepam   Injectable 0.5 milliGRAM(s) IV Push every 4 hours PRN Anxiety      --------------------------------------------------------------------------------------    --------------------------------------------------------------------------------------    EXAM    NEURO  Exam: on sedation, alert and responsive     HEENT   Intraoral packing in place, malodorous    RESPIRATORY  Exam: unlabored, clear to auscultation bilaterally.   s/p Tracheotomy - On Mech Vent     CARDIOVASCULAR  S1 S2 heard , RRR    GI/NUTRITION  Soft , non distended , non tender   Diet : NPO / PEG - On tube feeds     METABOLIC/FLUIDS/ELECTROLYTES      HEMATOLOGIC  [x] VTE Prophylaxis:   Transfusions:	[] PRBC	[] Platelets		[] FFP	[] Cryoprecipitate    INFECTIOUS DISEASE  Antimicrobials/Immunologic Medications:  clindamycin IVPB      clindamycin IVPB 900 milliGRAM(s) IV Intermittent every 8 hours      Tubes/Lines/Drains   [x] Peripheral IV  [] Central Venous Line     	[] R	[] L	[] IJ	[] Fem	[] SC	Date Placed:   [x] Arterial Line		[] R	[] L	[] Fem	[] Rad	[] Ax	Date Placed:   [] PICC		[] Midline		[] Mediport  [] Urinary Catheter		Date Placed:   [x] Necessity of urinary, arterial, and venous catheters discussed    LABS  --------------------------------------------------------------------------------------                        8.0    7.14  )-----------( 345      ( 05 Dec 2018 04:00 )             24.8     12-05    132<L>  |  97<L>  |  8   ----------------------------<  91  4.0   |  25  |  0.48<L>    Ca    8.2<L>      05 Dec 2018 04:00  Phos  3.7     12-05  Mg     1.6     12-05        RADIOLOGY, EKG & ADDITIONAL TESTS: Reviewed.   -------------------------------------------------------------------------------------- SICU Daily Progress Note  =====================================================  Interval/Overnight Events:  Precedex titrated to off. Anxiolysis with benzodiazepines, fentanyl drip titrated down, fentanyl boluses to substitute as needed. Had one episode of mucous plugging that resolved with suctioning.    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 Was recovering well but began to have recurrence of oral bleeding in setting of critical airway. SICU consulted  given bleeding for airway management. Patient made DNR/comfort however patient aspirated 11/28 into trach and packing, desaturated, and became agitated. Wife was upset and decided to rescind the DNR/CMO. Packing was replaced by ENT at bedside, no significant bleeding. Vanc and zosyn started for empiric coverage of suspected pneumonia. Same day EKG showed V4-6 T wave inversions, along with other nonspecific findings different from prior EKG one week ago suggestive of NSTEMI. Given contraindication to AC as well as poor prognosis precluding cath lab, trops were sent and bedside echo showed markedly decreased EF and SV. Off levo, still on high dose fent/prop gtt.    Allergies: hospital socks (Rash)  lisinopril (Anaphylaxis)  statins (Anaphylaxis)      MEDICATIONS:   --------------------------------------------------------------------------------------  MEDICATIONS  (STANDING):  ALPRAZolam 0.5 milliGRAM(s) Oral at bedtime  artificial  tears Solution 1 Drop(s) Both EYES every 12 hours  carvedilol 6.25 milliGRAM(s) Oral every 12 hours  chlorhexidine 4% Liquid 1 Application(s) Topical <User Schedule>  clindamycin IVPB      clindamycin IVPB 900 milliGRAM(s) IV Intermittent every 8 hours  dexmedetomidine Infusion 0.2 MICROgram(s)/kG/Hr (3.75 mL/Hr) IV Continuous <Continuous>  fentaNYL   Infusion. 7 MICROgram(s)/kG/Hr (52.5 mL/Hr) IV Continuous <Continuous>  fentaNYL   Patch 100 MICROgram(s)/Hr. 1 Patch Transdermal every 48 hours  OLANZapine 5 milliGRAM(s) Oral daily  T(C): 36.1 (12-06-18 @ 00:00), Max: 36.7 (12-05-18 @ 16:00)  HR: 108 (12-06-18 @ 01:00) (69 - 117)  BP: 153/97 (12-06-18 @ 01:00) (116/72 - 184/98)  RR: 20 (12-06-18 @ 01:00) (15 - 36)  SpO2: 99% (12-06-18 @ 01:00) (95% - 100%)  Wt(kg): --  12-04 @ 07:01  -  12-05 @ 07:00  --------------------------------------------------------  IN:    dexmedetomidine Infusion: 172.2 mL    dexmedetomidine Infusion: 140.6 mL    Enteral Tube Flush: 190 mL    fentaNYL Infusion.: 578.5 mL    fentaNYL Infusion.: 663 mL    IV PiggyBack: 150 mL    ns in tub fed  [id number]: 1035 mL  Total IN: 2929.3 mL    OUT:    Indwelling Catheter - Urethral: 3945 mL  Total OUT: 3945 mL    Total NET: -1015.7 mL      12-05 @ 07:01  -  12-06 @ 01:41  --------------------------------------------------------  IN:    dexmedetomidine Infusion: 95.8 mL    Enteral Tube Flush: 290 mL    fentaNYL Infusion.: 180 mL    fentaNYL Infusion.: 465 mL    IV PiggyBack: 50 mL    ns in tub fed  [id number]: 1260 mL  Total IN: 2340.8 mL    OUT:    Incontinent per Condom Catheter: 2245 mL    Indwelling Catheter - Urethral: 570 mL  Total OUT: 2815 mL    Total NET: -474.2 mL      pantoprazole   Suspension 40 milliGRAM(s) Oral daily  pregabalin 75 milliGRAM(s) Oral two times a day    MEDICATIONS  (PRN):  bisacodyl Suppository 10 milliGRAM(s) Rectal daily PRN Constipation  diazepam    Tablet 5 milliGRAM(s) Oral every 4 hours PRN agitation  LORazepam   Injectable 0.5 milliGRAM(s) IV Push every 4 hours PRN Anxiety      --------------------------------------------------------------------------------------    --------------------------------------------------------------------------------------    EXAM    NEURO  Exam: on sedation, alert and responsive     HEENT   Intraoral packing in place, malodorous    RESPIRATORY  Exam: unlabored, clear to auscultation bilaterally.   s/p Tracheotomy - On [facility] Vent     CARDIOVASCULAR  S1 S2 heard , RRR    GI/NUTRITION  Soft , non distended , non tender   Diet : NPO / PEG - On tube feeds     METABOLIC/FLUIDS/ELECTROLYTES      HEMATOLOGIC  [x] VTE Prophylaxis:   Transfusions:	[] PRBC	[] Platelets		[] FFP	[] Cryoprecipitate    INFECTIOUS DISEASE  Antimicrobials/Immunologic Medications:  clindamycin IVPB      clindamycin IVPB 900 milliGRAM(s) IV Intermittent every 8 hours      Tubes/Lines/Drains   [x] Peripheral IV  [] Central Venous Line     	[] R	[] L	[] IJ	[] Fem	[] SC	Date Placed:   [x] Arterial Line		[] R	[] L	[] Fem	[] Rad	[] Ax	Date Placed:   [] PICC		[] Midline		[] Mediport  [] Urinary Catheter		Date Placed:   [x] Necessity of urinary, arterial, and venous catheters discussed    LABS  --------------------------------------------------------------------------------------                        8.0    7.14  )-----------( 345      ( 05 Dec 2018 04:00 )             24.8     12-05    132<L>  |  97<L>  |  8   ----------------------------<  91  4.0   |  25  |  0.48<L>    Ca    8.2<L>      05 Dec 2018 04:00  Phos  3.7     12-05  Mg     1.6     12-05        RADIOLOGY, EKG & ADDITIONAL TESTS: Reviewed.   --------------------------------------------------------------------------------------

## 2018-12-06 NOTE — CHART NOTE - NSCHARTNOTEFT_GEN_A_CORE
NUTRITION FOLLOW-UP:    Pt seen for critical care nutrition f/u.  Pt continues on mechanical ventilation at this time, sedated.  TF provided via PEG.  Spoke w/ SICU team to clarify TF order.  TF to be held 1h before and after protonix dose.  TF to be for total of 16 hours/d.  Current wt is 6.8kg above admission wt.  Pt w/3+generalized edema.  Pt w/suspected DTI on coccyx    Weight:  12/5 - 81.8kg     12/2 - 78.1kg     Adm - 75kg     IBW - 80.9kg  Labs:  H/H 10.7/33.1  Na 130  BUN/Cr 6/0.45  Glu 100    Current Diet:  Jevity 1.2 @90ml/h x18h via gastrostomy (hold 1h before and after protonix).    Enteral Recommendations:  TF x 16h provides [id number] kcal w/79gm protein meeting current needs.  (20-25kcal/kg admit wt =9648-2229 kcal/d and 1.0-1.5gm protein/kg = .5gms/d).      RD to Remain Available:  yes    Additional Recommendations:   1) Monitor weights, labs, BM's, skin integrity, tolerance to TF  2) Jevity 1.2 @90ml/h x16 h - adjust for hold of TF when med provided

## 2018-12-06 NOTE — PROGRESS NOTE ADULT - ASSESSMENT
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 Was recovering well but began to have recurrence of oral bleeding in setting of critical airway. SICU consulted  given bleeding for airway management. Patient made DNR/comfort however patient aspirated 11/28 into trach and packing, desaturated, and became agitated. Wife was upset and decided to rescind the DNR/CMO. Packing was replaced by ENT at bedside, no significant bleeding. Vanc and zosyn started for empiric coverage of suspected pneumonia. Same day EKG showed V4-6 T wave inversions, along with other nonspecific findings different from prior EKG one week ago suggestive of NSTEMI. Given contraindication to AC as well as poor prognosis precluding cath lab, trops were sent and bedside echo showed markedly decreased EF and SV. Cardiology consulted. Off levo, still on high dose fent/prop gtt. Propofol was weaned off and changed to precedex gtt.    Neuro: Post operative pain, Anxiety  - wean fent gtt, switch to bolus, PCA in AM  - c/w olanzapine, lyrica  - Valium 5mg q6h/prn for anxiety & dressing changes  - Planned to start methadone 10mg TID however pt with recent demand ischemia, not tolerated PO, and has prolonged QTc  - cont to f/u palliative rec's    Resp: s/p Trach  - Concern for aspiration pneumonia vs pneumonitis; bedside US 11/29: B lines bilateral bases L > R, treated with abx  - Airway repacked by ENT 12/4, will change q3-4 days (otherwise not to be removed)  - Mode: AC/ CMV (Assist Control/ Continuous Mandatory Ventilation), RR (machine): 20, TV (machine): 500, FiO2: 40, PEEP: 5  - Trach collar trials as tolerated    CV: Hx HTN  - HR, BP currently stable off pressors  - On Home Coreg with holding parameters  - concern for NSTEMI - likely demand ischemia, trops downtrended  - bedside echo 11/27: markedly decreased EF, formal echo EF 27% (from 40-45% prior)  - major contraindications to both anticoagulation therapy and cath  - Cardiology recs: treat conservatively, strict I&Os, when deemed safe start ASA 81 mg, continue b-blocker    GI:   - TFs at goal, Jevity @70 by PEG (placed 10/31)  - Rectal tube in place    Renal:  - condom cath  - replete lytes PRN    Heme:   - SCDs for now, hold off chemical DVT ppx  - Trend H/H    ID:   - CTM Tmax, WBC  - Most recent BCx growing coag negative staph - not given abx given DNR  - Aspiration episode 11/27, tachy and febrile, DNR reversed, started on abx for aspiration pneumonia vs. 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 Was recovering well but began to have recurrence of oral bleeding in setting of critical airway. SICU consulted  given bleeding for airway management. Patient made DNR/comfort however patient aspirated 11/28 into trach and packing, desaturated, and became agitated. Wife was upset and decided to rescind the DNR/CMO. Packing was replaced by ENT at bedside, no significant bleeding. Vanc and zosyn started for empiric coverage of suspected pneumonia. Same day EKG showed V4-6 T wave inversions, along with other nonspecific findings different from prior EKG one week ago suggestive of NSTEMI. Given contraindication to AC as well as poor prognosis precluding cath lab, trops were sent and bedside echo showed markedly decreased EF and SV. Cardiology consulted. Off levo, still on high dose fent/prop gtt. Propofol was weaned off and changed to precedex gtt.    Neuro: Post operative pain, Anxiety  - wean fent gtt & bolus as needed, PCA in AM. Cont fent patch. Off precedex. Goal is to wean off fentanyl gtt completely by end of day.  - c/w olanzapine, lyrica  - Valium & Xanax PRN for anxiety & dressing changes.  - Planned to start methadone 10mg TID however pt with recent demand ischemia, not tolerated PO, and has prolonged QTc  - cont to f/u palliative rec's    Resp: s/p Trach  - Concern for aspiration pneumonia vs pneumonitis; bedside US 11/29: B lines bilateral bases L > R, treated with abx  - Airway repacked by ENT 12/4, will change q3-4 days (otherwise not to be removed)  - Mode: AC/ CMV (Assist Control/ Continuous Mandatory Ventilation), RR (machine): 20, TV (machine): 500, FiO2: 40, PEEP: 5  - CPAP trials  - had mucous plug this AM - breathing much improved after suctioning    CV: Hx HTN  - HR, BP currently stable off pressors  - On Home Coreg with holding parameters  - NSTEMI 2/2 to demand ischemia, trops downtrended  - bedside echo 11/27: markedly decreased EF, formal echo EF 27% (from 40-45% prior)  - major contraindications to both anticoagulation therapy and cath  - Cardiology recs: treat conservatively, strict I&Os, when deemed safe start ASA 81 mg, continue b-blocker    GI:   - TFs at goal, Jevity @70 by PEG (placed 10/31)  - Rectal tube in place    Renal:  - condom cath  - replete lytes PRN    Heme:   - SCDs for now, hold off chemical DVT ppx  - Trend H/H    ID:   - CTM Tmax, WBC  - Most recent BCx growing coag negative staph - not given abx given DNR  - Aspiration episode 11/27, tachy and febrile, DNR reversed, started on abx for aspiration pneumonia vs. pneumonitis  - c/w Clinda (12/2-) in setting of oral packing  - Blood cultures NGTD- 72 hrs   - Tylenol PRN for fevers    Endo:  - No issues    Patient FULL CODE again as of 11/27    Dispo: SICU      Critical care diagnosis: requires airway management, aspiration pneumonia, severe sepsis, severe protein calorie malnutrition, severe muscle hypotrophy, temporal wasting

## 2018-12-07 PROCEDURE — 99291 CRITICAL CARE FIRST HOUR: CPT

## 2018-12-07 RX ADMIN — Medication 1 DROP(S): at 05:20

## 2018-12-07 RX ADMIN — HYDROMORPHONE HYDROCHLORIDE 4 MG/HR: 2 INJECTION INTRAMUSCULAR; INTRAVENOUS; SUBCUTANEOUS at 07:38

## 2018-12-07 RX ADMIN — HYDROMORPHONE HYDROCHLORIDE 4 MG/HR: 2 INJECTION INTRAMUSCULAR; INTRAVENOUS; SUBCUTANEOUS at 22:49

## 2018-12-07 RX ADMIN — Medication 100 MILLIGRAM(S): at 05:20

## 2018-12-07 RX ADMIN — HYDROMORPHONE HYDROCHLORIDE 4 MG/HR: 2 INJECTION INTRAMUSCULAR; INTRAVENOUS; SUBCUTANEOUS at 05:21

## 2018-12-07 RX ADMIN — FENTANYL CITRATE 1 PATCH: 50 INJECTION INTRAVENOUS at 18:37

## 2018-12-07 RX ADMIN — FENTANYL CITRATE 1 PATCH: 50 INJECTION INTRAVENOUS at 06:20

## 2018-12-07 RX ADMIN — HYDROMORPHONE HYDROCHLORIDE 4 MG/HR: 2 INJECTION INTRAMUSCULAR; INTRAVENOUS; SUBCUTANEOUS at 13:48

## 2018-12-07 RX ADMIN — HYDROMORPHONE HYDROCHLORIDE 4 MG/HR: 2 INJECTION INTRAMUSCULAR; INTRAVENOUS; SUBCUTANEOUS at 05:20

## 2018-12-07 RX ADMIN — MIDAZOLAM HYDROCHLORIDE 9 MG/KG/HR: 1 INJECTION, SOLUTION INTRAMUSCULAR; INTRAVENOUS at 22:49

## 2018-12-07 RX ADMIN — MIDAZOLAM HYDROCHLORIDE 9 MG/KG/HR: 1 INJECTION, SOLUTION INTRAMUSCULAR; INTRAVENOUS at 07:38

## 2018-12-07 RX ADMIN — HYDROMORPHONE HYDROCHLORIDE 4 MG/HR: 2 INJECTION INTRAMUSCULAR; INTRAVENOUS; SUBCUTANEOUS at 02:39

## 2018-12-07 RX ADMIN — Medication 2 MILLIGRAM(S): at 15:01

## 2018-12-07 RX ADMIN — OLANZAPINE 5 MILLIGRAM(S): 15 TABLET, FILM COATED ORAL at 13:52

## 2018-12-07 NOTE — PROGRESS NOTE ADULT - SUBJECTIVE AND OBJECTIVE BOX
Pt seen and examined this AM.     Yesterday made DNR, CMO and taken off the vent per his own wishes.    NAD, awake and alert  6LPC trach in place, off the vent   NC: mass, no anterior bleeding  Oc/op: kerlix in place, no pooling  Neck soft and flat, stoma intact     A/P  58M w/ rapidly growing R maxillary sinus invasive scc s/p awake tracheotomy 11/19 for airway protection w/ oral packing in place due to continued bleeding from the tumor. No curative or palliative surgical options. Now DNR/CMO.   -DNR/CMO  -maintain oral packing in place  -abx per SICU based on GOC (risk of TTS if not on abx while packing is in place)  -PEG feeds per SICU based on GOC

## 2018-12-07 NOTE — PROGRESS NOTE ADULT - SUBJECTIVE AND OBJECTIVE BOX
SICU Daily Progress Note  =====================================================  Interval/Overnight Events:  Family re-instated DNR and family discussion was held. Pt to best og his ability given his degree of sedation, has expressed to them that he does not want to prolong his current state, which seems to be causing him marked discomfort absent high doses of intravenous opioids and benzos, which as a result likely make him ventilator dependent. Family members agrees that these are his wishes and would like to withdraw ventilator support. Family was counseled extensively on what to expect in terms of clinical course with this approach, and they are agreeable. 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 Was recovering well but began to have recurrence of oral bleeding in setting of critical airway. SICU consulted  given bleeding for airway management. Patient made DNR/comfort however patient aspirated 11/28 into trach and packing, desaturated, and became agitated. Wife was upset and decided to rescind the DNR/CMO. Packing was replaced by ENT at bedside, no significant bleeding. Vanc and zosyn started for empiric coverage of suspected pneumonia. Same day EKG showed V4-6 T wave inversions, along with other nonspecific findings different from prior EKG one week ago suggestive of NSTEMI. Given contraindication to AC as well as poor prognosis precluding cath lab, trops were sent and bedside echo showed markedly decreased EF and SV. Off levo, still on high dose fent/prop gtt. Propofol was weaned off and changed to precedex gtt.          Allergies: hospital socks (Rash)  lisinopril (Anaphylaxis)  statins (Anaphylaxis)      MEDICATIONS:   --------------------------------------------------------------------------------------  Neurologic Medications  ALPRAZolam 0.5 milliGRAM(s) Oral at bedtime  diazepam    Tablet 2 milliGRAM(s) Oral every 2 hours PRN agitation  fentaNYL    Injectable 50 MICROGram(s) IV Push every 2 hours PRN moderate to severe pain  fentaNYL   Patch 100 MICROgram(s)/Hr. 1 Patch Transdermal every 48 hours  HYDROmorphone  Injectable 2 milliGRAM(s) IV Push every 1 hour PRN Severe Pain (7 - 10)  HYDROmorphone Infusion 4 mG/Hr IV Continuous <Continuous>  LORazepam   Injectable 2 milliGRAM(s) IV Push every 1 hour PRN comfort, titrate to respiratory rate < than 16  midazolam Infusion 0.12 mG/kG/Hr IV Continuous <Continuous>  OLANZapine 5 milliGRAM(s) Oral daily  pregabalin 75 milliGRAM(s) Oral two times a day    Respiratory Medications    Cardiovascular Medications  carvedilol 6.25 milliGRAM(s) Oral every 12 hours    Gastrointestinal Medications  bisacodyl Suppository 10 milliGRAM(s) Rectal daily PRN Constipation  pantoprazole   Suspension 40 milliGRAM(s) Oral daily    Genitourinary Medications    Hematologic/Oncologic Medications    Antimicrobial/Immunologic Medications  clindamycin IVPB      clindamycin IVPB 900 milliGRAM(s) IV Intermittent every 8 hours    Endocrine/Metabolic Medications    Topical/Other Medications  artificial  tears Solution 1 Drop(s) Both EYES every 12 hours  chlorhexidine 4% Liquid 1 Application(s) Topical <User Schedule>    --------------------------------------------------------------------------------------  ICU Vital Signs Last 24 Hrs  T(C): 36 (07 Dec 2018 00:00), Max: 36.8 (06 Dec 2018 16:00)  T(F): 96.8 (07 Dec 2018 00:00), Max: 98.3 (06 Dec 2018 16:00)  HR: 124 (07 Dec 2018 00:10) (97 - 136)  BP: 87/65 (06 Dec 2018 20:00) (80/55 - 174/108)  BP(mean): 69 (06 Dec 2018 20:00) (60 - 124)  ABP: --  ABP(mean): --  RR: 9 (07 Dec 2018 00:00) (9 - 36)  SpO2: 97% (07 Dec 2018 00:10) (97% - 100%)    --------------------------------------------------------------------------------------  I&O's Detail    05 Dec 2018 07:01  -  06 Dec 2018 07:00  --------------------------------------------------------  IN:    dexmedetomidine Infusion: 95.8 mL    Enteral Tube Flush: 390 mL    fentaNYL Infusion.: 285 mL    fentaNYL Infusion.: 465 mL    IV PiggyBack: 100 mL    ns in tub fed  ryrydc76: 1260 mL  Total IN: 2595.8 mL    OUT:    Incontinent per Condom Catheter: 2915 mL    Indwelling Catheter - Urethral: 570 mL  Total OUT: 3485 mL    Total NET: -889.2 mL      06 Dec 2018 07:01  -  07 Dec 2018 02:28  --------------------------------------------------------  IN:    fentaNYL Infusion.: 60 mL    HYDROmorphone  Infusion: 150 mL    IV PiggyBack: 100 mL    midazolam Infusion: 55 mL    midazolam Infusion: 37 mL    midazolam Infusion: 30 mL  Total IN: 432 mL    OUT:  Total OUT: 0 mL    Total NET: 432 mL    --------------------------------------------------------------------------------------    EXAM  NEUROLOGY  Exam: on comfort care, sleeping     HEENT   Intraoral packing in place, malodorous    RESPIRATORY  Exam: unlabored, clear to auscultation bilaterally.   s/p tracheotomy, off vent    CARDIOVASCULAR  S1 S2 heard , RRR    GI/NUTRITION  Soft , non distended , non tender   Diet : NPO / PEG      METABOLIC/FLUIDS/ELECTROLYTES      HEMATOLOGIC  [x] VTE Prophylaxis:     INFECTIOUS DISEASE  Antimicrobials/Immunologic Medications:  clindamycin IVPB      clindamycin IVPB 900 milliGRAM(s) IV Intermittent every 8 hours      Tubes/Lines/Drains    [x] Peripheral IV  [x] Necessity of urinary, arterial, and venous catheters discussed    LABS  --------------------------------------------------------------------------------------  CBC (12-06 @ 05:00)                              10.7<L>                         12.65<H>  )----------------(  558<H>     --    % Neutrophils, --    % Lymphocytes, ANC: --                                  33.1<L>  CBC (12-06 @ 02:20)                              10.4<L>                         13.82<H>  )----------------(  591<H>     --    % Neutrophils, --    % Lymphocytes, ANC: --                                  32.8<L>    BMP (12-06 @ 02:20)             130<L>  |  92<L>   |  6<L>  		Ca++ --      Ca 8.9                ---------------------------------( 100<H>		Mg 1.7                4.0     |  28      |  0.45<L>			Ph 3.1             ABG (12-06 @ 06:40)     7.42 / 46 / 131<H> / 28<H> / 4.7 / 98.8%     Lactate: 1.2       --------------------------------------------------------------------------------------  ASSESSMENT:  59 y/o M with Hx CHF (EF 40-45%), 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  - cont to f/u palliative rec's    Resp: s/p Trach  - off vent, trach collar, comfort care    CV: Hx HTN  - off pressors  - comfort care    GI:   - comfort care    Renal:  - condom cath  - replete lytes PRN    Heme:   - SCDs for now, hold off chemical DVT ppx    Dispo: SICU, comfort care    --------------------------------------------------------------------------------------

## 2018-12-08 PROCEDURE — 99291 CRITICAL CARE FIRST HOUR: CPT

## 2018-12-08 RX ORDER — FENTANYL CITRATE 50 UG/ML
5 INJECTION INTRAVENOUS
Qty: 2500 | Refills: 0 | Status: DISCONTINUED | OUTPATIENT
Start: 2018-12-08 | End: 2018-12-09

## 2018-12-08 RX ORDER — FENTANYL CITRATE 50 UG/ML
5 INJECTION INTRAVENOUS
Qty: 5000 | Refills: 0 | Status: DISCONTINUED | OUTPATIENT
Start: 2018-12-08 | End: 2018-12-08

## 2018-12-08 RX ADMIN — CHLORHEXIDINE GLUCONATE 1 APPLICATION(S): 213 SOLUTION TOPICAL at 11:26

## 2018-12-08 RX ADMIN — FENTANYL CITRATE 37.5 MICROGRAM(S)/KG/HR: 50 INJECTION INTRAVENOUS at 21:03

## 2018-12-08 NOTE — PROGRESS NOTE ADULT - SUBJECTIVE AND OBJECTIVE BOX
Pt seen and examined.   remains on trach collar. sedated 2/2 severe pain.     NAD, awake and alert  6LPC trach in place, on TC  NC: mass, no anterior bleeding  Oc/op: kerlix in place, no pooling  Neck soft and flat, stoma intact     A/P  58M w/ rapidly growing R maxillary sinus invasive scc s/p awake tracheotomy 11/19 for airway protection w/ oral packing in place due to continued bleeding from the tumor. No curative or palliative surgical options. Now DNR/CMO.   -DNR/CMO  -maintain oral packing in place  -dr lazo to see pt today  -abx per SICU based on GOC (risk of TTS if not on abx while packing is in place)  -PEG feeds per SICU based on GOC

## 2018-12-08 NOTE — PROGRESS NOTE ADULT - SUBJECTIVE AND OBJECTIVE BOX
SICU Daily Progress Note  =====================================================  Interval/Overnight Events:   12/8/18: Pt remains on Dilaudid gtt and Versed gtt .       12/7/18   Family re-instated DNR and family discussion was held. Pt to best og his ability given his degree of sedation, has expressed to them that he does not want to prolong his current state, which seems to be causing him marked discomfort absent high doses of intravenous opioids and benzos, which as a result likely make him ventilator dependent. Family members agrees that these are his wishes and would like to withdraw ventilator support. Family was counseled extensively on what to expect in terms of clinical course with this approach, and they are agreeable. 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 Was recovering well but began to have recurrence of oral bleeding in setting of critical airway. SICU consulted  given bleeding for airway management. Patient made DNR/comfort however patient aspirated 11/28 into trach and packing, desaturated, and became agitated. Wife was upset and decided to rescind the DNR/CMO. Packing was replaced by ENT at bedside, no significant bleeding. Vanc and zosyn started for empiric coverage of suspected pneumonia. Same day EKG showed V4-6 T wave inversions, along with other nonspecific findings different from prior EKG one week ago suggestive of NSTEMI. Given contraindication to AC as well as poor prognosis precluding cath lab, trops were sent and bedside echo showed markedly decreased EF and SV. Off levo, still on high dose fent/prop gtt. Propofol was weaned off and changed to precedex gtt.          Allergies: hospital socks (Rash)  lisinopril (Anaphylaxis)  statins (Anaphylaxis)      MEDICATIONS:   --------------------------------------------------------------------------------------  Neurologic Medications:  fentaNYL    Injectable 50 MICROGram(s) IV Push every 2 hours PRN  HYDROmorphone  Injectable 2 milliGRAM(s) IV Push every 1 hour PRN  HYDROmorphone Infusion 4 mG/Hr IV Continuous <Continuous>  LORazepam   Injectable 2 milliGRAM(s) IV Push every 1 hour PRN  midazolam Infusion 0.12 mG/kG/Hr IV Continuous <Continuous>    Respiratory Medications:    Cardiovascular Medications:    Gastrointestinal Medications:    Genitourinary Medications:    Hematologic/Oncologic Medications:    Antimicrobials/Immunologic Medications:    Endocrine/Metabolic Medications:    Topical/Other Medications:  chlorhexidine 4% Liquid 1 Application(s) Topical <User Schedule>      --------------------------------------------------------------------------------------  ICU Vital Signs Last 24 Hrs    T(C): 37.3 (12-07-18 @ 20:00), Max: 37.8 (12-07-18 @ 08:05)  HR: 117 (12-08-18 @ 00:00) (115 - 149)  BP: 83/58 (12-07-18 @ 20:00) (47/27 - 98/64)  BP(mean): 64 (12-07-18 @ 20:00) (64 - 64)  ABP: --  ABP(mean): --  RR: 12 (12-08-18 @ 00:00) (10 - 20)  SpO2: 89% (12-08-18 @ 00:00) (86% - 99%)  Wt(kg): --  CVP(mm Hg): --  CI: --  CAPILLARY BLOOD GLUCOSE       N/A      12-06 @ 07:01  -  12-07 @ 07:00  --------------------------------------------------------  IN:    fentaNYL Infusion.: 60 mL    HYDROmorphone  Infusion: 360 mL    IV PiggyBack: 100 mL    midazolam Infusion: 37 mL    midazolam Infusion: 135 mL    midazolam Infusion: 55 mL  Total IN: 747 mL    OUT:  Total OUT: 0 mL    Total NET: 747 mL      12-07 @ 07:01  -  12-08 @ 02:01  --------------------------------------------------------  IN:    HYDROmorphone  Infusion: 540 mL    midazolam Infusion: 270 mL  Total IN: 810 mL    OUT:  Total OUT: 0 mL    Total NET: 810 mL             --------------------------------------------------------------------------------------    EXAM  NEUROLOGY  Exam: on comfort care, sleeping     HEENT   Intraoral packing in place, malodorous    RESPIRATORY  Exam: unlabored, clear to auscultation bilaterally.   s/p trach collar     CARDIOVASCULAR  S1 S2 heard , RRR    GI/NUTRITION  Soft , non distended , non tender   Diet : NPO / PEG( on hold Tube feeds )       METABOLIC/FLUIDS/ELECTROLYTES      HEMATOLOGIC  [x] VTE Prophylaxis:     INFECTIOUS DISEASE  Antimicrobials/Immunologic Medications:  clindamycin IVPB      clindamycin IVPB 900 milliGRAM(s) IV Intermittent every 8 hours      Tubes/Lines/Drains    [x] Peripheral IV  [x] Necessity of urinary, arterial, and venous catheters discussed    LABS  --------------------------------------------------------------------------------------  CBC (12-06 @ 05:00)                              10.7<L>                         12.65<H>  )----------------(  558<H>     --    % Neutrophils, --    % Lymphocytes, ANC: --                                  33.1<L>  CBC (12-06 @ 02:20)                              10.4<L>                         13.82<H>  )----------------(  591<H>     --    % Neutrophils, --    % Lymphocytes, ANC: --                                  32.8<L>    BMP (12-06 @ 02:20)             130<L>  |  92<L>   |  6<L>  		Ca++ --      Ca 8.9                ---------------------------------( 100<H>		Mg 1.7                4.0     |  28      |  0.45<L>			Ph 3.1             ABG (12-06 @ 06:40)     7.42 / 46 / 131<H> / 28<H> / 4.7 / 98.8%     Lactate: 1.2       --------------------------------------------------------------------------------------  ASSESSMENT:  57 y/o M with Hx CHF (EF 40-45%), 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  - cont to f/u palliative rec's    Resp: s/p Trach  - off vent, trach collar, comfort care    CV: Hx HTN  - off pressors  - comfort care    GI:   - comfort care    Renal:  - condom cath  - replete lytes PRN    Heme:   - SCDs for now, hold off chemical DVT ppx    Dispo: SICU, comfort care    COde Status: DNR   --------------------------------------------------------------------------------------

## 2018-12-09 PROCEDURE — 99291 CRITICAL CARE FIRST HOUR: CPT

## 2018-12-09 RX ADMIN — HYDROMORPHONE HYDROCHLORIDE 4 MG/HR: 2 INJECTION INTRAMUSCULAR; INTRAVENOUS; SUBCUTANEOUS at 00:11

## 2018-12-09 RX ADMIN — FENTANYL CITRATE 37.5 MICROGRAM(S)/KG/HR: 50 INJECTION INTRAVENOUS at 10:51

## 2018-12-09 RX ADMIN — FENTANYL CITRATE 37.5 MICROGRAM(S)/KG/HR: 50 INJECTION INTRAVENOUS at 15:46

## 2018-12-09 RX ADMIN — MIDAZOLAM HYDROCHLORIDE 9 MG/KG/HR: 1 INJECTION, SOLUTION INTRAMUSCULAR; INTRAVENOUS at 15:46

## 2018-12-09 RX ADMIN — HYDROMORPHONE HYDROCHLORIDE 4 MG/HR: 2 INJECTION INTRAMUSCULAR; INTRAVENOUS; SUBCUTANEOUS at 10:51

## 2018-12-09 RX ADMIN — MIDAZOLAM HYDROCHLORIDE 9 MG/KG/HR: 1 INJECTION, SOLUTION INTRAMUSCULAR; INTRAVENOUS at 00:11

## 2018-12-09 RX ADMIN — FENTANYL CITRATE 37.5 MICROGRAM(S)/KG/HR: 50 INJECTION INTRAVENOUS at 00:11

## 2018-12-09 RX ADMIN — MIDAZOLAM HYDROCHLORIDE 9 MG/KG/HR: 1 INJECTION, SOLUTION INTRAMUSCULAR; INTRAVENOUS at 10:52

## 2018-12-09 RX ADMIN — HYDROMORPHONE HYDROCHLORIDE 4 MG/HR: 2 INJECTION INTRAMUSCULAR; INTRAVENOUS; SUBCUTANEOUS at 15:47

## 2018-12-09 NOTE — PROGRESS NOTE ADULT - SUBJECTIVE AND OBJECTIVE BOX
Pt seen and examined. remains on trach collar. sedated 2/2 severe pain. Sats slowly decreasing    NAD, awake and alert  6LPC trach in place, on TC  NC: mass, no anterior bleeding  Oc/op: kerlix in place, no pooling  Neck soft and flat, stoma intact     A/P  58M w/ rapidly growing R maxillary sinus invasive scc s/p awake tracheotomy 11/19 for airway protection w/ oral packing in place due to continued bleeding from the tumor. No curative or palliative surgical options. Now DNR/CMO.   -DNR/CMO  -maintain oral packing in place  -abx per SICU based on GOC (risk of TTS if not on abx while packing is in place)  -PEG feeds per SICU based on GOC

## 2018-12-09 NOTE — PROGRESS NOTE ADULT - SUBJECTIVE AND OBJECTIVE BOX
SICU Daily Progress Note  =====================================================  Interval/Overnight Events:   12/8/18-12/9/2018: Pt remains on Dilaudid gtt and Versed gtt. Switched to fentanyl drip in the after    12/7/18   Family re-instated DNR and family discussion was held. Pt to best og his ability given his degree of sedation, has expressed to them that he does not want to prolong his current state, which seems to be causing him marked discomfort absent high doses of intravenous opioids and benzos, which as a result likely make him ventilator dependent. Family members agrees that these are his wishes and would like to withdraw ventilator support. Family was counseled extensively on what to expect in terms of clinical course with this approach, and they are agreeable. 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 Was recovering well but began to have recurrence of oral bleeding in setting of critical airway. SICU consulted  given bleeding for airway management. Patient made DNR/comfort however patient aspirated 11/28 into trach and packing, desaturated, and became agitated. Wife was upset and decided to rescind the DNR/CMO. Packing was replaced by ENT at bedside, no significant bleeding. Vanc and zosyn started for empiric coverage of suspected pneumonia. Same day EKG showed V4-6 T wave inversions, along with other nonspecific findings different from prior EKG one week ago suggestive of NSTEMI. Given contraindication to AC as well as poor prognosis precluding cath lab, trops were sent and bedside echo showed markedly decreased EF and SV. Off levo, still on high dose fent/prop gtt. Propofol was weaned off and changed to precedex gtt.          Allergies: hospital socks (Rash)  lisinopril (Anaphylaxis)  statins (Anaphylaxis)      MEDICATIONS:   --------------------------------------------------------------------------------------  Neurologic Medications:  fentaNYL    Injectable 50 MICROGram(s) IV Push every 2 hours PRN  HYDROmorphone  Injectable 2 milliGRAM(s) IV Push every 1 hour PRN  HYDROmorphone Infusion 4 mG/Hr IV Continuous <Continuous>  LORazepam   Injectable 2 milliGRAM(s) IV Push every 1 hour PRN  midazolam Infusion 0.12 mG/kG/Hr IV Continuous <Continuous>    Respiratory Medications:    Cardiovascular Medications:    Gastrointestinal Medications:    Genitourinary Medications:    Hematologic/Oncologic Medications:    Antimicrobials/Immunologic Medications:    Endocrine/Metabolic Medications:    Topical/Other Medications:  chlorhexidine 4% Liquid 1 Application(s) Topical <User Schedule>      --------------------------------------------------------------------------------------               --------------------------------------------------------------------------------------  T(C): 38.1 (12-08-18 @ 20:00), Max: 38.1 (12-08-18 @ 20:00)  HR: 151 (12-08-18 @ 20:00) (111 - 151)  BP: 107/70 (12-08-18 @ 20:00) (98/66 - 107/70)  RR: 22 (12-08-18 @ 20:00) (10 - 22)  SpO2: 54% (12-08-18 @ 20:00) (54% - 89%)  Wt(kg): --  12-07 @ 07:01  -  12-08 @ 07:00  --------------------------------------------------------  IN:    HYDROmorphone  Infusion: 720 mL    midazolam Infusion: 360 mL  Total IN: 1080 mL    OUT:    Incontinent per Condom Catheter: 25 mL  Total OUT: 25 mL    Total NET: 1055 mL      12-08 @ 07:01  -  12-09 @ 01:03  --------------------------------------------------------  IN:    fentaNYL Infusion.: 187.5 mL    HYDROmorphone  Infusion: 60 mL    midazolam Infusion: 75 mL  Total IN: 322.5 mL    OUT:    Incontinent per Condom Catheter: 200 mL  Total OUT: 200 mL    Total NET: 122.5 mL        EXAM  NEUROLOGY  Exam: on comfort care, sleeping     HEENT   Intraoral packing in place, malodorous    RESPIRATORY  Exam: unlabored, clear to auscultation bilaterally.   s/p trach collar     CARDIOVASCULAR  S1 S2 heard , RRR    GI/NUTRITION  Soft , non distended , non tender   Diet : NPO / PEG( on hold Tube feeds )       METABOLIC/FLUIDS/ELECTROLYTES      HEMATOLOGIC  [x] VTE Prophylaxis:     INFECTIOUS DISEASE  Antimicrobials/Immunologic Medications:  clindamycin IVPB      clindamycin IVPB 900 milliGRAM(s) IV Intermittent every 8 hours      Tubes/Lines/Drains    [x] Peripheral IV  [x] Necessity of urinary, arterial, and venous catheters discussed    LABS  --------------------------------------------------------------------------------------  CBC (12-06 @ 05:00)                              10.7<L>                         12.65<H>  )----------------(  558<H>     --    % Neutrophils, --    % Lymphocytes, ANC: --                                  33.1<L>  CBC (12-06 @ 02:20)                              10.4<L>                         13.82<H>  )----------------(  591<H>     --    % Neutrophils, --    % Lymphocytes, ANC: --                                  32.8<L>    BMP (12-06 @ 02:20)             130<L>  |  92<L>   |  6<L>  		Ca++ --      Ca 8.9                ---------------------------------( 100<H>		Mg 1.7                4.0     |  28      |  0.45<L>			Ph 3.1             ABG (12-06 @ 06:40)     7.42 / 46 / 131<H> / 28<H> / 4.7 / 98.8%     Lactate: 1.2       --------------------------------------------------------------------------------------  ASSESSMENT:  57 y/o M with Hx CHF (EF 40-45%), 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  - cont to f/u palliative rec's    Resp: s/p Trach  - off vent, trach collar, comfort care    CV: Hx HTN  - off pressors  - comfort care    GI:   - comfort care    Renal:  - condom cath  - comfort care    Heme:   - SCDs for now, hold off chemical DVT ppx    Dispo: SICU, comfort care    Code Status: DNR   --------------------------------------------------------------------------------------

## 2018-12-09 NOTE — PROGRESS NOTE ADULT - REASON FOR ADMISSION
SICU team
worsening dyspnea, invasive sinus cancer
right maxillary sinus SCC

## 2018-12-09 NOTE — PROGRESS NOTE ADULT - PROVIDER SPECIALTY LIST ADULT
Anesthesia
ENT
Heme/Onc
Heme/Onc
Palliative Care
SICU

## 2018-12-09 NOTE — DISCHARGE NOTE FOR THE EXPIRED PATIENT - HOSPITAL COURSE
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 Was recovering well but, began to have recurrence of oral bleeding in setting of critical airway. SICU consulted given bleeding for airway management. Patient made DNR/comfort however patient aspirated  into trach and packing, desaturated, and became agitated. Wife was upset and decided to rescind the DNR/CMO. Packing was replaced by ENT at bedside, no significant bleeding. Vanc and zosyn started for empiric coverage of suspected pneumonia. Same day EKG showed V4-6 T wave inversions, along with other nonspecific findings different from prior EKG one week ago suggestive of NSTEMI. Given contraindication to AC as well as poor prognosis precluding cath lab, trops were sent and bedside echo showed markedly decreased EF and SV. Off levo, still on high dose fent/prop gtt. Propofol was weaned off and changed to precedex gtt. Propofol was weaned off and changed to precedex gtt. Precedex titrated to off. Anxiolysis with benzodiazepines, fentanyl drip titrated down, fentanyl boluses to substitute as needed. Family re-instated DNR and family discussion was held. Pt to best of his ability given his degree of sedation, has expressed to them that he does not want to prolong his current state, which seems to be causing him marked discomfort absent high doses of intravenous opioids and benzos, which as a result likely make him ventilator dependent. Family members agrees that these are his wishes and would like to withdraw ventilator support. Family was counseled extensively on what to expect in terms of clinical course with this approach, and they are agreeable. Instructed family once pt is taken off the ventilator, supportive care will be given. -: Pt remains on Dilaudid gtt and Versed gtt . Switched to fentanyl drip in the afternoon. Last this evening, pt went to asystole and  at 7:06pm. [facility] was notified and has stated they will not been taking the case. Pt's family has stated they would not like an autopsy done. Admitted will be made aware.

## 2018-12-09 NOTE — PROGRESS NOTE ADULT - ATTENDING COMMENTS
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  Patient is CMO as per family wishes  J96.90 Respiratory failure, unspecified chronicity, unspecified whether with hypoxia or hypercapnia   - no further escalation of care.  CMO  G89.3 Neoplasm related pain   - on dilaudid infusion as well as versed infusion  Z91.89 At risk for malnutrition   - patient is CMO
I have personally interviewed and examined this patient, reviewed pertinent labs and imaging, and discussed the case with colleagues, residents, physician assistants, and nurses on SICU rounds.    30    minutes in total were spent in providing direct critical care for the diagnoses, assessment and plan outlined below.  These diagnoses are unrelated to the surgical procedure.  Additionally, time spent in the performance of separately billable procedures was not counted toward the critical care time.  There is no overlap.    The active critical care issues are:  1.    Plan  neuro: sedation with propofol and fentanyl, goal rass -1-2  cards: hemodynamically stable, carvedilol with hold parameters  pulm: continue ventilatory support while sedated, primary team facilitating 2nd opinion re: treatment options  gi: will clarify with primary team if ok to restart feeds, ppi while on ventilator  heme: check q6hr cbc until stable, if bleeding, call ENT  gu: monitor urine output, hyponatremia resolving  ID: ancef per primary team  endo: will add dextrose to NS  proph: SCDS
The patient is a critical care patient with hemodynamic and metabolic instability in SICU.  I have personally interviewed and examined this patient, reviewed labs and x-rays, discussed with other consultants, House staff and PA's.  I spent   35  minutes  in total providing critical care for the diagnoses, assessment and plan above.  These diagnoses are unrelated to the surgical procedure noted above.  I met with family   10  min to get further history and make care decisions for this patient who is unable to participate due to altered mental status.  Time involved in performance of separately billable procedures was not counted toward my critical care time.  There is no overlap.    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  Off ventilator, on TC, with fentanyl and versed infusions  J96.90 Respiratory failure, unspecified chronicity, unspecified whether with hypoxia or hypercapnia   - as above  Z91.89 At risk for malnutrition   - as above
The patient is a critical care patient with hemodynamic and metabolic instability in SICU.  I have personally interviewed and examined this patient, reviewed labs and x-rays, discussed with other consultants, House staff and PA's.  I spent  35   minutes  in total providing critical care for the diagnoses, assessment and plan above.  These diagnoses are unrelated to the surgical procedure noted above.  I met with family   15  min to get further history and make care decisions for this patient who is unable to participate due to altered mental status.  Time involved in performance of separately billable procedures was not counted toward my critical care time.  There is no overlap.    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  His cancer is unresectable and the bleeding is not amenable to IR embolization.  Goals of care discussed at length with patient's wife (patient is currently under deep sedation).  Plan moving forward is comfort measures only.  D62 Acute post-hemorrhagic anemia   - stable exam with packing in place.  No further plans for blood draws  J96.90 Respiratory failure, unspecified chronicity, unspecified whether with hypoxia or hypercapnia   - secondary to impaired ventilatory drive.  stable gas exchange on current settings  Z91.89 At risk for malnutrition   - on TFs
The patient is a critical care patient with hemodynamic and metabolic instability in SICU.  I have personally interviewed and examined this patient, reviewed labs and x-rays, discussed with other consultants, House staff and PA's.  I spent  35   minutes  in total providing critical care for the diagnoses, assessment and plan above.  These diagnoses are unrelated to the surgical procedure noted above.  Time involved in performance of separately billable procedures was not counted toward my critical care time.  There is no overlap.    Current Issues:  J96.91 Respiratory failure with hypoxia, unspecified chronicity   - sats currently in 50's.  No further escalation of care planned.  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  Plan for CMO presently  G89.3 Neoplasm related pain   - on dilaudid and versed infusions  Z91.89 At risk for malnutrition   - CMO per family wishes
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 Was recovering well but began to have recurrence of oral bleeding in setting of critical airway. SICU consulted  given bleeding for airway management. Patient made DNR/comfort however patient aspirated 11/28 into trach and packing, desaturated, and became agitated. Wife was upset and decided to rescind the DNR/CMO. Packing was replaced by ENT at bedside, no significant bleeding. Vanc and zosyn started for empiric coverage of suspected pneumonia. Same day EKG showed V4-6 T wave inversions, along with other nonspecific findings different from prior EKG one week ago. Given contraindication to AC as well as poor prognosis precluding cath lab, trops were sent and bedside echo showed markedly decreased EF and SV. Currently on Levophed     Neuro: Post operative pain, Anxiety  - Pt on fentanyl, propofol gtt  - Planned to start methadone 10mg TID however pt with recent demand ischemia, not tolerated PO, and has prolonged QTc    Resp: s/p Trach  - Concern for aspiration pneumonia vs pneumonitis; bedside US 11/29: b lines bilateral bases L > R  - Airway repacked by ENT 11/27, minimal bleeding at that time  - Mode: AC/ CMV (Assist Control/ Continuous Mandatory Ventilation), RR (machine): 20, TV (machine): 500, FiO2: 40, PEEP: 5, MAP: 10, PIP: 19  - For trach collar trial once sedatives have been weaned    CV: Hx HTN  - on levo for hypotension - wean as tolerated  - concern for NSTEMI - likely demand ischemia, trops downtrended  - bedside echo 11/27: markedly decreased EF, formal echo EF 27% (from 40-45% prior)  - major contraindications to both anticoagulation therapy and cath  - holding home coreg; labetalol to be restarted when BP improved    GI:   - TFs at goal, Jevity @70 by PEG (placed 10/31)  - continue with Colace, Senna, suppository PRN    Renal:  - condom cath  - replete lytes PRN    Heme:   - SCDs for now, hold off chemical DVT ppx  - Trend H/H    ID:   - CTM Tmax, WBC  - Most recent BCx growing coag negative staph - not given abx given DNR  - Aspiration episode 11/27, tachy and febrile, DNR resversed, started on abx for aspiration pneumonia vs. pneumonitis  - c/w Vanc / Zosyn (11/27-) - d/c tomorrow  - f/u resp cultures, blood cultures  - Tylenol PRN for fevers    Endo:  - No issues  - Cortisol 11/30 wnl    Patient is now FULL CODE again as of 11/27  Dispo: SICU    Critical care diagnosis: requires airway management, aspiration pneumonia, severe sepsis, requires pressors, severe calorie protein malnutrition, severe muscle hypotrophy, temporal wasting  The patient is a critical care patient with life threatening hemodynamic and metabolic instability in SICU.  I have personally interviewed when possible and examined the patient, reviewed data and laboratory tests/x-rays and all pertinent electronic images.  I was physically present for the key portions of the evaluation and management (E/M) service provided.   The SICU team has a constant risk benefit analyzes discussion with the primary team, all consultants, House Staff and PA's on all decisions.  These diagnoses are unrelated to the surgical procedure noted above.  I meet with family if needed to get further history, discuss the case and make care decisions for this patient who might not be able to participate.  Time involved in performance of separately billable procedures was not counted toward my critical care time. There is no overlap.  I spent 55-75 minutes of critical care time for the diagnoses, assessment, plan and interventions.
History as above. Lengthy family meeting today lasting approx 1 hr. Issues discussed included pt's clinical course to date, current status and ongoing issues and significant morbidity at this time. Have d/w family that active anti-cancer therapy cannot be considered at this time as it is far more likely to lead to significant treatment related morbidity and likely hasten death. With respect to next treatment options should he recover from his current condition, Dr. Loza noted that would include immunotherapeutic options. Pt's wife was insistent that we also send off for TRK fusion testing on his path in light of recent report of findings / activity for larotrectinib in this group of cancers. Dr. Loza and I reiterated that it would not be the next choice of therapy should he recover and be a potential candidate for further therapy but she strongly insisted this be sent off. At this pointy I agreed as it was causing significant distress. The family's questions were answered to the best extent possible. They clearly remain very upset.
The patient is a critical care patient with hemodynamic and metabolic instability in SICU.  I have personally interviewed and examined this patient, reviewed labs and x-rays, discussed with other consultants, House staff and PA's.  I spent   40  minutes  in total providing critical care for the diagnoses, assessment and plan above.  These diagnoses are unrelated to the surgical procedure noted above.  I met with family   15  min to get further history and make care decisions for this patient who is unable to participate due to altered mental status.  Time involved in performance of separately billable procedures was not counted toward my critical care time.  There is no overlap.    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  Grim prognosis.    D62 Acute post-hemorrhagic anemia   - as above  J96.90 Respiratory failure, unspecified chronicity, unspecified whether with hypoxia or hypercapnia   - secondary to impaired ventilatory drive due to high analgesic needs. Otherwise maintaining good gas exchange on current settings  Z91.89 At risk for malnutrition   - on TFs
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 Was recovering well but began to have recurrence of oral bleeding in setting of critical airway. SICU consulted  given bleeding for airway management. Patient made DNR/comfort however patient aspirated 11/28 into trach and packing, desaturated, and became agitated. Wife was upset and decided to rescind the DNR/CMO. Packing was replaced by ENT at bedside, no significant bleeding. Vanc and zosyn started for empiric coverage of suspected pneumonia. Same day EKG showed V4-6 T wave inversions, along with other nonspecific findings different from prior EKG one week ago suggestive of NSTEMI. Given contraindication to AC as well as poor prognosis precluding cath lab, trops were sent and bedside echo showed markedly decreased EF and SV. Cardiology consulted. Off levo, still on high dose fent/prop gtt.    Neuro: Post operative pain, Anxiety  - Pt on fentanyl, propofol gtt  - Planned to start methadone 10mg TID however pt with recent demand ischemia, not tolerated PO, and has prolonged QTc  - Wean sedation as able, currently on Precedex, Propofol and Fentanyl ; optimizing Precdex to wean Prop and Fent     Resp: s/p Trach  - Concern for aspiration pneumonia vs pneumonitis; bedside US 11/29: b lines bilateral bases L > R  - Airway repacked by ENT 11/30, planning to change on Monday again  - Mode: AC/ CMV (Assist Control/ Continuous Mandatory Ventilation), RR (machine): 20, TV (machine): 500, FiO2: 40, PEEP: 5, MAP: 10, PIP: 19  - Trach Collar once off sedation     CV: Hx HTN  - c/w Home anti hypertensives   - concern for NSTEMI - likely demand ischemia, trops downtrended  - bedside echo 11/27: markedly decreased EF, formal echo EF 27% (from 40-45% prior)  - major contraindications to both anticoagulation therapy and cath  - Cardiology recs: treat conservatively, strict Is&Os, consider switch from Zosyn (as zosyn with high salt load), when deemed safe start ASA 81 mg, continue b-blocker    GI:   - TFs at goal, Jevity @70 by PEG (placed 10/31)  - Holding bowel regimen given now having diarrhea   - c/w rectal tube for Strict Is/ Os    Renal:  - condom cath  - replete lytes PRN    Heme:   - SCDs for now, hold off chemical DVT ppx  - Trend H/H    ID:   - Daily CBC,  - Most recent BCx growing coag negative staph - not given abx given DNR  - Aspiration episode 11/27, tachy and febrile, DNR reversed started on abx for aspiration pneumonia vs. pneumonitis  - Vanc / Zosyn (11/27-12/1) completed   - Start Clinda today (12/2-) given oral packing cannot be removed  - Blood cultures NGTD  - Tylenol PRN for fevers    Endo:  - No issues  - Cortisol 11/30 wnl    Patient is now FULL CODE again as of 11/27    Dispo: SICU    FAMILY MEETING MONDAY 10AM with SICU, ENT, ethics, palliative, patient advocacy (contacting teams)    Critical care diagnosis: requires airway management, aspiration pneumonia, severe sepsis, requires pressors, severe calorie protein malnutrition, severe muscle hypotrophy, temporal wasting    The patient is a critical care patient with life threatening hemodynamic and metabolic instability in SICU.  I have personally interviewed when possible and examined the patient, reviewed data and laboratory tests/x-rays and all pertinent electronic images.  I was physically present for the key portions of the evaluation and management (E/M) service provided.   The SICU team has a constant risk benefit analyzes discussion with the primary team, all consultants, House Staff and PA's on all decisions.  These diagnoses are unrelated to the surgical procedure noted above.  I meet with family if needed to get further history, discuss the case and make care decisions for this patient who might not be able to participate.  Time involved in performance of separately billable procedures was not counted toward my critical care time. There is no overlap.  I spent 55-75 minutes of critical care time for the diagnoses, assessment, plan and interventions.
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 Was recovering well but began to have recurrence of oral bleeding in setting of critical airway. SICU consulted  given bleeding for airway management. Patient made DNR/comfort however patient aspirated 11/28 into trach and packing, desaturated, and became agitated. Wife was upset and decided to rescind the DNR/CMO. Packing was replaced by ENT at bedside, no significant bleeding. Vanc and zosyn started for empiric coverage of suspected pneumonia. Same day EKG showed V4-6 T wave inversions, along with other nonspecific findings different from prior EKG one week ago suggestive of NSTEMI. Given contraindication to AC as well as poor prognosis precluding cath lab, trops were sent and bedside echo showed markedly decreased EF and SV. Cardiology consulted. Off levo, still on high dose fent/prop gtt. Propofol was weaned off and changed to precedex gtt.    Neuro: Post operative pain, Anxiety  - Pt on fentanyl and precedex, cont to wean  - c/w olanzapine, lyrica  - Valium 5mg q6h/prn for anxiety & dressing changes  - Planned to start methadone 10mg TID however pt with recent demand ischemia, not tolerated PO, and has prolonged QTc  - cont to f/u palliative rec's    Resp: s/p Trach  - Concern for aspiration pneumonia vs pneumonitis; bedside US 11/29: B lines bilateral bases L > R, treated with abx  - Airway repacked by ENT 12/4, will change q3-4 days (otherwise not to be removed)  - Mode: AC/ CMV (Assist Control/ Continuous Mandatory Ventilation), RR (machine): 20, TV (machine): 500, FiO2: 40, PEEP: 5  - Trach collar trials as tolerated    CV: Hx HTN  - HR, BP currently stable off pressors  - On Home Coreg with holding parameters  - concern for NSTEMI - likely demand ischemia, trops downtrended  - bedside echo 11/27: markedly decreased EF, formal echo EF 27% (from 40-45% prior)  - major contraindications to both anticoagulation therapy and cath  - Cardiology recs: treat conservatively, strict I&Os, when deemed safe start ASA 81 mg, continue b-blocker    GI:   - TFs at goal, Jevity @70 by PEG (placed 10/31)  - Rectal tube in place    Renal:  - condom cath  - replete lytes PRN    Heme:   - SCDs for now, hold off chemical DVT ppx  - Trend H/H    ID:   - CTM Tmax, WBC  - Most recent BCx growing coag negative staph - not given abx given DNR  - Aspiration episode 11/27, tachy and febrile, DNR reversed, started on abx for aspiration pneumonia vs. pneumonitis  - c/w Clinda (12/2-) in setting of oral packing  - Blood cultures NGTD- 72 hrs   - Tylenol PRN for fevers    Endo:  - No issues    Patient FULL CODE again as of 11/27    Dispo: SICU      Critical care diagnosis: requires airway management, aspiration pneumonia, severe sepsis, severe protein calorie malnutrition, severe muscle hypotrophy, temporal wasting    The patient is a critical care patient with life threatening hemodynamic and metabolic instability in SICU.  I have personally interviewed when possible and examined the patient, reviewed data and laboratory tests/x-rays and all pertinent electronic images.  I was physically present for the key portions of the evaluation and management (E/M) service provided.   The SICU team has a constant risk benefit analyzes discussion with the primary team, all consultants, House Staff and PA's on all decisions.  These diagnoses are unrelated to the surgical procedure noted above.  I meet with family if needed to get further history, discuss the case and make care decisions for this patient who might not be able to participate.  Time involved in performance of separately billable procedures was not counted toward my critical care time. There is no overlap.  I spent 55-75 minutes of critical care time for the diagnoses, assessment, plan and interventions.
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 Was recovering well but began to have recurrence of oral bleeding in setting of critical airway. SICU consulted  given bleeding for airway management. Patient made DNR/comfort however patient aspirated 11/28 into trach and packing, desaturated, and became agitated. Wife was upset and decided to rescind the DNR/CMO. Packing was replaced by ENT at bedside, no significant bleeding. Vanc and zosyn started for empiric coverage of suspected pneumonia. Same day EKG showed V4-6 T wave inversions, along with other nonspecific findings different from prior EKG one week ago suggestive of NSTEMI. Given contraindication to AC as well as poor prognosis precluding cath lab, trops were sent and bedside echo showed markedly decreased EF and SV. Cardiology consulted. Off levo, still on high dose fent/prop gtt.    Neuro: Post operative pain, Anxiety  - Pt on fentanyl, propofol gtt  - Planned to start methadone 10mg TID however pt with recent demand ischemia, not tolerated PO, and has prolonged QTc  - Started on precedex, will continue to increase and wean prop/fent as tolerated    Resp: s/p Trach  - Concern for aspiration pneumonia vs pneumonitis; bedside US 11/29: b lines bilateral bases L > R  - Airway repacked by ENT 11/30, planning to change on Monday again  - Mode: AC/ CMV (Assist Control/ Continuous Mandatory Ventilation), RR (machine): 20, TV (machine): 500, FiO2: 40, PEEP: 5, MAP: 10, PIP: 19  - For trach collar trial once sedatives have been weaned    CV: Hx HTN  - Weaned off levo, resumed home Coreg  - concern for NSTEMI - likely demand ischemia, trops downtrended  - bedside echo 11/27: markedly decreased EF, formal echo EF 27% (from 40-45% prior)  - major contraindications to both anticoagulation therapy and cath  - Cardiology recs: treat conservatively, strict Is&Os, consider switch from Zosyn (as zosyn with high salt load), when deemed safe start ASA 81 mg, continue b-blocker    GI:   - TFs at goal, Jevity @70 by PEG (placed 10/31)  - d/c'ed Colace, Senna, suppository PRN given diarrhea  - Rectal tube in place    Renal:  - condom cath  - replete lytes PRN    Heme:   - SCDs for now, hold off chemical DVT ppx  - Trend H/H    ID:   - CTM Tmax, WBC  - Most recent BCx growing coag negative staph - not given abx given DNR  - Aspiration episode 11/27, tachy and febrile, DNR resversed, started on abx for aspiration pneumonia vs. pneumonitis  - Vanc / Zosyn (11/27-), will d/c at the end of day  - Start Clinda tomorrow (12/2-) in setting of oral packing  - Blood cultures NGTD  - Tylenol PRN for fevers    Endo:  - No issues  - Cortisol 11/30 wnl    Patient is now FULL CODE again as of 11/27    Dispo: SICU    FAMILY MEETING MONDAY 10AM with SICU, ENT, ethics, palliative, patient advocacy (contacting teams)    Critical care diagnosis: requires airway management, aspiration pneumonia, severe sepsis, requires pressors, severe calorie protein malnutrition, severe muscle hypotrophy, temporal wasting.  The patient is a critical care patient with life threatening hemodynamic and metabolic instability in SICU.  I have personally interviewed when possible and examined the patient, reviewed data and laboratory tests/x-rays and all pertinent electronic images.  I was physically present for the key portions of the evaluation and management (E/M) service provided.   The SICU team has a constant risk benefit analyzes discussion with the primary team, all consultants, House Staff and PA's on all decisions.  These diagnoses are unrelated to the surgical procedure noted above.  I meet with family if needed to get further history, discuss the case and make care decisions for this patient who might not be able to participate.  Time involved in performance of separately billable procedures was not counted toward my critical care time. There is no overlap.  I spent 55-75 minutes of critical care time for the diagnoses, assessment, plan and interventions.
I have personally examined this patient, reviewed pertinent labs and imaging, and discussed the case with colleagues, residents, physician assistants, and nurses on SICU rounds.  This is a late entry documentation for my professional services rendered on 12/4/18.      31   minutes in total were spent in providing direct critical care for the diagnoses, assessment and plan outlined below.  These diagnoses are unrelated to the surgical procedure.  Additionally, time spent in the performance of separately billable procedures was not counted toward the critical care time.  There is no overlap.    The active critical care issues are:  1.  irretrievable advanced oral cancer, rebleeding during packing change required repacking  2. NSTEMI, cannot be A-C due to oral bleeding    GOC discussions ongoing with wife and ENT service.
Pt seen, examined and d/w fellow. agree with above A/P. H/O SCCa right max sinus s/p CDDP/XRT with POD as noted above. Had bleeding from mass and SOB req admission, packing and ultimate trach . Course c/b infection. PE remarkable for sedated and unresponsive WM intubated; trach site clean; palp mass in right max region; lungs clear, heart RRR, abd soft/ NT/ no HSM/masses. A/P Pt is quite ill at this point in time. I agree with the fellow note above that further chemo at this time would put the pt at extraordinarily increased risk of further morbidity. His wife at the bedside was notably upset and wants the pt to have further therapy. Asking for testing of his tumor for TRK fusion positive in consideration for the recently approved Larotrectinib. We all agreed to have a family meeting and include Sondra Loza MD, the pt's primary med onc.
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  Packing in place  b.  Hgb remains stable  c.  With poor oncologic and bleeding prognosis  d.  On large doses of propofol and fentanyl  e.  Trial of Precedex and methadone for pain control  f,   Restart olanzapine    Respiratory failure  a.  On vent support with adequate oxygenation    CHF  a.  Restart carvedilol  b.  Check chest with ultrasound    I had extensive discussion with wife and son re: overall poor prognosis in regard to the SCCA and its complication of life-threatening bleeding with no therapeutic option available.  She understands and reinforces patient is DNR.   We have agreed to better manage his pain, continue vent support +/- trach collar as tolerated, continue nutrition and resume home medicatons    I spent 95 minutes in critical care    I spent
The patient is a critical care patient with hemodynamic and metabolic instability in SICU.  I have personally interviewed and examined this patient, reviewed labs and x-rays, discussed with other consultants, House staff and PA's.  I spent   45  minutes  in total providing critical care for the diagnoses, assessment and plan above.  These diagnoses are unrelated to the surgical procedure noted above.  I met with family  15   min to get further history and make care decisions for this patient who is unable to participate due to altered mental status.  Time involved in performance of separately billable procedures was not counted toward my critical care time.  There is no overlap.    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  Presently full code.  No surgical or IR option available per primary team  D62 Acute post-hemorrhagic anemia   - transfusing as needed  J96.90 Respiratory failure, unspecified chronicity, unspecified whether with hypoxia or hypercapnia   - secondary to impaired ventilatory drive.  Otherwise maintaining good gas exchange on current settings  Z91.89 At risk for malnutrition   - on TFs
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 Was recovering well but began to have recurrence of oral bleeding in setting of critical airway. SICU consulted  given bleeding for airway management. Patient made DNR/comfort however patient aspirated 11/28 into trach and packing, desaturated, and became agitated. Wife was upset and decided to rescind the DNR/CMO. Packing was replaced by ENT at bedside, no significant bleeding. Vanc and zosyn started for empiric coverage of suspected pneumonia. Same day EKG showed V4-6 T wave inversions, along with other nonspecific findings different from prior EKG one week ago suggestive of NSTEMI. Given contraindication to AC as well as poor prognosis precluding cath lab, trops were sent and bedside echo showed markedly decreased EF and SV. Cardiology consulted. Off levo, still on high dose fent/prop gtt. Propofol was weaned off and changed to precedex gtt.    Neuro: Post operative pain, Anxiety  - wean fent gtt & bolus as needed, PCA in AM. Cont fent patch. Off precedex. Goal is to wean off fentanyl gtt completely by end of day.  - c/w olanzapine, lyrica  - Valium & Xanax PRN for anxiety & dressing changes.  - Planned to start methadone 10mg TID however pt with recent demand ischemia, not tolerated PO, and has prolonged QTc  - cont to f/u palliative rec's    Resp: s/p Trach  - Concern for aspiration pneumonia vs pneumonitis; bedside US 11/29: B lines bilateral bases L > R, treated with abx  - Airway repacked by ENT 12/4, will change q3-4 days (otherwise not to be removed)  - Mode: AC/ CMV (Assist Control/ Continuous Mandatory Ventilation), RR (machine): 20, TV (machine): 500, FiO2: 40, PEEP: 5  - CPAP trials  - had mucous plug this AM - breathing much improved after suctioning    CV: Hx HTN  - HR, BP currently stable off pressors  - On Home Coreg with holding parameters  - NSTEMI 2/2 to demand ischemia, trops downtrended  - bedside echo 11/27: markedly decreased EF, formal echo EF 27% (from 40-45% prior)  - major contraindications to both anticoagulation therapy and cath  - Cardiology recs: treat conservatively, strict I&Os, when deemed safe start ASA 81 mg, continue b-blocker    GI:   - TFs at goal, Jevity @70 by PEG (placed 10/31)  - Rectal tube in place    Renal:  - condom cath  - replete lytes PRN    Heme:   - SCDs for now, hold off chemical DVT ppx  - Trend H/H    ID:   - CTM Tmax, WBC  - Most recent BCx growing coag negative staph - not given abx given DNR  - Aspiration episode 11/27, tachy and febrile, DNR reversed, started on abx for aspiration pneumonia vs. pneumonitis  - c/w Clinda (12/2-) in setting of oral packing  - Blood cultures NGTD- 72 hrs   - Tylenol PRN for fevers    Endo:  - No issues    Patient FULL CODE again as of 11/27    Dispo: SICU      Critical care diagnosis: requires airway management, aspiration pneumonia, severe sepsis, severe protein calorie malnutrition, severe muscle hypotrophy, temporal wasting  The patient is a critical care patient with life threatening hemodynamic and metabolic instability in SICU.  I have personally interviewed when possible and examined the patient, reviewed data and laboratory tests/x-rays and all pertinent electronic images.  I was physically present for the key portions of the evaluation and management (E/M) service provided.   The SICU team has a constant risk benefit analyzes discussion with the primary team, all consultants, House Staff and PA's on all decisions.  These diagnoses are unrelated to the surgical procedure noted above.  I meet with family if needed to get further history, discuss the case and make care decisions for this patient who might not be able to participate.  Time involved in performance of separately billable procedures was not counted toward my critical care time. There is no overlap.  I spent 55-75 minutes of critical care time for the diagnoses, assessment, plan and interventions.
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 Was recovering well but began to have recurrence of oral bleeding in setting of critical airway. SICU consulted  given bleeding for airway management. Patient made DNR/comfort however patient aspirated 11/28 into trach and packing, desaturated, and became agitated. Wife was upset and decided to rescind the DNR/CMO. Packing was replaced by ENT at bedside, no significant bleeding. Vanc and zosyn started for empiric coverage of suspected pneumonia. Same day EKG showed V4-6 T wave inversions, along with other nonspecific findings different from prior EKG one week ago. Given contraindication to AC as well as poor prognosis precluding cath lab, trops were sent and bedside echo showed markedly decreased EF and SV. Currently on Levophed     Neuro: Post operative pain, Anxiety  - Pt on fentanyl, propofol gtt  - Planned to start methadone 10mg TID however pt with recent demand ischemia, not tolerated PO, and has prolonged QTc, will reassess once tolerating PO and obtain repeat EKG  - Will start Precedex and wean gtts    Resp: s/p Trach  - Concern for aspiration pneumonia vs pneumonitis; bedside US 11/29: b lines bilateral bases L > R  - Airway repacked by ENT 11/27, minimal bleeding at that time  - Mode: AC/ CMV (Assist Control/ Continuous Mandatory Ventilation), RR (machine): 20, TV (machine): 500, FiO2: 40, PEEP: 5, MAP: 10, PIP: 19  - For trach collar trial once sedatives have been weaned    CV: Hx HTN  - on levo for hypotension - wean as tolerated  - concern for NSTEMI - likely demand ischemia  - bedside echo 11/27: markedly decreased EF, formal echo EF 27% (from 40-45% prior)  - major contraindications to both anticoagulation therapy and cath  - troponins downtrending  - holding home coreg; labetalol to be restarted when BP improved    GI:   - TFs at goal, Jevity @70 by PEG (placed 10/31)  - d/c Colace, Senna, suppository PRN as patient is making BMs    Renal:  - condom cath  - replete lytes PRN    Heme:   - SCDs for now, hold off chemical DVT ppx  - Trend H/H    ID:   - CTM Tmax, WBC  - Most recent BCx growing coag negative staph - not given abx given DNR  - Aspiration episode 11/27, tachy and febrile, DNR resversed, started on abx for aspiration pneumonia vs. pneumonitis  - c/w Vanc / Zosyn (11/27-) - d/c tomorrow  - f/u resp cultures, blood cultures  - Tylenol PRN for fevers    Endo:  - No issues  - Will send cortisol level    Patient is now FULL CODE again as of 11/27    Dispo: SICU    Critical care diagnosis: requires airway management, aspiration pneumonia, severe sepsis, requires pressors, severe calorie protein malnutrition, severe muscle hypotrophy, temporal wasting.  CCDx; respiratory failure.  The patient is a critical care patient with life threatening hemodynamic and metabolic instability in SICU.  I have personally interviewed when possible and examined the patient, reviewed data and laboratory tests/x-rays and all pertinent electronic images.  I was physically present for the key portions of the evaluation and management (E/M) service provided.   The SICU team has a constant risk benefit analyzes discussion with the primary team, all consultants, House Staff and PA's on all decisions.  These diagnoses are unrelated to the surgical procedure noted above.  I meet with family if needed to get further history, discuss the case and make care decisions for this patient who might not be able to participate.  Time involved in performance of separately billable procedures was not counted toward my critical care time. There is no overlap.  I spent 55-75 minutes of critical care time for the diagnoses, assessment, plan and interventions.
Seen and examined, chart and note reviewed, case discuss with SICU team    Respiratory failure  a.  Adequate gas exchange  b.  Continue vent support at present settings  c.  Continue zosyn/vancomycin for aspiration PNA     CAD  a.  Trend troponin I  b.  Restart beta blocker, prefer carvedilol over labetalol  c.  Start statin  d.  Hold ASA for bleeding risk    SCCA Right maxilla  a.  discuss with oncology service re: options  b.  Remains with poor oncologic/bleeding prognosis    At risk for malnutrition  a.  Obtain abdominal xray  b.  May need fecal disimpaction    Spent 45 minutes in critical care
Seen and examined, chart and note reviewed, case discussed with SICU    Severe sepsis secondary to presumed pneumonia /pneumonitis  a.  Continue zosyn, vancomycin  b.  Follow up cultures  c,. On levophed, titrate propofol/fentanyl done as needed  d.  Start methadone    Respiratory failure  a.  Continue ventilatory support, remains with adequate gas exchange    NSTEMI  a.  Hold ASA, Plavix, beta blocker  b.  Trend enzymes, obtain ECHO    At risk for malnutrition  a, npo  B.  gt to gravity  c.  rectal suppository    Spent 40 minutes in critical care
Seen and examined, chart and note reviewed, case discussed with SICU team    Right SCCA of maxillary sinus  a.  With intractable bleeding, currently packed  b.  No surgical or interventional solution available  c.   Will discuss goals of care with family  d.  Currently DNR    Respiratory failure  a.  Trach collar as tolerated  b.  On propofol gtt, transition to fentanyl patch    At risk for malnutrition  a.  Continue tube feeds at goal  b.  Discontinue imodium    Spent 30 minutes in critical care
Pt seen with NP. Recommendations for symptom management as above. Support provided for patient and family.
Pt seen with NP. Had discussion with pts wife, son and daughter regarding concerns of bleeding from the tumor and no further options to control it aside from packing the area. Discussed with SICU team how they would want a second opinion and how important it is for them, at this moment, in order to move forward and have further goals of care discussion. Page for uncontrolled symptoms.
Pt seen with NP. Plan is to start Methadone, given high requirements of Fentanyl and sedation to control symptoms. Would recommend to come down on sedation and Fentanyl as Methadone is started. Page for uncontrolled symptoms.
Pt seen with NP. Symptom management as above. Plan discussed with pt and wife.
Pt seen with NP. S/p EKG changes, vomiting and suspected aspiration. Currently given EKG changes and QTc prolongation, nausea/vomiting would not recommend to re-start Methadone. Discussed plan with wife and primary team.

## 2018-12-10 ENCOUNTER — APPOINTMENT (OUTPATIENT)
Dept: HEMATOLOGY ONCOLOGY | Facility: CLINIC | Age: 58
End: 2018-12-10

## 2018-12-11 ENCOUNTER — APPOINTMENT (OUTPATIENT)
Age: 58
End: 2018-12-11

## 2018-12-17 ENCOUNTER — APPOINTMENT (OUTPATIENT)
Dept: HEMATOLOGY ONCOLOGY | Facility: CLINIC | Age: 58
End: 2018-12-17

## 2018-12-18 ENCOUNTER — APPOINTMENT (OUTPATIENT)
Age: 58
End: 2018-12-18

## 2019-01-08 ENCOUNTER — OTHER (OUTPATIENT)
Age: 59
End: 2019-01-08

## 2019-04-04 NOTE — ED ADULT NURSE NOTE - CAS DISCH CONDITION
[Clear to Ausculatation Bilaterally] : clear to auscultation bilaterally [NL] : regular rate and rhythm, normal S1, S2 audible, no murmurs [FreeTextEntry7] : tenderness on palpation lower srernum Stable

## 2019-05-21 NOTE — ED ADULT NURSE NOTE - PERSON CONFIRMING BED ASSIGNMENT
High Dose Vitamin A Pregnancy And Lactation Text: High dose vitamin A therapy is contraindicated during pregnancy and breast feeding. Topical Retinoid counseling:  Patient advised to apply a pea-sized amount only at bedtime and wait 30 minutes after washing their face before applying.  If too drying, patient may add a non-comedogenic moisturizer. The patient verbalized understanding of the proper use and possible adverse effects of retinoids.  All of the patient's questions and concerns were addressed. Tetracycline Pregnancy And Lactation Text: This medication is Pregnancy Category D and not consider safe during pregnancy. It is also excreted in breast milk. Tazorac Pregnancy And Lactation Text: This medication is not safe during pregnancy. It is unknown if this medication is excreted in breast milk. Doxycycline Counseling:  Patient counseled regarding possible photosensitivity and increased risk for sunburn.  Patient instructed to avoid sunlight, if possible.  When exposed to sunlight, patients should wear protective clothing, sunglasses, and sunscreen.  The patient was instructed to call the office immediately if the following severe adverse effects occur:  hearing changes, easy bruising/bleeding, severe headache, or vision changes.  The patient verbalized understanding of the proper use and possible adverse effects of doxycycline.  All of the patient's questions and concerns were addressed. Tetracycline Counseling: Patient counseled regarding possible photosensitivity and increased risk for sunburn.  Patient instructed to avoid sunlight, if possible.  When exposed to sunlight, patients should wear protective clothing, sunglasses, and sunscreen.  The patient was instructed to call the office immediately if the following severe adverse effects occur:  hearing changes, easy bruising/bleeding, severe headache, or vision changes.  The patient verbalized understanding of the proper use and possible adverse effects of tetracycline.  All of the patient's questions and concerns were addressed. Patient understands to avoid pregnancy while on therapy due to potential birth defects. Azithromycin Counseling:  I discussed with the patient the risks of azithromycin including but not limited to GI upset, allergic reaction, drug rash, diarrhea, and yeast infections. Topical Retinoid Pregnancy And Lactation Text: This medication is Pregnancy Category C. It is unknown if this medication is excreted in breast milk. Topical Clindamycin Counseling: Patient counseled that this medication may cause skin irritation or allergic reactions.  In the event of skin irritation, the patient was advised to reduce the amount of the drug applied or use it less frequently.   The patient verbalized understanding of the proper use and possible adverse effects of clindamycin.  All of the patient's questions and concerns were addressed. Spironolactone Counseling: Patient advised regarding risks of diarrhea, abdominal pain, hyperkalemia, birth defects (for female patients), liver toxicity and renal toxicity. The patient may need blood work to monitor liver and kidney function and potassium levels while on therapy. The patient verbalized understanding of the proper use and possible adverse effects of spironolactone.  All of the patient's questions and concerns were addressed. Use Enhanced Medication Counseling?: No Topical Sulfur Applications Pregnancy And Lactation Text: This medication is Pregnancy Category C and has an unknown safety profile during pregnancy. It is unknown if this topical medication is excreted in breast milk. Isotretinoin Counseling: Patient should get monthly blood tests, not donate blood, not drive at night if vision affected, not share medication, and not undergo elective surgery for 6 months after tx completed. Side effects reviewed, pt to contact office should one occur. Isotretinoin Pregnancy And Lactation Text: This medication is Pregnancy Category X and is considered extremely dangerous during pregnancy. It is unknown if it is excreted in breast milk. Topical Sulfur Applications Counseling: Topical Sulfur Counseling: Patient counseled that this medication may cause skin irritation or allergic reactions.  In the event of skin irritation, the patient was advised to reduce the amount of the drug applied or use it less frequently.   The patient verbalized understanding of the proper use and possible adverse effects of topical sulfur application.  All of the patient's questions and concerns were addressed. Dapsone Pregnancy And Lactation Text: This medication is Pregnancy Category C and is not considered safe during pregnancy or breast feeding. Birth Control Pills Counseling: Birth Control Pill Counseling: I discussed with the patient the potential side effects of OCPs including but not limited to increased risk of stroke, heart attack, thrombophlebitis, deep venous thrombosis, hepatic adenomas, breast changes, GI upset, headaches, and depression.  The patient verbalized understanding of the proper use and possible adverse effects of OCPs. All of the patient's questions and concerns were addressed. Dapsone Counseling: I discussed with the patient the risks of dapsone including but not limited to hemolytic anemia, agranulocytosis, rashes, methemoglobinemia, kidney failure, peripheral neuropathy, headaches, GI upset, and liver toxicity.  Patients who start dapsone require monitoring including baseline LFTs and weekly CBCs for the first month, then every month thereafter.  The patient verbalized understanding of the proper use and possible adverse effects of dapsone.  All of the patient's questions and concerns were addressed. Birth Control Pills Pregnancy And Lactation Text: This medication should be avoided if pregnant and for the first 30 days post-partum. Detail Level: Zone Minocycline Counseling: Patient advised regarding possible photosensitivity and discoloration of the teeth, skin, lips, tongue and gums.  Patient instructed to avoid sunlight, if possible.  When exposed to sunlight, patients should wear protective clothing, sunglasses, and sunscreen.  The patient was instructed to call the office immediately if the following severe adverse effects occur:  hearing changes, easy bruising/bleeding, severe headache, or vision changes.  The patient verbalized understanding of the proper use and possible adverse effects of minocycline.  All of the patient's questions and concerns were addressed. Detail Level: Detailed Benzoyl Peroxide Pregnancy And Lactation Text: This medication is Pregnancy Category C. It is unknown if benzoyl peroxide is excreted in breast milk. High Dose Vitamin A Counseling: Side effects reviewed, pt to contact office should one occur. Bactrim Counseling:  I discussed with the patient the risks of sulfa antibiotics including but not limited to GI upset, allergic reaction, drug rash, diarrhea, dizziness, photosensitivity, and yeast infections.  Rarely, more serious reactions can occur including but not limited to aplastic anemia, agranulocytosis, methemoglobinemia, blood dyscrasias, liver or kidney failure, lung infiltrates or desquamative/blistering drug rashes. Doxycycline Pregnancy And Lactation Text: This medication is Pregnancy Category D and not consider safe during pregnancy. It is also excreted in breast milk but is considered safe for shorter treatment courses. Tazorac Counseling:  Patient advised that medication is irritating and drying.  Patient may need to apply sparingly and wash off after an hour before eventually leaving it on overnight.  The patient verbalized understanding of the proper use and possible adverse effects of tazorac.  All of the patient's questions and concerns were addressed. Bactrim Pregnancy And Lactation Text: This medication is Pregnancy Category D and is known to cause fetal risk.  It is also excreted in breast milk. Erythromycin Pregnancy And Lactation Text: This medication is Pregnancy Category B and is considered safe during pregnancy. It is also excreted in breast milk. Erythromycin Counseling:  I discussed with the patient the risks of erythromycin including but not limited to GI upset, allergic reaction, drug rash, diarrhea, increase in liver enzymes, and yeast infections. Topical Clindamycin Pregnancy And Lactation Text: This medication is Pregnancy Category B and is considered safe during pregnancy. It is unknown if it is excreted in breast milk. Spironolactone Pregnancy And Lactation Text: This medication can cause feminization of the male fetus and should be avoided during pregnancy. The active metabolite is also found in breast milk. Azithromycin Pregnancy And Lactation Text: This medication is considered safe during pregnancy and is also secreted in breast milk. Benzoyl Peroxide Counseling: Patient counseled that medicine may cause skin irritation and bleach clothing.  In the event of skin irritation, the patient was advised to reduce the amount of the drug applied or use it less frequently.   The patient verbalized understanding of the proper use and possible adverse effects of benzoyl peroxide.  All of the patient's questions and concerns were addressed. RICHIE Anderson RN

## 2019-09-01 NOTE — ASU PATIENT PROFILE, ADULT - NS TRANSFER DENTURES
none I personally evaluated the patient. I reviewed the Resident’s or Physician Assistant’s note (as assigned above), and agree with the findings and plan except as documented in my note.  Chart reviewed. Blow to nose from a window that flipped on it. Exam shows mild swelling and tenderness over nasal bone, no epistaxis, no septal hematoma, no loose dentition, neuro A&OX3 GCS 15 no deficits.

## 2019-12-13 NOTE — H&P ADULT - GEN GEN HX ROS MEA POS PC
Problem: Pain  Goal: #Acceptable pain level achieved/maintained at rest using NRS/Faces  This goal is used for patients who can self-report.  Acceptable means the level is at or below the identified comfort/function goal.  Outcome: Outcome Met, Continue evaluating goal progress toward completion  Pain control reviewed with patient who verbalized understanding and expressed adequate pain control.    Problem: Postoperative Care  Goal: Vital signs are maintained within parameters  Outcome: Outcome Met, Continue evaluating goal progress toward completion  VSS, will continue to monitor    Problem: VTE, Risk for  Goal: # No s/s of VTE  Outcome: Outcome Met, Continue evaluating goal progress toward completion  S/S of VTE reviewed with patient and  who verbalized understanding. All questions answered.    Comments: All care plan goals reviewed with patient and  who used teachback to verbalize understanding. All questions answered.   fatigue/weakness

## 2020-01-01 NOTE — DIETITIAN INITIAL EVALUATION ADULT. - NS AS NUTRI INTERV COLLABORAT3
Statement Selected Collaboration with other providers/Suggest Speech and swallow evaluation in-house to confirm as discussed with ENT team.

## 2020-02-18 NOTE — CHART NOTE - NSCHARTNOTEFT_GEN_A_CORE
Wife (HCP) expressed wish for terminal liberation from the ventilator. Family at bedside - I discussed with her that we would focus on his comfort, preventing air hunger and anxiety with medication, but that I could not predict how long he would survive once on trach collar and off the ventilator. No further labs, monitors to privacy mode, will remove extraneous devices/pumps as appropriate.     Giancarlo MOTT na

## 2020-08-09 NOTE — H&P ADULT - PSY GEN HX ROS MEA POS PC
[facility]  Neurocritical Care  Discharge Summary    Admit Date: 8/8/2020    Service Date: 08/09/2020    Discharge Date:     Length of Stay: 1    Final Active Diagnoses:    Diagnosis Date Noted POA    Cerebellar hemorrhage [I61.4] 08/07/2020 Yes    Brain herniation [G93.5] 08/07/2020 Yes    Cardiac arrest [I46.9] 08/07/2020 Yes    Acute respiratory failure with hypoxia and hypercapnia [J96.01, J96.02] 08/07/2020 Yes    Radha coma scale total score 3-8 [R40.2430] 08/07/2020 Yes      Problems Resolved During this Admission:      History of Present Illness: 36-year-old female, transfer to [facility] for ICH management. She originally presented to to [facility]  Via EMS after being found down unrepsonve at home.  No significant past medical history is known about this patient.  What is known is 2 weeks ago she developed a headache and was seen at an outside facility.  A CT scan was negative for acute intracranial process.  Tonight the patient was at home in her normal state of health when fell and was found down unresponsive,. EMS arrived and in route to  outside facility she had a PEA cardiac arrest about 10 min before  ROSC, at which pouit she was intubated, around 1730.  She had a  had a subsequent PEA arrest when she arrived in the ED at the outside facility, which lasted about 5-7min.  A CT scan that showed a large cerebral bleed with subarachnoid component. Transfer center contacted and tranfers to [facility] accepted, recs for Mannitol given. Mannitol 100gm was given about 1930. Patient arrived to [facility], GCS 3T, on vent. TOF performed, 4/4 twitches on brow setting 2, to R/o any paralytic that may have been left from intubation earlier.  Exam very poor, concerns for herniation,. CTA with herniation and hydrocephalus. NSGY consulted, STAT.  Given poor exam, herniation present, no NSGY option offered. Awaiting family to discuss prognosis.     Hospital Course by Event: 8/8: brain death  exam    Hospital Course by Problem:   Radha coma scale total score 3-8  COMA  No commands  No response      Acute respiratory failure with hypoxia and hypercapnia  No vent weaning  Apnea test today      Cardiac arrest  Cardiac arrest at home x2  See cerebellar ICH    Brain herniation  See cerebellar ICH      Cerebellar hemorrhage  Large cerebellar ICH  CTA with no obvious AVM or aneurysm  Already herniation on arrival  Grave prognosis  No NSGY intervention  CPP goal > 50      Significant Results:  Imaging:  See PACS    Cardiology:  See echo   See ECGs    Microbiology:  See micro    Laboratory:  Lab Results   Component Value Date    HGBA1C 5.2 08/08/2020    CHOL 193 08/07/2020    HDL 44 08/07/2020    LDLCALC 120.0 08/07/2020    TRIG 145 08/07/2020    TSH 0.657 08/07/2020       Pending Results: none    Consultations:  None      Procedures:   none    Medications:   There are no discharge medications for this patient.     Diet: NPO    Activity: As tolerated.     Disposition: Discharged to [facility] in stable critical condition.    Follow Up Plan:  1) SUSY    This discharge took more than 30 minutes to complete.    Carlos Manuel Lees MD  Neurocritical Care  [facility]   depression/anxiety

## 2020-08-14 NOTE — ED ADULT NURSE NOTE - NSSISCREENINGQ2_ED_A_ED
Per your last note\" Bilateral leg pain. This may represent claudication versus musculoskeletal pain. Given her prior smoking history, have recommended exercise ABIs to evaluate for any significant peripheral arterial disease.     Hypertension. This remains well controlled on her current medical regimen which includes Micardis as monotherapy.     Dyslipidemia. Patient is now taking Livalo 2 mg daily. This is followed by her PCP.     History of tobacco abuse. Patient quit smoking in 2016. She is congratulated for this and reminded to remain tobacco free.     Family history for CAD. Because of this I recommended that she exercise regularly and continue to modify all of her risk factors.     Patient can follow up annually, sooner if needed.
No

## 2020-08-31 NOTE — ED PROVIDER NOTE - CARDIOVASCULAR NEGATIVE STATEMENT, MLM
Assessment/Plan:    Mass of right side of neck  Mass right neck along submandibular region  No facial nerve weakness noted today  Noted erythema along the mass on skin, 1 5 cm excoriation  Discussed possible causes including lymph node, submandibular mass, vs dental process  Reviewed risk salivary gland carcinoma  Treatment options include imaging, FNAB of mass, vs surgical excision of the mass  Agreed to in office FNAB of the right mass, see procedure note  Also Ct scan neck  Follow up after testing           Diagnoses and all orders for this visit:    Mass of right side of neck  -     CT soft tissue neck with contrast; Future  -     FNA  -     Fine Needle Aspiration; Future    Swelling of right parotid gland  -     Ambulatory Referral to Otolaryngology          Subjective:      Patient ID: Flakito Elder is a 76 y o  male  Presents today as a new patient due to neck lump  Broken lower tooth last November  Tooth removed one week ago by oral surgeon  Began with lump under chin for past month, assumed related to tooth concern  Tenderness area  External redness along right submandibular area  The following portions of the patient's history were reviewed and updated as appropriate: allergies, current medications, past family history, past medical history, past social history, past surgical history and problem list     Review of Systems   Constitutional: Negative  HENT: Positive for dental problem  Negative for congestion, ear discharge, ear pain, hearing loss, nosebleeds, postnasal drip, rhinorrhea, sinus pressure, sinus pain, sore throat, tinnitus and voice change  Eyes: Negative  Respiratory: Negative for chest tightness and shortness of breath  Cardiovascular: Negative  Gastrointestinal: Negative  Endocrine: Negative  Musculoskeletal: Positive for neck pain  Skin: Positive for wound  Negative for color change     Neurological: Negative for dizziness, numbness and headaches  Psychiatric/Behavioral: Negative  Objective:      Temp 98 3 °F (36 8 °C) (Temporal)   Ht 5' 9" (1 753 m)   Wt 73 5 kg (162 lb)   BMI 23 92 kg/m²          Physical Exam  Constitutional:       Appearance: He is well-developed  HENT:      Head: Normocephalic  Right Ear: Hearing, tympanic membrane, ear canal and external ear normal  No decreased hearing noted  No drainage or tenderness  Tympanic membrane is not perforated or erythematous  Left Ear: Hearing, tympanic membrane, ear canal and external ear normal  No decreased hearing noted  No drainage or tenderness  Tympanic membrane is not perforated or erythematous  Nose: Nose normal  No nasal deformity or septal deviation  Mouth/Throat:      Mouth: Mucous membranes are not pale and not dry  No oral lesions  Dentition: Normal dentition  Pharynx: Uvula midline  No oropharyngeal exudate  Neck:      Musculoskeletal: Full passive range of motion without pain, normal range of motion and neck supple  Trachea: No tracheal deviation  Comments: Firm mass right neck, along submandibular region  Cardiovascular:      Rate and Rhythm: Normal rate  Pulmonary:      Effort: Pulmonary effort is normal  No accessory muscle usage or respiratory distress  Musculoskeletal:      Right shoulder: He exhibits normal range of motion  Lymphadenopathy:      Cervical: No cervical adenopathy  Skin:     General: Skin is warm and dry  Neurological:      Mental Status: He is alert and oriented to person, place, and time  Cranial Nerves: No cranial nerve deficit  Sensory: No sensory deficit  Psychiatric:         Behavior: Behavior is cooperative           Scribe Attestation    I,:   MOSHE Nina am acting as a scribe while in the presence of the attending physician :        I,:   Chris Hdz MD personally performed the services described in this documentation    as scribed in my presence :                FNA Date/Time 8/31/2020 1:33 PM     Performed by  Freddy Rendon MD     Authorized by Freddy Rendon MD      Universal Protocol Consent: Verbal consent obtained  Written consent obtained  Consent given by: patient  Patient understanding: patient states understanding of the procedure being performed  Patient consent: the patient's understanding of the procedure matches consent given        Local anesthesia used: yes     Anesthesia   Local anesthesia used: yes  Local Anesthetic: lidocaine 2% with epinephrine     Sedation   Patient sedated: no        Specimen: yes    Culture: no   Procedure Details   Procedure Notes: In office biopsy of the right neck mass agreed upon and completed today  Right neck mass anesthetized with local and topical anesthetic  Aspirated tissue from the neck mass and sent for evaluation  Hemostasis obtained by applying pressure to the site  Pt tolerated well  no chest pain and no edema.

## 2020-12-06 NOTE — H&P CARDIOLOGY - RESPIRATORY
Breath Sounds equal & clear to percussion & auscultation, no accessory muscle use disoriented to time/situation/disoriented to place

## 2021-02-10 NOTE — DISCHARGE NOTE ADULT - FUNCTIONAL SCREEN CURRENT LEVEL: AMBULATION, MLM
;      [facility] Emergency Department  [facility], [place] [address]  [phone]     Clinical Summary     PERSON INFORMATION   Name BROCK BILL Age  48 Years  1972 12:00 AM   Acct# [id number] Sex Female Phone [phone]   Dispo Type Still a patient - 30 Arrival 2020 2:51 PM Checkout 2020 9:31 PM    Address: [address] [place] [id number]      Visit Reason NAUSEA     ED Physician Note      ED Time Seen By Provider Entered On:  2020 15:14     Performed On:  2020 15:14  by Anitra Rand NP               Time Seen By Provider   Time Seen by Provider :   2020 15:14    Anitra Rand NP - 2020 15:14           VITALS INFORMATION  Vitals/Ht/Wt  Temperature Tympanic:  100.2 F  Peripheral Pulse Rate:  111 bpm  Respiratory Rate:  18 br/min  Oxygen Saturation:  94 %  Systolic Blood Pressure:  77 mmHg  Diastolic Blood Pressure:  48 mmHg  Mean Arterial Pressure:  58 mmHg  Height:  0 cm  Weight:  131 kg  Weight Type:  Estimated  ED Hand-Off Communication  ED Hand-Off Reason:  In Hospital Transfer  ED Hand-Off Reason / In Hospital:  SBAR reviewed during report  Patient on Cardiac Monitor:  No  Sitter Present:  No  Potential Core Measure:  N/A  Hand-off Report Given to:  Wade forbes       MEDICAL INFORMATION   Allergy Info:          NKA             Prescriptions:            DISCHARGE INFORMATION   Discharge Disposition: Still a patient - 30     PATIENT EDUCATION INFORMATION   Instructions:          Follow up:            DIAGNOSIS          
0 = independent

## 2021-06-25 NOTE — PROGRESS NOTE ADULT - ATTENDING COMMENTS
SPIRITUAL HEALTH SERVICES  SPIRITUAL ASSESSMENT Progress Note  [facility]     REFERRAL SOURCE: Length of Stay    Brief conversation with Hima who shared regarding past and current health concerns. He's waiting to hear about where he'll be going for transitional care. Hima's roommate and friend arrived for a visit.     PLAN: [facility] remains available for support.    Cynthia Tillman  Associate   Pager [phone]  Phone [phone]     Will Follow

## 2021-09-27 NOTE — ED ADULT NURSE REASSESSMENT NOTE - GASTROINTESTINAL ASSESSMENT
WDL Otc Regimen: La Roche Posay Effaclar Duo qd Modify Regimen: Tretinoin 0.05% cream 3 nights a week (M, W, F) Plan: Patient reports frequent acne from wearing masks for long periods of time every day. \\n\\nReintroduce applying Tretinoin 0.05% cream 3 nights a week (M, W, F). Wait until face dries before applying.\\n\\nStart La Roche Posay Effaclar Duo qd to spot treat pimples Detail Level: Zone Samples Given: La Roche Posay Effaclar Duo

## 2021-12-20 NOTE — PROGRESS NOTE ADULT - SUBJECTIVE AND OBJECTIVE BOX
NITIN DAVALOS  ----------------------------------------  The patient was seen and evaluated for dysphagia.  The patient is in no acute distress.  Denied any chest pain, palpitations, dyspnea, or abdominal pain.  Offers no compaints.    Vital Signs Last 24 Hrs  T(C): 36.7 (2018 12:13), Max: 36.9 (2018 00:44)  T(F): 98 (2018 12:13), Max: 98.4 (2018 00:44)  HR: 91 (:) (91 - 108)  BP: 116/62 (2018 12:) (96/59 - 116/62)  BP(mean): --  RR: 19 (:) (18 - 20)  SpO2: 93% (:) (90% - 95%)    CAPILLARY BLOOD GLUCOSE  POCT Blood Glucose.: 86 mg/dL (2018 10:38)  POCT Blood Glucose.: 125 mg/dL (2018 02:30)  POCT Blood Glucose.: 75 mg/dL (2018 00:41)    PHYSICAL EXAMINATION:  ----------------------------------------  General appearance: No acute distress, Awake, Alert  HEENT: Normocephalic, Atraumatic, Conjunctiva clear, EOMI  Neck: Supple, No JVD, No tenderness  Lungs: Clear to auscultation, Breath sound equal bilaterally, No wheezes, No rales  Cardiovascular: S1S2, Regular rhythm  Abdomen: Soft, Nontender, Nondistended, No guarding/rebound, Positive bowel sounds, Gastrostomy tube in place.  Extremities: No clubbing, No cyanosis, No edema, No calf tenderness  Neuro: Strength equal bilaterally, No tremors  Psychiatric: Appropriate mood, Normal affect      LABORATORY STUDIES:  ----------------------------------------             8.3    9.1   )-----------( 257      ( 2018 07:55 )             25.6     07-    136  |  100  |  11.0  ----------------------------<  137<H>  3.4<L>   |  23.0  |  1.00    Ca    8.0<L>      2018 07:55  Phos  2.6       Mg     1.6         TPro  6.7  /  Alb  3.7  /  TBili  0.7  /  DBili  x   /  AST  36  /  ALT  47<H>  /  AlkPhos  101  07-    LIVER FUNCTIONS - ( 2018 23:44 )  Alb: 3.7 g/dL / Pro: 6.7 g/dL / ALK PHOS: 101 U/L / ALT: 47 U/L / AST: 36 U/L / GGT: x           PT/INR - ( 2018 23:43 )   PT: 13.9 sec;   INR: 1.26 ratio    PTT - ( 2018 23:43 )  PTT:35.7 sec    CARDIAC MARKERS ( 2018 23:44 )  x     / <0.01 ng/mL / 23 U/L / x     / x        Urinalysis Basic - ( 2018 08:32 )  Color: Yellow / Appearance: Clear / S.015 / pH: x  Gluc: x / Ketone: Negative  / Bili: Negative / Urobili: Negative mg/dL   Blood: x / Protein: 15 mg/dL / Nitrite: Negative   Leuk Esterase: Negative / RBC: 0-2 /HPF / WBC 0-2   Sq Epi: x / Non Sq Epi: Negative / Bacteria: Negative    MEDICATIONS  (STANDING):  amphetamine/dextroamphetamine 30 milliGRAM(s) Oral two times a day  carvedilol 6.25 milliGRAM(s) Oral every 12 hours  ceFAZolin   IVPB      ceFAZolin   IVPB 2000 milliGRAM(s) IV Intermittent every 8 hours  loratadine 10 milliGRAM(s) Oral daily  morphine ER Tablet 30 milliGRAM(s) Oral three times a day  OLANZapine 5 milliGRAM(s) Oral daily  pantoprazole  Injectable 40 milliGRAM(s) IV Push two times a day  potassium chloride    Tablet ER 40 milliEquivalent(s) Oral once  pregabalin 75 milliGRAM(s) Oral two times a day    MEDICATIONS  (PRN):  ALPRAZolam 0.5 milliGRAM(s) Oral at bedtime PRN anxiety  HYDROmorphone  Injectable 1 milliGRAM(s) IV Push every 4 hours PRN Severe Pain (7 - 10)  meclizine 25 milliGRAM(s) Oral three times a day PRN Dizziness      ASSESSMENT / PLAN:  ----------------------------------------  Dysphagia - Status post placement of a gastrostomy tube. To initiate tube feeds. As per the patient and his wife, he was tolerating some oral intake. Will also initiate puree diet as tolerated.    Maxillary sinus neoplasm - Oncology consultation noted. Analgesic medications as needed.    Anemia - Multifactorial. Acute blood loss anemia and chemotherapy. Following CBC.    Bipolar disorder - On olanzapine. PMD: Dr. Carmelo Jason and Epileptologist: Dr. Henderson

## 2022-01-10 NOTE — PROGRESS NOTE ADULT - PROVIDER SPECIALTY LIST ADULT
Gastroenterology
Heme/Onc
Hospitalist
Infectious Disease
Reason for call:  Other   Patient called regarding (reason for call): call back  Additional comments: pt just had surg and feels disgusted at the thought of food/eating. Wants to know if this is common    Phone number to reach patient:  Cell number on file:    Telephone Information:   Mobile [phone]       Best Time:      Can we leave a detailed message on this number?  YES    Travel screening: Not Applicable  
Hospitalist
Heme/Onc

## 2022-02-25 NOTE — PATIENT PROFILE ADULT. - VISION (WITH CORRECTIVE LENSES IF THE PATIENT USUALLY WEARS THEM):
shower tomorrow no soap Partially impaired: cannot see medication labels or newsprint, but can see obstacles in path, and the surrounding layout; can count fingers at arm's length/S/P cataract surgery (one eye)

## 2022-05-19 NOTE — DISCHARGE NOTE ADULT - ADMISSION DATE +STARTOFVISITDATE
Statement Selected
Chandan Haines (MD)  Cardiovascular Disease; Internal Medicine  [address], [address]  [place] [address]  Phone: [phone]  Fax: [phone]  Follow Up Time:     Aura French  FAMILY MEDICINE  [address]  [place] [address]  Phone: [phone]  Fax: [phone]  Follow Up Time:

## 2022-08-29 NOTE — PATIENT PROFILE ADULT. - NSSUBSTANCEUSE_GEN_ALL_CORE_SD
Encounter Date: 8/29/2022       History     Chief Complaint   Patient presents with    Fever    Vomiting     Pt. Has had fever and emesis for a couple days. Pt. Active. BBS with mild exp wheeze at bases. No retractions. Pt. Has had albuterol at home and antipyretic yesterday.      22-month-old male patient with a past medical history of reactive airway disease and recent ED visit on 08/22/2022 for bronchiolitis.  Patient now brought in by mom with a history of non remission of symptoms since recent ED visit. Mom reports that patient has had an ongoing cough, fever, and multiple episodes of vomiting associated with the cough since 8-22. Mom reports that the child has been versus to food and only tolerating milk and vomiting is milky and nonbilious.  Mom denies any worsening shortness of breath, diarrhea, or lethargy.       Review of patient's allergies indicates:   Allergen Reactions    Milk containing products      Past Medical History:   Diagnosis Date    GERD (gastroesophageal reflux disease)     Heart murmur     Premature baby      History reviewed. No pertinent surgical history.  Family History   Problem Relation Age of Onset    Irritable bowel syndrome Maternal Grandmother         Copied from mother's family history at birth    COPD Maternal Grandmother     Asthma Mother         Copied from mother's history at birth    Hypertension Mother         Copied from mother's history at birth    Obesity Mother     Asthma Brother         Severe, hx of multiple hospitalizations    No Known Problems Father     Premature birth Brother     Premature birth Brother     Arrhythmia Neg Hx     Congenital heart disease Neg Hx     Early death Neg Hx     Pacemaker/defibrilator Neg Hx     Heart attacks under age 50 Neg Hx      Social History     Tobacco Use    Smoking status: Never    Smokeless tobacco: Never     Review of Systems   Constitutional:  Positive for crying, fever and irritability. Negative for chills.   HENT:  Positive  for congestion and rhinorrhea. Negative for ear discharge and sneezing.    Eyes:  Negative for redness.   Respiratory:  Positive for cough. Negative for choking and wheezing.    Cardiovascular:  Negative for cyanosis.   Gastrointestinal:  Positive for nausea and vomiting. Negative for diarrhea.     Physical Exam     Initial Vitals [08/29/22 1224]   BP Pulse Resp Temp SpO2   -- (!) 139 26 100 °F (37.8 °C) 97 %      MAP       --         Physical Exam    Vitals reviewed.  Constitutional: He appears well-developed and well-nourished. He is not diaphoretic. He is active. No distress.   22-month-old male patient sitting on bed not in any acute distress, appears comfortable.   HENT:   Right Ear: Tympanic membrane normal.   Left Ear: Tympanic membrane normal.   Nose: Nasal discharge present.   Mouth/Throat: Mucous membranes are moist.   Difficult to visualize posterior pharynx, tonsils.   Eyes: Conjunctivae are normal.   Neck: Neck supple. No neck adenopathy.   Normal range of motion.  Cardiovascular:  Regular rhythm.   Tachycardia present.         Murmur heard.  Pulmonary/Chest: No nasal flaring. He has rhonchi. He exhibits no retraction.   Coarse BS   Abdominal: He exhibits no distension. There is no abdominal tenderness.   Musculoskeletal:         General: No tenderness or deformity.      Cervical back: Normal range of motion and neck supple.     Neurological: He is alert. He exhibits normal muscle tone.   Skin: Skin is warm. Capillary refill takes less than 2 seconds. No rash noted.       ED Course   Procedures  Labs Reviewed - No data to display       Imaging Results               X-Ray Chest PA And Lateral (Final result)  Result time 08/29/22 14:08:25      Final result by Tristen Hartman MD (08/29/22 14:08:25)                   Impression:      Clearing of previous lung abnormalities.  Remaining peribronchial cuffing suggesting the possibility of bronchitis, bronchopneumonia.    Clinical correlation  consideration for progress study 10-14 days suggested as indicated.    This report was flagged in Epic as abnormal.      Electronically signed by: Tristen Hatrman MD  Date:    08/29/2022  Time:    14:08               Narrative:    EXAMINATION:  XR CHEST PA AND LATERAL    CLINICAL HISTORY:  Wheezing    TECHNIQUE:  PA and lateral views of the chest were performed.    COMPARISON:  Two thousand twenty    FINDINGS:  Absence of hyperinflation of lungs, interstitial densities right upper lobe apical anterior segment, right mid lung zone and right lateral CP angle.  The left lung densities have cleared as well.  Remaining peribronchial cuffing question particularly bilateral infrahilar and left hilar suprahilar regions.  Heart normal size.  Bony thorax normal.  Removal of previous NG tube.                                       Medications   ibuprofen 100 mg/5 mL suspension 156 mg (156 mg Oral Given 8/29/22 1248)     Medical Decision Making:   History:   I obtained history from: someone other than patient and another health care provider.       <> Summary of History: See HPI  Old Medical Records: I decided to obtain old medical records.  Initial Assessment:   20-month-old male patient sitting in examining bed in no distress.  Patient has a patent airway, breathing spontaneously, slightly tachycardic and well perfused, alert active  Differential Diagnosis:   Initial impression is concerning for secondary bacterial infection and considered rhinosinusitis, bacterial tracheitis, bacterial pneumonia.      Clinical Tests:   Lab Tests: Ordered and Reviewed  ED Management:  See ED course           ED Course as of 08/29/22 1859   Mon Aug 29, 2022   1331 Temp: 100 °F (37.8 °C) [PM]   1335 Decided to obtain chest x-ray to rule out pneumonia. [PM]   1410 X-Ray Chest PA And Lateral  Not suggestive of gross infiltrates, pulmonary edema or pneumothorax. [PM]   1430 Symptoms and clinical examination in keeping with rhinosinusitis.   Discussed with mom regarding antibiotic treatment.  Patient discharged home with amoxicillin. [PM]      ED Course User Index  [PM] Heather Woodard MD               Clinical Impression:   Final diagnoses:  [R05.3] Chronic cough  [R06.2] Wheeze  [J31.0, J32.9] Rhinosinusitis (Primary)      ED Disposition Condition    Discharge Stable          ED Prescriptions       Medication Sig Dispense Start Date End Date Auth. Provider    amoxicillin (AMOXIL) 400 mg/5 mL suspension  (Status: Discontinued) Take 8.9 mLs (712 mg total) by mouth 2 (two) times daily. for 10 days 178 mL 8/29/2022 8/29/2022 Heather Woodard MD    amoxicillin (AMOXIL) 400 mg/5 mL suspension Take 8.9 mLs (712 mg total) by mouth 2 (two) times daily. for 10 days 178 mL 8/29/2022 9/8/2022 Heather Woodard MD          Follow-up Information       Follow up With Specialties Details Why Contact Info    Radhika Garvey MD Pediatrics In 1 week  [address] [address]  [phone]      [facility] - Emergency Dept Emergency Medicine  As needed, If symptoms worsen [address] [facility] [address] [phone]             Heather Woodard MD  Resident  08/29/22 0017       Candice Ledezma MD  08/29/22 3489     never used

## 2022-09-04 NOTE — PATIENT PROFILE ADULT - NSASFALLNEEDSASSIST_GEN_A_NUR
PAST SURGICAL HISTORY:  Abdominal hernia with mesh, 2003    Basal cell carcinoma removal, mid vertex scalp, 2002    H/O colonoscopy , 2008 polypectomy, ,     H/O ovarian cystectomy right,     History of  10/1982    S/P deep brain stimulator placement 2006, 2 brain pacemakers Model #96068, batteries replaced 2014    
yes

## 2022-09-17 NOTE — CONSULT NOTE ADULT - PROBLEM SELECTOR RECOMMENDATION 2
-likely ADH excess postop in the setting of cancer, pain, fluids  -fluid restriction 800 ml/day  -NaCl 1 gm tid x3 days  -urine lytes Aruna 46 and Uosm 572 c/w SIADH
As above.  Patient's wife reports patient was taking Dilaudid 8mg PO q4h ATC standing and has been unable to take oral MS Contin due to inability to crush for over a week.  Patient currently receiving Dilaudid 2mg IV q3 PRN (received total of 18mg IV in 24hours) and Lyrica 75mg via PEG BID.  Given high requirements and inability to crush medications with a combination of neuropathic pain and somatic pain - Methadone would be the optimal drug choice.  Given patient's weight loss and low fat stores, Fentanyl patch would not be ideal in managing patient's pain.  Discussed with outpatient pain provider Dr. Cruz and with patient and patient's wife at length.  Patient, Dr. Cruz, and Sondra are in agreement with plan to start Methadone in place of MS Contin through the PEG - Given patient's total Morphine equivalents of 360mg in the past 24hrs - with cross tolerance taken into consideration - Recommend Methadone 5mg via PEG q8h - HOLD for sedation.  Discontinue Morphine 30mg via PEG - continue Dilaudid 2mg IV q3h PRN moderate and severe pain.  Please page [phone] for any questions or concerns.  EKG 11/21/18 with QTc 435.  Patient with recent diarrhea.  Bowel regimen when able given high opioid use and risk for constipation.  Patient to follow-up with Dr. Cruz as an outpatient - appointment to be scheduled by patient's wife upon discharge.
Principal Discharge DX:	Choking, initial encounter  
1

## 2022-10-12 NOTE — CHART NOTE - NSCHARTNOTEFT_GEN_A_CORE
Late entry:  Administered first dose Gardasil Vaccine on 10/11/2022 to left deltoid IM with consent form signed.  Urine pregnancy test negative.  Patient tolerated vaccine well.  NDC# 4922-7796-85  Lot# [id number]  Exp. 8/10/2024  Second dose hpv vaccine scheduled 12/13/2022  [email or web address]       Card fellow CATH consult note    57M pmhx of smoking (1ppd x 30 years), HTN presented with sudden onset of CP since 5:30am. States felt like "a person standing on me" with associated dyspnea. THis am, continued to have the pain but decided to go to work () and when driving, pain continued to worsen, radiating down L arm, and thus came to the ED. Fam history of father  of Mi in 50s. No other toxic habits. In the ED, continued to endorse pain, received SL nitro x 2 with much relief of pain. Called for concern of ACS    ON examination, sinus tachycardic to 110s bps 127/85 (symmetrical bilat) patient looked uncomfortable, in pain. rapid nl s1s2 no mgr, lungs clear.  no edema,. peripheral DP pulses intact    ECG notable for sinus tach, posterior leads done did not show ST changes    CXR showed normal silouette, no mediastinal widening, no ptx, no pna    Concern was for ACS at this time given multiple risk factors of smoker, fam history, Low suspicion for PE, low suspicion for dissection. Given continued chest pain, arranged for cardiac cath. Cath clean cors, recommend admission for further evaluation of leukocytosis, low grade lactate acidosis, tachypnea ?COPD. Please recall for further evaluation    Shana Zeng MD [id number]

## 2022-10-25 NOTE — DIETITIAN INITIAL EVALUATION ADULT. - ENTERAL KCAL
Medication History completed:    New medications: potassium chloride ER tabs, pantoprazole, Creon, Novolin N, Humalog, furosemide, ferrous sulfate    Medications discontinued: none    Changes to dosing:   Warfarin changed to 1.25 mg (half a 2.5 mg tablet) daily  Metoprolol succinate changed to 50 mg twice daily - the patient is on ASH brand name only Toprol XL  Clonazepam changed to 0.5 mg daily - prescription is for BID prn, but the patient reports that she only takes it daily at 1500 for tremors    Stated allergies: The patient reports an anaphylactic reaction to penicillins and a rash with Bactrim. Added latex, Norco, hydromorphone, and nitrofurantoin per [facility] records. Other pertinent information: Medications confirmed with [facility]/pharmacy and [facility] Everywhere reviewed. The [facility] with an INR of 3.8. She has not missed any doses in the past week. The patient reports that she is having difficulty tolerating her Creon and is having significant diarrhea while taking it. 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 The patient last filled clonazepam on 10/6/22 for a 90 day supply.      Thank you,  Shoshana Brown, PharmD, BCPS  [phone]

## 2022-12-21 NOTE — H&P PST ADULT - MALLAMPATI CLASS
[facility]   Virtual Headache Neurology Consult  December 21, 2022     Reyna Ramirez MRN# [id number]   YOB: 1954 Age: 67 year old     Requesting physician: self-referred         Assessment and Recommendations:     Reyna Ramirez is a 67 year old female who presents for evaluation of headache.     Her current presentation is consistent with episodic migraine without aura, though previously her headaches were frequent enough to meet criteria for chronic migraine. She most likely has this on a genetic basis. Her virtual neurologic examination was normal. She recently had an MRI Brain 12/2/2022 which demonstrated a stable meningioma, which she monitors with her neurosurgeon.     We discussed a symptomatic treatment strategy for chronic migraine, with both acute and preventative treatment.  -For acute treatment of mild headache, she may continue Excedrin as needed, not to exceed more than 14 days/month to avoid medication overuse.  - For acute treatment of moderate to severe headache, she would like to retry zolmitriptan as this has been helpful in the past and she is not as pleased with rizatriptan recently. She will take zolmitriptan 5 mg tablet at onset of headache, with a repeat dose in 2 hours if needed.  - She does not have nausea with headache, so she does not currently desire an antiemetic.     -For headache prevention, we discussed continue with magnesium supplementation or adding a new medication. She is interested in adding a preventative.  - I recommend Emgality 120 mg subcutaneous injection every 28 days. We will obtain prior authorization for this.   - She has tried several preventatives in the past including propranolol, topiramate, citalopram, sertraline, Depakote, gabapentin, lamotrigine. She uses trazadone at night and given her age, would avoid amitriptyline.     Will follow-up in 6 months to monitor her progress.       I spent 60 minutes on patient care and  "documentation.    Patient seen with MD Racheal Long PA-C  Neurology    Physician Attestation   I, Dayanna Das MD, saw this patient and agree with the findings and plan of care as documented in the note.  I spent 60 minutes on record review, patient care, documentation on the date of visit.    Dayanna Das MD              Chief Complaint:     Chief Complaint   Patient presents with     Video Visit     Np headache           History is obtained from the patient and medical record.  Patient was seen via a virtual visit in their home.      Reyna Rmairez is a 67 year old female who has had headaches since age 11. She has been diagnosed with migraine headaches.     She has previously followed with Dr. Hood at [facility] and Dr. Hassan.     Her headaches start as pain at the left posterior neck and pain radiates to the left temple. Pain can move to there right side. It can be throbbing, pressure, or stabbing pain. She denies any photophobia, phonophobia, visual aura, autonomic or positional features. She does not get nauseous with headache.    She states she can also have \"tension\" headaches which are a pressure located at her forehead. She takes Excedrin for these which is generally helpful. She is currently uses Excedrin 2 times per week.    Headaches have been stable. She reports 8 headaches per month. Years ago, she was having > 15 headaches per month.     She is currently using Maxalt for acute treatment of headache. She is finding this is not as effective as it used to be. Previously, she would have relief in 45 minutes, but lately finds her migraines return and can persist for 4-5 days despite using second dose of Maxalt.     In the past, she has tried sumatriptan tablet and injection which were not effective and she also did not tolerate side effect of flushing. She has also used zolmitriptan in the past. Possibly this was stopped due to less effectiveness, " "but she had previously had good response to zolmitriptan.    For headache prevention, she is using magnesium supplements with uncertain benefit.     From office visit note with Dr. Margarette Hassan 11/12/2021:  \"Past treatment trials include:  Topamax-100mg marked dec in HA freq to 1/mo but slowly wore off and dose inc to 300mg '05. Retry '13 to 150mg with transient help then wore off.  Depakote 1000mg dec HA to 2/mo '99 retry '11 500mg no help. Imitrex short duration. Zomig lasts longer.   Zoloft no help.  Inderal 120mg helped migraines for awhile but then wore off '08.  Celexa helped anxiety/HA '09. Robaxin no help. Flexeril minor help protracted HA.  Neurontin 100mg '11 SE.  Lamictal 75mg bid with dec in HA and anxiety '15- HA 4-6d/mo but effect waned and d/c in 2021.   Acupuncture trial helped '16  ABORTIVES- Imitrex SE. Zomig stops migraine with early tx but nausea. Maxalt works well without nausea '20  2)NP- ongoing tension drives NP/HAs and scalenes main area that is tight. cyclobenzaprine helps.  3)Generalized anxiety- Neuropsych testing Noran '08 nl per pt and no ADD. She did better on Celexa winter '09 but stopped it on her own. Panic attack in '08 and in '09 with control on Celexa for awhile then hospitalized in 12/10 at [facility]. She is off Adderal, Zoloft and wellbutrin in '15. She was calmer on Lamictal 75mg bid and she has stopped this on her own twice now\"      She also has a stable meningioma, she monitors this with neurosurgery.  She also has a history of anxiety, depression, obsessive-compulsive disorder.               Past Medical History:   No past medical history on file.   Generalized anxiety, depression, obsessive-compulsive disorder, meningioma          Past Surgical History:     Past Surgical History:   Procedure Laterality Date     GYN SURGERY       STRIP VEIN               Social History:     Social History     Socioeconomic History     Marital status:      Spouse name: Not on file     " Number of children: Not on file     Years of education: Not on file     Highest education level: Not on file   Occupational History     Not on file   Tobacco Use     Smoking status: Never     Smokeless tobacco: Never   Substance and Sexual Activity     Alcohol use: Yes     Drug use: No     Sexual activity: Yes     Partners: Male   Other Topics Concern     Parent/sibling w/ CABG, MI or angioplasty before 65F 55M? Not Asked   Social History Narrative     Not on file     Social Determinants of Health     Financial Resource Strain: Not on file   Food Insecurity: Not on file   Transportation Needs: Not on file   Physical Activity: Not on file   Stress: Not on file   Social Connections: Not on file   Intimate Partner Violence: Not on file   Housing Stability: Not on file             Family History:   Mother and daughters have headache.    Family History   Problem Relation Age of Onset     Cancer Mother      Cancer Father      Coronary Artery Disease Maternal Grandfather      Cancer Paternal Grandmother      Blood Disease Brother      Cancer Daughter      Coronary Artery Disease Daughter              Allergies:    No Known Allergies          Medications:     Current Outpatient Medications:      alendronate (FOSAMAX) 70 MG tablet, alendronate 70 mg tablet, Disp: , Rfl:      Calcium Carb-Ergocalciferol 500-200 MG-UNIT TABS, , Disp: , Rfl:      calcium carbonate-vitamin D (CALTRATE) 600-10 MG-MCG per tablet, Take 2 tablets by mouth, Disp: , Rfl:      Calcium Citrate-Vitamin D3 315-6.25 MG-MCG TABS, Takes 600mg two times a day, Disp: , Rfl:      cyclobenzaprine (FLEXERIL) 10 MG tablet, Take 10 mg by mouth, Disp: , Rfl:      galcanezumab-gnlm (EMGALITY) 120 MG/ML injection, Inject 2 mLs (240 mg) Subcutaneous once for 1 dose, Disp: 2 mL, Rfl: 0     galcanezumab-gnlm (EMGALITY) 120 MG/ML injection, Inject 1 mL (120 mg) Subcutaneous every 28 days, Disp: 1 mL, Rfl: 11     lifitegrast (XIIDRA) 5 % opthalmic solution, Xiidra 5 % eye  drops in a dropperette  INSTILL 1 DROP IN BOTH EYES TWICE DAILY, Disp: , Rfl:      magnesium 200 MG TABS, Take by mouth every 24 hours, Disp: , Rfl:      magnesium oxide (MAG-OX) 400 MG tablet, Take 400 mg by mouth, Disp: , Rfl:      ofloxacin (OCUFLOX) 0.3 % ophthalmic solution, INSTILL 1 DROP INTO AFFECTED EYE(S) 4 TIMES A DAY, Disp: , Rfl:      polyethylene glycol (MIRALAX) 17 GM/Dose powder, , Disp: , Rfl:      rizatriptan (MAXALT) 10 MG tablet, Take 1 tablet (10 mg) by mouth at onset of headache for migraine (repeat in 2 hours if needed), Disp: 18 tablet, Rfl: 11     rosuvastatin (CRESTOR) 10 MG tablet, Take 10 mg by mouth daily, Disp: , Rfl:      traZODone (DESYREL) 50 MG tablet, Take 1 tablet by mouth At Bedtime, Disp: , Rfl:      tretinoin (RETIN-A) 0.1 % external cream, , Disp: , Rfl:      COMPRESSION STOCKINGS, 1 each daily (Patient not taking: Reported on 12/21/2022), Disp: 2 Units, Rfl: 4          Physical Exam:   There were no vitals taken for this visit.   Physical Exam:   General: NAD  Neurologic:   Mental Status Exam: Alert, awake and oriented to situation. No dysarthria. Speech of normal fluency.   Cranial Nerves: Pupils equal, EOMs intact, no nystagmus, facial movements symmetric, hearing intact to conversation, tongue midline and fully mobile. No tongue atrophy or fasciculations.    Motor: No drift in upper extremities. Able to stand from a seated position without use of arms. No tremors or abnormal movements noted.   Coordination: With arms outstretched, able to touch nose using index finger accurately bilaterally.  Normal finger tapping bilaterally.     Gait: Normal stance and casual gait.    Neck: Normal range of motion with lateral head movements and neck flexion.  Eyes: No conjunctival injection, no scleral icterus.           Data:     MRI brain (12/2/2022):   Comparison: 12/14/2021   Findings: No evidence of diffusion restriction to suggest acute ischemia. No midline shift. Stable scattered  foci of T2 prolongation in the supratentorial subcortical white matter. No hydrocephalus. No suspicious extra-axial collection or acute intracranial hemorrhage. Stable 1.8 x 0.5 x 1.6 cm (AP x TR x CC) extra-axial dural-based enhancing lesion along the interhemispheric fissure consistent with meningioma. No significant mass effect on adjacent structures. No associated parenchymal edema. Expected intracranial vascular flow voids are preserved. The orbits are unremarkable. The paranasal sinuses are clear. The mastoid air cells are clear.   Impression:   1. Stable meningioma along the interhemispheric fissure. No significant mass effect on adjacent structures. No associated parenchymal edema. No new lesions.   2. Stable mild presumed chronic microangiopathic white matter changes.          Class II - visualization of the soft palate, fauces, and uvula

## 2023-02-08 NOTE — ED PROVIDER NOTE - PRINCIPAL DIAGNOSIS
Maxillary sinus cancer O-L Flap Text: The defect edges were debeveled with a #15 scalpel blade.  Given the location of the defect, shape of the defect and the proximity to free margins an O-L flap was deemed most appropriate.  Using a sterile surgical marker, an appropriate advancement flap was drawn incorporating the defect and placing the expected incisions within the relaxed skin tension lines where possible.    The area thus outlined was incised deep to adipose tissue with a #15 scalpel blade.  The skin margins were undermined to an appropriate distance in all directions utilizing iris scissors.

## 2023-05-03 NOTE — CONSULT NOTE ADULT - PROBLEM SELECTOR RECOMMENDATION 3
Pt arrived to unit without complaint, tolerated Venofer infusion without any adverse reaction  Pt left unit in stable condition without question or concern, AVS provided 
monitor CBC, transfuse prn, had bleeding from sinus tumor this am  CT angio pending, if positive may need IR embolization
Patient reports anxiety given recent trach and bleeding concerns.  Akiko reports he takes Xanax 2mg via PEG QHS and Xanax 0.5mg via PEG PRN (rarely uses at baseline).  Would recommend Xanax 0.5mg via PEG q4h PRN anxiety.  Psychosocial support provided.

## 2023-07-26 NOTE — PATIENT PROFILE ADULT. - TEACHING/LEARNING FACTORS INFLUENCE READINESS TO LEARN OTHER
[FreeTextEntry1] : I, Julia Morales, acted solely as a scribe for Dr. Ricardo Batista MD on this date 07/26/2023  \par \par All medical record entries made by the Scribe were at my, Dr. Ricardo Batista MD., direction and personally dictated by me on 07/26/2023 . I have reviewed the chart and agree that the record accurately reflects my personal performance of the history, physical exam, assessment and plan. I have also personally directed, reviewed, and agreed with the chart. 
none

## 2023-12-01 NOTE — ASU PREOP CHECKLIST - PATIENT SENT TO
acetaminophen 325 mg oral tablet: 3 tab(s) orally every 6 hours  cefpodoxime 100 mg oral tablet: 1 tab(s) orally 2 times a day  cephalexin 500 mg oral capsule: 1 cap(s) orally every 6 hours  cephalexin 500 mg oral tablet: 1 tab(s) orally 4 times a day   mg oral tablet: 1 tab(s) orally 4 times a day  ibuprofen 600 mg oral tablet: 1 tab(s) orally every 6 hours   operating room acetaminophen 325 mg oral tablet: 3 tab(s) orally every 6 hours  Bactrim  mg-160 mg oral tablet: 1 tab(s) orally 2 times a day

## 2023-12-13 NOTE — CONSULT NOTE ADULT - PROBLEM SELECTOR RECOMMENDATION 9
Spoke to pt and daughter and aware of fasting BW orders.   -spiculated lung nodule on CT  -will be difficult to biopsy given location more than 1cm in chest and in the upper lobes. Will likely eventually need CT surgery evaluation as outpatient.  -After reviewing films with chest radiologists the best plan of action is to have a short interval followup at 1 month and if still present then proceed with PET/CT  -There is a high suspicion for malignancy  -smoking cessation -continue AVAPs at current settings  -patient has his home machine

## 2024-02-21 NOTE — ED ADULT NURSE NOTE - PM[facility]
INFECTIOUS DISEASE CLINIC  2/21/24    Subjective:      Chief Complaint:   Pulmonary MAC follow up     History of Present Illness:    Patient Diego Gunter is a 81 y.o. male  with h/o emphysema and cavitary MAC, alzheimers, epidural hematoma last seen 9/2023 here for MAC follow up. Abx paused 2/2 development of resistance and cost. Compliant with aerobika and inhaled hypertonic saline.     Since last visit, I submitted e-consult to [facility]   They recommended adult day unit at [facility] to get an evaluation by their cardiothoracic surgeon (to see if they can offer you surgical resection of your infection). If you are able to travel to [place] and interested in this, please let me know and I'll call the physician line to get you referred. This is a different program than their regular 10 day program.      They also recommended continuing clofazamine, ethambutol, rifampin, and inhaled arikayce (and tezolid and bedaquiline if not cost prohibitive).       Had reached out to patient, not interested in traveling to [place]    Open to re-starting antibiotics, but can't afford tezolid or bedaqueline    Has clofazamine, ethambutol, rifampin, and inhaled arikayce     Since last visit has been diagnozed with dementia/alzheimers. Feeling better since starting meds- donepezil, memantine    Resp symptosm stable and much better than when he first started treatment         Organism     MYCOBACTERIUM AVIUM COMPLEX   Antibiotic                   TED (mcg/mL)  Interpretation   ----------------------------------------------------------   Clofazimine                          0.12   Moxifloxacin                            2       I   Clarithromycin                        >64       R   Amikacin (IV)                          64       R   Amikacin (liposomal, inhaled)          64       S   Linezolid                              32       R       4/17/23  Organism     MYCOBACTERIUM AVIUM COMPLEX   Antibiotic                    TED (mcg/mL)  Interpretation   ----------------------------------------------------------   Clofazimine                          0.12   Moxifloxacin                            2       I   Clarithromycin                        >64       R   Amikacin (IV)                          32       I   Amikacin (liposomal, inhaled)          32       S   Linezolid                              32       R         CT 2/19     No acute process.     Stable chronic changes of HILLARY infection.               Takes prilosec for reflux.     Quit smoking 57 y ago.     Had bronch 10/2018 for work up of lung mass     BRONCHIAL WASH CYTOLOGY:  NEGATIVE EXAM-NO NEOPLASIA IDENTIFIED  NO ORGANISMS IDENTIFIED WITH A GMS STAIN  THE CONTROL STAINED APPROPRIATEL              Review of Symptoms:  Constitutional: Denies fevers, chills, or weakness.  ENT: Denies dysphagia, nasal discharge, ear pain or discharge.  Cardiovascular: Denies chest pain, palpitations, orthopnea, or claudication.  Respiratory: Denies shortness of breath, cough, hemoptysis, or wheezing.  GI: Denies nausea/vomitting, hematochezia, melena, abd pain, or changes in appetite.  : Denies dysuria, incontinence, or hematuria.  Musculoskeletal: Denies joint pain or myalgias.  Skin/breast: Denies rashes, lumps, lesions, or discharge.  Neurologic: Denies headache, dizziness, vertigo, or paresthesias.    Past Medical History:   Diagnosis Date    Acute encephalopathy     Anemia     Antiphospholipid syndrome 11/19/2021    Carpal tunnel syndrome on right 01/10/2023    Centrilobular emphysema     COPD (chronic obstructive pulmonary disease)     Cubital tunnel syndrome on right 01/10/2023    Dysphagia     Dysphagia, oropharyngeal 06/17/2016    - improved with speech therapy    Functional belching disorder 12/07/2021    Hard of hearing     Hx of colonic polyps     followed by GI. Dr. Pham    Hx of colonic polyps     followed by GI. Dr. Pham    Hyperlipidemia associated with type  2 diabetes mellitus     Hyperlipidemia LDL goal <100     Hypernatremia 05/05/2022    Hypertension associated with type 2 diabetes mellitus     Hypertension associated with type 2 diabetes mellitus     Hypotension     Intermittent confusion 04/29/2022    Overweight(278.02)     Prostate cancer 09/2011    followed by urology, Dr. Rogers    Pulmonary embolism     Reducible right inguinal hernia 10/10/2022    Right hemiparesis 07/22/2022    Secondary to cervical hematoma    Type II or unspecified type diabetes mellitus without mention of complication, not stated as uncontrolled     diet controlled    Urinary retention 04/29/2022       Past Surgical History:   Procedure Laterality Date    BRONCHOSCOPY N/A 10/15/2018    Procedure: BRONCHOSCOPY;  [facility];  Location: [facility] OR 2ND FLR;  Service: Anesthesiology;  Laterality: N/A;    CARPAL TUNNEL RELEASE Right 1/11/2023    Procedure: RELEASE, CARPAL TUNNEL;  Surgeon: Romero Lazo MD;  Location: [facility];  Service: Orthopedics;  Laterality: Right;    CATARACT EXTRACTION, BILATERAL  2014    COLONOSCOPY N/A 5/16/2023    Procedure: COLONOSCOPY;  Surgeon: Buster Sequeira MD;  Location: [facility] (4TH FLR);  Service: Endoscopy;  Laterality: N/A;    DECOMPRESSION, NERVE, ULNAR Right 1/11/2023    Procedure: DECOMPRESSION, NERVE, ULNAR - right cub jolie and guyon's decompression as well as open right carpal tunnel release;  Surgeon: Romero Lazo MD;  Location: [facility] OR;  Service: Orthopedics;  Laterality: Right;    ESOPHAGOGASTRODUODENOSCOPY N/A 5/16/2023    Procedure: EGD (ESOPHAGOGASTRODUODENOSCOPY);  Surgeon: Buster Sequeira MD;  Location: [facility] (4TH FLR);  Service: Endoscopy;  Laterality: N/A;  inst mailed-RB  5/10/23 no answer for precall/mleone lpn    POSTERIOR CERVICAL LAMINECTOMY Bilateral 4/28/2022    Procedure: LAMINECTOMY, SPINE, CERVICAL, POSTERIOR APPROACH C3-6;  Surgeon: Carlos Miller MD;  Location: [facility] OR 2ND FLR;  Service: Neurosurgery;   Laterality: Bilateral;    PROSTATECTOMY      REPAIR, HERNIA, INGUINAL, WITHOUT HISTORY OF PRIOR REPAIR, AGE 5 YEARS OR OLDER Right 10/10/2022    Procedure: REPAIR, HERNIA, INGUINAL, WITHOUT HISTORY OF PRIOR REPAIR, AGE 5 YEARS OR OLDER;  Surgeon: Dario Esquivel MD;  Location: [facility] OR [address] [place] [address];  Service: General;  Laterality: Right;  open right inguinal hernia repair with mesh       Family History   Problem Relation Age of Onset    Colon cancer Mother     Diabetes Mother     Heart disease Father     Mental illness Sister     Diabetes Sister     Other Brother         polio as a child    Pulmonary fibrosis Brother     Diabetes Brother     Myasthenia gravis Brother     Parkinsonism Brother     Diabetes Brother     Dementia Brother     Lung cancer Brother         smoker    Diabetes Maternal Aunt     Diabetes Other     Esophageal cancer Neg Hx        Social History     Socioeconomic History    Marital status:      Spouse name: Addis    Number of children: 2   Occupational History    Occupation: [employer]    Tobacco Use    Smoking status: Former     Current packs/day: 0.00     Average packs/day: 0.3 packs/day for 5.0 years (1.3 ttl pk-yrs)     Types: Cigarettes     Start date: 10/2/1957     Quit date: 10/2/1962     Years since quittin.4    Smokeless tobacco: Former    Tobacco comments:     quit age 21   Substance and Sexual Activity    Alcohol use: Not Currently    Drug use: No    Sexual activity: Yes     Partners: Female     Social Determinants of Health     Financial Resource Strain: Low Risk  (2023)    Overall Financial Resource Strain (CARDIA)     Difficulty of Paying Living Expenses: Not hard at all   Food Insecurity: No Food Insecurity (2023)    Hunger Vital Sign     Worried About Running Out of Food in the Last Year: Never true     Ran Out of Food in the Last Year: Never true   Transportation Needs: No Transportation Needs (2023)    PRAPARE - Transportation     Lack of  "Transportation (Medical): No     Lack of Transportation (Non-Medical): No   Physical Activity: Insufficiently Active (12/4/2023)    Exercise Vital Sign     Days of Exercise per Week: 1 day     Minutes of Exercise per Session: 10 min   Stress: No Stress Concern Present (12/4/2023)    [employer] - Occupational Stress Questionnaire     Feeling of Stress : Not at all   Social Connections: Moderately Isolated (12/4/2023)    Social Connection and Isolation Panel [NHANES]     Frequency of Communication with Friends and Family: Once a week     Frequency of Social Gatherings with Friends and Family: Once a week     Attends Restorationism Services: More than 4 times per year     Active Member of Clubs or Organizations: No     Attends Club or Organization Meetings: Never     Marital Status:    Housing Stability: Low Risk  (12/4/2023)    Housing Stability Vital Sign     Unable to Pay for Housing in the Last Year: No     Number of Places Lived in the Last Year: 1     Unstable Housing in the Last Year: No       Review of patient's allergies indicates:  No Known Allergies      Objective:   /70   Pulse (!) 51   Ht 5' 10" (1.778 m)   Wt 73 kg (160 lb 13.2 oz)   SpO2 95%   BMI 23.08 kg/m²     General: Afebrile, alert, comfortable, no acute distress.   HEENT: TERELL. EOMI, no scleral icterus.   Pulmonary: Non labored,clear to auscultation A/P/L. No wheezing, crackles, or rhonchi.  Cardiac: normal S1 & S2 w/o rubs/murmurs/gallops.   Abdominal: Non-tender, non-distended.  Extremities: Moves all extremities x 4. No peripheral edema.  Skin: No jaundice, rashes, or visible lesions.   Neurological:  Alert and oriented x 4.     Labs:    Glucose   Date Value Ref Range Status   11/24/2023 75 70 - 110 mg/dL Final   07/24/2023 84 70 - 110 mg/dL Final   07/07/2023 100 70 - 110 mg/dL Final       Calcium   Date Value Ref Range Status   11/24/2023 9.2 8.7 - 10.5 mg/dL Final   07/24/2023 9.2 8.7 - 10.5 mg/dL " Final   07/07/2023 9.6 8.7 - 10.5 mg/dL Final       Albumin   Date Value Ref Range Status   11/24/2023 3.8 3.5 - 5.2 g/dL Final   07/24/2023 4.1 3.5 - 5.2 g/dL Final   07/07/2023 4.5 3.5 - 5.2 g/dL Final       Total Protein   Date Value Ref Range Status   11/24/2023 7.1 6.0 - 8.4 g/dL Final   07/24/2023 6.8 6.0 - 8.4 g/dL Final   07/07/2023 7.3 6.0 - 8.4 g/dL Final       Sodium   Date Value Ref Range Status   11/24/2023 145 136 - 145 mmol/L Final   07/24/2023 140 136 - 145 mmol/L Final   07/07/2023 140 136 - 145 mmol/L Final       Potassium   Date Value Ref Range Status   11/24/2023 4.1 3.5 - 5.1 mmol/L Final   07/24/2023 4.1 3.5 - 5.1 mmol/L Final   07/07/2023 4.5 3.5 - 5.1 mmol/L Final       CO2   Date Value Ref Range Status   11/24/2023 28 23 - 29 mmol/L Final   07/24/2023 28 23 - 29 mmol/L Final   07/07/2023 30 (H) 23 - 29 mmol/L Final       Chloride   Date Value Ref Range Status   11/24/2023 108 95 - 110 mmol/L Final   07/24/2023 106 95 - 110 mmol/L Final   07/07/2023 105 95 - 110 mmol/L Final       BUN   Date Value Ref Range Status   11/24/2023 12 8 - 23 mg/dL Final   07/24/2023 14 8 - 23 mg/dL Final   07/07/2023 13 8 - 23 mg/dL Final       Creatinine   Date Value Ref Range Status   11/24/2023 0.9 0.5 - 1.4 mg/dL Final   07/24/2023 0.9 0.5 - 1.4 mg/dL Final   07/07/2023 1.0 0.5 - 1.4 mg/dL Final       Alkaline Phosphatase   Date Value Ref Range Status   11/24/2023 71 55 - 135 U/L Final   07/24/2023 62 55 - 135 U/L Final   07/07/2023 62 55 - 135 U/L Final       ALT   Date Value Ref Range Status   11/24/2023 16 10 - 44 U/L Final   07/24/2023 23 10 - 44 U/L Final   07/07/2023 26 10 - 44 U/L Final       AST   Date Value Ref Range Status   11/24/2023 21 10 - 40 U/L Final   07/24/2023 21 10 - 40 U/L Final   07/07/2023 23 10 - 40 U/L Final       Total Bilirubin   Date Value Ref Range Status   11/24/2023 0.4 0.1 - 1.0 mg/dL Final     Comment:     For infants and newborns, interpretation of results should be based  on  gestational age, weight and in agreement with clinical  observations.    Premature Infant recommended reference ranges:  Up to 24 hours.............<8.0 mg/dL  Up to 48 hours............<12.0 mg/dL  3-5 days..................<15.0 mg/dL  6-29 days.................<15.0 mg/dL     07/24/2023 0.4 0.1 - 1.0 mg/dL Final     Comment:     For infants and newborns, interpretation of results should be based  on gestational age, weight and in agreement with clinical  observations.    Premature Infant recommended reference ranges:  Up to 24 hours.............<8.0 mg/dL  Up to 48 hours............<12.0 mg/dL  3-5 days..................<15.0 mg/dL  6-29 days.................<15.0 mg/dL     07/07/2023 0.6 0.1 - 1.0 mg/dL Final     Comment:     For infants and newborns, interpretation of results should be based  on gestational age, weight and in agreement with clinical  observations.    Premature Infant recommended reference ranges:  Up to 24 hours.............<8.0 mg/dL  Up to 48 hours............<12.0 mg/dL  3-5 days..................<15.0 mg/dL  6-29 days.................<15.0 mg/dL         WBC   Date Value Ref Range Status   11/24/2023 6.77 3.90 - 12.70 K/uL Final   11/04/2022 6.00 3.90 - 12.70 K/uL Final   10/21/2022 6.65 3.90 - 12.70 K/uL Final       Hemoglobin   Date Value Ref Range Status   11/24/2023 13.1 (L) 14.0 - 18.0 g/dL Final   11/04/2022 10.1 (L) 14.0 - 18.0 g/dL Final   10/21/2022 9.7 (L) 14.0 - 18.0 g/dL Final       POC Hematocrit   Date Value Ref Range Status   04/28/2022 34 (L) 36 - 54 %PCV Final   04/28/2022 42 36 - 54 %PCV Final     Hematocrit   Date Value Ref Range Status   11/24/2023 41.1 40.0 - 54.0 % Final   11/04/2022 31.2 (L) 40.0 - 54.0 % Final   10/21/2022 30.0 (L) 40.0 - 54.0 % Final       MCV   Date Value Ref Range Status   11/24/2023 88 82 - 98 fL Final   11/04/2022 86 82 - 98 fL Final   10/21/2022 84 82 - 98 fL Final       Platelets   Date Value Ref Range Status   11/24/2023 258 150 - 450 K/uL  "Final   11/04/2022 340 150 - 450 K/uL Final   10/21/2022 396 150 - 450 K/uL Final       Lab Results   Component Value Date    CHOL 135 03/08/2023    CHOL 116 (L) 04/28/2022    CHOL 116 (L) 03/07/2022       Lab Results   Component Value Date    HDL 40 03/08/2023    HDL 36 (L) 04/28/2022    HDL 43 03/07/2022       Lab Results   Component Value Date    LDLCALC 70.8 03/08/2023    LDLCALC 62.4 (L) 04/28/2022    LDLCALC 54.4 (L) 03/07/2022       Lab Results   Component Value Date    TRIG 121 03/08/2023    TRIG 88 04/28/2022    TRIG 93 03/07/2022       Lab Results   Component Value Date    CHOLHDL 29.6 03/08/2023    CHOLHDL 31.0 04/28/2022    CHOLHDL 37.1 03/07/2022       RPR   Date Value Ref Range Status   12/07/2023 Non-reactive Non-reactive Final   05/08/2022 Non-reactive Non-reactive Final   10/21/2019 Non-reactive Non-reactive Final     No results found for: "QUANTIFERON"    Medications:  Current Outpatient Medications on File Prior to Visit   Medication Sig Dispense Refill    albuterol (VENTOLIN HFA) 90 mcg/actuation inhaler Inhale 2 puffs into the lungs daily as needed (30 min before arikayce). Rescue (Patient not taking: Reported on 9/18/2023) 18 g 11    ARIKAYCE 590 mg/8.4 mL NbSp       aspirin (ECOTRIN) 81 MG EC tablet Take 81 mg by mouth once daily.      atorvastatin (LIPITOR) 10 MG tablet Take 10 mg by mouth every evening.      atorvastatin (LIPITOR) 40 MG tablet Take 40 mg by mouth every evening.      azelastine (ASTELIN) 137 mcg (0.1 %) nasal spray 2 sprays (274 mcg total) by Nasal route 2 (two) times daily. 30 mL 0    B COMPLEX 1, WITH FOLIC ACID, 0.4 mg Tab Take by mouth.      b complex vitamins capsule Take 1 capsule by mouth once daily.      COMIRNATY 2023-24, 12Y UP,,PF, 30 mcg/0.3 mL inection       donepeziL (ARICEPT) 5 MG tablet Take 2 tablets (10 mg total) by mouth every evening. 120 tablet 2    EScitalopram oxalate (LEXAPRO) 10 MG tablet Take 1.5 tablets (15 mg total) by mouth once daily. 145 tablet " "0    ethambutoL (MYAMBUTOL) 400 MG Tab Take 2 tablets (800 mg total) by mouth once daily. 60 tablet 5    FLUZONE HIGHDOSE QUAD 23-24  mcg/0.7 mL Syrg       INV clofazimine capsule Take 2 capsules (100 mg total) by mouth once daily with meals. For Investigational Use Only. Patient Study Req ID# [id number]. Protocol: [id number] 200 capsule 0    memantine (NAMENDA) 5 MG Tab Take 1 tablet (5 mg total) by mouth 2 (two) times daily. Initially 5mg daily for 1 week, and then increased to 5mg twice a day. 60 tablet 11    nebulizer and compressor (Manifest COMPRESSOR SYSTEM) Marcy use to administer nebulized medication as directed 1 each 0    pantoprazole (PROTONIX) 40 MG tablet TAKE 1 TABLET(40 MG) BY MOUTH EVERY DAY 30 MINUTES BEFORE BREAKFAST 90 tablet 1    pravastatin (PRAVACHOL) 10 MG tablet Take 1 tablet (10 mg total) by mouth every evening. 90 tablet 1    sodium chloride 3% 3 % nebulizer solution USE 4 ML VIAL IN NEBULIZER  TWICE DAILY 480 mL 11    sodium chloride 7% 7 % nebulizer solution use one vial in nebulizer twice a day 500 mL 11    [DISCONTINUED] omega-3 fatty acids 1,000 mg Cap Take 2 g by mouth.       No current facility-administered medications on file prior to visit.       Antibiotics:   Antibiotics (From admission, onward)      None            HIV: No components found for: "HIV 1/2 AG/AB"  Hepatitis C IgG: No components found for: "HEPATITIS C"  Syphilis:   RPR   Date Value Ref Range Status   12/07/2023 Non-reactive Non-reactive Final   05/08/2022 Non-reactive Non-reactive Final   10/21/2019 Non-reactive Non-reactive Final       Hepatitis A IgG: No components found for: "HEPATITIS A IGG"  Hepatitis Bc IgG: No components found for: "HEPATITIS B CORE IGG"  Hepatitis Bs IgG:  Quantiferon: No results found for: "QUANTIFERON"  VZV IgG: No components found for: "VARICELLA IGG"    No components found for: "SEDIMENTATION RATE"  No components found for: "C-REACTIVE PROTEIN"      Microbiology x 7d:   Microbiology " "Results (last 7 days)       ** No results found for the last 168 hours. **            Immunization History   Administered Date(s) Administered    COVID-19, MRNA, LN-S, PF (Pfizer) (Purple Cap) 01/13/2021, 04/28/2021, 11/05/2021    COVID-19, mRNA, LNP-S, PF, douglas-sucrose, 30 mcg/0.3 mL (Pfizer 2023 Ages 12+) 10/03/2023    Influenza 10/19/2022    Influenza (FLUAD) - Trivalent - Adjuvanted - PF (65+) 09/25/2019    Influenza - High Dose - PF (65 years and older) 09/18/2015, 10/04/2016, 10/23/2017, 09/21/2018    Influenza - Quadrivalent - High Dose - PF (65 years and older) 11/05/2021, 10/18/2022, 10/03/2023    Influenza - Quadrivalent - PF *Preferred* (6 months and older) 10/06/2014    Influenza A (H1N1) 2009 Monovalent - IM 01/22/2010    Influenza Split 10/10/2011, 10/06/2014    Pneumococcal Conjugate - 13 Valent 10/28/2015    Pneumococcal Polysaccharide - 23 Valent 07/12/2013    Tdap 03/08/2015    Zoster Recombinant 09/25/2019, 12/30/2019         Reviewed records today as well as relevant labs, cultures, and imaging    Assessment:     Pulmonary MAC, macrolide resistant   Cavitary lung lesion  Nodular liver- noted on CT; US abdomen 10/2021 No compelling sonographic evidence of cirrhosis.  empysema  Antiphospholipid antibody syndrome; h/o coumadin, paused after hematoma       81M with emphysema, epidural hematoma and cavitary pulm MAC that recently developed macrolide resistance on therapy. symptoms stable (minimal cough, weight stable). CT chest with stable cavitary lung lesion. sputum cultures persistently positive for MAC.     Unfortunately he is resistant to azithromycin and iv amikacin.  I can not get omadacycline or tedezolid or bedqaqueline because insurance wont' cover ("not FDA approved for MAC") and cost too high to pay out of pocket    e-consult to [facility]   They recommended adult day unit at [facility] to get an evaluation by their cardiothoracic surgeon (to see if they can offer you " surgical resection of your infection). If you are able to travel to [place] and interested in this, please let me know and I'll call the physician line to get you referred. This is a different program than their regular 10 day program.      They also recommended continuing clofazamine, ethambutol, rifampin, and inhaled arikayce (and tezolid and bedaquiline if not cost prohibitive).          Risk factor-   structural: emphysema, bronchiectasis  functional: reflux        Qtc 407 on 3/8/23    The patient has numerous risk factors for disease progression including male gender, older age,  fibrocavitary disease, macrolide resistance, bronchiectasis. pulm disease from macrolide resistent mac has low cure rate independnetly of these other factors.    Goal of treatment is to prevent progression and not cure; not clear that pt is interested in surgery for removal of pulmonary cavitary lesion    No toxicities from antimicrobials  Extremely medically complex  Patient is high risk for infectious complications given medical comorbidities    Plan:     Resume clofazamine, ethambutol, arikayce. Couseled on side effects    While on inhaled amikacin- Dysphonia can happen in 1st month (warm water gargle). Pre-treat with albuteral 30min before inhaled amikacin    While on ethambutol- self vision screens daily. If blurry vision lasts 2 days in a row, patient known to stop ethambutol and call eye doctor for exam and to let us know that this happened.    Stressed importance of airway clearance. continue Aerobika and nebulized hypertonic saline bid.      Prevent reflux- avoid stomach sleeping. Famotidine/tums. Stop liquids 3hr before bedtime     Monitor afb sputum monthly     - Will monitor drug therapy for toxicity      I have sent communication to the referring physician and/or primary care provider.     I spent a total of 45 minutes on the day of the visit. This includes face to face time and non-face to face time preparing to see the  patient (eg, review of tests), obtaining and/or reviewing separately obtained history, documenting clinical information in the electronic or other health record, independently interpreting results, and communicating results to the patient/family/caregiver, or care coordination.      Paola Rao MD, MPH  Infectious Disease      Orders Placed This Encounter   Procedures    AFB Culture & Smear     Standing Status:   Standing     Number of Occurrences:   11     Standing Expiration Date:   4/21/2025    Comprehensive Metabolic Panel     Standing Status:   Standing     Number of Occurrences:   11     Standing Expiration Date:   4/21/2025    ECG 12 lead     Standing Status:   Standing     Number of Occurrences:   11     Standing Expiration Date:   2/21/2025              Bipolar disorder, unspecified    CAD (coronary artery disease)    CHF (congestive heart failure)  2014  Chronic obstructive pulmonary disease, unspecified COPD type    Diaphragm dysfunction  partial paralysis- now resolved as per wife  ETOH abuse    HF (heart failure)    HLD (hyperlipidemia)    HTN (hypertension)    YANET (mycobacterium avium-intracellulare)  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

## 2024-05-30 NOTE — PROGRESS NOTE ADULT - SUBJECTIVE AND OBJECTIVE BOX
CHIEF COMPLAINT:  Chief Complaint   Patient presents with   • Office Visit     Nailcare. Bilateral jungle rot. Numbness and itchiness with red bumps flares up once a month. Toenail fungus        Diony Hernandez is a 73 year old male who presents to clinic for the first time today with concerns of multiple complaints.  Patient relates he has several issues with his feet and believes these are due to agent orange exposure.  He relates that he has numbness and tingling of his feet bilaterally that he takes gabapentin for.  He also relates he has a redness on the bottom of his feet with diffuse skin flaking and itching and skin peeling and he does not know what to try for this and it has been going on for many years.  He denies any evidence of foot wound or infection.  He also relates his toenails are very thick and they are very difficult for him to trim.  As a result of the thickness of his nails, he relates he has significant pain with ambulation as well as fitting of appropriate shoe gear.  He denies any other pedal complaints today.       The patient denies any other pedal complaints. The patient denies any nausea, vomiting, fever, chills, shortness of breath, chest pain or other constitutional symptoms.     I have reviewed the patient's medications and allergies, past medical, surgical, social and family history, updating these as appropriate.  See Histories section of the EMR for a display of this information.    PAST MEDICAL HISTORY:   Pcp, Verify  No past medical history on file.  There is no problem list on file for this patient.    REVIEW OF SYSTEMS:  Negative unless otherwise stated in the HPI.  Allergies:  ALLERGIES:  Not on File  Medications:   Current Outpatient Medications   Medication Sig Dispense Refill   • clotrimazole (LOTRIMIN) 1 % cream Apply topically 2 times daily for 14 days. 40 g 0   • gabapentin (NEURONTIN) 600 MG tablet Take 1 tablet by mouth in the morning and 1 tablet in the evening. 
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
(Patient not taking: Reported on 5/30/2024)     • albuterol 108 (90 Base) MCG/ACT inhaler INHALE 2 PUFFS BY MOUTH EVERY 4 HOURS AS NEEDED SHAKE WELL     • amLODIPine (NORVASC) 5 MG tablet 5 mg.     • fluticasone-salmeterol 250-50 MCG/ACT inhaler INHALE 1 INHALATION BY MOUTH TWICE A DAY - RINSE MOUTH WITH WATER AFTER USE     • hydrochlorothiazide (HYDRODIURIL) 50 MG tablet 50 mg.     • lisinopril (ZESTRIL) 40 MG tablet Take 1 tablet by mouth daily.     • rosuvastatin (CRESTOR) 40 MG tablet 20 mg.     • traMADol (ULTRAM) 50 MG tablet TAKE 1 OR 2 TABLETS BY MOUTH EVERY DAY AS NEEDED FOR PAIN       No current facility-administered medications for this visit.     Family History:  No family history on file.  Social History:       Surgical History:  No past surgical history on file.    PHYSICAL EXAMINATION:  Vital Signs:  Visit Vitals  /80   Pulse 80   Resp 16   Wt (!) 139.7 kg (308 lb)       General:   Patient is alert and oriented and in no apparent distress.    They present a good history.    Vitals are noted in the chart.    Vascular:  DP and PT are 1/4, bilateral.  CFT is less than 3 seconds times 10 digits.  No pedal ecchymosis or edema.      Neurological:  Light touch sensation is decreased to the level of the digits.     Dermatologic:  Skin warm, dry and supple.  There is significant skin flaking and erythema in moccasin distribution of the plantar aspect of the foot bilaterally.  There is no evidence of open wound, drainage, purulence, maceration, malodor or granuloma formation.  Nails times 10 are elongated, dystrophic, discolored and thickened.  There are no open or other skin lesions noted.    Musculoskeletal:  Strength is 5/5 for all tendons crossing at the level of the ankle joint, for all movements.  There is pain on palpation of the toenails times 10, secondary to their thickness.  There are no other symptomatic biomechanical deformities noted.    ASSESSMENT:  1) tinea pedis, bilateral  2) 
ANESTHESIA POSTOP CHECK    57y Male POSTOP DAY 1 S/P Direct Laryngoscopy, Nasal Endoscopy with biopsies    Vital Signs Last 24 Hrs  T(C): 36.6 (15 Marcos 2018 10:03), Max: 36.8 (14 Jun 2018 19:00)  T(F): 97.8 (15 Marcos 2018 10:03), Max: 98.2 (14 Jun 2018 19:00)  HR: 70 (15 Marcos 2018 10:03) (64 - 98)  BP: 129/77 (15 Marcos 2018 10:03) (129/77 - 166/84)  BP(mean): --  RR: 18 (15 Marcos 2018 10:03) (14 - 19)  SpO2: 98% (15 Marcos 2018 10:03) (93% - 99%)  I&O's Summary    14 Jun 2018 07:01  -  15 Marcos 2018 07:00  --------------------------------------------------------  IN: 1700 mL / OUT: 200 mL / NET: 1500 mL    15 Marcos 2018 07:01  -  15 Marcos 2018 10:53  --------------------------------------------------------  IN: 100 mL / OUT: 0 mL / NET: 100 mL        [X] NO APPARENT ANESTHESIA COMPLICATIONS      Comments:
ANESTHESIA POSTOP CHECK    57y Male POSTOP DAY 1 S/P Revision of bilateral split osteotomies    Vital Signs Last 24 Hrs  T(C): 36.6 (15 Marcos 2018 10:03), Max: 36.8 (14 Jun 2018 19:00)  T(F): 97.8 (15 Marcos 2018 10:03), Max: 98.2 (14 Jun 2018 19:00)  HR: 70 (15 Marcos 2018 10:03) (64 - 98)  BP: 129/77 (15 Marcos 2018 10:03) (129/77 - 166/84)  BP(mean): --  RR: 18 (15 Marcos 2018 10:03) (14 - 19)  SpO2: 98% (15 Marcos 2018 10:03) (93% - 99%)  I&O's Summary    14 Jun 2018 07:01  -  15 Marcos 2018 07:00  --------------------------------------------------------  IN: 1700 mL / OUT: 200 mL / NET: 1500 mL    15 Marcos 2018 07:01  -  15 Marcos 2018 10:54  --------------------------------------------------------  IN: 100 mL / OUT: 0 mL / NET: 100 mL        [X] NO APPARENT ANESTHESIA COMPLICATIONS      Comments:   One episode of vomiting blood right when he came out. After that, no PONV. No complications noted.
onychomycosis x 10 with associated toe nail pain  3) peripheral neruopathy        PLAN:  The patient was seen and evaluated in podiatry clinic today.   Discussed underlying etiology as well as treatment options.  Questions are encouraged and answered.  Patient presents with tinea pedis bilaterally and onychomycosis with the associated toenail pain and peripheral neuropathy.  Discussed conservative treatment options with the patient.  He would like to try topical clotrimazole cream and a prescription has been provided for this for the patient.  He will apply for at least 2-3 weeks over affected areas of his feet bilaterally.  Writer recommends that he can also do a Vicks Vaporub or topical tea tree oil to help soften nails so he can trim them on his own at home.  Patient is already on gabapentin for his peripheral neuropathy and defers any further management today.  Nails debrided times 10 with sterile nail Nipper to 20 percent reduction in thickness and length without incident to patient's satisfaction.  Return to Clinic:  [place].

## 2024-07-23 NOTE — ED ADULT NURSE NOTE - NS ED NURSE TRANSPORT WITH
Quality 226: Preventive Care And Screening: Tobacco Use: Screening And Cessation Intervention: Patient screened for tobacco use and is an ex/non-smoker Detail Level: Detailed IV

## 2024-07-29 NOTE — ED PROVIDER NOTE - ENMT, MLM
Vitamin D level is low at 19.7.  Instructed patient to take over-the-counter Vitamin D 6779-0302 IUs daily.    I called the patient and left a message   Airway patent, Nasal mucosa clear. Mouth with normal mucosa. Throat has no vesicles, no oropharyngeal exudates and uvula is midline. dried blood in right nostril. TMs nml bilaterally.

## 2024-08-21 NOTE — DISCHARGE NOTE ADULT - PHYSICIAN SECTION COMPLETE
Lab Results   Component Value Date    HGBA1C 6.2 (H) 07/19/2024     Well-controlled A1c of 6.2%, he was encouraged to continue his effort managing diabetes.  He reports abdominal discomfort and is willing to switch metformin to extended release form which was made, if he continues to experience GI symptoms, will decrease metformin to 1000 mg daily.  Continue rest of the management.  Ophthalmology and podiatry follow-up.  Return back in 4 months.  Labs prior to next visit.   Yes

## 2025-03-19 NOTE — ED ADULT NURSE NOTE - NS ED NOTE ABUSE SUSPICION NEGLECT YN
Pt arrives ambultory with co sob and light headed with ambulation and irregular heart rate up to 140's that started this morning. Hx afib and cardioverted here 3/1/25. Hr 60-70's afib in triage. Has been in afib on/off since a few days after cardioversion.   
No

## 2025-04-08 NOTE — ASU PATIENT PROFILE, ADULT - PRO INTERPRETER NEED 2
"  [facility]    Cardiology  Progress Note    Patient Name: Tao Bee  MRN: [id number]  Admission Date: 3/29/2025  Hospital Length of Stay: 10 days  Code Status: Full Code   Attending Provider: Jackie Mir MD   Consulting Provider: SUNDAR Yip  Primary Care Physician: Penny Garsia FNP  Principal Problem:<principal problem not specified>    Patient information was obtained from patient, past medical records, ER records, and primary team.     Subjective:     Chief Complaint/Reason for Consult: MARY    HPI: Mr. Bee is a 60 y/o male with a history of HLD, HTN, CKD, who is known to [facility], Dr. Molina. He presented to the ER on 3.29.25 with complaints of abdominal pain, diarrhea, and melena. He was found to have septic shock and sepsis. He was also found to have non-bleeding gastric ulcers in the antrum with a clean base; GI recommended to repeat EGD in 8 weeks. He has had positive blood cultures with E.coli. Significant labs include WBC 30.45, H&H 7.9/23.5, PLTs 487, K 3.4, BUN/Creat 65.3/5.58, Albumin 1.6. ECHO showed an echo dense structure on the anterior leaflet, cannot exclude vegetation vs calcified chordae. [facility] has been consulted for MARY.     4.4.25: NAD. VSS. No complaints of CP/SOB/Palps. "I feel okay" WBC 21.29, H&H 8.7/25.9, Plts 522, BUN/Creat 34.7/3.59, Glucose 104, Albumin 1.6  4.5.25: NAD. VSS. No complaints of CP/SOB/Palps. "I'm okay. Ready to go back to our room" WBC 19.50, H&H 8.3/25.3, PLTs 543, K 4.03, Mag 1.6  4.6.25: NAD. VSS. No complaints of CP/SOB/Palps. "I feel okay" WBC 6.27. Plts 562, H&H 8.7/26.1, Creat 2.72  4.7.25: NAD. VSS. No complaints of CP/SOB/Palps. "I feel okay" WBC 15.96, H&H 8.4/25.9, BUN/Great 27.1/3.46   4.8.25: NAD. VSS. No complaints of CP/SOB/Palpst. "I'm good" WBC 16.19, H&H 8.3/26.1, PLTs 584, Creat 3.85. NPO for MARY Today     PMH: HLD, HTN, CKD, Non-Healing Wounds, Vitamin D Deficiency, Neuropathy  PSH: EGD  Family History: " Mother - DM II  Social History: Current Smoker (1/2 PPD). Alcohol use (28 drinks per week). Denies illicit drug use.     Previous Cardiac Diagnostics:   ECHO (4.3.25):  Left Ventricle: The left ventricle is normal in size. Increased wall thickness. There is normal systolic function with a visually estimated ejection fraction of 60 - 65%. Right Ventricle: The right ventricle is normal in size. Systolic function is normal. TAPSE is 2.68 cm. Mitral Valve: Mildly thickened leaflets. There is a echo dense structure on the anterior leaflet, cannot exclude vegetation vs calcified chordae. There is mild regurgitation. Recommend MARY for further evaluation if clinically indicated.    Lower Ext Venous US (2.26.24):  The study quality is average. Limited unilateral study right  leg only post GSV RFA procedure. No evidence of DVT is noted from the right CFV to the distal femoral vein segment with good compression, augmentation and phasic flow obtained.The right GSV is non-compressible with no flow noted from the proximal thigh to the proximal calf consistent with successful RFA procedure.    Arterial US (1.23.24):  The study quality is average. Normal bilateral lower extremity arterial duplex ultrasound.  No plaquing is visualized in bilateral lower extremities.    Venogram (12.7.22):  Bilateral femoral venography did not reveal critical venous compression Intravenous ultrasound was performed which did not reveal critical compression greater than 50%. Therefore no stenting was performed    ECHO (10.8.20):  The study quality is average. The left ventricle is decreased in size. Global left ventricular systolic function is normal. The left ventricular ejection fraction is 70%. The left ventricle diastolic function is impaired (Grade I) with normal left atrial pressure. Noted left ventricular hypertrophy. It is mild. Dilatation of the aortic root limited to the sinus of valsalva (3.9 cms) is noted. Mild (1+) mitral  regurgitation.    NPI (10.7.20):  This is a normal perfusion study, no perfusion defects noted. There is no evidence of ischemia. This scan is suggestive of low risk for future cardiovascular events. The left ventricular cavity is noted to be normal on the stress study. The left ventricular ejection fraction was calculated to be 63% and left ventricular global function is normal. The study quality is good.       Review of patient's allergies indicates:   Allergen Reactions    Opioids - morphine analogues Hallucinations     No current facility-administered medications on file prior to encounter.     Current Outpatient Medications on File Prior to Encounter   Medication Sig    gabapentin (NEURONTIN) 600 MG tablet Take 600 mg by mouth 3 (three) times daily.     Review of Systems   Constitutional:  Negative for fatigue and fever.   Respiratory:  Negative for shortness of breath.    Cardiovascular:  Negative for chest pain, palpitations and leg swelling.   All other systems reviewed and are negative.      Objective:     Vital Signs (Most Recent):  Temp: 98.8 °F (37.1 °C) (04/08/25 0722)  Pulse: 66 (04/08/25 0722)  Resp: 16 (04/08/25 0722)  BP: (!) 157/87 (04/08/25 0722)  SpO2: 99 % (04/08/25 0722) Vital Signs (24h Range):  Temp:  [97.5 °F (36.4 °C)-98.8 °F (37.1 °C)] 98.8 °F (37.1 °C)  Pulse:  [66-84] 66  Resp:  [16-18] 16  SpO2:  [95 %-99 %] 99 %  BP: (111-158)/(61-87) 157/87   Weight: 70 kg (154 lb 5.2 oz)  Body mass index is 24.16 kg/m².  SpO2: 99 %       Intake/Output Summary (Last 24 hours) at 4/8/2025 0853  Last data filed at 4/8/2025 0500  Gross per 24 hour   Intake 440 ml   Output 1600 ml   Net -1160 ml     Lines/Drains/Airways       Central Venous Catheter Line  Duration                  Hemodialysis Catheter 03/30/25 1233 right internal jugular 8 days              Drain  Duration                  Urethral Catheter 03/30/25 1300 Coude 16 Fr. 8 days              Peripheral Intravenous Line  Duration                "   Peripheral IV - Single Lumen 03/29/25 1030 20 G Right Antecubital 9 days         Peripheral IV - Single Lumen 03/30/25 1356 20 G Right;Lateral Forearm 8 days         Peripheral IV - Single Lumen 03/31/25 0200 18 G Left;Proximal;Medial Forearm 8 days                  Significant Labs:   Chemistries:   Recent Labs   Lab 04/03/25  0505 04/04/25  0357 04/05/25  0316 04/06/25  0350 04/07/25  0523 04/08/25  0546    140 138 139 141 142   K 3.4* 3.6 3.7 4.1 4.7 4.7   CL 99 104 104 104 103 105   CO2 31* 28 26 29 28 28   BUN 65.3* 34.7* 37.9* 22.0 27.1* 27.2*   CREATININE 5.58* 3.59* 4.03* 2.72* 3.46* 3.85*   CALCIUM 7.8* 8.1* 8.2* 8.3* 8.1* 8.4   BILITOT 0.3 0.2 0.2 0.3 0.3 0.3   ALKPHOS 62 70 74 67 70 64   ALT 7 8 10 11 9 9   AST 18 20 32 21 17 16   GLUCOSE 116* 104* 123* 95 93 92   MG  --   --  1.60  --  2.20  --    PHOS 4.5 3.5  --   --   --  5.4*        CBC/Anemia Labs: Coags:    Recent Labs   Lab 04/06/25  0350 04/07/25  0523 04/08/25  0546   WBC 16.27* 15.96* 16.19*   HGB 8.7* 8.4* 8.3*   HCT 26.1* 25.9* 26.1*   * 560* 584*   MCV 87.9 90.6 91.3   RDW 17.2* 17.1* 17.2*    No results for input(s): "PT", "INR", "APTT" in the last 168 hours.       Significant Imaging:  Imaging Results              US Retroperitoneal Limited (Final result)  Result time 03/29/25 12:20:31      Final result by Jakob Horan MD (03/29/25 12:20:31)                   Impression:      Findings concerning for pelvicaliectasis with changes of medical renal disease.  No overt evidence for obstructive uropathy.      Electronically signed by: Jakob Horan MD  Date:    03/29/2025  Time:    12:20               Narrative:    EXAMINATION:  US RETROPERITONEAL LIMITED    CLINICAL HISTORY:  Acute kidney failure, unspecified    TECHNIQUE:  Multiple sonographic images the kidneys were obtained by department sonographer.    COMPARISON:  None    FINDINGS:  Right kidney measures 10.9 cm in length.  Left kidney measures 10.2 cm length.  No evidence " for hydronephrosis, however, pelvicaliectasis is identified.  The parenchyma demonstrates increased echogenicity with cortical thinning.    Bladder is distended and normal.                                       CT Chest Abdomen Pelvis Without Contrast (XPD) (Final result)  Result time 03/29/25 09:44:25      Final result by Haylee Correia MD (03/29/25 09:44:25)                   Impression:      Abnormal edematous appearance of the right kidney.  There is fullness at the upper pole right kidney which is poorly characterized.  Appearance could be related to mass, phlegmon, focal pyelonephritis in the acute setting.  There is perinephric stranding on the right.  No martha hydronephrosis.  Further characterization limited without contrast.      Electronically signed by: Haylee Correia  Date:    03/29/2025  Time:    09:44               Narrative:    EXAMINATION:  CT CHEST ABDOMEN PELVIS WITHOUT CONTRAST(XPD)    CLINICAL HISTORY:  Sepsis;  lower abdominal pain x3 days.  Nausea.  Diarrhea.    TECHNIQUE:  Helical acquisition through the chest, abdomen and pelvis without  IV administration of contrast. Axial, sagittal and coronal reformats interpreted.    Automated tube current modulation, weight-based exposure dosing, and/or iterative reconstruction technique utilized to reach lowest reasonably achievable exposure rate.    DLP: 329 mGy*cm    COMPARISON:  No relevant priors available for comparison at time of this dictation    FINDINGS:  BASE OF NECK: No significant abnormality.    HEART: Normal size.    THORACIC VASCULATURE: Thoracic aorta is unremarkable.    KIARA/MEDIASTINUM: No enlarged lymph nodes by size criteria. Evaluation of hilar lymphadenopathy is limited without contrast.    AIRWAYS: Patent.    LUNGS/PLEURA: Mild dependent atelectasis.    THORACIC SOFT TISSUES: Unremarkable.    LIVER: Normal attenuation. No appreciable focal hepatic lesion.    BILIARY: Gallbladder decompressed.    PANCREAS: No inflammatory  change.    SPLEEN: Normal in size    ADRENALS: No mass.    KIDNEYS/URETERS: Edematous appearance of the right kidney.  Fullness at the upper pole right kidney poorly characterized.  Right perinephric stranding.  No martha hydronephrosis.    GI TRACT/MESENTERY: Evaluation of the bowel is limited without contrast.  No evidence of bowel obstruction or inflammation. The appendix is normal. Colonic diverticulosis without acute inflammatory change.    PERITONEUM: No free fluid.No free air.    LYMPH NODES: No enlarged lymph nodes by size criteria.    ABDOMINOPELVIC VASCULATURE: Aortoiliac atherosclerosis.    BLADDER: Normal appearance given degree of distention.    REPRODUCTIVE ORGANS: Normal as visualized.    ABDOMINAL WALL: Unremarkable.    BONES: No acute osseous abnormality.  Old, healed left rib deformities.                                       X-Ray Chest AP Portable (Final result)  Result time 03/29/25 10:10:10      Final result by Wilmer Hooper MD (03/29/25 10:10:10)                   Impression:      No acute cardiopulmonary process identified.      Electronically signed by: Wilmer Hooper  Date:    03/29/2025  Time:    10:10               Narrative:    EXAMINATION:  XR CHEST AP PORTABLE    CLINICAL HISTORY:  Sepsis; abdominal pain for 3 days.  Nausea and diarrhea.    TECHNIQUE:  One view    COMPARISON:  None available.    FINDINGS:  Cardiopericardial silhouette is within normal limits. Lungs are without dense focal or segmental consolidation, congestive process, pleural effusions or pneumothorax.  Multiple old fractures of the left ribs.                                    EKG:         Telemetry:  SR    Physical Exam  Vitals and nursing note reviewed.   HENT:      Head: Normocephalic.      Nose: Nose normal.      Mouth/Throat:      Mouth: Mucous membranes are dry.   Eyes:      Extraocular Movements: Extraocular movements intact.   Cardiovascular:      Rate and Rhythm: Normal rate and regular rhythm.      Pulses:  Normal pulses.      Heart sounds: Murmur heard.   Pulmonary:      Effort: Pulmonary effort is normal.      Breath sounds: No rales.      Comments: Diminished Lung sounds at bases     On NC 2 L   Abdominal:      Palpations: Abdomen is soft.   Genitourinary:     Comments: + Nguyen  Musculoskeletal:         General: Normal range of motion.      Cervical back: Normal range of motion.      Right lower leg: No edema.      Left lower leg: No edema.   Skin:     General: Skin is warm.      Comments: R CW HD Cath    Neurological:      Mental Status: He is alert and oriented to person, place, and time.   Psychiatric:         Mood and Affect: Mood normal.         Behavior: Behavior normal.         Home Medications:   Medications Ordered Prior to Encounter[1]  Current Schedule Inpatient Medications:   ampicillin-sulbactam  1.5 g Intravenous Q24H    heparin (porcine)  3,000 Units Intravenous Once    pantoprazole  40 mg Intravenous BID     Continuous Infusions:    Assessment:   Anterior Leaflet Vegetation vs Calcified Chordae     - ECHO (4.2.25): There is a echo dense structure on the anterior leaflet, cannot exclude vegetation vs calcified chordae.   Leukocytosis/Sepsis - Improving     - BC positive for E.coli   UTI/Pyelonephritis   Fever   Anemia - Slight Improvement   Non-bleeding Gastric Ulcers   Thrombocytosis - Worsening   Electrolyte Derangements     - Hypomagnesia     - Hypokalemia (Resolved)    - Hyponatremia - Resolved   Metabolic Acidosis   VHD    - Mild MR   HLD  HTN  LEANDRO vs CKD/ESRD - Improving     - on HD   Non-Healing Wounds  Venous Insuffiencey   Vitamin D Deficiency  Neuropathy  History of GI Bleed     Plan:   Plan for MARY Today   Keep NPO   Consents Obtained (Placed on Chart)  Keep Mag > 2 and Potassium > 4    We will sign off post MARY    SUNDAR Yip  Cardiology  [facility]             [1]   No current facility-administered medications on file prior to encounter.     Current  Outpatient Medications on File Prior to Encounter   Medication Sig Dispense Refill    gabapentin (NEURONTIN) 600 MG tablet Take 600 mg by mouth 3 (three) times daily.        English

## 2025-04-17 NOTE — DIETITIAN INITIAL EVALUATION ADULT. - NS FNS SUPPLEMENTS
Quality 226: Preventive Care And Screening: Tobacco Use: Screening And Cessation Intervention: Patient screened for tobacco use and is an ex/non-smoker Detail Level: Generalized TID/Ensure Enlive®

## 2025-04-19 NOTE — CONSULT NOTE ADULT - PROVIDER SPECIALTY LIST ADULT
Cardiology
Gastroenterology
Infectious Disease
[No Acute Distress] : no acute distress
[Speech Grossly Normal] : speech grossly normal
[Alert and Oriented x3] : oriented to person, place, and time

## 2025-06-04 NOTE — DISEASE MANAGEMENT
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

## 2025-09-05 ENCOUNTER — TRANSCRIPTION ENCOUNTER (OUTPATIENT)
Age: 65
End: 2025-09-05